# Patient Record
Sex: MALE | Race: WHITE | Employment: UNEMPLOYED | URBAN - METROPOLITAN AREA
[De-identification: names, ages, dates, MRNs, and addresses within clinical notes are randomized per-mention and may not be internally consistent; named-entity substitution may affect disease eponyms.]

---

## 2017-01-20 ENCOUNTER — HOSPITAL ENCOUNTER (OUTPATIENT)
Dept: GENERAL RADIOLOGY | Age: 57
Discharge: HOME | End: 2017-01-20
Payer: COMMERCIAL

## 2017-01-20 ENCOUNTER — OFFICE VISIT (OUTPATIENT)
Dept: INTERNAL MEDICINE | Age: 57
End: 2017-01-20

## 2017-01-20 VITALS
BODY MASS INDEX: 31.64 KG/M2 | SYSTOLIC BLOOD PRESSURE: 140 MMHG | DIASTOLIC BLOOD PRESSURE: 90 MMHG | TEMPERATURE: 97.8 F | OXYGEN SATURATION: 96 % | WEIGHT: 196 LBS | HEART RATE: 96 BPM

## 2017-01-20 DIAGNOSIS — E78.5 HYPERLIPIDEMIA, UNSPECIFIED HYPERLIPIDEMIA TYPE: ICD-10-CM

## 2017-01-20 DIAGNOSIS — I10 ESSENTIAL HYPERTENSION: ICD-10-CM

## 2017-01-20 DIAGNOSIS — E78.5 HYPERLIPIDEMIA, UNSPECIFIED HYPERLIPIDEMIA TYPE: Primary | ICD-10-CM

## 2017-01-20 DIAGNOSIS — R09.89 CHEST CONGESTION: ICD-10-CM

## 2017-01-20 LAB
ALBUMIN SERPL-MCNC: 4 G/DL (ref 3.9–4.9)
ALP BLD-CCNC: 177 U/L (ref 35–104)
ALT SERPL-CCNC: 34 U/L (ref 0–41)
ANION GAP SERPL CALCULATED.3IONS-SCNC: 13 MEQ/L (ref 7–13)
ANTISTREPTOLYSIN-O: <20 IU/ML (ref 0–200)
AST SERPL-CCNC: 30 U/L (ref 0–40)
BILIRUB SERPL-MCNC: 0.3 MG/DL (ref 0–1.2)
BUN BLDV-MCNC: 23 MG/DL (ref 6–20)
C-REACTIVE PROTEIN: 1 MG/L (ref 0–5)
CALCIUM SERPL-MCNC: 8.8 MG/DL (ref 8.6–10.2)
CHLORIDE BLD-SCNC: 102 MEQ/L (ref 98–107)
CO2: 24 MEQ/L (ref 22–29)
CREAT SERPL-MCNC: 1.23 MG/DL (ref 0.7–1.2)
GFR AFRICAN AMERICAN: >60
GFR NON-AFRICAN AMERICAN: >60
GLOBULIN: 2.5 G/DL (ref 2.3–3.5)
GLUCOSE BLD-MCNC: 211 MG/DL (ref 74–109)
HBA1C MFR BLD: 6.3 % (ref 4.8–5.9)
HCT VFR BLD CALC: 37.9 % (ref 42–52)
HEMOGLOBIN: 12.3 G/DL (ref 14–18)
MCH RBC QN AUTO: 28.9 PG (ref 27–31.3)
MCHC RBC AUTO-ENTMCNC: 32.6 % (ref 33–37)
MCV RBC AUTO: 88.7 FL (ref 80–100)
MONO TEST: NEGATIVE
PDW BLD-RTO: 13.6 % (ref 11.5–14.5)
PLATELET # BLD: 234 K/UL (ref 130–400)
POTASSIUM SERPL-SCNC: 5 MEQ/L (ref 3.5–5.1)
RBC # BLD: 4.27 M/UL (ref 4.7–6.1)
SODIUM BLD-SCNC: 139 MEQ/L (ref 132–144)
TOTAL PROTEIN: 6.5 G/DL (ref 6.4–8.1)
WBC # BLD: 8.5 K/UL (ref 4.8–10.8)

## 2017-01-20 PROCEDURE — 99213 OFFICE O/P EST LOW 20 MIN: CPT | Performed by: INTERNAL MEDICINE

## 2017-01-20 PROCEDURE — 71020 XR CHEST STANDARD TWO VW: CPT

## 2017-01-20 RX ORDER — NICOTINE POLACRILEX 4 MG
15 LOZENGE BUCCAL
COMMUNITY
Start: 2015-11-11 | End: 2017-05-01 | Stop reason: ALTCHOICE

## 2017-01-20 RX ORDER — SYRINGE AND NEEDLE,INSULIN,1ML 29 G X1/2"
SYRINGE, EMPTY DISPOSABLE MISCELLANEOUS
COMMUNITY
Start: 2016-12-21 | End: 2021-10-25

## 2017-01-20 RX ORDER — AZITHROMYCIN 250 MG/1
TABLET, FILM COATED ORAL
Qty: 1 PACKET | Refills: 0 | Status: SHIPPED | OUTPATIENT
Start: 2017-01-20 | End: 2017-01-30

## 2017-01-20 ASSESSMENT — ENCOUNTER SYMPTOMS
SINUS PRESSURE: 1
ABDOMINAL PAIN: 0
VOMITING: 0
ANAL BLEEDING: 0
BACK PAIN: 0
TROUBLE SWALLOWING: 0
SHORTNESS OF BREATH: 1
SORE THROAT: 0
NAUSEA: 1
COUGH: 1
CONSTIPATION: 0
DIARRHEA: 0

## 2017-03-07 ENCOUNTER — OFFICE VISIT (OUTPATIENT)
Dept: INTERNAL MEDICINE | Age: 57
End: 2017-03-07

## 2017-03-07 VITALS
HEART RATE: 84 BPM | OXYGEN SATURATION: 98 % | WEIGHT: 195.4 LBS | TEMPERATURE: 94.9 F | SYSTOLIC BLOOD PRESSURE: 140 MMHG | BODY MASS INDEX: 31.54 KG/M2 | DIASTOLIC BLOOD PRESSURE: 100 MMHG

## 2017-03-07 DIAGNOSIS — I10 ESSENTIAL HYPERTENSION: Primary | ICD-10-CM

## 2017-03-07 DIAGNOSIS — I63.9 CEREBROVASCULAR ACCIDENT (CVA), UNSPECIFIED MECHANISM (HCC): ICD-10-CM

## 2017-03-07 DIAGNOSIS — R09.81 SINUS CONGESTION: ICD-10-CM

## 2017-03-07 DIAGNOSIS — R53.1 WEAKNESS: ICD-10-CM

## 2017-03-07 DIAGNOSIS — E78.5 HYPERLIPIDEMIA, UNSPECIFIED HYPERLIPIDEMIA TYPE: ICD-10-CM

## 2017-03-07 PROCEDURE — 99214 OFFICE O/P EST MOD 30 MIN: CPT | Performed by: INTERNAL MEDICINE

## 2017-03-07 PROCEDURE — 96372 THER/PROPH/DIAG INJ SC/IM: CPT | Performed by: INTERNAL MEDICINE

## 2017-03-07 RX ORDER — FEXOFENADINE HCL 180 MG/1
180 TABLET ORAL DAILY
Qty: 30 TABLET | Refills: 3 | Status: SHIPPED | OUTPATIENT
Start: 2017-03-07 | End: 2017-12-12 | Stop reason: ALTCHOICE

## 2017-03-07 RX ORDER — CYANOCOBALAMIN 1000 UG/ML
1000 INJECTION INTRAMUSCULAR; SUBCUTANEOUS ONCE
Status: COMPLETED | OUTPATIENT
Start: 2017-03-07 | End: 2017-03-07

## 2017-03-07 RX ADMIN — CYANOCOBALAMIN 1000 MCG: 1000 INJECTION INTRAMUSCULAR; SUBCUTANEOUS at 11:20

## 2017-03-07 ASSESSMENT — ENCOUNTER SYMPTOMS
COLOR CHANGE: 0
WHEEZING: 0
TROUBLE SWALLOWING: 0
ABDOMINAL PAIN: 0
NAUSEA: 0
ANAL BLEEDING: 0
BACK PAIN: 0
SORE THROAT: 0
SHORTNESS OF BREATH: 1
CONSTIPATION: 0
COUGH: 0
DIARRHEA: 0
VOMITING: 0

## 2017-03-07 ASSESSMENT — PATIENT HEALTH QUESTIONNAIRE - PHQ9
1. LITTLE INTEREST OR PLEASURE IN DOING THINGS: 0
SUM OF ALL RESPONSES TO PHQ QUESTIONS 1-9: 0
2. FEELING DOWN, DEPRESSED OR HOPELESS: 0
SUM OF ALL RESPONSES TO PHQ9 QUESTIONS 1 & 2: 0

## 2017-04-03 ENCOUNTER — OFFICE VISIT (OUTPATIENT)
Dept: INTERNAL MEDICINE | Age: 57
End: 2017-04-03

## 2017-04-03 VITALS
HEART RATE: 87 BPM | SYSTOLIC BLOOD PRESSURE: 172 MMHG | DIASTOLIC BLOOD PRESSURE: 100 MMHG | HEIGHT: 66 IN | OXYGEN SATURATION: 97 % | TEMPERATURE: 97.6 F | BODY MASS INDEX: 31.66 KG/M2 | WEIGHT: 197 LBS

## 2017-04-03 DIAGNOSIS — I69.354 HEMIPARESIS AFFECTING LEFT SIDE AS LATE EFFECT OF CEREBROVASCULAR ACCIDENT (CVA) (HCC): Chronic | ICD-10-CM

## 2017-04-03 DIAGNOSIS — I10 HYPERTENSION, UNCONTROLLED: Primary | ICD-10-CM

## 2017-04-03 PROCEDURE — 99213 OFFICE O/P EST LOW 20 MIN: CPT | Performed by: INTERNAL MEDICINE

## 2017-04-03 RX ORDER — VALSARTAN AND HYDROCHLOROTHIAZIDE 80; 12.5 MG/1; MG/1
1 TABLET, FILM COATED ORAL DAILY
Qty: 30 TABLET | Refills: 3 | Status: SHIPPED | OUTPATIENT
Start: 2017-04-03 | End: 2017-05-01

## 2017-04-03 ASSESSMENT — ENCOUNTER SYMPTOMS
BLOOD IN STOOL: 0
COUGH: 0
TROUBLE SWALLOWING: 0
ORTHOPNEA: 0
SHORTNESS OF BREATH: 0
CHOKING: 0
BACK PAIN: 0
RESPIRATORY NEGATIVE: 1
ABDOMINAL PAIN: 0

## 2017-05-01 ENCOUNTER — OFFICE VISIT (OUTPATIENT)
Dept: INTERNAL MEDICINE | Age: 57
End: 2017-05-01

## 2017-05-01 VITALS
OXYGEN SATURATION: 99 % | SYSTOLIC BLOOD PRESSURE: 140 MMHG | RESPIRATION RATE: 16 BRPM | HEIGHT: 66 IN | TEMPERATURE: 98.7 F | BODY MASS INDEX: 31.18 KG/M2 | DIASTOLIC BLOOD PRESSURE: 100 MMHG | WEIGHT: 194 LBS | HEART RATE: 84 BPM

## 2017-05-01 DIAGNOSIS — E78.2 MIXED HYPERLIPIDEMIA: Chronic | ICD-10-CM

## 2017-05-01 DIAGNOSIS — I10 HYPERTENSION, UNCONTROLLED: Primary | Chronic | ICD-10-CM

## 2017-05-01 PROCEDURE — 99213 OFFICE O/P EST LOW 20 MIN: CPT | Performed by: INTERNAL MEDICINE

## 2017-05-01 RX ORDER — BENZOCAINE/MENTHOL 6 MG-10 MG
LOZENGE MUCOUS MEMBRANE
Refills: 0 | COMMUNITY
Start: 2017-04-03 | End: 2017-09-12 | Stop reason: ALTCHOICE

## 2017-05-01 RX ORDER — ECHINACEA PURPUREA EXTRACT 125 MG
1 TABLET ORAL 4 TIMES DAILY PRN
Qty: 1 BOTTLE | Refills: 3 | Status: SHIPPED | OUTPATIENT
Start: 2017-05-01 | End: 2019-06-24 | Stop reason: ALTCHOICE

## 2017-05-01 RX ORDER — ATORVASTATIN CALCIUM 80 MG/1
TABLET, FILM COATED ORAL
Qty: 90 TABLET | Refills: 1 | Status: SHIPPED | OUTPATIENT
Start: 2017-05-01 | End: 2017-12-16 | Stop reason: SDUPTHER

## 2017-05-01 RX ORDER — LOSARTAN POTASSIUM 100 MG/1
100 TABLET ORAL DAILY
Qty: 90 TABLET | Refills: 1 | Status: SHIPPED | OUTPATIENT
Start: 2017-05-01 | End: 2017-07-07

## 2017-05-01 RX ORDER — ATORVASTATIN CALCIUM 80 MG/1
TABLET, FILM COATED ORAL
Qty: 90 TABLET | Refills: 1 | Status: CANCELLED | OUTPATIENT
Start: 2017-05-01

## 2017-05-01 RX ORDER — LOSARTAN POTASSIUM 50 MG/1
50 TABLET ORAL DAILY
Refills: 6 | COMMUNITY
Start: 2017-04-19 | End: 2017-05-01 | Stop reason: SDUPTHER

## 2017-05-01 ASSESSMENT — ENCOUNTER SYMPTOMS
BLOOD IN STOOL: 0
SHORTNESS OF BREATH: 0
BACK PAIN: 0
ORTHOPNEA: 0
TROUBLE SWALLOWING: 0
COUGH: 0
CHOKING: 0
ABDOMINAL PAIN: 0
RESPIRATORY NEGATIVE: 1

## 2017-06-30 RX ORDER — VALSARTAN AND HYDROCHLOROTHIAZIDE 80; 12.5 MG/1; MG/1
TABLET, FILM COATED ORAL
COMMUNITY
Start: 2017-06-29 | End: 2017-07-07

## 2017-08-01 ENCOUNTER — OFFICE VISIT (OUTPATIENT)
Dept: INTERNAL MEDICINE | Age: 57
End: 2017-08-01

## 2017-08-01 VITALS
HEART RATE: 85 BPM | WEIGHT: 194 LBS | OXYGEN SATURATION: 95 % | TEMPERATURE: 98.4 F | HEIGHT: 66 IN | BODY MASS INDEX: 31.18 KG/M2 | SYSTOLIC BLOOD PRESSURE: 138 MMHG | DIASTOLIC BLOOD PRESSURE: 88 MMHG

## 2017-08-01 DIAGNOSIS — E78.2 MIXED HYPERLIPIDEMIA: Chronic | ICD-10-CM

## 2017-08-01 DIAGNOSIS — I10 ESSENTIAL HYPERTENSION: Primary | Chronic | ICD-10-CM

## 2017-08-01 PROCEDURE — 99213 OFFICE O/P EST LOW 20 MIN: CPT | Performed by: INTERNAL MEDICINE

## 2017-08-01 ASSESSMENT — ENCOUNTER SYMPTOMS
TROUBLE SWALLOWING: 0
BLOOD IN STOOL: 0
ABDOMINAL PAIN: 0
RESPIRATORY NEGATIVE: 1
ORTHOPNEA: 0
SHORTNESS OF BREATH: 0
COUGH: 0
BACK PAIN: 0
CHOKING: 0

## 2017-09-12 ENCOUNTER — OFFICE VISIT (OUTPATIENT)
Dept: INTERNAL MEDICINE | Age: 57
End: 2017-09-12

## 2017-09-12 VITALS
DIASTOLIC BLOOD PRESSURE: 80 MMHG | HEIGHT: 66 IN | SYSTOLIC BLOOD PRESSURE: 130 MMHG | WEIGHT: 185 LBS | HEART RATE: 90 BPM | BODY MASS INDEX: 29.73 KG/M2 | TEMPERATURE: 97.4 F | OXYGEN SATURATION: 99 %

## 2017-09-12 DIAGNOSIS — M21.42 PES PLANUS OF BOTH FEET: ICD-10-CM

## 2017-09-12 DIAGNOSIS — Z79.4 CONTROLLED TYPE 2 DIABETES MELLITUS WITHOUT COMPLICATION, WITH LONG-TERM CURRENT USE OF INSULIN (HCC): Primary | Chronic | ICD-10-CM

## 2017-09-12 DIAGNOSIS — M21.41 PES PLANUS OF BOTH FEET: ICD-10-CM

## 2017-09-12 DIAGNOSIS — I10 ESSENTIAL HYPERTENSION: Chronic | ICD-10-CM

## 2017-09-12 DIAGNOSIS — E11.9 CONTROLLED TYPE 2 DIABETES MELLITUS WITHOUT COMPLICATION, WITH LONG-TERM CURRENT USE OF INSULIN (HCC): Primary | Chronic | ICD-10-CM

## 2017-09-12 PROCEDURE — 99213 OFFICE O/P EST LOW 20 MIN: CPT | Performed by: INTERNAL MEDICINE

## 2017-09-12 RX ORDER — ASPIRIN 81 MG/1
TABLET ORAL
COMMUNITY
Start: 2017-08-16 | End: 2017-09-12 | Stop reason: SDUPTHER

## 2017-09-12 ASSESSMENT — ENCOUNTER SYMPTOMS
RESPIRATORY NEGATIVE: 1
BLOOD IN STOOL: 0
BLURRED VISION: 0
ABDOMINAL PAIN: 0
SHORTNESS OF BREATH: 0
CHOKING: 0
BACK PAIN: 0
COUGH: 0
TROUBLE SWALLOWING: 0

## 2017-09-13 DIAGNOSIS — Z79.4 CONTROLLED TYPE 2 DIABETES MELLITUS WITHOUT COMPLICATION, WITH LONG-TERM CURRENT USE OF INSULIN (HCC): Primary | ICD-10-CM

## 2017-09-13 DIAGNOSIS — E11.9 CONTROLLED TYPE 2 DIABETES MELLITUS WITHOUT COMPLICATION, WITH LONG-TERM CURRENT USE OF INSULIN (HCC): Primary | ICD-10-CM

## 2017-09-13 LAB — HBA1C MFR BLD: 5.4 %

## 2017-09-13 PROCEDURE — 83036 HEMOGLOBIN GLYCOSYLATED A1C: CPT | Performed by: INTERNAL MEDICINE

## 2017-11-01 DIAGNOSIS — E11.9 CONTROLLED TYPE 2 DIABETES MELLITUS WITHOUT COMPLICATION, WITH LONG-TERM CURRENT USE OF INSULIN (HCC): Chronic | ICD-10-CM

## 2017-11-01 DIAGNOSIS — Z79.4 CONTROLLED TYPE 2 DIABETES MELLITUS WITHOUT COMPLICATION, WITH LONG-TERM CURRENT USE OF INSULIN (HCC): Chronic | ICD-10-CM

## 2017-11-01 LAB
CHOLESTEROL, TOTAL: 117 MG/DL (ref 0–199)
HDLC SERPL-MCNC: 58 MG/DL (ref 40–59)
LDL CHOLESTEROL CALCULATED: 41 MG/DL (ref 0–129)
TRIGL SERPL-MCNC: 90 MG/DL (ref 0–200)

## 2017-12-12 ENCOUNTER — OFFICE VISIT (OUTPATIENT)
Dept: INTERNAL MEDICINE | Age: 57
End: 2017-12-12

## 2017-12-12 VITALS
HEIGHT: 66 IN | DIASTOLIC BLOOD PRESSURE: 100 MMHG | WEIGHT: 184 LBS | HEART RATE: 90 BPM | SYSTOLIC BLOOD PRESSURE: 162 MMHG | TEMPERATURE: 96.7 F | BODY MASS INDEX: 29.57 KG/M2

## 2017-12-12 DIAGNOSIS — I10 UNCONTROLLED HYPERTENSION: Primary | ICD-10-CM

## 2017-12-12 DIAGNOSIS — J30.89 CHRONIC NON-SEASONAL ALLERGIC RHINITIS, UNSPECIFIED TRIGGER: Chronic | ICD-10-CM

## 2017-12-12 PROCEDURE — 99213 OFFICE O/P EST LOW 20 MIN: CPT | Performed by: INTERNAL MEDICINE

## 2017-12-12 PROCEDURE — 3017F COLORECTAL CA SCREEN DOC REV: CPT | Performed by: INTERNAL MEDICINE

## 2017-12-12 PROCEDURE — G8484 FLU IMMUNIZE NO ADMIN: HCPCS | Performed by: INTERNAL MEDICINE

## 2017-12-12 PROCEDURE — G8427 DOCREV CUR MEDS BY ELIG CLIN: HCPCS | Performed by: INTERNAL MEDICINE

## 2017-12-12 PROCEDURE — G8598 ASA/ANTIPLAT THER USED: HCPCS | Performed by: INTERNAL MEDICINE

## 2017-12-12 PROCEDURE — 1036F TOBACCO NON-USER: CPT | Performed by: INTERNAL MEDICINE

## 2017-12-12 PROCEDURE — G8417 CALC BMI ABV UP PARAM F/U: HCPCS | Performed by: INTERNAL MEDICINE

## 2017-12-12 RX ORDER — MONTELUKAST SODIUM 10 MG/1
10 TABLET ORAL NIGHTLY
Qty: 30 TABLET | Refills: 3 | Status: SHIPPED | OUTPATIENT
Start: 2017-12-12 | End: 2018-04-02 | Stop reason: SDUPTHER

## 2017-12-12 ASSESSMENT — ENCOUNTER SYMPTOMS
RESPIRATORY NEGATIVE: 1
CHOKING: 0
BLOOD IN STOOL: 0
SINUS PRESSURE: 0
ORTHOPNEA: 0
RHINORRHEA: 1
COUGH: 0
SINUS PAIN: 0
ABDOMINAL PAIN: 0
SHORTNESS OF BREATH: 0
BACK PAIN: 0
TROUBLE SWALLOWING: 0

## 2017-12-12 NOTE — PROGRESS NOTES
Travis Estrada is a 56 y.o. male with history of recurrent strokes, diabetes mellitus, who presents with     Chief Complaint   Patient presents with    Hypertension    Nasal Congestion     Since the past office visit, the patient has continued to live independently. He comes today to the office visit accompanied with his mother. He had no accidents and no emergency room visits or hospital admissions. He states he takes his medications daily, does not check blood pressures at home. The following laboratory reports since the past visit were reviewed (the ones pertinent to today's visit were discussed with the patient):    Orders Only on 11/01/2017   Component Date Value Ref Range Status    Cholesterol, Total 11/01/2017 117  0 - 199 mg/dL Final    Triglycerides 11/01/2017 90  0 - 200 mg/dL Final    HDL 11/01/2017 58  40 - 59 mg/dL Final    Comment: ATP III HDL Cholesterol Classification is Desirable. Expected Values:    Males:    >55 = No Risk            35-55 = Moderate Risk            <35 = High Risk    Females:  >65 = No Risk            45-65 = Moderate Risk            <45 = High Risk    NCEP Guidelines: Third Report May 2001  >59 = negative risk factor for CHD  <40 = major risk factor for CHD      LDL Calculated 11/01/2017 41  0 - 129 mg/dL Final   Orders Only on 09/13/2017   Component Date Value Ref Range Status    Hemoglobin A1C 09/13/2017 5.4  % Final       HPI:    Hypertension   This is a chronic problem. The current episode started more than 1 month ago. The problem is resistant. Associated symptoms include anxiety. Pertinent negatives include no chest pain, headaches, malaise/fatigue, neck pain, orthopnea, palpitations, peripheral edema, PND or shortness of breath. There are no associated agents to hypertension. Risk factors for coronary artery disease include male gender, sedentary lifestyle, stress, diabetes mellitus and obesity. Past treatments include angiotensin blockers and diuretics.  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 No pertinent family history. Family and social history were reviewed and pertinent changes documented. ALLERGIES    Review of patient's allergies indicates no known allergies. Current Outpatient Prescriptions on File Prior to Visit   Medication Sig Dispense Refill    aspirin 81 MG tablet Take 1 tablet by mouth 2 times daily 60 tablet 5    insulin 70-30 (HUMULIN;NOVOLIN) (70-30) 100 UNIT per ML injection vial Inject 20 Units subcutaneously before breakfast and 20 Units before dinner. 1 vial 5    valsartan-hydrochlorothiazide (DIOVAN-HCT) 80-12.5 MG per tablet TAKE 1 TABLET BY MOUTH DAILY 30 tablet 5    atorvastatin (LIPITOR) 80 MG tablet TAKE 1 TABLET BY MOUTH DAILY 90 tablet 1    budesonide (RHINOCORT AQUA) 32 MCG/ACT nasal spray 1 spray by Nasal route daily 1 Bottle 2    sodium chloride (ALTAMIST SPRAY) 0.65 % nasal spray 1 spray by Nasal route 4 times daily as needed for Congestion 1 Bottle 3    RELION INSULIN SYR 0.3CC/30G 30G X 5/16\" 0.3 ML MISC        No current facility-administered medications on file prior to visit. Review of Systems   Constitutional: Negative for activity change, appetite change and malaise/fatigue. HENT: Positive for congestion, hearing loss, postnasal drip and rhinorrhea. Negative for drooling, nosebleeds, sinus pain, sinus pressure and trouble swallowing. Eyes: Negative for visual disturbance. Respiratory: Negative. Negative for cough, choking and shortness of breath. Cardiovascular: Negative. Negative for chest pain, palpitations, orthopnea and PND. Gastrointestinal: Negative for abdominal pain and blood in stool.    Endocrine: Negative for cold (around 3/12/2018). I have reviewed the patient's medical and surgical, family and social history, health maintenance schedule, and updated the computerized patient record. Please note this report has been partially produced by using speech recognition hardware. It may contain errors related to the system, including grammar, punctuation and spelling as well as words and phrases that may seem inaccurate. For any questions or concerns, please feel free to contact me for clarification.         Electronically signed by Corey Wise MD

## 2017-12-12 NOTE — PATIENT INSTRUCTIONS
sodium\" may still have too much sodium. Be sure to read the label to see how much sodium you are getting. · Buy fresh vegetables, or frozen vegetables without added sauces. Buy low-sodium versions of canned vegetables, soups, and other canned goods. Prepare low-sodium meals  · Cut back on the amount of salt you use in cooking. This will help you adjust to the taste. Do not add salt after cooking. One teaspoon of salt has about 2,300 mg of sodium. · Take the salt shaker off the table. · Flavor your food with garlic, lemon juice, onion, vinegar, herbs, and spices. Do not use soy sauce, lite soy sauce, steak sauce, onion salt, garlic salt, celery salt, mustard, or ketchup on your food. · Use low-sodium salad dressings, sauces, and ketchup. Or make your own salad dressings and sauces without adding salt. · Use less salt (or none) when recipes call for it. You can often use half the salt a recipe calls for without losing flavor. Other foods such as rice, pasta, and grains do not need added salt. · Rinse canned vegetables, and cook them in fresh water. This removes some-but not all-of the salt. · Avoid water that is naturally high in sodium or that has been treated with water softeners, which add sodium. Call your local water company to find out the sodium content of your water supply. If you buy bottled water, read the label and choose a sodium-free brand. Avoid high-sodium foods  · Avoid eating:  ¨ Smoked, cured, salted, and canned meat, fish, and poultry. ¨ Ham, rosenbaum, hot dogs, and luncheon meats. ¨ Regular, hard, and processed cheese and regular peanut butter. ¨ Crackers with salted tops, and other salted snack foods such as pretzels, chips, and salted popcorn. ¨ Frozen prepared meals, unless labeled low-sodium. ¨ Canned and dried soups, broths, and bouillon, unless labeled sodium-free or low-sodium. ¨ Canned vegetables, unless labeled sodium-free or low-sodium.   ¨ Western Liliana fries, pizza, tacos, and other fast foods. ¨ Pickles, olives, ketchup, and other condiments, especially soy sauce, unless labeled sodium-free or low-sodium. Where can you learn more? Go to https://Ko[employer] [address].[employer]. org and sign in to your [employer] account. Enter N321 in the [facility] box to learn more about \"Low Sodium Diet (2,000 Milligram): Care Instructions. \"     If you do not have an account, please click on the \"Sign Up Now\" link. Current as of: May 12, 2017  Content Version: 11.4  © 1700-9163 [employer]. Care instructions adapted under license by [facility] ([facility]). If you have questions about a medical condition or this instruction, always ask your healthcare professional. [address] any warranty or liability for your use of this information. Patient Education        DASH Diet: Care Instructions  Your Care Instructions    The DASH diet is an eating plan that can help lower your blood pressure. DASH stands for Dietary Approaches to Stop Hypertension. Hypertension is high blood pressure. The DASH diet focuses on eating foods that are high in calcium, potassium, and magnesium. These nutrients can lower blood pressure. The foods that are highest in these nutrients are fruits, vegetables, low-fat dairy products, nuts, seeds, and legumes. But taking calcium, potassium, and magnesium supplements instead of eating foods that are high in those nutrients does not have the same effect. The DASH diet also includes whole grains, fish, and poultry. The DASH diet is one of several lifestyle changes your doctor may recommend to lower your high blood pressure. Your doctor may also want you to decrease the amount of sodium in your diet. Lowering sodium while following the DASH diet can lower blood pressure even further than just the DASH diet alone. Follow-up care is a key part of your treatment and safety.  Be sure to make and go to all appointments, and call your doctor if you are having problems. It's also a good idea to know your test results and keep a list of the medicines you take. How can you care for yourself at home? Following the DASH diet  · Eat 4 to 5 servings of fruit each day. A serving is 1 medium-sized piece of fruit, ½ cup chopped or canned fruit, 1/4 cup dried fruit, or 4 ounces (½ cup) of fruit juice. Choose fruit more often than fruit juice. · Eat 4 to 5 servings of vegetables each day. A serving is 1 cup of lettuce or raw leafy vegetables, ½ cup of chopped or cooked vegetables, or 4 ounces (½ cup) of vegetable juice. Choose vegetables more often than vegetable juice. · Get 2 to 3 servings of low-fat and fat-free dairy each day. A serving is 8 ounces of milk, 1 cup of yogurt, or 1 ½ ounces of cheese. · Eat 6 to 8 servings of grains each day. A serving is 1 slice of bread, 1 ounce of dry cereal, or ½ cup of cooked rice, pasta, or cooked cereal. Try to choose whole-grain products as much as possible. · Limit lean meat, poultry, and fish to 2 servings each day. A serving is 3 ounces, about the size of a deck of cards. · Eat 4 to 5 servings of nuts, seeds, and legumes (cooked dried beans, lentils, and split peas) each week. A serving is 1/3 cup of nuts, 2 tablespoons of seeds, or ½ cup of cooked beans or peas. · Limit fats and oils to 2 to 3 servings each day. A serving is 1 teaspoon of vegetable oil or 2 tablespoons of salad dressing. · Limit sweets and added sugars to 5 servings or less a week. A serving is 1 tablespoon jelly or jam, ½ cup sorbet, or 1 cup of lemonade. · Eat less than 2,300 milligrams (mg) of sodium a day. If you limit your sodium to 1,500 mg a day, you can lower your blood pressure even more. Tips for success  · Start small. Do not try to make dramatic changes to your diet all at once. You might feel that you are missing out on your favorite foods and then be more likely to not follow the plan. Make small changes, and stick with them.  Once those changes become habit, add a few more changes. · Try some of the following:  ¨ Make it a goal to eat a fruit or vegetable at every meal and at snacks. This will make it easy to get the recommended amount of fruits and vegetables each day. ¨ Try yogurt topped with fruit and nuts for a snack or healthy dessert. ¨ Add lettuce, tomato, cucumber, and onion to sandwiches. ¨ Combine a ready-made pizza crust with low-fat mozzarella cheese and lots of vegetable toppings. Try using tomatoes, squash, spinach, broccoli, carrots, cauliflower, and onions. ¨ Have a variety of cut-up vegetables with a low-fat dip as an appetizer instead of chips and dip. ¨ Sprinkle sunflower seeds or chopped almonds over salads. Or try adding chopped walnuts or almonds to cooked vegetables. ¨ Try some vegetarian meals using beans and peas. Add garbanzo or kidney beans to salads. Make burritos and tacos with mashed awad beans or black beans. Where can you learn more? Go to https://ViewdlepepicewTRAKLOK.[employer]. org and sign in to your [employer] account. Enter E926 in the [employer] box to learn more about \"DASH Diet: Care Instructions. \"     If you do not have an account, please click on the \"Sign Up Now\" link. Current as of: September 21, 2016  Content Version: 11.4  © 9928-8081 [employer]. Care instructions adapted under license by [facility] ([facility]). If you have questions about a medical condition or this instruction, always ask your healthcare professional. [place] [address] any warranty or liability for your use of this information. Patient Education        Learning About High Blood Pressure  What is high blood pressure? Blood pressure is a measure of how hard the blood pushes against the walls of your arteries. It's normal for blood pressure to go up and down throughout the day, but if it stays up, you have high blood pressure. Another name for high blood pressure is hypertension.   Two numbers tell

## 2017-12-18 RX ORDER — ATORVASTATIN CALCIUM 80 MG/1
TABLET, FILM COATED ORAL
Qty: 90 TABLET | Refills: 1 | Status: SHIPPED | OUTPATIENT
Start: 2017-12-18 | End: 2018-04-03 | Stop reason: SDUPTHER

## 2017-12-19 RX ORDER — VALSARTAN AND HYDROCHLOROTHIAZIDE 80; 12.5 MG/1; MG/1
TABLET, FILM COATED ORAL
Qty: 30 TABLET | Refills: 5 | Status: SHIPPED | OUTPATIENT
Start: 2017-12-19 | End: 2018-01-16 | Stop reason: SDUPTHER

## 2018-01-16 RX ORDER — VALSARTAN AND HYDROCHLOROTHIAZIDE 80; 12.5 MG/1; MG/1
TABLET, FILM COATED ORAL
Qty: 30 TABLET | Refills: 5 | Status: SHIPPED | OUTPATIENT
Start: 2018-01-16 | End: 2018-07-03 | Stop reason: SDUPTHER

## 2018-01-16 RX ORDER — ASPIRIN 81 MG/1
TABLET ORAL
Refills: 5 | COMMUNITY
Start: 2018-01-10 | End: 2018-07-03 | Stop reason: SDUPTHER

## 2018-04-02 RX ORDER — MONTELUKAST SODIUM 10 MG/1
10 TABLET ORAL NIGHTLY
Qty: 30 TABLET | Refills: 3 | Status: SHIPPED | OUTPATIENT
Start: 2018-04-02 | End: 2019-06-24 | Stop reason: ALTCHOICE

## 2018-04-03 ENCOUNTER — OFFICE VISIT (OUTPATIENT)
Dept: INTERNAL MEDICINE CLINIC | Age: 58
End: 2018-04-03
Payer: MEDICARE

## 2018-04-03 VITALS
BODY MASS INDEX: 29.57 KG/M2 | DIASTOLIC BLOOD PRESSURE: 94 MMHG | OXYGEN SATURATION: 98 % | TEMPERATURE: 97.7 F | HEIGHT: 66 IN | HEART RATE: 80 BPM | WEIGHT: 184 LBS | SYSTOLIC BLOOD PRESSURE: 158 MMHG

## 2018-04-03 DIAGNOSIS — Z79.4 CONTROLLED TYPE 2 DIABETES MELLITUS WITHOUT COMPLICATION, WITH LONG-TERM CURRENT USE OF INSULIN (HCC): Primary | ICD-10-CM

## 2018-04-03 DIAGNOSIS — E11.9 CONTROLLED TYPE 2 DIABETES MELLITUS WITHOUT COMPLICATION, WITH LONG-TERM CURRENT USE OF INSULIN (HCC): Primary | ICD-10-CM

## 2018-04-03 LAB — HBA1C MFR BLD: 6.3 %

## 2018-04-03 PROCEDURE — 83036 HEMOGLOBIN GLYCOSYLATED A1C: CPT | Performed by: INTERNAL MEDICINE

## 2018-04-03 PROCEDURE — 99213 OFFICE O/P EST LOW 20 MIN: CPT | Performed by: INTERNAL MEDICINE

## 2018-04-03 RX ORDER — ATORVASTATIN CALCIUM 80 MG/1
TABLET, FILM COATED ORAL
Qty: 90 TABLET | Refills: 1 | Status: SHIPPED | OUTPATIENT
Start: 2018-04-03 | End: 2018-06-30 | Stop reason: SDUPTHER

## 2018-04-03 ASSESSMENT — ENCOUNTER SYMPTOMS
SHORTNESS OF BREATH: 0
SINUS PRESSURE: 0
COUGH: 0
ABDOMINAL PAIN: 0
SINUS PAIN: 0
EYE ITCHING: 0
BLURRED VISION: 0
EYE PAIN: 0
EYE REDNESS: 0
RESPIRATORY NEGATIVE: 1
EYE DISCHARGE: 0
BLOOD IN STOOL: 0
TROUBLE SWALLOWING: 0
CHOKING: 0
RHINORRHEA: 1
ABDOMINAL DISTENTION: 0
BACK PAIN: 0

## 2018-04-03 ASSESSMENT — PATIENT HEALTH QUESTIONNAIRE - PHQ9
2. FEELING DOWN, DEPRESSED OR HOPELESS: 0
1. LITTLE INTEREST OR PLEASURE IN DOING THINGS: 0
SUM OF ALL RESPONSES TO PHQ9 QUESTIONS 1 & 2: 0
SUM OF ALL RESPONSES TO PHQ QUESTIONS 1-9: 0

## 2018-04-09 ENCOUNTER — HOSPITAL ENCOUNTER (OUTPATIENT)
Dept: ULTRASOUND IMAGING | Age: 58
Discharge: HOME | End: 2018-04-11
Payer: MEDICARE

## 2018-04-09 DIAGNOSIS — I65.23 BILATERAL CAROTID ARTERY STENOSIS: ICD-10-CM

## 2018-04-09 PROCEDURE — 93880 EXTRACRANIAL BILAT STUDY: CPT

## 2018-04-16 DIAGNOSIS — Z79.4 CONTROLLED TYPE 2 DIABETES MELLITUS WITHOUT COMPLICATION, WITH LONG-TERM CURRENT USE OF INSULIN (HCC): ICD-10-CM

## 2018-04-16 DIAGNOSIS — E11.9 CONTROLLED TYPE 2 DIABETES MELLITUS WITHOUT COMPLICATION, WITH LONG-TERM CURRENT USE OF INSULIN (HCC): ICD-10-CM

## 2018-04-16 PROBLEM — R80.9 MICROALBUMINURIA: Status: ACTIVE | Noted: 2018-01-01

## 2018-04-16 LAB
CREATININE URINE: 74.1 MG/DL
MICROALBUMIN UR-MCNC: 2.1 MG/DL
MICROALBUMIN/CREAT UR-RTO: 28.3 MG/G (ref 0–30)

## 2018-07-02 RX ORDER — ATORVASTATIN CALCIUM 80 MG/1
TABLET, FILM COATED ORAL
Qty: 90 TABLET | Refills: 1 | Status: SHIPPED | OUTPATIENT
Start: 2018-07-02 | End: 2018-12-11 | Stop reason: SDUPTHER

## 2018-07-03 ENCOUNTER — OFFICE VISIT (OUTPATIENT)
Dept: INTERNAL MEDICINE CLINIC | Age: 58
End: 2018-07-03
Payer: MEDICARE

## 2018-07-03 VITALS
DIASTOLIC BLOOD PRESSURE: 90 MMHG | SYSTOLIC BLOOD PRESSURE: 130 MMHG | HEIGHT: 66 IN | HEART RATE: 76 BPM | TEMPERATURE: 98.5 F | BODY MASS INDEX: 29.09 KG/M2 | WEIGHT: 181 LBS | OXYGEN SATURATION: 98 %

## 2018-07-03 DIAGNOSIS — I10 ESSENTIAL HYPERTENSION: Chronic | ICD-10-CM

## 2018-07-03 DIAGNOSIS — E11.29 CONTROLLED TYPE 2 DIABETES MELLITUS WITH MICROALBUMINURIA, WITH LONG-TERM CURRENT USE OF INSULIN (HCC): Primary | Chronic | ICD-10-CM

## 2018-07-03 DIAGNOSIS — R80.9 CONTROLLED TYPE 2 DIABETES MELLITUS WITH MICROALBUMINURIA, WITH LONG-TERM CURRENT USE OF INSULIN (HCC): Primary | Chronic | ICD-10-CM

## 2018-07-03 DIAGNOSIS — Z79.4 CONTROLLED TYPE 2 DIABETES MELLITUS WITH MICROALBUMINURIA, WITH LONG-TERM CURRENT USE OF INSULIN (HCC): Primary | Chronic | ICD-10-CM

## 2018-07-03 PROCEDURE — 99214 OFFICE O/P EST MOD 30 MIN: CPT | Performed by: INTERNAL MEDICINE

## 2018-07-03 PROCEDURE — 3044F HG A1C LEVEL LT 7.0%: CPT | Performed by: INTERNAL MEDICINE

## 2018-07-03 PROCEDURE — G8417 CALC BMI ABV UP PARAM F/U: HCPCS | Performed by: INTERNAL MEDICINE

## 2018-07-03 PROCEDURE — 3017F COLORECTAL CA SCREEN DOC REV: CPT | Performed by: INTERNAL MEDICINE

## 2018-07-03 PROCEDURE — G8598 ASA/ANTIPLAT THER USED: HCPCS | Performed by: INTERNAL MEDICINE

## 2018-07-03 PROCEDURE — 1036F TOBACCO NON-USER: CPT | Performed by: INTERNAL MEDICINE

## 2018-07-03 PROCEDURE — 2022F DILAT RTA XM EVC RTNOPTHY: CPT | Performed by: INTERNAL MEDICINE

## 2018-07-03 PROCEDURE — G8427 DOCREV CUR MEDS BY ELIG CLIN: HCPCS | Performed by: INTERNAL MEDICINE

## 2018-07-03 RX ORDER — VALSARTAN AND HYDROCHLOROTHIAZIDE 80; 12.5 MG/1; MG/1
TABLET, FILM COATED ORAL
Qty: 90 TABLET | Refills: 1 | Status: SHIPPED | OUTPATIENT
Start: 2018-07-03 | End: 2018-09-04

## 2018-07-03 RX ORDER — ASPIRIN 81 MG/1
162 TABLET ORAL DAILY
Qty: 30 TABLET | Refills: 5
Start: 2018-07-03 | End: 2019-06-24 | Stop reason: SDUPTHER

## 2018-07-03 ASSESSMENT — ENCOUNTER SYMPTOMS
EYE REDNESS: 0
ABDOMINAL PAIN: 0
SINUS PRESSURE: 0
CHOKING: 0
EYE DISCHARGE: 0
EYE PAIN: 0
SHORTNESS OF BREATH: 0
EYE ITCHING: 0
BLOOD IN STOOL: 0
BLURRED VISION: 0
TROUBLE SWALLOWING: 0
BACK PAIN: 0
VOICE CHANGE: 0
ABDOMINAL DISTENTION: 0
COUGH: 0

## 2018-07-03 NOTE — PROGRESS NOTES
Darshan Restrepo is a 56 y.o. male nonsmoker, history of recurrent CVA, small vessel cerebrovascular disease, hearing loss, who presents with     Chief Complaint   Patient presents with    Diabetes     lost weight, passed out  x 1 a month ago, June 14, sugar was below 100 in AM, gave himself insulin then went to the kitchen to have breakfast, fell on the floor, no injury    Hypertension    Referral - General     [facility]    Medication Refill       Interim history: Since past office visit, the patient remained independent at home, no emergency room or hospital admission. Comes to the office visit today accompanied by mother. Overall, feels he is doing well and he lost significant amount of weight. The following laboratory reports since the past visit were reviewed (the ones pertinent to today's visit were discussed with the patient):    Orders Only on 04/16/2018   Component Date Value Ref Range Status    Microalbumin, Random Urine 04/16/2018 2.10* Not Established mg/dL Final    Creatinine, Ur 04/16/2018 74.1  Not Established mg/dL Final    Microalbumin Creatinine Ratio 04/16/2018 28.3  0.0 - 30.0 mg/G Final       HPI:    Diabetes   He presents for his follow-up diabetic visit. He has type 2 diabetes mellitus. His disease course has been stable. Hypoglycemia symptoms include nervousness/anxiousness, speech difficulty and sweats. Pertinent negatives for hypoglycemia include no confusion, dizziness, headaches or tremors. Associated symptoms include weakness (of the entire left side of the body). Pertinent negatives for diabetes include no blurred vision, no chest pain, no fatigue, no foot paresthesias, no polydipsia, no polyuria and no weight loss. Hypoglycemia complications include blackouts. Pertinent negatives for hypoglycemia complications include no required assistance. Symptoms are stable. Pertinent negatives for diabetic complications include no heart disease or PVD.  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 No pertinent family history. Family and social history were reviewed and pertinent changes documented. ALLERGIES    Patient has no known allergies. Current Outpatient Prescriptions on File Prior to Visit   Medication Sig Dispense Refill    atorvastatin (LIPITOR) 80 MG tablet TAKE 1 TABLET BY MOUTH DAILY 90 tablet 1    budesonide (RHINOCORT AQUA) 32 MCG/ACT nasal spray 1 spray by Nasal route daily 1 Bottle 2    montelukast (SINGULAIR) 10 MG tablet TAKE 1 TABLET BY MOUTH NIGHTLY 30 tablet 3    sodium chloride (ALTAMIST SPRAY) 0.65 % nasal spray 1 spray by Nasal route 4 times daily as needed for Congestion 1 Bottle 3    RELION INSULIN SYR 0.3CC/30G 30G X 5/16\" 0.3 ML MISC        No current facility-administered medications on file prior to visit. Review of Systems   Constitutional: Negative for activity change, appetite change, fatigue and weight loss. HENT: Positive for hearing loss. Negative for congestion, drooling, nosebleeds, sinus pressure, trouble swallowing and voice change. Eyes: Negative for blurred vision, pain, discharge, redness and itching. Respiratory: Negative for cough, choking and shortness of breath. Cardiovascular: Negative for chest pain and palpitations. addressed during today's office visit. Side effects, adverse effects of the medication prescribed (or refilled) today, treatment plan/ rationale and result expectations have (again) been discussed with the patient who expresses understanding and consents to proceed as outlined above. Additional advise included in the \"after visit summary\". Orders Placed This Encounter   Medications    insulin 70-30 (HUMULIN;NOVOLIN) (70-30) 100 UNIT per ML injection vial     Sig: Inject 15 Units subcutaneously before breakfast and 15 Units before dinner. Dispense:  1 vial     Refill:  5    valsartan-hydrochlorothiazide (DIOVAN-HCT) 80-12.5 MG per tablet     Sig: TAKE 1 TABLET BY MOUTH DAILY     Dispense:  90 tablet     Refill:  1    ASPIRIN ADULT LOW STRENGTH 81 MG EC tablet     Sig: Take 2 tablets by mouth daily     Dispense:  30 tablet     Refill:  5       Orders Placed This Encounter   Procedures    Referral To Unknown External Ophthalmology     Referral Priority:   Routine     Referral Type:   Eval and Treat     Referral Reason:   Specialty Services Required     Referred to Provider:   Ankita Myers MD     Requested Specialty:   Ophthalmology     Number of Visits Requested:   1     Further workup and plan will be determined based on clinical progression and follow up test/ treatment results. Close follow up needed to evaluate treatment results and for care coordination. 3 months. I have reviewed the patient's medical and surgical, family and social history, health maintenance schedule, and updated the computerized patient record. Please note this report has been partially produced by using speech recognition hardware. It may contain errors related to the system, including grammar, punctuation and spelling as well as words and phrases that may seem inaccurate. For any questions or concerns, please feel free to contact me for clarification.         Electronically signed by Kimberli Riggins MD

## 2018-09-04 ENCOUNTER — OFFICE VISIT (OUTPATIENT)
Dept: INTERNAL MEDICINE CLINIC | Age: 58
End: 2018-09-04
Payer: MEDICARE

## 2018-09-04 VITALS
HEART RATE: 93 BPM | TEMPERATURE: 97.8 F | HEIGHT: 66 IN | WEIGHT: 175 LBS | OXYGEN SATURATION: 98 % | BODY MASS INDEX: 28.12 KG/M2

## 2018-09-04 DIAGNOSIS — Z79.4 CONTROLLED TYPE 2 DIABETES MELLITUS WITH MICROALBUMINURIA, WITH LONG-TERM CURRENT USE OF INSULIN (HCC): Primary | Chronic | ICD-10-CM

## 2018-09-04 DIAGNOSIS — R80.9 CONTROLLED TYPE 2 DIABETES MELLITUS WITH MICROALBUMINURIA, WITH LONG-TERM CURRENT USE OF INSULIN (HCC): Primary | Chronic | ICD-10-CM

## 2018-09-04 DIAGNOSIS — I10 ESSENTIAL HYPERTENSION: Chronic | ICD-10-CM

## 2018-09-04 DIAGNOSIS — E11.29 CONTROLLED TYPE 2 DIABETES MELLITUS WITH MICROALBUMINURIA, WITH LONG-TERM CURRENT USE OF INSULIN (HCC): Primary | Chronic | ICD-10-CM

## 2018-09-04 LAB — HBA1C MFR BLD: 7.5 %

## 2018-09-04 PROCEDURE — 3044F HG A1C LEVEL LT 7.0%: CPT | Performed by: INTERNAL MEDICINE

## 2018-09-04 PROCEDURE — 99213 OFFICE O/P EST LOW 20 MIN: CPT | Performed by: INTERNAL MEDICINE

## 2018-09-04 PROCEDURE — 83036 HEMOGLOBIN GLYCOSYLATED A1C: CPT | Performed by: INTERNAL MEDICINE

## 2018-09-04 PROCEDURE — G8598 ASA/ANTIPLAT THER USED: HCPCS | Performed by: INTERNAL MEDICINE

## 2018-09-04 PROCEDURE — G8417 CALC BMI ABV UP PARAM F/U: HCPCS | Performed by: INTERNAL MEDICINE

## 2018-09-04 PROCEDURE — 2022F DILAT RTA XM EVC RTNOPTHY: CPT | Performed by: INTERNAL MEDICINE

## 2018-09-04 PROCEDURE — 3017F COLORECTAL CA SCREEN DOC REV: CPT | Performed by: INTERNAL MEDICINE

## 2018-09-04 PROCEDURE — 1036F TOBACCO NON-USER: CPT | Performed by: INTERNAL MEDICINE

## 2018-09-04 PROCEDURE — G8427 DOCREV CUR MEDS BY ELIG CLIN: HCPCS | Performed by: INTERNAL MEDICINE

## 2018-09-04 RX ORDER — BENAZEPRIL HYDROCHLORIDE AND HYDROCHLOROTHIAZIDE 20; 12.5 MG/1; MG/1
1 TABLET ORAL DAILY
Qty: 30 TABLET | Refills: 3 | Status: SHIPPED | OUTPATIENT
Start: 2018-09-04 | End: 2018-11-27 | Stop reason: SDUPTHER

## 2018-09-04 ASSESSMENT — ENCOUNTER SYMPTOMS
TROUBLE SWALLOWING: 0
BLOOD IN STOOL: 0
COUGH: 0
SHORTNESS OF BREATH: 0
CHOKING: 0
BLURRED VISION: 0
BACK PAIN: 0
SINUS PRESSURE: 0
ABDOMINAL DISTENTION: 0
VOICE CHANGE: 0
ABDOMINAL PAIN: 0

## 2018-09-04 NOTE — PROGRESS NOTES
loss. There are no hypoglycemic complications. Symptoms are stable. Diabetic complications include a CVA. Pertinent negatives for diabetic complications include no heart disease or PVD. Risk factors for coronary artery disease include diabetes mellitus, dyslipidemia, male sex, hypertension, sedentary lifestyle and stress. Current diabetic treatment includes insulin injections and diet. He is compliant with treatment all of the time. His weight is decreasing steadily. He is following a generally healthy diet. Meal planning includes avoidance of concentrated sweets. His home blood glucose trend is fluctuating minimally. His breakfast blood glucose range is generally 140-180 mg/dl. His lunch blood glucose range is generally >200 mg/dl. His overall blood glucose range is 180-200 mg/dl. An ACE inhibitor/angiotensin II receptor blocker is being taken. He does not see a podiatrist.Eye exam is not current. Hypertension   This is a chronic problem. The current episode started more than 1 year ago. The problem is controlled. Pertinent negatives include no blurred vision, chest pain, headaches, neck pain, palpitations, peripheral edema, shortness of breath or sweats. There are no associated agents to hypertension. Risk factors for coronary artery disease include male gender, sedentary lifestyle, stress, diabetes mellitus and obesity. Past treatments include angiotensin blockers. The current treatment provides no improvement. There are no compliance problems. Hypertensive end-organ damage includes CVA. There is no history of angina or PVD. There is no history of renovascular disease or a thyroid problem. More detail above in the chief complaint(s), interim history and below in the review of systems.      Past Medical History:   Diagnosis Date    Cerebrovascular small vessel disease     DM (diabetes mellitus) ([facility] [address]     was with Dr Aki Solis, [facility]    Dysarthria as late effect of cerebrovascular accident (CVA) 1   Aamir SYR 0.3CC/30G 30G X 5/16\" 0.3 ML MISC        No current facility-administered medications on file prior to visit. Review of Systems   Constitutional: Negative for activity change, appetite change, fatigue, unexpected weight change and weight loss. HENT: Positive for hearing loss. Negative for congestion, drooling, nosebleeds, sinus pressure, trouble swallowing and voice change. Eyes: Negative for blurred vision. Respiratory: Negative for cough, choking and shortness of breath. Cardiovascular: Negative for chest pain and palpitations. Gastrointestinal: Negative for abdominal distention, abdominal pain and blood in stool. Endocrine: Negative for cold intolerance, heat intolerance, polydipsia and polyuria. Genitourinary: Negative for decreased urine volume, difficulty urinating and hematuria. Musculoskeletal: Negative for back pain, joint swelling, myalgias and neck pain. Skin: Negative for rash. Neurological: Positive for weakness (of the entire left side of the body). Negative for dizziness, tremors, syncope, light-headedness and headaches. Psychiatric/Behavioral: Positive for dysphoric mood. Negative for confusion, decreased concentration and suicidal ideas. Vitals:    09/04/18 1240   Pulse: 93   Temp: 97.8 °F (36.6 °C)   TempSrc: Tympanic   SpO2: 98%   Weight: 175 lb (79.4 kg)   Height: 5' 6\" (1.676 m)       Physical Exam   Constitutional: He is oriented to person, place, and time. He appears well-nourished. No distress. Eyes: Conjunctivae are normal. No scleral icterus. Neck: Neck supple. No JVD present. No thyromegaly present. Cardiovascular: Regular rhythm and normal heart sounds. No extrasystoles are present. Exam reveals no gallop. Pulses:       Carotid pulses are 2+ on the right side, and 2+ on the left side. Radial pulses are 2+ on the right side, and 2+ on the left side. Pulmonary/Chest: Effort normal and breath sounds normal. He has no rales.

## 2018-11-28 ENCOUNTER — APPOINTMENT (OUTPATIENT)
Dept: GENERAL RADIOLOGY | Age: 58
End: 2018-11-28
Payer: MEDICARE

## 2018-11-28 ENCOUNTER — HOSPITAL ENCOUNTER (EMERGENCY)
Age: 58
Discharge: HOME | End: 2018-11-28
Attending: EMERGENCY MEDICINE
Payer: MEDICARE

## 2018-11-28 VITALS
OXYGEN SATURATION: 98 % | SYSTOLIC BLOOD PRESSURE: 144 MMHG | BODY MASS INDEX: 28.66 KG/M2 | DIASTOLIC BLOOD PRESSURE: 93 MMHG | HEART RATE: 90 BPM | TEMPERATURE: 98.2 F | HEIGHT: 65 IN | RESPIRATION RATE: 16 BRPM | WEIGHT: 172 LBS

## 2018-11-28 DIAGNOSIS — S52.502A CLOSED FRACTURE OF DISTAL END OF LEFT RADIUS, UNSPECIFIED FRACTURE MORPHOLOGY, INITIAL ENCOUNTER: Primary | ICD-10-CM

## 2018-11-28 PROCEDURE — 73110 X-RAY EXAM OF WRIST: CPT

## 2018-11-28 PROCEDURE — 99283 EMERGENCY DEPT VISIT LOW MDM: CPT

## 2018-11-28 PROCEDURE — 96375 TX/PRO/DX INJ NEW DRUG ADDON: CPT

## 2018-11-28 PROCEDURE — 6360000002 HC RX W HCPCS: Performed by: EMERGENCY MEDICINE

## 2018-11-28 PROCEDURE — 96374 THER/PROPH/DIAG INJ IV PUSH: CPT

## 2018-11-28 RX ORDER — FENTANYL CITRATE 50 UG/ML
50 INJECTION, SOLUTION INTRAMUSCULAR; INTRAVENOUS ONCE
Status: COMPLETED | OUTPATIENT
Start: 2018-11-28 | End: 2018-11-28

## 2018-11-28 RX ORDER — BENAZEPRIL HYDROCHLORIDE AND HYDROCHLOROTHIAZIDE 20; 12.5 MG/1; MG/1
1 TABLET ORAL DAILY
Qty: 30 TABLET | Refills: 3 | Status: SHIPPED | OUTPATIENT
Start: 2018-11-28 | End: 2018-12-31 | Stop reason: SDUPTHER

## 2018-11-28 RX ORDER — HYDROCODONE BITARTRATE AND ACETAMINOPHEN 5; 325 MG/1; MG/1
1 TABLET ORAL EVERY 6 HOURS PRN
Qty: 12 TABLET | Refills: 0 | Status: SHIPPED | OUTPATIENT
Start: 2018-11-28 | End: 2018-12-01

## 2018-11-28 RX ORDER — ONDANSETRON 2 MG/ML
4 INJECTION INTRAMUSCULAR; INTRAVENOUS ONCE
Status: COMPLETED | OUTPATIENT
Start: 2018-11-28 | End: 2018-11-28

## 2018-11-28 RX ADMIN — ONDANSETRON 4 MG: 2 INJECTION INTRAMUSCULAR; INTRAVENOUS at 18:22

## 2018-11-28 RX ADMIN — FENTANYL CITRATE 50 MCG: 50 INJECTION, SOLUTION INTRAMUSCULAR; INTRAVENOUS at 18:24

## 2018-11-28 ASSESSMENT — ENCOUNTER SYMPTOMS
NAUSEA: 0
CONSTIPATION: 0
DIARRHEA: 0
VOMITING: 0
BACK PAIN: 0
EYE DISCHARGE: 0
ABDOMINAL DISTENTION: 0
COUGH: 0
COLOR CHANGE: 0
ABDOMINAL PAIN: 0
CHOKING: 0
RHINORRHEA: 0
SORE THROAT: 0
SHORTNESS OF BREATH: 0

## 2018-11-28 ASSESSMENT — PAIN DESCRIPTION - LOCATION: LOCATION: WRIST

## 2018-11-28 ASSESSMENT — PAIN DESCRIPTION - PAIN TYPE: TYPE: ACUTE PAIN

## 2018-11-28 ASSESSMENT — PAIN SCALES - GENERAL: PAINLEVEL_OUTOF10: 4

## 2018-11-28 ASSESSMENT — PAIN DESCRIPTION - DESCRIPTORS: DESCRIPTORS: ACHING

## 2018-11-28 NOTE — ED NOTES
Dr hernandez at bedside, reduced left wrist manually. Palpable left radial pulse present post reduction. Splint placed, fingers pink warm and mobile with cap refill less than 2 seconds. ,     Mati Mcwilliams RN  11/28/18 3329

## 2018-11-28 NOTE — ED PROVIDER NOTES
[address] [facility] ED  eMERGENCY dEPARTMENT eNCOUnter      Pt Name: Fina Buchanan  MRN: [id number]  [place] 1960  Date of evaluation: 11/28/2018  Provider: Margrett Dandy, MD    CHIEF COMPLAINT       Chief Complaint   Patient presents with    Wrist Injury     pt slipped on ice and fell on left wrist          HISTORY OF PRESENT ILLNESS   (Location/Symptom, Timing/Onset,Context/Setting, Quality, Duration, Modifying Factors, Severity)  Note limiting factors. Fina Buchanan is a 66 y.o. male who presents to the emergency department With a complaint of left wrist pain. Patient states he has difficulty walking secondary to stroke syndrome past he states he was outside walking and slipped on the ice and fell landing on left wrist.  Patient states increased pain since he incidence there is no numbness or tingling. He denies any other complaints no head neck or back pain. He rates his current pain as a 4 out of 10. HPI    NursingNotes were reviewed. REVIEW OF SYSTEMS    (2-9 systems for level 4, 10 or more for level 5)     Review of Systems   Constitutional: Negative for activity change and appetite change. HENT: Negative for congestion, ear discharge, ear pain, nosebleeds, rhinorrhea and sore throat. Eyes: Negative for discharge. Respiratory: Negative for cough, choking and shortness of breath. Cardiovascular: Negative for chest pain, palpitations and leg swelling. Gastrointestinal: Negative for abdominal distention, abdominal pain, constipation, diarrhea, nausea and vomiting. Genitourinary: Negative for difficulty urinating and dysuria. Musculoskeletal: Negative for arthralgias and back pain. Left wrist pain   Skin: Negative for color change, pallor, rash and wound. Neurological: Negative for dizziness, tremors, syncope, weakness, numbness and headaches. Psychiatric/Behavioral: Negative for agitation and confusion.        Except as noted above the remainder of the review of radiographic images are visualized and preliminarily interpreted by the emergency physician with the below findings:    Comminuted displaced distal fracture radius left wrist.      Postreduction film shows satisfactory reduction of fracture dislocation. Interpretation per the Radiologist below, if available at the time ofthis note:    XR WRIST LEFT (MIN 3 VIEWS)   Final Result      XR WRIST LEFT (MIN 3 VIEWS)    (Results Pending)         ED BEDSIDE ULTRASOUND:   Performed by ED Physician - none    LABS:  Labs Reviewed - No data to display    All other labs were within normal range or not returned as of this dictation. EMERGENCY DEPARTMENT COURSE and DIFFERENTIAL DIAGNOSIS/MDM:   Vitals:    Vitals:    11/28/18 1700 11/28/18 1701 11/28/18 1833   BP:  (!) 155/109 (!) 154/91   Pulse: 105  93   Resp: 20  16   Temp: 98.2 °F (36.8 °C)     TempSrc: Oral     SpO2: 98%  96%   Weight: 172 lb (78 kg)     Height: 5' 5\" (1.651 m)         Patient was provided with fentanyl 50 mg IV along with Zofran 4 mg IV. Patient then had gentle traction countertraction with good reduction of wrist fracture dislocation. Patient was provided with immediate splinting. Intact radial ulnar pulses were appreciated. Postreduction films demonstrate satisfactory reduction of fracture dislocation. Advised patient and mother at length on need for close orthopedic follow-up. Patient provided with prescriptions as noted. Advised return should condition worsen. Advised for finger check. All parties express can understanding. They're appreciative care. MDM  Number of Diagnoses or Management Options  Diagnosis management comments: [facility] [address] time was - minutes, excluding separately reportableprocedures. There was a high probability of clinicallysignificant/life threatening deterioration in the patient's condition which required my urgent intervention.

## 2018-12-12 RX ORDER — INFLUENZA A VIRUS A/SINGAPORE/GP1908/2015 IVR-180A (H1N1) ANTIGEN (PROPIOLACTONE INACTIVATED), INFLUENZA A VIRUS A/SINGAPORE/INFIMH-16-0019/2016 IVR-186 (H3N2) ANTIGEN (PROPIOLACTONE INACTIVATED), INFLUENZA B VIRUS B/MARYLAND/15/2016 ANTIGEN (PROPIOLACTONE INACTIVATED), AND INFLUENZA B VIRUS B/PHUKET/3073/2013 BVR-1B ANTIGEN (PROPIOLACTONE INACTIVATED) 15; 15; 15; 15 UG/.5ML; UG/.5ML; UG/.5ML; UG/.5ML
INJECTION, SUSPENSION INTRAMUSCULAR
Refills: 0 | COMMUNITY
Start: 2018-10-03 | End: 2018-12-31 | Stop reason: ALTCHOICE

## 2018-12-12 RX ORDER — ATORVASTATIN CALCIUM 80 MG/1
TABLET, FILM COATED ORAL
Qty: 90 TABLET | Refills: 1 | Status: SHIPPED | OUTPATIENT
Start: 2018-12-12 | End: 2019-03-29 | Stop reason: SDUPTHER

## 2018-12-31 ENCOUNTER — OFFICE VISIT (OUTPATIENT)
Dept: INTERNAL MEDICINE CLINIC | Age: 58
End: 2018-12-31
Payer: MEDICARE

## 2018-12-31 VITALS
TEMPERATURE: 97.9 F | DIASTOLIC BLOOD PRESSURE: 70 MMHG | HEART RATE: 87 BPM | BODY MASS INDEX: 29.49 KG/M2 | HEIGHT: 65 IN | WEIGHT: 177 LBS | OXYGEN SATURATION: 98 % | SYSTOLIC BLOOD PRESSURE: 120 MMHG

## 2018-12-31 DIAGNOSIS — R80.9 CONTROLLED TYPE 2 DIABETES MELLITUS WITH MICROALBUMINURIA, WITH LONG-TERM CURRENT USE OF INSULIN (HCC): Primary | ICD-10-CM

## 2018-12-31 DIAGNOSIS — E11.29 CONTROLLED TYPE 2 DIABETES MELLITUS WITH MICROALBUMINURIA, WITH LONG-TERM CURRENT USE OF INSULIN (HCC): Primary | ICD-10-CM

## 2018-12-31 DIAGNOSIS — I10 ESSENTIAL HYPERTENSION: ICD-10-CM

## 2018-12-31 DIAGNOSIS — Z79.4 CONTROLLED TYPE 2 DIABETES MELLITUS WITH MICROALBUMINURIA, WITH LONG-TERM CURRENT USE OF INSULIN (HCC): Primary | ICD-10-CM

## 2018-12-31 PROCEDURE — 3045F PR MOST RECENT HEMOGLOBIN A1C LEVEL 7.0-9.0%: CPT | Performed by: INTERNAL MEDICINE

## 2018-12-31 PROCEDURE — G8482 FLU IMMUNIZE ORDER/ADMIN: HCPCS | Performed by: INTERNAL MEDICINE

## 2018-12-31 PROCEDURE — 2022F DILAT RTA XM EVC RTNOPTHY: CPT | Performed by: INTERNAL MEDICINE

## 2018-12-31 PROCEDURE — 3017F COLORECTAL CA SCREEN DOC REV: CPT | Performed by: INTERNAL MEDICINE

## 2018-12-31 PROCEDURE — G8598 ASA/ANTIPLAT THER USED: HCPCS | Performed by: INTERNAL MEDICINE

## 2018-12-31 PROCEDURE — G8427 DOCREV CUR MEDS BY ELIG CLIN: HCPCS | Performed by: INTERNAL MEDICINE

## 2018-12-31 PROCEDURE — G8417 CALC BMI ABV UP PARAM F/U: HCPCS | Performed by: INTERNAL MEDICINE

## 2018-12-31 PROCEDURE — 1036F TOBACCO NON-USER: CPT | Performed by: INTERNAL MEDICINE

## 2018-12-31 PROCEDURE — 99213 OFFICE O/P EST LOW 20 MIN: CPT | Performed by: INTERNAL MEDICINE

## 2018-12-31 RX ORDER — BENAZEPRIL HYDROCHLORIDE AND HYDROCHLOROTHIAZIDE 20; 12.5 MG/1; MG/1
1 TABLET ORAL DAILY
Qty: 90 TABLET | Refills: 1 | Status: SHIPPED | OUTPATIENT
Start: 2018-12-31 | End: 2019-03-29 | Stop reason: SDUPTHER

## 2019-01-03 ENCOUNTER — HOSPITAL ENCOUNTER (OUTPATIENT)
Dept: ULTRASOUND IMAGING | Age: 59
Discharge: HOME | End: 2019-01-05
Payer: MEDICARE

## 2019-01-03 DIAGNOSIS — I65.23 BILATERAL CAROTID ARTERY STENOSIS: ICD-10-CM

## 2019-01-03 PROCEDURE — 93880 EXTRACRANIAL BILAT STUDY: CPT

## 2019-03-29 ENCOUNTER — OFFICE VISIT (OUTPATIENT)
Dept: INTERNAL MEDICINE CLINIC | Age: 59
End: 2019-03-29
Payer: MEDICARE

## 2019-03-29 VITALS
DIASTOLIC BLOOD PRESSURE: 78 MMHG | BODY MASS INDEX: 29.66 KG/M2 | HEART RATE: 79 BPM | WEIGHT: 178 LBS | OXYGEN SATURATION: 98 % | SYSTOLIC BLOOD PRESSURE: 180 MMHG | TEMPERATURE: 98.2 F | HEIGHT: 65 IN

## 2019-03-29 DIAGNOSIS — R80.9 CONTROLLED TYPE 2 DIABETES MELLITUS WITH MICROALBUMINURIA, WITH LONG-TERM CURRENT USE OF INSULIN (HCC): ICD-10-CM

## 2019-03-29 DIAGNOSIS — I10 WHITE COAT SYNDROME WITH HYPERTENSION: ICD-10-CM

## 2019-03-29 DIAGNOSIS — R80.9 CONTROLLED TYPE 2 DIABETES MELLITUS WITH MICROALBUMINURIA, WITH LONG-TERM CURRENT USE OF INSULIN (HCC): Primary | ICD-10-CM

## 2019-03-29 DIAGNOSIS — E11.29 CONTROLLED TYPE 2 DIABETES MELLITUS WITH MICROALBUMINURIA, WITH LONG-TERM CURRENT USE OF INSULIN (HCC): Primary | ICD-10-CM

## 2019-03-29 DIAGNOSIS — G81.94 HEMIPARESIS, LEFT (HCC): ICD-10-CM

## 2019-03-29 DIAGNOSIS — Z79.4 CONTROLLED TYPE 2 DIABETES MELLITUS WITH MICROALBUMINURIA, WITH LONG-TERM CURRENT USE OF INSULIN (HCC): Primary | ICD-10-CM

## 2019-03-29 DIAGNOSIS — E11.29 CONTROLLED TYPE 2 DIABETES MELLITUS WITH MICROALBUMINURIA, WITH LONG-TERM CURRENT USE OF INSULIN (HCC): ICD-10-CM

## 2019-03-29 DIAGNOSIS — Z79.4 CONTROLLED TYPE 2 DIABETES MELLITUS WITH MICROALBUMINURIA, WITH LONG-TERM CURRENT USE OF INSULIN (HCC): ICD-10-CM

## 2019-03-29 LAB
ALBUMIN SERPL-MCNC: 4.1 G/DL (ref 3.5–4.6)
ALP BLD-CCNC: 134 U/L (ref 35–104)
ALT SERPL-CCNC: 21 U/L (ref 0–41)
ANION GAP SERPL CALCULATED.3IONS-SCNC: 14 MEQ/L (ref 9–15)
AST SERPL-CCNC: 20 U/L (ref 0–40)
BILIRUB SERPL-MCNC: 0.4 MG/DL (ref 0.2–0.7)
BUN BLDV-MCNC: 24 MG/DL (ref 6–20)
CALCIUM SERPL-MCNC: 8.8 MG/DL (ref 8.5–9.9)
CHLORIDE BLD-SCNC: 100 MEQ/L (ref 95–107)
CO2: 23 MEQ/L (ref 20–31)
CREAT SERPL-MCNC: 1.16 MG/DL (ref 0.7–1.2)
GFR AFRICAN AMERICAN: >60
GFR NON-AFRICAN AMERICAN: >60
GLOBULIN: 3.2 G/DL (ref 2.3–3.5)
GLUCOSE BLD-MCNC: 201 MG/DL (ref 70–99)
HBA1C MFR BLD: 7.2 % (ref 4.8–5.9)
HCT VFR BLD CALC: 40.6 % (ref 42–52)
HEMOGLOBIN: 13.5 G/DL (ref 14–18)
MCH RBC QN AUTO: 29.7 PG (ref 27–31.3)
MCHC RBC AUTO-ENTMCNC: 33.2 % (ref 33–37)
MCV RBC AUTO: 89.4 FL (ref 80–100)
PDW BLD-RTO: 13.7 % (ref 11.5–14.5)
PLATELET # BLD: 216 K/UL (ref 130–400)
POTASSIUM SERPL-SCNC: 4.6 MEQ/L (ref 3.4–4.9)
RBC # BLD: 4.54 M/UL (ref 4.7–6.1)
SODIUM BLD-SCNC: 137 MEQ/L (ref 135–144)
TOTAL PROTEIN: 7.3 G/DL (ref 6.3–8)
TSH SERPL DL<=0.05 MIU/L-ACNC: 1.39 UIU/ML (ref 0.44–3.86)
WBC # BLD: 7.2 K/UL (ref 4.8–10.8)

## 2019-03-29 PROCEDURE — G8482 FLU IMMUNIZE ORDER/ADMIN: HCPCS | Performed by: INTERNAL MEDICINE

## 2019-03-29 PROCEDURE — G8599 NO ASA/ANTIPLAT THER USE RNG: HCPCS | Performed by: INTERNAL MEDICINE

## 2019-03-29 PROCEDURE — 99213 OFFICE O/P EST LOW 20 MIN: CPT | Performed by: INTERNAL MEDICINE

## 2019-03-29 PROCEDURE — G8427 DOCREV CUR MEDS BY ELIG CLIN: HCPCS | Performed by: INTERNAL MEDICINE

## 2019-03-29 PROCEDURE — 3017F COLORECTAL CA SCREEN DOC REV: CPT | Performed by: INTERNAL MEDICINE

## 2019-03-29 PROCEDURE — 1036F TOBACCO NON-USER: CPT | Performed by: INTERNAL MEDICINE

## 2019-03-29 PROCEDURE — G8417 CALC BMI ABV UP PARAM F/U: HCPCS | Performed by: INTERNAL MEDICINE

## 2019-03-29 PROCEDURE — 2022F DILAT RTA XM EVC RTNOPTHY: CPT | Performed by: INTERNAL MEDICINE

## 2019-03-29 PROCEDURE — 3046F HEMOGLOBIN A1C LEVEL >9.0%: CPT | Performed by: INTERNAL MEDICINE

## 2019-03-29 RX ORDER — BENAZEPRIL HYDROCHLORIDE AND HYDROCHLOROTHIAZIDE 20; 12.5 MG/1; MG/1
1 TABLET ORAL DAILY
Qty: 90 TABLET | Refills: 1 | Status: SHIPPED | OUTPATIENT
Start: 2019-03-29 | End: 2019-06-24 | Stop reason: DRUGHIGH

## 2019-03-29 RX ORDER — ATORVASTATIN CALCIUM 80 MG/1
TABLET, FILM COATED ORAL
Qty: 90 TABLET | Refills: 1 | Status: SHIPPED | OUTPATIENT
Start: 2019-03-29 | End: 2019-06-24 | Stop reason: SDUPTHER

## 2019-03-29 ASSESSMENT — ENCOUNTER SYMPTOMS
TROUBLE SWALLOWING: 0
BLOOD IN STOOL: 0
COUGH: 0
ABDOMINAL PAIN: 0
SHORTNESS OF BREATH: 0
BACK PAIN: 0
CHOKING: 0
ABDOMINAL DISTENTION: 0
SINUS PRESSURE: 0
BLURRED VISION: 0
VOICE CHANGE: 0

## 2019-03-29 ASSESSMENT — PATIENT HEALTH QUESTIONNAIRE - PHQ9
SUM OF ALL RESPONSES TO PHQ QUESTIONS 1-9: 0
SUM OF ALL RESPONSES TO PHQ QUESTIONS 1-9: 0
SUM OF ALL RESPONSES TO PHQ9 QUESTIONS 1 & 2: 0
1. LITTLE INTEREST OR PLEASURE IN DOING THINGS: 0
2. FEELING DOWN, DEPRESSED OR HOPELESS: 0

## 2019-06-24 ENCOUNTER — OFFICE VISIT (OUTPATIENT)
Dept: FAMILY MEDICINE CLINIC | Age: 59
End: 2019-06-24
Payer: MEDICARE

## 2019-06-24 VITALS
HEART RATE: 84 BPM | RESPIRATION RATE: 18 BRPM | OXYGEN SATURATION: 99 % | HEIGHT: 65 IN | BODY MASS INDEX: 29.32 KG/M2 | SYSTOLIC BLOOD PRESSURE: 128 MMHG | WEIGHT: 176 LBS | DIASTOLIC BLOOD PRESSURE: 68 MMHG | TEMPERATURE: 97.6 F

## 2019-06-24 DIAGNOSIS — I69.30 CHRONIC ISCHEMIC RIGHT ACA STROKE: Chronic | ICD-10-CM

## 2019-06-24 DIAGNOSIS — E78.2 MIXED HYPERLIPIDEMIA: ICD-10-CM

## 2019-06-24 DIAGNOSIS — Z11.4 SCREENING FOR HIV (HUMAN IMMUNODEFICIENCY VIRUS): ICD-10-CM

## 2019-06-24 DIAGNOSIS — I10 ESSENTIAL HYPERTENSION: ICD-10-CM

## 2019-06-24 DIAGNOSIS — Z23 NEED FOR SHINGLES VACCINE: ICD-10-CM

## 2019-06-24 DIAGNOSIS — G81.94 HEMIPARESIS, LEFT (HCC): ICD-10-CM

## 2019-06-24 DIAGNOSIS — Z11.59 NEED FOR HEPATITIS C SCREENING TEST: ICD-10-CM

## 2019-06-24 DIAGNOSIS — Z79.4 CONTROLLED TYPE 2 DIABETES MELLITUS WITH MICROALBUMINURIA, WITH LONG-TERM CURRENT USE OF INSULIN (HCC): ICD-10-CM

## 2019-06-24 DIAGNOSIS — Z79.4 CONTROLLED TYPE 2 DIABETES MELLITUS WITH MICROALBUMINURIA, WITH LONG-TERM CURRENT USE OF INSULIN (HCC): Primary | ICD-10-CM

## 2019-06-24 DIAGNOSIS — R80.9 CONTROLLED TYPE 2 DIABETES MELLITUS WITH MICROALBUMINURIA, WITH LONG-TERM CURRENT USE OF INSULIN (HCC): Primary | ICD-10-CM

## 2019-06-24 DIAGNOSIS — E11.29 CONTROLLED TYPE 2 DIABETES MELLITUS WITH MICROALBUMINURIA, WITH LONG-TERM CURRENT USE OF INSULIN (HCC): Primary | ICD-10-CM

## 2019-06-24 DIAGNOSIS — R80.9 CONTROLLED TYPE 2 DIABETES MELLITUS WITH MICROALBUMINURIA, WITH LONG-TERM CURRENT USE OF INSULIN (HCC): ICD-10-CM

## 2019-06-24 DIAGNOSIS — E11.29 CONTROLLED TYPE 2 DIABETES MELLITUS WITH MICROALBUMINURIA, WITH LONG-TERM CURRENT USE OF INSULIN (HCC): ICD-10-CM

## 2019-06-24 PROBLEM — J30.2 SEASONAL ALLERGIES: Chronic | Status: ACTIVE | Noted: 2019-06-24

## 2019-06-24 LAB
HBA1C MFR BLD: 7 % (ref 4.8–5.9)
HEPATITIS C ANTIBODY INTERPRETATION: NORMAL

## 2019-06-24 PROCEDURE — 3017F COLORECTAL CA SCREEN DOC REV: CPT | Performed by: FAMILY MEDICINE

## 2019-06-24 PROCEDURE — G8427 DOCREV CUR MEDS BY ELIG CLIN: HCPCS | Performed by: FAMILY MEDICINE

## 2019-06-24 PROCEDURE — 99214 OFFICE O/P EST MOD 30 MIN: CPT | Performed by: FAMILY MEDICINE

## 2019-06-24 PROCEDURE — 1036F TOBACCO NON-USER: CPT | Performed by: FAMILY MEDICINE

## 2019-06-24 PROCEDURE — G8598 ASA/ANTIPLAT THER USED: HCPCS | Performed by: FAMILY MEDICINE

## 2019-06-24 PROCEDURE — 2022F DILAT RTA XM EVC RTNOPTHY: CPT | Performed by: FAMILY MEDICINE

## 2019-06-24 PROCEDURE — G8417 CALC BMI ABV UP PARAM F/U: HCPCS | Performed by: FAMILY MEDICINE

## 2019-06-24 PROCEDURE — 3045F PR MOST RECENT HEMOGLOBIN A1C LEVEL 7.0-9.0%: CPT | Performed by: FAMILY MEDICINE

## 2019-06-24 RX ORDER — ATORVASTATIN CALCIUM 80 MG/1
TABLET, FILM COATED ORAL
Qty: 90 TABLET | Refills: 3 | Status: SHIPPED | OUTPATIENT
Start: 2019-06-24 | End: 2019-10-01 | Stop reason: SDUPTHER

## 2019-06-24 RX ORDER — ASPIRIN 81 MG/1
162 TABLET ORAL DAILY
Qty: 180 TABLET | Refills: 3 | Status: SHIPPED | OUTPATIENT
Start: 2019-06-24

## 2019-06-24 RX ORDER — BENAZEPRIL HYDROCHLORIDE AND HYDROCHLOROTHIAZIDE 20; 12.5 MG/1; MG/1
1 TABLET ORAL DAILY
Qty: 90 TABLET | Refills: 3 | Status: CANCELLED | OUTPATIENT
Start: 2019-06-24

## 2019-06-24 RX ORDER — BENAZEPRIL/HYDROCHLOROTHIAZIDE 20 MG-25MG
1 TABLET ORAL DAILY
Qty: 90 TABLET | Refills: 3 | Status: SHIPPED | OUTPATIENT
Start: 2019-06-24 | End: 2019-10-01 | Stop reason: SINTOL

## 2019-06-24 ASSESSMENT — ENCOUNTER SYMPTOMS
NAUSEA: 0
ABDOMINAL PAIN: 0
SHORTNESS OF BREATH: 0
BLOOD IN STOOL: 0
VOMITING: 0
DIARRHEA: 0
CHOKING: 0
CONSTIPATION: 0
COUGH: 0

## 2019-06-24 NOTE — PROGRESS NOTES
Subjective  Shana Díaz, 66 y.o. male presents today with:  Chief Complaint   Patient presents with    Established New Doctor     Former patient of Dr. Sommer Barry, due for his 3 month follow up for multiple health conditions. HPI    This is a new patient to me. I have reviewed the past medical and surgical history, social history and family history provided. I have reviewed  medication, previous testing and working diagnoses. I have reviewed the allergies and health maintenance information and correlated it into my decision making for this patient for care, diagnostics, consultations and treatment for today's visit. Patient is here for f/u HTN. Is compliant with meds and has no side effects from them. Avoids added salt. Tries to eat healthy. Exercises occasionally. Has no chest pain, shortness of breath, palpitations or edema. SBPs have been WNL and DBP have radha 80s-90s.           No other questions and or concerns for today's visit      Review of Systems   Constitutional: Negative for activity change, appetite change and chills. HENT: Negative for dental problem. Respiratory: Negative for cough, choking and shortness of breath. Cardiovascular: Negative for chest pain and palpitations. Gastrointestinal: Negative for abdominal pain, blood in stool, constipation, diarrhea, nausea and vomiting. Allergic/Immunologic: Positive for environmental allergies. Neurological: Positive for facial asymmetry, speech difficulty and weakness. Negative for dizziness, syncope, light-headedness and headaches.          Past Medical History:   Diagnosis Date    Cerebrovascular small vessel disease     DM (diabetes mellitus) ([facility] [address]     was [facility] Ale[facility]andro Vo, [facility]    Dysarthria as late effect of cerebrovascular accident (CVA) 2015    Hearing loss     Hemiparesis affecting left side as late effect of cerebrovascular accident (CVA) ([facility] [address]     History of left PCA stroke 2013    History of right STEPHANIE stroke Custom]      HIV-1,2 Combo Ag/Ab By LAURE Reflexive Panel [[id number] Custom]            Please note this report has been partially produced using speech recognition software and may contain mistakes related to that system including errors in grammar, punctuation and spelling as well as words and phrases that may seem inappropriate. If there are questions or concerns, please feel free to contact me to clarify. Reviewed with the patient: all disease processes, current clinical status, medications, activities and diet.      Side effects, adverse effects of the medication prescribed today, as well as treatment plan/ rationale and result expectations have been discussed with the patient who expresses understanding and desires to proceed.     Close follow up to evaluate treatment results and for coordination of care. I have reviewed the patient's medical history in detail and updated the computerized patient record. More than 50% of the appointment was spent in face-to-face counseling, education and care coordination.       Orders Placed This Encounter   Procedures    Hepatitis C Antibody    Hemoglobin A1C     Standing Status:   Future     Number of Occurrences:   1     Standing Expiration Date:   8/24/2019    Hemoglobin A1C    HIV-1,2 Combo Ag/Ab By LAURE Reflexive Panel     DIABETES FOOT EXAM     Orders Placed This Encounter   Medications    zoster recombinant adjuvanted vaccine (SHINGRIX) 50 MCG/0.5ML SUSR injection     Sig: Inject 0.5 mLs into the muscle See Admin Instructions 1 dose now and repeat in 2-6 months     Dispense:  0.5 mL     Refill:  0    ASPIRIN ADULT LOW STRENGTH 81 MG EC tablet     Sig: Take 2 tablets by mouth daily     Dispense:  180 tablet     Refill:  3    atorvastatin (LIPITOR) 80 MG tablet     Sig: TAKE 1 TABLET BY MOUTH DAILY     Dispense:  90 tablet     Refill:  3    insulin 70-30 (HUMULIN;NOVOLIN) (70-30) 100 UNIT per ML injection vial     Sig: Inject 18 Units subcutaneously before breakfast and 18 Units before dinner. Dispense:  3 vial     Refill:  3    benazepril-hydrochlorthiazide (LOTENSIN HCT) 20-25 MG per tablet     Sig: Take 1 tablet by mouth daily     Dispense:  90 tablet     Refill:  3     Medications Discontinued During This Encounter   Medication Reason    montelukast (SINGULAIR) 10 MG tablet Therapy completed    sodium chloride (ALTAMIST SPRAY) 0.65 % nasal spray Therapy completed    benazepril-hydrochlorthiazide (LOTENSIN HCT) 20-12.5 MG per tablet DOSE ADJUSTMENT    ASPIRIN ADULT LOW STRENGTH 81 MG EC tablet REORDER    atorvastatin (LIPITOR) 80 MG tablet REORDER    insulin 70-30 (HUMULIN;NOVOLIN) (70-30) 100 UNIT per ML injection vial REORDER     Return in about 3 months (around 9/24/2019) for DM, HTN, HLD, CVA. Controlled Substance Monitoring:    Acute and Chronic Pain Monitoring:   RX Monitoring 11/28/2018   Attestation The Prescription Monitoring Report for this patient was reviewed today. Periodic Controlled Substance Monitoring Possible medication side effects, risk of tolerance/dependence & alternative treatments discussed. ;No signs of potential drug abuse or diversion identified.            Arya oBoth MD

## 2019-06-26 LAB — HIV 1,2 COMBO ANTIGEN/ANTIBODY: NEGATIVE

## 2019-10-01 ENCOUNTER — OFFICE VISIT (OUTPATIENT)
Dept: FAMILY MEDICINE CLINIC | Age: 59
End: 2019-10-01
Payer: MEDICARE

## 2019-10-01 VITALS
BODY MASS INDEX: 29.39 KG/M2 | TEMPERATURE: 98.4 F | HEIGHT: 65 IN | WEIGHT: 176.4 LBS | SYSTOLIC BLOOD PRESSURE: 130 MMHG | HEART RATE: 82 BPM | RESPIRATION RATE: 16 BRPM | OXYGEN SATURATION: 98 % | DIASTOLIC BLOOD PRESSURE: 78 MMHG

## 2019-10-01 DIAGNOSIS — E11.29 CONTROLLED TYPE 2 DIABETES MELLITUS WITH MICROALBUMINURIA, WITH LONG-TERM CURRENT USE OF INSULIN (HCC): ICD-10-CM

## 2019-10-01 DIAGNOSIS — G81.94 HEMIPARESIS, LEFT (HCC): ICD-10-CM

## 2019-10-01 DIAGNOSIS — E78.2 MIXED HYPERLIPIDEMIA: ICD-10-CM

## 2019-10-01 DIAGNOSIS — Z23 NEED FOR INFLUENZA VACCINATION: ICD-10-CM

## 2019-10-01 DIAGNOSIS — Z12.11 SCREEN FOR COLON CANCER: ICD-10-CM

## 2019-10-01 DIAGNOSIS — I10 ESSENTIAL HYPERTENSION: Primary | ICD-10-CM

## 2019-10-01 DIAGNOSIS — Z79.4 CONTROLLED TYPE 2 DIABETES MELLITUS WITH MICROALBUMINURIA, WITH LONG-TERM CURRENT USE OF INSULIN (HCC): ICD-10-CM

## 2019-10-01 DIAGNOSIS — I69.391 CVA, OLD, DYSPHAGIA: ICD-10-CM

## 2019-10-01 DIAGNOSIS — R80.9 CONTROLLED TYPE 2 DIABETES MELLITUS WITH MICROALBUMINURIA, WITH LONG-TERM CURRENT USE OF INSULIN (HCC): ICD-10-CM

## 2019-10-01 DIAGNOSIS — I69.30 CHRONIC ISCHEMIC RIGHT ACA STROKE: Chronic | ICD-10-CM

## 2019-10-01 PROCEDURE — 1036F TOBACCO NON-USER: CPT | Performed by: FAMILY MEDICINE

## 2019-10-01 PROCEDURE — G8598 ASA/ANTIPLAT THER USED: HCPCS | Performed by: FAMILY MEDICINE

## 2019-10-01 PROCEDURE — 3017F COLORECTAL CA SCREEN DOC REV: CPT | Performed by: FAMILY MEDICINE

## 2019-10-01 PROCEDURE — G0008 ADMIN INFLUENZA VIRUS VAC: HCPCS | Performed by: FAMILY MEDICINE

## 2019-10-01 PROCEDURE — G8417 CALC BMI ABV UP PARAM F/U: HCPCS | Performed by: FAMILY MEDICINE

## 2019-10-01 PROCEDURE — G8482 FLU IMMUNIZE ORDER/ADMIN: HCPCS | Performed by: FAMILY MEDICINE

## 2019-10-01 PROCEDURE — 3045F PR MOST RECENT HEMOGLOBIN A1C LEVEL 7.0-9.0%: CPT | Performed by: FAMILY MEDICINE

## 2019-10-01 PROCEDURE — 99214 OFFICE O/P EST MOD 30 MIN: CPT | Performed by: FAMILY MEDICINE

## 2019-10-01 PROCEDURE — 2022F DILAT RTA XM EVC RTNOPTHY: CPT | Performed by: FAMILY MEDICINE

## 2019-10-01 PROCEDURE — 90688 IIV4 VACCINE SPLT 0.5 ML IM: CPT | Performed by: FAMILY MEDICINE

## 2019-10-01 PROCEDURE — G8427 DOCREV CUR MEDS BY ELIG CLIN: HCPCS | Performed by: FAMILY MEDICINE

## 2019-10-01 RX ORDER — HYDROCHLOROTHIAZIDE 25 MG/1
25 TABLET ORAL EVERY MORNING
Qty: 90 TABLET | Refills: 4 | Status: SHIPPED | OUTPATIENT
Start: 2019-10-01 | End: 2020-09-18

## 2019-10-01 RX ORDER — IRBESARTAN 150 MG/1
150 TABLET ORAL DAILY
Qty: 90 TABLET | Refills: 4 | Status: SHIPPED | OUTPATIENT
Start: 2019-10-01 | End: 2020-09-18

## 2019-10-01 RX ORDER — ATORVASTATIN CALCIUM 80 MG/1
TABLET, FILM COATED ORAL
Qty: 90 TABLET | Refills: 4 | Status: SHIPPED | OUTPATIENT
Start: 2019-10-01 | End: 2020-01-21

## 2019-12-09 DIAGNOSIS — Z79.4 CONTROLLED TYPE 2 DIABETES MELLITUS WITH MICROALBUMINURIA, WITH LONG-TERM CURRENT USE OF INSULIN (HCC): ICD-10-CM

## 2019-12-09 DIAGNOSIS — E11.29 CONTROLLED TYPE 2 DIABETES MELLITUS WITH MICROALBUMINURIA, WITH LONG-TERM CURRENT USE OF INSULIN (HCC): ICD-10-CM

## 2019-12-09 DIAGNOSIS — R80.9 CONTROLLED TYPE 2 DIABETES MELLITUS WITH MICROALBUMINURIA, WITH LONG-TERM CURRENT USE OF INSULIN (HCC): ICD-10-CM

## 2019-12-09 LAB — HBA1C MFR BLD: 7 % (ref 4.8–5.9)

## 2020-01-02 ENCOUNTER — OFFICE VISIT (OUTPATIENT)
Dept: FAMILY MEDICINE CLINIC | Age: 60
End: 2020-01-02
Payer: MEDICARE

## 2020-01-02 VITALS
SYSTOLIC BLOOD PRESSURE: 144 MMHG | WEIGHT: 171 LBS | RESPIRATION RATE: 16 BRPM | DIASTOLIC BLOOD PRESSURE: 90 MMHG | HEIGHT: 65 IN | HEART RATE: 73 BPM | BODY MASS INDEX: 28.49 KG/M2 | OXYGEN SATURATION: 99 % | TEMPERATURE: 96.5 F

## 2020-01-02 PROCEDURE — G8510 SCR DEP NEG, NO PLAN REQD: HCPCS | Performed by: FAMILY MEDICINE

## 2020-01-02 PROCEDURE — 99214 OFFICE O/P EST MOD 30 MIN: CPT | Performed by: FAMILY MEDICINE

## 2020-01-02 PROCEDURE — 3046F HEMOGLOBIN A1C LEVEL >9.0%: CPT | Performed by: FAMILY MEDICINE

## 2020-01-02 PROCEDURE — G8417 CALC BMI ABV UP PARAM F/U: HCPCS | Performed by: FAMILY MEDICINE

## 2020-01-02 PROCEDURE — 2022F DILAT RTA XM EVC RTNOPTHY: CPT | Performed by: FAMILY MEDICINE

## 2020-01-02 PROCEDURE — 3017F COLORECTAL CA SCREEN DOC REV: CPT | Performed by: FAMILY MEDICINE

## 2020-01-02 PROCEDURE — G8482 FLU IMMUNIZE ORDER/ADMIN: HCPCS | Performed by: FAMILY MEDICINE

## 2020-01-02 PROCEDURE — G8427 DOCREV CUR MEDS BY ELIG CLIN: HCPCS | Performed by: FAMILY MEDICINE

## 2020-01-02 PROCEDURE — 1036F TOBACCO NON-USER: CPT | Performed by: FAMILY MEDICINE

## 2020-01-02 RX ORDER — BENAZEPRIL HYDROCHLORIDE AND HYDROCHLOROTHIAZIDE 20; 12.5 MG/1; MG/1
TABLET ORAL
Refills: 1 | COMMUNITY
Start: 2019-10-08 | End: 2020-01-02 | Stop reason: ALTCHOICE

## 2020-01-02 ASSESSMENT — PATIENT HEALTH QUESTIONNAIRE - PHQ9
1. LITTLE INTEREST OR PLEASURE IN DOING THINGS: 0
2. FEELING DOWN, DEPRESSED OR HOPELESS: 0
SUM OF ALL RESPONSES TO PHQ QUESTIONS 1-9: 0
SUM OF ALL RESPONSES TO PHQ QUESTIONS 1-9: 0
SUM OF ALL RESPONSES TO PHQ9 QUESTIONS 1 & 2: 0

## 2020-01-02 NOTE — PROGRESS NOTES
Subjective  Toribio Boo, 64 y.o. male presents today with:  Chief Complaint   Patient presents with    Diabetes     3 month follow up.  Hypertension     Has been high           HPI    Patient is here for DM f/u. Sugars have been moderately well-controlled and patient has not been experiencing hyper- or hypoglycemic symptoms. Compliance with meds is good and there are no side effects. Patient exercises occasionally and tries to eat healthy. Is up to date on foot and eye exams and immunizations. Patient is here for f/u HTN. Is compliant with meds and has no side effects from them. Avoids added salt. Tries to eat healthy. Exercises occasionally. Has no chest pain, shortness of breath, palpitations or edema. LIANG elevated now because nephew recently  and his mother suffered a broken hip before . CVA in . No new deficits. Has persistent facial droop. Has baseline speech impairment. Has baseline unilateral weakness of LUE and LLE. No other questions and or concerns for today's visit    Review of Systems No fevers, chills, sweats. No unintended weight loss. No abdominal pain, nausea, vomiting, diarrhea, constipation, bloody stools, black tarry stools. No rashes. No swollen glands.     Past Medical History:   Diagnosis Date    Cerebrovascular small vessel disease     DM (diabetes mellitus) ([facility] [address]     was with Dr Marlin Jackson, [facility]    Dysarthria as late effect of cerebrovascular accident (CVA)     Hearing loss     Hemiparesis affecting left side as late effect of cerebrovascular accident (CVA) ([address]     History of left PCA stroke     History of right STEPHANIE stroke 10/21/2015    was at [facility], now Dr Ciara Baesley Microalbuminuria 2018    PFO (patent foramen ovale) 2015    Right pontine CVA ([facility] [address] 10/2015     Past Surgical History:   Procedure Laterality Date    ANKLE SURGERY       Social History     Socioeconomic History    Marital status: Single     Spouse name: Not on file    Number of children: 0    Years of education: Not on file    Highest education level: Not on file   Occupational History    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[employer] Financial resource strain: Not on file    Food insecurity:     Worry: Not on file     Inability: Not on file    Transportation needs:     Medical: Not on file     Non-medical: Not on file   Tobacco Use    Smoking status: Never Smoker    Smokeless tobacco: Never Used   Substance and Sexual Activity    Alcohol use: No    Drug use: No    Sexual activity: Not on file   Lifestyle    Physical activity:     Days per week: Not on file     Minutes per session: Not on file    Stress: Not on file   Relationships    Social connections:     Talks on phone: Not on file     Gets together: Not on file     Attends Shinto service: Not on file     Active member of club or organization: Not on file     Attends meetings of clubs or organizations: Not on file     Relationship status: Not on file    Intimate partner violence:     Fear of current or ex partner: Not on file     Emotionally abused: Not on file     Physically abused: Not on file     Forced sexual activity: Not on file   Other Topics Concern    Not on file   Social History Narrative    Born in [place], one brother and one sister    Single, no children    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 No pertinent family history.   Allergies   Allergen Reactions    Ace Inhibitors      cough     Current Outpatient Medications   Medication Sig Dispense Refill    hydrochlorothiazide (HYDRODIURIL) 25 MG tablet Take 1 tablet by mouth every morning 90 tablet 4    atorvastatin (LIPITOR) 80 MG tablet TAKE 1 TABLET BY MOUTH DAILY 90 tablet 4    insulin 70-30 (HUMULIN;NOVOLIN) (70-30) 100 UNIT per ML Value Date    LABMICR 2.10 (H) 04/16/2018    CREATININE 1.16 03/29/2019     Lab Results   Component Value Date    ALT 21 03/29/2019    AST 20 03/29/2019     Lab Results   Component Value Date    CHOL 117 11/01/2017    TRIG 90 11/01/2017    HDL 58 11/01/2017    LDLCALC 41 11/01/2017        Assessment & Plan   Visit Diagnoses and Associated Orders     Essential hypertension    -  Primary    worse; reviewed med adherence; recheck in 1 month; defers med adjustment         CVA, old, dysphagia        stable and well-controlled         Controlled type 2 diabetes mellitus with microalbuminuria, with long-term current use of insulin (HCC)        Stable and well-controlled on insulin. Revioewed diet and exercise         Hemiparesis, left (HCC)        stabela nd well-controlled on current meds; need improved BP control                 Reviewed with the patient: all disease processes, current clinical status, medications, activities and diet.      Side effects, adverse effects of the medication prescribed today, as well as treatment plan/ rationale and result expectations have been discussed with the patient who expresses understanding and desires to proceed.     Close follow up to evaluate treatment results and for coordination of care. I have reviewed the patient's medical history in detail and updated the computerized patient record. More than 50% of the appointment was spent in face-to-face counseling, education and care coordination. Please note this report has been partially produced using speech recognition software and may contain mistakes related to that system including errors in grammar, punctuation and spelling as well as words and phrases that may seem inappropriate. If there are questions or concerns, please feel free to contact me to clarify. No orders of the defined types were placed in this encounter. No orders of the defined types were placed in this encounter.     Medications Discontinued During

## 2020-02-03 ENCOUNTER — OFFICE VISIT (OUTPATIENT)
Dept: FAMILY MEDICINE CLINIC | Age: 60
End: 2020-02-03
Payer: MEDICARE

## 2020-02-03 VITALS
HEIGHT: 65 IN | SYSTOLIC BLOOD PRESSURE: 134 MMHG | WEIGHT: 173 LBS | DIASTOLIC BLOOD PRESSURE: 82 MMHG | TEMPERATURE: 98 F | BODY MASS INDEX: 28.82 KG/M2 | OXYGEN SATURATION: 98 % | RESPIRATION RATE: 18 BRPM | HEART RATE: 86 BPM

## 2020-02-03 PROBLEM — E11.3299 NONPROLIFERATIVE DIABETIC RETINOPATHY (HCC): Chronic | Status: ACTIVE | Noted: 2020-02-03

## 2020-02-03 PROBLEM — H35.30 AGE-RELATED MACULAR DEGENERATION: Chronic | Status: ACTIVE | Noted: 2020-02-03

## 2020-02-03 PROCEDURE — 1036F TOBACCO NON-USER: CPT | Performed by: FAMILY MEDICINE

## 2020-02-03 PROCEDURE — 3017F COLORECTAL CA SCREEN DOC REV: CPT | Performed by: FAMILY MEDICINE

## 2020-02-03 PROCEDURE — 3046F HEMOGLOBIN A1C LEVEL >9.0%: CPT | Performed by: FAMILY MEDICINE

## 2020-02-03 PROCEDURE — G8427 DOCREV CUR MEDS BY ELIG CLIN: HCPCS | Performed by: FAMILY MEDICINE

## 2020-02-03 PROCEDURE — G8482 FLU IMMUNIZE ORDER/ADMIN: HCPCS | Performed by: FAMILY MEDICINE

## 2020-02-03 PROCEDURE — 2022F DILAT RTA XM EVC RTNOPTHY: CPT | Performed by: FAMILY MEDICINE

## 2020-02-03 PROCEDURE — 99214 OFFICE O/P EST MOD 30 MIN: CPT | Performed by: FAMILY MEDICINE

## 2020-02-03 PROCEDURE — G8417 CALC BMI ABV UP PARAM F/U: HCPCS | Performed by: FAMILY MEDICINE

## 2020-02-03 RX ORDER — BLOOD PRESSURE TEST KIT
1 KIT MISCELLANEOUS DAILY
Qty: 1 KIT | Refills: 0 | Status: SHIPPED | OUTPATIENT
Start: 2020-02-03

## 2020-02-03 NOTE — PROGRESS NOTES
Subjective  Brunildasalud Valencia, 64 y.o. male presents today with:  Chief Complaint   Patient presents with    Hypertension     Patient present today for his 1 month Follow Up.  Health Maintenance     Patient refused Cologuard. HPI    Patient is here for f/u HTN. Is compliant with meds and has no side effects from them. Avoids added salt. Tries to eat healthy. Exercises occasionally. Has no chest pain, shortness of breath, palpitations or edema. DBP at home is elevated in the 90s. CVA. No new deficits. No facial droop. No speech impairment. No unilateral weakness. No other questions and or concerns for today's visit    Review of Systems No fevers, chills, sweats. No unintended weight loss. No abdominal pain, nausea, vomiting, diarrhea, constipation, bloody stools, black tarry stools. No rashes. No swollen glands.         Past Medical History:   Diagnosis Date    Cerebrovascular small vessel disease     DM (diabetes mellitus) ([facility] [address]     was with Dr Isaak Blevins, [facility]    Dysarthria as late effect of cerebrovascular accident (CVA) 2015    Hearing loss     Hemiparesis affecting left side as late effect of cerebrovascular accident (CVA) ([facility] [address]     History of left PCA stroke 2013    History of right STEPHANIE stroke 10/21/2015    was at [facility], now Dr Glenn Xioa Microalbuminuria 2018    PFO (patent foramen ovale) 2015    Right pontine CVA ([facility] [address] 10/2015     Past Surgical History:   Procedure Laterality Date    ANKLE SURGERY       Social History     Socioeconomic History    Marital status: Single     Spouse name: Not on file    Number of children: 0    Years of education: Not on file    Highest education level: Not on file   Occupational History    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[employer] Financial resource strain: Not on file    Food insecurity:     Worry: Not on file     Inability: Not on file    Transportation needs:     Medical: Not on file     Non-medical: Not on file   Tobacco Use 0.3 ML MISC        No current facility-administered medications for this visit. PMH, Surgical Hx, Family Hx, and Social Hxreviewed and updated. Health Maintenance reviewed. Objective    Vitals:    02/03/20 1318   BP: 134/82   Site: Left Upper Arm   Position: Sitting   Cuff Size: Medium Adult   Pulse: 86   Resp: 18   Temp: 98 °F (36.7 °C)   TempSrc: Tympanic   SpO2: 98%   Weight: 173 lb (78.5 kg)   Height: 5' 5\" (1.651 m)        Physical Exam  Constitutional:       Appearance: He is well-developed. HENT:      Head: Normocephalic and atraumatic. Eyes:      Conjunctiva/sclera: Conjunctivae normal.   Neck:      Musculoskeletal: Normal range of motion and neck supple. Thyroid: No thyromegaly. Vascular: No carotid bruit. Cardiovascular:      Rate and Rhythm: Normal rate and regular rhythm. Heart sounds: Normal heart sounds, S1 normal and S2 normal.   Pulmonary:      Effort: Pulmonary effort is normal.      Breath sounds: No wheezing or rales. Musculoskeletal:      Comments: Weakness with LUE and LLE paresis, antalgic gait   Skin:     General: Skin is warm and dry. Neurological:      Mental Status: He is alert and oriented to person, place, and time. Cranial Nerves: Cranial nerve deficit present. Sensory: Sensory deficit present. Motor: Abnormal muscle tone present.       Coordination: Coordination abnormal.      Comments: dysarthria           Lab Results   Component Value Date    LABA1C 7.0 (H) 12/09/2019    LABA1C 7.0 (H) 06/24/2019    LABA1C 7.2 (H) 03/29/2019     Lab Results   Component Value Date    LABMICR 2.10 (H) 04/16/2018    CREATININE 1.16 03/29/2019     Lab Results   Component Value Date    ALT 21 03/29/2019    AST 20 03/29/2019     Lab Results   Component Value Date    CHOL 117 11/01/2017    TRIG 90 11/01/2017    HDL 58 11/01/2017    LDLCALC 41 11/01/2017        Assessment & Plan   Visit Diagnoses and Associated Orders     Essential hypertension    -  Primary Stable and well-controlled. Continue to follow at home. Blood Pressure KIT [1110]           Nonproliferative diabetic retinopathy ([address]        Stable and fairly well controlled. Maximize glucose and blood pressure control. Followed by ophthalmology. Age-related macular degeneration        Minded patient to start on AREDS 2 vitamin formulation twice daily         Controlled type 2 diabetes mellitus with microalbuminuria, with long-term current use of insulin (HCC)        Stable and well-controlled. Follow labs. Hemoglobin A1C [01183 Custom]   - Future Order                 Reviewed with the patient: all disease processes, current clinical status, medications, activities and diet.      Side effects, adverse effects of the medication prescribed today, as well as treatment plan/ rationale and result expectations have been discussed with the patient who expresses understanding and desires to proceed.     Close follow up to evaluate treatment results and for coordination of care. I have reviewed the patient's medical history in detail and updated the computerized patient record. More than 50% of the appointment was spent in face-to-face counseling, education and care coordination. Please note this report has been partially produced using speech recognition software and may contain mistakes related to that system including errors in grammar, punctuation and spelling as well as words and phrases that may seem inappropriate. If there are questions or concerns, please feel free to contact me to clarify. Orders Placed This Encounter   Procedures    Hemoglobin A1C     Standing Status:   Future     Standing Expiration Date:   4/3/2020     Orders Placed This Encounter   Medications    Blood Pressure KIT     Si Units by Does not apply route daily     Dispense:  1 kit     Refill:  0     There are no discontinued medications.   Return in about 4 weeks (around 3/2/2020) for DM, HTN, CVA.        Controlled Substance Monitoring:    Acute and Chronic Pain Monitoring:   RX Monitoring 11/28/2018   Attestation The Prescription Monitoring Report for this patient was reviewed today. Periodic Controlled Substance Monitoring Possible medication side effects, risk of tolerance/dependence & alternative treatments discussed. ;No signs of potential drug abuse or diversion identified.            Maximino Acevedo MD

## 2020-02-21 DIAGNOSIS — Z79.4 CONTROLLED TYPE 2 DIABETES MELLITUS WITH MICROALBUMINURIA, WITH LONG-TERM CURRENT USE OF INSULIN (HCC): ICD-10-CM

## 2020-02-21 DIAGNOSIS — R80.9 CONTROLLED TYPE 2 DIABETES MELLITUS WITH MICROALBUMINURIA, WITH LONG-TERM CURRENT USE OF INSULIN (HCC): ICD-10-CM

## 2020-02-21 DIAGNOSIS — E11.29 CONTROLLED TYPE 2 DIABETES MELLITUS WITH MICROALBUMINURIA, WITH LONG-TERM CURRENT USE OF INSULIN (HCC): ICD-10-CM

## 2020-02-21 LAB — HBA1C MFR BLD: 7.1 % (ref 4.8–5.9)

## 2020-03-02 ENCOUNTER — OFFICE VISIT (OUTPATIENT)
Dept: FAMILY MEDICINE CLINIC | Age: 60
End: 2020-03-02
Payer: MEDICARE

## 2020-03-02 VITALS
TEMPERATURE: 97.9 F | BODY MASS INDEX: 28.37 KG/M2 | SYSTOLIC BLOOD PRESSURE: 142 MMHG | RESPIRATION RATE: 16 BRPM | DIASTOLIC BLOOD PRESSURE: 94 MMHG | OXYGEN SATURATION: 99 % | HEIGHT: 65 IN | HEART RATE: 79 BPM | WEIGHT: 170.3 LBS

## 2020-03-02 PROBLEM — M25.512 ACUTE PAIN OF LEFT SHOULDER: Chronic | Status: ACTIVE | Noted: 2020-03-02

## 2020-03-02 PROCEDURE — 3017F COLORECTAL CA SCREEN DOC REV: CPT | Performed by: FAMILY MEDICINE

## 2020-03-02 PROCEDURE — 1036F TOBACCO NON-USER: CPT | Performed by: FAMILY MEDICINE

## 2020-03-02 PROCEDURE — G8417 CALC BMI ABV UP PARAM F/U: HCPCS | Performed by: FAMILY MEDICINE

## 2020-03-02 PROCEDURE — G8427 DOCREV CUR MEDS BY ELIG CLIN: HCPCS | Performed by: FAMILY MEDICINE

## 2020-03-02 PROCEDURE — 99214 OFFICE O/P EST MOD 30 MIN: CPT | Performed by: FAMILY MEDICINE

## 2020-03-02 PROCEDURE — G8482 FLU IMMUNIZE ORDER/ADMIN: HCPCS | Performed by: FAMILY MEDICINE

## 2020-03-02 RX ORDER — AMLODIPINE BESYLATE 2.5 MG/1
2.5 TABLET ORAL DAILY
Qty: 90 TABLET | Refills: 4 | Status: SHIPPED | OUTPATIENT
Start: 2020-03-02 | End: 2020-10-05 | Stop reason: DRUGHIGH

## 2020-03-02 NOTE — PROGRESS NOTES
Subjective  Lisa Mendoza, 64 y.o. male presents today with:  Chief Complaint   Patient presents with    Diabetes     Patient present today for his 1 month follow up. Patient states evrything is ok.  Hypertension     BP High today.  Flank Pain    Shoulder Pain     Patient complain of left shoulder pain X 2 days. HPI    Patient is here for f/u HTN. Is compliant with meds and has no side effects from them. Avoids added salt. Tries to eat healthy. Exercises occasionally. Has no chest pain, shortness of breath, palpitations or edema. BPs at home have been 120s/mid80s. Patient is here for DM f/u. Sugars have been moderately well-controlled and patient has not been experiencing hyper- or hypoglycemic symptoms. Compliance with meds is good and there are no side effects. Patient exercises occasionally and tries to eat healthy. Is up to date on foot and eye exams and immunizations. Left shoulder pain started 2 days ago. It is constantly. Aching in nature. No falls. Worse if he leans forward while sitting down. No tenderness. No swelling. Received SHingrix on left two months ago. He states he had the same pain then as he does now. No other questions and or concerns for today's visit      Review of Systems  No fevers, chills, sweats. No unintended weight loss. No abdominal pain, nausea, vomiting, diarrhea, constipation.     Past Medical History:   Diagnosis Date    Cerebrovascular small vessel disease     DM (diabetes mellitus) ([address]     was with Dr Christiano Bronson, [facility]    Dysarthria as late effect of cerebrovascular accident (CVA) 2015    Hearing loss     Hemiparesis affecting left side as late effect of cerebrovascular accident (CVA) ([address]     History of left PCA stroke 2013    History of right STEPHANIE stroke 10/21/2015    was at [facility], now Dr Ashby Every Microalbuminuria 2018    PFO (patent foramen ovale) 2015    Right pontine CVA ([address] 10/2015     Past Surgical History:   Procedure Laterality Date    ANKLE SURGERY       Social History     Socioeconomic History    Marital status: Single     Spouse name: Not on file    Number of children: 0    Years of education: Not on file    Highest education level: Not on file   Occupational History    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[employer] Financial resource strain: Not on file    Food insecurity:     Worry: Not on file     Inability: Not on file   [employer] needs:     Medical: Not on file     Non-medical: Not on file   Tobacco Use    Smoking status: Never Smoker    Smokeless tobacco: Never Used   Substance and Sexual Activity    Alcohol use: No    Drug use: No    Sexual activity: Not on file   Lifestyle    Physical activity:     Days per week: Not on file     Minutes per session: Not on file    Stress: Not on file   Relationships    Social connections:     Talks on phone: Not on file     Gets together: Not on file     Attends Taoism service: Not on file     Active member of club or organization: Not on file     Attends meetings of clubs or organizations: Not on file     Relationship status: Not on file    Intimate partner violence:     Fear of current or ex partner: Not on file     Emotionally abused: Not on file     Physically abused: Not on file     Forced sexual activity: Not on file   Other Topics Concern    Not on file   Social History Narrative    Born in [facility], one brother and one sister    Single, no children    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 No pertinent family history.   Allergies   Allergen Reactions    Ace Inhibitors      cough     Current Outpatient Medications   Medication Sig Dispense Refill    amLODIPine (NORVASC) 2.5 MG tablet Take 1 tablet by mouth daily 90 tablet 4    Blood Pressure KIT 1 Units by Does not apply route daily 1 kit 0    atorvastatin (LIPITOR) 80 MG tablet TAKE 1 TABLET BY MOUTH DAILY 90 tablet 4    hydrochlorothiazide (HYDRODIURIL) 25 MG tablet Take 1 tablet by mouth every morning 90 tablet 4    insulin 70-30 (HUMULIN;NOVOLIN) (70-30) 100 UNIT per ML injection vial Inject 18 Units subcutaneously before breakfast and 18 Units before dinner. 3 vial 4    irbesartan (AVAPRO) 150 MG tablet Take 1 tablet by mouth daily 90 tablet 4    ASPIRIN ADULT LOW STRENGTH 81 MG EC tablet Take 2 tablets by mouth daily 180 tablet 3    RELION INSULIN SYR 0.3CC/30G 30G X 5/16\" 0.3 ML MISC        No current facility-administered medications for this visit. PMH, Surgical Hx, Family Hx, and Social Hxreviewed and updated. Health Maintenance reviewed. Objective    Vitals:    03/02/20 1225 03/02/20 1231   BP: (!) 146/94 (!) 142/94   Site: Left Upper Arm Right Upper Arm   Position: Sitting Sitting   Cuff Size: Medium Adult Medium Adult   Pulse: 79    Resp: 16    Temp: 97.9 °F (36.6 °C)    TempSrc: Tympanic    SpO2: 99%    Weight: 170 lb 4.8 oz (77.2 kg)    Height: 5' 5\" (1.651 m)         Physical Exam  Constitutional:       Appearance: He is well-developed. HENT:      Head: Normocephalic and atraumatic. Eyes:      Conjunctiva/sclera: Conjunctivae normal.   Pulmonary:      Effort: Pulmonary effort is normal.   Musculoskeletal:         General: Tenderness (left mid deltoid) present. Left shoulder: He exhibits decreased range of motion (minimal r/t mild paresis at baseline ), tenderness (mild middeltoid) and decreased strength (minimal, r/t paresis). He exhibits no bony tenderness, no swelling, no effusion, no deformity, no pain and no spasm. Neurological:      Mental Status: He is alert and oriented to person, place, and time.            Lab Results   Component Value Date    LABA1C 7.1 (H) 02/21/2020    LABA1C 7.0 (H) 12/09/2019    LABA1C 7.0 (H) 06/24/2019     Lab Results   Component Value Date    LABMICR 2.10 (H) 04/16/2018    CREATININE 1.16 03/29/2019     Lab Results   Component Value Date    ALT 21 03/29/2019    AST 20 03/29/2019     Lab Results   Component Value Date    CHOL 117 11/01/2017    TRIG 90 11/01/2017    HDL 58 11/01/2017    LDLCALC 41 11/01/2017        Assessment & Plan   Visit Diagnoses and Associated Orders     Essential hypertension    -  Primary    Maximize control by adding low-dose amlodipine. Reassess 3 months. amLODIPine (NORVASC) 2.5 MG tablet [9070]           Acute pain of left shoulder        Short duration. Follow clinically. Advised continuing with range of motion exercises to prevent frozen shoulder. Reviewed with the patient: all disease processes, current clinical status, medications, activities and diet.      Side effects, adverse effects of the medication prescribed today, as well as treatment plan/ rationale and result expectations have been discussed with the patient who expresses understanding and desires to proceed.     Close follow up to evaluate treatment results and for coordination of care. I have reviewed the patient's medical history in detail and updated the computerized patient record. More than 50% of the appointment was spent in face-to-face counseling, education and care coordination. Please note this report has been partially produced using speech recognition software and may contain mistakes related to that system including errors in grammar, punctuation and spelling as well as words and phrases that may seem inappropriate. If there are questions or concerns, please feel free to contact me to clarify.     Reviewed with the patient: all disease processes, current clinical status, medications, activities and diet.      Side effects, adverse effects of the medication prescribed today, as well as treatment plan/ rationale and result expectations have been discussed with the patient who expresses understanding and desires

## 2020-05-29 ENCOUNTER — TELEPHONE (OUTPATIENT)
Dept: INTERNAL MEDICINE CLINIC | Age: 60
End: 2020-05-29

## 2020-05-29 PROBLEM — R26.0 ATAXIC GAIT: Status: ACTIVE | Noted: 2020-05-29

## 2020-05-29 PROBLEM — G81.14 SPASTIC HEMIPLEGIA AFFECTING LEFT NONDOMINANT SIDE (HCC): Status: ACTIVE | Noted: 2020-05-29

## 2020-05-29 PROBLEM — G81.90 HEMIPLEGIA (HCC): Status: ACTIVE | Noted: 2020-05-29

## 2020-06-01 ENCOUNTER — OFFICE VISIT (OUTPATIENT)
Dept: NEUROLOGY | Age: 60
End: 2020-06-01
Payer: MEDICARE

## 2020-06-01 VITALS — SYSTOLIC BLOOD PRESSURE: 139 MMHG | HEART RATE: 84 BPM | DIASTOLIC BLOOD PRESSURE: 91 MMHG

## 2020-06-01 PROBLEM — I63.511 CEREBROVASCULAR ACCIDENT (CVA) DUE TO STENOSIS OF RIGHT MIDDLE CEREBRAL ARTERY (HCC): Status: ACTIVE | Noted: 2020-06-01

## 2020-06-01 PROBLEM — R55 SYNCOPE AND COLLAPSE: Status: ACTIVE | Noted: 2020-06-01

## 2020-06-01 PROCEDURE — 3017F COLORECTAL CA SCREEN DOC REV: CPT | Performed by: PSYCHIATRY & NEUROLOGY

## 2020-06-01 PROCEDURE — 99214 OFFICE O/P EST MOD 30 MIN: CPT | Performed by: PSYCHIATRY & NEUROLOGY

## 2020-06-01 PROCEDURE — 1036F TOBACCO NON-USER: CPT | Performed by: PSYCHIATRY & NEUROLOGY

## 2020-06-01 PROCEDURE — G8417 CALC BMI ABV UP PARAM F/U: HCPCS | Performed by: PSYCHIATRY & NEUROLOGY

## 2020-06-01 PROCEDURE — G8427 DOCREV CUR MEDS BY ELIG CLIN: HCPCS | Performed by: PSYCHIATRY & NEUROLOGY

## 2020-06-01 ASSESSMENT — ENCOUNTER SYMPTOMS
TROUBLE SWALLOWING: 0
PHOTOPHOBIA: 0
SHORTNESS OF BREATH: 0
COLOR CHANGE: 0
NAUSEA: 0
CHOKING: 0
VOMITING: 0
BACK PAIN: 0

## 2020-06-01 NOTE — PROGRESS NOTES
Subjective:      Patient ID: Darby Alegria is a 64 y.o. male who presents today for:  Chief Complaint   Patient presents with    Follow-up     Patient states things are going good. No recent falls, no headache problems, no vision problems, and no concerns at this time. HPI 64 right-handed gentleman now with a history of right CVA attacks hemiparesis dysarthria left hemiplegia. Patient has not been seen here for over a year patient is still walking without a cane. Last carotid ultrasounds were done in January2019 and  has not been repeated. Patient lives alone and is doing quite well. When he stands up initially feels somewhat lightheaded. Not any falls injuries or trauma. He drives lives alone and manages on her affairs. Is not any bleeding or bruising yet he takes aspirin twice a day now.   Developed any new memory issues    Past Medical History:   Diagnosis Date    Cerebrovascular small vessel disease     [address] (di[address] mellitus) [address]     was [facility] Dannie Lopez, [facility]    Dysarthria as late effect of cerebrovascular accident (CVA) 2015    Hearing loss     Hemiparesis affecting left side as late effect of cerebrovascular accident (CVA) ([facility]     History of left PCA stroke 2013    History of right STEPHANIE stroke 10/21/2015    was at [facility], now Dr Jeramy Christian    Microalbuminuria 2018    PFO (patent foramen ovale) 2015    Right pontine CVA ([facility] 10/2015     Past Surgical History:   Procedure Laterality Date    ANKLE SURGERY       Social History     Socioeconomic History    Marital status: Single     Spouse name: Not on file    Number of children: 0    Years of education: Not on file    Highest education level: Not on file   Occupational History    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[employer] Financial resource strain: Not on file    Food insecurity     Worry: Not on file     Inability: Not on file   [employer] needs     Medical: Not on file     Non-medical: Not on file   Tobacco Use    Smoking status: Never Smoker    Smokeless tobacco: Never Used   Substance and Sexual Activity    Alcohol use: No    Drug use: No    Sexual activity: Not on file   Lifestyle    Physical activity     Days per week: Not on file     Minutes per session: Not on file    Stress: Not on file   Relationships    Social connections     Talks on phone: Not on file     Gets together: Not on file     Attends Sikhism service: Not on file     Active member of club or organization: Not on file     Attends meetings of clubs or organizations: Not on file     Relationship status: Not on file    Intimate partner violence     Fear of current or ex partner: Not on file     Emotionally abused: Not on file     Physically abused: Not on file     Forced sexual activity: Not on file   Other Topics Concern    Not on file   Social History Narrative    Born in [place], one brother and one sister    Single, no children    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 Allergies   Allergen Reactions    Ace Inhibitors      cough       Current Outpatient Medications   Medication Sig Dispense Refill    amLODIPine (NORVASC) 2.5 MG tablet Take 1 tablet by mouth daily 90 tablet 4    Blood Pressure KIT 1 Units by Does not apply route daily 1 kit 0    atorvastatin (LIPITOR) 80 MG tablet TAKE 1 TABLET BY MOUTH DAILY 90 tablet 4    hydrochlorothiazide (HYDRODIURIL) 25 MG tablet Take 1 tablet by mouth every morning 90 tablet 4    insulin 70-30 (HUMULIN;NOVOLIN) (70-30) 100 UNIT per ML injection vial Inject 18 Units subcutaneously before breakfast and 18 Units before dinner.  3 vial 4    irbesartan (AVAPRO) 150 MG tablet Take 1 tablet by mouth daily 90 tablet 4    ASPIRIN ADULT LOW STRENGTH 81 MG EC tablet Take 2 tablets by mouth daily 180 MCHC 33.2 03/29/2019    RDW 13.7 03/29/2019     03/29/2019     Lab Results   Component Value Date     03/29/2019    K 4.6 03/29/2019     03/29/2019    CO2 23 03/29/2019    BUN 24 03/29/2019    CREATININE 1.16 03/29/2019    GFRAA >60.0 03/29/2019    LABGLOM >60.0 03/29/2019    GLUCOSE 201 03/29/2019    PROT 7.3 03/29/2019    LABALBU 4.1 03/29/2019    CALCIUM 8.8 03/29/2019    BILITOT 0.4 03/29/2019    ALKPHOS 134 03/29/2019    AST 20 03/29/2019    ALT 21 03/29/2019     Lab Results   Component Value Date    PROTIME 10.0 12/04/2015    INR 0.9 12/04/2015     Lab Results   Component Value Date    TSH 1.390 03/29/2019     Lab Results   Component Value Date    TRIG 90 11/01/2017    HDL 58 11/01/2017    LDLCALC 41 11/01/2017     No results found for: Gabbi Remberto, LABBENZ, CANNAB, COCAINESCRN, LABMETH, OPIATESCREENURINE, PHENCYCLIDINESCREENURINE, PPXUR, ETOH  No results found for: LITHIUM, DILFRTOT, VALPROATE    Assessment:       Diagnosis Orders   1. Cerebrovascular accident (CVA) due to stenosis of right middle cerebral artery ([address]  US Carotid Artery Bilateral   2. Chronic ischemic right STEPHANIE stroke     3. Hemiparesis, left (HCC)     4. Spastic hemiplegia of left nondominant side as late effect of cerebral infarction (HCC)     5. Ataxia     6. Dysarthria     Right cerebral stroke with gait ataxia with hemiparesis dysarthria left hemiplegia. Patient has no other concerns except he is lightheaded when he stands up. Pt not dizzy. Patient does not report any recent falls injuries trauma is mild ataxia going on for some time. Patient has no bleeding or bruising any takes aspirin twice a day please risk factors for vascular is were reviewed. Recommend carotid ultrasound has not had one for 1 year. He has dysarthria which is quite prominent. Continue to follow him in a year unless there are new issues.       Plan:      Orders Placed This Encounter   Procedures    US Carotid Artery Bilateral Standing Status:   Future     Standing Expiration Date:   6/1/2021     Order Specific Question:   Reason for exam:     Answer:   stroke     No orders of the defined types were placed in this encounter. Return in about 1 year (around 6/1/2021).       Popeye Kern MD

## 2020-06-15 ENCOUNTER — VIRTUAL VISIT (OUTPATIENT)
Dept: FAMILY MEDICINE CLINIC | Age: 60
End: 2020-06-15
Payer: MEDICARE

## 2020-06-15 PROBLEM — R26.89 BALANCE PROBLEM: Chronic | Status: ACTIVE | Noted: 2020-06-15

## 2020-06-15 PROCEDURE — 99213 OFFICE O/P EST LOW 20 MIN: CPT | Performed by: FAMILY MEDICINE

## 2020-06-15 ASSESSMENT — PATIENT HEALTH QUESTIONNAIRE - PHQ9
SUM OF ALL RESPONSES TO PHQ9 QUESTIONS 1 & 2: 0
SUM OF ALL RESPONSES TO PHQ QUESTIONS 1-9: 0
SUM OF ALL RESPONSES TO PHQ QUESTIONS 1-9: 0
2. FEELING DOWN, DEPRESSED OR HOPELESS: 0
1. LITTLE INTEREST OR PLEASURE IN DOING THINGS: 0

## 2020-06-15 NOTE — PROGRESS NOTES
Tom Russell is a 64 y.o. male evaluated via telephone on 6/15/2020. Consent:  He and/or health care decision maker is aware that that he may receive a bill for this telephone service, depending on his insurance coverage, and has provided verbal consent to proceed: Yes      Documentation:  I communicated with the patient and/or health care decision maker about see above. Details of this discussion including any medical advice provided:see above    I affirm this is a Patient Initiated Episode with an Established Patient who has not had a related appointment within my department in the past 7 days or scheduled within the next 24 hours. Total Time: minutes: 11-20 minutes    Note: not billable if this call serves to triage the patient into an appointment for the relevant concern      María Elena ALCANTAR     6/15/2020    TELEHEALTH EVALUATION -- Audio (During [facility]- public health emergency)    HPI:    Tom Russell (:  1960) has requested an audio evaluation for the following concern(s):    Patient is here for f/u HTN. Is compliant with meds and has no side effects from them. Avoids added salt. Tries to eat healthy. Exercises occasionally. Has no chest pain, shortness of breath, palpitations or edema. CVA. No new deficits. Left sided weakness is stable. Balance is abnormal. Loses balance when he is walking. Patient is here for DM f/u. Sugars have been very  well-controlled - 104 this AM, 120s in general - and patient has not been experiencing hyper- or hypoglycemic symptoms. Compliance with meds is good and there are no side effects. Patient exercises occasionally and tries to eat healthy. Cooks for himself. Shoulder pain - hurt for two months. Stopped taking AREDS and now is better. Review of Systems No fevers, chills, sweats. No unintended weight loss. No abdominal pain, nausea, vomiting, diarrhea, constipation, bloody stools, black tarry stools. No rashes.   No swollen glands. Prior to Visit Medications    Medication Sig Taking? Authorizing Provider   amLODIPine (NORVASC) 2.5 MG tablet Take 1 tablet by mouth daily  Flor Wilson MD   Blood Pressure KIT 1 Units by Does not apply route daily  Flor Wilson MD   atorvastatin (LIPITOR) 80 MG tablet TAKE 1 TABLET BY MOUTH DAILY  Flor Wilson MD   hydrochlorothiazide (HYDRODIURIL) 25 MG tablet Take 1 tablet by mouth every morning  Flor Wilson MD   insulin 70-30 (HUMULIN;NOVOLIN) (70-30) 100 UNIT per ML injection vial Inject 18 Units subcutaneously before breakfast and 18 Units before dinner.   Flor Wilson MD   irbesartan (AVAPRO) 150 MG tablet Take 1 tablet by mouth daily  Flor Wilson MD   ASPIRIN ADULT LOW STRENGTH 81 MG EC tablet Take 2 tablets by mouth daily  Flor Wilson MD   RELION INSULIN SYR 0.3CC/30G 30G X 5/16\" 0.3 ML [facility]   Historical Provider, MD       Social History     Tobacco Use    Smoking status: Never Smoker    Smokeless tobacco: Never Used   Substance Use Topics    Alcohol use: No    Drug use: No        Allergies   Allergen Reactions    Ace Inhibitors      cough   ,   Past Medical History:   Diagnosis Date    Cerebrovascular small vessel disease     DM (diabetes mellitus) ([facility]     was with Dr Danielle Burns, [facility]    Dysarthria as late effect of cerebrovascular accident (CVA) 2015    Hearing loss     Hemiparesis affecting left side as late effect of cerebrovascular accident (CVA) ([facility]     History of left PCA stroke 2013    History of right STEPHANIE stroke 10/21/2015    was at [facility], now Dr Madhavi Hull    Microalbuminuria 2018    PFO (patent foramen ovale) 2015    Right pontine CVA ([facility] 10/2015   ,   Past Surgical History:   Procedure Laterality Date    ANKLE SURGERY         PHYSICAL EXAMINATION:  [ INSTRUCTIONS:  \"[x]\" Indicates a positive item  \"[]\" Indicates a negative item  -- DELETE ALL ITEMS NOT EXAMINED]  Vital Signs: unavailable 136/93 after physical exertion; 114/82    Patient appears to be alert and oriented to person, place, time, situation and is in no acute distress. Respiratory effort appears normal. Mood appears stable and speech and thought are grossly normal. Dysarthria present and at baseline. ASSESSMENT/PLAN:  Luis Fernando Jenkins was seen today for hypertension, hyperlipidemia and diabetes. Diagnoses and all orders for this visit:    Cerebrovascular accident (CVA) due to stenosis of right middle cerebral artery ([address]  Comments:  Stable and well-controlled on atorvastatin and aspirin with normal blood pressure control. Dr. Krystle Kuo who has ordered carotid ultrasounds. Controlled type 2 diabetes mellitus with microalbuminuria, with long-term current use of insulin ([facility])  Comments:  Stable and well-controlled on Humulin 70/30 twice daily. Orders:  -     Hemoglobin A1C; Future  -     Hemoglobin A1C; Future    Mixed hyperlipidemia  Comments:  Stable and well-controlled on atorvastatin    Balance problem  Comments:  New onset. Possibly due to deconditioning related to increased sedentary time during Claretha Boron pandemic. Defers PT. Has carotid ultrasounds ordered    Essential hypertension  Comments:  Stable and well-controlled on irbesartan, amlodipine, hydrochlorothiazide. Return in about 3 months (around 9/15/2020) for DM, HTN, CVD - phone visit. Jennifer Damico is a 64 y.o. male being evaluated by a Virtual Visit (telephonic visit) encounter to address concerns as mentioned above. A caregiver was present when appropriate. Due to this being a TeleHealth encounter (During [facility]CRX[facility]-05 public health emergency), evaluation of the following organ systems was limited: Vitals/Constitutional/EENT/Resp/CV/GI//MS/Neuro/Skin/Heme-Lymph-Imm.   Pursuant to the emergency declaration under the [facility] waiver authority and the [place] [employer] and Response Supplemental Appropriations Act, this Virtual Visit was conducted with patient's (and/or legal guardian's) consent, to reduce the patient's risk of exposure to COVID-19 and provide necessary medical care. The patient (and/or legal guardian) has also been advised to contact this office for worsening conditions or problems, and seek emergency medical treatment and/or call 911 if deemed necessary. Services were provided through a telephonic synchronous discussion virtually to substitute for in-person clinic visit. Patient and provider were located at their individual homes. --Jf Reid MD on 6/15/2020 at 10:04 AM    An electronic signature was used to authenticate this note.

## 2020-07-02 DIAGNOSIS — Z79.4 CONTROLLED TYPE 2 DIABETES MELLITUS WITH MICROALBUMINURIA, WITH LONG-TERM CURRENT USE OF INSULIN (HCC): ICD-10-CM

## 2020-07-02 DIAGNOSIS — R80.9 CONTROLLED TYPE 2 DIABETES MELLITUS WITH MICROALBUMINURIA, WITH LONG-TERM CURRENT USE OF INSULIN (HCC): ICD-10-CM

## 2020-07-02 DIAGNOSIS — E11.29 CONTROLLED TYPE 2 DIABETES MELLITUS WITH MICROALBUMINURIA, WITH LONG-TERM CURRENT USE OF INSULIN (HCC): ICD-10-CM

## 2020-07-02 LAB — HBA1C MFR BLD: 6.6 % (ref 4.8–5.9)

## 2020-07-08 ENCOUNTER — HOSPITAL ENCOUNTER (OUTPATIENT)
Dept: ULTRASOUND IMAGING | Age: 60
Discharge: HOME | End: 2020-07-10
Payer: MEDICARE

## 2020-07-08 PROCEDURE — 93880 EXTRACRANIAL BILAT STUDY: CPT

## 2020-08-20 ENCOUNTER — OFFICE VISIT (OUTPATIENT)
Dept: FAMILY MEDICINE CLINIC | Age: 60
End: 2020-08-20
Payer: MEDICARE

## 2020-08-20 VITALS
DIASTOLIC BLOOD PRESSURE: 80 MMHG | RESPIRATION RATE: 16 BRPM | OXYGEN SATURATION: 98 % | TEMPERATURE: 97.3 F | HEIGHT: 65 IN | HEART RATE: 88 BPM | WEIGHT: 173 LBS | SYSTOLIC BLOOD PRESSURE: 136 MMHG | BODY MASS INDEX: 28.82 KG/M2

## 2020-08-20 PROCEDURE — 99214 OFFICE O/P EST MOD 30 MIN: CPT | Performed by: FAMILY MEDICINE

## 2020-08-20 PROCEDURE — 2022F DILAT RTA XM EVC RTNOPTHY: CPT | Performed by: FAMILY MEDICINE

## 2020-08-20 PROCEDURE — 3017F COLORECTAL CA SCREEN DOC REV: CPT | Performed by: FAMILY MEDICINE

## 2020-08-20 PROCEDURE — 3044F HG A1C LEVEL LT 7.0%: CPT | Performed by: FAMILY MEDICINE

## 2020-08-20 PROCEDURE — G8427 DOCREV CUR MEDS BY ELIG CLIN: HCPCS | Performed by: FAMILY MEDICINE

## 2020-08-20 ASSESSMENT — LIFESTYLE VARIABLES: HOW OFTEN DO YOU HAVE A DRINK CONTAINING ALCOHOL: 0

## 2020-08-20 ASSESSMENT — PATIENT HEALTH QUESTIONNAIRE - PHQ9
SUM OF ALL RESPONSES TO PHQ QUESTIONS 1-9: 0
SUM OF ALL RESPONSES TO PHQ QUESTIONS 1-9: 0

## 2020-08-20 NOTE — PATIENT INSTRUCTIONS
Personalized Preventive Plan for Jude Wilson - 8/20/2020  Medicare offers a range of preventive health benefits. Some of the tests and screenings are paid in full while other may be subject to a deductible, co-insurance, and/or copay. Some of these benefits include a comprehensive review of your medical history including lifestyle, illnesses that may run in your family, and various assessments and screenings as appropriate. After reviewing your medical record and screening and assessments performed today your provider may have ordered immunizations, labs, imaging, and/or referrals for you. A list of these orders (if applicable) as well as your Preventive Care list are included within your After Visit Summary for your review. Other Preventive Recommendations:    · A preventive eye exam performed by an eye specialist is recommended every 1-2 years to screen for glaucoma; cataracts, macular degeneration, and other eye disorders. · A preventive dental visit is recommended every 6 months. · Try to get at least 150 minutes of exercise per week or 10,000 steps per day on a pedometer . · Order or download the FREE \"Exercise & Physical Activity: Your Everyday Guide\" from [employer]. Call 0-721.779.3392 or search [employer] online. · You need 4322-5344 mg of calcium and 9192-1419 IU of vitamin D per day. It is possible to meet your calcium requirement with diet alone, but a vitamin D supplement is usually necessary to meet this goal.  · When exposed to the sun, use a sunscreen that protects against both UVA and UVB radiation with an SPF of 30 or greater. Reapply every 2 to 3 hours or after sweating, drying off with a towel, or swimming. · Always wear a seat belt when traveling in a car. Always wear a helmet when riding a bicycle or motorcycle. Heart-Healthy Diet   Sodium, Fat, and Cholesterol Controlled Diet       What Is a Heart Healthy Diet?    A heart-healthy diet is one that limits sodium , certain types of fat , and cholesterol . This type of diet is recommended for:   People with any form of cardiovascular disease (eg, coronary heart disease , peripheral vascular disease , previous heart attack , previous stroke )   People with risk factors for cardiovascular disease, such as high blood pressure , high cholesterol , or diabetes   Anyone who wants to lower their risk of developing cardiovascular disease   Sodium    Sodium is a mineral found in many foods. In general, most people consume much more sodium than they need. Diets high in sodium can increase blood pressure and lead to edema (water retention). On a heart-healthy diet, you should consume no more than 2,300 mg (milligrams) of sodium per dayabout the amount in one teaspoon of table salt. The foods highest in sodium include table salt (about 50% sodium), processed foods, convenience foods, and preserved foods. Cholesterol    Cholesterol is a fat-like, waxy substance in your blood. Our bodies make some cholesterol. It is also found in animal products, with the highest amounts in fatty meat, egg yolks, whole milk, cheese, shellfish, and organ meats. On a heart-healthy diet, you should limit your cholesterol intake to less than 200 mg per day. It is normal and important to have some cholesterol in your bloodstream. But too much cholesterol can cause plaque to build up within your arteries, which can eventually lead to a heart attack or stroke. The two types of cholesterol that are most commonly referred to are:   Low-density lipoprotein (LDL) cholesterol  Also known as bad cholesterol, this is the cholesterol that tends to build up along your arteries. Bad cholesterol levels are increased by eating fats that are saturated or hydrogenated. Optimal level of this cholesterol is less than 100. Over 130 starts to get risky for heart disease.    High-density lipoprotein (HDL) cholesterol  Also known as good cholesterol, this type of cholesterol actually carries cholesterol away from your arteries and may, therefore, help lower your risk of having a heart attack. You want this level to be high (ideally greater than 60). It is a risk to have a level less than 40. You can raise this good cholesterol by eating olive oil, canola oil, avocados, or nuts. Exercise raises this level, too. Fat    Fat is calorie dense and packs a lot of calories into a small amount of food. Even though fats should be limited due to their high calorie content, not all fats are bad. In fact, some fats are quite healthful. Fat can be broken down into four main types. The good-for-you fats are:   Monounsaturated fat  found in oils such as olive and canola, avocados, and nuts and natural nut butters; can decrease cholesterol levels, while keeping levels of HDL cholesterol high   Polyunsaturated fat  found in oils such as safflower, sunflower, soybean, corn, and sesame; can decrease total cholesterol and LDL cholesterol   Omega-3 fatty acids  particularly those found in fatty fish (such as salmon, trout, tuna, mackerel, herring, and sardines); can decrease risk of arrhythmias, decrease triglyceride levels, and slightly lower blood pressure   The fats that you want to limit are:   Saturated fat  found in animal products, many fast foods, and a few vegetables; increases total blood cholesterol, including LDL levels   Animal fats that are saturated include: butter, lard, whole-milk dairy products, meat fat, and poultry skin   Vegetable fats that are saturated include: hydrogenated shortening, palm oil, coconut oil, cocoa butter   Hydrogenated or trans fat  found in margarine and vegetable shortening, most shelf stable snack foods, and fried foods; increases LDL and decreases HDL     It is generally recommended that you limit your total fat for the day to less than 30% of your total calories.  If you follow an 1800-calorie heart healthy diet, for example, this would mean 60 grams of fat or less per day. Saturated fat and trans fat in your diet raises your blood cholesterol the most, much more than dietary cholesterol does. For this reason, on a heart-healthy diet, less than 7% of your calories should come from saturated fat and ideally 0% from trans fat. On an 1800-calorie diet, this translates into less than 14 grams of saturated fat per day, leaving 46 grams of fat to come from mono- and polyunsaturated fats.    Food Choices on a Heart Healthy Diet   Food Category   Foods Recommended   Foods to Avoid   Grains   Breads and rolls without salted tops Most dry and cooked cereals Unsalted crackers and breadsticks Low-sodium or homemade breadcrumbs or stuffing All rice and pastas   Breads, rolls, and crackers with salted tops High-fat baked goods (eg, muffins, donuts, pastries) Quick breads, self-rising flour, and biscuit mixes Regular bread crumbs Instant hot cereals Commercially prepared rice, pasta, or stuffing mixes   Vegetables   Most fresh, frozen, and low-sodium canned vegetables Low-sodium and salt-free vegetable juices Canned vegetables if unsalted or rinsed   Regular canned vegetables and juices, including sauerkraut and pickled vegetables Frozen vegetables with sauces Commercially prepared potato and vegetable mixes   Fruits   Most fresh, frozen, and canned fruits All fruit juices   Fruits processed with salt or sodium   Milk   Nonfat or low-fat (1%) milk Nonfat or low-fat yogurt Cottage cheese, low-fat ricotta, cheeses labeled as low-fat and low-sodium   Whole milk Reduced-fat (2%) milk Malted and chocolate milk Full fat yogurt Most cheeses (unless low-fat and low salt) Buttermilk (no more than 1 cup per week)   Meats and Beans   Lean cuts of fresh or frozen beef, veal, lamb, or pork (look for the word loin) Fresh or frozen poultry without the skin Fresh or frozen fish and some shellfish Egg whites and egg substitutes (Limit whole eggs to three per week) Tofu Nuts or seeds (unsalted, dry-roasted), low-sodium peanut butter Dried peas, beans, and lentils   Any smoked, cured, salted, or canned meat, fish, or poultry (including rosenbaum, chipped beef, cold cuts, hot dogs, sausages, sardines, and anchovies) Poultry skins Breaded and/or fried fish or meats Canned peas, beans, and lentils Salted nuts   Fats and Oils   Olive oil and canola oil Low-sodium, low-fat salad dressings and mayonnaise   Butter, margarine, coconut and palm oils, rosenbaum fat   Snacks, Sweets, and Condiments   Low-sodium or unsalted versions of broths, soups, soy sauce, and condiments Pepper, herbs, and spices; vinegar, lemon, or lime juice Low-fat frozen desserts (yogurt, sherbet, fruit bars) Sugar, cocoa powder, honey, syrup, jam, and preserves Low-fat, trans-fat free cookies, cakes, and pies Chase and animal crackers, fig bars, ozzie snaps   High-fat desserts Broth, soups, gravies, and sauces, made from instant mixes or other high-sodium ingredients Salted snack foods Canned olives Meat tenderizers, seasoning salt, and most flavored vinegars   Beverages   Low-sodium carbonated beverages Tea and coffee in moderation Soy milk   Commercially softened water   Suggestions   Make whole grains, fruits, and vegetables the base of your diet. Choose heart-healthy fats such as canola, olive, and flaxseed oil, and foods high in heart-healthy fats, such as nuts, seeds, soybeans, tofu, and fish. Eat fish at least twice per week; the fish highest in omega-3 fatty acids and lowest in mercury include salmon, herring, mackerel, sardines, and canned chunk light tuna. If you eat fish less than twice per week or have high triglycerides, talk to your doctor about taking fish oil supplements. Read food labels.    For products low in fat and cholesterol, look for fat free, low-fat, cholesterol free, saturated fat free, and trans fat freeAlso scan the Nutrition Facts Label, which lists saturated fat, trans fat, and cholesterol amounts. For products low in sodium, look for sodium free, very low sodium, low sodium, no added salt, and unsalted   Skip the salt when cooking or at the table; if food needs more flavor, get creative and try out different herbs and spices. Garlic and onion also add substantial flavor to foods. Trim any visible fat off meat and poultry before cooking, and drain the fat off after stafford. Use cooking methods that require little or no added fat, such as grilling, boiling, baking, poaching, broiling, roasting, steaming, stir-frying, and sauting. Avoid fast food and convenience food. They tend to be high in saturated and trans fat and have a lot of added salt. Talk to a registered dietitian for individualized diet advice. Last Reviewed: March 2011 Fernanda Mendieta MS, MPH, RD   Updated: 3/29/2011   ·     High-Fiber Diet     What Is Fiber? Dietary fiber is a form of carbohydrate found in plants that cannot be digested by humans. All plants contain fiber, including fruits, vegetables, grains, and legumes. Fiber is often classified into two categories: soluble and insoluble. Soluble fiber draws water into the bowel and can help slow digestion. Examples of foods that are high in soluble fiber include oatmeal, oat bran, barley, legumes (eg, beans and peas), apples, and strawberries. Insoluble fiber speeds digestion and can add bulk to the stool. Examples of foods that are high in insoluble fiber include whole-wheat products, wheat bran, cauliflower, green beans, and potatoes. Why Follow a High-Fiber Diet? A high-fiber diet is often recommended to prevent and treat constipation , hemorrhoids , diverticulitis , and irritable bowel syndrome . Eating a high-fiber diet can also help improve your cholesterol levels, lower your risk of coronary heart disease , reduce your risk of type 2 diabetes , and lower your weight.  For people with type 1 or 2 diabetes, a high-fiber diet can also help stabilize blood sugar levels. How Much Fiber Should I Eat? A high-fiber diet should contain  20-35 grams  of fiber a day. This is actually the amount recommended for the general adult population; however, most Americans eat only 15 grams of fiber per day. Digestion of Fiber   Eating a higher fiber diet than usual can take some getting used to by your body's digestive system. To avoid the side effects of sudden increases in dietary fiber (eg, gas, cramping, bloating, and diarrhea), increase fiber gradually and be sure to drink plenty of fluids every day. Tips for Increasing Fiber Intake   Whenever possible, choose whole grains over refined grains (eg, brown rice instead of white rice, whole-wheat bread instead of white bread). Include a variety of grains in your diet, such as wheat, rye, barley, oats, quinoa, and bulgur. Eat more vegetarian-based meals. Here are some ideas: black bean burgers, eggplant lasagna, and veggie tofu stir-slater. Choose high-fiber snacks, such as fruits, popcorn, whole-grain crackers, and nuts. Make whole-grain cereal or whole-grain toast part of your daily breakfast regime. When eating out, whether ordering a sandwich or dinner, ask for extra vegetables. When baking, replace part of the white flour with whole-wheat flour. Whole-wheat flour is particularly easy to incorporate into a recipe. High-Fiber Diet Eating Guide   Food Category   Foods Recommended   Notes   Grains   Whole-grain breads, muffins, bagels, or alyssa bread Rye bread Whole-wheat crackers or crisp breads Whole-grain or bran cereals Oatmeal, oat bran, or grits Wheat germ Whole-wheat pasta and brown rice   Read the ingredients list on food labels. Look for products that list \"whole\" as the first ingredient (eg, whole-wheat, whole oats). Choose cereals with at least 2 grams of fiber per serving.    Vegetables   All vegetables, especially asparagus, bean sprouts, broccoli, Fay sprouts, cabbage, carrots, cauliflower, celery, corn, greens, green beans, green pepper, onions, peas, potatoes (with skin), snow peas, spinach, squash, sweet potatoes, tomatoes, zucchini   For maximum fiber intake, eat the peels of fruits and vegetablesjust be sure to wash them well first.   Fruits   All fruits, especially apples, berries, grapefruits, mangoes, nectarines, oranges, peaches, pears, dried fruits (figs, dates, prunes, raisins)   Choose raw fruits and vegetables over juice, cooked, or cannedraw fruit has more fiber. Dried fruit is also a good source of fiber. Milk   With the exception of yogurt containing inulin (a type of fiber), dairy foods provide little fiber. Add more fiber by topping your yogurt or cottage cheese with fresh fruit, whole grain or bran cereals, nuts, or seeds. Meats and Beans   All beans and peas, especially Garbanzo beans, kidney beans, lentils, lima beans, split peas, and awad beans All nuts and seeds, especially almonds, peanuts, [place] nuts, cashews, peanut butter, walnuts, sesame and sunflower seeds All meat, poultry, fish, and eggs   Increase fiber in meat dishes by adding awad beans, kidney beans, black-eyed peas, bran, or oatmeal. If you are following a low-fat diet, use nuts and seeds only in moderation. Fats and Oils   All in moderation   Fats and oils do not provide fiber   Snacks, Sweets, and Condiments   Fruit Nuts Popcorn, whole-wheat pretzels, or trail mix made with dried fruits, nuts, and seeds Cakes, breads, and cookies made with oatmeal or whole-wheat flour   Most snack foods do not provide much fiber. Choose snacks with at least 2 grams of fiber per serving. Last Reviewed: March 2011 Kathi Edwards MS, MPH, RD   Updated: 3/29/2011   ·     Keep Your Memory Cherylskyler Mackey       Many factors can affect your ability to remembera hectic lifestyle, aging, stress, chronic disease, and certain medicines.  But, there are steps you can take to sharpen your mind and help preserve your memory. Challenge Your Brain   Regularly challenging your mind may help keeps it in top shape. Good mental exercises include:   Crossword puzzlesUse a dictionary if you need it; you will learn more that way. Brainteasers Try some! Crafts, such as wood working and sewing   Hobbies, such as gardening and building model airplanes   SocializingVisit old friends or join groups to meet new ones. Reading   Learning a new language   Taking a class, whether it be art history or santana chi   TravelingExperience the food, history, and culture of your destination   Learning to use a computer   Going to museums, the theater, or thought-provoking movies   Changing things in your daily life, such as reversing your pattern in the grocery store or brushing your teeth using your nondominant hand   Use Memory Aids   There is no need to remember every detail on your own. These memory aids can help:   Calendars and day planners   Electronic organizers to store all sorts of helpful informationThese devices can \"beep\" to remind you of appointments. A book of days to record birthdays, anniversaries, and other occasions that occur on the same date every year   Detailed \"to-do\" lists and strategically placed sticky notes   Quick \"study\" sessionsBefore a gathering, review who will be there so their names will be fresh in your mind. Establish routinesFor example, keep your keys, wallet, and umbrella in the same place all the time or take medicine with your 8:00 AM glass of juice   Live a Healthy Life   Many actions that will keep your body strong will do the same for your mind. For example:   Talk to Your Doctor About Herbs and Supplements    Malnutrition and vitamin deficiencies can impair your mental function. For example, vitamin B12 deficiency can cause a range of symptoms, including confusion. But, what if your nutritional needs are being met? Can herbs and supplements still offer a benefit?  Researchers have investigated a range of natural remedies, such as ginkgo , ginseng , and the supplement phosphatidylserine (PS). So far, though, the evidence is inconsistent as to whether these products can improve memory or thinking. If you are interested in taking herbs and supplements, talk to your doctor first because they may interact with other medicines that you are taking. Exercise Regularly    Among the many benefits of regular exercise are increased blood flow to the brain and decreased risk of certain diseases that can interfere with memory function. One study found that even moderate exercise has a beneficial effect. Examples of \"moderate\" exercise include:   Playing 18 holes of golf once a week, without a cart   Playing tennis twice a week   Walking one mile per day   Manage Stress    It can be tough to remember what is important when your mind is cluttered. Make time for relaxation. Choose activities that calm you down, and make it routine. Manage Chronic Conditions    Side effects of high blood pressure , diabetes, and heart disease can interfere with mental function. Many of the lifestyle steps discussed here can help manage these conditions. Strive to eat a healthy diet, exercise regularly, get stress under control, and follow your doctor's advice for your condition. Minimize Medications    Talk to your doctor about the medicines that you take. Some may be unnecessary. Also, healthy lifestyle habits may lower the need for certain drugs. Last Reviewed: April 2010 Kirsten Benjamin MD   Updated: 4/13/2010   ·     [address]       As we get older, changes in balance, gait, strength, vision, hearing, and cognition make even the most youthful senior more prone to accidents. Falls are one of the leading health risks for older people.  This increased risk of falling is related to:   Aging process (eg, decreased muscle strength, slowed reflexes)   Higher incidence of chronic health problems (eg, arthritis, diabetes) that may limit mobility, agility or sensory awareness   Side effects of medicine (eg, dizziness, blurred vision)especially medicines like prescription pain medicines and drugs used to treat mental health conditions   Depending on the brittleness of your bones, the consequences of a fall can be serious and long lasting. Home Life   Research by the Association of [employer] [facility]) shows that some home accidents among older adults can be prevented by making simple lifestyle changes and basic modifications and repairs to the home environment. Here are some lifestyle changes that experts recommend:   Have your hearing and vision checked regularly. Be sure to wear prescription glasses that are right for you. Speak to your doctor or pharmacist about the possible side effects of your medicines. A number of medicines can cause dizziness. If you have problems with sleep, talk to your doctor. Limit your intake of alcohol. If necessary, use a cane or walker to help maintain your balance. Wear supportive, rubber-soled shoes, even at home. If you live in a region that gets wintry weather, you may want to put special cleats on your shoes to prevent you from slipping on the snow and ice. Exercise regularly to help maintain muscle tone, agility, and balance. Always hold the banister when going up or down stairs. Also, use  bars when getting in or out of the bath or shower, or using the toilet. To avoid dizziness, get up slowly from a lying down position. Sit up first, dangling your legs for a minute or two before rising to a standing position. Overall Home Safety Check   According to the Consumer Product Safety Commision's \"Older Consumer Home Safety Checklist,\" it is important to check for potential hazards in each room. And remember, proper lighting is an essential factor in home safety. If you cannot see clearly, you are more likely to fall.    Important questions to ask yourself include:   Are lamp, electric, extension, and telephone cords placed out of the flow of traffic and maintained in good condition? Have frayed cords been replaced? Are all small rugs and runners slip resistant? If not, you can secure them to the floor with a special double-sided carpet tape. Are smoke detectors properly locatedone on every floor of your home and one outside of every sleeping area? Are they in good working order? Are batteries replaced at least once a year? Do you have a well-maintained carbon monoxide detector outside every sleeping are in your home? Does your furniture layout leave plenty of space to maneuver between and around chairs, tables, beds, and sofas? Are hallways, stairs and passages between rooms well lit? Can you reach a lamp without getting out of bed? Are floor surfaces well maintained? Shag rugs, high-pile carpeting, tile floors, and polished wood floors can be particularly slippery. Stairs should always have handrails and be carpeted or fitted with a non-skid tread. Is your telephone easily reachable. Is the cord safely tucked away? Room by Room   According to the Association [employer]ging, bathrooms and deidre are the two most potentially hazardous rooms in your home. In the Kitchen    Be sure your stove is in proper working order and always make sure burners and the oven are off before you go out or go to sleep. Keep pots on the back burners, turn handles away from the front of the stove, and keep stove clean and free of grease build-up. Kitchen ventilation systems and range exhausts should be working properly. Keep flammable objects such as towels and pot holders away from the cooking area except when in use. Make sure kitchen curtains are tied back. Move cords and appliances away from the sink and hot surfaces. If extension cords are needed, install wiring guides so they do not hang over the sink, range, or working areas.     Look for coffee pots, kettles and toaster ovens with automatic shut-offs. Keep a mop handy in the kitchen so you can wipe up spills instantly. You should also have a small fire extinguisher. Arrange your kitchen with frequently used items on lower shelves to avoid the need to stand on a stepstool to reach them. Make sure countertops are well-lit to avoid injuries while cutting and preparing food. In the Bathroom    Use a non-slip mat or decals in the tub and shower, since wet, soapy tile or porcelain surfaces are extremely slippery. Make sure bathroom rugs are non-skid or tape them firmly to the floor. Bathtubs should have at least one, preferably two, grab bars, firmly attached to structural supports in the wall. (Do not use built-in soap holders or glass shower doors as grab bars.)    Tub seats fitted with non-slip material on the legs allow you to wash sitting down. For people with limited mobility, bathtub transfer benches allow you to slide safely into the tub. Raised toilet seats and toilet safety rails are helpful for those with knee or hip problems. In the [facility]    Make sure you use a nightlight and that the area around your bed is clear of potential obstacles. Be careful with electric blankets and never go to sleep with a heating pad, which can cause serious burns even if on a low setting. Use fire-resistant mattress covers and pillows, and NEVER smoke in bed. Keep a phone next to the bed that is programmed to dial 911 at the push of a button. If you have a chronic condition, you may want to sign on with an automatic call-in service. Typically the system includes a small pendant that connects directly to an emergency medical voice-response system. You should also make arrangements to stay in contact with someonefriend, neighbor, family memberon a regular schedule.    Fire Prevention   According to the [employer]. (Smoke Alarms for Every) [address] [place] [address], senior citizens are one of the two highest risk groups for death your mind and make your final days less stressful and more meaningful. Follow-up care is a key part of your treatment and safety. Be sure to make and go to all appointments, and call your doctor if you are having problems. It's also a good idea to know your test results and keep a list of the medicines you take. What can you do to plan for the end of life? You can bring these issues up with your doctor. You do not need to wait until your doctor starts the conversation. You might start with \"I would not be willing to live with . Daya Baker \" When you complete this sentence it helps your doctor understand your wishes. Talk openly and honestly with your doctor. This is the best way to understand the decisions you will need to make as your health changes. Know that you can always change your mind. Ask your doctor about commonly used life-support measures. These include tube feedings, breathing machines, and fluids given through a vein (IV). Understanding these treatments will help you decide whether you want them. You may choose to have these life-supporting treatments for a limited time. This allows a trial period to see whether they will help you. You may also decide that you want your doctor to take only certain measures to keep you alive. It is important to spell out these conditions so that your doctor and family understand them. Talk to your doctor about how long you are likely to live. He or she may be able to give you an idea of what usually happens with your specific illness. Think about preparing papers that state your wishes. This way there will not be any confusion about what you want. You can change your instructions at any time. Which papers should you prepare? Advance directives are legal papers that tell doctors how you want to be cared for at the end of your life. You do not need a  to write these papers.  Ask your doctor or your state [facility] department for information on how to write your advance directives. They may have the forms for each of these types of papers. Make sure your doctor has a copy of these on file, and give a copy to a family member or close friend. Consider a do-not-resuscitate order (DNR). This order asks that no extra treatments be done if your heart stops or you stop breathing. Extra treatments may include cardiopulmonary resuscitation (CPR), electrical shock to restart your heart, or a machine to breathe for you. If you decide to have a DNR order, ask your doctor to explain and write it. Place the order in your home where everyone can easily see it. Consider a living will. A living will explains your wishes about life support and other treatments at the end of your life if you become unable to speak for yourself. Living mcdaniel tell doctors to use or not use treatments that would keep you alive. You must have one or two witnesses or a notary present when you sign this form. A living will may be called something else in your state. Consider a medical power of . This form allows you to name a person to make decisions about your care if you are not able to. Most people ask a close friend or family member. Talk to this person about the kinds of treatments you want and those that you do not want. Make sure this person understands your wishes. A medical power of  may be called something else in your state. These legal papers are simple to change. Tell your doctor what you want to change, and ask him or her to make a note in your medical file. Give your family updated copies of the papers. Where can you learn more? Go to https://Vaunteherson.CellVir. org and sign in to your PinkelStar account. Enter P184 in the [facility] box to learn more about \"Advance Care Planning: Care Instructions. \"     If you do not have an account, please click on the \"Sign Up Now\" link.   Current as of: December 9, 2019               Content Version: 12.5  © 9702-7356 Healthwise, Incorporated. Care instructions adapted under license by [employer]. If you have questions about a medical condition or this instruction, always ask your healthcare professional. [address] any warranty or liability for your use of this information. ·        Learning About Living [place]  What is a living will? A living will, also called a declaration, is a legal form. It tells your family and your doctor your wishes when you can't speak for yourself. It's used by the health professionals who will treat you as you near the end of your life or if you get seriously hurt or ill. If you put your wishes in writing, your loved ones and others will know what kind of care you want. They won't need to guess. This can ease your mind and be helpful to others. And you can change or cancel your living will at any time. A living will is not the same as an estate or property will. An estate will explains what you want to happen with your money and property after you die. How do you use it? A living will is used to describe the kinds of treatment or life support you want as you near the end of your life or if you get seriously hurt or ill. Keep these facts in mind about living mcdaniel. Your living will is used only if you can't speak or make decisions for yourself. Most often, one or more doctors must certify that you can't speak or decide for yourself before your living will takes effect. If you get better and can speak for yourself again, you can accept or refuse any treatment. It doesn't matter what you said in your living will. Some states may limit your right to refuse treatment in certain cases. For example, you may need to clearly state in your living will that you don't want artificial hydration and nutrition, such as being fed through a tube. Is a living will a legal document? A living will is a legal document. Each state has its own laws about living mcdaniel.  And a living will may be called something else in your state. Here are some things to know about living mcdaniel. You don't need an  to complete a living will. But legal advice can be helpful if your state's laws are unclear. It can also help if your health history is complicated or your family can't agree on what should be in your living will. You can change your living will at any time. Some people find that their wishes about end-of-life care change as their health changes. If you make big changes to your living will, complete a new form. If you move to another state, make sure that your living will is legal in the state where you now live. In most cases, doctors will respect your wishes even if you have a form from a different state. You might use a universal form that has been approved by many states. This kind of form can sometimes be filled out and stored online. Your digital copy will then be available wherever you have a connection to the internet. The doctors and nurses who need to treat you can find it right away. Your state may offer an online registry. This is another place where you can store your living will online. It's a good idea to get your living will notarized. This means using a person called a [facility] to watch two people sign, or witness, your living will. What should you know when you create a living will? Here are some questions to ask yourself as you make your living will:  Do you know enough about life support methods that might be used? If not, talk to your doctor so you know what might be done if you can't breathe on your own, your heart stops, or you can't swallow. What things would you still want to be able to do after you receive life-support methods? Would you want to be able to walk? To speak? To eat on your own? To live without the help of machines? Do you want certain Protestant practices performed if you become very ill? If you have a choice, where do you want to be cared for?  In your home? At a hospital or nursing home? If you have a choice at the end of your life, where would you prefer to die? At home? In a hospital or nursing home? Somewhere else? Would you prefer to be buried or cremated? Do you want your organs to be donated after you die? What should you do with your living will? Make sure that your family members and your health care agent have copies of your living will (also called a declaration). Give your doctor a copy of your living will. Ask him or her to keep it as part of your medical record. If you have more than one doctor, make sure that each one has a copy. Put a copy of your living will where it can be easily found. For example, some people may put a copy on their refrigerator door. If you are using a digital copy, be sure your doctor, family members, and health care agent know how to find and access it. Where can you learn more? Go to https://SplitSecndpeQuality Solicitorseweb.AugmentWare. org and sign in to your Sinimanes account. Enter M436 in the [employer] box to learn more about \"Learning About Living [place]. \"     If you do not have an account, please click on the \"Sign Up Now\" link. Current as of: December 9, 2019               Content Version: 12.5  © 9123-9633 Healthwise, Incorporated. Care instructions adapted under license by [facility] ([facility]). If you have questions about a medical condition or this instruction, always ask your healthcare professional. [place] [address] any warranty or liability for your use of this information. ·        Learning About Medical Power of   What is a medical power of ? A medical power of , also called a durable power of  for health care, is one type of the legal forms called advance directives. It lets you name the person you want to make treatment decisions for you if you can't speak or decide for yourself. The person you choose is called your health care agent.  This person is also called a health care proxy or health care surrogate. A medical power of  may be called something else in your state. How do you choose a health care agent? Choose your health care agent carefully. This person may or may not be a family member. Talk to the person before you make your final decision. Make sure he or she is comfortable with this responsibility. It's a good idea to choose someone who:  Is at least 25years old. Knows you well and understands what makes life meaningful for you. Understands your Gnosticism and moral values. Will do what you want, not what he or she wants. Will be able to make difficult choices at a stressful time. Will be able to refuse or stop treatment, if that is what you would want, even if you could die. Will be firm and confident with health professionals if needed. Will ask questions to get needed information. Lives near you or agrees to travel to you if needed. Your family may help you make medical decisions while you can still be part of that process. But it's important to choose one person to be your health care agent in case you aren't able to make decisions for yourself. If you don't fill out the legal form and name a health care agent, the decisions your family can make may be limited. A health care agent may be called something else in your state. Who will make decisions for you if you don't have a health care agent? If you don't have a health care agent or a living will, you may not get the care you want. Decisions may be made by family members who disagree about your medical care. Or decisions may be made by a medical professional who doesn't know you well. In some cases, a  makes the decisions. When you name a health care agent, it is very clear who has the power to make health decisions for you. How do you name a health care agent? You name your health care agent on a legal form.  This form is usually called a medical power of . Ask your hospital, state bar association, or office on aging where to find these forms. You must sign the form to make it legal. Some states require you to get the form notarized. This means that a person called a  watches you sign the form and then he or she signs the form. Some states also require that two or more witnesses sign the form. Be sure to tell your family members and doctors who your health care agent is. Where can you learn more? Go to https://MetrekarepeMeaningfy.Echometrix. org and sign in to your Neuravi account. Enter [id number] in the [employer] box to learn more about \"Learning About [address]. \"     If you do not have an account, please click on the \"Sign Up Now\" link. Current as of: December 9, 2019               Content Version: 12.5  © 8501-3245 Healthwise, Incorporated. Care instructions adapted under license by [facility] ([facility]). If you have questions about a medical condition or this instruction, always ask your healthcare professional. [place] [address] any warranty or liability for your use of this information.     ·

## 2020-08-20 NOTE — PROGRESS NOTES
Medicare Annual Wellness Visit  Name: Dayna Cha Date: 2020   MRN: [id number] Sex: Male   Age: 64 y.o. Ethnicity: Non-/Non    : 1960 Race: Marla Petersen is here for Medicare AWV and Health Maintenance (refusing CRC)    Screenings for behavioral, psychosocial and functional/safety risks, and cognitive dysfunction are all negative except as indicated below. These results, as well as other patient data from the [address] form, are documented in Flowsheets linked to this Encounter. Allergies   Allergen Reactions    Ace Inhibitors      cough       Prior to Visit Medications    Medication Sig Taking? Authorizing Provider   amLODIPine (NORVASC) 2.5 MG tablet Take 1 tablet by mouth daily Yes Katina Banegas MD   Blood Pressure KIT 1 Units by Does not apply route daily Yes Katina Banegas MD   atorvastatin (LIPITOR) 80 MG tablet TAKE 1 TABLET BY MOUTH DAILY Yes Katina Banegas MD   insulin 70-30 (HUMULIN;NOVOLIN) (70-30) 100 UNIT per ML injection vial Inject 18 Units subcutaneously before breakfast and 18 Units before dinner.  Yes Katina Banegas MD   irbesartan (AVAPRO) 150 MG tablet Take 1 tablet by mouth daily Yes Katina Banegas MD   ASPIRIN ADULT LOW STRENGTH 81 MG EC tablet Take 2 tablets by mouth daily Yes Katina Banegas MD   RELION INSULIN SYR 0.3CC/30G 30G X 5/16\" 0.3 ML MISC  Yes Historical Provider, MD   hydrochlorothiazide (HYDRODIURIL) 25 MG tablet Take 1 tablet by mouth every morning  Patient not taking: Reported on 2020  Katina Banegas MD       Past Medical History:   Diagnosis Date    Cerebrovascular small vessel disease     DM [facility]di[facility] mellitus) [facility] [address]     was with Dr Donis Cline, [facility]    Dysarthria as late effect of cerebrovascular accident (CVA)     Hearing loss     Hemiparesis affecting left side as late effect of cerebrovascular accident (CVA) [facility] [address]     History of left PCA stroke 2013    History of right STEPHANIE stroke 10/21/2015    [facility], now Dr Yoana Bo    Microalbuminuria 2018    PFO (patent foramen ovale) 2015    Right pontine CVA ([facility] [address] 10/2015       Past Surgical History:   Procedure Laterality Date    ANKLE SURGERY         No family history on file. CareTeam (Including outside providers/suppliers regularly involved in providing care):   Patient Care Team:  Krissy Ward MD as PCP - General (Family Medicine)  Krissy Ward MD as PCP - [facility] Provider    Wt Readings from Last 3 Encounters:   08/20/20 173 lb (78.5 kg)   03/02/20 170 lb 4.8 oz (77.2 kg)   02/03/20 173 lb (78.5 kg)     Vitals:    08/20/20 0919   BP: 136/80   Site: Left Upper Arm   Position: Sitting   Cuff Size: Medium Adult   Pulse: 88   Resp: 16   Temp: 97.3 °F (36.3 °C)   TempSrc: Tympanic   SpO2: 98%   Weight: 173 lb (78.5 kg)   Height: 5' 5\" (1.651 m)     Body mass index is 28.79 kg/m². Based upon direct observation of the patient, evaluation of cognition reveals recent and remote memory intact. Patient's complete Health Risk Assessment and screening values have been reviewed and are found in Flowsheets. The following problems were reviewed today and where indicated follow up appointments were made and/or referrals ordered. Positive Risk Factor Screenings with Interventions:     General Health:  General  In general, how would you say your health is?: Fair  In the past 7 days, have you experienced any of the following?  New or Increased Pain, New or Increased Fatigue, Loneliness, Social Isolation, Stress or Anger?: None of These  Do you get the social and emotional support that you need?: (!) No  Do you have a Living Will?: (!) No  General Health Risk Interventions:  · Social isolation: patient declines any further intervention for this issue  · No Living Will: patient declined    Hearing/Vision:  No exam data present  Hearing/Vision  Do you or your family notice any trouble with your hearing?: (!) Yes(hearing aids)  Do you have difficulty driving, watching TV, or doing any of your daily activities because of your eyesight?: (!) Yes(glasses)  Have you had an eye exam within the past year?: Yes  Hearing/Vision Interventions:  · Hearing concerns:  patient declines any further evaluation/treatment for hearing issues  · Vision concerns:  patient declines any further evaluation/treatment for this issue    Safety:  Safety  Do you have working smoke detectors?: Yes  Have all throw rugs been removed or fastened?: Yes  Do you have non-slip mats or surfaces in all bathtubs/showers?: (!) No  Do all of your stairways have a railing or banister?: Yes  Are your doorways, halls and stairs free of clutter?: Yes  Do you always fasten your seatbelt when you are in a car?: Yes  Safety Interventions:  · Home safety tips provided  · Patient declines any further evaluation/treatment for this issue    Personalized Preventive Plan   Current Health Maintenance Status  Immunization History   Administered Date(s) Administered    Influenza Vaccine, unspecified formulation 10/07/2016, 10/02/2017    Influenza Virus Vaccine 10/03/2018    Influenza, Lexx Rubio, IM, (6 mo and older Fluzone, Flulaval, Fluarix and 3 yrs and older Afluria) 10/03/2018    Influenza, Quadv, IM, PF (6 mo and older Fluzone, Flulaval, Fluarix, and 3 yrs and older Afluria) 10/01/2019    Pneumococcal Polysaccharide ([id number]) 02/06/2014    Tdap (Boostrix, Adacel) 02/22/2013    Zoster Recombinant (Shingrix) 07/08/2019, 11/19/2019        Health Maintenance   Topic Date Due    Colon cancer screen colonoscopy  07/16/2012    Lipid screen  11/01/2018    Annual Wellness Visit (AWV)  05/29/2019    Potassium monitoring  03/29/2020    Creatinine monitoring  03/29/2020    Diabetic foot exam  06/24/2020    Hepatitis B vaccine (1 of 3 - Risk 3-dose series) 08/20/2021 (Originally

## 2020-09-14 DIAGNOSIS — E11.29 CONTROLLED TYPE 2 DIABETES MELLITUS WITH MICROALBUMINURIA, WITH LONG-TERM CURRENT USE OF INSULIN (HCC): ICD-10-CM

## 2020-09-14 DIAGNOSIS — R80.9 CONTROLLED TYPE 2 DIABETES MELLITUS WITH MICROALBUMINURIA, WITH LONG-TERM CURRENT USE OF INSULIN (HCC): ICD-10-CM

## 2020-09-14 DIAGNOSIS — E78.2 MIXED HYPERLIPIDEMIA: ICD-10-CM

## 2020-09-14 DIAGNOSIS — Z79.4 CONTROLLED TYPE 2 DIABETES MELLITUS WITH MICROALBUMINURIA, WITH LONG-TERM CURRENT USE OF INSULIN (HCC): ICD-10-CM

## 2020-09-14 LAB
ALBUMIN SERPL-MCNC: 4 G/DL (ref 3.5–4.6)
ALP BLD-CCNC: 144 U/L (ref 35–104)
ALT SERPL-CCNC: 26 U/L (ref 0–41)
ANION GAP SERPL CALCULATED.3IONS-SCNC: 9 MEQ/L (ref 9–15)
AST SERPL-CCNC: 27 U/L (ref 0–40)
BILIRUB SERPL-MCNC: 0.3 MG/DL (ref 0.2–0.7)
BUN BLDV-MCNC: 41 MG/DL (ref 6–20)
CALCIUM SERPL-MCNC: 8.5 MG/DL (ref 8.5–9.9)
CHLORIDE BLD-SCNC: 104 MEQ/L (ref 95–107)
CHOLESTEROL, TOTAL: 103 MG/DL (ref 0–199)
CO2: 24 MEQ/L (ref 20–31)
CREAT SERPL-MCNC: 1.68 MG/DL (ref 0.7–1.2)
GFR AFRICAN AMERICAN: 51
GFR NON-AFRICAN AMERICAN: 42.2
GLOBULIN: 2.7 G/DL (ref 2.3–3.5)
GLUCOSE BLD-MCNC: 144 MG/DL (ref 70–99)
HDLC SERPL-MCNC: 59 MG/DL (ref 40–59)
LDL CHOLESTEROL CALCULATED: 32 MG/DL (ref 0–129)
POTASSIUM SERPL-SCNC: 5.3 MEQ/L (ref 3.4–4.9)
SODIUM BLD-SCNC: 137 MEQ/L (ref 135–144)
TOTAL PROTEIN: 6.7 G/DL (ref 6.3–8)
TRIGL SERPL-MCNC: 61 MG/DL (ref 0–150)

## 2020-09-18 RX ORDER — HYDROCHLOROTHIAZIDE 25 MG/1
TABLET ORAL
Qty: 90 TABLET | Refills: 3 | Status: SHIPPED | OUTPATIENT
Start: 2020-09-18 | End: 2020-10-05 | Stop reason: ALTCHOICE

## 2020-09-18 RX ORDER — IRBESARTAN 150 MG/1
150 TABLET ORAL DAILY
Qty: 90 TABLET | Refills: 3 | Status: SHIPPED | OUTPATIENT
Start: 2020-09-18

## 2020-09-18 NOTE — TELEPHONE ENCOUNTER
Rx request   Requested Prescriptions     Pending Prescriptions Disp Refills    hydroCHLOROthiazide (HYDRODIURIL) 25 MG tablet [Pharmacy Med Name: hydrochlorothiazide 25 mg tablet] 90 tablet 3     Sig: TAKE 1 TABLET BY MOUTH DAILY IN THE MORNING    irbesartan (AVAPRO) 150 MG tablet [Pharmacy Med Name: irbesartan 150 mg tablet] 90 tablet 3     Sig: TAKE 1 TABLET BY MOUTH DAILY     LOV 8/20/2020  Next Visit Date:  Future Appointments   Date Time Provider Sarmad Jaime   12/16/2020  8:30 AM MD Barbara Garcia [facility]   6/7/2021  1:00 PM MD Arias Welch   8/26/2021 10:00 AM Jimmy Guzman MD [address] [place] [address]

## 2020-09-24 ENCOUNTER — TELEPHONE (OUTPATIENT)
Dept: FAMILY MEDICINE CLINIC | Age: 60
End: 2020-09-24

## 2020-10-05 RX ORDER — AMLODIPINE BESYLATE 5 MG/1
5 TABLET ORAL DAILY
Qty: 90 TABLET | Refills: 4 | Status: SHIPPED | OUTPATIENT
Start: 2020-10-05 | End: 2020-10-08 | Stop reason: SDUPTHER

## 2020-10-08 ENCOUNTER — TELEPHONE (OUTPATIENT)
Dept: FAMILY MEDICINE CLINIC | Age: 60
End: 2020-10-08

## 2020-10-08 RX ORDER — AMLODIPINE BESYLATE 5 MG/1
5 TABLET ORAL DAILY
Qty: 90 TABLET | Refills: 4 | Status: SHIPPED | OUTPATIENT
Start: 2020-10-08 | End: 2021-10-21 | Stop reason: SDUPTHER

## 2020-10-08 NOTE — TELEPHONE ENCOUNTER
Patient called to say that he need his Amlodipine 5mg sent to [facility] in [place].  He no longer uses [facility]  Rx sent

## 2020-12-16 ENCOUNTER — VIRTUAL VISIT (OUTPATIENT)
Dept: FAMILY MEDICINE CLINIC | Age: 60
End: 2020-12-16
Payer: MEDICARE

## 2020-12-16 PROBLEM — N18.32 CHRONIC KIDNEY DISEASE, STAGE 3B (HCC): Chronic | Status: ACTIVE | Noted: 2020-12-16

## 2020-12-16 PROCEDURE — 2022F DILAT RTA XM EVC RTNOPTHY: CPT | Performed by: FAMILY MEDICINE

## 2020-12-16 PROCEDURE — 99214 OFFICE O/P EST MOD 30 MIN: CPT | Performed by: FAMILY MEDICINE

## 2020-12-16 PROCEDURE — 3017F COLORECTAL CA SCREEN DOC REV: CPT | Performed by: FAMILY MEDICINE

## 2020-12-16 PROCEDURE — G8427 DOCREV CUR MEDS BY ELIG CLIN: HCPCS | Performed by: FAMILY MEDICINE

## 2020-12-16 PROCEDURE — 3044F HG A1C LEVEL LT 7.0%: CPT | Performed by: FAMILY MEDICINE

## 2020-12-16 ASSESSMENT — PATIENT HEALTH QUESTIONNAIRE - PHQ9
1. LITTLE INTEREST OR PLEASURE IN DOING THINGS: 0
SUM OF ALL RESPONSES TO PHQ9 QUESTIONS 1 & 2: 0
SUM OF ALL RESPONSES TO PHQ QUESTIONS 1-9: 0
2. FEELING DOWN, DEPRESSED OR HOPELESS: 0

## 2020-12-16 NOTE — PROGRESS NOTES
Meenu Griffiths is a 64 y.o. male evaluated via telephone on 2020. Consent:  He and/or health care decision maker is aware that that he may receive a bill for this telephone service, depending on his insurance coverage, and has provided verbal consent to proceed: Yes      Documentation:  I communicated with the patient and/or health care decision maker about see below. Details of this discussion including any medical advice provided: see below      I affirm this is a Patient Initiated Episode with an Established Patient who has not had a related appointment within my department in the past 7 days or scheduled within the next 24 hours. Total Time: minutes: 21-30 minutes    Note: not billable if this call serves to triage the patient into an appointment for the relevant concern      Kala ALCANTAR     2020    TELEHEALTH EVALUATION -- Audio (During [facility]-34 public health emergency)    HPI:    Meenu Griffiths (:  1960) has requested an audio evaluation for the following concern(s):    3 Month Follow-Up (Voices no new concerns) and Health Maintenance (Pt declines cologaurd because of stroke)    Patient is here for f/u HTN. Is compliant with meds and has no side effects from them. Avoids added salt. Tries to eat healthy. Exercises occasionally. Has no chest pain, shortness of breath, palpitations or edema. Patient is here for DM f/u. Sugars have been well-controlled and patient has not been experiencing hyper- or hypoglycemic symptoms. Compliance with meds is good and there are no side effects. Patient exercises occasionally and tries to eat healthy. Is up to date on foot and eye exams and immunizations. Patient is here for hyperlipidemia. Is compliant with medications and has no apparent side effects from them. CVA. No new deficits. No facial droop. No speech impairment. No unilateral weakness.     Review of Systems     Prior to Visit Medications Medication Sig Taking? Authorizing Provider   amLODIPine (NORVASC) 5 MG tablet Take 1 tablet by mouth daily Yes Tim Franco MD   irbesartan (AVAPRO) 150 MG tablet TAKE 1 TABLET BY MOUTH DAILY Yes Tim Franco MD   Blood Pressure KIT 1 Units by Does not apply route daily Yes Tim Franco MD   atorvastatin (LIPITOR) 80 MG tablet TAKE 1 TABLET BY MOUTH DAILY Yes Tim Franco MD   insulin 70-30 (HUMULIN;NOVOLIN) (70-30) 100 UNIT per ML injection vial Inject 18 Units subcutaneously before breakfast and 18 Units before dinner. Yes Tim Franco MD   ASPIRIN ADULT LOW STRENGTH 81 MG EC tablet Take 2 tablets by mouth daily Yes Tim Franco MD   RELION INSULIN SYR 0.3CC/30G 30G X 5/16\" 0.3 ML MISC  Yes Historical Provider, MD       Social History     Tobacco Use    Smoking status: Never Smoker    Smokeless tobacco: Never Used   Substance Use Topics    Alcohol use: No    Drug use: No        Allergies   Allergen Reactions    Ace Inhibitors      cough   ,   Past Medical History:   Diagnosis Date    Cerebrovascular small vessel disease     Chronic kidney disease, stage 3b 12/16/2020    DM (diabetes mellitus) ([facility]     was with Dr Rica Warren, [facility]    Dysarthria as late effect of cerebrovascular accident (CVA) 2015    Hearing loss     Hemiparesis affecting left side as late effect of cerebrovascular accident (CVA) ([facility]     History of left PCA stroke 2013    History of right STEPHANIE stroke 10/21/2015    was at [facility], now Dr Mario Jackson    Microalbuminuria 2018    PFO (patent foramen ovale) 2015    Right pontine CVA ([facility] 10/2015   ,   Past Surgical History:   Procedure Laterality Date    ANKLE SURGERY     ,   Social History     Tobacco Use    Smoking status: Never Smoker    Smokeless tobacco: Never Used   Substance Use Topics    Alcohol use: No    Drug use: No   , History reviewed. No pertinent family history. PHYSICAL EXAMINATION:  [ INSTRUCTIONS:  \"[x]\" Indicates a positive item  \"[]\" Indicates a negative item  -- DELETE ALL ITEMS NOT EXAMINED]  Vital Signs: unavailable    Patient appears to be alert and oriented to person, place, time, situation and is in no acute distress. Respiratory effort appears normal. Mood appears stable and speech and thought are grossly normal.    Lab Results   Component Value Date    WBC 7.2 03/29/2019    HGB 13.5 (L) 03/29/2019    HCT 40.6 (L) 03/29/2019     03/29/2019    CHOL 103 09/14/2020    TRIG 61 09/14/2020    HDL 59 09/14/2020    ALT 26 09/14/2020    AST 27 09/14/2020     09/14/2020    K 5.3 (H) 09/14/2020     09/14/2020    CREATININE 1.68 (H) 09/14/2020    BUN 41 (H) 09/14/2020    CO2 24 09/14/2020    TSH 1.390 03/29/2019    PSA 0.26 06/06/2016    INR 0.9 12/04/2015    LABA1C 6.6 (H) 07/02/2020    LABMICR 2.10 (H) 04/16/2018       ASSESSMENT/PLAN:  Umm Perez was seen today for 3 month follow-up and health maintenance.     Diagnoses and all orders for this visit:    Cerebrovascular accident (CVA) due to stenosis of right middle cerebral artery (HCC)  Comments:  stable and well-controlled on ASA, atorvastatin      Essential hypertension  Comments:  stable and well-controlled irbesartan and amlodipine    Controlled type 2 diabetes mellitus with microalbuminuria, with long-term current use of insulin ([facility])  Comments:  stable ad well-controlled on 70-30    Nonproliferative diabetic retinopathy ([address]  Comments:  stable and well-controlled    Hemiparesis, left ([address]  Comments:  stable and well-controlled in that there is no progression    Hemiplegia as late effect of cerebral infarction, unspecified hemiplegia type, unspecified laterality ([address]  Comments:  stable and well-controlled in that there is no progression    Spastic hemiplegia of left nondominant side as late effect of cerebral infarction [facility]  Comments:  stable and well-controlled in that there is no progression Mixed hyperlipidemia  Comments:  stable and well-controlled on atorvastatin    Chronic kidney disease, stage 3b  Comments:  New onset. Unclear etiology stopped HCTZ. Follow BP and labs. Return in about 4 months (around 4/16/2021) for DM, HTN, HLD, CVA - OV. Rolan Boateng is a 64 y.o. male being evaluated by a Virtual Visit (telephonic visit) encounter to address concerns as mentioned above. A caregiver was present when appropriate. Due to this being a TeleHealth encounter (During [facility]), evaluation of the following organ systems was limited: Vitals/Constitutional/EENT/Resp/CV/GI//MS/Neuro/Skin/Heme-Lymph-Imm. Pursuant to the emergency declaration under the [address] [place] [address] authority and the [employer] and [employer] Act, this Virtual Visit was conducted with patient's (and/or legal guardian's) consent, to reduce the patient's risk of exposure to COVID-19 and provide necessary medical care. The patient (and/or legal guardian) has also been advised to contact this office for worsening conditions or problems, and seek emergency medical treatment and/or call 911 if deemed necessary. Services were provided through a telephonic synchronous discussion virtually to substitute for in-person clinic visit. Patient and provider were located at their individual homes. --Adolfo Haines MD on 12/16/2020 at 9:02 AM    An electronic signature was used to authenticate this note.

## 2021-01-04 DIAGNOSIS — I10 ESSENTIAL HYPERTENSION: ICD-10-CM

## 2021-01-04 DIAGNOSIS — Z79.4 CONTROLLED TYPE 2 DIABETES MELLITUS WITH MICROALBUMINURIA, WITH LONG-TERM CURRENT USE OF INSULIN (HCC): ICD-10-CM

## 2021-01-04 DIAGNOSIS — N18.32 CHRONIC KIDNEY DISEASE, STAGE 3B (HCC): Chronic | ICD-10-CM

## 2021-01-04 DIAGNOSIS — R80.9 CONTROLLED TYPE 2 DIABETES MELLITUS WITH MICROALBUMINURIA, WITH LONG-TERM CURRENT USE OF INSULIN (HCC): ICD-10-CM

## 2021-01-04 DIAGNOSIS — E11.29 CONTROLLED TYPE 2 DIABETES MELLITUS WITH MICROALBUMINURIA, WITH LONG-TERM CURRENT USE OF INSULIN (HCC): ICD-10-CM

## 2021-01-04 LAB
ANION GAP SERPL CALCULATED.3IONS-SCNC: 14 MEQ/L (ref 9–15)
BUN BLDV-MCNC: 26 MG/DL (ref 6–20)
CALCIUM SERPL-MCNC: 8.7 MG/DL (ref 8.5–9.9)
CHLORIDE BLD-SCNC: 110 MEQ/L (ref 95–107)
CO2: 19 MEQ/L (ref 20–31)
CREAT SERPL-MCNC: 1.58 MG/DL (ref 0.7–1.2)
GFR AFRICAN AMERICAN: 54.7
GFR NON-AFRICAN AMERICAN: 45.2
GLUCOSE BLD-MCNC: 219 MG/DL (ref 70–99)
HBA1C MFR BLD: 6.7 % (ref 4.8–5.9)
POTASSIUM SERPL-SCNC: 5 MEQ/L (ref 3.4–4.9)
SODIUM BLD-SCNC: 143 MEQ/L (ref 135–144)

## 2021-04-22 ENCOUNTER — OFFICE VISIT (OUTPATIENT)
Dept: FAMILY MEDICINE CLINIC | Age: 61
End: 2021-04-22
Payer: MEDICARE

## 2021-04-22 VITALS
BODY MASS INDEX: 29.49 KG/M2 | RESPIRATION RATE: 18 BRPM | SYSTOLIC BLOOD PRESSURE: 112 MMHG | WEIGHT: 177.2 LBS | TEMPERATURE: 97.2 F | DIASTOLIC BLOOD PRESSURE: 78 MMHG | OXYGEN SATURATION: 97 % | HEART RATE: 64 BPM

## 2021-04-22 DIAGNOSIS — E11.42 DIABETIC POLYNEUROPATHY ASSOCIATED WITH TYPE 2 DIABETES MELLITUS (HCC): Chronic | ICD-10-CM

## 2021-04-22 DIAGNOSIS — I69.30 CHRONIC ISCHEMIC RIGHT ACA STROKE: Chronic | ICD-10-CM

## 2021-04-22 DIAGNOSIS — I63.511 CEREBROVASCULAR ACCIDENT (CVA) DUE TO STENOSIS OF RIGHT MIDDLE CEREBRAL ARTERY (HCC): Primary | ICD-10-CM

## 2021-04-22 DIAGNOSIS — I10 ESSENTIAL HYPERTENSION: ICD-10-CM

## 2021-04-22 DIAGNOSIS — E11.3299 NONPROLIFERATIVE DIABETIC RETINOPATHY (HCC): ICD-10-CM

## 2021-04-22 DIAGNOSIS — I69.354 SPASTIC HEMIPLEGIA OF LEFT NONDOMINANT SIDE AS LATE EFFECT OF CEREBRAL INFARCTION (HCC): ICD-10-CM

## 2021-04-22 DIAGNOSIS — E11.29 CONTROLLED TYPE 2 DIABETES MELLITUS WITH MICROALBUMINURIA, WITH LONG-TERM CURRENT USE OF INSULIN (HCC): ICD-10-CM

## 2021-04-22 DIAGNOSIS — Z79.4 CONTROLLED TYPE 2 DIABETES MELLITUS WITH MICROALBUMINURIA, WITH LONG-TERM CURRENT USE OF INSULIN (HCC): ICD-10-CM

## 2021-04-22 DIAGNOSIS — E78.2 MIXED HYPERLIPIDEMIA: ICD-10-CM

## 2021-04-22 DIAGNOSIS — G81.94 HEMIPARESIS, LEFT (HCC): ICD-10-CM

## 2021-04-22 DIAGNOSIS — R80.9 CONTROLLED TYPE 2 DIABETES MELLITUS WITH MICROALBUMINURIA, WITH LONG-TERM CURRENT USE OF INSULIN (HCC): ICD-10-CM

## 2021-04-22 DIAGNOSIS — N18.32 CHRONIC KIDNEY DISEASE, STAGE 3B (HCC): Chronic | ICD-10-CM

## 2021-04-22 PROCEDURE — 99215 OFFICE O/P EST HI 40 MIN: CPT | Performed by: FAMILY MEDICINE

## 2021-04-22 PROCEDURE — 3044F HG A1C LEVEL LT 7.0%: CPT | Performed by: FAMILY MEDICINE

## 2021-04-22 PROCEDURE — G8417 CALC BMI ABV UP PARAM F/U: HCPCS | Performed by: FAMILY MEDICINE

## 2021-04-22 PROCEDURE — 1036F TOBACCO NON-USER: CPT | Performed by: FAMILY MEDICINE

## 2021-04-22 PROCEDURE — 3017F COLORECTAL CA SCREEN DOC REV: CPT | Performed by: FAMILY MEDICINE

## 2021-04-22 PROCEDURE — 2022F DILAT RTA XM EVC RTNOPTHY: CPT | Performed by: FAMILY MEDICINE

## 2021-04-22 PROCEDURE — G8427 DOCREV CUR MEDS BY ELIG CLIN: HCPCS | Performed by: FAMILY MEDICINE

## 2021-04-22 RX ORDER — HYDROCHLOROTHIAZIDE 25 MG/1
25 TABLET ORAL EVERY MORNING
COMMUNITY
Start: 2021-03-26 | End: 2021-10-21 | Stop reason: ALTCHOICE

## 2021-04-22 RX ORDER — ATORVASTATIN CALCIUM 80 MG/1
TABLET, FILM COATED ORAL
Qty: 90 TABLET | Refills: 4 | Status: SHIPPED | OUTPATIENT
Start: 2021-04-22 | End: 2022-06-06 | Stop reason: SDUPTHER

## 2021-04-22 ASSESSMENT — PATIENT HEALTH QUESTIONNAIRE - PHQ9
2. FEELING DOWN, DEPRESSED OR HOPELESS: 0
SUM OF ALL RESPONSES TO PHQ9 QUESTIONS 1 & 2: 0
SUM OF ALL RESPONSES TO PHQ QUESTIONS 1-9: 0

## 2021-04-22 NOTE — PROGRESS NOTES
Subjective  Madhavi Hidalgo, 64 y.o. male presents today with:  Chief Complaint   Patient presents with    Hypertension     HTN, DM, HLD, CVA 4 month follow-up           HPI    Patient is here for f/u HTN. Is compliant with meds and has no side effects from them. Avoids added salt. Tries to eat healthy. Exercises occasionally. Has no chest pain, shortness of breath, palpitations or edema. However, did have ankle swelling two months ago. It resolved quickly. CVA. No new deficits. No facial droop. No speech impairment. No unilateral weakness. He requests that I complete his [employer] Disability papers which Dr. Yamilka Vickers typically completes. Patient is here for DM f/u. Sugars have been moderately well-controlled and patient has not been experiencing hyper- or hypoglycemic symptoms. Compliance with meds is good and there are no side effects. Patient exercises regularly and tries to eat healthy. Is up to date on foot and eye exams and immunizations. Goes every 6 months for eye exams. Gets shooting pain in feet, ankles and shins. Intermittent. Leg cramps in calves at night. Pains are mild and he defers meds. Patient is here for hyperlipidemia. Is compliant with medications and has no apparent side effects from them.     No other questions and or concerns for today's visit          Past Medical History:   Diagnosis Date    Cerebrovascular small vessel disease     Chronic kidney disease, stage 3b 12/16/2020    Diabetic polyneuropathy associated with type 2 diabetes mellitus ([facility] 4/22/2021    DM (diabetes mellitus) ([facility]     was with Dr Malachi Hemphill, [facility]    Dysarthria as late effect of cerebrovascular accident (CVA) 2015    Hearing loss     Hemiparesis affecting left side as late effect of cerebrovascular accident (CVA) ([facility]     History of left PCA stroke 2013    History of right STEPHANIE stroke 10/21/2015    was at [facility], now Dr Yamilka Vickers    Microalbuminuria 2018    PFO (patent foramen ovale) 2015    Right pontine JELLY ([facility] 10/2015     Past Surgical History:   Procedure Laterality Date    ANKLE SURGERY       Social History     Socioeconomic History    Marital status: Single     Spouse name: Not on file    Number of children: 0    Years of education: Not on file    Highest education level: Not on file   Occupational History    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[place] [employer] strain: Not on file    Food insecurity     Worry: Not on file     Inability: Not on file   [place] [employer] needs     Medical: Not on file     Non-medical: Not on file   Tobacco Use    Smoking status: Never Smoker    Smokeless tobacco: Never Used   Substance and Sexual Activity    Alcohol use: No    Drug use: No    Sexual activity: Not on file   Lifestyle    Physical activity     Days per week: Not on file     Minutes per session: Not on file    Stress: Not on file   Relationships    Social connections     Talks on phone: Not on file     Gets together: Not on file     Attends [employer] service: Not on file     Active member of club or organization: Not on file     Attends meetings of clubs or organizations: Not on file     Relationship status: Not on file    Intimate partner violence     Fear of current or ex partner: Not on file     Emotionally abused: Not on file     Physically abused: Not on file     Forced sexual activity: Not on file   Other Topics Concern    Not on file   Social History Narrative    Born in [facility], one brother and one sister    Single, no children    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 No pertinent family history.   Allergies   Allergen Reactions    Ace Inhibitors      cough     Current Outpatient Medications   Medication Sig Dispense Refill    hydroCHLOROthiazide (HYDRODIURIL) 25 MG tablet Take 25 mg by mouth every morning      atorvastatin (LIPITOR) 80 MG tablet TAKE 1 TABLET BY MOUTH DAILY 90 tablet 4    amLODIPine (NORVASC) 5 MG tablet Take 1 tablet by mouth daily 90 tablet 4    Blood Pressure KIT 1 Units by Does not apply route daily 1 kit 0    insulin 70-30 (HUMULIN;NOVOLIN) (70-30) 100 UNIT per ML injection vial Inject 18 Units subcutaneously before breakfast and 18 Units before dinner. 3 vial 4    ASPIRIN ADULT LOW STRENGTH 81 MG EC tablet Take 2 tablets by mouth daily 180 tablet 3    RELION INSULIN SYR 0.3CC/30G 30G X 5/16\" 0.3 ML MISC       irbesartan (AVAPRO) 150 MG tablet TAKE 1 TABLET BY MOUTH DAILY (Patient not taking: Reported on 4/22/2021) 90 tablet 3     No current facility-administered medications for this visit. PMH, Surgical Hx, Family Hx, and Social Hxreviewed and updated. Health Maintenance reviewed. Objective    Vitals:    04/22/21 1038   BP: 112/78   Site: Right Upper Arm   Position: Sitting   Cuff Size: Medium Adult   Pulse: 64   Resp: 18   Temp: 97.2 °F (36.2 °C)   TempSrc: Tympanic   SpO2: 97%   Weight: 177 lb 3.2 oz (80.4 kg)        Physical Exam  Constitutional:       General: He is not in acute distress. Appearance: He is well-developed. HENT:      Head: Normocephalic and atraumatic. Eyes:      Conjunctiva/sclera: Conjunctivae normal.   Neck:      Musculoskeletal: Normal range of motion and neck supple. Cardiovascular:      Rate and Rhythm: Normal rate and regular rhythm. Heart sounds: Normal heart sounds. Pulmonary:      Effort: No respiratory distress. Breath sounds: No wheezing or rales. Musculoskeletal:      Right lower leg: No edema. Left lower leg: No edema. Lymphadenopathy:      Cervical: No cervical adenopathy. Skin:     General: Skin is warm and dry. Neurological:      Mental Status: He is alert and oriented to person, place, and time. Comments: Spastic hemiplegia of left lower extremity.   Hemiparesis of labs.    Basic Metabolic Panel [64966 Custom]   - Future Order         Mixed hyperlipidemia        Stable. Follow labs. Continue atorvastatin. atorvastatin (LIPITOR) 80 MG tablet [[id number]]           Diabetic polyneuropathy associated with type 2 diabetes mellitus (HCC)        Stable, fair control. Patient defers medical management at this time. Mixed hyperlipidemia        Stable and well-controlled on atorvastatin    atorvastatin (LIPITOR) 80 MG tablet [[id number]]           Chronic ischemic right STEPHANIE stroke        No new strokelike symptoms. atorvastatin (LIPITOR) 80 MG tablet [[id number]]           ORDERS WITHOUT AN ASSOCIATED DIAGNOSIS    hydroCHLOROthiazide (HYDRODIURIL) 25 MG tablet [[id number]]              Reviewed with the patient: all disease processes, current clinical status, medications, activities and diet.      Side effects, adverse effects of the medication prescribed today, as well as treatment plan/ rationale and result expectations have been discussed with the patient who expresses understanding and desires to proceed.     Close follow up to evaluate treatment results and for coordination of care. I have reviewed the patient's medical history in detail and updated the computerized patient record. Time spent: 50 minutes. Please note this report has been partially produced using speech recognition software and may contain mistakes related to that system including errors in grammar, punctuation and spelling as well as words and phrases that may seem inappropriate. If there are questions or concerns, please feel free to contact me to clarify.     Orders Placed This Encounter   Procedures    Hemoglobin A1C     Standing Status:   Future     Standing Expiration Date:   10/22/2021    Basic Metabolic Panel     Standing Status:   Future     Standing Expiration Date:   10/22/2021     Orders Placed This Encounter   Medications    atorvastatin (LIPITOR) 80 MG tablet     Sig: TAKE 1 TABLET BY MOUTH DAILY Dispense:  90 tablet     Refill:  4     Medications Discontinued During This Encounter   Medication Reason    atorvastatin (LIPITOR) 80 MG tablet REORDER     Return in about 6 months (around 10/22/2021) for DM, HTN, CVA - OV. Controlled Substance Monitoring:    Acute and Chronic Pain Monitoring:   RX Monitoring 11/28/2018   Attestation The Prescription Monitoring Report for this patient was reviewed today. Periodic Controlled Substance Monitoring Possible medication side effects, risk of tolerance/dependence & alternative treatments discussed. ;No signs of potential drug abuse or diversion identified.            Prerna Faith MD

## 2021-06-01 PROBLEM — S52.592A OTH FRACTURES OF LOWER END OF LEFT RADIUS, INIT FOR CLOS FX: Status: ACTIVE | Noted: 2018-11-29

## 2021-06-01 PROBLEM — S52.592A OTH FRACTURES OF LOWER END OF LEFT RADIUS, INIT FOR CLOS FX: Status: ACTIVE | Noted: 2019-02-22

## 2021-06-01 PROBLEM — R47.1 DYSARTHRIA: Status: ACTIVE | Noted: 2021-06-01

## 2021-06-07 ENCOUNTER — OFFICE VISIT (OUTPATIENT)
Dept: NEUROLOGY | Age: 61
End: 2021-06-07
Payer: MEDICARE

## 2021-06-07 VITALS
SYSTOLIC BLOOD PRESSURE: 140 MMHG | WEIGHT: 178.9 LBS | BODY MASS INDEX: 29.77 KG/M2 | HEART RATE: 92 BPM | OXYGEN SATURATION: 96 % | DIASTOLIC BLOOD PRESSURE: 90 MMHG

## 2021-06-07 DIAGNOSIS — R29.898 LEFT LEG WEAKNESS: ICD-10-CM

## 2021-06-07 DIAGNOSIS — N18.32 CHRONIC KIDNEY DISEASE, STAGE 3B (HCC): Chronic | ICD-10-CM

## 2021-06-07 DIAGNOSIS — I69.359 HEMIPLEGIA AS LATE EFFECT OF CEREBRAL INFARCTION, UNSPECIFIED HEMIPLEGIA TYPE, UNSPECIFIED LATERALITY (HCC): ICD-10-CM

## 2021-06-07 DIAGNOSIS — Z79.4 CONTROLLED TYPE 2 DIABETES MELLITUS WITH MICROALBUMINURIA, WITH LONG-TERM CURRENT USE OF INSULIN (HCC): ICD-10-CM

## 2021-06-07 DIAGNOSIS — I69.30 CHRONIC ISCHEMIC RIGHT ACA STROKE: Primary | Chronic | ICD-10-CM

## 2021-06-07 DIAGNOSIS — R42 DIZZINESS: ICD-10-CM

## 2021-06-07 DIAGNOSIS — E11.29 CONTROLLED TYPE 2 DIABETES MELLITUS WITH MICROALBUMINURIA, WITH LONG-TERM CURRENT USE OF INSULIN (HCC): ICD-10-CM

## 2021-06-07 DIAGNOSIS — G81.94 HEMIPARESIS, LEFT (HCC): ICD-10-CM

## 2021-06-07 DIAGNOSIS — I10 ESSENTIAL HYPERTENSION: ICD-10-CM

## 2021-06-07 DIAGNOSIS — R80.9 CONTROLLED TYPE 2 DIABETES MELLITUS WITH MICROALBUMINURIA, WITH LONG-TERM CURRENT USE OF INSULIN (HCC): ICD-10-CM

## 2021-06-07 LAB
ANION GAP SERPL CALCULATED.3IONS-SCNC: 12 MEQ/L (ref 9–15)
BUN BLDV-MCNC: 33 MG/DL (ref 6–20)
CALCIUM SERPL-MCNC: 9.3 MG/DL (ref 8.5–9.9)
CHLORIDE BLD-SCNC: 103 MEQ/L (ref 95–107)
CO2: 24 MEQ/L (ref 20–31)
CREAT SERPL-MCNC: 1.7 MG/DL (ref 0.7–1.2)
GFR AFRICAN AMERICAN: 50.2
GFR NON-AFRICAN AMERICAN: 41.5
GLUCOSE BLD-MCNC: 120 MG/DL (ref 70–99)
HBA1C MFR BLD: 7.3 % (ref 4.8–5.9)
POTASSIUM SERPL-SCNC: 4.3 MEQ/L (ref 3.4–4.9)
SODIUM BLD-SCNC: 139 MEQ/L (ref 135–144)

## 2021-06-07 PROCEDURE — G8427 DOCREV CUR MEDS BY ELIG CLIN: HCPCS | Performed by: PSYCHIATRY & NEUROLOGY

## 2021-06-07 PROCEDURE — 3017F COLORECTAL CA SCREEN DOC REV: CPT | Performed by: PSYCHIATRY & NEUROLOGY

## 2021-06-07 PROCEDURE — 99213 OFFICE O/P EST LOW 20 MIN: CPT | Performed by: PSYCHIATRY & NEUROLOGY

## 2021-06-07 PROCEDURE — 1036F TOBACCO NON-USER: CPT | Performed by: PSYCHIATRY & NEUROLOGY

## 2021-06-07 PROCEDURE — G8417 CALC BMI ABV UP PARAM F/U: HCPCS | Performed by: PSYCHIATRY & NEUROLOGY

## 2021-06-07 ASSESSMENT — ENCOUNTER SYMPTOMS
SHORTNESS OF BREATH: 0
COLOR CHANGE: 0
NAUSEA: 0
VOMITING: 0
PHOTOPHOBIA: 0
CHOKING: 0
BACK PAIN: 0
TROUBLE SWALLOWING: 0

## 2021-06-07 NOTE — PROGRESS NOTES
Subjective:      Patient ID: Concepcion Mcclain is a 64 y.o. male who presents today for:  Chief Complaint   Patient presents with    Follow-up     Pt states things have been going good for him. Pt has no questions or concerns today. HPI 65-year right-handed gentleman history of right CVA with a hemiparesis with dysarthria with left hemiplegia. Patient is seen every year. Last carotid ultrasound did not raise extinction. Still somewhat woozy and lightheaded when he stands up he has not passed out. Is not any hospitalizations. She still reports that when he looks up he has some dizziness but has not passed out. Not any falls injuries trauma no bleeding bruising choking drooling. He otherwise is independent in his activities of daily living.   Episodes reports occurs only about once a month    Past Medical History:   Diagnosis Date    Cerebrovascular small vessel disease     Chronic kidney disease, stage 3b ([address] 12/16/2020    Diabetic polyneuropathy associated with type 2 diabetes mellitus ([facility] 4/22/2021    DM (diabetes mellitus) ([facility]     was with Dr Demetris Figueroa, [facility]    Dysarthria as late effect of cerebrovascular accident (CVA) 2015    Hearing loss     Hemiparesis affecting left side as late effect of cerebrovascular accident (CVA) ([address]     History of left PCA stroke 2013    History of right STEPHANIE stroke 10/21/2015    was at [facility], now Dr Lissette Cruz Microalbuminuria 2018    PFO (patent foramen ovale) 2015    Right pontine CVA ([address] 10/2015     Past Surgical History:   Procedure Laterality Date    ANKLE SURGERY       Social History     Socioeconomic History    Marital status: Single     Spouse name: Not on file    Number of children: 0    Years of education: Not on file    Highest education level: Not on file   Occupational History    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[employer]   Tobacco Use    Smoking status: Never Smoker    Smokeless tobacco: Never Used   Substance and Sexual Activity    Alcohol use: No    Drug use: No    Sexual activity: Not on file   Other Topics Concern    Not on file   Social History Narrative    Born in [facility], one brother and one sister    Single, no children    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, the time of a pontine and cerebellar stroke    Was at [facility] and in nursing home for rehabilitation    Lives independetly in an apartment in [facility] Strain:     Difficulty of Paying Living Expenses:    Food Insecurity:     Worried About [address] in the Last Year:     [address] [place] in the Last Year:    Transportation Needs:     Lack of Transportation (Medical):  Lack of Transportation (Non-Medical):    Physical Activity:     Days of Exercise per Week:     Minutes of Exercise per Session:    Stress:     Feeling of Stress :    Social Connections:     Frequency of Communication with Friends and Family:     Frequency of Social Gatherings with Friends and Family:     Attends Mandaeism Services:     Active Member of Clubs or Organizations:     Attends Club or Organization Meetings:     Marital Status:    Intimate Partner Violence:     Fear of Current or Ex-Partner:     Emotionally Abused:     Physically Abused:     Sexually Abused:      No family history on file.   Allergies   Allergen Reactions    Ace Inhibitors      cough       Current Outpatient Medications   Medication Sig Dispense Refill    hydroCHLOROthiazide (HYDRODIURIL) 25 MG tablet Take 25 mg by mouth every morning      atorvastatin (LIPITOR) 80 MG tablet TAKE 1 TABLET BY MOUTH DAILY 90 tablet 4    amLODIPine (NORVASC) 5 MG tablet Take 1 tablet by mouth daily 90 tablet 4    irbesartan (AVAPRO) 150 MG tablet TAKE 1 TABLET BY MOUTH DAILY 90 tablet 3    Blood Pressure KIT 1 Units by Does not apply route daily 1 kit 0    insulin 70-30 (HUMULIN;NOVOLIN) (70-30) 100 UNIT per ML injection vial Inject 18 Units subcutaneously before breakfast and 18 Units before dinner. 3 vial 4    ASPIRIN ADULT LOW STRENGTH 81 MG EC tablet Take 2 tablets by mouth daily 180 tablet 3    RELION INSULIN SYR 0.3CC/30G 30G X 5/16\" 0.3 ML MISC        No current facility-administered medications for this visit. Review of Systems   Constitutional: Negative for fever. HENT: Negative for ear pain, tinnitus and trouble swallowing. Eyes: Negative for photophobia and visual disturbance. Respiratory: Negative for choking and shortness of breath. Cardiovascular: Negative for chest pain and palpitations. Gastrointestinal: Negative for nausea and vomiting. Musculoskeletal: Negative for back pain, gait problem, joint swelling, myalgias, neck pain and neck stiffness. Skin: Negative for color change. Allergic/Immunologic: Negative for food allergies. Neurological: Positive for dizziness. Negative for tremors, seizures, syncope, facial asymmetry, speech difficulty, weakness, light-headedness, numbness and headaches. Psychiatric/Behavioral: Negative for behavioral problems, confusion, hallucinations and sleep disturbance. Objective:   BP (!) 140/90 (Site: Left Upper Arm, Position: Sitting, Cuff Size: Medium Adult)   Pulse 92   Wt 178 lb 14.4 oz (81.1 kg)   SpO2 96%   BMI 29.77 kg/m²     Physical Exam  Vitals reviewed. Eyes:      Pupils: Pupils are equal, round, and reactive to light. Cardiovascular:      Rate and Rhythm: Normal rate and regular rhythm. Heart sounds: No murmur heard. Pulmonary:      Effort: Pulmonary effort is normal.      Breath sounds: Normal breath sounds. Abdominal:      General: Bowel sounds are normal.   Musculoskeletal:         General: Normal range of motion. Cervical back: Normal range of motion. Skin:     General: Skin is warm. Neurological:      Mental Status: He is alert and oriented to person, place, and time. Cranial Nerves: No cranial nerve deficit. Sensory: No sensory deficit. Motor: No abnormal muscle tone. Coordination: Coordination normal.      Deep Tendon Reflexes: Reflexes are normal and symmetric. Babinski sign absent on the right side. Babinski sign absent on the left side. Psychiatric:         Mood and Affect: Mood normal.         US CAROTID ARTERY BILATERAL    Result Date: 7/8/2020  EXAMINATION: US CAROTID ARTERY BILATERAL DATE AND TIME:7/8/2020 12:12 PM CLINICAL HISTORY: Carotid artery stenosis  I63.511 Cerebrovascular accident (CVA) due to stenosis of right middle cerebral artery ([address] ICD10 COMPARISON: January 3, 2019 TECHNIQUE:Carotid duplex sonograms which include gray scale and color flow evaluation are complimented with spectral waveform analysis. Please refer to chart below for specific carotid velocity measurements. The degree of stenosis recorded on this exam uses the same method of stratification used in the NASCET trials. This complies with ACR practice guidelines and the Society of radiology in ultrasound consensus statement and provides adequate information for clinical decision making. Society of Radiologists in Ultrasound (SRU) consensus statement was used to estimate internal carotid artery stenosis. FINDINGS: There is some heterogeneous plaque again noted in the right carotid bulb not significantly changed in appearance from the previous ultrasound. No significant increase in systolic flow or turbulence to indicate any hemodynamically significant narrowing in the right or left internal carotid arteries. There is antegrade flow identified in both vertebral arteries.  ARTERIAL BLOOD FLOW VELOCITY RIGHT PS                                               Prox CCA    108 cm/s             Mid CCA     115 cm/s              Dist CCA    119 cm/s              Prox ICA    93 cm/s               Mid ICA     116 cm/s            Dist ICA    114 cm/s             Prox ECA    160 cm/s             Prox VERT   37 cm/s              ICA/CCA This may suggest a carotid sinus hypersensitivity. Patient is not any falls any trauma no bleeding bruising choking drooling lives alone is independent his activities of daily living. His risk factors cerebrovascular disease were reviewed and appears to be all well corrected. See me in a year   Plan:      No orders of the defined types were placed in this encounter. No orders of the defined types were placed in this encounter. No follow-ups on file.       Anuel Sherman MD

## 2021-08-30 ENCOUNTER — OFFICE VISIT (OUTPATIENT)
Dept: FAMILY MEDICINE CLINIC | Age: 61
End: 2021-08-30
Payer: MEDICARE

## 2021-08-30 VITALS
HEIGHT: 65 IN | DIASTOLIC BLOOD PRESSURE: 82 MMHG | RESPIRATION RATE: 18 BRPM | OXYGEN SATURATION: 98 % | SYSTOLIC BLOOD PRESSURE: 120 MMHG | BODY MASS INDEX: 28.32 KG/M2 | TEMPERATURE: 98.2 F | HEART RATE: 88 BPM | WEIGHT: 170 LBS

## 2021-08-30 DIAGNOSIS — Z53.20 COLON CANCER SCREENING DECLINED: Chronic | ICD-10-CM

## 2021-08-30 DIAGNOSIS — N18.32 CHRONIC KIDNEY DISEASE, STAGE 3B (HCC): ICD-10-CM

## 2021-08-30 DIAGNOSIS — Z00.00 ROUTINE GENERAL MEDICAL EXAMINATION AT A HEALTH CARE FACILITY: Primary | ICD-10-CM

## 2021-08-30 PROCEDURE — 3051F HG A1C>EQUAL 7.0%<8.0%: CPT | Performed by: FAMILY MEDICINE

## 2021-08-30 PROCEDURE — G0438 PPPS, INITIAL VISIT: HCPCS | Performed by: FAMILY MEDICINE

## 2021-08-30 PROCEDURE — 3017F COLORECTAL CA SCREEN DOC REV: CPT | Performed by: FAMILY MEDICINE

## 2021-08-30 SDOH — ECONOMIC STABILITY: FOOD INSECURITY: WITHIN THE PAST 12 MONTHS, YOU WORRIED THAT YOUR FOOD WOULD RUN OUT BEFORE YOU GOT MONEY TO BUY MORE.: NEVER TRUE

## 2021-08-30 SDOH — ECONOMIC STABILITY: FOOD INSECURITY: WITHIN THE PAST 12 MONTHS, THE FOOD YOU BOUGHT JUST DIDN'T LAST AND YOU DIDN'T HAVE MONEY TO GET MORE.: NEVER TRUE

## 2021-08-30 ASSESSMENT — SOCIAL DETERMINANTS OF HEALTH (SDOH): HOW HARD IS IT FOR YOU TO PAY FOR THE VERY BASICS LIKE FOOD, HOUSING, MEDICAL CARE, AND HEATING?: NOT HARD AT ALL

## 2021-08-30 ASSESSMENT — PATIENT HEALTH QUESTIONNAIRE - PHQ9
SUM OF ALL RESPONSES TO PHQ QUESTIONS 1-9: 0
SUM OF ALL RESPONSES TO PHQ QUESTIONS 1-9: 0
2. FEELING DOWN, DEPRESSED OR HOPELESS: 0
SUM OF ALL RESPONSES TO PHQ QUESTIONS 1-9: 0
1. LITTLE INTEREST OR PLEASURE IN DOING THINGS: 0
SUM OF ALL RESPONSES TO PHQ9 QUESTIONS 1 & 2: 0

## 2021-08-30 ASSESSMENT — LIFESTYLE VARIABLES: HOW OFTEN DO YOU HAVE A DRINK CONTAINING ALCOHOL: 0

## 2021-08-30 NOTE — PATIENT INSTRUCTIONS
Personalized Preventive Plan for Mia Stephenson - 8/30/2021  Medicare offers a range of preventive health benefits. Some of the tests and screenings are paid in full while other may be subject to a deductible, co-insurance, and/or copay. Some of these benefits include a comprehensive review of your medical history including lifestyle, illnesses that may run in your family, and various assessments and screenings as appropriate. After reviewing your medical record and screening and assessments performed today your provider may have ordered immunizations, labs, imaging, and/or referrals for you. A list of these orders (if applicable) as well as your Preventive Care list are included within your After Visit Summary for your review. Other Preventive Recommendations:    · A preventive eye exam performed by an eye specialist is recommended every 1-2 years to screen for glaucoma; cataracts, macular degeneration, and other eye disorders. · A preventive dental visit is recommended every 6 months. · Try to get at least 150 minutes of exercise per week or 10,000 steps per day on a pedometer . · Order or download the FREE \"Exercise & Physical Activity: Your Everyday Guide\" from [employer]. Call 2-705.638.2113 or search [employer] online. · You need 2459-4241 mg of calcium and 0320-9352 IU of vitamin D per day. It is possible to meet your calcium requirement with diet alone, but a vitamin D supplement is usually necessary to meet this goal.  · When exposed to the sun, use a sunscreen that protects against both UVA and UVB radiation with an SPF of 30 or greater. Reapply every 2 to 3 hours or after sweating, drying off with a towel, or swimming. · Always wear a seat belt when traveling in a car. Always wear a helmet when riding a bicycle or motorcycle. High-Fiber Diet     What Is Fiber?    Dietary fiber is a form of carbohydrate found in plants that cannot be digested by humans. All plants contain fiber, including fruits, vegetables, grains, and legumes. Fiber is often classified into two categories: soluble and insoluble. Soluble fiber draws water into the bowel and can help slow digestion. Examples of foods that are high in soluble fiber include oatmeal, oat bran, barley, legumes (eg, beans and peas), apples, and strawberries. Insoluble fiber speeds digestion and can add bulk to the stool. Examples of foods that are high in insoluble fiber include whole-wheat products, wheat bran, cauliflower, green beans, and potatoes. Why Follow a High-Fiber Diet? A high-fiber diet is often recommended to prevent and treat constipation , hemorrhoids , diverticulitis , and irritable bowel syndrome . Eating a high-fiber diet can also help improve your cholesterol levels, lower your risk of coronary heart disease , reduce your risk of type 2 diabetes , and lower your weight. For people with type 1 or 2 diabetes, a high-fiber diet can also help stabilize blood sugar levels. How Much Fiber Should I Eat? A high-fiber diet should contain  20-35 grams  of fiber a day. This is actually the amount recommended for the general adult population; however, most Americans eat only 15 grams of fiber per day. Digestion of Fiber   Eating a higher fiber diet than usual can take some getting used to by your body's digestive system. To avoid the side effects of sudden increases in dietary fiber (eg, gas, cramping, bloating, and diarrhea), increase fiber gradually and be sure to drink plenty of fluids every day. Tips for Increasing Fiber Intake   Whenever possible, choose whole grains over refined grains (eg, brown rice instead of white rice, whole-wheat bread instead of white bread). Include a variety of grains in your diet, such as wheat, rye, barley, oats, quinoa, and bulgur. Eat more vegetarian-based meals. Here are some ideas: black bean burgers, eggplant lasagna, and veggie tofu stir-slater. medicines. A number of medicines can cause dizziness. If you have problems with sleep, talk to your doctor. Limit your intake of alcohol. If necessary, use a cane or walker to help maintain your balance. Wear supportive, rubber-soled shoes, even at home. If you live in a region that gets wintry weather, you may want to put special cleats on your shoes to prevent you from slipping on the snow and ice. Exercise regularly to help maintain muscle tone, agility, and balance. Always hold the banister when going up or down stairs. Also, use  bars when getting in or out of the bath or shower, or using the toilet. To avoid dizziness, get up slowly from a lying down position. Sit up first, dangling your legs for a minute or two before rising to a standing position. Overall Home Safety Check   According to the Consumer Product Safety Commision's \"Older Consumer Home Safety Checklist,\" it is important to check for potential hazards in each room. And remember, proper lighting is an essential factor in home safety. If you cannot see clearly, you are more likely to fall. Important questions to ask yourself include:   Are lamp, electric, extension, and telephone cords placed out of the flow of traffic and maintained in good condition? Have frayed cords been replaced? Are all small rugs and runners slip resistant? If not, you can secure them to the floor with a special double-sided carpet tape. Are smoke detectors properly locatedone on every floor of your home and one outside of every sleeping area? Are they in good working order? Are batteries replaced at least once a year? Do you have a well-maintained carbon monoxide detector outside every sleeping are in your home? Does your furniture layout leave plenty of space to maneuver between and around chairs, tables, beds, and sofas? Are hallways, stairs and passages between rooms well lit? Can you reach a lamp without getting out of bed?    Are floor surfaces well maintained? Shag rugs, high-pile carpeting, tile floors, and polished wood floors can be particularly slippery. Stairs should always have handrails and be carpeted or fitted with a non-skid tread. Is your telephone easily reachable. Is the cord safely tucked away? Room by Room   According to the Association of Aging, bathrooms and deidre are the two most potentially hazardous rooms in your home. In the Kitchen    Be sure your stove is in proper working order and always make sure burners and the oven are off before you go out or go to sleep. Keep pots on the back burners, turn handles away from the front of the stove, and keep stove clean and free of grease build-up. Kitchen ventilation systems and range exhausts should be working properly. Keep flammable objects such as towels and pot holders away from the cooking area except when in use. Make sure kitchen curtains are tied back. Move cords and appliances away from the sink and hot surfaces. If extension cords are needed, install wiring guides so they do not hang over the sink, range, or working areas. Look for coffee pots, kettles and toaster ovens with automatic shut-offs. Keep a mop handy in the kitchen so you can wipe up spills instantly. You should also have a small fire extinguisher. Arrange your kitchen with frequently used items on lower shelves to avoid the need to stand on a stepstool to reach them. Make sure countertops are well-lit to avoid injuries while cutting and preparing food. In the Bathroom    Use a non-slip mat or decals in the tub and shower, since wet, soapy tile or porcelain surfaces are extremely slippery. Make sure bathroom rugs are non-skid or tape them firmly to the floor. Bathtubs should have at least one, preferably two, grab bars, firmly attached to structural supports in the wall.  (Do not use built-in soap holders or glass shower doors as grab bars.)    Tub seats fitted with non-slip material on the legs allow you to wash sitting down. For people with limited mobility, bathtub transfer benches allow you to slide safely into the tub. Raised toilet seats and toilet safety rails are helpful for those with knee or hip problems. In the [facility]    Make sure you use a nightlight and that the area around your bed is clear of potential obstacles. Be careful with electric blankets and never go to sleep with a heating pad, which can cause serious burns even if on a low setting. Use fire-resistant mattress covers and pillows, and NEVER smoke in bed. Keep a phone next to the bed that is programmed to dial 911 at the push of a button. If you have a chronic condition, you may want to sign on with an automatic call-in service. Typically the system includes a small pendant that connects directly to an emergency medical voice-response system. You should also make arrangements to stay in contact with someonefriend, neighbor, family memberon a regular schedule. Fire Prevention   According to the [employer] (Smoke Alarms for Every) [address] [place] [address], senior citizens are one of the two highest risk groups for death and serious injuries due to residential fires. When cooking, wear short-sleeved items, never a bulky long-sleeved robe. The [facility]'s Safety Checklist for Older Consumers emphasizes the importance of checking basements, garages, workshops and storage areas for fire hazards, such as volatile liquids, piles of old rags or clothing and overloaded circuits. Never smoke in bed or when lying down on a couch or recliner chair. Small portable electric or kerosene heaters are responsible for many home fires and should be used cautiously if at all. If you do use one, be sure to keep them away from flammable materials. In case of fire, make sure you have a pre-established emergency exit plan. Have a professional check your fireplace and other fuel-burning appliances yearly. [employer]   Baby boomers entering the coreas years will continue to see the development of new products to help older adults live safely and independently in spite of age-related changes. Making Life More Livable  , by Keisha Dixon, lists over 1,000 products for \"living well in the mature years,\" such as bathing and mobility aids, household security devices, ergonomically designed knives and peelers, and faucet valves and knobs for temperature control. Medical supply stores and organizations are good sources of information about products that improve your quality of life and insure your safety.      Last Reviewed: November 2009 Mitch Bender MD   Updated: 3/7/2011     ·

## 2021-08-30 NOTE — PROGRESS NOTES
Medicare Annual Wellness Visit  Name: Leyda La Date: 2021   MRN: [id number] Sex: Male   Age: 63 y.o. Ethnicity: Non- / Non    : 1960 Race: White (non-)      Virgen Santiago is here for Medicare AWV and Health Maintenance (declines colonoscopy and cologuard)    Screenings for behavioral, psychosocial and functional/safety risks, and cognitive dysfunction are all negative except as indicated below. These results, as well as other patient data from the [address] form, are documented in Flowsheets linked to this Encounter. Allergies   Allergen Reactions    Ace Inhibitors      cough         Prior to Visit Medications    Medication Sig Taking? Authorizing Provider   hydroCHLOROthiazide (HYDRODIURIL) 25 MG tablet Take 25 mg by mouth every morning Yes Historical Provider, MD   atorvastatin (LIPITOR) 80 MG tablet TAKE 1 TABLET BY MOUTH DAILY Yes Janiya Matos MD   amLODIPine (NORVASC) 5 MG tablet Take 1 tablet by mouth daily Yes Janiya Matos MD   irbesartan (AVAPRO) 150 MG tablet TAKE 1 TABLET BY MOUTH DAILY Yes Janiya Matos MD   Blood Pressure KIT 1 Units by Does not apply route daily Yes Janiya Matos MD   insulin 70-30 (HUMULIN;NOVOLIN) (70-30) 100 UNIT per ML injection vial Inject 18 Units subcutaneously before breakfast and 18 Units before dinner.  Yes Janiya Matos MD   ASPIRIN ADULT LOW STRENGTH 81 MG EC tablet Take 2 tablets by mouth daily Yes Janiya Matos MD   RELION INSULIN SYR 0.3CC/30G 30G X 5/16\" 0.3 ML MISC  Yes Historical Provider, MD         Past Medical History:   Diagnosis Date    Cerebrovascular small vessel disease     Chronic kidney disease, stage 3b ([facility] 2020    Colon cancer screening declined 2021    Diabetic polyneuropathy associated with type 2 diabetes mellitus ([facility] 2021    DM (diabetes mellitus) ([facility]     [facility] with  [facility], [facility]    Dysarthria as late effect of cerebrovascular accident (CVA) 2015    Hearing loss     Hemiparesis affecting left side as late effect of cerebrovascular accident (CVA) ([address]     History of left PCA stroke 2013    History of right STEPHANIE stroke 10/21/2015    [facility], now Dr Bettina Olszewski Microalbuminuria 2018    PFO (patent foramen ovale) 2015    Right pontine CVA ([facility] 10/2015       Past Surgical History:   Procedure Laterality Date    ANKLE SURGERY         History reviewed. No pertinent family history. CareTeam (Including outside providers/suppliers regularly involved in providing care):   Patient Care Team:  Brien Parham MD as PCP - General (Family Medicine)  Brien Parham MD as PCP - [facility] Provider    Wt Readings from Last 3 Encounters:   08/30/21 170 lb (77.1 kg)   06/07/21 178 lb 14.4 oz (81.1 kg)   04/22/21 177 lb 3.2 oz (80.4 kg)     Vitals:    08/30/21 1252   BP: 120/82   Site: Left Upper Arm   Position: Sitting   Cuff Size: Medium Adult   Pulse: 88   Resp: 18   Temp: 98.2 °F (36.8 °C)   TempSrc: Temporal   SpO2: 98%   Weight: 170 lb (77.1 kg)   Height: 5' 5\" (1.651 m)     Body mass index is 28.29 kg/m². Based upon direct observation of the patient, evaluation of cognition reveals recent and remote memory intact. Patient's complete Health Risk Assessment and screening values have been reviewed and are found in Flowsheets. The following problems were reviewed today and where indicated follow up appointments were made and/or referrals ordered. Positive Risk Factor Screenings with Interventions:      Cognitive: Words recalled: 2 Words Recalled  Clock Drawing Test (CDT) Score: (!) Abnormal  Total Score Interpretation: Positive Mini-Cog  Did the patient refuse to take the cognition test?: No  Cognitive Impairment Interventions:  · No cognitive impairment.  SUspect left sided neglect r/t CVA         General Health and ACP:  General  In 10/01/2019, 09/10/2020    Pneumococcal Polysaccharide ([id number]) 02/06/2014    Tdap (Boostrix, Adacel) 02/22/2013    Zoster Recombinant (Shingrix) 07/08/2019, 11/19/2019        Health Maintenance   Topic Date Due    Hepatitis B vaccine (1 of 3 - Risk 3-dose series) Never done    Colon cancer screen colonoscopy  Never done    Annual Wellness Visit (AWV)  Never done    Diabetic retinal exam  11/08/2020    Diabetic foot exam  08/20/2021    Flu vaccine (1) 09/01/2021    Lipid screen  09/14/2021    A1C test (Diabetic or Prediabetic)  06/07/2022    Potassium monitoring  06/07/2022    Creatinine monitoring  06/07/2022    DTaP/Tdap/Td vaccine (2 - Td or Tdap) 02/22/2023    Pneumococcal 0-64 years Vaccine (2 of 2 - PPSV23) 07/16/2027    Shingles Vaccine  Completed    COVID-19 Vaccine  Completed    Hepatitis C screen  Completed    HIV screen  Completed    Hepatitis A vaccine  Aged Out    Hib vaccine  Aged Out    Meningococcal (ACWY) vaccine  Aged Out     Recommendations for [employer] Due: see orders and patient instructions/AVS.  . Recommended screening schedule for the next 5-10 years is provided to the patient in written form: see Patient Fatimah Jean was seen today for medicare awv and health maintenance. Diagnoses and all orders for this visit:    Routine general medical examination at a health care facility    Type 2 diabetes mellitus with neurological manifestations, uncontrolled ([address]  -     Hemoglobin A1C; Future  -     Microalbumin / Creatinine Urine Ratio; Future    Chronic kidney disease, stage 3b ([address]  -     Basic Metabolic Panel;  Future    Colon cancer screening declined  Comments:  Understands delay in dx may increase morbidity and mortality

## 2021-09-01 ENCOUNTER — TELEPHONE (OUTPATIENT)
Dept: FAMILY MEDICINE CLINIC | Age: 61
End: 2021-09-01

## 2021-09-03 DIAGNOSIS — N18.32 CHRONIC KIDNEY DISEASE, STAGE 3B (HCC): Primary | ICD-10-CM

## 2021-10-04 ENCOUNTER — TELEPHONE (OUTPATIENT)
Dept: FAMILY MEDICINE CLINIC | Age: 61
End: 2021-10-04

## 2021-10-04 DIAGNOSIS — H91.91 HEARING LOSS OF RIGHT EAR, UNSPECIFIED HEARING LOSS TYPE: Primary | ICD-10-CM

## 2021-10-04 NOTE — TELEPHONE ENCOUNTER
Patient is calling to ask for a referral to Dr. Hugo Aaron. Patient has an apt scheduled for tomorrow, but states he needs a referral for insurance purposes. This referral is for his right ear. He has lost hearing. Please advise and thank you.

## 2021-10-05 ENCOUNTER — INITIAL CONSULT (OUTPATIENT)
Dept: ENT CLINIC | Age: 61
End: 2021-10-05
Payer: MEDICARE

## 2021-10-05 VITALS
OXYGEN SATURATION: 97 % | BODY MASS INDEX: 28.32 KG/M2 | TEMPERATURE: 96.9 F | WEIGHT: 170 LBS | SYSTOLIC BLOOD PRESSURE: 138 MMHG | HEIGHT: 65 IN | DIASTOLIC BLOOD PRESSURE: 76 MMHG | HEART RATE: 84 BPM

## 2021-10-05 DIAGNOSIS — H90.A11 CONDUCTIVE HEARING LOSS OF RIGHT EAR WITH RESTRICTED HEARING OF LEFT EAR: ICD-10-CM

## 2021-10-05 DIAGNOSIS — H90.3 SENSORINEURAL HEARING LOSS (SNHL) OF BOTH EARS: Primary | ICD-10-CM

## 2021-10-05 PROCEDURE — G8484 FLU IMMUNIZE NO ADMIN: HCPCS | Performed by: OTOLARYNGOLOGY

## 2021-10-05 PROCEDURE — 3017F COLORECTAL CA SCREEN DOC REV: CPT | Performed by: OTOLARYNGOLOGY

## 2021-10-05 PROCEDURE — 1036F TOBACCO NON-USER: CPT | Performed by: OTOLARYNGOLOGY

## 2021-10-05 PROCEDURE — G8417 CALC BMI ABV UP PARAM F/U: HCPCS | Performed by: OTOLARYNGOLOGY

## 2021-10-05 PROCEDURE — G8427 DOCREV CUR MEDS BY ELIG CLIN: HCPCS | Performed by: OTOLARYNGOLOGY

## 2021-10-05 PROCEDURE — 99203 OFFICE O/P NEW LOW 30 MIN: CPT | Performed by: OTOLARYNGOLOGY

## 2021-10-05 ASSESSMENT — ENCOUNTER SYMPTOMS
RHINORRHEA: 0
FACIAL SWELLING: 0
TROUBLE SWALLOWING: 0
SORE THROAT: 0
SINUS PAIN: 0
VOICE CHANGE: 0
SINUS PRESSURE: 0

## 2021-10-05 NOTE — PROGRESS NOTES
Subjective:      Patient ID: Coy Monreal is a 63 y.o. male who presents today for:  Chief Complaint   Patient presents with    Other       HPI: Patient has a binaural hearing aids who got the left side 5 years ago on the left side only a year ago few weeks ago he could not hear in the right side went to his audiologist evaluated and was told it is not his hearing aid this is a problem he was subsequently referred to me for definitive diagnosis and treatment    Past Medical History:   Diagnosis Date    Cerebrovascular small vessel disease     Chronic kidney disease, stage 3b ([address] 12/16/2020    Colon cancer screening declined 8/30/2021    Diabetic polyneuropathy associated with type 2 diabetes mellitus ([address] 4/22/2021    DM (diabetes mellitus) ([address]     was with Dr Valente De Paz, [facility]    Dysarthria as late effect of cerebrovascular accident (CVA) 2015    Hearing loss     Hemiparesis affecting left side as late effect of cerebrovascular accident (CVA) ([address]     History of left PCA stroke 2013    History of right STEPHANIE stroke 10/21/2015    was at [facility], now Dr Gloria Diaz Microalbuminuria 2018    PFO (patent foramen ovale) 2015    Right pontine CVA ([address] 10/2015     Past Surgical History:   Procedure Laterality Date    ANKLE SURGERY       Social History     Socioeconomic History    Marital status: Single     Spouse name: Not on file    Number of children: 0    Years of education: Not on file    Highest education level: Not on file   Occupational History    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[employer]   Tobacco Use    Smoking status: Never Smoker    Smokeless tobacco: Never Used   Substance and Sexual Activity    Alcohol use: No    Drug use: No    Sexual activity: Not on file   Other Topics Concern    Not on file   Social History Narrative    Born in [place], one brother and one sister    Single, no children    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, the time of a pontine and cerebellar stroke    [facility] at [facility] and in nursing home for rehabilitation    Lives independetly in an apartment in [facility] Strain: Low Risk     Difficulty of Paying Living Expenses: Not hard at all   Food Insecurity: No Food Insecurity    Worried About [address] in the Last Year: Never true    [address] [facility] in the Last Year: Never true   Transportation Needs:     Lack of Transportation (Medical):  Lack of Transportation (Non-Medical):    Physical Activity:     Days of Exercise per Week:     Minutes of Exercise per Session:    Stress:     Feeling of Stress :    Social Connections:     Frequency of Communication with Friends and Family:     Frequency of Social Gatherings with Friends and Family:     Attends Christianity Services:     Active Member of Clubs or Organizations:     Attends Club or Organization Meetings:     Marital Status:    Intimate Partner Violence:     Fear of Current or Ex-Partner:     Emotionally Abused:     Physically Abused:     Sexually Abused:      History reviewed. No pertinent family history. Allergies   Allergen Reactions    Ace Inhibitors      cough         Review of Systems   HENT: Positive for hearing loss (Bilateral sensorineural hearing loss with a mixed component in the right ear due to an existing otitis media with effusion and drainage) and tinnitus. Negative for congestion, dental problem, drooling, ear discharge, ear pain, facial swelling, mouth sores, nosebleeds, postnasal drip, rhinorrhea, sinus pressure, sinus pain, sneezing, sore throat, trouble swallowing and voice change. All other systems reviewed and are negative.       Objective:   /76 (Site: Right Upper Arm, Position: Sitting, Cuff Size: Large Adult)   Pulse 84   Temp 96.9 °F (36.1 °C) (Infrared)   Ht 5' 5\" (1.651 m)   Wt 170 lb (77.1 kg)   SpO2 97%   BMI 28.29 kg/m²     Physical Exam     Head and neck: Normocephalic no nuchal rigidity no palpable mass no venous engorgement no lymphadenopathy    Ears: Patient wearing hearing aid in both ears right ear canal with minimal drainage tympanic membrane intact with small perforation left tympanic membrane intact no perforation    Nose: No significant finding    Mouth and throat: No abnormality    Assessment:      Bilateral sensorineural hearing loss with right conductive overlay due to otitis media   Plan:   Right ear suctioned out and removal of minimal wax performed  Augmentin 875 mg twice a day for 10 days  The office in 2 weeks for reevaluation    Nguyễn Chambers MD

## 2021-10-21 ENCOUNTER — OFFICE VISIT (OUTPATIENT)
Dept: FAMILY MEDICINE CLINIC | Age: 61
End: 2021-10-21
Payer: MEDICARE

## 2021-10-21 VITALS
BODY MASS INDEX: 28.49 KG/M2 | HEIGHT: 65 IN | OXYGEN SATURATION: 97 % | DIASTOLIC BLOOD PRESSURE: 80 MMHG | TEMPERATURE: 96.1 F | WEIGHT: 171 LBS | SYSTOLIC BLOOD PRESSURE: 130 MMHG | RESPIRATION RATE: 15 BRPM | HEART RATE: 82 BPM

## 2021-10-21 DIAGNOSIS — N18.31 STAGE 3A CHRONIC KIDNEY DISEASE (HCC): ICD-10-CM

## 2021-10-21 DIAGNOSIS — I10 ESSENTIAL HYPERTENSION: ICD-10-CM

## 2021-10-21 DIAGNOSIS — E16.2 HYPOGLYCEMIA: ICD-10-CM

## 2021-10-21 DIAGNOSIS — Z12.11 SCREEN FOR COLON CANCER: ICD-10-CM

## 2021-10-21 PROCEDURE — 1036F TOBACCO NON-USER: CPT | Performed by: FAMILY MEDICINE

## 2021-10-21 PROCEDURE — 3017F COLORECTAL CA SCREEN DOC REV: CPT | Performed by: FAMILY MEDICINE

## 2021-10-21 PROCEDURE — 3051F HG A1C>EQUAL 7.0%<8.0%: CPT | Performed by: FAMILY MEDICINE

## 2021-10-21 PROCEDURE — 2022F DILAT RTA XM EVC RTNOPTHY: CPT | Performed by: FAMILY MEDICINE

## 2021-10-21 PROCEDURE — G8484 FLU IMMUNIZE NO ADMIN: HCPCS | Performed by: FAMILY MEDICINE

## 2021-10-21 PROCEDURE — G8417 CALC BMI ABV UP PARAM F/U: HCPCS | Performed by: FAMILY MEDICINE

## 2021-10-21 PROCEDURE — 99215 OFFICE O/P EST HI 40 MIN: CPT | Performed by: FAMILY MEDICINE

## 2021-10-21 PROCEDURE — G8427 DOCREV CUR MEDS BY ELIG CLIN: HCPCS | Performed by: FAMILY MEDICINE

## 2021-10-21 RX ORDER — AMLODIPINE BESYLATE 5 MG/1
5 TABLET ORAL DAILY
Qty: 90 TABLET | Refills: 4 | Status: SHIPPED | OUTPATIENT
Start: 2021-10-21 | End: 2021-12-30 | Stop reason: SDUPTHER

## 2021-10-21 RX ORDER — CANAGLIFLOZIN 100 MG/1
100 TABLET, FILM COATED ORAL
Qty: 90 TABLET | Refills: 4 | Status: SHIPPED | OUTPATIENT
Start: 2021-10-21 | End: 2021-11-11 | Stop reason: DRUGHIGH

## 2021-10-21 NOTE — PROGRESS NOTES
Subjective  Stevie Nathaniel, 63 y.o. male presents today with:  Chief Complaint   Patient presents with    6 Month Follow-Up           HPI  Cares for elderly mother who lives alone and is DM as well. Patient is here for f/u HTN. Is compliant with meds and has no side effects from them. Avoids added salt. Tries to eat healthy. Exercises occasionally. Has no chest pain, shortness of breath, palpitations or edema. CVA. No new deficits. No facial droop. No speech impairment. No unilateral weakness. Patient is here for DM f/u. Has changed diet to plant-based. Sugars have been low. Had glucose=60 and gave himself insulin with a meal then woke up on the floor hours later. Metformin causes diarrhea.  Avandia, Amaryl and     No other questions and or concerns for today's visit      Review of Systems      Past Medical History:   Diagnosis Date    Cerebrovascular small vessel disease     Chronic kidney disease, stage 3b ([address] 12/16/2020    Colon cancer screening declined 8/30/2021    Diabetic polyneuropathy associated with type 2 diabetes mellitus ([address] 4/22/2021    DM (diabetes mellitus) ([address]     was with Dr Dorina Rao, [facility]    Dysarthria as late effect of cerebrovascular accident (CVA) 2015    Hearing loss     Hemiparesis affecting left side as late effect of cerebrovascular accident (CVA) ([address]     History of left PCA stroke 2013    History of right STEPHANIE stroke 10/21/2015    was at [facility], now Dr Sharifa Clark Microalbuminuria 2018    PFO (patent foramen ovale) 2015    Right pontine CVA ([address] 10/2015     Past Surgical History:   Procedure Laterality Date    ANKLE SURGERY       Social History     Socioeconomic History    Marital status: Single     Spouse name: Not on file    Number of children: 0    Years of education: Not on file    Highest education level: Not on file   Occupational History    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[employer]   Tobacco Use    Smoking status: Never Smoker    Smokeless tobacco: Never Used   Substance and Sexual Activity    Alcohol use: No    Drug use: No    Sexual activity: Not on file   Other Topics Concern    Not on file   Social History Narrative    Born in [place], one brother and one sister    Single, no children    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 and in nursing home for rehabilitation    Lives independetly in an apartment in [place] [facility] Strain: Low Risk     Difficulty of Paying Living Expenses: Not hard at all   Food Insecurity: No Food Insecurity    Worried About [address] in the Last Year: Never true    [address] in the Last Year: Never true   Transportation Needs:     Lack of Transportation (Medical):  Lack of Transportation (Non-Medical):    Physical Activity:     Days of Exercise per Week:     Minutes of Exercise per Session:    Stress:     Feeling of Stress :    Social Connections:     Frequency of Communication with Friends and Family:     Frequency of Social Gatherings with Friends and Family:     Attends Voodoo Services:     Active Member of Clubs or Organizations:     Attends Club or Organization Meetings:     Marital Status:    Intimate Partner Violence:     Fear of Current or Ex-Partner:     Emotionally Abused:     Physically Abused:     Sexually Abused:      History reviewed. No pertinent family history.   Allergies   Allergen Reactions    Ace Inhibitors      cough     Current Outpatient Medications   Medication Sig Dispense Refill    amLODIPine (NORVASC) 5 MG tablet Take 1 tablet by mouth daily 90 tablet 4    canagliflozin (INVOKANA) 100 MG TABS tablet Take 1 tablet by mouth every morning (before breakfast) 90 tablet 4    atorvastatin (LIPITOR) 80 MG tablet TAKE 1 TABLET BY MOUTH DAILY 90 tablet 4    Blood Pressure KIT 1 Units by Does not apply route daily 1 kit 0    insulin 70-30 (HUMULIN;NOVOLIN) (70-30) 100 UNIT per ML injection vial Inject 18 Units subcutaneously before breakfast and 18 Units before dinner. 3 vial 4    ASPIRIN ADULT LOW STRENGTH 81 MG EC tablet Take 2 tablets by mouth daily 180 tablet 3    irbesartan (AVAPRO) 150 MG tablet TAKE 1 TABLET BY MOUTH DAILY 90 tablet 3    RELION INSULIN SYR 0.3CC/30G 30G X 5/16\" 0.3 ML MISC  (Patient not taking: Reported on 10/21/2021)       No current facility-administered medications for this visit. PMH, Surgical Hx, Family Hx, and Social Hxreviewed and updated. Health Maintenance reviewed. Objective    Vitals:    10/21/21 1215   BP: 130/80   Pulse: 82   Resp: 15   Temp: 96.1 °F (35.6 °C)   TempSrc: Temporal   SpO2: 97%   Weight: 171 lb (77.6 kg)   Height: 5' 5\" (1.651 m)        Physical Exam  Constitutional:       General: He is not in acute distress. Appearance: He is well-developed. HENT:      Head: Normocephalic and atraumatic. Ears:      Comments: B/l hearing deficit, severe; wearing aides  Eyes:      Conjunctiva/sclera: Conjunctivae normal.   Cardiovascular:      Rate and Rhythm: Normal rate and regular rhythm. Heart sounds: Normal heart sounds. Pulmonary:      Effort: No respiratory distress. Breath sounds: No wheezing or rales. Musculoskeletal:      Cervical back: Normal range of motion and neck supple. Right lower leg: No edema. Left lower leg: No edema. Lymphadenopathy:      Cervical: No cervical adenopathy. Skin:     General: Skin is warm and dry. Neurological:      Mental Status: He is alert and oriented to person, place, and time. Comments: Spastic hemiplegia of left lower extremity. Hemiparesis of right upper extremity. Moderately severe dysarthria. Ataxic gait. Dysdiadochokinesia of left upper extremity   Psychiatric:         Mood and Affect: Mood normal.         Behavior: Behavior normal.         Thought Content:  Thought content normal.         Judgment: Judgment normal.           Lab Results   Component Value Date    LABA1C 7.2 (H) 09/02/2021    LABA1C 7.3 (H) 06/07/2021    LABA1C 6.7 (H) 01/04/2021     Lab Results   Component Value Date    LABMICR 1.30 10/05/2021    CREATININE 1.52 (H) 10/05/2021     Lab Results   Component Value Date    ALT 26 09/14/2020    AST 27 09/14/2020     Lab Results   Component Value Date    CHOL 103 09/14/2020    TRIG 61 09/14/2020    HDL 59 09/14/2020    LDLCALC 32 09/14/2020        Assessment & Plan   Visit Diagnoses and Associated Orders     Type 2 diabetes mellitus with neurological manifestations, uncontrolled ([facility]    -  Primary    Worse, fair control in patient has had an episode of hypoglycemia with LOC. He is only giving himself insulin if his sugar is over 200    Lipid, Fasting [[id number] Custom]   - Future Order    Hemoglobin A1C [[id number] Custom]   - Future Order    Basic Metabolic Panel [[id number] Custom]   - Future Order         Hypoglycemia        Had one episode of LOC r/t hypoglycemia; stop insulin now that on low carb diet. Stage 3a chronic kidney disease (HCC)        stable, fair control. Follow closely. HCTZ has been stopped. Basic Metabolic Panel [[id number] Custom]   - Future Order         Essential hypertension        Maximize control by adding low-dose amlodipine. Reassess 3 months.     amLODIPine (NORVASC) 5 MG tablet [5471]      Basic Metabolic Panel [[id number] Custom]   - Future Order         Screen for colon cancer        Cologuard (For External Results Only) [[id number] Custom]   - Future Order         ORDERS WITHOUT AN ASSOCIATED DIAGNOSIS    canagliflozin (INVOKANA) 100 MG TABS tablet [[id number]]        Diet reviewed and diet info provided    Time spent: 40 minutes    Reviewed with the patient: all disease processes, current clinical status, medications, activities and diet.      Side effects, adverse effects of the medication prescribed today, as well as treatment plan/ rationale and result expectations have been [place] Screen. Controlled Substance Monitoring:    Acute and Chronic Pain Monitoring:   RX Monitoring 11/28/2018   Attestation The Prescription Monitoring Report for this patient was reviewed today. Periodic Controlled Substance Monitoring Possible medication side effects, risk of tolerance/dependence & alternative treatments discussed. ;No signs of potential drug abuse or diversion identified.            Karen Mirza MD

## 2021-11-11 ENCOUNTER — OFFICE VISIT (OUTPATIENT)
Dept: FAMILY MEDICINE CLINIC | Age: 61
End: 2021-11-11
Payer: MEDICARE

## 2021-11-11 VITALS
TEMPERATURE: 96 F | SYSTOLIC BLOOD PRESSURE: 114 MMHG | RESPIRATION RATE: 16 BRPM | DIASTOLIC BLOOD PRESSURE: 70 MMHG | HEIGHT: 65 IN | BODY MASS INDEX: 28.16 KG/M2 | OXYGEN SATURATION: 99 % | HEART RATE: 92 BPM | WEIGHT: 169 LBS

## 2021-11-11 DIAGNOSIS — R80.9 CONTROLLED TYPE 2 DIABETES MELLITUS WITH MICROALBUMINURIA, WITH LONG-TERM CURRENT USE OF INSULIN (HCC): Primary | ICD-10-CM

## 2021-11-11 DIAGNOSIS — E11.29 CONTROLLED TYPE 2 DIABETES MELLITUS WITH MICROALBUMINURIA, WITH LONG-TERM CURRENT USE OF INSULIN (HCC): Primary | ICD-10-CM

## 2021-11-11 DIAGNOSIS — Z79.4 CONTROLLED TYPE 2 DIABETES MELLITUS WITH MICROALBUMINURIA, WITH LONG-TERM CURRENT USE OF INSULIN (HCC): Primary | ICD-10-CM

## 2021-11-11 PROCEDURE — 99214 OFFICE O/P EST MOD 30 MIN: CPT | Performed by: FAMILY MEDICINE

## 2021-11-11 PROCEDURE — 2022F DILAT RTA XM EVC RTNOPTHY: CPT | Performed by: FAMILY MEDICINE

## 2021-11-11 PROCEDURE — G8427 DOCREV CUR MEDS BY ELIG CLIN: HCPCS | Performed by: FAMILY MEDICINE

## 2021-11-11 PROCEDURE — G8484 FLU IMMUNIZE NO ADMIN: HCPCS | Performed by: FAMILY MEDICINE

## 2021-11-11 PROCEDURE — 1036F TOBACCO NON-USER: CPT | Performed by: FAMILY MEDICINE

## 2021-11-11 PROCEDURE — G8417 CALC BMI ABV UP PARAM F/U: HCPCS | Performed by: FAMILY MEDICINE

## 2021-11-11 PROCEDURE — 3017F COLORECTAL CA SCREEN DOC REV: CPT | Performed by: FAMILY MEDICINE

## 2021-11-11 PROCEDURE — 3051F HG A1C>EQUAL 7.0%<8.0%: CPT | Performed by: FAMILY MEDICINE

## 2021-11-11 RX ORDER — CANAGLIFLOZIN 300 MG/1
300 TABLET, FILM COATED ORAL
Qty: 90 TABLET | Refills: 4 | Status: SHIPPED | OUTPATIENT
Start: 2021-11-11 | End: 2021-12-07

## 2021-11-11 RX ORDER — ACETAMINOPHEN 160 MG
TABLET,DISINTEGRATING ORAL
COMMUNITY

## 2021-11-11 RX ORDER — MV-MIN/FA/VIT K/LUTEIN/ZEAXANT 200MCG-5MG
CAPSULE ORAL
COMMUNITY

## 2021-11-11 NOTE — PROGRESS NOTES
Subjective  David Baxter, 63 y.o. male presents today with:  Chief Complaint   Patient presents with    Diabetes     f/u;  glucose has been in the 200s average            HPI    Because [place] has started to exercise and has become very strict with his diet which is now essentially vegetarian, his sugars have been running very low. Therefore, we decided he should stop his insulin and take Invokana 150 mg. Since then, his sugars have been in the high 100s to low 200s. He has had no hyper or hypoglycemic symptoms.     No other questions and or concerns for today's visit      Review of Systems    See above  Past Medical History:   Diagnosis Date    Cerebrovascular small vessel disease     Chronic kidney disease, stage 3b ([address] 12/16/2020    Colon cancer screening declined 8/30/2021    Diabetic polyneuropathy associated with type 2 diabetes mellitus ([address] 4/22/2021    DM (diabetes mellitus) ([address]     was with Dr Haleigh Bowers, CC    Dysarthria as late effect of cerebrovascular accident (CVA) 2015    Hearing loss     Hemiparesis affecting left side as late effect of cerebrovascular accident (CVA) ([address]     History of left PCA stroke 2013    History of right STEPHANIE stroke 10/21/2015    was at [facility], now Dr Terri Hall Microalbuminuria 2018    PFO (patent foramen ovale) 2015    Right pontine CVA ([address] 10/2015     Past Surgical History:   Procedure Laterality Date    ANKLE SURGERY       Social History     Socioeconomic History    Marital status: Single     Spouse name: Not on file    Number of children: 0    Years of education: Not on file    Highest education level: Not on file   Occupational History    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[employer]   Tobacco Use    Smoking status: Never Smoker    Smokeless tobacco: Never Used   Substance and Sexual Activity    Alcohol use: No    Drug use: No    Sexual activity: Not on file   Other Topics Concern    Not on file   Social History Narrative    Born in [place], 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 and in [facility]    Lives independetly in an apartment in [place] [address] Strain: Low Risk     Difficulty of Paying Living Expenses: Not hard at all   Food Insecurity: No Food Insecurity    Worried About [address] in the Last Year: Never true    [address] in the Last Year: Never true   Transportation Needs:     Lack of Transportation (Medical): Not on file    Lack of Transportation (Non-Medical): Not on file   Physical Activity:     Days of Exercise per Week: Not on file    Minutes of Exercise per Session: Not on file   Stress:     Feeling of Stress : Not on file   Social Connections:     Frequency of Communication with Friends and Family: Not on file    Frequency of Social Gatherings with Friends and Family: Not on file    Attends Religion Services: Not on file    Active Member of [employer] or Organizations: Not on file    Attends Club or Organization Meetings: Not on file    Marital Status: Not on file   Intimate Partner Violence:     Fear of Current or Ex-Partner: Not on file    Emotionally Abused: Not on file    Physically Abused: Not on file    Sexually Abused: Not on file   Housing Stability:     Unable to Pay for Housing in the Last Year: Not on file    Number of [place] in the Last Year: Not on file    Unstable Housing in the Last Year: Not on file     History reviewed. No pertinent family history.   Allergies   Allergen Reactions    Ace Inhibitors      cough     Current Outpatient Medications   Medication Sig Dispense Refill    Multiple Vitamins-Minerals (PRESERVISION AREDS 2+MULTI VIT) CAPS Take by mouth      Cholecalciferol (VITAMIN D3) 50 MCG (2000 UT) CAPS Take by mouth      canagliflozin (INVOKANA) 300 MG TABS tablet Take 1 tablet by mouth every morning (before breakfast) 90 tablet 4    amLODIPine (NORVASC) 5 MG tablet Take 1 tablet by mouth daily 90 tablet 4    atorvastatin (LIPITOR) 80 MG tablet TAKE 1 TABLET BY MOUTH DAILY 90 tablet 4    irbesartan (AVAPRO) 150 MG tablet TAKE 1 TABLET BY MOUTH DAILY 90 tablet 3    Blood Pressure KIT 1 Units by Does not apply route daily 1 kit 0    ASPIRIN ADULT LOW STRENGTH 81 MG EC tablet Take 2 tablets by mouth daily 180 tablet 3     No current facility-administered medications for this visit. PMH, Surgical Hx, Family Hx, and Social Hxreviewed and updated. Health Maintenance reviewed. Objective    Vitals:    11/11/21 0939   BP: 114/70   Pulse: 92   Resp: 16   Temp: 96 °F (35.6 °C)   TempSrc: Temporal   SpO2: 99%   Weight: 169 lb (76.7 kg)   Height: 5' 5\" (1.651 m)        Physical Exam  Constitutional:       General: He is not in acute distress. Appearance: He is well-developed. HENT:      Head: Normocephalic and atraumatic. Ears:      Comments: B/l hearing deficit, severe; wearing aides  Eyes:      Conjunctiva/sclera: Conjunctivae normal.   Skin:     General: Skin is warm and dry. Neurological:      Mental Status: He is alert and oriented to person, place, and time. Comments: Spastic hemiplegia of left lower extremity. Hemiparesis of right upper extremity. Moderately severe dysarthria. Ataxic gait. Dysdiadochokinesia of left upper extremity   Psychiatric:         Mood and Affect: Mood normal.         Behavior: Behavior normal.         Thought Content:  Thought content normal.         Judgment: Judgment normal.           Lab Results   Component Value Date    LABA1C 7.2 (H) 09/02/2021    LABA1C 7.3 (H) 06/07/2021    LABA1C 6.7 (H) 01/04/2021     Lab Results   Component Value Date    LABMICR 1.30 10/05/2021    CREATININE 1.52 (H) 10/05/2021     Lab Results   Component Value Date    ALT 26 09/14/2020    AST 27 09/14/2020     Lab Results   Component Value Date    CHOL 103 09/14/2020    TRIG 61 09/14/2020    HDL 59 09/14/2020    LDLCALC 32 09/14/2020        Assessment & Plan   Visit Diagnoses and Associated Orders     Controlled type 2 diabetes mellitus with microalbuminuria, with long-term current use of insulin ([facility]    -  Primary    Worse, fair control. Increase Invokana to 300 mg and reassess in 3 months. Patient will contact me should hypoglycemia persist with change in dose    canagliflozin (INVOKANA) 300 MG TABS tablet [[id number]]           ORDERS WITHOUT AN ASSOCIATED DIAGNOSIS    Multiple Vitamins-Minerals (PRESERVISION AREDS 2+MULTI VIT) CAPS [[id number]]      Cholecalciferol (VITAMIN D3) 50 MCG (2000 UT) CAPS [[id number]]              Reviewed with the patient: all disease processes, current clinical status, medications, activities and diet.      Side effects, adverse effects of the medication prescribed today, as well as treatment plan/ rationale and result expectations have been discussed with the patient who expresses understanding and desires to proceed.     Close follow up to evaluate treatment results and for coordination of care. I have reviewed the patient's medical history in detail and updated the computerized patient record. More than 50% of the appointment was spent in face-to-face counseling, education and care coordination. Please note this report has been partially produced using speech recognition software and may contain mistakes related to that system including errors in grammar, punctuation and spelling as well as words and phrases that may seem inappropriate. If there are questions or concerns, please feel free to contact me to clarify. No orders of the defined types were placed in this encounter.     Orders Placed This Encounter   Medications    canagliflozin (INVOKANA) 300 MG TABS tablet     Sig: Take 1 tablet by mouth every morning (before breakfast)     Dispense:  90 tablet     Refill:  4     Medications Discontinued During This Encounter   Medication Reason    insulin 70-30 (HUMULIN;NOVOLIN) (70-30) 100 UNIT per ML injection vial Therapy completed    canagliflozin (INVOKANA) 100 MG TABS tablet DOSE ADJUSTMENT     Return in about 4 weeks (around 12/9/2021) for for appt on 12/21/2021 previously scheduled. Controlled Substance Monitoring:    Acute and Chronic Pain Monitoring:   RX Monitoring 11/28/2018   Attestation The Prescription Monitoring Report for this patient was reviewed today. Periodic Controlled Substance Monitoring Possible medication side effects, risk of tolerance/dependence & alternative treatments discussed. ;No signs of potential drug abuse or diversion identified.            Michel Juárez MD

## 2021-12-02 DIAGNOSIS — I10 ESSENTIAL HYPERTENSION: ICD-10-CM

## 2021-12-02 DIAGNOSIS — N18.31 STAGE 3A CHRONIC KIDNEY DISEASE (HCC): ICD-10-CM

## 2021-12-02 LAB
ANION GAP SERPL CALCULATED.3IONS-SCNC: 15 MEQ/L (ref 9–15)
BUN BLDV-MCNC: 34 MG/DL (ref 6–20)
CALCIUM SERPL-MCNC: 9.2 MG/DL (ref 8.5–9.9)
CHLORIDE BLD-SCNC: 105 MEQ/L (ref 95–107)
CHOLESTEROL, FASTING: 127 MG/DL (ref 0–199)
CO2: 22 MEQ/L (ref 20–31)
CREAT SERPL-MCNC: 1.64 MG/DL (ref 0.7–1.2)
GFR AFRICAN AMERICAN: 52.2
GFR NON-AFRICAN AMERICAN: 43.2
GLUCOSE BLD-MCNC: 151 MG/DL (ref 70–99)
HBA1C MFR BLD: 8.8 % (ref 4.8–5.9)
HDLC SERPL-MCNC: 47 MG/DL (ref 40–59)
LDL CHOLESTEROL CALCULATED: 47 MG/DL (ref 0–129)
POTASSIUM SERPL-SCNC: 5.2 MEQ/L (ref 3.4–4.9)
SODIUM BLD-SCNC: 142 MEQ/L (ref 135–144)
TRIGLYCERIDE, FASTING: 163 MG/DL (ref 0–150)

## 2021-12-07 DIAGNOSIS — R80.9 CONTROLLED TYPE 2 DIABETES MELLITUS WITH MICROALBUMINURIA, WITH LONG-TERM CURRENT USE OF INSULIN (HCC): Primary | ICD-10-CM

## 2021-12-07 DIAGNOSIS — Z79.4 CONTROLLED TYPE 2 DIABETES MELLITUS WITH MICROALBUMINURIA, WITH LONG-TERM CURRENT USE OF INSULIN (HCC): Primary | ICD-10-CM

## 2021-12-07 DIAGNOSIS — E11.29 CONTROLLED TYPE 2 DIABETES MELLITUS WITH MICROALBUMINURIA, WITH LONG-TERM CURRENT USE OF INSULIN (HCC): Primary | ICD-10-CM

## 2021-12-07 RX ORDER — PIOGLITAZONEHYDROCHLORIDE 15 MG/1
15 TABLET ORAL DAILY
Qty: 90 TABLET | Refills: 4 | Status: SHIPPED | OUTPATIENT
Start: 2021-12-07

## 2021-12-08 DIAGNOSIS — R80.9 CONTROLLED TYPE 2 DIABETES MELLITUS WITH MICROALBUMINURIA, WITH LONG-TERM CURRENT USE OF INSULIN (HCC): ICD-10-CM

## 2021-12-08 DIAGNOSIS — E11.29 CONTROLLED TYPE 2 DIABETES MELLITUS WITH MICROALBUMINURIA, WITH LONG-TERM CURRENT USE OF INSULIN (HCC): ICD-10-CM

## 2021-12-08 DIAGNOSIS — Z79.4 CONTROLLED TYPE 2 DIABETES MELLITUS WITH MICROALBUMINURIA, WITH LONG-TERM CURRENT USE OF INSULIN (HCC): ICD-10-CM

## 2021-12-08 NOTE — TELEPHONE ENCOUNTER
Patient is under the impression that he should be taking the INVOKANA 300 mg along with the ACTOS 15mg please advise    Rx request   Requested Prescriptions     Pending Prescriptions Disp Refills    canagliflozin (INVOKANA) 300 MG TABS tablet 90 tablet 4     Sig: Take 1 tablet by mouth every morning (before breakfast)     LOV 11/11/2021  Next Visit Date:  Future Appointments   Date Time Provider Sarmad Jaime   12/21/2021  9:30 AM Thor Mccann MD [facility]   6/6/2022  1:00 PM Kay Jackson MD [facility]   9/1/2022 11:00 AM Thor Mccann MD [address] [place] [address]

## 2021-12-09 RX ORDER — CANAGLIFLOZIN 300 MG/1
300 TABLET, FILM COATED ORAL
Qty: 90 TABLET | Refills: 4 | Status: SHIPPED | OUTPATIENT
Start: 2021-12-09 | End: 2021-12-21 | Stop reason: DRUGHIGH

## 2021-12-09 NOTE — TELEPHONE ENCOUNTER
I received a message saying that he was not comfortable with Invokana and that he wanted Actos. Both in combination would be best.  Therefore I have refilled the Invokana.

## 2021-12-21 ENCOUNTER — OFFICE VISIT (OUTPATIENT)
Dept: FAMILY MEDICINE CLINIC | Age: 61
End: 2021-12-21
Payer: MEDICARE

## 2021-12-21 ENCOUNTER — OFFICE VISIT (OUTPATIENT)
Dept: ENDOCRINOLOGY | Age: 61
End: 2021-12-21
Payer: MEDICARE

## 2021-12-21 VITALS
HEART RATE: 88 BPM | BODY MASS INDEX: 24.99 KG/M2 | WEIGHT: 150 LBS | SYSTOLIC BLOOD PRESSURE: 124 MMHG | HEIGHT: 65 IN | DIASTOLIC BLOOD PRESSURE: 78 MMHG | OXYGEN SATURATION: 98 %

## 2021-12-21 VITALS
RESPIRATION RATE: 17 BRPM | BODY MASS INDEX: 25.16 KG/M2 | TEMPERATURE: 97 F | WEIGHT: 151 LBS | SYSTOLIC BLOOD PRESSURE: 122 MMHG | DIASTOLIC BLOOD PRESSURE: 78 MMHG | HEIGHT: 65 IN | HEART RATE: 85 BPM | OXYGEN SATURATION: 98 %

## 2021-12-21 DIAGNOSIS — R80.9 CONTROLLED TYPE 2 DIABETES MELLITUS WITH MICROALBUMINURIA, WITH LONG-TERM CURRENT USE OF INSULIN (HCC): Primary | ICD-10-CM

## 2021-12-21 DIAGNOSIS — Z79.4 CONTROLLED TYPE 2 DIABETES MELLITUS WITH MICROALBUMINURIA, WITH LONG-TERM CURRENT USE OF INSULIN (HCC): Primary | ICD-10-CM

## 2021-12-21 DIAGNOSIS — E11.29 CONTROLLED TYPE 2 DIABETES MELLITUS WITH MICROALBUMINURIA, WITH LONG-TERM CURRENT USE OF INSULIN (HCC): Primary | ICD-10-CM

## 2021-12-21 DIAGNOSIS — I10 ESSENTIAL HYPERTENSION: ICD-10-CM

## 2021-12-21 PROCEDURE — 2022F DILAT RTA XM EVC RTNOPTHY: CPT | Performed by: FAMILY MEDICINE

## 2021-12-21 PROCEDURE — 1036F TOBACCO NON-USER: CPT | Performed by: FAMILY MEDICINE

## 2021-12-21 PROCEDURE — 3052F HG A1C>EQUAL 8.0%<EQUAL 9.0%: CPT | Performed by: PHYSICIAN ASSISTANT

## 2021-12-21 PROCEDURE — G8420 CALC BMI NORM PARAMETERS: HCPCS | Performed by: FAMILY MEDICINE

## 2021-12-21 PROCEDURE — 99214 OFFICE O/P EST MOD 30 MIN: CPT | Performed by: FAMILY MEDICINE

## 2021-12-21 PROCEDURE — G8420 CALC BMI NORM PARAMETERS: HCPCS | Performed by: PHYSICIAN ASSISTANT

## 2021-12-21 PROCEDURE — G8484 FLU IMMUNIZE NO ADMIN: HCPCS | Performed by: PHYSICIAN ASSISTANT

## 2021-12-21 PROCEDURE — G8427 DOCREV CUR MEDS BY ELIG CLIN: HCPCS | Performed by: PHYSICIAN ASSISTANT

## 2021-12-21 PROCEDURE — 2022F DILAT RTA XM EVC RTNOPTHY: CPT | Performed by: PHYSICIAN ASSISTANT

## 2021-12-21 PROCEDURE — G8484 FLU IMMUNIZE NO ADMIN: HCPCS | Performed by: FAMILY MEDICINE

## 2021-12-21 PROCEDURE — 3052F HG A1C>EQUAL 8.0%<EQUAL 9.0%: CPT | Performed by: FAMILY MEDICINE

## 2021-12-21 PROCEDURE — 3017F COLORECTAL CA SCREEN DOC REV: CPT | Performed by: PHYSICIAN ASSISTANT

## 2021-12-21 PROCEDURE — G8427 DOCREV CUR MEDS BY ELIG CLIN: HCPCS | Performed by: FAMILY MEDICINE

## 2021-12-21 PROCEDURE — 3017F COLORECTAL CA SCREEN DOC REV: CPT | Performed by: FAMILY MEDICINE

## 2021-12-21 PROCEDURE — 99213 OFFICE O/P EST LOW 20 MIN: CPT | Performed by: PHYSICIAN ASSISTANT

## 2021-12-21 PROCEDURE — 1036F TOBACCO NON-USER: CPT | Performed by: PHYSICIAN ASSISTANT

## 2021-12-21 RX ORDER — CANAGLIFLOZIN 100 MG/1
100 TABLET, FILM COATED ORAL
Qty: 90 TABLET | Refills: 4 | Status: CANCELLED | OUTPATIENT
Start: 2021-12-21

## 2021-12-21 RX ORDER — DULAGLUTIDE 0.75 MG/.5ML
0.75 INJECTION, SOLUTION SUBCUTANEOUS WEEKLY
Qty: 4 PEN | Refills: 1 | Status: SHIPPED | OUTPATIENT
Start: 2021-12-21 | End: 2022-03-21

## 2021-12-21 RX ORDER — CANAGLIFLOZIN 300 MG/1
300 TABLET, FILM COATED ORAL
Qty: 30 TABLET | Refills: 3 | Status: SHIPPED | OUTPATIENT
Start: 2021-12-21 | End: 2022-01-25 | Stop reason: SDUPTHER

## 2021-12-21 ASSESSMENT — ENCOUNTER SYMPTOMS
EYE PAIN: 0
SORE THROAT: 0
SINUS PRESSURE: 0
ABDOMINAL PAIN: 0
SHORTNESS OF BREATH: 0
RHINORRHEA: 0
NAUSEA: 0
VOMITING: 0
WHEEZING: 0
EYE REDNESS: 0
DIARRHEA: 0
COUGH: 0

## 2021-12-21 NOTE — PROGRESS NOTES
stroke 2013    History of right STEPHANIE stroke 10/21/2015    was at [facility], now Dr Alex Guo Microalbuminuria 2018    PFO (patent foramen ovale) 2015    Right pontine CVA ([address] 10/2015     Past Surgical History:   Procedure Laterality Date    ANKLE SURGERY       Social History     Socioeconomic History    Marital status: Single     Spouse name: Not on file    Number of children: 0    Years of education: Not on file    Highest education level: Not on file   Occupational History    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[employer]   Tobacco Use    Smoking status: Never Smoker    Smokeless tobacco: Never Used   Substance and Sexual Activity    Alcohol use: No    Drug use: No    Sexual activity: Not on file   Other Topics Concern    Not on file   Social History Narrative    Born in [facility], one brother and one sister    Single, no children    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, the time of a pontine and cerebellar stroke    Was at [facility] and in nursing home for rehabilitation    Lives independetly in an apartment in [facility] Strain: Low Risk     Difficulty of Paying Living Expenses: Not hard at all   Food Insecurity: No Food Insecurity    Worried About [address] in the Last Year: Never true    [address] in the Last Year: Never true   Transportation Needs:     Lack of Transportation (Medical): Not on file    Lack of Transportation (Non-Medical):  Not on file   Physical Activity:     Days of Exercise per Week: Not on file    Minutes of Exercise per Session: Not on file   Stress:     Feeling of Stress : Not on file   Social Connections:     Frequency of Communication with Friends and Family: Not on file    Frequency of Social Gatherings with Friends and Family: Not on file    Attends Worship Services: Not on file    Active Member of Clubs or Organizations: Not on file    Attends Club or Organization Meetings: Not on file    Marital Status: Not on file   Intimate Partner Violence:     Fear of Current or Ex-Partner: Not on file    Emotionally Abused: Not on file    Physically Abused: Not on file    Sexually Abused: Not on file   Housing Stability:     Unable to Pay for Housing in the Last Year: Not on file    Number of [place] in the Last Year: Not on file    Unstable Housing in the Last Year: Not on file     History reviewed. No pertinent family history. Allergies   Allergen Reactions    Ace Inhibitors      cough     Current Outpatient Medications   Medication Sig Dispense Refill    pioglitazone (ACTOS) 15 MG tablet Take 1 tablet by mouth daily 90 tablet 4    Multiple Vitamins-Minerals (PRESERVISION AREDS 2+MULTI VIT) CAPS Take by mouth      Cholecalciferol (VITAMIN D3) 50 MCG (2000 UT) CAPS Take by mouth      amLODIPine (NORVASC) 5 MG tablet Take 1 tablet by mouth daily 90 tablet 4    atorvastatin (LIPITOR) 80 MG tablet TAKE 1 TABLET BY MOUTH DAILY 90 tablet 4    irbesartan (AVAPRO) 150 MG tablet TAKE 1 TABLET BY MOUTH DAILY 90 tablet 3    Blood Pressure KIT 1 Units by Does not apply route daily 1 kit 0    ASPIRIN ADULT LOW STRENGTH 81 MG EC tablet Take 2 tablets by mouth daily 180 tablet 3    canagliflozin (INVOKANA) 300 MG TABS tablet Take 1 tablet by mouth every morning (before breakfast) 30 tablet 3    Dulaglutide (TRULICITY) 0.35 KA/2.0AI SOPN Inject 0.75 mg into the skin once a week 4 pen 1     No current facility-administered medications for this visit. PMH, Surgical Hx, Family Hx, and Social Hxreviewed and updated. Health Maintenance reviewed. Objective    Vitals:    12/21/21 0932   BP: 122/78   Pulse: 85   Resp: 17   Temp: 97 °F (36.1 °C)   TempSrc: Temporal   SpO2: 98%   Weight: 151 lb (68.5 kg)   Height: 5' 5\" (1.651 m)        Physical Exam  Constitutional:       Appearance: He is well-developed. Comments: While in NAD, sig wt loss evident, appears somewhat fatigued. HENT:      Head: Normocephalic and atraumatic. Eyes:      Conjunctiva/sclera: Conjunctivae normal.   Pulmonary:      Effort: Pulmonary effort is normal.   Neurological:      Mental Status: He is alert and oriented to person, place, and time. Lab Results   Component Value Date    LABA1C 8.8 (H) 12/02/2021    LABA1C 7.2 (H) 09/02/2021    LABA1C 7.3 (H) 06/07/2021     Lab Results   Component Value Date    LABMICR 1.30 10/05/2021    CREATININE 1.64 (H) 12/02/2021     Lab Results   Component Value Date    ALT 26 09/14/2020    AST 27 09/14/2020     Lab Results   Component Value Date    CHOL 103 09/14/2020    TRIG 61 09/14/2020    HDL 47 12/02/2021    LDLCALC 47 12/02/2021        Assessment & Plan   Visit Diagnoses and Associated Orders     Type 2 diabetes mellitus with neurological manifestations, uncontrolled ([address]    -  Primary    worse, poor control; arranged for Caesar PALMER to see patient immediately for further E&M. Essential hypertension        Stable and well-controlled on current medications                 Reviewed with the patient: all disease processes, current clinical status, medications, activities and diet.      Side effects, adverse effects of the medication prescribed today, as well as treatment plan/ rationale and result expectations have been discussed with the patient who expresses understanding and desires to proceed.     Close follow up to evaluate treatment results and for coordination of care. I have reviewed the patient's medical history in detail and updated the computerized patient record. More than 50% of the appointment was spent in face-to-face counseling, education and care coordination. Please note this report has been partially produced using speech recognition software and may contain mistakes related to that system including errors in grammar, punctuation and spelling as well as words and phrases that may seem inappropriate.  If there are questions or concerns, please feel free to contact me to clarify. No orders of the defined types were placed in this encounter. No orders of the defined types were placed in this encounter. Medications Discontinued During This Encounter   Medication Reason    canagliflozin (INVOKANA) 300 MG TABS tablet DOSE ADJUSTMENT     No follow-ups on file. Controlled Substance Monitoring:    Acute and Chronic Pain Monitoring:   RX Monitoring 11/28/2018   Attestation The Prescription Monitoring Report for this patient was reviewed today. Periodic Controlled Substance Monitoring Possible medication side effects, risk of tolerance/dependence & alternative treatments discussed. ;No signs of potential drug abuse or diversion identified.            Tiki Castillo MD

## 2021-12-21 NOTE — PATIENT INSTRUCTIONS
Endocrinology-diabetes    1. Check your blood sugars 4 times a day, before meals and at night  2. Document these numbers and a blood glucose log and bring them with you to your follow-up appointment. 3. Call our office if you have blood sugars less than 80 or greater then 300 on two or more occasions  4. Call our office if you have any questions regarding your blood sugars or insulin dosing regiment  5. Signs of low blood sugar include sweating , heart racing, dizziness and weakness. Check your blood sugar if you have any of these symptoms. Medications-    6. Pioglitazone 15 mg by mouth daily  7. Invokana 300 mg by mouth daily  8. Start Trulicity 9.18 mg injected weekly   9. Monitor glucose 4 times daily   10.  Follow up in 4 weeks

## 2021-12-21 NOTE — PROGRESS NOTES
12/21/2021    Assessment:       Diagnosis Orders   1. Controlled type 2 diabetes mellitus with microalbuminuria, with long-term current use of insulin (HCC)  C-Peptide    GLUTAMIC ACID DECARBOXYLASE ANTIBODY (GAD65)    Insulin Free & Total    Comprehensive Metabolic Panel       PLAN:     1. Pioglitazone 15 mg by mouth daily  2. Invokana 300 mg by mouth daily  3. Start Trulicity 3.39 mg injected weekly   4. Monitor glucose 4 times daily   5. Follow up in 4 weeks       Orders Placed This Encounter   Procedures    C-Peptide     Standing Status:   Future     Standing Expiration Date:   12/21/2022    GLUTAMIC ACID DECARBOXYLASE ANTIBODY (GAD65)     Standing Status:   Future     Standing Expiration Date:   12/21/2022    Insulin Free & Total     Standing Status:   Future     Standing Expiration Date:   12/21/2022    Comprehensive Metabolic Panel     Standing Status:   Future     Standing Expiration Date:   12/21/2022     Orders Placed This Encounter   Medications    canagliflozin (INVOKANA) 300 MG TABS tablet     Sig: Take 1 tablet by mouth every morning (before breakfast)     Dispense:  30 tablet     Refill:  3    Dulaglutide (TRULICITY) 4.07 WZ/5.3ZG SOPN     Sig: Inject 0.75 mg into the skin once a week     Dispense:  4 pen     Refill:  1     Return in about 3 months (around 3/21/2022) for Diabetes. Subjective:     Chief Complaint   Patient presents with    New Patient    Diabetes     Vitals:    12/21/21 1034   BP: 124/78   Pulse: 88   SpO2: 98%   Weight: 150 lb (68 kg)   Height: 5' 5\" (1.651 m)     Wt Readings from Last 3 Encounters:   12/21/21 150 lb (68 kg)   12/21/21 151 lb (68.5 kg)   11/11/21 169 lb (76.7 kg)     BP Readings from Last 3 Encounters:   12/21/21 124/78   12/21/21 122/78   11/11/21 114/70     Kelly Alejo is a 65-year-old type II diabetic male with worsening glycemic control. He has been on insulin in the past, wanted to come off of it because he kept getting hypoglycemic events.   He has made significant lifestyle changes, is mostly vegetarian diet, low calorie, 20 pound weight loss in 3 months. He states that he is not eating because when he does it makes his blood glucose levels go up he appears to be low energy and slightly emaciated. He was sent from his PCP over those same concerns as well as his deteriorating renal function. I spent least 20 minutes educating the patient on the pathophysiology and progression of diabetes. The effects of insulin and why insulin is required to lower glucose levels in his blood. Also told him that we might have to put him on insulin if his pancreatic function was decreased. He had some understanding of those concepts, still seems slightly confused, we will continue the education ongoing at his follow-up visits. He did not want to attend diabetic education classes at this time because he is busy taking care of his mother. Diabetes  He presents for his initial diabetic visit. He has type 2 diabetes mellitus. The initial diagnosis of diabetes was made 20 years ago. His disease course has been worsening. Hypoglycemia symptoms include confusion, sweats and tremors. Pertinent negatives for hypoglycemia include no dizziness or headaches. Pertinent negatives for diabetes include no chest pain, no fatigue, no polydipsia and no polyuria. Hypoglycemia complications include blackouts and required assistance. Diabetic complications include a CVA. Risk factors for coronary artery disease include diabetes mellitus and male sex. Current diabetic treatment includes oral agent (triple therapy). He is compliant with treatment all of the time. His weight is decreasing steadily. He is following a generally healthy diet. Meal planning includes avoidance of concentrated sweets. He has had a previous visit with a dietitian. He participates in exercise daily. His home blood glucose trend is increasing steadily. His overall blood glucose range is 180-200 mg/dl.  An ACE inhibitor/angiotensin II receptor blocker is being taken. He does not see a podiatrist.Eye exam is current.      Past Medical History:   Diagnosis Date    Cerebrovascular small vessel disease     Chronic kidney disease, stage 3b ([address] 12/16/2020    Colon cancer screening declined 8/30/2021    Diabetic polyneuropathy associated with type 2 diabetes mellitus ([address] 4/22/2021    DM (diabetes mellitus) ([facility]     was with Dr Goldie Mejia, [facility]    Dysarthria as late effect of cerebrovascular accident (CVA) 2015    Hearing loss     Hemiparesis affecting left side as late effect of cerebrovascular accident (CVA) ([address]     History of left PCA stroke 2013    History of right STEPHANIE stroke 10/21/2015    was at [facility], now Dr Ezequiel Vázquez Microalbuminuria 2018    PFO (patent foramen ovale) 2015    Right pontine CVA ([address] 10/2015     Past Surgical History:   Procedure Laterality Date    ANKLE SURGERY       Social History     Socioeconomic History    Marital status: Single     Spouse name: Not on file    Number of children: 0    Years of education: Not on file    Highest education level: Not on file   Occupational History    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[employer]   Tobacco Use    Smoking status: Never Smoker    Smokeless tobacco: Never Used   Substance and Sexual Activity    Alcohol use: No    Drug use: No    Sexual activity: Not on file   Other Topics Concern    Not on file   Social History Narrative    Born in [facility], one brother and one sister    Single, no children    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, the time of a pontine and cerebellar stroke    Was at [facility] and in [facility] home [facility]    Lives independetly in an apartment in [facility] Determinants of Health     Financial Resource Strain: Low Risk     Difficulty of Paying Living Expenses: Not hard at all   Food Insecurity: No Food Insecurity    Worried About [address] in the Last Year: Never true  Ran Out of Food in the Last Year: Never true   Transportation Needs:     Lack of Transportation (Medical): Not on file    Lack of Transportation (Non-Medical): Not on file   Physical Activity:     Days of Exercise per Week: Not on file    Minutes of Exercise per Session: Not on file   Stress:     Feeling of Stress : Not on file   Social Connections:     Frequency of Communication with Friends and Family: Not on file    Frequency of Social Gatherings with Friends and Family: Not on file    Attends Ep[employer]opalian Services: Not on file    Active Member of [address] [place] [address] or Organizations: Not on file    Attends Club or Organization Meetings: Not on file    Marital Status: Not on file   Intimate Partner Violence:     Fear of Current or Ex-Partner: Not on file    Emotionally Abused: Not on file    Physically Abused: Not on file    Sexually Abused: Not on file   Housing Stability:     Unable to Pay for Housing in the Last Year: Not on file    Number of [place] in the Last Year: Not on file    Unstable Housing in the Last Year: Not on file     No family history on file.   Allergies   Allergen Reactions    Ace Inhibitors      cough       Current Outpatient Medications:     canagliflozin (INVOKANA) 300 MG TABS tablet, Take 1 tablet by mouth every morning (before breakfast), Disp: 30 tablet, Rfl: 3    Dulaglutide (TRULICITY) 1.18 SM/4.8UT SOPN, Inject 0.75 mg into the skin once a week, Disp: 4 pen, Rfl: 1    pioglitazone (ACTOS) 15 MG tablet, Take 1 tablet by mouth daily, Disp: 90 tablet, Rfl: 4    Multiple Vitamins-Minerals (PRESERVISION AREDS 2+MULTI VIT) CAPS, Take by mouth, Disp: , Rfl:     Cholecalciferol (VITAMIN D3) 50 MCG (2000 UT) CAPS, Take by mouth, Disp: , Rfl:     amLODIPine (NORVASC) 5 MG tablet, Take 1 tablet by mouth daily, Disp: 90 tablet, Rfl: 4    atorvastatin (LIPITOR) 80 MG tablet, TAKE 1 TABLET BY MOUTH DAILY, Disp: 90 tablet, Rfl: 4    irbesartan (AVAPRO) 150 MG tablet, TAKE 1 TABLET BY MOUTH DAILY, Disp: 90 tablet, Rfl: 3    Blood Pressure KIT, 1 Units by Does not apply route daily, Disp: 1 kit, Rfl: 0    ASPIRIN ADULT LOW STRENGTH 81 MG EC tablet, Take 2 tablets by mouth daily, Disp: 180 tablet, Rfl: 3  Lab Results   Component Value Date     12/02/2021    K 5.2 (H) 12/02/2021     12/02/2021    CO2 22 12/02/2021    BUN 34 (H) 12/02/2021    CREATININE 1.64 (H) 12/02/2021    GLUCOSE 151 (H) 12/02/2021    CALCIUM 9.2 12/02/2021    PROT 6.7 09/14/2020    LABALBU 4.0 09/14/2020    BILITOT 0.3 09/14/2020    ALKPHOS 144 (H) 09/14/2020    AST 27 09/14/2020    ALT 26 09/14/2020    LABGLOM 43.2 (L) 12/02/2021    GFRAA 52.2 (L) 12/02/2021    GLOB 2.7 09/14/2020     Lab Results   Component Value Date    WBC 7.2 03/29/2019    HGB 13.5 (L) 03/29/2019    HCT 40.6 (L) 03/29/2019    MCV 89.4 03/29/2019     03/29/2019     Lab Results   Component Value Date    LABA1C 8.8 (H) 12/02/2021    LABA1C 7.2 (H) 09/02/2021    LABA1C 7.3 (H) 06/07/2021     Lab Results   Component Value Date    CHOLFAST 127 12/02/2021    TRIGLYCFAST 163 (H) 12/02/2021    HDL 47 12/02/2021    HDL 59 09/14/2020    HDL 58 11/01/2017    LDLCALC 47 12/02/2021    LDLCALC 32 09/14/2020    LDLCALC 41 11/01/2017    CHOL 103 09/14/2020    CHOL 117 11/01/2017    CHOL 122 06/06/2016    TRIG 61 09/14/2020    TRIG 90 11/01/2017    TRIG 78 06/06/2016     No results found for: TESTM  Lab Results   Component Value Date    TSH 1.390 03/29/2019     No results found for: TPOABS    Review of Systems   Constitutional: Negative for chills, fatigue and fever. HENT: Negative for congestion, ear pain, postnasal drip, rhinorrhea, sinus pressure and sore throat. Eyes: Negative for pain and redness. Respiratory: Negative for cough, shortness of breath and wheezing. Cardiovascular: Negative for chest pain, palpitations and leg swelling. Gastrointestinal: Negative for abdominal pain, diarrhea, nausea and vomiting.    Endocrine: Negative

## 2021-12-30 DIAGNOSIS — I10 ESSENTIAL HYPERTENSION: ICD-10-CM

## 2021-12-30 RX ORDER — AMLODIPINE BESYLATE 5 MG/1
5 TABLET ORAL DAILY
Qty: 90 TABLET | Refills: 4 | Status: SHIPPED | OUTPATIENT
Start: 2021-12-30

## 2021-12-30 NOTE — TELEPHONE ENCOUNTER
Rx request   Requested Prescriptions     Pending Prescriptions Disp Refills    amLODIPine (NORVASC) 5 MG tablet 90 tablet 4     Sig: Take 1 tablet by mouth daily     LOV 12/21/2021  Next Visit Date:  Future Appointments   Date Time Provider Sarmad Yeni   1/25/2022 10:40 AM Newton Matthews, [address]   6/6/2022  1:00 PM Carolina Masters MD [facility]   9/1/2022 11:00 AM Moira Pace MD [address] [place] [address]

## 2022-01-12 DIAGNOSIS — E11.29 CONTROLLED TYPE 2 DIABETES MELLITUS WITH MICROALBUMINURIA, WITH LONG-TERM CURRENT USE OF INSULIN (HCC): ICD-10-CM

## 2022-01-12 DIAGNOSIS — Z79.4 CONTROLLED TYPE 2 DIABETES MELLITUS WITH MICROALBUMINURIA, WITH LONG-TERM CURRENT USE OF INSULIN (HCC): ICD-10-CM

## 2022-01-12 DIAGNOSIS — R80.9 CONTROLLED TYPE 2 DIABETES MELLITUS WITH MICROALBUMINURIA, WITH LONG-TERM CURRENT USE OF INSULIN (HCC): ICD-10-CM

## 2022-01-12 LAB
ALBUMIN SERPL-MCNC: 4.2 G/DL (ref 3.5–4.6)
ALP BLD-CCNC: 127 U/L (ref 35–104)
ALT SERPL-CCNC: 26 U/L (ref 0–41)
ANION GAP SERPL CALCULATED.3IONS-SCNC: 14 MEQ/L (ref 9–15)
AST SERPL-CCNC: 27 U/L (ref 0–40)
BILIRUB SERPL-MCNC: 0.4 MG/DL (ref 0.2–0.7)
BUN BLDV-MCNC: 32 MG/DL (ref 6–20)
CALCIUM SERPL-MCNC: 9.4 MG/DL (ref 8.5–9.9)
CHLORIDE BLD-SCNC: 98 MEQ/L (ref 95–107)
CO2: 21 MEQ/L (ref 20–31)
CREAT SERPL-MCNC: 1.4 MG/DL (ref 0.7–1.2)
GFR AFRICAN AMERICAN: >60
GFR NON-AFRICAN AMERICAN: 51.8
GLOBULIN: 3.1 G/DL (ref 2.3–3.5)
GLUCOSE BLD-MCNC: 175 MG/DL (ref 70–99)
POTASSIUM SERPL-SCNC: 4.2 MEQ/L (ref 3.4–4.9)
SODIUM BLD-SCNC: 133 MEQ/L (ref 135–144)
TOTAL PROTEIN: 7.3 G/DL (ref 6.3–8)

## 2022-01-13 LAB — C-PEPTIDE: 2.1 NG/ML (ref 1.1–4.4)

## 2022-01-17 LAB — GLUTAMIC ACID DECARB AB: <5 IU/ML (ref 0–5)

## 2022-01-25 ENCOUNTER — OFFICE VISIT (OUTPATIENT)
Dept: ENDOCRINOLOGY | Age: 62
End: 2022-01-25
Payer: MEDICARE

## 2022-01-25 VITALS
HEART RATE: 88 BPM | HEIGHT: 65 IN | WEIGHT: 147 LBS | SYSTOLIC BLOOD PRESSURE: 134 MMHG | BODY MASS INDEX: 24.49 KG/M2 | OXYGEN SATURATION: 96 % | DIASTOLIC BLOOD PRESSURE: 81 MMHG

## 2022-01-25 DIAGNOSIS — Z79.4 CONTROLLED TYPE 2 DIABETES MELLITUS WITH MICROALBUMINURIA, WITH LONG-TERM CURRENT USE OF INSULIN (HCC): Primary | ICD-10-CM

## 2022-01-25 DIAGNOSIS — R80.9 CONTROLLED TYPE 2 DIABETES MELLITUS WITH MICROALBUMINURIA, WITH LONG-TERM CURRENT USE OF INSULIN (HCC): Primary | ICD-10-CM

## 2022-01-25 DIAGNOSIS — E11.29 CONTROLLED TYPE 2 DIABETES MELLITUS WITH MICROALBUMINURIA, WITH LONG-TERM CURRENT USE OF INSULIN (HCC): Primary | ICD-10-CM

## 2022-01-25 LAB
CHP ED QC CHECK: NORMAL
GLUCOSE BLD-MCNC: 191 MG/DL

## 2022-01-25 PROCEDURE — 2022F DILAT RTA XM EVC RTNOPTHY: CPT | Performed by: PHYSICIAN ASSISTANT

## 2022-01-25 PROCEDURE — 99213 OFFICE O/P EST LOW 20 MIN: CPT | Performed by: PHYSICIAN ASSISTANT

## 2022-01-25 PROCEDURE — 1036F TOBACCO NON-USER: CPT | Performed by: PHYSICIAN ASSISTANT

## 2022-01-25 PROCEDURE — G8420 CALC BMI NORM PARAMETERS: HCPCS | Performed by: PHYSICIAN ASSISTANT

## 2022-01-25 PROCEDURE — 82962 GLUCOSE BLOOD TEST: CPT | Performed by: PHYSICIAN ASSISTANT

## 2022-01-25 PROCEDURE — 3046F HEMOGLOBIN A1C LEVEL >9.0%: CPT | Performed by: PHYSICIAN ASSISTANT

## 2022-01-25 PROCEDURE — 3017F COLORECTAL CA SCREEN DOC REV: CPT | Performed by: PHYSICIAN ASSISTANT

## 2022-01-25 PROCEDURE — G8484 FLU IMMUNIZE NO ADMIN: HCPCS | Performed by: PHYSICIAN ASSISTANT

## 2022-01-25 PROCEDURE — G8428 CUR MEDS NOT DOCUMENT: HCPCS | Performed by: PHYSICIAN ASSISTANT

## 2022-01-25 RX ORDER — DULAGLUTIDE 1.5 MG/.5ML
1.5 INJECTION, SOLUTION SUBCUTANEOUS WEEKLY
Qty: 4 PEN | Refills: 3 | Status: SHIPPED | OUTPATIENT
Start: 2022-01-25 | End: 2022-03-21

## 2022-01-25 RX ORDER — CANAGLIFLOZIN 300 MG/1
300 TABLET, FILM COATED ORAL
Qty: 90 TABLET | Refills: 3 | Status: SHIPPED | OUTPATIENT
Start: 2022-01-25 | End: 2022-05-24 | Stop reason: ALTCHOICE

## 2022-01-25 ASSESSMENT — ENCOUNTER SYMPTOMS
SHORTNESS OF BREATH: 0
VOMITING: 0
WHEEZING: 0
DIARRHEA: 0
COUGH: 0
EYE REDNESS: 0
NAUSEA: 0
EYE PAIN: 0
SORE THROAT: 0
SINUS PRESSURE: 0
RHINORRHEA: 0
ABDOMINAL PAIN: 0

## 2022-01-25 NOTE — PATIENT INSTRUCTIONS
Endocrinology-diabetes    1. Check your blood sugars 4 times a day, before meals and at night  2. Document these numbers and a blood glucose log and bring them with you to your follow-up appointment. 3. Do not take your mealtime insulin if your blood sugars less than 120  4. Call our office if you have blood sugars less than 80 or greater then 300 on two or more occasions  5. Call our office if you have any questions regarding your blood sugars or insulin dosing regiment  6. Signs of low blood sugar include sweating , heart racing, dizziness and weakness. Check your blood sugar if you have any of these symptoms.      Medications-

## 2022-01-25 NOTE — PROGRESS NOTES
1/25/2022    Assessment:       Diagnosis Orders   1. Controlled type 2 diabetes mellitus with microalbuminuria, with long-term current use of insulin ([facility])  POCT Glucose    Comprehensive Metabolic Panel    Hemoglobin A1C     DIABETES FOOT EXAM       PLAN:     1. Continue Pioglitazone 15 mg by mouth daily  2. Continue Invokana 300 mg by mouth daily  3. Increase Trulicity 1.5 mg injected weekly   4. Monitor glucose 3-4 times daily and document  5. Repeat labs in 3 months   6. Follow up in 3 months       Orders Placed This Encounter   Procedures    Comprehensive Metabolic Panel     Standing Status:   Future     Standing Expiration Date:   1/25/2023    Hemoglobin A1C     Standing Status:   Future     Standing Expiration Date:   1/25/2023    POCT Glucose     DIABETES FOOT EXAM     Orders Placed This Encounter   Medications    Dulaglutide (TRULICITY) 1.5 XA/3.6ZO SOPN     Sig: Inject 1.5 mg into the skin once a week     Dispense:  4 pen     Refill:  3    canagliflozin (INVOKANA) 300 MG TABS tablet     Sig: Take 1 tablet by mouth every morning (before breakfast)     Dispense:  90 tablet     Refill:  3     Return in about 3 months (around 4/25/2022) for Diabetes. Subjective:     Chief Complaint   Patient presents with    1 Month Follow-Up    Diabetes     Vitals:    01/25/22 0936   BP: 134/81   Pulse: 88   SpO2: 96%   Weight: 147 lb (66.7 kg)   Height: 5' 5\" (1.651 m)     Wt Readings from Last 3 Encounters:   01/25/22 147 lb (66.7 kg)   12/21/21 150 lb (68 kg)   12/21/21 151 lb (68.5 kg)     BP Readings from Last 3 Encounters:   01/25/22 134/81   12/21/21 124/78   12/21/21 122/78     Marco is a 61-year-old type II diabetic male with worsening glycemic control. He has been on insulin in the past, wanted to come off of it because he kept getting hypoglycemic events. He has made significant lifestyle changes, is mostly vegetarian diet, low calorie, 20 pound weight loss in 3 months.   He states that he is not eating because when he does it makes his blood glucose levels go up he appears to be low energy and slightly emaciated. He was sent from his PCP over those same concerns as well as his deteriorating renal function. I spent least 20 minutes educating the patient on the pathophysiology and progression of diabetes. The effects of insulin and why insulin is required to lower glucose levels in his blood. Also told him that we might have to put him on insulin if his pancreatic function was decreased. He had some understanding of those concepts, still seems slightly confused, we will continue the education ongoing at his follow-up visits. He did not want to attend diabetic education classes at this time because he is busy taking care of his mother. Diabetes  He presents for his initial diabetic visit. He has type 2 diabetes mellitus. The initial diagnosis of diabetes was made 20 years ago. His disease course has been worsening. Hypoglycemia symptoms include confusion, sweats and tremors. Pertinent negatives for hypoglycemia include no dizziness or headaches. Pertinent negatives for diabetes include no chest pain, no fatigue, no polydipsia and no polyuria. Hypoglycemia complications include blackouts and required assistance. Diabetic complications include a CVA. Risk factors for coronary artery disease include diabetes mellitus and male sex. Current diabetic treatment includes oral agent (triple therapy). He is compliant with treatment all of the time. His weight is decreasing steadily. He is following a generally healthy diet. Meal planning includes avoidance of concentrated sweets. He has had a previous visit with a dietitian. He participates in exercise daily. His home blood glucose trend is increasing steadily. His overall blood glucose range is 180-200 mg/dl. An ACE inhibitor/angiotensin II receptor blocker is being taken. He does not see a podiatrist.Eye exam is current.      Past Medical History:   Diagnosis Date  Cerebrovascular small vessel disease     Chronic kidney disease, stage 3b ([facility] 12/16/2020    [place] cancer screening declined 8/30/2021    Diabetic polyneuropathy associated with type 2 diabetes mellitus ([facility] 4/22/2021    DM (diabetes mellitus) ([facility]     was with Dr Yoana Hartmann, [facility]    Dysarthria as late effect of cerebrovascular accident (CVA) 2015    Hearing loss     Hemiparesis affecting left side as late effect of cerebrovascular accident (CVA) ([facility]     History of left PCA stroke 2013    History of right STEPHANIE stroke 10/21/2015    was at [facility] [place], now Dr Di Bolaños Microalbuminuria 2018    PFO (patent foramen ovale) 2015    Right pontine CVA ([facility] 10/2015     Past Surgical History:   Procedure Laterality Date    ANKLE SURGERY       Social History     Socioeconomic History    Marital status: Single     Spouse name: Not on file    Number of children: 0    Years of education: Not on file    Highest education level: Not on file   Occupational History    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[employer]   Tobacco Use    Smoking status: Never Smoker    Smokeless tobacco: Never Used   Substance and Sexual Activity    Alcohol use: No    Drug use: No    Sexual activity: Not on file   Other Topics Concern    Not on file   Social History Narrative    Born in [facility], one brother and one sister    Single, no children    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, the time of a pontine and cerebellar stroke    Was at [facility] and in nursing home for rehabilitation    Lives independetly in an apartment in [facility] Strain: Low Risk     Difficulty of Paying Living Expenses: Not hard at all   Food Insecurity: No Food Insecurity    Worried About [address] in the Last Year: Never true    [address] in the Last Year: Never true   Transportation Needs:     Lack of Transportation (Medical):  Not on file    Lack of Transportation (Non-Medical): Not on file   Physical Activity:     Days of Exercise per Week: Not on file    Minutes of Exercise per Session: Not on file   Stress:     Feeling of Stress : Not on file   Social Connections:     Frequency of Communication with Friends and Family: Not on file    Frequency of Social Gatherings with Friends and Family: Not on file    Attends Orthodoxy Services: Not on file    Active Member of [address] [place] [address] or Organizations: Not on file    Attends Club or Organization Meetings: Not on file    Marital Status: Not on file   Intimate Partner Violence:     Fear of Current or Ex-Partner: Not on file    Emotionally Abused: Not on file    Physically Abused: Not on file    Sexually Abused: Not on file   Housing Stability:     Unable to Pay for Housing in the Last Year: Not on file    Number of [place] in the Last Year: Not on file    Unstable Housing in the Last Year: Not on file     No family history on file.   Allergies   Allergen Reactions    Ace Inhibitors      cough       Current Outpatient Medications:     Dulaglutide (TRULICITY) 1.5 EK/7.6EA SOPN, Inject 1.5 mg into the skin once a week, Disp: 4 pen, Rfl: 3    canagliflozin (INVOKANA) 300 MG TABS tablet, Take 1 tablet by mouth every morning (before breakfast), Disp: 90 tablet, Rfl: 3    amLODIPine (NORVASC) 5 MG tablet, Take 1 tablet by mouth daily, Disp: 90 tablet, Rfl: 4    Dulaglutide (TRULICITY) 9.78 HA/2.4WD SOPN, Inject 0.75 mg into the skin once a week, Disp: 4 pen, Rfl: 1    pioglitazone (ACTOS) 15 MG tablet, Take 1 tablet by mouth daily, Disp: 90 tablet, Rfl: 4    Multiple Vitamins-Minerals (PRESERVISION AREDS 2+MULTI VIT) CAPS, Take by mouth, Disp: , Rfl:     Cholecalciferol (VITAMIN D3) 50 MCG (2000 UT) CAPS, Take by mouth, Disp: , Rfl:     atorvastatin (LIPITOR) 80 MG tablet, TAKE 1 TABLET BY MOUTH DAILY, Disp: 90 tablet, Rfl: 4    irbesartan (AVAPRO) 150 MG tablet, TAKE 1 TABLET BY MOUTH DAILY, Disp: 90 tablet, Rfl: 3   Blood Pressure KIT, 1 Units by Does not apply route daily, Disp: 1 kit, Rfl: 0    ASPIRIN ADULT LOW STRENGTH 81 MG EC tablet, Take 2 tablets by mouth daily, Disp: 180 tablet, Rfl: 3  Lab Results   Component Value Date     (L) 01/12/2022    K 4.2 01/12/2022    CL 98 01/12/2022    CO2 21 01/12/2022    BUN 32 (H) 01/12/2022    CREATININE 1.40 (H) 01/12/2022    GLUCOSE 191 01/25/2022    CALCIUM 9.4 01/12/2022    PROT 7.3 01/12/2022    LABALBU 4.2 01/12/2022    BILITOT 0.4 01/12/2022    ALKPHOS 127 (H) 01/12/2022    AST 27 01/12/2022    ALT 26 01/12/2022    LABGLOM 51.8 (L) 01/12/2022    GFRAA >60.0 01/12/2022    GLOB 3.1 01/12/2022     Lab Results   Component Value Date    WBC 7.2 03/29/2019    HGB 13.5 (L) 03/29/2019    HCT 40.6 (L) 03/29/2019    MCV 89.4 03/29/2019     03/29/2019     Lab Results   Component Value Date    LABA1C 8.8 (H) 12/02/2021    LABA1C 7.2 (H) 09/02/2021    LABA1C 7.3 (H) 06/07/2021     Results for Pollo Tapia" (MRN [id number]) as of 1/25/2022 09:58   Ref. Range 1/12/2022 08:33   C-Peptide Latest Ref Range: 1.1 - 4.4 ng/mL 2.1      1/12/22 0833    Glutamic Acid Decarb Ab 0.0 - 5.0 IU/mL <5.0          Lab Results   Component Value Date    CHOLFAST 127 12/02/2021    TRIGLYCFAST 163 (H) 12/02/2021    HDL 47 12/02/2021    HDL 59 09/14/2020    HDL 58 11/01/2017    LDLCALC 47 12/02/2021    LDLCALC 32 09/14/2020    LDLCALC 41 11/01/2017    CHOL 103 09/14/2020    CHOL 117 11/01/2017    CHOL 122 06/06/2016    TRIG 61 09/14/2020    TRIG 90 11/01/2017    TRIG 78 06/06/2016     No results found for: TESTM  Lab Results   Component Value Date    TSH 1.390 03/29/2019     No results found for: TPOABS    Review of Systems   Constitutional: Negative for chills, fatigue and fever. HENT: Negative for congestion, ear pain, postnasal drip, rhinorrhea, sinus pressure and sore throat. Eyes: Negative for pain and redness. Respiratory: Negative for cough, shortness of breath and wheezing. Cardiovascular: Negative for chest pain, palpitations and leg swelling. Gastrointestinal: Negative for abdominal pain, diarrhea, nausea and vomiting. Endocrine: Negative for polydipsia and polyuria. Genitourinary: Negative for difficulty urinating. Musculoskeletal: Negative for arthralgias. Skin: Negative for rash. Neurological: Positive for tremors. Negative for dizziness and headaches. Psychiatric/Behavioral: Positive for confusion. Objective:   Physical Exam  Constitutional:       Appearance: He is well-developed. HENT:      Head: Normocephalic and atraumatic. Mouth/Throat:      Mouth: Mucous membranes are moist.   Eyes:      Conjunctiva/sclera: Conjunctivae normal.   Neck:      Comments: No thyromegaly or palpable nodules  Cardiovascular:      Rate and Rhythm: Normal rate and regular rhythm. Heart sounds: Normal heart sounds. No murmur heard. Pulmonary:      Effort: Pulmonary effort is normal.      Breath sounds: Normal breath sounds. Abdominal:      General: Bowel sounds are normal.      Palpations: Abdomen is soft. Musculoskeletal:         General: No swelling. Normal range of motion. Cervical back: Normal range of motion and neck supple. Comments: 2+ DP/PT pulses bilaterally with good capillary refill. No wounds, lesions, or ulcerations. Thickened toenails. Moderate Lt ankle 1+ pitting edema. Gross and protective sensation intact bilaterally, intact vibratory sensation bilaterally. Skin:     General: Skin is warm and dry. Neurological:      Mental Status: He is alert and oriented to person, place, and time.    Psychiatric:         Mood and Affect: Mood normal.      Comments: Dysphoric mood, improved

## 2022-01-26 LAB
INSULIN FREE: 4 UIU/ML (ref 3–25)
INSULIN: 5 UIU/ML (ref 3–25)

## 2022-03-10 DIAGNOSIS — E11.29 CONTROLLED TYPE 2 DIABETES MELLITUS WITH MICROALBUMINURIA, WITH LONG-TERM CURRENT USE OF INSULIN (HCC): ICD-10-CM

## 2022-03-10 DIAGNOSIS — R80.9 CONTROLLED TYPE 2 DIABETES MELLITUS WITH MICROALBUMINURIA, WITH LONG-TERM CURRENT USE OF INSULIN (HCC): ICD-10-CM

## 2022-03-10 DIAGNOSIS — Z79.4 CONTROLLED TYPE 2 DIABETES MELLITUS WITH MICROALBUMINURIA, WITH LONG-TERM CURRENT USE OF INSULIN (HCC): ICD-10-CM

## 2022-03-10 LAB
ALBUMIN SERPL-MCNC: 4.1 G/DL (ref 3.5–4.6)
ALP BLD-CCNC: 119 U/L (ref 35–104)
ALT SERPL-CCNC: 18 U/L (ref 0–41)
ANION GAP SERPL CALCULATED.3IONS-SCNC: 11 MEQ/L (ref 9–15)
AST SERPL-CCNC: 21 U/L (ref 0–40)
BILIRUB SERPL-MCNC: 0.4 MG/DL (ref 0.2–0.7)
BUN BLDV-MCNC: 33 MG/DL (ref 6–20)
CALCIUM SERPL-MCNC: 9 MG/DL (ref 8.5–9.9)
CHLORIDE BLD-SCNC: 105 MEQ/L (ref 95–107)
CO2: 22 MEQ/L (ref 20–31)
CREAT SERPL-MCNC: 1.55 MG/DL (ref 0.7–1.2)
GFR AFRICAN AMERICAN: 55.7
GFR NON-AFRICAN AMERICAN: 46
GLOBULIN: 2.7 G/DL (ref 2.3–3.5)
GLUCOSE BLD-MCNC: 188 MG/DL (ref 70–99)
HBA1C MFR BLD: 7.9 % (ref 4.8–5.9)
POTASSIUM SERPL-SCNC: 4.6 MEQ/L (ref 3.4–4.9)
SODIUM BLD-SCNC: 138 MEQ/L (ref 135–144)
TOTAL PROTEIN: 6.8 G/DL (ref 6.3–8)

## 2022-03-21 ENCOUNTER — OFFICE VISIT (OUTPATIENT)
Dept: ENDOCRINOLOGY | Age: 62
End: 2022-03-21
Payer: MEDICARE

## 2022-03-21 VITALS
DIASTOLIC BLOOD PRESSURE: 83 MMHG | HEART RATE: 81 BPM | SYSTOLIC BLOOD PRESSURE: 124 MMHG | HEIGHT: 65 IN | WEIGHT: 140 LBS | OXYGEN SATURATION: 98 % | BODY MASS INDEX: 23.32 KG/M2

## 2022-03-21 DIAGNOSIS — Z79.4 CONTROLLED TYPE 2 DIABETES MELLITUS WITH MICROALBUMINURIA, WITH LONG-TERM CURRENT USE OF INSULIN (HCC): Primary | ICD-10-CM

## 2022-03-21 DIAGNOSIS — R80.9 CONTROLLED TYPE 2 DIABETES MELLITUS WITH MICROALBUMINURIA, WITH LONG-TERM CURRENT USE OF INSULIN (HCC): Primary | ICD-10-CM

## 2022-03-21 DIAGNOSIS — E11.29 CONTROLLED TYPE 2 DIABETES MELLITUS WITH MICROALBUMINURIA, WITH LONG-TERM CURRENT USE OF INSULIN (HCC): Primary | ICD-10-CM

## 2022-03-21 LAB
CHP ED QC CHECK: NORMAL
GLUCOSE BLD-MCNC: 215 MG/DL

## 2022-03-21 PROCEDURE — G8427 DOCREV CUR MEDS BY ELIG CLIN: HCPCS | Performed by: PHYSICIAN ASSISTANT

## 2022-03-21 PROCEDURE — 99213 OFFICE O/P EST LOW 20 MIN: CPT | Performed by: PHYSICIAN ASSISTANT

## 2022-03-21 PROCEDURE — G8420 CALC BMI NORM PARAMETERS: HCPCS | Performed by: PHYSICIAN ASSISTANT

## 2022-03-21 PROCEDURE — 3017F COLORECTAL CA SCREEN DOC REV: CPT | Performed by: PHYSICIAN ASSISTANT

## 2022-03-21 PROCEDURE — 2022F DILAT RTA XM EVC RTNOPTHY: CPT | Performed by: PHYSICIAN ASSISTANT

## 2022-03-21 PROCEDURE — G8484 FLU IMMUNIZE NO ADMIN: HCPCS | Performed by: PHYSICIAN ASSISTANT

## 2022-03-21 PROCEDURE — 1036F TOBACCO NON-USER: CPT | Performed by: PHYSICIAN ASSISTANT

## 2022-03-21 PROCEDURE — 3051F HG A1C>EQUAL 7.0%<8.0%: CPT | Performed by: PHYSICIAN ASSISTANT

## 2022-03-21 PROCEDURE — 82962 GLUCOSE BLOOD TEST: CPT | Performed by: PHYSICIAN ASSISTANT

## 2022-03-21 ASSESSMENT — ENCOUNTER SYMPTOMS
SHORTNESS OF BREATH: 0
COUGH: 0
NAUSEA: 0
RHINORRHEA: 0
VOMITING: 0
SINUS PRESSURE: 0
EYE PAIN: 0
ABDOMINAL PAIN: 0
WHEEZING: 0
SORE THROAT: 0
DIARRHEA: 0
EYE REDNESS: 0

## 2022-03-21 NOTE — PROGRESS NOTES
3/21/2022    Assessment:       Diagnosis Orders   1. Controlled type 2 diabetes mellitus with microalbuminuria, with long-term current use of insulin ([facility])  POCT Glucose     Stop Trulicity due to cost   PLAN:     1. Start Novolog 70/30 inject 15 units before breakfast and dinner if glucose is greater than 130  2. Continue Pioglitazone 15 mg by mouth daily  3. Continue Invokana 300 mg by mouth daily  4. Monitor glucose 3-4 times daily and document  5. Repeat labs in 3 months   6. Follow up in 3 months     Orders Placed This Encounter   Procedures    POCT Glucose     No orders of the defined types were placed in this encounter. No follow-ups on file. Subjective:     Chief Complaint   Patient presents with    Follow-up    Diabetes     Vitals:    03/21/22 1111   BP: 124/83   Pulse: 81   SpO2: 98%   Weight: 140 lb (63.5 kg)   Height: 5' 5\" (1.651 m)     Wt Readings from Last 3 Encounters:   03/21/22 140 lb (63.5 kg)   01/25/22 147 lb (66.7 kg)   12/21/21 150 lb (68 kg)     BP Readings from Last 3 Encounters:   03/21/22 124/83   01/25/22 134/81   12/21/21 124/78     Tesha Swenson is a 82-year-old type II diabetic male with worsening glycemic control. He has been on insulin in the past, wanted to come off of it because he kept getting hypoglycemic events. He has made significant lifestyle changes, is mostly vegetarian diet, low calorie, 20 pound weight loss in 3 months. He states that he is not eating because when he does it makes his blood glucose levels go up he appears to be low energy and slightly emaciated. He was sent from his PCP over those same concerns as well as his deteriorating renal function. I spent least 20 minutes educating the patient on the pathophysiology and progression of diabetes. The effects of insulin and why insulin is required to lower glucose levels in his blood. Also told him that we might have to put him on insulin if his pancreatic function was decreased.   He had some understanding of those concepts, still seems slightly confused, we will continue the education ongoing at his follow-up visits. He did not want to attend diabetic education classes at this time because he is busy taking care of his mother. Diabetes  He presents for his initial diabetic visit. He has type 2 diabetes mellitus. The initial diagnosis of diabetes was made 20 years ago. His disease course has been worsening. Hypoglycemia symptoms include confusion, sweats and tremors. Pertinent negatives for hypoglycemia include no dizziness or headaches. Pertinent negatives for diabetes include no chest pain, no fatigue, no polydipsia and no polyuria. Hypoglycemia complications include blackouts and required assistance. Diabetic complications include a CVA. Risk factors for coronary artery disease include diabetes mellitus and male sex. Current diabetic treatment includes oral agent (triple therapy). He is compliant with treatment all of the time. His weight is decreasing steadily. He is following a generally healthy diet. Meal planning includes avoidance of concentrated sweets. He has had a previous visit with a dietitian. He participates in exercise daily. His home blood glucose trend is increasing steadily. His overall blood glucose range is 180-200 mg/dl. An ACE inhibitor/angiotensin II receptor blocker is being taken. He does not see a podiatrist.Eye exam is current.      Past Medical History:   Diagnosis Date    Cerebrovascular small vessel disease     Chronic kidney disease, stage 3b ([facility] 12/16/2020    Colon cancer screening declined 8/30/2021    Diabetic polyneuropathy associated with type 2 diabetes mellitus ([facility] 4/22/2021    DM (diabetes mellitus) ([facility]     was with Dr Kwesi Johnson, [facility]    Dysarthria as late effect of cerebrovascular accident (CVA) 2015    Hearing loss     Hemiparesis affecting left side as late effect of cerebrovascular accident (CVA) ([facility] [facility]     History of left PCA stroke 2013    History of right STEPHANIE stroke 10/21/2015    was at [facility], now Dr Dilma Onofre Microalbuminuria 2018    PFO (patent foramen ovale) 2015    Right pontine CVA ([address] 10/2015     Past Surgical History:   Procedure Laterality Date    ANKLE SURGERY       Social History     Socioeconomic History    Marital status: Single     Spouse name: Not on file    Number of children: 0    Years of education: Not on file    Highest education level: Not on file   Occupational History    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[employer]   Tobacco Use    Smoking status: Never Smoker    Smokeless tobacco: Never Used   Substance and Sexual Activity    Alcohol use: No    Drug use: No    Sexual activity: Not on file   Other Topics Concern    Not on file   Social History Narrative    Born in [facility], one brother and one sister    Single, no children    Was living and working as a manager in [place]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, the time of a pontine and cerebellar stroke    [facility] and in nursing home for rehabilitation    Lives independetly in an apartment in [address] [place] [address] Strain: Low Risk     Difficulty of Paying Living Expenses: Not hard at all   Food Insecurity: No Food Insecurity    Worried About [facility] in the Last Year: Never true    [facility] in the Last Year: Never true   Transportation Needs:     Lack of Transportation (Medical): Not on file    Lack of Transportation (Non-Medical):  Not on file   Physical Activity:     Days of Exercise per Week: Not on file    Minutes of Exercise per Session: Not on file   Stress:     Feeling of Stress : Not on file   Social Connections:     Frequency of Communication with Friends and Family: Not on file    Frequency of Social Gatherings with Friends and Family: Not on file    Attends Nondenominational Services: Not on file    Active Member of Clubs or Organizations: Not on file    Attends Club or Organization Meetings: Not on file    Marital Status: Not on file   Intimate Partner Violence:     Fear of Current or Ex-Partner: Not on file    Emotionally Abused: Not on file    Physically Abused: Not on file    Sexually Abused: Not on file   Housing Stability:     Unable to Pay for Housing in the Last Year: Not on file    Number of [place] in the Last Year: Not on file    Unstable Housing in the Last Year: Not on file     No family history on file.   Allergies   Allergen Reactions    Ace Inhibitors      cough       Current Outpatient Medications:     canagliflozin (INVOKANA) 300 MG TABS tablet, Take 1 tablet by mouth every morning (before breakfast), Disp: 90 tablet, Rfl: 3    amLODIPine (NORVASC) 5 MG tablet, Take 1 tablet by mouth daily, Disp: 90 tablet, Rfl: 4    pioglitazone (ACTOS) 15 MG tablet, Take 1 tablet by mouth daily, Disp: 90 tablet, Rfl: 4    Multiple Vitamins-Minerals (PRESERVISION AREDS 2+MULTI VIT) CAPS, Take by mouth, Disp: , Rfl:     Cholecalciferol (VITAMIN D3) 50 MCG (2000 UT) CAPS, Take by mouth, Disp: , Rfl:     atorvastatin (LIPITOR) 80 MG tablet, TAKE 1 TABLET BY MOUTH DAILY, Disp: 90 tablet, Rfl: 4    irbesartan (AVAPRO) 150 MG tablet, TAKE 1 TABLET BY MOUTH DAILY, Disp: 90 tablet, Rfl: 3    Blood Pressure KIT, 1 Units by Does not apply route daily, Disp: 1 kit, Rfl: 0    ASPIRIN ADULT LOW STRENGTH 81 MG EC tablet, Take 2 tablets by mouth daily, Disp: 180 tablet, Rfl: 3    Dulaglutide (TRULICITY) 1.5 CP/5.3FW SOPN, Inject 1.5 mg into the skin once a week (Patient not taking: Reported on 3/21/2022), Disp: 4 pen, Rfl: 3    Dulaglutide (TRULICITY) 0.22 HD/1.3IF SOPN, Inject 0.75 mg into the skin once a week (Patient not taking: Reported on 3/21/2022), Disp: 4 pen, Rfl: 1  Lab Results   Component Value Date     03/10/2022    K 4.6 03/10/2022     03/10/2022    CO2 22 03/10/2022    BUN 33 (H) 03/10/2022    CREATININE 1.55 (H) 03/10/2022    GLUCOSE 215 03/21/2022    CALCIUM 9.0 03/10/2022    PROT 6.8 03/10/2022 LABALBU 4.1 03/10/2022    BILITOT 0.4 03/10/2022    ALKPHOS 119 (H) 03/10/2022    AST 21 03/10/2022    ALT 18 03/10/2022    LABGLOM 46.0 (L) 03/10/2022    GFRAA 55.7 (L) 03/10/2022    GLOB 2.7 03/10/2022     Lab Results   Component Value Date    WBC 7.2 03/29/2019    HGB 13.5 (L) 03/29/2019    HCT 40.6 (L) 03/29/2019    MCV 89.4 03/29/2019     03/29/2019     Lab Results   Component Value Date    LABA1C 7.9 (H) 03/10/2022    LABA1C 8.8 (H) 12/02/2021    LABA1C 7.2 (H) 09/02/2021     Results for Stefani Pardo" (MRN [id number]) as of 1/25/2022 09:58   Ref. Range 1/12/2022 08:33   C-Peptide Latest Ref Range: 1.1 - 4.4 ng/mL 2.1      1/12/22 0833    Glutamic Acid Decarb Ab 0.0 - 5.0 IU/mL <5.0          Lab Results   Component Value Date    CHOLFAST 127 12/02/2021    TRIGLYCFAST 163 (H) 12/02/2021    HDL 47 12/02/2021    HDL 59 09/14/2020    HDL 58 11/01/2017    LDLCALC 47 12/02/2021    LDLCALC 32 09/14/2020    LDLCALC 41 11/01/2017    CHOL 103 09/14/2020    CHOL 117 11/01/2017    CHOL 122 06/06/2016    TRIG 61 09/14/2020    TRIG 90 11/01/2017    TRIG 78 06/06/2016     No results found for: TESTM  Lab Results   Component Value Date    TSH 1.390 03/29/2019     No results found for: TPOABS    Review of Systems   Constitutional: Negative for chills, fatigue and fever. HENT: Negative for congestion, ear pain, postnasal drip, rhinorrhea, sinus pressure and sore throat. Eyes: Negative for pain and redness. Respiratory: Negative for cough, shortness of breath and wheezing. Cardiovascular: Negative for chest pain, palpitations and leg swelling. Gastrointestinal: Negative for abdominal pain, diarrhea, nausea and vomiting. Endocrine: Negative for polydipsia and polyuria. Genitourinary: Negative for difficulty urinating. Musculoskeletal: Negative for arthralgias. Skin: Negative for rash. Neurological: Positive for tremors. Negative for dizziness and headaches.    Psychiatric/Behavioral: Positive for confusion. Objective:   Physical Exam  Constitutional:       Appearance: He is well-developed. HENT:      Head: Normocephalic and atraumatic. Mouth/Throat:      Mouth: Mucous membranes are moist.   Eyes:      Conjunctiva/sclera: Conjunctivae normal.   Neck:      Comments: No thyromegaly or palpable nodules  Cardiovascular:      Rate and Rhythm: Normal rate and regular rhythm. Heart sounds: Normal heart sounds. No murmur heard. Pulmonary:      Effort: Pulmonary effort is normal.      Breath sounds: Normal breath sounds. Abdominal:      General: Bowel sounds are normal.      Palpations: Abdomen is soft. Musculoskeletal:         General: No swelling. Normal range of motion. Cervical back: Normal range of motion and neck supple. Skin:     General: Skin is warm and dry. Neurological:      Mental Status: He is alert and oriented to person, place, and time.    Psychiatric:         Mood and Affect: Mood normal.      Comments: Dysphoric mood, improved

## 2022-04-11 ENCOUNTER — OFFICE VISIT (OUTPATIENT)
Dept: FAMILY MEDICINE CLINIC | Age: 62
End: 2022-04-11
Payer: MEDICARE

## 2022-04-11 VITALS
OXYGEN SATURATION: 99 % | TEMPERATURE: 95.8 F | HEART RATE: 75 BPM | DIASTOLIC BLOOD PRESSURE: 70 MMHG | BODY MASS INDEX: 23.66 KG/M2 | RESPIRATION RATE: 18 BRPM | SYSTOLIC BLOOD PRESSURE: 116 MMHG | WEIGHT: 142 LBS | HEIGHT: 65 IN

## 2022-04-11 DIAGNOSIS — I69.354 SPASTIC HEMIPLEGIA OF LEFT NONDOMINANT SIDE AS LATE EFFECT OF CEREBRAL INFARCTION (HCC): ICD-10-CM

## 2022-04-11 DIAGNOSIS — E78.2 MIXED HYPERLIPIDEMIA: ICD-10-CM

## 2022-04-11 DIAGNOSIS — G81.94 HEMIPARESIS, LEFT (HCC): ICD-10-CM

## 2022-04-11 DIAGNOSIS — I63.511 CEREBROVASCULAR ACCIDENT (CVA) DUE TO STENOSIS OF RIGHT MIDDLE CEREBRAL ARTERY (HCC): ICD-10-CM

## 2022-04-11 DIAGNOSIS — I10 ESSENTIAL HYPERTENSION: ICD-10-CM

## 2022-04-11 DIAGNOSIS — E11.42 DIABETIC POLYNEUROPATHY ASSOCIATED WITH TYPE 2 DIABETES MELLITUS (HCC): Chronic | ICD-10-CM

## 2022-04-11 DIAGNOSIS — R80.9 MICROALBUMINURIA: ICD-10-CM

## 2022-04-11 PROBLEM — R55 SYNCOPE AND COLLAPSE: Status: RESOLVED | Noted: 2020-06-01 | Resolved: 2022-04-11

## 2022-04-11 PROBLEM — N18.31 STAGE 3A CHRONIC KIDNEY DISEASE (HCC): Chronic | Status: ACTIVE | Noted: 2020-12-16

## 2022-04-11 PROCEDURE — G8420 CALC BMI NORM PARAMETERS: HCPCS | Performed by: FAMILY MEDICINE

## 2022-04-11 PROCEDURE — 1036F TOBACCO NON-USER: CPT | Performed by: FAMILY MEDICINE

## 2022-04-11 PROCEDURE — 99215 OFFICE O/P EST HI 40 MIN: CPT | Performed by: FAMILY MEDICINE

## 2022-04-11 PROCEDURE — 3017F COLORECTAL CA SCREEN DOC REV: CPT | Performed by: FAMILY MEDICINE

## 2022-04-11 PROCEDURE — 2022F DILAT RTA XM EVC RTNOPTHY: CPT | Performed by: FAMILY MEDICINE

## 2022-04-11 PROCEDURE — 3051F HG A1C>EQUAL 7.0%<8.0%: CPT | Performed by: FAMILY MEDICINE

## 2022-04-11 PROCEDURE — G8427 DOCREV CUR MEDS BY ELIG CLIN: HCPCS | Performed by: FAMILY MEDICINE

## 2022-04-11 NOTE — PATIENT INSTRUCTIONS
Follow Mediterranean Diet or [address] [place] [address] Avenue. Be very intentional about your food. Choose Insulin or choose Actos or choose Invokana and stick with it without changing back and forth. Then see George Wang with your numbers. That way you can add on meds/insulin in a logical and deliberate manner and can avoid large fluctuations in your sugars. Patient Education        Giving a Mixed-Dose Insulin Shot: Care Instructions  Overview     Insulin is normally made by the pancreas, a gland behind the stomach. In people with diabetes, the pancreas no longer makes enough insulin or it stops making it. Without insulin, your blood sugar level rises to dangerous levels. When this happens, you need insulin shots to keep your blood sugar in your targetrange. You may be nervous giving a shot at first. But soon, giving yourself a shot will become routine. It is quite easy to learn how to draw up insulin into a syringe and give the shot. The needles you use to give the insulin injections are very thin, and most people who have diabetes say that they do not even feel the needle enter the skin. Even if you do feel the injection, the sting of theshot is not bad and does not last long. Follow-up care is a key part of your treatment and safety. Be sure to make and go to all appointments, and call your doctor if you are having problems. It's also a good idea to know your test results and keep alist of the medicines you take. How can you care for yourself at home? Getting started  [employer] your supplies. You will need an insulin syringe, your bottles of insulin, and an alcohol wipe or a cotton ball dipped in alcohol. Make sure the types of insulin you're using can be mixed together. Keep your supplies in a bag or kit so you can carry the supplies wherever you go.  Check the labels on the bottles and contents.  Read and follow all instructions on the label, including how to store the insulin and how long the insulin will last.  [employer] your hands with soap and running water. Dry them well. Preparing the shot   For a mixed-dose insulin shot:  1. Roll the insulin bottles gently between your hands. This will warm the insulin if you have kept the bottle in the refrigerator. Roll the cloudy insulin bottle until the white powder has dissolved and the insulin is mixed. 2. Wipe the rubber lid of both insulin bottles with an alcohol wipe or a cotton ball dipped in alcohol. (If you are using a bottle for the first time, remove the protective cover over the rubber lid.) Let the top dry before you remove any insulin. 3. Remove the plastic cap from the needle on your insulin syringe. Take care not to touch the needle. 4. Pull the plunger back on your insulin syringe, and draw air into the syringe equal to the number of units of cloudy insulin to be given. 5. Push the needle of the syringe into the rubber lid of the cloudy insulin bottle. Push the plunger of the syringe to force the air into the bottle. This equalizes the pressure in the bottle when you later remove the dose of insulin. Remove the needle from the bottle, but do not draw up any insulin. 6. Pull the plunger of the syringe back and draw air into the syringe equal to the number of units of clear insulin to be given. 7. Push the needle of the syringe into the rubber lid of the clear insulin bottle. Push the plunger to force the air into the bottle. Leave the needle in the bottle. 8. Turn the bottle and syringe upside down, and hold them in one hand. Position the tip of the needle so that it is below the surface of insulin in the bottle. Pull back the plunger to fill the syringe with slightly more than the correct number of units of clear insulin to be given. 9. Tap the outside (barrel) of the syringe so that trapped air bubbles move into the needle area. Push the air bubbles back into the bottle. Make sure that you have the correct number of units of insulin in your syringe.  Remove the needle from the clear insulin bottle. 10. Insert the needle into the rubber lid of the cloudy insulin bottle. Do not push the plunger, because this would force clear insulin into your cloudy insulin bottle. If clear insulin is mixed in the bottle of cloudy, it will change the action of your other doses from that bottle. 11. Turn the bottle and syringe upside down and hold them in one hand. Position the tip of the needle so that it is below the surface of insulin in the bottle. Slowly pull back the plunger of the syringe to fill the syringe with the correct number of units of cloudy insulin to be given. This will keep air bubbles from entering the syringe. Remove the needle from the bottle. 12. You should now have the total number of units for the clear and cloudy insulin in your syringe. For example, if 10 units of clear and 15 units of cloudy are needed, you should have 25 units in your syringe. Now you are ready to give the shot. Giving the shot  Before giving your shot:  1. Use alcohol to clean the skin before you give the shot. Let it dry. 2. Slightly pinch a fold of skin between your fingers and thumb of one hand. 3. Hold the syringe like a pencil close to the site, keeping your fingers off the plunger. It is usually recommended to place the syringe at a 90-degree angle to the shot site, standing straight up from the skin. 4. Bend your wrist, and quickly push the needle all the way into the pinched-up area. 5. Push the plunger of the syringe all the way in so the insulin goes into the fatty tissue. 6. Take the needle out at the same angle that you inserted it. If you bleed a little, apply pressure over the shot area with your finger, a cotton ball, or a piece of gauze. Do not rub the area. 7. Replace the cover over the needle and dispose of the needle safely. Do not use the same needle more than one time.   Where to give the shot  You can inject insulin into:   The belly, but at least 2 inches from set the dose of insulin with a dial on the outside of the pen. You use the pen to give the insulin shot (injection). Both disposable and reusable insulinpens are available. With a disposable pen, a set amount of insulin comes in the pen ready to use. When the insulin is used up, you throw the pen away. You use a new pen the nexttime you need insulin. With a reusable pen, you don't throw the pen away. Instead, you reload the pen with a pre-measured cartridge of insulin. When the insulin is used up, youinsert a new cartridge into the pen. Disposable and reusable pens both need a new needle with each shot. The needles come in different lengths and widths. Carman needles will prevent injecting into the muscle, especially in children or people who are lean. Thinner-width needles reduce the pricking sensation. Width is measured by gauge. The higherthe number, the thinner the needle. Why do some people prefer pens?  Most people find that insulin pens are easier to use than a bottle and syringe.  Many people feel less pain (or no pain) with the smaller insulin pen needle, compared to a syringe needle.  Insulin pens may help you give yourself more accurate doses. When you draw insulin into a syringe, you must carefully measure so that you don't get too much or too little. But with a pen, you set a dial for the amount of insulin you want, and then you push the button.  Insulin pens may work better than syringes for people who don't see well or who have problems like arthritis that make it harder to use a syringe.  Using an insulin pen draws less attention from others. You can give yourself insulin with fewer people noticing.  You don't need to carry insulin bottles and syringes everywhere you go. An insulin pen fits into a pocket or purse. What should you know about insulin pens? Each pen delivers a different brand and type (or types) of insulin. Some deliver rapid-acting insulin.  Others deliver long-acting insulin. And some pensdeliver a mixture of both in one shot. Pens have different colored labels, cartridge holders, or dosing knobs. Many pens have special features. For example, some pens have springs so that it takes less force to deliver a dose of insulin. Other pens have signals you can hear that let you know the insulin has been delivered. Some have memory to showthe amount and time of the last dose. How do you use an insulin pen? 1. For a reusable pen, put the insulin cartridge into the pen. Disposable pens already have an insulin cartridge. Follow the directions for how to screw a new needle onto your pen. Before using cloudy insulin, such as NPH and premixed insulin, gently roll the pen between your palms 10 times, then tip the pen up and down 10 times. Do not shake the pen. The insulin should look milky white. 2. Remove the outer cap from the needle. Keep this cap to use later. 3. Remove the inner cover from the needle. Be careful not to prick yourself. 4. Before each shot, prime the needle. Priming removes air from the needle and helps make sure you get the right dose. Turn the dose knob to 2 units or to the amount that your pen's  recommends. Hold your pen with the needle pointing up. Tap the cartridge ocampo gently to move any air bubbles to the top. Push the injection button all the way in. Watch for a stream or drop of insulin to come out of the needle. If it does not, repeat this step again. 5. Clean the area of skin where you will give the shot. If you use alcohol to clean the skin, let it dry. Use a different spot each time you inject insulin. That's because using the same spot every time can cause bumps or pits to form in the skin. For example, inject your insulin above your belly button, then the next time use your upper thigh, and then the next time inject below your belly button. 6. Turn the dose knob to the number of units of insulin you need to inject.  Push the needle into your skin. Most people can inject using a 90-degree angle and without pinching the skin. Adults and children who are very lean and people who use longer needles may need to pinch the skin to avoid injecting into muscle. 7. Put your thumb on the injection button and push it in until it stops. Keep the pen in your skin. Hold the dose knob in for 10 seconds (or to the number that the  recommends). Then pull the needle out of your skin. Do not rub the area. 8. Put only the outer cap back over the needle. The thin inner cover is harder to put back on, and you could stick yourself. 9. After covering the needle with the outer cap, unscrew the needle and throw it away in a sharps container or other solid plastic container. You can get a sharps container at your drugstore. 10. Always read the insulin package information that tells the best way to store your insulin pen and insulin cartridges. In general, unopened insulin for pens will last longer if it is kept in the refrigerator. After insulin is opened, most manufacturers say to store it at room temperature. Don't share insulin pens with anyone else who uses insulin. Even when the needle is changed, an insulin pen can carry bacteria or blood that can makeanother person sick. Where can you learn more? Go to [email or web address] org and sign in to your [employer] account. Enter M910 in the [facility] box to learn more about \"Learning About Insulin Pens. \"     If you do not have an account, please click on the \"Sign Up Now\" link. Current as of: July 28, 2021               Content Version: 13.2  © 4103-0580 Health[employer]. Care instructions adapted under license by [facility] ([facility]). If you have questions about a medical condition or this instruction, always ask your healthcare professional. [address]  any warranty or liability for your use of this information.          Patient Education        insulin aspart and insulin aspart protamine  Pronunciation: IN yeh oren AS part, IN yeh oren AS part PRO carlos kitchen  Brand: NovoLOG Mix 70/30, NovoLOG Mix 70/30 FlexPen  What is the most important information I should know about this medicine? Never share an injection pen or syringe with another person, even if the needle has been changed. What is insulin aspart and insulin aspart protamine? Insulin is a hormone that works by lowering levels of glucose (sugar) in the blood. Insulin aspart is a fast-acting insulin. Insulin aspart protamine is an intermediate-acting insulin. This combination insulin starts to work within 10 to 20 minutes afterinjection, peaks in 2 hours, and keeps working for up to 24 hours. Insulin aspart and insulin aspart protamine is a combination medicine used toimprove blood sugar control in adults with diabetes mellitus (type 1 or type 2). Insulin aspart and insulin aspart protamine may also be used for purposes notlisted in this medication guide. What should I discuss with my healthcare provider before using this medicine? You should not use this medicine if you are allergic to insulin aspart, or ifyou are having an episode of hypoglycemia (low blood sugar). This medicine is not approved for use by anyone younger than 25years old. Tell your doctor if you have ever had:   liver or kidney disease; or   low levels of potassium in your blood (hypokalemia). Tell your doctor if you also take pioglitazone or rosiglitazone (sometimes contained in combinations with glimepiride or metformin). Taking certain oral diabetes medicines while you are using insulin may increase your risk of serious heart problems. Tell your doctor if you are pregnant or breastfeeding. Follow your doctor's instructions about using insulin if you are pregnant or you become pregnant. Controlling diabetes is very important during pregnancy, and having high bloodsugar may cause complications in both the mother and the baby.   How should I use this medicine? Follow all directions on your prescription label and read all medication guidesor instruction sheets. Use the medicine exactly as directed. This medicine is injected under the skin. A healthcare provider may teach you how to properly use the medication by yourself. Do not inject this medicine into a vein or a muscle. This type of insulin must not be given with an insulin pump, or mixed with other insulins. Read and carefully follow any Instructions for Use provided with your medicine. Ask your doctor or pharmacist if you don't understand all instructions. Prepare an injection only when you are ready to give it. This medicine should look white and cloudy. Do not use the medicine if it looks clear or has particles in it. Call your pharmacist for new medicine. It is important to time your insulin use with meals.  If you have type 1 diabetes: Use this medicine within 15 minutes before the start of a meal.   If you have type 2 diabetes: Use this medicine within 15 minutes before or after the start of a meal.  If you use an injection pen, use only the injection pen that comes with this medicine. Attach a new needlebefore each use. Never share an injection pen or syringe with another person, even if the needle has been changed. Sharing these devices can allow infections or disease to pass from one personto another. Do not inject this medicine into skin that is damaged, tender, bruised, pitted,thickened, scaly, or has a scar or hard lump. You may have low blood sugar (hypoglycemia) and feel very hungry, dizzy, irritable, confused, anxious, or shaky. To quickly treat hypoglycemia, eat or drink a fast-acting source of sugar (fruitjuice, hard candy, crackers, raisins, or non-diet soda). Your doctor may prescribe a glucagon injection kit in case you have severe hypoglycemia. Be sure your family or close friends know how to give you thisinjection in an emergency.   Also watch for signs of high blood sugar (hyperglycemia) such as increased thirst or urination. Blood sugar levels can be affected by stress, illness, surgery, exercise, alcohol use, or skipping meals. Ask your doctor before changing your dose or medication schedule. Keep this medicine in its original container protected from heat and light. Do not freeze insulin or store it near the cooling element in a refrigerator. Throw away any insulin that has been frozen. Storing this medicine unopened (not in use):  Refrigerate and use until expiration date; or   Store at room temperature, and use the vial within 28 days or use the injection pen within 14 days. Storing this medicine opened (in use):  Store vial in a refrigerator or at room temperature and use within 28 days.  Store the injection pen at room temperature (do not refrigerate) and use within 14 days. Do not store the injection pen with a needle attached. Use a needle and syringe only once and then place them in a puncture-proof \"sharps\" container. Follow state or local laws about how to dispose of thiscontainer. Keep it out of the reach of children and pets. What happens if I miss a dose? Since this medicine is used with meals, you may not be on a timed dosing schedule. Whenever you use this insulin, be sure to eat a meal within 15 minutes. Do not use extra medicine to make up a missed dose. What happens if I overdose? Seek emergency medical attention or call the Poison Help line at 1-421.429.2589. Insulin overdose can cause life-threatening hypoglycemia. Symptoms include drowsiness, confusion, blurred vision, trouble speaking, muscle weakness, clumsy or jerky movements, seizure (convulsions), or loss ofconsciousness. What should I avoid while using this medicine? Avoid driving or operating machinery until you know how this medicine willaffect you. Avoid medication errors by always checking the medicine label before injectingyour insulin. Avoid drinking alcohol. It can cause low blood sugar and may interfere withyour diabetes treatment. What are the possible side effects of insulin aspart and insulin aspart protamine? Get emergency medical help if you have signs of insulin allergy: redness or swelling where an injection was given, itchy skin rash over the entire body, trouble breathing, wheezing, rapid pulse, sweating, feeling likeyou might pass out, or swelling in your tongue or throat. Call your doctor at once if you have:   fluid retention --weight gain, swelling in your hands or feet, feeling short of breath; or   low potassium --leg cramps, constipation, irregular heartbeats, fluttering in your chest, increased thirst or urination, numbness or tingling, muscle weakness or limp feeling. Common side effects may include:   low potassium;   itching, mild skin rash; or   thickening or hollowing of the skin where you injected the medicine. This is not a complete list of side effects and others may occur. Call your doctor for medical advice about side effects. You may report side effects toFDA at [phone]. What other drugs will affect insulin aspart and insulin aspart protamine? Many other medicines can affect your blood sugar, and some medicines can increase or decrease the effects of insulin. Some drugs can also cause you to have fewer symptoms of hypoglycemia, making it harder to tell when your blood sugar is low. Tell each of your health care providers about all medicines you use now and any medicine you start or stop using. This includes prescription and over-the-counter medicines, vitamins, andherbal products. Where can I get more information? Your pharmacist can provide more information about insulin aspart and insulinaspart protamine. Remember, keep this and all other medicines out of the reach of children, never share your medicines with others, and use this medication only for the indication prescribed.    Every effort has been made to ensure that the information provided by [address] is accurate, up-to-date, and complete, but no guarantee is made to that effect. Drug information contained herein may be time sensitive. [facility] information has been compiled for use by healthcare practitioners and consumers in the [place] and therefore [facility] does not warrant that uses outside of the [place] are appropriate, unless specifically indicated otherwise. [facility]'s drug information does not endorse drugs, diagnose patients or recommend therapy. [facility]'s drug information is an informational resource designed to assist licensed healthcare practitioners in caring for their patients and/or to serve consumers viewing this service as a supplement to, and not a substitute for, the expertise, skill, knowledge and judgment of healthcare practitioners. The absence of a warning for a given drug or drug combination in no way should be construed to indicate that the drug or drug combination is safe, effective or appropriate for any given patient. [facility] does not assume any responsibility for any aspect of healthcare administered with the aid of information [facility] provides. The information contained herein is not intended to cover all possible uses, directions, precautions, warnings, drug interactions, allergic reactions, or adverse effects. If you have questions about the drugs you are taking, check with yourdoctor, nurse or pharmacist.  Copyright 7374-0564 [address] [place] [address] Dr BAIG. Version: 11.03. Revision date: 4/6/2020. Care instructions adapted under license by [facility] [place][note 2] [address] If you have questions about a medical condition or this instruction, always ask your healthcare professional. [place] [address] any warranty or liability for your use of this information.

## 2022-04-11 NOTE — PROGRESS NOTES
Subjective  Juan Garcia, 63 y.o. male presents today with:  Chief Complaint   Patient presents with    Diabetes     sugar is going down to 50 with invokana and insulin; taking 12 units of humalog and it stays at a go BG            HPI    11/11/2021:    Because Magali Russ has started to exercise and has become very strict with his diet which is now essentially vegetarian, his sugars have been running very low.  Therefore, we decided he should stop his insulin and take Invokana 150 mg.  Since then, his sugars have been in the high 100s to low Nabor Cos has had no hyper or hypoglycemic symptoms.     12/21/2021: Labs sugars have been running high and he opted not to take Actos but did increase Invokana to 300. His sugars continue to be high. His hemoglobin A1c on 12/2/2021 was 8.8.. His LDL was 47, triglycerides 163, HDL 47. Creatinine 1.64 and GFR 43.2. In October creatinine was 1.52 and GFR was 47.1. In September creatinine was 1.44 and GFR was 50.2. He is on amlodipine, atorvastatin, irbesartan, pioglitazone and canagliflozin. He has lost 18 pounds in 1 month.     Weight loss - lunch - broccoli and cauliflower, breakfast is a piece of toast. \"I've been trying to control my sugar with eating but it ain't helping much. \" States he is eating more meat. INsistent upon not resuming insulin. Multiple concerns about oral mediations. 04/11/2022: Mother is now in nursing home and he goes to see her every day from 11am to 2 or 4pm.    DM - Per Renee Arriaga on 03/21/2022:     Diagnosis Orders   1. Controlled type 2 diabetes mellitus with microalbuminuria, with long-term current use of insulin ([facility])  POCT Glucose      Stop Trulicity due to cost   PLAN:      1. Start Novolog 70/30 inject 15 units before breakfast and dinner if glucose is greater than 130  2. Continue Pioglitazone 15 mg by mouth daily  3. Continue Invokana 300 mg by mouth daily  4. Monitor glucose 3-4 times daily and document  5.  Repeat labs in 3 months   6. Follow up in 3 months      A1C now 7.9. Weight 142, Was 178 June 2021. Was losing weight still and was getting sugars in the 50s so he has reduced insulin to 6-8 units AC BID. He takes no Invokana when he does insulin and no Actos when he does insulin. But if sugar is low enough - 120-150 - he might take Invokana at breakfast and Actos at dinner. Regarding weight loss, he has gained 6 pounds in the last 2 weeks because he ceased his highly restrictive diet and began to eat in an unrestricted manner like he used to eat. E.g. He ate two meatball subs last night and had pizza the night before (because he missed it and was craving it). The pizza he made was made with jessica sauce because tomatoes and potatoes are bad for kidneys. All last week was visiting mother several hours a day all last week so he did not use insulin or take oral meds for DM because he feared having hypoglycemia while visiting and while driving. Refuses nutrition education due to cost and asks for my assist with DM mgt because I \"cost zero and Darell Awad costs him $40 to walk in the door. \"    Patient is here for f/u HTN. Is compliant with meds and has no side effects from them. Avoids added salt. Tries to eat healthy. Exercises occasionally. Has no chest pain, shortness of breath, palpitations or edema. CVA. No new deficits. Persistent hemiparesis, hearing loss, dysarthria, ataxia.      No other questions and or concerns for today's visit      Past Medical History:   Diagnosis Date    Cerebrovascular small vessel disease     Chronic kidney disease, stage 3b ([address] 12/16/2020    Colon cancer screening declined 8/30/2021    Diabetic polyneuropathy associated with type 2 diabetes mellitus ([address] 4/22/2021    DM (diabetes mellitus) [facility] [address]     was with Dr Shabana Rodriguez, [facility]    Dysarthria as late effect of cerebrovascular accident (CVA) 2015    Hearing loss     Hemiparesis affecting left side as late effect of cerebrovascular accident (CVA) [facility] [address]     History of left PCA stroke 2013    History of right STEPHANIE stroke 10/21/2015    was at [facility], now Dr Calderon Hawkins Microalbuminuria 2018    PFO (patent foramen ovale) 2015    Right pontine CVA ([facility] [address] 10/2015    Stage 3a chronic kidney disease ([facility] [address] 12/16/2020    Syncope and collapse 6/1/2020     Past Surgical History:   Procedure Laterality Date    ANKLE SURGERY       Social History     Socioeconomic History    Marital status: Single     Spouse name: Not on file    Number of children: 0    Years of education: Not on file    Highest education level: Not on file   Occupational History    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[employer]   Tobacco Use    Smoking status: Never Smoker    Smokeless tobacco: Never Used   Substance and Sexual Activity    Alcohol use: No    Drug use: No    Sexual activity: Not on file   Other Topics Concern    Not on file   Social History Narrative    Born in [facility], one brother and one sister    Single, no children    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, the time of a pontine and cerebellar stroke    [facility] at [facility] and in nursing home for rehabilitation    Lives independetly in an apartment in [facility] Strain: Low Risk     Difficulty of Paying Living Expenses: Not hard at all   Food Insecurity: No Food Insecurity    Worried About Running Out of Food in the Last Year: Never true    Alexis of Food in the Last Year: Never true   Transportation Needs:     Lack of Transportation (Medical): Not on file    Lack of Transportation (Non-Medical):  Not on file   Physical Activity:     Days of Exercise per Week: Not on file    Minutes of Exercise per Session: Not on file   Stress:     Feeling of Stress : Not on file   Social Connections:     Frequency of Communication with Friends and Family: Not on file    Frequency of Social Gatherings with Friends and Family: Not on file    Attends Confucianism Services: Not on file    Active Member of Clubs or Organizations: Not on file    Attends Club or Organization Meetings: Not on file    Marital Status: Not on file   Intimate Partner Violence:     Fear of Current or Ex-Partner: Not on file    Emotionally Abused: Not on file    Physically Abused: Not on file    Sexually Abused: Not on file   Housing Stability:     Unable to Pay for Housing in the Last Year: Not on file    Number of [place] in the Last Year: Not on file    Unstable Housing in the Last Year: Not on file     History reviewed. No pertinent family history. Allergies   Allergen Reactions    Ace Inhibitors      cough     Current Outpatient Medications   Medication Sig Dispense Refill    insulin aspart protamine-insulin aspart (NOVOLOG 70/30) (70-30) 100 UNIT/ML injection Inject 15 Units into the skin 2 times daily (with meals) Before breakfast and dinner 5 pen 3    Insulin Pen Needle 32G X 6 MM MISC 1 Device by Does not apply route 2 times daily (before meals) 90 each 3    canagliflozin (INVOKANA) 300 MG TABS tablet Take 1 tablet by mouth every morning (before breakfast) 90 tablet 3    amLODIPine (NORVASC) 5 MG tablet Take 1 tablet by mouth daily 90 tablet 4    pioglitazone (ACTOS) 15 MG tablet Take 1 tablet by mouth daily 90 tablet 4    Multiple Vitamins-Minerals (PRESERVISION AREDS 2+MULTI VIT) CAPS Take by mouth      Cholecalciferol (VITAMIN D3) 50 MCG (2000 UT) CAPS Take by mouth      atorvastatin (LIPITOR) 80 MG tablet TAKE 1 TABLET BY MOUTH DAILY 90 tablet 4    irbesartan (AVAPRO) 150 MG tablet TAKE 1 TABLET BY MOUTH DAILY 90 tablet 3    Blood Pressure KIT 1 Units by Does not apply route daily 1 kit 0    ASPIRIN ADULT LOW STRENGTH 81 MG EC tablet Take 2 tablets by mouth daily 180 tablet 3     No current facility-administered medications for this visit. PMH, Surgical Hx, Family Hx, and Social Hxreviewed and updated.   Health Maintenance reviewed. Objective    Vitals:    04/11/22 1249   BP: 116/70   Pulse: 75   Resp: 18   Temp: 95.8 °F (35.4 °C)   TempSrc: Temporal   SpO2: 99%   Weight: 142 lb (64.4 kg)   Height: 5' 5\" (1.651 m)        Physical Exam  Constitutional:       General: He is not in acute distress. Appearance: He is well-developed. Comments: While in NAD, sig wt loss evident, appears somewhat fatigued. HENT:      Head: Normocephalic and atraumatic. Eyes:      Conjunctiva/sclera: Conjunctivae normal.   Cardiovascular:      Rate and Rhythm: Normal rate and regular rhythm. Heart sounds: Normal heart sounds. Pulmonary:      Effort: Pulmonary effort is normal. No respiratory distress. Breath sounds: No wheezing or rales. Musculoskeletal:      Cervical back: Normal range of motion and neck supple. Lymphadenopathy:      Cervical: No cervical adenopathy. Skin:     General: Skin is warm and dry. Neurological:      Mental Status: He is alert and oriented to person, place, and time.          Lab Results   Component Value Date    WBC 7.2 03/29/2019    HGB 13.5 (L) 03/29/2019    HCT 40.6 (L) 03/29/2019     03/29/2019    CHOL 103 09/14/2020    TRIG 61 09/14/2020    HDL 47 12/02/2021    ALT 18 03/10/2022    AST 21 03/10/2022     03/10/2022    K 4.6 03/10/2022     03/10/2022    CREATININE 1.55 (H) 03/10/2022    BUN 33 (H) 03/10/2022    CO2 22 03/10/2022    TSH 1.390 03/29/2019    PSA 0.26 06/06/2016    INR 0.9 12/04/2015    LABA1C 7.9 (H) 03/10/2022    LABMICR 1.30 10/05/2021       Lab Results   Component Value Date    LABA1C 7.9 (H) 03/10/2022    LABA1C 8.8 (H) 12/02/2021    LABA1C 7.2 (H) 09/02/2021     Lab Results   Component Value Date    LABMICR 1.30 10/05/2021    CREATININE 1.55 (H) 03/10/2022     Lab Results   Component Value Date    ALT 18 03/10/2022    AST 21 03/10/2022     Lab Results   Component Value Date    CHOL 103 09/14/2020    TRIG 61 09/14/2020    HDL 47 12/02/2021    LDLCALC 47 12/02/2021        Assessment & Plan   Visit Diagnoses and Associated Orders     Type 2 diabetes mellitus with neurological manifestations, uncontrolled ([facility] [address]    -  Primary    Worse, poor control due to med plan nonadherence and extreme calorie restriction v. dietary indiscretion         Hemiparesis, left (HCC)        Stable and well-controlled on current meds in that there has been no recurrence or progression         Cerebrovascular accident (CVA) due to stenosis of right middle cerebral artery (HCC)        Stable and well-controlled on current meds in that there has been no recurrence or progression         Essential hypertension        Stable and well-controlled on current meds          Diabetic polyneuropathy associated with type 2 diabetes mellitus (HCC)        Stable and well-controlled on current meds in that there has been no recurrence or progression         Spastic hemiplegia of left nondominant side as late effect of cerebral infarction ([facility] [address]        Stable and well-controlled on current meds in that there has been no recurrence or progression         Mixed hyperlipidemia        Stable and well-controlled on current meds    Lipid, Fasting [21516 Custom]   - Future Order         Microalbuminuria        CHeck labs    Microalbumin / Creatinine Urine Ratio [[id number] Custom]   - Future Order               Time spent on appointment included reviewing past laboratory and radiographic results, previous notes, consultations, past procedures, medications, obtaining history and performing physical, formulating mutually agreed upon plan of care with patient - 72 minutes      Reviewed with the patient: all disease processes, current clinical status, medications, activities and diet.      Side effects, adverse effects of the medication prescribed today, as well as treatment plan/ rationale and result expectations have been discussed with the patient who expresses understanding and desires to proceed.     Close follow up to evaluate treatment results and for coordination of care. I have reviewed the patient's medical history in detail and updated the computerized patient record. More than 50% of the appointment was spent in face-to-face counseling, education and care coordination. Please note this report has been partially produced using speech recognition software and may contain mistakes related to that system including errors in grammar, punctuation and spelling as well as words and phrases that may seem inappropriate. If there are questions or concerns, please feel free to contact me to clarify.'. Orders Placed This Encounter   Procedures    Lipid, Fasting     Standing Status:   Future     Standing Expiration Date:   4/11/2023    Microalbumin / Creatinine Urine Ratio     Standing Status:   Future     Standing Expiration Date:   6/11/2022     No orders of the defined types were placed in this encounter. There are no discontinued medications. Return in about 3 months (around 7/11/2022) for DM, HTn. Controlled Substance Monitoring:    Acute and Chronic Pain Monitoring:   RX Monitoring 11/28/2018   Attestation The Prescription Monitoring Report for this patient was reviewed today. Periodic Controlled Substance Monitoring Possible medication side effects, risk of tolerance/dependence & alternative treatments discussed. ;No signs of potential drug abuse or diversion identified.            Apryl Etienne MD

## 2022-05-11 DIAGNOSIS — R80.9 CONTROLLED TYPE 2 DIABETES MELLITUS WITH MICROALBUMINURIA, WITH LONG-TERM CURRENT USE OF INSULIN (HCC): ICD-10-CM

## 2022-05-11 DIAGNOSIS — Z79.4 CONTROLLED TYPE 2 DIABETES MELLITUS WITH MICROALBUMINURIA, WITH LONG-TERM CURRENT USE OF INSULIN (HCC): ICD-10-CM

## 2022-05-11 DIAGNOSIS — R80.9 MICROALBUMINURIA: ICD-10-CM

## 2022-05-11 DIAGNOSIS — E11.29 CONTROLLED TYPE 2 DIABETES MELLITUS WITH MICROALBUMINURIA, WITH LONG-TERM CURRENT USE OF INSULIN (HCC): ICD-10-CM

## 2022-05-11 LAB
ALBUMIN SERPL-MCNC: 3.8 G/DL (ref 3.5–4.6)
ALP BLD-CCNC: 113 U/L (ref 35–104)
ALT SERPL-CCNC: 18 U/L (ref 0–41)
ANION GAP SERPL CALCULATED.3IONS-SCNC: 13 MEQ/L (ref 9–15)
AST SERPL-CCNC: 20 U/L (ref 0–40)
BILIRUB SERPL-MCNC: 0.4 MG/DL (ref 0.2–0.7)
BUN BLDV-MCNC: 33 MG/DL (ref 6–20)
CALCIUM SERPL-MCNC: 8.5 MG/DL (ref 8.5–9.9)
CHLORIDE BLD-SCNC: 107 MEQ/L (ref 95–107)
CO2: 21 MEQ/L (ref 20–31)
CREAT SERPL-MCNC: 1.55 MG/DL (ref 0.7–1.2)
CREATININE URINE: 62.4 MG/DL
GFR AFRICAN AMERICAN: 55.7
GFR NON-AFRICAN AMERICAN: 46
GLOBULIN: 2.6 G/DL (ref 2.3–3.5)
GLUCOSE BLD-MCNC: 143 MG/DL (ref 70–99)
HBA1C MFR BLD: 6.9 % (ref 4.8–5.9)
MICROALBUMIN UR-MCNC: <1.2 MG/DL
MICROALBUMIN/CREAT UR-RTO: NORMAL MG/G (ref 0–30)
POTASSIUM SERPL-SCNC: 4.7 MEQ/L (ref 3.4–4.9)
SODIUM BLD-SCNC: 141 MEQ/L (ref 135–144)
TOTAL PROTEIN: 6.4 G/DL (ref 6.3–8)

## 2022-05-24 ENCOUNTER — OFFICE VISIT (OUTPATIENT)
Dept: ENDOCRINOLOGY | Age: 62
End: 2022-05-24
Payer: MEDICARE

## 2022-05-24 VITALS
OXYGEN SATURATION: 96 % | DIASTOLIC BLOOD PRESSURE: 81 MMHG | BODY MASS INDEX: 24.16 KG/M2 | HEIGHT: 65 IN | WEIGHT: 145 LBS | SYSTOLIC BLOOD PRESSURE: 120 MMHG | HEART RATE: 78 BPM

## 2022-05-24 DIAGNOSIS — R80.9 CONTROLLED TYPE 2 DIABETES MELLITUS WITH MICROALBUMINURIA, WITH LONG-TERM CURRENT USE OF INSULIN (HCC): Primary | ICD-10-CM

## 2022-05-24 DIAGNOSIS — Z79.4 CONTROLLED TYPE 2 DIABETES MELLITUS WITH MICROALBUMINURIA, WITH LONG-TERM CURRENT USE OF INSULIN (HCC): Primary | ICD-10-CM

## 2022-05-24 DIAGNOSIS — E11.29 CONTROLLED TYPE 2 DIABETES MELLITUS WITH MICROALBUMINURIA, WITH LONG-TERM CURRENT USE OF INSULIN (HCC): Primary | ICD-10-CM

## 2022-05-24 LAB
CHP ED QC CHECK: ABNORMAL
GLUCOSE BLD-MCNC: 174 MG/DL

## 2022-05-24 PROCEDURE — 99213 OFFICE O/P EST LOW 20 MIN: CPT | Performed by: PHYSICIAN ASSISTANT

## 2022-05-24 PROCEDURE — 3044F HG A1C LEVEL LT 7.0%: CPT | Performed by: PHYSICIAN ASSISTANT

## 2022-05-24 PROCEDURE — 82962 GLUCOSE BLOOD TEST: CPT | Performed by: PHYSICIAN ASSISTANT

## 2022-05-24 PROCEDURE — 1036F TOBACCO NON-USER: CPT | Performed by: PHYSICIAN ASSISTANT

## 2022-05-24 PROCEDURE — G8427 DOCREV CUR MEDS BY ELIG CLIN: HCPCS | Performed by: PHYSICIAN ASSISTANT

## 2022-05-24 PROCEDURE — 3017F COLORECTAL CA SCREEN DOC REV: CPT | Performed by: PHYSICIAN ASSISTANT

## 2022-05-24 PROCEDURE — G8420 CALC BMI NORM PARAMETERS: HCPCS | Performed by: PHYSICIAN ASSISTANT

## 2022-05-24 PROCEDURE — 2022F DILAT RTA XM EVC RTNOPTHY: CPT | Performed by: PHYSICIAN ASSISTANT

## 2022-05-24 RX ORDER — FLASH GLUCOSE SENSOR
1 KIT MISCELLANEOUS
Qty: 2 EACH | Refills: 3 | Status: SHIPPED | OUTPATIENT
Start: 2022-05-24

## 2022-05-24 RX ORDER — CANAGLIFLOZIN 300 MG/1
300 TABLET, FILM COATED ORAL
Qty: 30 TABLET | Refills: 5 | Status: SHIPPED | OUTPATIENT
Start: 2022-05-24 | End: 2022-08-23 | Stop reason: SDUPTHER

## 2022-05-24 RX ORDER — PEN NEEDLE, DIABETIC 31 G X1/4"
NEEDLE, DISPOSABLE MISCELLANEOUS
COMMUNITY
Start: 2022-03-21

## 2022-05-24 RX ORDER — FLASH GLUCOSE SCANNING READER
1 EACH MISCELLANEOUS
Qty: 1 EACH | Refills: 0 | Status: SHIPPED | OUTPATIENT
Start: 2022-05-24

## 2022-05-24 ASSESSMENT — ENCOUNTER SYMPTOMS
SINUS PRESSURE: 0
COUGH: 0
SHORTNESS OF BREATH: 0
SORE THROAT: 0
DIARRHEA: 0
WHEEZING: 0
VOMITING: 0
EYE PAIN: 0
EYE REDNESS: 0
ABDOMINAL PAIN: 0
NAUSEA: 0
RHINORRHEA: 0

## 2022-05-24 NOTE — PROGRESS NOTES
2022    Assessment:       Diagnosis Orders   1. Controlled type 2 diabetes mellitus with microalbuminuria, with long-term current use of insulin (formerly [facility])  POCT Glucose    insulin aspart protamine-insulin aspart (NOVOLOG 70/30) (70-30) 100 UNIT/ML injection    Comprehensive Metabolic Panel    Hemoglobin Q6H     Stopped Trulicity due to cost   PLAN:     1. Decrease Novolog 70/30 inject 10 units before breakfast and dinner if glucose is greater than 130  2. Continue Pioglitazone 15 mg by mouth daily  3. Continue Invokana (canagliflozin)  300 mg by mouth daily  4. Monitor glucose 3-4 times daily and document  5. Freestyle Nghia [employer]   6. Repeat labs in 3 months   7. Follow up in 3 months     Orders Placed This Encounter   Procedures    Comprehensive Metabolic Panel     Standing Status:   Future     Standing Expiration Date:   2023    Hemoglobin A1C     Standing Status:   Future     Standing Expiration Date:   2023    POCT Glucose     Orders Placed This Encounter   Medications    insulin aspart protamine-insulin aspart (NOVOLOG 70/30) (70-30) 100 UNIT/ML injection     Sig: Inject 15 Units into the skin 2 times daily (with meals) Before breakfast and dinner     Dispense:  5 pen     Refill:  3    canagliflozin (INVOKANA) 300 MG TABS tablet     Sig: Take 1 tablet by mouth every morning (before breakfast)     Dispense:  30 tablet     Refill:  5    Continuous Blood Gluc Sensor (FREESTYLE NGHIA 2 SENSOR) MISC     Si Device by Does not apply route every 14 days     Dispense:  2 each     Refill:  3    Continuous Blood Gluc  (FREESTYLE NGHIA 2 READER) JALEEL     Si Device by Does not apply route 4 times daily (before meals and nightly)     Dispense:  1 each     Refill:  0     Return in about 3 months (around 2022) for Diabetes.   Subjective:     Chief Complaint   Patient presents with    Follow-up    Diabetes     Vitals:    22 0959   BP: 120/81   Pulse: 78 SpO2: 96%   Weight: 145 lb (65.8 kg)   Height: 5' 5\" (1.651 m)     Wt Readings from Last 3 Encounters:   05/24/22 145 lb (65.8 kg)   04/11/22 142 lb (64.4 kg)   03/21/22 140 lb (63.5 kg)     BP Readings from Last 3 Encounters:   05/24/22 120/81   04/11/22 116/70   03/21/22 124/83     Marco is a 53-year-old type II diabetic male with worsening glycemic control. He has been on insulin in the past, wanted to come off of it because he kept getting hypoglycemic events. He has made significant lifestyle changes, is mostly vegetarian diet, low calorie, 20 pound weight loss in 3 months. He states that he is not eating because when he does it makes his blood glucose levels go up he appears to be low energy and slightly emaciated. He was sent from his PCP over those same concerns as well as his deteriorating renal function. I spent least 20 minutes educating the patient on the pathophysiology and progression of diabetes. The effects of insulin and why insulin is required to lower glucose levels in his blood. Also told him that we might have to put him on insulin if his pancreatic function was decreased. He had some understanding of those concepts, still seems slightly confused, we will continue the education ongoing at his follow-up visits. He did not want to attend diabetic education classes at this time because he is busy taking care of his mother. Diabetes  He presents for his initial diabetic visit. He has type 2 diabetes mellitus. The initial diagnosis of diabetes was made 20 years ago. His disease course has been worsening. Hypoglycemia symptoms include confusion, sweats and tremors. Pertinent negatives for hypoglycemia include no dizziness or headaches. Pertinent negatives for diabetes include no chest pain, no fatigue, no polydipsia and no polyuria. Hypoglycemia complications include blackouts and required assistance. Diabetic complications include a CVA.  Risk factors for coronary artery disease include diabetes mellitus and male sex. Current diabetic treatment includes oral agent (triple therapy). He is compliant with treatment all of the time. His weight is decreasing steadily. He is following a generally healthy diet. Meal planning includes avoidance of concentrated sweets. He has had a previous visit with a dietitian. He participates in exercise daily. His home blood glucose trend is increasing steadily. His overall blood glucose range is 180-200 mg/dl. An ACE inhibitor/angiotensin II receptor blocker is being taken. He does not see a podiatrist.Eye exam is current.      Past Medical History:   Diagnosis Date    Cerebrovascular small vessel disease     Chronic kidney disease, stage 3b ([address] 12/16/2020    Colon cancer screening declined 8/30/2021    Diabetic polyneuropathy associated with type 2 diabetes mellitus ([address] 4/22/2021    DM [facility]diabetes mellitus) [facility]     was [facility] Deborah [facility], [facility]    Dysarthria as late effect of cerebrovascular accident (CVA) 2015    Hearing loss     Hemiparesis affecting left side as late effect of cerebrovascular accident (CVA) ([facility]     History of left PCA stroke 2013    History of right STEPHANIE stroke 10/21/2015    was at [facility], now Dr Eve Shepherd    Microalbuminuria 2018    PFO (patent foramen ovale) 2015    Right pontine CVA ([address] 10/2015    Stage 3a chronic kidney disease ([facility] 12/16/2020    Syncope and collapse 6/1/2020     Past Surgical History:   Procedure Laterality Date    ANKLE SURGERY       Social History     Socioeconomic History    Marital status: Single     Spouse name: Not on file    Number of children: 0    Years of education: Not on file    Highest education level: Not on file   Occupational History    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[employer]   Tobacco Use    Smoking status: Never Smoker    Smokeless tobacco: Never Used   Substance and Sexual Activity    Alcohol use: No    Drug use: No    Sexual activity: Not on file   Other Topics Concern    Not on file Social History Narrative    Born in [facility], one brother and one sister    Single, no children    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, the time of a pontine and cerebellar stroke    [facility] at [facility] and in [facility] home [facility]    Lives independetly in an apartment in [address] [place] [address] Strain: Low Risk     Difficulty of Paying Living Expenses: Not hard at all   Food Insecurity: No Food Insecurity    Worried About [facility] in the Last Year: Never true    [address]  [employer] in the Last Year: Never true   Transportation Needs:     Lack of Transportation (Medical): Not on file    Lack of Transportation (Non-Medical): Not on file   Physical Activity:     Days of Exercise per Week: Not on file    Minutes of Exercise per Session: Not on file   Stress:     Feeling of Stress : Not on file   Social Connections:     Frequency of Communication with Friends and Family: Not on file    Frequency of Social Gatherings with Friends and Family: Not on file    Attends Baptism Services: Not on file    Active Member of [address] [place] [address] or Organizations: Not on file    Attends Club or Organization Meetings: Not on file    Marital Status: Not on file   Intimate Partner Violence:     Fear of Current or Ex-Partner: Not on file    Emotionally Abused: Not on file    Physically Abused: Not on file    Sexually Abused: Not on file   Housing Stability:     Unable to Pay for Housing in the Last Year: Not on file    Number of [place] in the Last Year: Not on file    Unstable Housing in the Last Year: Not on file     No family history on file.   Allergies   Allergen Reactions    Ace Inhibitors      cough       Current Outpatient Medications:     [facility] UNIFINE PENTIPS 31G X 6 MM MISC, USE AS DIRECTED TWICE DAILY BEFORE MEALS, Disp: , Rfl:     insulin aspart protamine-insulin aspart (NOVOLOG 70/30) (70-30) 100 UNIT/ML injection, Inject 15 Units into the skin 2 times daily (with meals) Before breakfast and dinner, Disp: 5 pen, Rfl: 3    canagliflozin (INVOKANA) 300 MG TABS tablet, Take 1 tablet by mouth every morning (before breakfast), Disp: 30 tablet, Rfl: 5    Continuous Blood Gluc Sensor (FREESTYLE CHINO 2 SENSOR) MISC, 1 Device by Does not apply route every 14 days, Disp: 2 each, Rfl: 3    Continuous Blood Gluc  (FREESTYLE CHINO 2 READER) JALEEL, 1 Device by Does not apply route 4 times daily (before meals and nightly), Disp: 1 each, Rfl: 0    amLODIPine (NORVASC) 5 MG tablet, Take 1 tablet by mouth daily, Disp: 90 tablet, Rfl: 4    pioglitazone (ACTOS) 15 MG tablet, Take 1 tablet by mouth daily, Disp: 90 tablet, Rfl: 4    Multiple Vitamins-Minerals (PRESERVISION AREDS 2+MULTI VIT) CAPS, Take by mouth, Disp: , Rfl:     Cholecalciferol (VITAMIN D3) 50 MCG (2000 UT) CAPS, Take by mouth, Disp: , Rfl:     atorvastatin (LIPITOR) 80 MG tablet, TAKE 1 TABLET BY MOUTH DAILY, Disp: 90 tablet, Rfl: 4    irbesartan (AVAPRO) 150 MG tablet, TAKE 1 TABLET BY MOUTH DAILY, Disp: 90 tablet, Rfl: 3    Blood Pressure KIT, 1 Units by Does not apply route daily, Disp: 1 kit, Rfl: 0    ASPIRIN ADULT LOW STRENGTH 81 MG EC tablet, Take 2 tablets by mouth daily, Disp: 180 tablet, Rfl: 3  Lab Results   Component Value Date     05/11/2022    K 4.7 05/11/2022     05/11/2022    CO2 21 05/11/2022    BUN 33 (H) 05/11/2022    CREATININE 1.55 (H) 05/11/2022    GLUCOSE 174 (H) 05/24/2022    CALCIUM 8.5 05/11/2022    PROT 6.4 05/11/2022    LABALBU 3.8 05/11/2022    BILITOT 0.4 05/11/2022    ALKPHOS 113 (H) 05/11/2022    AST 20 05/11/2022    ALT 18 05/11/2022    LABGLOM 46.0 (L) 05/11/2022    GFRAA 55.7 (L) 05/11/2022    GLOB 2.6 05/11/2022     Lab Results   Component Value Date    WBC 7.2 03/29/2019    HGB 13.5 (L) 03/29/2019    HCT 40.6 (L) 03/29/2019    MCV 89.4 03/29/2019     03/29/2019     Lab Results   Component Value Date LABA1C 6.9 (H) 05/11/2022    LABA1C 7.9 (H) 03/10/2022    LABA1C 8.8 (H) 12/02/2021     Results for Brandi Crum" (MRN [id number]) as of 1/25/2022 09:58   Ref. Range 1/12/2022 08:33   C-Peptide Latest Ref Range: 1.1 - 4.4 ng/mL 2.1      1/12/22 0833    Glutamic Acid Decarb Ab 0.0 - 5.0 IU/mL <5.0          Lab Results   Component Value Date    CHOLFAST 127 12/02/2021    TRIGLYCFAST 163 (H) 12/02/2021    HDL 47 12/02/2021    HDL 59 09/14/2020    HDL 58 11/01/2017    LDLCALC 47 12/02/2021    LDLCALC 32 09/14/2020    LDLCALC 41 11/01/2017    CHOL 103 09/14/2020    CHOL 117 11/01/2017    CHOL 122 06/06/2016    TRIG 61 09/14/2020    TRIG 90 11/01/2017    TRIG 78 06/06/2016     No results found for: TESTM  Lab Results   Component Value Date    TSH 1.390 03/29/2019     No results found for: TPOABS    Review of Systems   Constitutional: Negative for chills, fatigue and fever. HENT: Negative for congestion, ear pain, postnasal drip, rhinorrhea, sinus pressure and sore throat. Eyes: Negative for pain and redness. Respiratory: Negative for cough, shortness of breath and wheezing. Cardiovascular: Negative for chest pain, palpitations and leg swelling. Gastrointestinal: Negative for abdominal pain, diarrhea, nausea and vomiting. Endocrine: Negative for polydipsia and polyuria. Genitourinary: Negative for difficulty urinating. Musculoskeletal: Negative for arthralgias. Skin: Negative for rash. Neurological: Positive for tremors. Negative for dizziness and headaches. Psychiatric/Behavioral: Positive for confusion. Objective:   Physical Exam  Constitutional:       Appearance: He is well-developed. HENT:      Head: Normocephalic and atraumatic. Mouth/Throat:      Mouth: Mucous membranes are moist.   Eyes:      Conjunctiva/sclera: Conjunctivae normal.   Neck:      Comments: No thyromegaly or palpable nodules  Cardiovascular:      Rate and Rhythm: Normal rate and regular rhythm.       Heart

## 2022-05-24 NOTE — PATIENT INSTRUCTIONS
Endocrinology-diabetes    1. Check your blood sugars 4 times a day, before meals and at night  2. Document these numbers and a blood glucose log and bring them with you to your follow-up appointment. 3. If you are prescribed insulin, Do not take your mealtime insulin if your blood sugars less than 130   4. Call our office if you have blood sugars less than 80 or greater then 300 on two or more occasions  5. Call our office if you have any questions regarding your blood sugars or insulin dosing regiment  6. Signs of low blood sugar include sweating , heart racing, dizziness and weakness. Check your blood sugar if you have any of these symptoms. Medications-  7. Decrease Novolog 70/30 inject 10 units before breakfast and dinner if glucose is greater than 130  8. Continue Pioglitazone 15 mg by mouth daily  9. Continue Invokana (canagliflozin)  300 mg by mouth daily  10. Monitor glucose 3-4 times daily and document  11. [employer] [facility] [employer]   12. Repeat labs in 3 months   13.  Follow up in 3 months

## 2022-05-31 PROBLEM — R42 DIZZINESS: Status: ACTIVE | Noted: 2022-05-31

## 2022-05-31 PROBLEM — G81.94 HEMIPARESIS, LEFT (HCC): Status: ACTIVE | Noted: 2022-05-31

## 2022-05-31 PROBLEM — I69.359: Status: ACTIVE | Noted: 2022-05-31

## 2022-05-31 PROBLEM — R29.898 LEFT LEG WEAKNESS: Status: ACTIVE | Noted: 2022-05-31

## 2022-06-06 DIAGNOSIS — I69.30 CHRONIC ISCHEMIC RIGHT ACA STROKE: Chronic | ICD-10-CM

## 2022-06-06 DIAGNOSIS — E78.2 MIXED HYPERLIPIDEMIA: ICD-10-CM

## 2022-06-06 RX ORDER — ATORVASTATIN CALCIUM 80 MG/1
TABLET, FILM COATED ORAL
Qty: 90 TABLET | Refills: 4 | Status: SHIPPED | OUTPATIENT
Start: 2022-06-06

## 2022-06-06 NOTE — TELEPHONE ENCOUNTER
pharmacy requesting medication refill.  Please approve or deny this request.    Rx requested:  Requested Prescriptions     Pending Prescriptions Disp Refills    atorvastatin (LIPITOR) 80 MG tablet 90 tablet 4     Sig: TAKE 1 TABLET BY MOUTH DAILY         Last Office Visit:   4/11/2022      Next Visit Date:  Future Appointments   Date Time Provider Sarmad Jaime   6/6/2022  1:00 PM Ender Flanagan MD [facility]   7/11/2022  8:30 AM Tim Franco MD [facility]   8/23/2022 10:00 AM Kendrick Vivar [facility]   9/1/2022 11:00 AM Tim Franco MD [address] [place] [address]

## 2022-06-20 DIAGNOSIS — R80.9 CONTROLLED TYPE 2 DIABETES MELLITUS WITH MICROALBUMINURIA, WITH LONG-TERM CURRENT USE OF INSULIN (HCC): ICD-10-CM

## 2022-06-20 DIAGNOSIS — E11.29 CONTROLLED TYPE 2 DIABETES MELLITUS WITH MICROALBUMINURIA, WITH LONG-TERM CURRENT USE OF INSULIN (HCC): ICD-10-CM

## 2022-06-20 DIAGNOSIS — Z79.4 CONTROLLED TYPE 2 DIABETES MELLITUS WITH MICROALBUMINURIA, WITH LONG-TERM CURRENT USE OF INSULIN (HCC): ICD-10-CM

## 2022-06-20 LAB
ALBUMIN SERPL-MCNC: 4.2 G/DL (ref 3.5–4.6)
ALP BLD-CCNC: 84 U/L (ref 35–104)
ALT SERPL-CCNC: 19 U/L (ref 0–41)
ANION GAP SERPL CALCULATED.3IONS-SCNC: 12 MEQ/L (ref 9–15)
AST SERPL-CCNC: 18 U/L (ref 0–40)
BILIRUB SERPL-MCNC: 0.4 MG/DL (ref 0.2–0.7)
BUN BLDV-MCNC: 31 MG/DL (ref 6–20)
CALCIUM SERPL-MCNC: 8.8 MG/DL (ref 8.5–9.9)
CHLORIDE BLD-SCNC: 104 MEQ/L (ref 95–107)
CO2: 22 MEQ/L (ref 20–31)
CREAT SERPL-MCNC: 1.47 MG/DL (ref 0.7–1.2)
GFR AFRICAN AMERICAN: 59.1
GFR NON-AFRICAN AMERICAN: 48.9
GLOBULIN: 2.4 G/DL (ref 2.3–3.5)
GLUCOSE BLD-MCNC: 148 MG/DL (ref 70–99)
HBA1C MFR BLD: 6.9 % (ref 4.8–5.9)
POTASSIUM SERPL-SCNC: 4.7 MEQ/L (ref 3.4–4.9)
SODIUM BLD-SCNC: 138 MEQ/L (ref 135–144)
TOTAL PROTEIN: 6.6 G/DL (ref 6.3–8)

## 2022-07-11 ENCOUNTER — OFFICE VISIT (OUTPATIENT)
Dept: FAMILY MEDICINE CLINIC | Age: 62
End: 2022-07-11
Payer: MEDICARE

## 2022-07-11 VITALS
BODY MASS INDEX: 24.79 KG/M2 | TEMPERATURE: 96.8 F | HEIGHT: 65 IN | HEART RATE: 68 BPM | SYSTOLIC BLOOD PRESSURE: 128 MMHG | DIASTOLIC BLOOD PRESSURE: 70 MMHG | OXYGEN SATURATION: 100 % | WEIGHT: 148.8 LBS

## 2022-07-11 DIAGNOSIS — R53.81 DEBILITY: Chronic | ICD-10-CM

## 2022-07-11 DIAGNOSIS — R26.89 BALANCE PROBLEM: Chronic | ICD-10-CM

## 2022-07-11 DIAGNOSIS — N18.31 STAGE 3A CHRONIC KIDNEY DISEASE (HCC): Chronic | ICD-10-CM

## 2022-07-11 DIAGNOSIS — G81.94 HEMIPARESIS, LEFT (HCC): ICD-10-CM

## 2022-07-11 DIAGNOSIS — E11.42 DIABETIC POLYNEUROPATHY ASSOCIATED WITH TYPE 2 DIABETES MELLITUS (HCC): Primary | Chronic | ICD-10-CM

## 2022-07-11 DIAGNOSIS — Z53.20 COLON CANCER SCREENING DECLINED: Chronic | ICD-10-CM

## 2022-07-11 DIAGNOSIS — E11.3299 NONPROLIFERATIVE DIABETIC RETINOPATHY (HCC): Chronic | ICD-10-CM

## 2022-07-11 DIAGNOSIS — R27.0 ATAXIA: ICD-10-CM

## 2022-07-11 PROBLEM — G81.14 SPASTIC HEMIPLEGIA AFFECTING LEFT NONDOMINANT SIDE (HCC): Status: RESOLVED | Noted: 2020-05-29 | Resolved: 2022-07-11

## 2022-07-11 PROBLEM — I69.359: Status: RESOLVED | Noted: 2022-05-31 | Resolved: 2022-07-11

## 2022-07-11 PROBLEM — S52.592A OTH FRACTURES OF LOWER END OF LEFT RADIUS, INIT FOR CLOS FX: Status: RESOLVED | Noted: 2018-11-29 | Resolved: 2022-07-11

## 2022-07-11 PROCEDURE — G8427 DOCREV CUR MEDS BY ELIG CLIN: HCPCS | Performed by: FAMILY MEDICINE

## 2022-07-11 PROCEDURE — 3044F HG A1C LEVEL LT 7.0%: CPT | Performed by: FAMILY MEDICINE

## 2022-07-11 PROCEDURE — 99215 OFFICE O/P EST HI 40 MIN: CPT | Performed by: FAMILY MEDICINE

## 2022-07-11 PROCEDURE — 1036F TOBACCO NON-USER: CPT | Performed by: FAMILY MEDICINE

## 2022-07-11 PROCEDURE — 2022F DILAT RTA XM EVC RTNOPTHY: CPT | Performed by: FAMILY MEDICINE

## 2022-07-11 PROCEDURE — 3017F COLORECTAL CA SCREEN DOC REV: CPT | Performed by: FAMILY MEDICINE

## 2022-07-11 PROCEDURE — G8420 CALC BMI NORM PARAMETERS: HCPCS | Performed by: FAMILY MEDICINE

## 2022-07-11 ASSESSMENT — PATIENT HEALTH QUESTIONNAIRE - PHQ9
8. MOVING OR SPEAKING SO SLOWLY THAT OTHER PEOPLE COULD HAVE NOTICED. OR THE OPPOSITE, BEING SO FIGETY OR RESTLESS THAT YOU HAVE BEEN MOVING AROUND A LOT MORE THAN USUAL: 0
4. FEELING TIRED OR HAVING LITTLE ENERGY: 0
2. FEELING DOWN, DEPRESSED OR HOPELESS: 0
3. TROUBLE FALLING OR STAYING ASLEEP: 0
SUM OF ALL RESPONSES TO PHQ QUESTIONS 1-9: 0
5. POOR APPETITE OR OVEREATING: 0
SUM OF ALL RESPONSES TO PHQ9 QUESTIONS 1 & 2: 0
SUM OF ALL RESPONSES TO PHQ QUESTIONS 1-9: 0
SUM OF ALL RESPONSES TO PHQ QUESTIONS 1-9: 0
9. THOUGHTS THAT YOU WOULD BE BETTER OFF DEAD, OR OF HURTING YOURSELF: 0
7. TROUBLE CONCENTRATING ON THINGS, SUCH AS READING THE NEWSPAPER OR WATCHING TELEVISION: 0
1. LITTLE INTEREST OR PLEASURE IN DOING THINGS: 0
10. IF YOU CHECKED OFF ANY PROBLEMS, HOW DIFFICULT HAVE THESE PROBLEMS MADE IT FOR YOU TO DO YOUR WORK, TAKE CARE OF THINGS AT HOME, OR GET ALONG WITH OTHER PEOPLE: 0
SUM OF ALL RESPONSES TO PHQ QUESTIONS 1-9: 0
6. FEELING BAD ABOUT YOURSELF - OR THAT YOU ARE A FAILURE OR HAVE LET YOURSELF OR YOUR FAMILY DOWN: 0

## 2022-07-11 NOTE — PROGRESS NOTES
June 2021. Was losing weight still and was getting sugars in the 50s so he has reduced insulin to 6-8 units AC BID. He takes no Invokana when he does insulin and no Actos when he does insulin. But if sugar is low enough - 120-150 - he might take Invokana at breakfast and Actos at dinner.      Regarding weight loss, he has gained 6 pounds in the last 2 weeks because he ceased his highly restrictive diet and began to eat in an unrestricted manner like he used to eat. E.g. He ate two meatball subs last night and had pizza the night before (because he missed it and was craving it). The pizza he made was made with jessica sauce because tomatoes and potatoes are bad for kidneys.     All last week was visiting mother several hours a day all last week so he did not use insulin or take oral meds for DM because he feared having hypoglycemia while visiting and while driving.      Refuses nutrition education due to cost and asks for my assist with DM mgt because I \"cost zero and Romero Gopijoao costs him $40 to walk in the door. \"     Patient is here for f/u HTN. Is compliant with meds and has no side effects from them. Avoids added salt. Tries to eat healthy. Exercises occasionally. Has no chest pain, shortness of breath, palpitations or edema.     CVA. No new deficits. Persistent hemiparesis, hearing loss, dysarthria, ataxia. 05/24/2022: Plan from visit with Eddie PALMER as follows:    7. Decrease Novolog 70/30 inject 10 units before breakfast and dinner if glucose is greater than 130  8. Continue Pioglitazone 15 mg by mouth daily  9. Continue Invokana (canagliflozin)  300 mg by mouth daily  10. Monitor glucose 3-4 times daily and document  11. Hello [facility] [employer]   12. Repeat labs in 3 months   13. Follow up in 3 months     07/11/2022:   Sad because mother is \"dyinig in the nursing home from the same kidney problems I got. \" Goes to [place] daily to see her. This is farther for him to drive. DM: Meds as above. A1C is 6.9. Has been eating less restrictive diet at meals but is not eating snacks. Has CBGM which is helping a lot. States he passed out on Easter and was out for 8 hours. Has not passed out since then \"but my sugar drops. .No results found for this visit on 07/11/22. morning my sugar was 111\" and he did not take insulin. Sugar is <130 and he needs no insulin about three times a week. Has had 4 lows in the last 90 days, all have been in the last 30 days. Night time sugars are generally higher - 170 last night. Reviewed levels: appears to be approaching his symptomatic lows at HS. At night, no hearing aids and can't hear alarm when he is hypoglycemic.    CVA. No new deficits. No new or worsening facial droop. No  worsening of baseline speech impairment. No progression of unilateral weakness. States that it is getting hard for him to go up the stairs at his apartment  - mix of balance and strength deficits. No recent falls. Acknowledges sedentary lifestyle because he fears falling and now has decreased endurance. Able to attend to IADLs and ADLs without assist.    No chest pain, pressure or tightness, dizziness, headache, vision changes, palpitations, swelling in feet/legs.           No other questions and or concerns for today's visit      Review of Systems      Past Medical History:   Diagnosis Date    Cerebrovascular small vessel disease     Chronic kidney disease, stage 3b ([address] 12/16/2020    Colon cancer screening declined 8/30/2021    Debility 7/11/2022    Diabetic polyneuropathy associated with type 2 diabetes mellitus ([address] 4/22/2021    DM (diabetes mellitus) ([address]     was with Dr Nadia Quiñones, [facility]    Dysarthria as late effect of cerebrovascular accident (CVA) 2015    Hearing loss     Hemiparesis affecting left side as late effect of cerebrovascular accident (CVA) ([address]     History of left PCA stroke 2013    History of right STEPHANIE stroke 10/21/2015    was at [facility], now  [facility]    Microalbuminuria 2018    PFO (patent foramen ovale) 2015    Right pontine CVA [facility] 10/2015    Stage 3a chronic kidney disease ([facility] [facility] 12/16/2020    Syncope and collapse 6/1/2020     Past Surgical History:   Procedure Laterality Date    ANKLE SURGERY       Social History     Socioeconomic History    Marital status: Single     Spouse name: Not on file    Number of children: 0    Years of education: Not on file    Highest education level: Not on file   Occupational History    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[employer]   Tobacco Use    Smoking status: Never Smoker    Smokeless tobacco: Never Used   Substance and Sexual Activity    Alcohol use: No    Drug use: No    Sexual activity: Not on file   Other Topics Concern    Not on file   Social History Narrative    Born in [facility], one brother and one sister    Single, no children    Was living and working as a manager in [employer]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, the time of a pontine and cerebellar stroke    Was at [facility] and in nursing home for rehabilitation    Lives independetly in an apartment in [facility] Strain: Low Risk     Difficulty of Paying Living Expenses: Not hard at all   Food Insecurity: No Food Insecurity    Worried About Running Out of Food in the Last Year: Never true    Alexis of Food in the Last Year: Never true   Transportation Needs:     Lack of Transportation (Medical): Not on file    Lack of Transportation (Non-Medical):  Not on file   Physical Activity:     Days of Exercise per Week: Not on file    Minutes of Exercise per Session: Not on file   Stress:     Feeling of Stress : Not on file   Social Connections:     Frequency of Communication with Friends and Family: Not on file    Frequency of Social Gatherings with Friends and Family: Not on file    Attends Latter day Services: Not on file    Active Member of Clubs or Organizations: Not on file    Attends Club or Organization this visit. PMH, Surgical Hx, Family Hx, and Social Hxreviewed and updated. Health Maintenance reviewed. Objective    Vitals:    07/11/22 0842   BP: 128/70   Pulse: 68   Temp: 96.8 °F (36 °C)   TempSrc: Temporal   SpO2: 100%   Weight: 148 lb 12.8 oz (67.5 kg)   Height: 5' 5\" (1.651 m)        Physical Exam  Constitutional:       General: He is not in acute distress. Appearance: He is well-developed. HENT:      Head: Normocephalic and atraumatic. Eyes:      Conjunctiva/sclera: Conjunctivae normal.   Cardiovascular:      Rate and Rhythm: Normal rate and regular rhythm. Heart sounds: Normal heart sounds. Pulmonary:      Effort: No respiratory distress. Breath sounds: No wheezing or rales. Musculoskeletal:      Cervical back: Normal range of motion and neck supple. Lymphadenopathy:      Cervical: No cervical adenopathy. Skin:     General: Skin is warm and dry. Neurological:      Mental Status: He is alert and oriented to person, place, and time. Comments: Dysarthria with spastic paresis of LUE and paresis of LLE; significant hearing impairment; ataxia   Psychiatric:      Comments: Affect appears flat at baseline; tends to be inflexible in his thoughts and beliefs about his diet, his daily schedule, his capacity to participate in activities such as PT, the potential benefits of PT as would apply to his balance (fears falling, therefore will do no activities) and endurance.             Lab Results   Component Value Date    LABA1C 6.9 (H) 06/20/2022    LABA1C 6.9 (H) 05/11/2022    LABA1C 7.9 (H) 03/10/2022     Lab Results   Component Value Date    LABMICR <1.20 05/11/2022    CREATININE 1.47 (H) 06/20/2022     Lab Results   Component Value Date    ALT 19 06/20/2022    AST 18 06/20/2022     Lab Results   Component Value Date    CHOL 103 09/14/2020    TRIG 61 09/14/2020    HDL 47 12/02/2021    LDLCALC 47 12/02/2021        Assessment & Plan   Visit Diagnoses and Associated Orders Diabetic polyneuropathy associated with type 2 diabetes mellitus (HCC)    -  Primary         Nonproliferative diabetic retinopathy (HCC)             Hemiparesis, left (HCC)             Stage 3a chronic kidney disease (HCC)             Ataxia             Balance problem             Colon cancer screening declined             Debility                 Time spent on appointment included reviewing past laboratory and radiographic results, previous notes, consultations, past procedures, medications, obtaining history and performing physical, formulating mutually agreed upon plan of care with patient - 45 minutes. .       Reviewed with the patient: all disease processes, current clinical status, medications, activities and diet.      Side effects, adverse effects of the medication prescribed today, as well as treatment plan/ rationale and result expectations have been discussed with the patient who expresses understanding and desires to proceed.     Close follow up to evaluate treatment results and for coordination of care. I have reviewed the patient's medical history in detail and updated the computerized patient record. More than 50% of the appointment was spent in face-to-face counseling, education and care coordination. Please note this report has been partially produced using speech recognition software and may contain mistakes related to that system including errors in grammar, punctuation and spelling as well as words and phrases that may seem inappropriate. If there are questions or concerns, please feel free to contact me to clarify. No orders of the defined types were placed in this encounter. No orders of the defined types were placed in this encounter. There are no discontinued medications. Return in about 6 months (around 1/11/2023) for DM, CKD, HTN - OV.         Controlled Substance Monitoring:    Acute and Chronic Pain Monitoring:   RX Monitoring 11/28/2018   Attestation The Prescription Monitoring Report for this patient was reviewed today. Periodic Controlled Substance Monitoring Possible medication side effects, risk of tolerance/dependence & alternative treatments discussed. ;No signs of potential drug abuse or diversion identified.            Penelope Kumar MD

## 2022-08-15 ENCOUNTER — TELEPHONE (OUTPATIENT)
Dept: GASTROENTEROLOGY | Age: 62
End: 2022-08-15

## 2022-08-23 ENCOUNTER — OFFICE VISIT (OUTPATIENT)
Dept: ENDOCRINOLOGY | Age: 62
End: 2022-08-23
Payer: MEDICARE

## 2022-08-23 VITALS
HEIGHT: 65 IN | BODY MASS INDEX: 24.66 KG/M2 | OXYGEN SATURATION: 97 % | WEIGHT: 148 LBS | HEART RATE: 81 BPM | SYSTOLIC BLOOD PRESSURE: 138 MMHG | DIASTOLIC BLOOD PRESSURE: 86 MMHG

## 2022-08-23 DIAGNOSIS — R80.9 MICROALBUMINURIA: ICD-10-CM

## 2022-08-23 DIAGNOSIS — E11.29 CONTROLLED TYPE 2 DIABETES MELLITUS WITH MICROALBUMINURIA, WITH LONG-TERM CURRENT USE OF INSULIN (HCC): Primary | ICD-10-CM

## 2022-08-23 DIAGNOSIS — Z79.4 CONTROLLED TYPE 2 DIABETES MELLITUS WITH MICROALBUMINURIA, WITH LONG-TERM CURRENT USE OF INSULIN (HCC): Primary | ICD-10-CM

## 2022-08-23 DIAGNOSIS — R80.9 CONTROLLED TYPE 2 DIABETES MELLITUS WITH MICROALBUMINURIA, WITH LONG-TERM CURRENT USE OF INSULIN (HCC): Primary | ICD-10-CM

## 2022-08-23 LAB
CHP ED QC CHECK: ABNORMAL
GLUCOSE BLD-MCNC: 151 MG/DL

## 2022-08-23 PROCEDURE — 2022F DILAT RTA XM EVC RTNOPTHY: CPT | Performed by: PHYSICIAN ASSISTANT

## 2022-08-23 PROCEDURE — G8420 CALC BMI NORM PARAMETERS: HCPCS | Performed by: PHYSICIAN ASSISTANT

## 2022-08-23 PROCEDURE — 3017F COLORECTAL CA SCREEN DOC REV: CPT | Performed by: PHYSICIAN ASSISTANT

## 2022-08-23 PROCEDURE — 3044F HG A1C LEVEL LT 7.0%: CPT | Performed by: PHYSICIAN ASSISTANT

## 2022-08-23 PROCEDURE — G8427 DOCREV CUR MEDS BY ELIG CLIN: HCPCS | Performed by: PHYSICIAN ASSISTANT

## 2022-08-23 PROCEDURE — 99214 OFFICE O/P EST MOD 30 MIN: CPT | Performed by: PHYSICIAN ASSISTANT

## 2022-08-23 PROCEDURE — 82962 GLUCOSE BLOOD TEST: CPT | Performed by: PHYSICIAN ASSISTANT

## 2022-08-23 PROCEDURE — 1036F TOBACCO NON-USER: CPT | Performed by: PHYSICIAN ASSISTANT

## 2022-08-23 RX ORDER — CANAGLIFLOZIN 300 MG/1
300 TABLET, FILM COATED ORAL
Qty: 30 TABLET | Refills: 5 | Status: SHIPPED | OUTPATIENT
Start: 2022-08-23

## 2022-08-23 ASSESSMENT — ENCOUNTER SYMPTOMS
EYE REDNESS: 0
VOMITING: 0
DIARRHEA: 0
SINUS PRESSURE: 0
SHORTNESS OF BREATH: 0
NAUSEA: 0
RHINORRHEA: 0
COUGH: 0
ABDOMINAL PAIN: 0
WHEEZING: 0
SORE THROAT: 0
EYE PAIN: 0

## 2022-08-23 NOTE — PATIENT INSTRUCTIONS
Endocrinology-    Check your blood sugars 4 times a day, before meals and at night  Document these numbers in a blood glucose log and bring them with you to your follow-up appointment. If you are prescribed insulin, Do not take your mealtime insulin if your blood sugars less than 120   Call our office if you have blood sugars less than 80 or greater then 300 on two or more occasions  Call our office if you have any questions regarding your blood sugars or insulin dosing regiment  Signs of low blood sugar may include tremors, feeling shaky, sweating, dizziness, confusion and weakness. Check your blood sugar immediatly if you have any of these symptoms.      The plan as discussed at your appointment-    Continue Novolog 70/30 inject 10 units before breakfast and dinner if glucose is greater than 130  Stop Pioglitazone 15 mg by mouth daily  Continue Invokana (canagliflozin)  300 mg by mouth daily  Monitor glucose 3-4 times daily and document  Freestyle [place] 2 cfvygyc- [facility]   Repeat labs in 3 months   Follow up in 3 months

## 2022-08-23 NOTE — PROGRESS NOTES
8/23/2022    Assessment:       Diagnosis Orders   1. Controlled type 2 diabetes mellitus with microalbuminuria, with long-term current use of insulin ([facility])  POCT Glucose    canagliflozin (INVOKANA) 300 MG TABS tablet    Comprehensive Metabolic Panel    Hemoglobin A1C      2. Microalbuminuria          Stopped Trulicity due to cost   PLAN:     Continue Novolog 70/30 inject 10 units before breakfast and dinner if glucose is greater than 130  Stop Pioglitazone 15 mg by mouth daily (LE edema)   Continue Invokana (canagliflozin)  300 mg by mouth daily  Monitor glucose 3-4 times daily and document  Freestyle N[place] 2 ordered- [facility]   Repeat labs in 4 months   Follow up in 4 months     Orders Placed This Encounter   Procedures    Comprehensive Metabolic Panel     Standing Status:   Future     Standing Expiration Date:   8/23/2023    Hemoglobin A1C     Standing Status:   Future     Standing Expiration Date:   8/23/2023    POCT Glucose     Orders Placed This Encounter   Medications    canagliflozin (INVOKANA) 300 MG TABS tablet     Sig: Take 1 tablet by mouth every morning (before breakfast)     Dispense:  30 tablet     Refill:  5     Return in about 4 months (around 12/23/2022) for Diabetes. Subjective:     Chief Complaint   Patient presents with    Follow-up    Diabetes     Vitals:    08/23/22 1009   BP: 138/86   Site: Left Upper Arm   Pulse: 81   SpO2: 97%   Weight: 148 lb (67.1 kg)   Height: 5' 5\" (1.651 m)     Wt Readings from Last 3 Encounters:   08/23/22 148 lb (67.1 kg)   07/11/22 148 lb 12.8 oz (67.5 kg)   05/24/22 145 lb (65.8 kg)     BP Readings from Last 3 Encounters:   08/23/22 138/86   07/11/22 128/70   05/24/22 120/81     Presents today for follow-up of his diabetes. Hemoglobin A1c 6.9%. He has bilateral lower extremity edema. He has a small 2 mm round eschar on the tip of his left toe that is healing well without erythema or drainage.   I have instructed him to stop his Actos and see if that is contributing to his swelling in the lower legs. Told him to call me if he should not have improvement or if he has any chest pressure, chest pain, shortness of breath, or  palpitations. Diabetes  He presents for his initial diabetic visit. He has type 2 diabetes mellitus. The initial diagnosis of diabetes was made 20 years ago. His disease course has been worsening. Hypoglycemia symptoms include confusion, sweats and tremors. Pertinent negatives for hypoglycemia include no dizziness or headaches. Pertinent negatives for diabetes include no chest pain, no fatigue, no polydipsia and no polyuria. Hypoglycemia complications include blackouts and required assistance. Diabetic complications include a CVA. Risk factors for coronary artery disease include diabetes mellitus and male sex. Current diabetic treatment includes oral agent (triple therapy). He is compliant with treatment all of the time. His weight is decreasing steadily. He is following a generally healthy diet. Meal planning includes avoidance of concentrated sweets. He has had a previous visit with a dietitian. He participates in exercise daily. His home blood glucose trend is increasing steadily. His overall blood glucose range is 180-200 mg/dl. An ACE inhibitor/angiotensin II receptor blocker is being taken. He does not see a podiatrist.Eye exam is current.    Past Medical History:   Diagnosis Date    Cerebrovascular small vessel disease     Chronic kidney disease, stage 3b ([facility] 12/16/2020    Colon cancer screening declined 8/30/2021    Debility 7/11/2022    Diabetic polyneuropathy associated with type 2 diabetes mellitus ([facility] 4/22/2021    DM (diabetes mellitus) ([facility] [facility]     was with Dr Richy Prieto, [facility]    Dysarthria as late effect of cerebrovascular accident (CVA) 2015    Hearing loss     Hemiparesis affecting left side as late effect of cerebrovascular accident (CVA) ([facility]     History of left PCA stroke 2013    History of right STEPHANIE stroke 6 MM MISC, USE AS DIRECTED TWICE DAILY BEFORE MEALS, Disp: , Rfl:     insulin aspart protamine-insulin aspart (NOVOLOG 70/30) (70-30) 100 UNIT/ML injection, Inject 15 Units into the skin 2 times daily (with meals) Before breakfast and dinner, Disp: 5 pen, Rfl: 3    Continuous Blood Gluc Sensor (FREESTYLE CHINO 2 SENSOR) MISC, 1 Device by Does not apply route every 14 days, Disp: 2 each, Rfl: 3    Continuous Blood Gluc  (FREESTYLE CHINO 2 READER) JALEEL, 1 Device by Does not apply route 4 times daily (before meals and nightly), Disp: 1 each, Rfl: 0    amLODIPine (NORVASC) 5 MG tablet, Take 1 tablet by mouth daily, Disp: 90 tablet, Rfl: 4    pioglitazone (ACTOS) 15 MG tablet, Take 1 tablet by mouth daily, Disp: 90 tablet, Rfl: 4    Multiple Vitamins-Minerals (PRESERVISION AREDS 2+MULTI VIT) CAPS, Take by mouth, Disp: , Rfl:     Cholecalciferol (VITAMIN D3) 50 MCG (2000 UT) CAPS, Take by mouth, Disp: , Rfl:     irbesartan (AVAPRO) 150 MG tablet, TAKE 1 TABLET BY MOUTH DAILY, Disp: 90 tablet, Rfl: 3    Blood Pressure KIT, 1 Units by Does not apply route daily, Disp: 1 kit, Rfl: 0    ASPIRIN ADULT LOW STRENGTH 81 MG EC tablet, Take 2 tablets by mouth daily, Disp: 180 tablet, Rfl: 3  Lab Results   Component Value Date     06/20/2022    K 4.7 06/20/2022     06/20/2022    CO2 22 06/20/2022    BUN 31 (H) 06/20/2022    CREATININE 1.47 (H) 06/20/2022    GLUCOSE 151 (H) 08/23/2022    CALCIUM 8.8 06/20/2022    PROT 6.6 06/20/2022    LABALBU 4.2 06/20/2022    BILITOT 0.4 06/20/2022    ALKPHOS 84 06/20/2022    AST 18 06/20/2022    ALT 19 06/20/2022    LABGLOM 48.9 (L) 06/20/2022    GFRAA 59.1 (L) 06/20/2022    GLOB 2.4 06/20/2022     Lab Results   Component Value Date    WBC 7.2 03/29/2019    HGB 13.5 (L) 03/29/2019    HCT 40.6 (L) 03/29/2019    MCV 89.4 03/29/2019     03/29/2019     Lab Results   Component Value Date    LABA1C 6.9 (H) 06/20/2022    LABA1C 6.9 (H) 05/11/2022    LABA1C 7.9 (H) 03/10/2022 Results for Art Catherine" (MRN [id number]) as of 1/25/2022 09:58   Ref. Range 1/12/2022 08:33   C-Peptide Latest Ref Range: 1.1 - 4.4 ng/mL 2.1      1/12/22 0833    Glutamic Acid Decarb Ab 0.0 - 5.0 IU/mL <5.0          Lab Results   Component Value Date    CHOLFAST 127 12/02/2021    TRIGLYCFAST 163 (H) 12/02/2021    HDL 47 12/02/2021    HDL 59 09/14/2020    HDL 58 11/01/2017    LDLCALC 47 12/02/2021    LDLCALC 32 09/14/2020    LDLCALC 41 11/01/2017    CHOL 103 09/14/2020    CHOL 117 11/01/2017    CHOL 122 06/06/2016    TRIG 61 09/14/2020    TRIG 90 11/01/2017    TRIG 78 06/06/2016     No results found for: TESTM  Lab Results   Component Value Date    TSH 1.390 03/29/2019     No results found for: TPOABS    Review of Systems   Constitutional:  Negative for chills, fatigue and fever. HENT:  Negative for congestion, ear pain, postnasal drip, rhinorrhea, sinus pressure and sore throat. Eyes:  Negative for pain and redness. Respiratory:  Negative for cough, shortness of breath and wheezing. Cardiovascular:  Positive for leg swelling. Negative for chest pain and palpitations. Gastrointestinal:  Negative for abdominal pain, diarrhea, nausea and vomiting. Endocrine: Negative for polydipsia and polyuria. Genitourinary:  Negative for difficulty urinating. Musculoskeletal:  Negative for arthralgias. Skin:  Negative for rash. Neurological:  Positive for tremors. Negative for dizziness and headaches. Psychiatric/Behavioral:  Positive for confusion. Objective:   Physical Exam  Constitutional:       Appearance: He is well-developed. HENT:      Head: Normocephalic and atraumatic. Mouth/Throat:      Mouth: Mucous membranes are moist.   Eyes:      Conjunctiva/sclera: Conjunctivae normal.   Neck:      Comments: No thyromegaly or palpable nodules  Cardiovascular:      Rate and Rhythm: Normal rate and regular rhythm. Heart sounds: Normal heart sounds. No murmur heard.   Pulmonary: Effort: Pulmonary effort is normal.      Breath sounds: Normal breath sounds. Abdominal:      General: Bowel sounds are normal.      Palpations: Abdomen is soft. Musculoskeletal:         General: Swelling present. Normal range of motion. Cervical back: Normal range of motion and neck supple. Skin:     General: Skin is warm and dry. Neurological:      Mental Status: He is alert and oriented to person, place, and time.    Psychiatric:         Mood and Affect: Mood normal.      Comments: Dysphoric mood, improved

## 2022-08-25 SDOH — HEALTH STABILITY: PHYSICAL HEALTH: ON AVERAGE, HOW MANY MINUTES DO YOU ENGAGE IN EXERCISE AT THIS LEVEL?: 30 MIN

## 2022-08-25 SDOH — HEALTH STABILITY: PHYSICAL HEALTH: ON AVERAGE, HOW MANY DAYS PER WEEK DO YOU ENGAGE IN MODERATE TO STRENUOUS EXERCISE (LIKE A BRISK WALK)?: 7 DAYS

## 2022-08-25 ASSESSMENT — LIFESTYLE VARIABLES
HOW MANY STANDARD DRINKS CONTAINING ALCOHOL DO YOU HAVE ON A TYPICAL DAY: 0
HOW OFTEN DO YOU HAVE A DRINK CONTAINING ALCOHOL: NEVER
HOW MANY STANDARD DRINKS CONTAINING ALCOHOL DO YOU HAVE ON A TYPICAL DAY: PATIENT DOES NOT DRINK

## 2022-09-01 ENCOUNTER — OFFICE VISIT (OUTPATIENT)
Dept: FAMILY MEDICINE CLINIC | Age: 62
End: 2022-09-01
Payer: MEDICARE

## 2022-09-01 VITALS
HEART RATE: 80 BPM | BODY MASS INDEX: 24.66 KG/M2 | OXYGEN SATURATION: 99 % | SYSTOLIC BLOOD PRESSURE: 122 MMHG | HEIGHT: 65 IN | WEIGHT: 148 LBS | DIASTOLIC BLOOD PRESSURE: 78 MMHG

## 2022-09-01 DIAGNOSIS — Z71.89 COUNSELING REGARDING ADVANCE CARE PLANNING AND GOALS OF CARE: ICD-10-CM

## 2022-09-01 DIAGNOSIS — Z23 NEED FOR PROPHYLACTIC VACCINATION AGAINST STREPTOCOCCUS PNEUMONIAE (PNEUMOCOCCUS): ICD-10-CM

## 2022-09-01 DIAGNOSIS — Z00.00 MEDICARE ANNUAL WELLNESS VISIT, SUBSEQUENT: Primary | ICD-10-CM

## 2022-09-01 DIAGNOSIS — Z53.20 COLON CANCER SCREENING DECLINED: ICD-10-CM

## 2022-09-01 PROCEDURE — 3017F COLORECTAL CA SCREEN DOC REV: CPT | Performed by: FAMILY MEDICINE

## 2022-09-01 PROCEDURE — 90471 IMMUNIZATION ADMIN: CPT | Performed by: FAMILY MEDICINE

## 2022-09-01 PROCEDURE — G0439 PPPS, SUBSEQ VISIT: HCPCS | Performed by: FAMILY MEDICINE

## 2022-09-01 PROCEDURE — 90677 PCV20 VACCINE IM: CPT | Performed by: FAMILY MEDICINE

## 2022-09-01 ASSESSMENT — PATIENT HEALTH QUESTIONNAIRE - PHQ9
SUM OF ALL RESPONSES TO PHQ QUESTIONS 1-9: 0
1. LITTLE INTEREST OR PLEASURE IN DOING THINGS: 0
SUM OF ALL RESPONSES TO PHQ QUESTIONS 1-9: 0
SUM OF ALL RESPONSES TO PHQ9 QUESTIONS 1 & 2: 0
SUM OF ALL RESPONSES TO PHQ QUESTIONS 1-9: 0
2. FEELING DOWN, DEPRESSED OR HOPELESS: 0
SUM OF ALL RESPONSES TO PHQ QUESTIONS 1-9: 0

## 2022-09-01 NOTE — PROGRESS NOTES
Medicare Annual Wellness Visit    Feliciano Garvey is here for Medicare AWV    Assessment & Plan    Recommendations for Preventive Services Due: see orders and patient instructions/AVS.  Recommended screening schedule for the next 5-10 years is provided to the patient in written form: see Patient Instructions/AVS.     Return for Medicare Annual Wellness Visit in 1 year. Subjective       Patient's complete Health Risk Assessment and screening values have been reviewed and are found in Flowsheets. The following problems were reviewed today and where indicated follow up appointments were made and/or referrals ordered. Positive Risk Factor Screenings with Interventions:    Fall Risk:  Do you feel unsteady or are you worried about falling? : (!) yes  2 or more falls in past year?: (!) yes  Fall with injury in past year?: no   Fall Risk Interventions:    Home safety tips provided  Home exercises provided to promote strength and balance  Has \"passed out from my diabetes. \" Last occurred on Easter. Has CBGM now. It alarms and CBG at 70. Has occurred 3-4 times since June.              General Health and ACP:  General  In general, how would you say your health is?: Fair  In the past 7 days, have you experienced any of the following: New or Increased Pain, New or Increased Fatigue, Loneliness, Social Isolation, Stress or Anger?: No  Do you get the social and emotional support that you need?: Yes  Do you have a Living Will?: (!) No    Advance Directives       Power of [address] [place] [address] Will ACP-Advance Directive ACP-Power of     Not on File Not on File Not on File Not on File        General Health Risk Interventions:  No Living Will: Patient referred to [place] Habits/Nutrition:  Physical Activity: Sufficiently Active    Days of Exercise per Week: 7 days    Minutes of Exercise per Session: 30 min     Have you lost any weight without trying in the past 3 months?: (!) Yes  Body mass index: 24.63  Have you seen the dentist within the past year?: Yes  Health Habits/Nutrition Interventions:  Inadequate physical activity:  educational materials provided to promote increased physical activity  Nutritional issues:  educational materials for healthy, well-balanced diet provided             Objective   Vitals:    09/01/22 1053   BP: 122/78   Pulse: 80   SpO2: 99%   Weight: 148 lb (67.1 kg)   Height: 5' 5\" (1.651 m)      Body mass index is 24.63 kg/m². Allergies   Allergen Reactions    Ace Inhibitors      cough     Prior to Visit Medications    Medication Sig Taking?  Authorizing Provider   canagliflozin (INVOKANA) 300 MG TABS tablet Take 1 tablet by mouth every morning (before breakfast) Yes ESTELA Molina   atorvastatin (LIPITOR) 80 MG tablet TAKE 1 TABLET BY MOUTH DAILY Yes Shirley Helton MD   [facility] UNIFINE PENTIPS 31G X 6 MM MISC USE AS DIRECTED TWICE DAILY BEFORE MEALS Yes Historical Provider, MD   insulin aspart protamine-insulin aspart (NOVOLOG 70/30) (70-30) 100 UNIT/ML injection Inject 15 Units into the skin 2 times daily (with meals) Before breakfast and dinner Yes ESTELA Molina   Continuous Blood Gluc Sensor (FREESTYLE CHINO 2 SENSOR) MISC 1 Device by Does not apply route every 14 days Yes ESTELA Molina   Continuous Blood Gluc  (FREESTYLE CHINO 2 READER) JALEEL 1 Device by Does not apply route 4 times daily (before meals and nightly) Yes ESTELA Molina   amLODIPine (NORVASC) 5 MG tablet Take 1 tablet by mouth daily Yes Shirley Helton MD   pioglitazone (ACTOS) 15 MG tablet Take 1 tablet by mouth daily Yes Shirley Helton MD   Multiple Vitamins-Minerals (PRESERVISION AREDS 2+MULTI VIT) CAPS Take by mouth Yes Historical Provider, MD   Cholecalciferol (VITAMIN D3) 50 MCG (2000 UT) CAPS Take by mouth Yes Historical Provider, MD   irbesartan (AVAPRO) 150 MG tablet TAKE 1 TABLET BY MOUTH DAILY Yes Shirley Helton

## 2022-09-01 NOTE — PATIENT INSTRUCTIONS
Advance Directives: Care Instructions  Overview  An advance directive is a legal way to state your wishes at the end of your life. It tells your family and your doctor what to do if you can't say what youwant. There are two main types of advance directives. You can change them any timeyour wishes change. Living will. This form tells your family and your doctor your wishes about life support and other treatment. The form is also called a declaration. Medical power of . This form lets you name a person to make treatment decisions for you when you can't speak for yourself. This person is called a health care agent (health care proxy, health care surrogate). The form is also called a durable power of  for health care. If you do not have an advance directive, decisions about your medical care maybe made by a family member, or by a doctor or a  who doesn't know you. It may help to think of an advance directive as a gift to the people who carefor you. If you have one, they won't have to make tough decisions by themselves. Follow-up care is a key part of your treatment and safety. Be sure to make and go to all appointments, and call your doctor if you are having problems. It's also a good idea to know your test results and keep alist of the medicines you take. What should you include in an advance directive? Many states have a unique advance directive form. (It may ask you to address specific issues.) Or you might use a universal form that's approved by manystates. If your form doesn't tell you what to address, it may be hard to know what to include in your advance directive. Use the questions below to help you getstarted. Who do you want to make decisions about your medical care if you are not able to? What life-support measures do you want if you have a serious illness that gets worse over time or can't be cured? What are you most afraid of that might happen?  (Maybe you're afraid of having pain, losing your independence, or being kept alive by machines.)  Where would you prefer to die? (Your home? A hospital? A nursing home?)  Do you want to donate your organs when you die? Do you want certain Faith practices performed before you die? When should you call for help? Be sure to contact your doctor if you have any questions. Where can you learn more? Go to https://[email or web address]. org and sign in to your Giftology account. Enter R264 in the GIGAS box to learn more about \"Advance Directives: Care Instructions. \"     If you do not have an account, please click on the \"Sign Up Now\" link. Current as of: October 18, 2021               Content Version: 13.3  © 2006-2022 Healthwise, [employer]. Care instructions adapted under license by [email or web address] ([facility]). If you have questions about a medical condition or this instruction, always ask your healthcare professional. [place] [address] any warranty or liability for your use of this information. Learning About Medical Power of   What is a medical power of ? A medical power of , also called a durable power of  for health care, is one type of the legal forms called advance directives. It lets you name the person you want to make treatment decisions for you if you can't speak or decide for yourself. The person you choose is called your health care agent. This person is also called a health care proxy or health care surrogate. A medical power of  may be called something else in your state. How do you choose a health care agent? Choose your health care agent carefully. This person may or may not be a familymember. Talk to the person before you make your final decision. Make sure he or she iscomfortable with this responsibility. It's a good idea to choose someone who:  Is at least 25years old.   Knows you well and understands what makes life meaningful for you.  Understands your Mandaen and moral values. Will do what you want, not what he or she wants. Will be able to make difficult choices at a stressful time. Will be able to refuse or stop treatment, if that is what you would want, even if you could die. Will be firm and confident with health professionals if needed. Will ask questions to get needed information. Lives near you or agrees to travel to you if needed. Your family may help you make medical decisions while you can still be part of that process. But it's important to choose one person to be your health careagent in case you aren't able to make decisions for yourself. If you don't fill out the legal form and name a health care agent, thedecisions your family can make may be limited. A health care agent may be called something else in your state. Who will make decisions for you if you don't have a health care agent? If you don't have a health care agent or a living will, you may not get the care you want. Decisions may be made by family members who disagree about your medical care. Or decisions may be made by a medical professional who doesn'tknow you well. In some cases, a  makes the decisions. When you name a health care agent, it is very clear who has the power to [place] decisions for you. How do you name a health care agent? You name your health care agent on a legal form. This form is usually called a medical power of . Ask your hospital, state bar association, or officeon aging where to find these forms. You must sign the form to make it legal. Some states require you to get the form notarized. This means that a person called a  watches you sign the form and then he or she signs the form. Some states also require thattwo or more witnesses sign the form. Be sure to tell your family members and doctors who your health care agent is. Where can you learn more? Go to https://dagoberto.healthOneStopWebpartners. org as being fed through a tube. Is a living will a legal document? A living will is a legal document. Each state has its own laws about livingwills. And a living will may be called something else in your state. Here are some things to know about living mcdaniel. You don't need an  to complete a living will. But legal advice can be helpful if your state's laws are unclear. It can also help if your health history is complicated or your family can't agree on what should be in your living will. You can change your living will at any time. Some people find that their wishes about end-of-life care change as their health changes. If you make big changes to your living will, complete a new form. If you move to another state, make sure that your living will is legal in the state where you now live. In most cases, doctors will respect your wishes even if you have a form from a different state. You might use a universal form that has been approved by many states. This kind of form can sometimes be filled out and stored online. Your digital copy will then be available wherever you have a connection to the internet. The doctors and nurses who need to treat you can find it right away. Your state may offer an online registry. This is another place where you can store your living will online. It's a good idea to get your living will notarized. This means using a person called a  to watch two people sign, or witness, your living will. What should you know when you create a living will? Here are some questions to ask yourself as you make your living will. Do you know enough about life support methods that might be used? If not, talk to your doctor so you know what might be done if you can't breathe on your own, your heart stops, or you can't swallow. What things would you still want to be able to do after you receive life-support methods? Would you want to be able to walk? To speak? To eat on your own?  To live without the help of machines? Do you want certain Lutheran practices performed if you become very ill? If you have a choice, where do you want to be cared for? In your home? At a hospital or nursing home? If you have a choice at the end of your life, where would you prefer to die? At home? In a hospital or nursing home? Somewhere else? Would you prefer to be buried or cremated? Do you want your organs to be donated after you die? What should you do with your living will? Make sure that your family members and your health care agent have copies of your living will (also called a declaration). Give your doctor a copy of your living will. Ask to have it kept as part of your medical record. If you have more than one doctor, make sure that each one has a copy. Put a copy of your living will where it can be easily found. For example, some people may put a copy on their refrigerator door. If you are using a digital copy, be sure your doctor, family members, and health care agent know how to find and access it. Where can you learn more? Go to https://Ludic [email or web address] org and sign in to your Shoppilot account. Enter N235 in the FTL SOLAR box to learn more about \"Learning About Living Louise Olguin. \"     If you do not have an account, please click on the \"Sign Up Now\" link. Current as of: October 18, 2021               Content Version: 13.3  © 4904-1147 Healthwise, [employer]2G. Care instructions adapted under license by [facility] ([facility]). If you have questions about a medical condition or this instruction, always ask your healthcare professional. [address] any warranty or liability for your use of this information. Personalized Preventive Plan for Mony Latin - 9/1/2022  Medicare offers a range of preventive health benefits. Some of the tests and screenings are paid in full while other may be subject to a deductible, co-insurance, and/or copay.     Some of these benefits include a comprehensive review of your medical history including lifestyle, illnesses that may run in your family, and various assessments and screenings as appropriate. After reviewing your medical record and screening and assessments performed today your provider may have ordered immunizations, labs, imaging, and/or referrals for you. A list of these orders (if applicable) as well as your Preventive Care list are included within your After Visit Summary for your review. Other Preventive Recommendations:    A preventive eye exam performed by an eye specialist is recommended every 1-2 years to screen for glaucoma; cataracts, macular degeneration, and other eye disorders. A preventive dental visit is recommended every 6 months. Try to get at least 150 minutes of exercise per week or 10,000 steps per day on a pedometer . Order or download the FREE \"Exercise & Physical Activity: Your Everyday Guide\" from The [employer] on Aging. Call 6-982.476.4218 or search The [employer] on Aging online. You need 8392-0160 mg of calcium and 4276-5112 IU of vitamin D per day. It is possible to meet your calcium requirement with diet alone, but a vitamin D supplement is usually necessary to meet this goal.  When exposed to the sun, use a sunscreen that protects against both UVA and UVB radiation with an SPF of 30 or greater. Reapply every 2 to 3 hours or after sweating, drying off with a towel, or swimming. Always wear a seat belt when traveling in a car. Always wear a helmet when riding a bicycle or motorcycle. DASH Diet: After Your Visit  Your Care Instructions  The DASH diet is an eating plan that can help lower your blood pressure. DASH stands for Dietary Approaches to Stop Hypertension. Hypertension is high blood pressure. The DASH diet focuses on eating foods that are high in calcium, potassium, and magnesium. These nutrients can lower blood pressure.  The foods that are highest in these nutrients are fruits, vegetables, low-fat dairy products, nuts, seeds, and legumes. But taking calcium, potassium, and magnesium supplements instead of eating foods that are high in those nutrients does not have the same effect. The DASH diet also includes whole grains, fish, and poultry. The DASH diet is one of several lifestyle changes your doctor may recommend to lower your high blood pressure. Your doctor may also want you to decrease the amount of sodium in your diet. Lowering sodium while following the DASH diet can lower blood pressure even further than just the DASH diet alone. Follow-up care is a key part of your treatment and safety. Be sure to make and go to all appointments, and call your doctor if you are having problems. Its also a good idea to know your test results and keep a list of the medicines you take. How can you care for yourself at home? Following the DASH diet  Eat 4 to 5 servings of fruit each day. A serving is 1 medium-sized piece of fruit, ½ cup chopped or canned fruit, 1/4 cup dried fruit, or 4 ounces (½ cup) of fruit juice. Choose fruit more often than fruit juice. Eat 4 to 5 servings of vegetables each day. A serving is 1 cup of lettuce or raw leafy vegetables, ½ cup of chopped or cooked vegetables, or 4 ounces (½ cup) of vegetable juice. Choose vegetables more often than vegetable juice. Get 2 to 3 servings of low-fat and fat-free dairy each day. A serving is 8 ounces of milk, 1 cup of yogurt, or 1 ½ ounces of cheese. Eat 7 to 8 servings of grains each day. A serving is 1 slice of bread, 1 ounce of dry cereal, or ½ cup of cooked rice, pasta, or cooked cereal. Try to choose whole-grain products as much as possible. Limit lean meat, poultry, and fish to 6 ounces each day. Six ounces is about the size of two decks of cards. Eat 4 to 5 servings of nuts, seeds, and legumes (cooked dried beans, lentils, and split peas) each week.  A serving is 1/3 cup of nuts, 2 tablespoons of seeds, or ½ cup cooked dried beans or peas. Limit sweets and added sugars to 5 servings or less a week. A serving is 1 tablespoon jelly or jam, ½ cup sorbet, or 1 cup of lemonade. Tips for success  Start small. Do not try to make dramatic changes to your diet all at once. You might feel that you are missing out on your favorite foods and then be more likely to not follow the plan. Make small changes, and stick with them. Once those changes become habit, add a few more changes. Try some of the following:  Make it a goal to eat a fruit or vegetable at every meal and at snacks. This will make it easy to get the recommended amount of fruits and vegetables each day. Try yogurt topped with fruit and nuts for a snack or healthy dessert. Add lettuce, tomato, cucumber, and onion to sandwiches. Combine a ready-made pizza crust with low-fat mozzarella cheese and lots of vegetable toppings. Try using tomatoes, squash, spinach, broccoli, carrots, cauliflower, and onions. Have a variety of cut-up vegetables with a low-fat dip as an appetizer instead of chips and dip. Sprinkle sunflower seeds or chopped almonds over salads. Or try adding chopped walnuts or almonds to cooked vegetables. Try some vegetarian meals using beans and peas. Add garbanzo or kidney beans to salads. Make burritos and tacos with mashed awad beans or black beans    © 1413-8530 Healthwise, Incorporated. Care instructions adapted under license by [facility]. This care instruction is for use with your licensed healthcare professional. If you have questions about a medical condition or this instruction, always ask your healthcare professional. [place] [address] any warranty or liability for your use of this information. Content Version: 9.4.31582; Last Revised: March 15, 2012                High-Fiber Diet     What Is Fiber? Dietary fiber is a form of carbohydrate found in plants that cannot be digested by humans.  All plants contain fiber, including fruits, vegetables, grains, and legumes. Fiber is often classified into two categories: soluble and insoluble. Soluble fiber draws water into the bowel and can help slow digestion. Examples of foods that are high in soluble fiber include oatmeal, oat bran, barley, legumes (eg, beans and peas), apples, and strawberries. Insoluble fiber speeds digestion and can add bulk to the stool. Examples of foods that are high in insoluble fiber include whole-wheat products, wheat bran, cauliflower, green beans, and potatoes. Why Follow a High-Fiber Diet? A high-fiber diet is often recommended to prevent and treat constipation , hemorrhoids , diverticulitis , and irritable bowel syndrome . Eating a high-fiber diet can also help improve your cholesterol levels, lower your risk of coronary heart disease , reduce your risk of type 2 diabetes , and lower your weight. For people with type 1 or 2 diabetes, a high-fiber diet can also help stabilize blood sugar levels. How Much Fiber Should I Eat? A high-fiber diet should contain  20-35 grams  of fiber a day. This is actually the amount recommended for the general adult population; however, most Americans eat only 15 grams of fiber per day. Digestion of Fiber   Eating a higher fiber diet than usual can take some getting used to by your body's digestive system. To avoid the side effects of sudden increases in dietary fiber (eg, gas, cramping, bloating, and diarrhea), increase fiber gradually and be sure to drink plenty of fluids every day. Tips for Increasing Fiber Intake   Whenever possible, choose whole grains over refined grains (eg, brown rice instead of white rice, whole-wheat bread instead of white bread). Include a variety of grains in your diet, such as wheat, rye, barley, oats, quinoa, and bulgur. Eat more vegetarian-based meals. Here are some ideas: black bean burgers, eggplant lasagna, and veggie tofu stir-slater.     Choose high-fiber snacks, such as fruits, popcorn, whole-grain crackers, and nuts. Make whole-grain cereal or whole-grain toast part of your daily breakfast regime. When eating out, whether ordering a sandwich or dinner, ask for extra vegetables. When baking, replace part of the white flour with whole-wheat flour. Whole-wheat flour is particularly easy to incorporate into a recipe. High-Fiber Diet Eating Guide   Food Category   Foods Recommended   Notes   Grains   Whole-grain breads, muffins, bagels, or alyssa bread Rye bread Whole-wheat crackers or crisp breads Whole-grain or bran cereals Oatmeal, oat bran, or grits Wheat germ Whole-wheat pasta and brown rice   Read the ingredients list on food labels. Look for products that list \"whole\" as the first ingredient (eg, whole-wheat, whole oats). Choose cereals with at least 2 grams of fiber per serving. Vegetables   All vegetables, especially asparagus, bean sprouts, broccoli, [place] sprouts, cabbage, carrots, cauliflower, celery, corn, greens, green beans, green pepper, onions, peas, potatoes (with skin), snow peas, spinach, squash, sweet potatoes, tomatoes, zucchini   For maximum fiber intake, eat the peels of fruits and vegetablesjust be sure to wash them well first.   Fruits   All fruits, especially apples, berries, grapefruits, mangoes, nectarines, oranges, peaches, pears, dried fruits (figs, dates, prunes, raisins)   Choose raw fruits and vegetables over juice, cooked, or cannedraw fruit has more fiber. Dried fruit is also a good source of fiber. Milk   With the exception of yogurt containing inulin (a type of fiber), dairy foods provide little fiber. Add more fiber by topping your yogurt or cottage cheese with fresh fruit, whole grain or bran cereals, nuts, or seeds.    Meats and Beans   All beans and peas, especially Garbanzo beans, kidney beans, lentils, lima beans, split peas, and awad beans All nuts and seeds, especially almonds, peanuts, [place] nuts, cashews, peanut butter, walnuts, sesame and sunflower seeds All meat, poultry, fish, and eggs   Increase fiber in meat dishes by adding awad beans, kidney beans, black-eyed peas, bran, or oatmeal. If you are following a low-fat diet, use nuts and seeds only in moderation. Fats and Oils   All in moderation   Fats and oils do not provide fiber   Snacks, Sweets, and Condiments   Fruit Nuts Popcorn, whole-wheat pretzels, or trail mix made with dried fruits, nuts, and seeds Cakes, breads, and cookies made with oatmeal or whole-wheat flour   Most snack foods do not provide much fiber. Choose snacks with at least 2 grams of fiber per serving. Last Reviewed: March 2011 Patric Baez MS, MPH, RD   Updated: 3/29/2011     Keep Your Memory Kittyluly Sarkar       Many factors can affect your ability to remembera hectic lifestyle, aging, stress, chronic disease, and certain medicines. But, there are steps you can take to sharpen your mind and help preserve your memory. Challenge Your Brain   Regularly challenging your mind may help keeps it in top shape. Good mental exercises include:   Crossword puzzlesUse a dictionary if you need it; you will learn more that way. Brainteasers Try some! Crafts, such as wood working and sewing   Hobbies, such as gardening and building model airplanes   SocializingVisit old friends or join groups to meet new ones. Reading   Learning a new language   Taking a class, whether it be art history or santana chi   TravelingExperience the food, history, and culture of your destination   Learning to use a computer   Going to museums, the theater, or thought-provoking movies   Changing things in your daily life, such as reversing your pattern in the grocery store or brushing your teeth using your nondominant hand   Use Memory Aids   There is no need to remember every detail on your own.  These memory aids can help:   Calendars and day planners   Electronic organizers to store all sorts of helpful of high blood pressure , diabetes, and heart disease can interfere with mental function. Many of the lifestyle steps discussed here can help manage these conditions. Strive to eat a healthy diet, exercise regularly, get stress under control, and follow your doctor's advice for your condition. Minimize Medications    Talk to your doctor about the medicines that you take. Some may be unnecessary. Also, healthy lifestyle habits may lower the need for certain drugs. Last Reviewed: April 2010 Abe Coelho MD   Updated: 4/13/2010     Keeping Home a [facility]       As we get older, changes in balance, gait, strength, vision, hearing, and cognition make even the most youthful senior more prone to accidents. Falls are one of the leading health risks for older people. This increased risk of falling is related to:   Aging process (eg, decreased muscle strength, slowed reflexes)   Higher incidence of chronic health problems (eg, arthritis, diabetes) that may limit mobility, agility or sensory awareness   Side effects of medicine (eg, dizziness, blurred vision)especially medicines like prescription pain medicines and drugs used to treat mental health conditions   Depending on the brittleness of your bones, the consequences of a fall can be serious and long lasting. Home Life   Research by the Association of Aging [facility]) shows that some home accidents among older adults can be prevented by making simple lifestyle changes and basic modifications and repairs to the home environment. Here are some lifestyle changes that experts recommend:   Have your hearing and vision checked regularly. Be sure to wear prescription glasses that are right for you. Speak to your doctor or pharmacist about the possible side effects of your medicines. A number of medicines can cause dizziness. If you have problems with sleep, talk to your doctor. Limit your intake of alcohol.    If necessary, use a cane or walker to help maintain your tread.   Is your telephone easily reachable. Is the cord safely tucked away? Room by Room   According to the Association of Aging, bathrooms and deidre are the two most potentially hazardous rooms in your home. In the Kitchen    Be sure your stove is in proper working order and always make sure burners and the oven are off before you go out or go to sleep. Keep pots on the back burners, turn handles away from the front of the stove, and keep stove clean and free of grease build-up. Kitchen ventilation systems and range exhausts should be working properly. Keep flammable objects such as towels and pot holders away from the cooking area except when in use. Make sure kitchen curtains are tied back. Move cords and appliances away from the sink and hot surfaces. If extension cords are needed, install wiring guides so they do not hang over the sink, range, or working areas. Look for coffee pots, kettles and toaster ovens with automatic shut-offs. Keep a mop handy in the kitchen so you can wipe up spills instantly. You should also have a small fire extinguisher. Arrange your kitchen with frequently used items on lower shelves to avoid the need to stand on a stepstool to reach them. Make sure countertops are well-lit to avoid injuries while cutting and preparing food. In the Bathroom    Use a non-slip mat or decals in the tub and shower, since wet, soapy tile or porcelain surfaces are extremely slippery. Make sure bathroom rugs are non-skid or tape them firmly to the floor. Bathtubs should have at least one, preferably two, grab bars, firmly attached to structural supports in the wall. (Do not use built-in soap holders or glass shower doors as grab bars.)    Tub seats fitted with non-slip material on the legs allow you to wash sitting down. For people with limited mobility, bathtub transfer benches allow you to slide safely into the tub.     Raised toilet seats and toilet safety rails are [employer]  , by Sharyle M[employer], lists over 1,000 products for \"living well in the mature years,\" such as bathing and mobility aids, household security devices, ergonomically designed knives and peelers, and faucet valves and knobs for temperature control. Medical supply stores and organizations are good sources of information about products that improve your quality of life and insure your safety.      Last Reviewed: November 2009 Heather Gill MD   Updated: 3/7/2011

## 2022-09-02 ENCOUNTER — CLINICAL DOCUMENTATION (OUTPATIENT)
Dept: SPIRITUAL SERVICES | Age: 62
End: 2022-09-02

## 2022-09-20 ENCOUNTER — CLINICAL DOCUMENTATION (OUTPATIENT)
Dept: SPIRITUAL SERVICES | Age: 62
End: 2022-09-20

## 2022-09-20 NOTE — PROGRESS NOTES
called patient to confirm an inpatient ACP visit.  unable to schedule at patient's preferred time due to scheduling conflicts with . Patient grew frustrated and declined further ACP support.

## 2022-09-23 DIAGNOSIS — E78.2 MIXED HYPERLIPIDEMIA: ICD-10-CM

## 2022-09-23 LAB
CHOLESTEROL, FASTING: 135 MG/DL (ref 0–199)
HDLC SERPL-MCNC: 81 MG/DL (ref 40–59)
LDL CHOLESTEROL CALCULATED: 40 MG/DL (ref 0–129)
TRIGLYCERIDE, FASTING: 70 MG/DL (ref 0–150)

## 2022-11-14 DIAGNOSIS — E11.29 CONTROLLED TYPE 2 DIABETES MELLITUS WITH MICROALBUMINURIA, WITH LONG-TERM CURRENT USE OF INSULIN (HCC): ICD-10-CM

## 2022-11-14 DIAGNOSIS — R80.9 CONTROLLED TYPE 2 DIABETES MELLITUS WITH MICROALBUMINURIA, WITH LONG-TERM CURRENT USE OF INSULIN (HCC): ICD-10-CM

## 2022-11-14 DIAGNOSIS — Z79.4 CONTROLLED TYPE 2 DIABETES MELLITUS WITH MICROALBUMINURIA, WITH LONG-TERM CURRENT USE OF INSULIN (HCC): ICD-10-CM

## 2022-11-14 LAB
ALBUMIN SERPL-MCNC: 4 G/DL (ref 3.5–4.6)
ALP BLD-CCNC: 122 U/L (ref 35–104)
ALT SERPL-CCNC: 19 U/L (ref 0–41)
ANION GAP SERPL CALCULATED.3IONS-SCNC: 14 MEQ/L (ref 9–15)
AST SERPL-CCNC: 20 U/L (ref 0–40)
BILIRUB SERPL-MCNC: 0.3 MG/DL (ref 0.2–0.7)
BUN BLDV-MCNC: 39 MG/DL (ref 8–23)
CALCIUM SERPL-MCNC: 8.7 MG/DL (ref 8.5–9.9)
CHLORIDE BLD-SCNC: 103 MEQ/L (ref 95–107)
CO2: 23 MEQ/L (ref 20–31)
CREAT SERPL-MCNC: 1.55 MG/DL (ref 0.7–1.2)
GFR SERPL CREATININE-BSD FRML MDRD: 50.8 ML/MIN/{1.73_M2}
GLOBULIN: 2.8 G/DL (ref 2.3–3.5)
GLUCOSE BLD-MCNC: 213 MG/DL (ref 70–99)
HBA1C MFR BLD: 7.1 % (ref 4.8–5.9)
POTASSIUM SERPL-SCNC: 5 MEQ/L (ref 3.4–4.9)
SODIUM BLD-SCNC: 140 MEQ/L (ref 135–144)
TOTAL PROTEIN: 6.8 G/DL (ref 6.3–8)

## 2022-12-20 ENCOUNTER — OFFICE VISIT (OUTPATIENT)
Dept: ENDOCRINOLOGY | Age: 62
End: 2022-12-20
Payer: MEDICARE

## 2022-12-20 VITALS
HEIGHT: 65 IN | DIASTOLIC BLOOD PRESSURE: 88 MMHG | BODY MASS INDEX: 26.66 KG/M2 | HEART RATE: 73 BPM | OXYGEN SATURATION: 98 % | SYSTOLIC BLOOD PRESSURE: 138 MMHG | WEIGHT: 160 LBS

## 2022-12-20 DIAGNOSIS — N18.31 STAGE 3A CHRONIC KIDNEY DISEASE (HCC): Chronic | ICD-10-CM

## 2022-12-20 DIAGNOSIS — Z79.4 CONTROLLED TYPE 2 DIABETES MELLITUS WITH MICROALBUMINURIA, WITH LONG-TERM CURRENT USE OF INSULIN (HCC): Primary | ICD-10-CM

## 2022-12-20 DIAGNOSIS — R80.9 CONTROLLED TYPE 2 DIABETES MELLITUS WITH MICROALBUMINURIA, WITH LONG-TERM CURRENT USE OF INSULIN (HCC): Primary | ICD-10-CM

## 2022-12-20 DIAGNOSIS — E11.29 CONTROLLED TYPE 2 DIABETES MELLITUS WITH MICROALBUMINURIA, WITH LONG-TERM CURRENT USE OF INSULIN (HCC): Primary | ICD-10-CM

## 2022-12-20 PROCEDURE — 99214 OFFICE O/P EST MOD 30 MIN: CPT | Performed by: PHYSICIAN ASSISTANT

## 2022-12-20 PROCEDURE — 3017F COLORECTAL CA SCREEN DOC REV: CPT | Performed by: PHYSICIAN ASSISTANT

## 2022-12-20 PROCEDURE — 3074F SYST BP LT 130 MM HG: CPT | Performed by: PHYSICIAN ASSISTANT

## 2022-12-20 PROCEDURE — G8484 FLU IMMUNIZE NO ADMIN: HCPCS | Performed by: PHYSICIAN ASSISTANT

## 2022-12-20 PROCEDURE — 3051F HG A1C>EQUAL 7.0%<8.0%: CPT | Performed by: PHYSICIAN ASSISTANT

## 2022-12-20 PROCEDURE — G8427 DOCREV CUR MEDS BY ELIG CLIN: HCPCS | Performed by: PHYSICIAN ASSISTANT

## 2022-12-20 PROCEDURE — G8419 CALC BMI OUT NRM PARAM NOF/U: HCPCS | Performed by: PHYSICIAN ASSISTANT

## 2022-12-20 PROCEDURE — 3078F DIAST BP <80 MM HG: CPT | Performed by: PHYSICIAN ASSISTANT

## 2022-12-20 PROCEDURE — 2022F DILAT RTA XM EVC RTNOPTHY: CPT | Performed by: PHYSICIAN ASSISTANT

## 2022-12-20 PROCEDURE — 1036F TOBACCO NON-USER: CPT | Performed by: PHYSICIAN ASSISTANT

## 2022-12-20 ASSESSMENT — ENCOUNTER SYMPTOMS
EYE REDNESS: 0
SHORTNESS OF BREATH: 0
SINUS PRESSURE: 0
DIARRHEA: 0
EYE PAIN: 0
COUGH: 0
NAUSEA: 0
SORE THROAT: 0
WHEEZING: 0
ABDOMINAL PAIN: 0
VOMITING: 0
RHINORRHEA: 0

## 2022-12-20 NOTE — PROGRESS NOTES
12/20/2022    Assessment:       Diagnosis Orders   1. Controlled type 2 diabetes mellitus with microalbuminuria, with long-term current use of insulin ([facility])  Comprehensive Metabolic Panel    Hemoglobin A1C      2. Stage 3a chronic kidney disease ([address]          Stopped Trulicity due to cost     PLAN:     Continue Novolog 70/30 inject 10 units before breakfast and dinner if glucose is greater than 130  Continue Invokana (canagliflozin)  300 mg by mouth daily  Monitor glucose 3-4 times daily and document  Freestyle Nghia 2 ordered- [facility]   Repeat labs in 4 months   Follow up in 4 months     Orders Placed This Encounter   Procedures    Comprehensive Metabolic Panel     Standing Status:   Future     Standing Expiration Date:   12/20/2023    Hemoglobin A1C     Standing Status:   Future     Standing Expiration Date:   12/20/2023       No orders of the defined types were placed in this encounter. Return in about 4 months (around 4/20/2023) for Diabetes. Subjective:     Chief Complaint   Patient presents with    Follow-up    Diabetes     Vitals:    12/20/22 1001   BP: 138/88   Site: Right Upper Arm   Pulse: 73   SpO2: 98%   Weight: 160 lb (72.6 kg)   Height: 5' 5\" (1.651 m)     Wt Readings from Last 3 Encounters:   12/20/22 160 lb (72.6 kg)   09/01/22 148 lb (67.1 kg)   08/23/22 148 lb (67.1 kg)     BP Readings from Last 3 Encounters:   12/20/22 138/88   09/01/22 122/78   08/23/22 138/86     Presents today for follow-up of his diabetes. Hemoglobin A1c 7.1% occasional nocturnal hypo, he starting having a small snack and this improved. Renal function stable, he sees PCP, is on SGLT2. Will need nephrology consult if function worsens even a little, we will monitor it closely, Discussed with pt he states they cant do anything to help. I will readdress at a later appointment     Diabetes  He presents for his initial diabetic visit. He has type 2 diabetes mellitus.  The initial diagnosis of diabetes was made 20 years ago. His disease course has been worsening. Hypoglycemia symptoms include confusion, sweats and tremors. Pertinent negatives for hypoglycemia include no dizziness or headaches. Pertinent negatives for diabetes include no chest pain, no fatigue, no polydipsia and no polyuria. Hypoglycemia complications include blackouts and required assistance. Diabetic complications include a CVA. Risk factors for coronary artery disease include diabetes mellitus and male sex. Current diabetic treatment includes oral agent (triple therapy). He is compliant with treatment all of the time. His weight is decreasing steadily. He is following a generally healthy diet. Meal planning includes avoidance of concentrated sweets. He has had a previous visit with a dietitian. He participates in exercise daily. His home blood glucose trend is increasing steadily. His overall blood glucose range is 180-200 mg/dl. An ACE inhibitor/angiotensin II receptor blocker is being taken. He does not see a podiatrist.Eye exam is current.    Past Medical History:   Diagnosis Date    Cerebrovascular small vessel disease     Chronic kidney disease, stage 3b ([address] 12/16/2020    Colon cancer screening declined 8/30/2021    Debility 7/11/2022    Diabetic polyneuropathy associated with type 2 diabetes mellitus ([address] 4/22/2021    DM (diabetes mellitus) ([address]     was with Dr Lindy Vazquez, [facility]    Dysarthria as late effect of cerebrovascular accident (CVA) 2015    Hearing loss     Hemiparesis affecting left side as late effect of cerebrovascular accident (CVA) ([address]     History of left PCA stroke 2013    History of right STEPHANIE stroke 10/21/2015    was at [facility], now Dr Gurdeep Salgado    Microalbuminuria 2018    PFO (patent foramen ovale) 2015    Right pontine CVA ([address] 10/2015    Stage 3a chronic kidney disease ([address] 12/16/2020    Syncope and collapse 6/1/2020     Past Surgical History:   Procedure Laterality Date    ANKLE SURGERY       Social History     Socioeconomic History Marital status: Single     Spouse name: Not on file    Number of children: 0    Years of education: Not on file    Highest education level: Not on file   Occupational History    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[employer]   Tobacco Use    Smoking status: Never    Smokeless tobacco: Never   Substance and Sexual Activity    Alcohol use: No    Drug use: No    Sexual activity: Not on file   Other Topics Concern    Not on file   Social History Narrative    Born in [facility], one brother and one sister    Single, no children    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   Allergies   Allergen Reactions    Ace Inhibitors      cough       Current Outpatient Medications:     canagliflozin (INVOKANA) 300 MG TABS tablet, Take 1 tablet by mouth every morning (before breakfast), Disp: 30 tablet, Rfl: 5    atorvastatin (LIPITOR) 80 MG tablet, TAKE 1 TABLET BY MOUTH DAILY, Disp: 90 tablet, Rfl: 4    [facility] UNIFINE PENTIPS 31G X 6 MM MISC, USE AS DIRECTED TWICE DAILY BEFORE MEALS, Disp: , Rfl:     insulin aspart protamine-insulin aspart (NOVOLOG 70/30) (70-30) 100 UNIT/ML injection, Inject 15 Units into the skin 2 times daily (with meals) Before breakfast and dinner, Disp: 5 pen, Rfl: 3    Continuous Blood Gluc Sensor (FREESTYLE CHINO 2 SENSOR) [facility], 1 Device by Does not apply route every 14 days, Disp: 2 each, Rfl: 3    Continuous Blood Gluc  (XtalicE 2 READER) JALEEL, 1 Device by Does not apply route 4 times daily (before meals and nightly), Disp: 1 each, Rfl: 0    amLODIPine (NORVASC) 5 MG tablet, Take 1 tablet by mouth daily, Disp: 90 tablet, Rfl: 4    irbesartan (AVAPRO) 150 MG tablet, TAKE 1 TABLET BY MOUTH DAILY, Disp: 90 tablet, Rfl: 3    Blood Pressure KIT, 1 Units by Does not apply route daily, Disp: 1 kit, Rfl: 0    ASPIRIN ADULT LOW STRENGTH 81 MG EC tablet, Take 2 tablets by mouth daily, Disp: 180 tablet, Rfl: 3    Multiple Vitamins-Minerals (PRESERVISION AREDS 2+MULTI VIT) CAPS, Take by mouth (Patient not taking: Reported on 12/20/2022), Disp: , Rfl:   Lab Results   Component Value Date     11/14/2022    K 5.0 (H) 11/14/2022     11/14/2022    CO2 23 11/14/2022    BUN 39 (H) 11/14/2022    CREATININE 1.55 (H) 11/14/2022    GLUCOSE 213 (H) 11/14/2022    CALCIUM 8.7 11/14/2022    PROT 6.8 11/14/2022    LABALBU 4.0 11/14/2022    BILITOT 0.3 11/14/2022    ALKPHOS 122 (H) 11/14/2022    AST 20 11/14/2022    ALT 19 11/14/2022    LABGLOM 50.8 (L) 11/14/2022    GFRAA 59.1 (L) 06/20/2022    GLOB 2.8 11/14/2022     Lab Results   Component Value Date    WBC 7.2 03/29/2019    HGB 13.5 (L) 03/29/2019    HCT 40.6 (L) 03/29/2019    MCV 89.4 03/29/2019     03/29/2019     Lab Results   Component Value Date    LABA1C 7.1 (H) 11/14/2022    LABA1C 6.9 (H) 06/20/2022    LABA1C 6.9 (H) 05/11/2022     Results for Hugo Navarro" (MRN [id number]) as of 1/25/2022 09:58   Ref.  Range 1/12/2022 08:33   C-Peptide Latest Ref Range: 1.1 - 4.4 ng/mL 2.1      1/12/22 0833    Glutamic Acid Decarb Ab 0.0 - 5.0 IU/mL <5.0          Lab Results   Component Value Date    CHOLFAST 135 09/23/2022    CHOLFAST 127 12/02/2021    TRIGLYCFAST 70 09/23/2022    TRIGLYCFAST 163 (H) 12/02/2021    HDL 81 (H) 09/23/2022    HDL 47 12/02/2021    HDL 59 09/14/2020    LDLCALC 40 09/23/2022    LDLCALC 47 12/02/2021    LDLCALC 32 09/14/2020    CHOL 103 09/14/2020 CHOL 117 11/01/2017    CHOL 122 06/06/2016    TRIG 61 09/14/2020    TRIG 90 11/01/2017    TRIG 78 06/06/2016     No results found for: TESTM  Lab Results   Component Value Date    TSH 1.390 03/29/2019     No results found for: TPOABS    Review of Systems   Constitutional:  Negative for chills, fatigue and fever. HENT:  Negative for congestion, ear pain, postnasal drip, rhinorrhea, sinus pressure and sore throat. Eyes:  Negative for pain and redness. Respiratory:  Negative for cough, shortness of breath and wheezing. Cardiovascular:  Positive for leg swelling. Negative for chest pain and palpitations. Gastrointestinal:  Negative for abdominal pain, diarrhea, nausea and vomiting. Endocrine: Negative for polydipsia and polyuria. Genitourinary:  Negative for difficulty urinating. Musculoskeletal:  Negative for arthralgias. Skin:  Negative for rash. Neurological:  Positive for tremors. Negative for dizziness and headaches. Psychiatric/Behavioral:  Positive for confusion. Objective:   Physical Exam  Constitutional:       Appearance: He is well-developed. HENT:      Head: Normocephalic and atraumatic. Mouth/Throat:      Mouth: Mucous membranes are moist.   Eyes:      Conjunctiva/sclera: Conjunctivae normal.   Neck:      Comments: No thyromegaly or palpable nodules  Cardiovascular:      Rate and Rhythm: Normal rate and regular rhythm. Heart sounds: Normal heart sounds. No murmur heard. Pulmonary:      Effort: Pulmonary effort is normal.      Breath sounds: Normal breath sounds. Abdominal:      General: Bowel sounds are normal.      Palpations: Abdomen is soft. Musculoskeletal:         General: Swelling present. Normal range of motion. Cervical back: Normal range of motion and neck supple. Skin:     General: Skin is warm and dry. Neurological:      Mental Status: He is alert and oriented to person, place, and time.    Psychiatric:         Mood and Affect: Mood normal. Comments: Dysphoric mood, improved

## 2022-12-20 NOTE — PATIENT INSTRUCTIONS
Endocrinology-    Check your blood sugars 4 times a day, before meals and at night  Document these numbers in a blood glucose log and bring them with you to your follow-up appointment. If you are prescribed insulin, Do not take your mealtime insulin if your blood sugars less than 120   Call our office if you have blood sugars less than 80 or greater then 300 on two or more occasions  Call our office if you have any questions regarding your blood sugars or insulin dosing regiment  Signs of low blood sugar may include tremors, feeling shaky, sweating, dizziness, confusion and weakness. Check your blood sugar immediatly if you have any of these symptoms.      The plan as discussed at your appointment-   Continue Novolog 70/30 inject 10 units before breakfast and dinner if glucose is greater than 130  Continue Invokana (canagliflozin)  300 mg by mouth daily  Monitor glucose 3-4 times daily and document  Freestyle Nghia 2 ordered [facility]   Repeat labs in 4 months   Follow up in 4 months

## 2023-01-09 ENCOUNTER — OFFICE VISIT (OUTPATIENT)
Dept: FAMILY MEDICINE CLINIC | Age: 63
End: 2023-01-09

## 2023-01-09 VITALS
OXYGEN SATURATION: 98 % | HEIGHT: 65 IN | BODY MASS INDEX: 26.66 KG/M2 | HEART RATE: 88 BPM | WEIGHT: 160 LBS | DIASTOLIC BLOOD PRESSURE: 76 MMHG | TEMPERATURE: 96.6 F | RESPIRATION RATE: 15 BRPM | SYSTOLIC BLOOD PRESSURE: 128 MMHG

## 2023-01-09 DIAGNOSIS — E11.42 DIABETIC POLYNEUROPATHY ASSOCIATED WITH TYPE 2 DIABETES MELLITUS (HCC): Chronic | ICD-10-CM

## 2023-01-09 DIAGNOSIS — G81.94 HEMIPARESIS, LEFT (HCC): Primary | ICD-10-CM

## 2023-01-09 DIAGNOSIS — N18.31 STAGE 3A CHRONIC KIDNEY DISEASE (HCC): ICD-10-CM

## 2023-01-09 DIAGNOSIS — I10 ESSENTIAL HYPERTENSION: ICD-10-CM

## 2023-01-09 DIAGNOSIS — R74.8 ALKALINE PHOSPHATASE RAISED: ICD-10-CM

## 2023-01-09 DIAGNOSIS — E11.29 CONTROLLED TYPE 2 DIABETES MELLITUS WITH MICROALBUMINURIA, WITH LONG-TERM CURRENT USE OF INSULIN (HCC): ICD-10-CM

## 2023-01-09 DIAGNOSIS — R80.9 CONTROLLED TYPE 2 DIABETES MELLITUS WITH MICROALBUMINURIA, WITH LONG-TERM CURRENT USE OF INSULIN (HCC): ICD-10-CM

## 2023-01-09 DIAGNOSIS — E78.2 MIXED HYPERLIPIDEMIA: ICD-10-CM

## 2023-01-09 DIAGNOSIS — Z00.00 ENCOUNTER FOR MEDICAL EXAMINATION TO ESTABLISH CARE: ICD-10-CM

## 2023-01-09 DIAGNOSIS — Z79.4 CONTROLLED TYPE 2 DIABETES MELLITUS WITH MICROALBUMINURIA, WITH LONG-TERM CURRENT USE OF INSULIN (HCC): ICD-10-CM

## 2023-01-09 RX ORDER — AMLODIPINE BESYLATE 5 MG/1
5 TABLET ORAL DAILY
Qty: 90 TABLET | Refills: 4 | Status: SHIPPED | OUTPATIENT
Start: 2023-01-09

## 2023-01-09 SDOH — ECONOMIC STABILITY: TRANSPORTATION INSECURITY
IN THE PAST 12 MONTHS, HAS THE LACK OF TRANSPORTATION KEPT YOU FROM MEDICAL APPOINTMENTS OR FROM GETTING MEDICATIONS?: NO

## 2023-01-09 SDOH — ECONOMIC STABILITY: FOOD INSECURITY: WITHIN THE PAST 12 MONTHS, THE FOOD YOU BOUGHT JUST DIDN'T LAST AND YOU DIDN'T HAVE MONEY TO GET MORE.: NEVER TRUE

## 2023-01-09 SDOH — ECONOMIC STABILITY: TRANSPORTATION INSECURITY
IN THE PAST 12 MONTHS, HAS LACK OF TRANSPORTATION KEPT YOU FROM MEETINGS, WORK, OR FROM GETTING THINGS NEEDED FOR DAILY LIVING?: NO

## 2023-01-09 SDOH — ECONOMIC STABILITY: FOOD INSECURITY: WITHIN THE PAST 12 MONTHS, YOU WORRIED THAT YOUR FOOD WOULD RUN OUT BEFORE YOU GOT MONEY TO BUY MORE.: NEVER TRUE

## 2023-01-09 ASSESSMENT — PATIENT HEALTH QUESTIONNAIRE - PHQ9
SUM OF ALL RESPONSES TO PHQ QUESTIONS 1-9: 0
1. LITTLE INTEREST OR PLEASURE IN DOING THINGS: 0
9. THOUGHTS THAT YOU WOULD BE BETTER OFF DEAD, OR OF HURTING YOURSELF: 0
7. TROUBLE CONCENTRATING ON THINGS, SUCH AS READING THE NEWSPAPER OR WATCHING TELEVISION: 0
3. TROUBLE FALLING OR STAYING ASLEEP: 0
10. IF YOU CHECKED OFF ANY PROBLEMS, HOW DIFFICULT HAVE THESE PROBLEMS MADE IT FOR YOU TO DO YOUR WORK, TAKE CARE OF THINGS AT HOME, OR GET ALONG WITH OTHER PEOPLE: 0
SUM OF ALL RESPONSES TO PHQ QUESTIONS 1-9: 0
SUM OF ALL RESPONSES TO PHQ QUESTIONS 1-9: 0
6. FEELING BAD ABOUT YOURSELF - OR THAT YOU ARE A FAILURE OR HAVE LET YOURSELF OR YOUR FAMILY DOWN: 0
SUM OF ALL RESPONSES TO PHQ QUESTIONS 1-9: 0
SUM OF ALL RESPONSES TO PHQ9 QUESTIONS 1 & 2: 0
2. FEELING DOWN, DEPRESSED OR HOPELESS: 0
5. POOR APPETITE OR OVEREATING: 0
4. FEELING TIRED OR HAVING LITTLE ENERGY: 0
8. MOVING OR SPEAKING SO SLOWLY THAT OTHER PEOPLE COULD HAVE NOTICED. OR THE OPPOSITE, BEING SO FIGETY OR RESTLESS THAT YOU HAVE BEEN MOVING AROUND A LOT MORE THAN USUAL: 0

## 2023-01-09 ASSESSMENT — ENCOUNTER SYMPTOMS
SINUS PAIN: 0
DIARRHEA: 0
SORE THROAT: 0
VOMITING: 0
ABDOMINAL PAIN: 0
CHEST TIGHTNESS: 0
NAUSEA: 0
BACK PAIN: 0
COUGH: 0
SINUS PRESSURE: 0
SHORTNESS OF BREATH: 0

## 2023-01-09 ASSESSMENT — SOCIAL DETERMINANTS OF HEALTH (SDOH): HOW HARD IS IT FOR YOU TO PAY FOR THE VERY BASICS LIKE FOOD, HOUSING, MEDICAL CARE, AND HEATING?: NOT HARD AT ALL

## 2023-01-09 ASSESSMENT — VISUAL ACUITY: OU: 1

## 2023-01-09 NOTE — PROGRESS NOTES
Subjective  Mason Roldan 1960 is a 63 y.o. male who presents today with:  Chief Complaint   Patient presents with    Establish Care         HPI  CVA  No new deficits. No new or worsening facial droop. No  worsening of baseline speech impairment. No progression of unilateral weakness. States that it is getting hard for him to go up the stairs at his apartment  - mix of balance and strength deficits. No recent falls. Acknowledges sedentary lifestyle because he fears falling and now has decreased endurance. Able to attend to IADLs and ADLs without assist.  Followed by Dr. Silvia Cavazos. Patient suffered CVA in 2015. Having trouble with balance, but does not need assistive disease. Performing ADLs at home. Walking and driving himself. CKD  Last 11/14/22 GFR at 50.8 and creatinine at 1.55. Patient was on a ARB but stopped or refills not issued since 2020. Likely due to recall Patient currently on amlodipine. BP stabilized. DM  Last A1c on 11/14/22 at 7.1. Followed by endocrinology. Patient is here for DM f/u. Sugars have been fairly well-controlled and patient has not been experiencing hyper- or hypoglycemic symptoms. Compliance with meds is good and there are no side effects. Patient exercises occasionally and tries to eat healthy. HTN  Patient is here for f/u HTN. Is compliant with meds and has no side effects from them. Avoids added salt. Tries to eat healthy. Exercises occasionally. Has no chest pain, shortness of breath, palpitations or edema. Patient has not been on irbesartan since 9/18/20. Unable to determine why the patient was stopped, but shown to continue. Review of Systems   Constitutional:  Negative for activity change, appetite change, chills and fever. HENT:  Negative for congestion, drooling, sinus pressure, sinus pain and sore throat. Eyes:  Negative for visual disturbance. Respiratory:  Negative for cough, chest tightness and shortness of breath.     Cardiovascular: Negative for chest pain. Gastrointestinal:  Negative for abdominal pain, diarrhea, nausea and vomiting. Endocrine: Negative for cold intolerance. Genitourinary:  Negative for dysuria, flank pain, frequency and hematuria. Musculoskeletal:  Negative for arthralgias and back pain. Skin:  Negative for rash. Allergic/Immunologic: Negative for food allergies. Neurological:  Negative for weakness, light-headedness, numbness and headaches. Hematological:  Does not bruise/bleed easily. Psychiatric/Behavioral:  Negative for agitation, decreased concentration and dysphoric mood.       Past Medical History:   Diagnosis Date    Cerebrovascular small vessel disease     Chronic kidney disease, stage 3b ([address] 12/16/2020    Colon cancer screening declined 8/30/2021    Debility 7/11/2022    Diabetic polyneuropathy associated with type 2 diabetes mellitus ([address] 4/22/2021    DM (diabetes mellitus) ([facility]     was with Dr Haleigh Bowers, [facility]    Dysarthria as late effect of cerebrovascular accident (CVA) 2015    Hearing loss     Hemiparesis affecting left side as late effect of cerebrovascular accident (CVA) ([address]     History of left PCA stroke 2013    History of right STEPHANIE stroke 10/21/2015    was at [facility], now Dr Migue Tarango    Microalbuminuria 2018    PFO (patent foramen ovale) 2015    Right pontine CVA ([address] 10/2015    Stage 3a chronic kidney disease ([facility] 12/16/2020    Syncope and collapse 6/1/2020     Past Surgical History:   Procedure Laterality Date    ANKLE SURGERY       Social History     Socioeconomic History    Marital status: Single     Spouse name: Not on file    Number of children: 0    Years of education: Not on file    Highest education level: Not on file   Occupational History    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[employer]   Tobacco Use    Smoking status: Never    Smokeless tobacco: Never   Substance and Sexual Activity    Alcohol use: No    Drug use: No    Sexual activity: Not on file   Other Topics Concern    Not on file Social History Narrative    Born in [facility], one brother and one sister    Single, no children    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, the time of a pontine and cerebellar stroke    Was at [facility] and in nursing home for rehabilitation    Lives independetly in an apartment in [facility] Strain: Low Risk     Difficulty of Paying Living Expenses: Not hard at all   Food Insecurity: No Food Insecurity    Worried About [address] in the Last Year: Never true    [address] in the Last Year: Never true   Transportation Needs: No Transportation Needs    Lack of Transportation (Medical): No    Lack of Transportation (Non-Medical): No   Physical Activity: Sufficiently Active    Days of Exercise per Week: 7 days    Minutes of Exercise per Session: 30 min   Stress: Not on file   Social Connections: Not on file   Intimate Partner Violence: Not on file   Housing Stability: Not on file     History reviewed. No pertinent family history.   Allergies   Allergen Reactions    Ace Inhibitors      cough     Current Outpatient Medications   Medication Sig Dispense Refill    amLODIPine (NORVASC) 5 MG tablet Take 1 tablet by mouth daily 90 tablet 4    canagliflozin (INVOKANA) 300 MG TABS tablet Take 1 tablet by mouth every morning (before breakfast) 30 tablet 5    atorvastatin (LIPITOR) 80 MG tablet TAKE 1 TABLET BY MOUTH DAILY 90 tablet 4    [facility] UNIFINE PENTIPS 31G X 6 MM MISC USE AS DIRECTED TWICE DAILY BEFORE MEALS      insulin aspart protamine-insulin aspart (NOVOLOG 70/30) (70-30) 100 UNIT/ML injection Inject 15 Units into the skin 2 times daily (with meals) Before breakfast and dinner 5 pen 3    Continuous Blood Gluc Sensor (FREESTYLE CHINO 2 SENSOR) MISC 1 Device by Does not apply route every 14 days 2 each 3    Continuous Blood Gluc  (FREESTYLE CHINO 2 READER) JALEEL 1 Device by Does not apply route 4 times daily (before meals and nightly) 1 each 0    Multiple Vitamins-Minerals (PRESERVISION AREDS 2+MULTI VIT) CAPS Take by mouth      ASPIRIN ADULT LOW STRENGTH 81 MG EC tablet Take 2 tablets by mouth daily 180 tablet 3    Blood Pressure KIT 1 Units by Does not apply route daily (Patient not taking: Reported on 1/9/2023) 1 kit 0     No current facility-administered medications for this visit. PMH, Surgical Hx, Family Hx, and Social Hx reviewed and updated. Health Maintenance reviewed. Objective  Vitals:    01/09/23 0808   BP: 128/76   Site: Right Upper Arm   Position: Sitting   Cuff Size: Medium Adult   Pulse: 88   Resp: 15   Temp: (!) 96.6 °F (35.9 °C)   TempSrc: Temporal   SpO2: 98%   Weight: 160 lb (72.6 kg)   Height: 5' 5\" (1.651 m)     BP Readings from Last 3 Encounters:   01/09/23 128/76   12/20/22 138/88   09/01/22 122/78     Wt Readings from Last 3 Encounters:   01/09/23 160 lb (72.6 kg)   12/20/22 160 lb (72.6 kg)   09/01/22 148 lb (67.1 kg)       Lab Results   Component Value Date    LABA1C 7.1 (H) 11/14/2022    LABA1C 6.9 (H) 06/20/2022    LABA1C 6.9 (H) 05/11/2022     Lab Results   Component Value Date    LABMICR <1.20 05/11/2022    CREATININE 1.55 (H) 11/14/2022     Lab Results   Component Value Date    ALT 19 11/14/2022    AST 20 11/14/2022     Lab Results   Component Value Date    CHOL 103 09/14/2020    TRIG 61 09/14/2020    HDL 81 (H) 09/23/2022    LDLCALC 40 09/23/2022          Physical Exam  Vitals and nursing note reviewed. Constitutional:       General: He is awake. He is not in acute distress. Appearance: Normal appearance. He is well-developed. He is not ill-appearing, toxic-appearing or diaphoretic. Comments: Bilateral hearing aid. HENT:      Head: Normocephalic and atraumatic. Right Ear: Hearing and external ear normal.      Left Ear: Hearing and external ear normal.      Nose: Nose normal.   Eyes:      General: Lids are normal. Vision grossly intact.  Gaze aligned appropriately. Conjunctiva/sclera: Conjunctivae normal.      Pupils: Pupils are equal, round, and reactive to light. Cardiovascular:      Rate and Rhythm: Normal rate and regular rhythm. Pulses: Normal pulses. Heart sounds: Normal heart sounds, S1 normal and S2 normal.   Pulmonary:      Effort: Pulmonary effort is normal.      Breath sounds: Normal breath sounds and air entry. Musculoskeletal:      Cervical back: Normal range of motion. Skin:     General: Skin is warm. Capillary Refill: Capillary refill takes less than 2 seconds. Neurological:      Mental Status: He is alert and oriented to person, place, and time. Gait: Gait is intact. Psychiatric:         Attention and Perception: Attention normal.         Mood and Affect: Mood normal.         Speech: Speech normal.         Behavior: Behavior normal. Behavior is cooperative. Assessment & Plan   1. Hemiparesis, left ([facility]  2. Controlled type 2 diabetes mellitus with microalbuminuria, with long-term current use of insulin ([facility]  Assessment & Plan:   Monitored by specialist- no acute findings meriting change in the plan  3. Stage 3a chronic kidney disease ([facility]  Assessment & Plan:   At goal, continue current medications. May benefit in kidney protective medication\  - On coozar in the past. Will likely set up at next appointment we reassessing kidney function. 4. Essential hypertension  Comments:  Maximize control by adding low-dose amlodipine. Reassess 3 months. Assessment & Plan:   Well-controlled, continue current medications. Would benefit in a Arb for kidney protection since patient does not tolerate ACEI  Orders:  -     amLODIPine (NORVASC) 5 MG tablet; Take 1 tablet by mouth daily, Disp-90 tablet, R-4Normal  5. Encounter for medical examination to establish care  6. Alkaline phosphatase raised  7.  Diabetic polyneuropathy associated with type 2 diabetes mellitus ([facility]  Assessment & Plan:   Monitored by specialist- no acute findings meriting change in the plan       8. Mixed hyperlipidemia  Assessment & Plan:   Well-controlled, continue current medications   No orders of the defined types were placed in this encounter. Orders Placed This Encounter   Medications    amLODIPine (NORVASC) 5 MG tablet     Sig: Take 1 tablet by mouth daily     Dispense:  90 tablet     Refill:  4       Medications Discontinued During This Encounter   Medication Reason    irbesartan (AVAPRO) 150 MG tablet LIST CLEANUP    amLODIPine (NORVASC) 5 MG tablet REORDER     Return in about 3 months (around 4/18/2023). Reviewed with the patient: current clinical status, medications, activities and diet. Side effects, adverse effects of the medication prescribed today, as well as treatment plan/ rationale and result expectations have been discussed with the patient who expresses understanding and desires to proceed. Close follow up to evaluate treatment results and for coordination of care. I have reviewed the patient's medical history in detail and updated the computerized patient record.     Rogelio Gaines

## 2023-01-09 NOTE — ASSESSMENT & PLAN NOTE
Well-controlled, continue current medications.  Would benefit in a Arb for kidney protection since patient does not tolerate ACEI

## 2023-02-16 ENCOUNTER — OFFICE VISIT (OUTPATIENT)
Dept: FAMILY MEDICINE CLINIC | Age: 63
End: 2023-02-16

## 2023-02-16 VITALS
HEIGHT: 65 IN | OXYGEN SATURATION: 99 % | HEART RATE: 72 BPM | TEMPERATURE: 96.3 F | BODY MASS INDEX: 26.82 KG/M2 | SYSTOLIC BLOOD PRESSURE: 132 MMHG | WEIGHT: 161 LBS | RESPIRATION RATE: 16 BRPM | DIASTOLIC BLOOD PRESSURE: 80 MMHG

## 2023-02-16 DIAGNOSIS — L20.9 ATOPIC DERMATITIS, UNSPECIFIED TYPE: Primary | ICD-10-CM

## 2023-02-16 RX ORDER — LORATADINE 10 MG/1
10 TABLET ORAL DAILY
Qty: 30 TABLET | Refills: 0 | Status: SHIPPED | OUTPATIENT
Start: 2023-02-16

## 2023-02-16 RX ORDER — PREDNISONE 20 MG/1
20 TABLET ORAL 2 TIMES DAILY
Qty: 10 TABLET | Refills: 0 | Status: SHIPPED | OUTPATIENT
Start: 2023-02-16 | End: 2023-02-16 | Stop reason: CLARIF

## 2023-02-16 SDOH — ECONOMIC STABILITY: FOOD INSECURITY: WITHIN THE PAST 12 MONTHS, THE FOOD YOU BOUGHT JUST DIDN'T LAST AND YOU DIDN'T HAVE MONEY TO GET MORE.: NEVER TRUE

## 2023-02-16 SDOH — ECONOMIC STABILITY: FOOD INSECURITY: WITHIN THE PAST 12 MONTHS, YOU WORRIED THAT YOUR FOOD WOULD RUN OUT BEFORE YOU GOT MONEY TO BUY MORE.: NEVER TRUE

## 2023-02-16 SDOH — ECONOMIC STABILITY: HOUSING INSECURITY
IN THE LAST 12 MONTHS, WAS THERE A TIME WHEN YOU DID NOT HAVE A STEADY PLACE TO SLEEP OR SLEPT IN A SHELTER (INCLUDING NOW)?: NO

## 2023-02-16 SDOH — ECONOMIC STABILITY: INCOME INSECURITY: HOW HARD IS IT FOR YOU TO PAY FOR THE VERY BASICS LIKE FOOD, HOUSING, MEDICAL CARE, AND HEATING?: NOT HARD AT ALL

## 2023-02-16 ASSESSMENT — ENCOUNTER SYMPTOMS
DIARRHEA: 0
SORE THROAT: 0
SHORTNESS OF BREATH: 0
NAIL CHANGES: 0
COUGH: 0
EYE PAIN: 0
RHINORRHEA: 0
VOMITING: 0

## 2023-02-16 ASSESSMENT — PATIENT HEALTH QUESTIONNAIRE - PHQ9
SUM OF ALL RESPONSES TO PHQ9 QUESTIONS 1 & 2: 0
SUM OF ALL RESPONSES TO PHQ QUESTIONS 1-9: 0
4. FEELING TIRED OR HAVING LITTLE ENERGY: 0
9. THOUGHTS THAT YOU WOULD BE BETTER OFF DEAD, OR OF HURTING YOURSELF: 0
5. POOR APPETITE OR OVEREATING: 0
7. TROUBLE CONCENTRATING ON THINGS, SUCH AS READING THE NEWSPAPER OR WATCHING TELEVISION: 0
10. IF YOU CHECKED OFF ANY PROBLEMS, HOW DIFFICULT HAVE THESE PROBLEMS MADE IT FOR YOU TO DO YOUR WORK, TAKE CARE OF THINGS AT HOME, OR GET ALONG WITH OTHER PEOPLE: 0
6. FEELING BAD ABOUT YOURSELF - OR THAT YOU ARE A FAILURE OR HAVE LET YOURSELF OR YOUR FAMILY DOWN: 0
2. FEELING DOWN, DEPRESSED OR HOPELESS: 0
SUM OF ALL RESPONSES TO PHQ QUESTIONS 1-9: 0
8. MOVING OR SPEAKING SO SLOWLY THAT OTHER PEOPLE COULD HAVE NOTICED. OR THE OPPOSITE, BEING SO FIGETY OR RESTLESS THAT YOU HAVE BEEN MOVING AROUND A LOT MORE THAN USUAL: 0
1. LITTLE INTEREST OR PLEASURE IN DOING THINGS: 0
SUM OF ALL RESPONSES TO PHQ QUESTIONS 1-9: 0
3. TROUBLE FALLING OR STAYING ASLEEP: 0
SUM OF ALL RESPONSES TO PHQ QUESTIONS 1-9: 0

## 2023-02-16 ASSESSMENT — VISUAL ACUITY: OU: 1

## 2023-02-16 NOTE — TELEPHONE ENCOUNTER
Comments:     Last Office Visit (last PCP visit):   12/20/2022    Next Visit Date:  Future Appointments   Date Time Provider Sarmad Eyni   2/16/2023  9:30 AM ESTELA Roblero [facility]   3/6/2023  1:30 PM Lacy Dobbins MD [facility] -   4/18/2023 10:30 AM ESTELA Caldwell [facility]   7/10/2023  8:30 AM ESTELA Roblero [facility]       **If hasn't been seen in over a year OR hasn't followed up according to last diabetes/ADHD visit, make appointment for patient before sending refill to provider.     Rx requested:  Requested Prescriptions     Pending Prescriptions Disp Refills    [facility] UNIFINE PENTIPS 31G X 6 MM MISC 100 each      Sig: USE AS DIRECTED TWICE DAILY BEFORE MEALS

## 2023-02-16 NOTE — PROGRESS NOTES
Reena Archibald 1960 is a 63 y.o. male who presents today with:  Chief Complaint   Patient presents with    Rash     X 1 month; is itchy all over and has little bumps on arms and back and thighs, around the ankles        Rash  This is a new problem. The current episode started more than 1 month ago. The problem is unchanged. The rash is diffuse. The rash is characterized by itchiness and burning. He was exposed to nothing. Pertinent negatives include no anorexia, congestion, cough, diarrhea, eye pain, facial edema, fatigue, fever, joint pain, nail changes, rhinorrhea, shortness of breath, sore throat or vomiting. Treatments tried: goldbond and hydrocortisone cream. There is no history of allergies, asthma, eczema or varicella. Occurs after warm showers and when he is feeling hot. Review of Systems   Constitutional:  Negative for fatigue and fever. HENT:  Negative for congestion, rhinorrhea and sore throat. Eyes:  Negative for pain. Respiratory:  Negative for cough and shortness of breath. Gastrointestinal:  Negative for anorexia, diarrhea and vomiting. Musculoskeletal:  Negative for joint pain. Skin:  Positive for rash. Negative for nail changes.      Past Medical History:   Diagnosis Date    Cerebrovascular small vessel disease     Chronic kidney disease, stage 3b ([address] 12/16/2020    Colon cancer screening declined 8/30/2021    Debility 7/11/2022    Diabetic polyneuropathy associated with type 2 diabetes mellitus ([address] 4/22/2021    DM (diabetes mellitus) ([address]     was with Dr Ludin Sanchez, [facility]    Dysarthria as late effect of cerebrovascular accident (CVA) 2015    Hearing loss     Hemiparesis affecting left side as late effect of cerebrovascular accident (CVA) ([address]     History of left PCA stroke 2013    History of right STEPHANIE stroke 10/21/2015    was at [facility], now Dr Aurelia Helton    Microalbuminuria 2018    PFO (patent foramen ovale) 2015    Right pontine CVA ([address] 10/2015    Stage 3a chronic kidney disease ([facility] 12/16/2020    Syncope and collapse 6/1/2020     Past Surgical History:   Procedure Laterality Date    ANKLE SURGERY       Social History     Socioeconomic History    Marital status: Single     Spouse name: Not on file    Number of children: 0    Years of education: Not on file    Highest education level: Not on file   Occupational History    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[employer]   Tobacco Use    Smoking status: Never    Smokeless tobacco: Never   Substance and Sexual Activity    Alcohol use: No    Drug use: No    Sexual activity: Not on file   Other Topics Concern    Not on file   Social History Narrative    Born in [facility], one brother and one sister    Single, no children    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, the time of a pontine and cerebellar stroke    Was at [facility] and in nursing home for rehabilitation    Lives independetly in an apartment in [facility] Strain: Low Risk     Difficulty of Paying Living Expenses: Not hard at all   Food Insecurity: No Food Insecurity    Worried About [address] in the Last Year: Never true    [address] in the Last Year: Never true   Transportation Needs: No Transportation Needs    Lack of Transportation (Medical): No    Lack of Transportation (Non-Medical): No   Physical Activity: Sufficiently Active    Days of Exercise per Week: 7 days    Minutes of Exercise per Session: 30 min   Stress: Not on file   Social Connections: Not on file   Intimate Partner Violence: Not on file   Housing Stability: Unknown    Unable to Pay for Housing in the Last Year: Not on file    Number of Places Lived in the Last Year: Not on file    Unstable Housing in the Last Year: No     No family history on file.   Allergies   Allergen Reactions    Ace Inhibitors      cough     Current Outpatient Medications   Medication Sig Dispense Refill    triamcinolone (KENALOG) 0.1 % ointment Apply thin layer topically 2 times daily for max of 2 weeks. 80 g 0    loratadine (CLARITIN) 10 MG tablet Take 1 tablet by mouth daily 30 tablet 0    amLODIPine (NORVASC) 5 MG tablet Take 1 tablet by mouth daily 90 tablet 4    canagliflozin (INVOKANA) 300 MG TABS tablet Take 1 tablet by mouth every morning (before breakfast) 30 tablet 5    atorvastatin (LIPITOR) 80 MG tablet TAKE 1 TABLET BY MOUTH DAILY 90 tablet 4    [facility] UNIFINE PENTIPS 31G X 6 MM MISC USE AS DIRECTED TWICE DAILY BEFORE MEALS      insulin aspart protamine-insulin aspart (NOVOLOG 70/30) (70-30) 100 UNIT/ML injection Inject 15 Units into the skin 2 times daily (with meals) Before breakfast and dinner 5 pen 3    Continuous Blood Gluc Sensor (FREESTYLE CHINO 2 SENSOR) MISC 1 Device by Does not apply route every 14 days 2 each 3    Continuous Blood Gluc  (FREESTYLE CHINO 2 READER) JALEEL 1 Device by Does not apply route 4 times daily (before meals and nightly) 1 each 0    Multiple Vitamins-Minerals (PRESERVISION AREDS 2+MULTI VIT) CAPS Take by mouth      ASPIRIN ADULT LOW STRENGTH 81 MG EC tablet Take 2 tablets by mouth daily 180 tablet 3    Blood Pressure KIT 1 Units by Does not apply route daily (Patient not taking: No sig reported) 1 kit 0     No current facility-administered medications for this visit. PMH, Surgical Hx, Family Hx, and Social Hx reviewed and updated. Health Maintenance reviewed.     Objective  Vitals:    02/16/23 0924   BP: 132/80   Site: Right Upper Arm   Position: Sitting   Cuff Size: Medium Adult   Pulse: 72   Resp: 16   Temp: (!) 96.3 °F (35.7 °C)   TempSrc: Temporal   SpO2: 99%   Weight: 161 lb (73 kg)   Height: 5' 5\" (1.651 m)     BP Readings from Last 3 Encounters:   02/16/23 132/80   01/09/23 128/76   12/20/22 138/88     Wt Readings from Last 3 Encounters:   02/16/23 161 lb (73 kg)   01/09/23 160 lb (72.6 kg)   12/20/22 160 lb (72.6 kg)       Lab Results   Component Value Date    LABA1C 7.1 (H) 11/14/2022    LABA1C 6.9 (H) 06/20/2022    LABA1C 6.9 (H) 05/11/2022     Lab Results   Component Value Date    LABMICR <1.20 05/11/2022    CREATININE 1.55 (H) 11/14/2022     Lab Results   Component Value Date    ALT 19 11/14/2022    AST 20 11/14/2022     Lab Results   Component Value Date    CHOL 103 09/14/2020    TRIG 61 09/14/2020    HDL 81 (H) 09/23/2022    LDLCALC 40 09/23/2022          Physical Exam  Vitals and nursing note reviewed. Constitutional:       General: He is awake. He is not in acute distress. Appearance: Normal appearance. He is well-developed. He is not ill-appearing, toxic-appearing or diaphoretic. HENT:      Head: Normocephalic and atraumatic. Right Ear: Hearing and external ear normal.      Left Ear: Hearing and external ear normal.      Nose: Nose normal.   Eyes:      General: Lids are normal. Vision grossly intact. Gaze aligned appropriately. Conjunctiva/sclera: Conjunctivae normal.      Pupils: Pupils are equal, round, and reactive to light. Cardiovascular:      Rate and Rhythm: Normal rate and regular rhythm. Pulses: Normal pulses. Heart sounds: Normal heart sounds, S1 normal and S2 normal.   Pulmonary:      Effort: Pulmonary effort is normal.      Breath sounds: Normal breath sounds and air entry. Musculoskeletal:      Cervical back: Normal range of motion. Skin:     General: Skin is warm. Capillary Refill: Capillary refill takes less than 2 seconds. Comments: Diffuse rash. No pustules. Excoriation marks seen. Neurological:      Mental Status: He is alert and oriented to person, place, and time. Gait: Gait is intact. Psychiatric:         Attention and Perception: Attention normal.         Mood and Affect: Mood normal.         Speech: Speech normal.         Behavior: Behavior normal. Behavior is cooperative. Assessment & Plan   1. Atopic dermatitis, unspecified type  -     triamcinolone (KENALOG) 0.1 % ointment;  Apply thin layer topically 2 times daily for max of 2 weeks. , Disp-80 g, R-0, Normal  -     loratadine (CLARITIN) 10 MG tablet; Take 1 tablet by mouth daily, Disp-30 tablet, R-0Normal   - no new foods, soap, detergents, lotions, drugs, or pets. Likely heat rash and dry skin. Skin is dry today. Recommend vasaline and kenalog cream. Went over possible s/e of the medications. Patient would like to proceed with therapy. Discussed signs and symptoms which require immediate follow-up in ED/call to 911. Patient verbalized understanding. No orders of the defined types were placed in this encounter. Orders Placed This Encounter   Medications    DISCONTD: predniSONE (DELTASONE) 20 MG tablet     Sig: Take 1 tablet by mouth 2 times daily for 5 days     Dispense:  10 tablet     Refill:  0    triamcinolone (KENALOG) 0.1 % ointment     Sig: Apply thin layer topically 2 times daily for max of 2 weeks. Dispense:  80 g     Refill:  0    loratadine (CLARITIN) 10 MG tablet     Sig: Take 1 tablet by mouth daily     Dispense:  30 tablet     Refill:  0     Medications Discontinued During This Encounter   Medication Reason    predniSONE (DELTASONE) 20 MG tablet ERROR     Return if symptoms worsen or fail to improve. Reviewed with the patient: current clinical status, medications, activities and diet. Side effects, adverse effects of the medication prescribed today, as well as treatment plan/ rationale and result expectations have been discussed with the patient who expresses understanding and desires to proceed. Close follow up to evaluate treatment results and for coordination of care. I have reviewed the patient's medical history in detail and updated the computerized patient record.     Azeb Mcdermott [place]

## 2023-02-24 RX ORDER — PEN NEEDLE, DIABETIC 31 G X1/4"
NEEDLE, DISPOSABLE MISCELLANEOUS
Qty: 100 EACH | Refills: 5 | Status: SHIPPED | OUTPATIENT
Start: 2023-02-24

## 2023-02-27 RX ORDER — PEN NEEDLE, DIABETIC 31 G X1/4"
NEEDLE, DISPOSABLE MISCELLANEOUS
Qty: 100 EACH | Refills: 5 | Status: SHIPPED | OUTPATIENT
Start: 2023-02-27

## 2023-03-30 DIAGNOSIS — Z79.4 CONTROLLED TYPE 2 DIABETES MELLITUS WITH MICROALBUMINURIA, WITH LONG-TERM CURRENT USE OF INSULIN (HCC): ICD-10-CM

## 2023-03-30 DIAGNOSIS — R80.9 CONTROLLED TYPE 2 DIABETES MELLITUS WITH MICROALBUMINURIA, WITH LONG-TERM CURRENT USE OF INSULIN (HCC): ICD-10-CM

## 2023-03-30 DIAGNOSIS — E11.29 CONTROLLED TYPE 2 DIABETES MELLITUS WITH MICROALBUMINURIA, WITH LONG-TERM CURRENT USE OF INSULIN (HCC): ICD-10-CM

## 2023-03-30 LAB
ALBUMIN SERPL-MCNC: 4.6 G/DL (ref 3.5–4.6)
ALP SERPL-CCNC: 173 U/L (ref 35–104)
ALT SERPL-CCNC: 24 U/L (ref 0–41)
ANION GAP SERPL CALCULATED.3IONS-SCNC: 15 MEQ/L (ref 9–15)
AST SERPL-CCNC: 23 U/L (ref 0–40)
BILIRUB SERPL-MCNC: 0.4 MG/DL (ref 0.2–0.7)
BUN SERPL-MCNC: 37 MG/DL (ref 8–23)
CALCIUM SERPL-MCNC: 9.3 MG/DL (ref 8.5–9.9)
CHLORIDE SERPL-SCNC: 100 MEQ/L (ref 95–107)
CO2 SERPL-SCNC: 22 MEQ/L (ref 20–31)
CREAT SERPL-MCNC: 1.88 MG/DL (ref 0.7–1.2)
GLOBULIN SER CALC-MCNC: 3 G/DL (ref 2.3–3.5)
GLUCOSE SERPL-MCNC: 224 MG/DL (ref 70–99)
HBA1C MFR BLD: 7.5 % (ref 4.8–5.9)
POTASSIUM SERPL-SCNC: 4.4 MEQ/L (ref 3.4–4.9)
PROT SERPL-MCNC: 7.6 G/DL (ref 6.3–8)
SODIUM SERPL-SCNC: 137 MEQ/L (ref 135–144)

## 2023-04-18 ENCOUNTER — OFFICE VISIT (OUTPATIENT)
Dept: ENDOCRINOLOGY | Age: 63
End: 2023-04-18

## 2023-04-18 VITALS
HEIGHT: 65 IN | DIASTOLIC BLOOD PRESSURE: 99 MMHG | BODY MASS INDEX: 26.82 KG/M2 | WEIGHT: 161 LBS | OXYGEN SATURATION: 96 % | SYSTOLIC BLOOD PRESSURE: 160 MMHG | HEART RATE: 86 BPM

## 2023-04-18 DIAGNOSIS — Z79.4 CONTROLLED TYPE 2 DIABETES MELLITUS WITH MICROALBUMINURIA, WITH LONG-TERM CURRENT USE OF INSULIN (HCC): Primary | ICD-10-CM

## 2023-04-18 DIAGNOSIS — E11.29 CONTROLLED TYPE 2 DIABETES MELLITUS WITH MICROALBUMINURIA, WITH LONG-TERM CURRENT USE OF INSULIN (HCC): Primary | ICD-10-CM

## 2023-04-18 DIAGNOSIS — R80.9 CONTROLLED TYPE 2 DIABETES MELLITUS WITH MICROALBUMINURIA, WITH LONG-TERM CURRENT USE OF INSULIN (HCC): Primary | ICD-10-CM

## 2023-04-18 RX ORDER — CANAGLIFLOZIN 300 MG/1
300 TABLET, FILM COATED ORAL
Qty: 30 TABLET | Refills: 5 | Status: SHIPPED | OUTPATIENT
Start: 2023-04-18

## 2023-04-18 ASSESSMENT — ENCOUNTER SYMPTOMS
EYE PAIN: 0
SINUS PRESSURE: 0
VOMITING: 0
NAUSEA: 0
EYE REDNESS: 0
ABDOMINAL PAIN: 0
SHORTNESS OF BREATH: 0
COUGH: 0
SORE THROAT: 0
WHEEZING: 0
RHINORRHEA: 0
DIARRHEA: 0

## 2023-04-18 NOTE — PROGRESS NOTES
4/18/2023    Assessment:       Diagnosis Orders   1. Controlled type 2 diabetes mellitus with microalbuminuria, with long-term current use of insulin ([facility])  POCT Glucose    Comprehensive Metabolic Panel    Hemoglobin A1C    HM DIABETES FOOT EXAM    canagliflozin (INVOKANA) 300 MG TABS tablet    CBC    insulin aspart protamine-insulin aspart (NOVOLOG 70/30) (70-30) 100 UNIT/ML injection        Stopped Trulicity due to cost     PLAN:     Increase Novolog 70/30 inject 14 units before breakfast and 12 units before dinner dinner if glucose is greater than 130  Continue Invokana (canagliflozin)  300 mg by mouth daily  Monitor glucose 3-4 times daily and document  Freestyle [place] 2 ordered- [facility]   Repeat labs in 4 months   Follow up in 4 months     Orders Placed This Encounter   Procedures    Comprehensive Metabolic Panel     Standing Status:   Future     Standing Expiration Date:   4/18/2024    Hemoglobin A1C     Standing Status:   Future     Standing Expiration Date:   4/18/2024    CBC     Standing Status:   Future     Standing Expiration Date:   4/18/2024    POCT Glucose     DIABETES FOOT EXAM       Orders Placed This Encounter   Medications    canagliflozin (INVOKANA) 300 MG TABS tablet     Sig: Take 1 tablet by mouth every morning (before breakfast)     Dispense:  30 tablet     Refill:  5    insulin aspart protamine-insulin aspart (NOVOLOG 70/30) (70-30) 100 UNIT/ML injection     Sig: Inject 14 units Before breakfast and 12 units before dinner if glucose is greater than 130     Dispense:  5 Adjustable Dose Pre-filled Pen Syringe     Refill:  4     Return in about 4 months (around 8/18/2023) for Diabetes.   Subjective:     Chief Complaint   Patient presents with    Follow-up    Diabetes     Vitals:    04/18/23 1026 04/18/23 1036   BP: (!) 148/88 (!) 160/99   Site: Left Upper Arm Left Upper Arm   Position: Sitting    Cuff Size: Small Adult    Pulse: 86    SpO2: 96%    Weight: 161 lb (73 kg)

## 2023-04-18 NOTE — PATIENT INSTRUCTIONS
Endocrinology-    Check your blood sugars 4 times a day, before meals and at night  Document these numbers in a blood glucose log and bring them with you to your follow-up appointment. If you are prescribed insulin, Do not take your mealtime insulin if your blood sugars less than 130   Call our office if you have blood sugars less than 80 or greater then 300 on two or more occasions  Call our office if you have any questions regarding your blood sugars or insulin dosing regiment  Signs of low blood sugar may include tremors, feeling shaky, sweating, dizziness, confusion and weakness. Check your blood sugar immediatly if you have any of these symptoms.      The plan as discussed at your appointment-

## 2023-05-16 ENCOUNTER — OFFICE VISIT (OUTPATIENT)
Dept: FAMILY MEDICINE CLINIC | Age: 63
End: 2023-05-16

## 2023-05-16 VITALS
HEIGHT: 65 IN | TEMPERATURE: 96.7 F | WEIGHT: 160.4 LBS | HEART RATE: 90 BPM | SYSTOLIC BLOOD PRESSURE: 130 MMHG | OXYGEN SATURATION: 98 % | BODY MASS INDEX: 26.73 KG/M2 | DIASTOLIC BLOOD PRESSURE: 78 MMHG

## 2023-05-16 DIAGNOSIS — R26.89 BALANCE PROBLEM: ICD-10-CM

## 2023-05-16 DIAGNOSIS — G81.94 HEMIPARESIS, LEFT (HCC): Primary | ICD-10-CM

## 2023-05-16 DIAGNOSIS — I10 ESSENTIAL HYPERTENSION: ICD-10-CM

## 2023-05-16 DIAGNOSIS — N18.31 STAGE 3A CHRONIC KIDNEY DISEASE (HCC): ICD-10-CM

## 2023-05-16 DIAGNOSIS — R27.0 ATAXIA: ICD-10-CM

## 2023-05-16 DIAGNOSIS — I63.511 CEREBROVASCULAR ACCIDENT (CVA) DUE TO STENOSIS OF RIGHT MIDDLE CEREBRAL ARTERY (HCC): ICD-10-CM

## 2023-05-16 DIAGNOSIS — I69.354 SPASTIC HEMIPLEGIA OF LEFT NONDOMINANT SIDE AS LATE EFFECT OF CEREBRAL INFARCTION (HCC): ICD-10-CM

## 2023-05-16 NOTE — PROGRESS NOTES
Subjective  Christiane Goldman 1960 is a 63 y.o. male who presents today with:  Chief Complaint   Patient presents with    Other     Pt states he here for his eyes he has macular degeneration. Kidney Problem     Pt states he has diabetes and he worried about his kidneys with his diabetes        HPI  Here for Disability. Eye- Retina specility on tanya. CVA  No new deficits. No new or worsening facial droop. No  worsening of baseline speech impairment. No progression of unilateral weakness. States that it is getting hard for him to go up the stairs at his apartment  - mix of balance and strength deficits. No recent falls. Acknowledges sedentary lifestyle because he fears falling and now has decreased endurance. Able to attend to IADLs and ADLs without assist.  Followed by Dr. Serafin Ware. Patient suffered CVA in 2015. Having trouble with balance, but does not need assistive disease. Performing ADLs at home. Walking and driving himself. CKD  Last 11/14/22 GFR at 50.8 and creatinine at 1.55. Patient was on a ARB but stopped or refills not issued since 2020. Likely due to recall Patient currently on amlodipine. BP stabilized. DM  Last A1c on 11/14/22 at 7.1. Followed by endocrinology. Patient is here for DM f/u. Sugars have been fairly well-controlled and patient has not been experiencing hyper- or hypoglycemic symptoms. Compliance with meds is good and there are no side effects. Patient exercises occasionally and tries to eat healthy. HTN  Patient is here for f/u HTN. Is compliant with meds and has no side effects from them. Avoids added salt. Tries to eat healthy. Exercises occasionally. Has no chest pain, shortness of breath, palpitations or edema. Patient has not been on irbesartan since 9/18/20. Unable to determine why the patient was stopped, but shown to continue.      Review of Systems    Past Medical History:   Diagnosis Date    Cerebrovascular small vessel disease     Chronic kidney

## 2023-05-17 ENCOUNTER — TELEPHONE (OUTPATIENT)
Dept: FAMILY MEDICINE CLINIC | Age: 63
End: 2023-05-17

## 2023-05-17 ASSESSMENT — VISUAL ACUITY: OU: 1

## 2023-05-17 NOTE — TELEPHONE ENCOUNTER
Patient is aware that disability paperwork is completed. He has already completed and mailed the patient portion. At his request, I will fax what is here and file for him to .

## 2023-06-15 PROBLEM — R29.898 LEFT LEG WEAKNESS: Status: ACTIVE | Noted: 2023-06-15

## 2023-06-19 ENCOUNTER — OFFICE VISIT (OUTPATIENT)
Dept: NEUROLOGY | Age: 63
End: 2023-06-19
Payer: MEDICARE

## 2023-06-19 VITALS
HEART RATE: 78 BPM | DIASTOLIC BLOOD PRESSURE: 70 MMHG | WEIGHT: 164.1 LBS | SYSTOLIC BLOOD PRESSURE: 130 MMHG | BODY MASS INDEX: 27.31 KG/M2

## 2023-06-19 DIAGNOSIS — R26.89 BALANCE PROBLEM: Chronic | ICD-10-CM

## 2023-06-19 DIAGNOSIS — E11.42 DIABETIC POLYNEUROPATHY ASSOCIATED WITH TYPE 2 DIABETES MELLITUS (HCC): Chronic | ICD-10-CM

## 2023-06-19 DIAGNOSIS — R27.0 ATAXIA: ICD-10-CM

## 2023-06-19 DIAGNOSIS — I69.30 CHRONIC ISCHEMIC RIGHT ACA STROKE: Primary | Chronic | ICD-10-CM

## 2023-06-19 DIAGNOSIS — I63.321 CEREBRAL INFARCTION DUE TO THROMBOSIS OF RIGHT ANTERIOR CEREBRAL ARTERY (HCC): ICD-10-CM

## 2023-06-19 DIAGNOSIS — G81.94 HEMIPARESIS, LEFT (HCC): ICD-10-CM

## 2023-06-19 PROCEDURE — 3078F DIAST BP <80 MM HG: CPT | Performed by: PSYCHIATRY & NEUROLOGY

## 2023-06-19 PROCEDURE — G8419 CALC BMI OUT NRM PARAM NOF/U: HCPCS | Performed by: PSYCHIATRY & NEUROLOGY

## 2023-06-19 PROCEDURE — G8427 DOCREV CUR MEDS BY ELIG CLIN: HCPCS | Performed by: PSYCHIATRY & NEUROLOGY

## 2023-06-19 PROCEDURE — 3051F HG A1C>EQUAL 7.0%<8.0%: CPT | Performed by: PSYCHIATRY & NEUROLOGY

## 2023-06-19 PROCEDURE — 3074F SYST BP LT 130 MM HG: CPT | Performed by: PSYCHIATRY & NEUROLOGY

## 2023-06-19 PROCEDURE — 1036F TOBACCO NON-USER: CPT | Performed by: PSYCHIATRY & NEUROLOGY

## 2023-06-19 PROCEDURE — 3017F COLORECTAL CA SCREEN DOC REV: CPT | Performed by: PSYCHIATRY & NEUROLOGY

## 2023-06-19 PROCEDURE — 99214 OFFICE O/P EST MOD 30 MIN: CPT | Performed by: PSYCHIATRY & NEUROLOGY

## 2023-06-19 PROCEDURE — 2022F DILAT RTA XM EVC RTNOPTHY: CPT | Performed by: PSYCHIATRY & NEUROLOGY

## 2023-06-19 ASSESSMENT — ENCOUNTER SYMPTOMS
NAUSEA: 0
TROUBLE SWALLOWING: 0
PHOTOPHOBIA: 0
CHOKING: 0
SHORTNESS OF BREATH: 0
VOMITING: 0
BACK PAIN: 0
COLOR CHANGE: 0

## 2023-06-28 ENCOUNTER — HOSPITAL ENCOUNTER (OUTPATIENT)
Dept: ULTRASOUND IMAGING | Age: 63
Discharge: HOME | End: 2023-06-30
Payer: MEDICARE

## 2023-06-28 DIAGNOSIS — I63.321 CEREBRAL INFARCTION DUE TO THROMBOSIS OF RIGHT ANTERIOR CEREBRAL ARTERY (HCC): ICD-10-CM

## 2023-06-28 DIAGNOSIS — I69.30 CHRONIC ISCHEMIC RIGHT ACA STROKE: Chronic | ICD-10-CM

## 2023-06-28 PROCEDURE — 93880 EXTRACRANIAL BILAT STUDY: CPT

## 2023-07-05 DIAGNOSIS — Z79.4 CONTROLLED TYPE 2 DIABETES MELLITUS WITH MICROALBUMINURIA, WITH LONG-TERM CURRENT USE OF INSULIN (HCC): ICD-10-CM

## 2023-07-05 DIAGNOSIS — R80.9 CONTROLLED TYPE 2 DIABETES MELLITUS WITH MICROALBUMINURIA, WITH LONG-TERM CURRENT USE OF INSULIN (HCC): ICD-10-CM

## 2023-07-05 DIAGNOSIS — E11.29 CONTROLLED TYPE 2 DIABETES MELLITUS WITH MICROALBUMINURIA, WITH LONG-TERM CURRENT USE OF INSULIN (HCC): ICD-10-CM

## 2023-07-05 LAB
ALBUMIN SERPL-MCNC: 4.3 G/DL (ref 3.5–4.6)
ALP SERPL-CCNC: 116 U/L (ref 35–104)
ALT SERPL-CCNC: 24 U/L (ref 0–41)
ANION GAP SERPL CALCULATED.3IONS-SCNC: 11 MEQ/L (ref 9–15)
AST SERPL-CCNC: 25 U/L (ref 0–40)
BILIRUB SERPL-MCNC: 0.3 MG/DL (ref 0.2–0.7)
BUN SERPL-MCNC: 46 MG/DL (ref 8–23)
CALCIUM SERPL-MCNC: 9.3 MG/DL (ref 8.5–9.9)
CHLORIDE SERPL-SCNC: 105 MEQ/L (ref 95–107)
CO2 SERPL-SCNC: 23 MEQ/L (ref 20–31)
CREAT SERPL-MCNC: 1.69 MG/DL (ref 0.7–1.2)
ERYTHROCYTE [DISTWIDTH] IN BLOOD BY AUTOMATED COUNT: 14.5 % (ref 11.5–14.5)
GLOBULIN SER CALC-MCNC: 2.7 G/DL (ref 2.3–3.5)
GLUCOSE SERPL-MCNC: 191 MG/DL (ref 70–99)
HCT VFR BLD AUTO: 37.6 % (ref 42–52)
HGB BLD-MCNC: 12.3 G/DL (ref 14–18)
MCH RBC QN AUTO: 29.5 PG (ref 27–31.3)
MCHC RBC AUTO-ENTMCNC: 32.7 % (ref 33–37)
MCV RBC AUTO: 90.3 FL (ref 79–92.2)
PLATELET # BLD AUTO: 231 K/UL (ref 130–400)
POTASSIUM SERPL-SCNC: 4.9 MEQ/L (ref 3.4–4.9)
PROT SERPL-MCNC: 7 G/DL (ref 6.3–8)
RBC # BLD AUTO: 4.16 M/UL (ref 4.7–6.1)
SODIUM SERPL-SCNC: 139 MEQ/L (ref 135–144)
WBC # BLD AUTO: 8.5 K/UL (ref 4.8–10.8)

## 2023-07-06 LAB — HBA1C MFR BLD: 7.4 % (ref 4.8–5.9)

## 2023-07-10 ENCOUNTER — OFFICE VISIT (OUTPATIENT)
Dept: FAMILY MEDICINE CLINIC | Age: 63
End: 2023-07-10
Payer: MEDICARE

## 2023-07-10 ENCOUNTER — PATIENT MESSAGE (OUTPATIENT)
Dept: FAMILY MEDICINE CLINIC | Age: 63
End: 2023-07-10

## 2023-07-10 VITALS
TEMPERATURE: 96.1 F | HEART RATE: 80 BPM | WEIGHT: 161 LBS | RESPIRATION RATE: 16 BRPM | DIASTOLIC BLOOD PRESSURE: 80 MMHG | SYSTOLIC BLOOD PRESSURE: 128 MMHG | HEIGHT: 65 IN | BODY MASS INDEX: 26.82 KG/M2 | OXYGEN SATURATION: 98 %

## 2023-07-10 DIAGNOSIS — E11.22 TYPE 2 DIABETES MELLITUS WITH STAGE 3A CHRONIC KIDNEY DISEASE, WITHOUT LONG-TERM CURRENT USE OF INSULIN (HCC): Primary | ICD-10-CM

## 2023-07-10 DIAGNOSIS — N18.31 TYPE 2 DIABETES MELLITUS WITH STAGE 3A CHRONIC KIDNEY DISEASE, WITHOUT LONG-TERM CURRENT USE OF INSULIN (HCC): Primary | ICD-10-CM

## 2023-07-10 DIAGNOSIS — I63.511 CEREBROVASCULAR ACCIDENT (CVA) DUE TO STENOSIS OF RIGHT MIDDLE CEREBRAL ARTERY (HCC): ICD-10-CM

## 2023-07-10 DIAGNOSIS — N18.31 STAGE 3A CHRONIC KIDNEY DISEASE (HCC): Chronic | ICD-10-CM

## 2023-07-10 DIAGNOSIS — Z12.11 SCREENING FOR COLORECTAL CANCER: ICD-10-CM

## 2023-07-10 DIAGNOSIS — Z12.12 SCREENING FOR COLORECTAL CANCER: ICD-10-CM

## 2023-07-10 PROCEDURE — 3079F DIAST BP 80-89 MM HG: CPT | Performed by: PHYSICIAN ASSISTANT

## 2023-07-10 PROCEDURE — G8419 CALC BMI OUT NRM PARAM NOF/U: HCPCS | Performed by: PHYSICIAN ASSISTANT

## 2023-07-10 PROCEDURE — 3051F HG A1C>EQUAL 7.0%<8.0%: CPT | Performed by: PHYSICIAN ASSISTANT

## 2023-07-10 PROCEDURE — 1036F TOBACCO NON-USER: CPT | Performed by: PHYSICIAN ASSISTANT

## 2023-07-10 PROCEDURE — 2022F DILAT RTA XM EVC RTNOPTHY: CPT | Performed by: PHYSICIAN ASSISTANT

## 2023-07-10 PROCEDURE — 3017F COLORECTAL CA SCREEN DOC REV: CPT | Performed by: PHYSICIAN ASSISTANT

## 2023-07-10 PROCEDURE — 99213 OFFICE O/P EST LOW 20 MIN: CPT | Performed by: PHYSICIAN ASSISTANT

## 2023-07-10 PROCEDURE — 3074F SYST BP LT 130 MM HG: CPT | Performed by: PHYSICIAN ASSISTANT

## 2023-07-10 PROCEDURE — G8427 DOCREV CUR MEDS BY ELIG CLIN: HCPCS | Performed by: PHYSICIAN ASSISTANT

## 2023-07-10 RX ORDER — LISINOPRIL 2.5 MG/1
2.5 TABLET ORAL DAILY
Qty: 30 TABLET | Refills: 3 | Status: SHIPPED | OUTPATIENT
Start: 2023-07-10

## 2023-07-10 ASSESSMENT — VISUAL ACUITY: OU: 1

## 2023-07-10 ASSESSMENT — PATIENT HEALTH QUESTIONNAIRE - PHQ9
4. FEELING TIRED OR HAVING LITTLE ENERGY: 0
6. FEELING BAD ABOUT YOURSELF - OR THAT YOU ARE A FAILURE OR HAVE LET YOURSELF OR YOUR FAMILY DOWN: 0
5. POOR APPETITE OR OVEREATING: 0
7. TROUBLE CONCENTRATING ON THINGS, SUCH AS READING THE NEWSPAPER OR WATCHING TELEVISION: 0
10. IF YOU CHECKED OFF ANY PROBLEMS, HOW DIFFICULT HAVE THESE PROBLEMS MADE IT FOR YOU TO DO YOUR WORK, TAKE CARE OF THINGS AT HOME, OR GET ALONG WITH OTHER PEOPLE: 0
SUM OF ALL RESPONSES TO PHQ QUESTIONS 1-9: 0
SUM OF ALL RESPONSES TO PHQ9 QUESTIONS 1 & 2: 0
2. FEELING DOWN, DEPRESSED OR HOPELESS: 0
SUM OF ALL RESPONSES TO PHQ QUESTIONS 1-9: 0
9. THOUGHTS THAT YOU WOULD BE BETTER OFF DEAD, OR OF HURTING YOURSELF: 0
8. MOVING OR SPEAKING SO SLOWLY THAT OTHER PEOPLE COULD HAVE NOTICED. OR THE OPPOSITE, BEING SO FIGETY OR RESTLESS THAT YOU HAVE BEEN MOVING AROUND A LOT MORE THAN USUAL: 0
SUM OF ALL RESPONSES TO PHQ QUESTIONS 1-9: 0
SUM OF ALL RESPONSES TO PHQ QUESTIONS 1-9: 0
3. TROUBLE FALLING OR STAYING ASLEEP: 0
1. LITTLE INTEREST OR PLEASURE IN DOING THINGS: 0

## 2023-07-10 ASSESSMENT — ENCOUNTER SYMPTOMS
SINUS PAIN: 0
BACK PAIN: 0
SINUS PRESSURE: 0
DIARRHEA: 0
ABDOMINAL PAIN: 0
CHEST TIGHTNESS: 0
COUGH: 0
NAUSEA: 0
VOMITING: 0
SORE THROAT: 0
SHORTNESS OF BREATH: 0

## 2023-07-10 NOTE — TELEPHONE ENCOUNTER
From: Ronny Yoo  To: Gabrielle Perez  Sent: 7/10/2023 11:30 AM EDT  Subject: new medicine     do I take lisinopril with my other blood pressure medication,The aftervisit says no long term insulin use ?

## 2023-07-11 ENCOUNTER — OFFICE VISIT (OUTPATIENT)
Dept: ENDOCRINOLOGY | Age: 63
End: 2023-07-11
Payer: MEDICARE

## 2023-07-11 VITALS
SYSTOLIC BLOOD PRESSURE: 138 MMHG | OXYGEN SATURATION: 96 % | BODY MASS INDEX: 26.82 KG/M2 | HEIGHT: 65 IN | WEIGHT: 161 LBS | HEART RATE: 78 BPM | DIASTOLIC BLOOD PRESSURE: 87 MMHG

## 2023-07-11 DIAGNOSIS — E11.29 CONTROLLED TYPE 2 DIABETES MELLITUS WITH MICROALBUMINURIA, WITH LONG-TERM CURRENT USE OF INSULIN (HCC): Primary | ICD-10-CM

## 2023-07-11 DIAGNOSIS — Z79.4 CONTROLLED TYPE 2 DIABETES MELLITUS WITH MICROALBUMINURIA, WITH LONG-TERM CURRENT USE OF INSULIN (HCC): Primary | ICD-10-CM

## 2023-07-11 DIAGNOSIS — R80.9 CONTROLLED TYPE 2 DIABETES MELLITUS WITH MICROALBUMINURIA, WITH LONG-TERM CURRENT USE OF INSULIN (HCC): Primary | ICD-10-CM

## 2023-07-11 PROCEDURE — G8427 DOCREV CUR MEDS BY ELIG CLIN: HCPCS | Performed by: PHYSICIAN ASSISTANT

## 2023-07-11 PROCEDURE — 3079F DIAST BP 80-89 MM HG: CPT | Performed by: PHYSICIAN ASSISTANT

## 2023-07-11 PROCEDURE — 99214 OFFICE O/P EST MOD 30 MIN: CPT | Performed by: PHYSICIAN ASSISTANT

## 2023-07-11 PROCEDURE — 3051F HG A1C>EQUAL 7.0%<8.0%: CPT | Performed by: PHYSICIAN ASSISTANT

## 2023-07-11 PROCEDURE — G8419 CALC BMI OUT NRM PARAM NOF/U: HCPCS | Performed by: PHYSICIAN ASSISTANT

## 2023-07-11 PROCEDURE — 1036F TOBACCO NON-USER: CPT | Performed by: PHYSICIAN ASSISTANT

## 2023-07-11 PROCEDURE — 3017F COLORECTAL CA SCREEN DOC REV: CPT | Performed by: PHYSICIAN ASSISTANT

## 2023-07-11 PROCEDURE — 2022F DILAT RTA XM EVC RTNOPTHY: CPT | Performed by: PHYSICIAN ASSISTANT

## 2023-07-11 PROCEDURE — 3075F SYST BP GE 130 - 139MM HG: CPT | Performed by: PHYSICIAN ASSISTANT

## 2023-07-11 ASSESSMENT — ENCOUNTER SYMPTOMS
RHINORRHEA: 0
COUGH: 0
EYE REDNESS: 0
ABDOMINAL PAIN: 0
VOMITING: 0
WHEEZING: 0
NAUSEA: 0
DIARRHEA: 0
SHORTNESS OF BREATH: 0
EYE PAIN: 0
SORE THROAT: 0
SINUS PRESSURE: 0

## 2023-07-11 NOTE — PROGRESS NOTES
difficulty urinating. Musculoskeletal:  Negative for arthralgias. Skin:  Negative for rash. Neurological:  Positive for tremors. Negative for dizziness and headaches. Psychiatric/Behavioral:  Positive for confusion. Objective:   Physical Exam  Constitutional:       Appearance: He is well-developed. HENT:      Head: Normocephalic and atraumatic. Mouth/Throat:      Mouth: Mucous membranes are moist.   Eyes:      Conjunctiva/sclera: Conjunctivae normal.   Neck:      Comments: No thyromegaly or palpable nodules  Cardiovascular:      Rate and Rhythm: Normal rate and regular rhythm. Heart sounds: Normal heart sounds. No murmur heard. Pulmonary:      Effort: Pulmonary effort is normal.      Breath sounds: Normal breath sounds. Abdominal:      General: Bowel sounds are normal.      Palpations: Abdomen is soft. Musculoskeletal:         General: Swelling present. Normal range of motion. Cervical back: Normal range of motion and neck supple. Skin:     General: Skin is warm and dry. Neurological:      Mental Status: He is alert and oriented to person, place, and time.    Psychiatric:         Mood and Affect: Mood normal.      Comments: Dysphoric mood, improved

## 2023-07-11 NOTE — PATIENT INSTRUCTIONS
Endocrinology-    Check your blood sugars 4 times a day, before meals and at night  Document these numbers in a blood glucose log and bring them with you to your follow-up appointment. If you are prescribed insulin, Do not take your mealtime insulin if your blood sugars less than 120   Call our office if you have blood sugars less than 80 or greater then 300 on two or more occasions  Call our office if you have any questions regarding your blood sugars or insulin dosing regiment  Signs of low blood sugar may include tremors, feeling shaky, sweating, dizziness, confusion and weakness. Check your blood sugar immediatly if you have any of these symptoms. The plan as discussed at your appointment-   Continue Novolog 70/30 inject 14 units before breakfast and increase 16 units before dinner if glucose is greater than 130  Continue Invokana (canagliflozin)  300 mg by mouth daily  Monitor glucose 3-4 times daily and document  Freestyle Nghia 2 being used[employer]   Repeat labs in 4 months   Follow up in 4 months     You have been prescribed the [employer] 2 continuous glucose monitor (CGM). This device reads your glucose levels in the interstitial fluid under your skin. This is not a blood glucose monitor. The concentration of glucose in your blood is sometimes slightly higher than the concentration of glucose in the fluid under your skin. This is a normal variation and is usually only a 5-15 difference. You may notice a greater difference in the numbers between the blood and the device immediately after eating or activity. This again is a normal variance and the levels will usually equal out over time. You can use the number on the Freestyle Nghia 2 to monitor your glucose and take your medications or insulin. If, for example, you get a low or high reading on the [place] and are asymptomatic, do a fingerstick and check your blood glucose to verify.   If there is a large discrepancy, use the

## 2023-08-21 DIAGNOSIS — R80.9 CONTROLLED TYPE 2 DIABETES MELLITUS WITH MICROALBUMINURIA, WITH LONG-TERM CURRENT USE OF INSULIN (HCC): ICD-10-CM

## 2023-08-21 DIAGNOSIS — Z79.4 CONTROLLED TYPE 2 DIABETES MELLITUS WITH MICROALBUMINURIA, WITH LONG-TERM CURRENT USE OF INSULIN (HCC): ICD-10-CM

## 2023-08-21 DIAGNOSIS — E11.29 CONTROLLED TYPE 2 DIABETES MELLITUS WITH MICROALBUMINURIA, WITH LONG-TERM CURRENT USE OF INSULIN (HCC): ICD-10-CM

## 2023-08-21 LAB
CREAT UR-MCNC: 67.5 MG/DL
MICROALBUMIN UR-MCNC: <1.2 MG/DL
MICROALBUMIN/CREAT UR-RTO: NORMAL MG/G (ref 0–30)

## 2023-10-03 ENCOUNTER — TELEPHONE (OUTPATIENT)
Dept: FAMILY MEDICINE CLINIC | Age: 63
End: 2023-10-03

## 2023-11-07 ENCOUNTER — OFFICE VISIT (OUTPATIENT)
Dept: ENDOCRINOLOGY | Age: 63
End: 2023-11-07
Payer: MEDICARE

## 2023-11-07 VITALS
OXYGEN SATURATION: 97 % | HEIGHT: 65 IN | WEIGHT: 174 LBS | DIASTOLIC BLOOD PRESSURE: 85 MMHG | BODY MASS INDEX: 28.99 KG/M2 | HEART RATE: 76 BPM | SYSTOLIC BLOOD PRESSURE: 136 MMHG

## 2023-11-07 DIAGNOSIS — Z79.4 CONTROLLED TYPE 2 DIABETES MELLITUS WITH STAGE 3 CHRONIC KIDNEY DISEASE, WITH LONG-TERM CURRENT USE OF INSULIN (HCC): Primary | ICD-10-CM

## 2023-11-07 DIAGNOSIS — E11.22 CONTROLLED TYPE 2 DIABETES MELLITUS WITH STAGE 3 CHRONIC KIDNEY DISEASE, WITH LONG-TERM CURRENT USE OF INSULIN (HCC): Primary | ICD-10-CM

## 2023-11-07 DIAGNOSIS — N18.30 CONTROLLED TYPE 2 DIABETES MELLITUS WITH STAGE 3 CHRONIC KIDNEY DISEASE, WITH LONG-TERM CURRENT USE OF INSULIN (HCC): Primary | ICD-10-CM

## 2023-11-07 PROCEDURE — 2022F DILAT RTA XM EVC RTNOPTHY: CPT | Performed by: PHYSICIAN ASSISTANT

## 2023-11-07 PROCEDURE — G8419 CALC BMI OUT NRM PARAM NOF/U: HCPCS | Performed by: PHYSICIAN ASSISTANT

## 2023-11-07 PROCEDURE — 1036F TOBACCO NON-USER: CPT | Performed by: PHYSICIAN ASSISTANT

## 2023-11-07 PROCEDURE — G8484 FLU IMMUNIZE NO ADMIN: HCPCS | Performed by: PHYSICIAN ASSISTANT

## 2023-11-07 PROCEDURE — 3079F DIAST BP 80-89 MM HG: CPT | Performed by: PHYSICIAN ASSISTANT

## 2023-11-07 PROCEDURE — 3051F HG A1C>EQUAL 7.0%<8.0%: CPT | Performed by: PHYSICIAN ASSISTANT

## 2023-11-07 PROCEDURE — 99214 OFFICE O/P EST MOD 30 MIN: CPT | Performed by: PHYSICIAN ASSISTANT

## 2023-11-07 PROCEDURE — 3075F SYST BP GE 130 - 139MM HG: CPT | Performed by: PHYSICIAN ASSISTANT

## 2023-11-07 PROCEDURE — G8427 DOCREV CUR MEDS BY ELIG CLIN: HCPCS | Performed by: PHYSICIAN ASSISTANT

## 2023-11-07 PROCEDURE — 3017F COLORECTAL CA SCREEN DOC REV: CPT | Performed by: PHYSICIAN ASSISTANT

## 2023-11-07 ASSESSMENT — ENCOUNTER SYMPTOMS
EYE REDNESS: 0
EYE PAIN: 0
VOMITING: 0
RHINORRHEA: 0
COUGH: 0
SHORTNESS OF BREATH: 0
NAUSEA: 0
WHEEZING: 0
SINUS PRESSURE: 0
SORE THROAT: 0
ABDOMINAL PAIN: 0
DIARRHEA: 0

## 2023-11-07 NOTE — PROGRESS NOTES
2023    Assessment:       Diagnosis Orders   1. Controlled type 2 diabetes mellitus with stage 3 chronic kidney disease, with long-term current use of insulin (HCC)            Stopped Trulicity due to cost     PLAN:     Continue Novolog 70/30 inject 12 units before breakfast and increase 12 units before dinner if glucose is greater than 130  Continue Invokana (canagliflozin)  300 mg by mouth daily  Monitor glucose 3-4 times daily and document  Freestyle Nghia 2 being used[employer]   Repeat labs in 4 months   Follow up in 4 months     No orders of the defined types were placed in this encounter. Orders Placed This Encounter   Medications    Continuous Blood Gluc  (FREESTYLE NGHIA 2 READER) JALEEL     Si Device by Does not apply route 4 times daily (before meals and nightly)     Dispense:  1 each     Refill:  0    Continuous Blood Gluc Sensor (FREESTYLE NGHIA 2 SENSOR) MISC     Si Device by Does not apply route every 14 days     Dispense:  6 each     Refill:  3     No follow-ups on file. Subjective:     Chief Complaint   Patient presents with    Diabetes     Vitals:    23 0929   BP: 136/85   Site: Left Upper Arm   Pulse: 76   SpO2: 97%   Weight: 78.9 kg (174 lb)   Height: 1.651 m (5' 5\")     Wt Readings from Last 3 Encounters:   23 78.9 kg (174 lb)   23 73 kg (161 lb)   07/10/23 73 kg (161 lb)     BP Readings from Last 3 Encounters:   23 136/85   23 138/87   07/10/23 128/80     Presents today for follow-up of his diabetes. Hemoglobin A1c 7.1% occasional nocturnal hypo, he starting having a small snack and this improved. Renal function stable, he sees PCP, is on SGLT2. Will need nephrology consult if function worsens even a little, we will monitor it closely, Discussed with pt he states they cant do anything to help. Renal function stable. Glycemic control is good. Clarence and reviewed with pt.      Diabetes  He presents for his initial

## 2023-11-07 NOTE — PATIENT INSTRUCTIONS
Endocrinology-    Check your blood sugars 4 times a day, before meals and at night  Document these numbers in a blood glucose log and bring them with you to your follow-up appointment. If you are prescribed insulin, Do not take your mealtime insulin if your blood sugars less than 120   Call our office if you have blood sugars less than 80 or greater then 300 on two or more occasions  Call our office if you have any questions regarding your blood sugars or insulin dosing regiment  Signs of low blood sugar may include tremors, feeling shaky, sweating, dizziness, confusion and weakness. Check your blood sugar immediatly if you have any of these symptoms.      The plan as discussed at your appointment-   Continue Novolog 70/30 inject 12 units before breakfast and increase 12 units before dinner if glucose is greater than 130  Continue Invokana (canagliflozin)  300 mg by mouth daily  Monitor glucose 3-4 times daily and document  Freestyle [place] 2 being used- [address] [phone] - F [phone]   [address] [place] [address], [address] [place] [id number]   Phone:  [phone]  Fax:  [phone]  Repeat labs in 4 months   Follow up in 4 months

## 2023-11-09 DIAGNOSIS — N18.31 TYPE 2 DIABETES MELLITUS WITH STAGE 3A CHRONIC KIDNEY DISEASE, WITHOUT LONG-TERM CURRENT USE OF INSULIN (HCC): ICD-10-CM

## 2023-11-09 DIAGNOSIS — R80.9 CONTROLLED TYPE 2 DIABETES MELLITUS WITH MICROALBUMINURIA, WITH LONG-TERM CURRENT USE OF INSULIN (HCC): ICD-10-CM

## 2023-11-09 DIAGNOSIS — E11.29 CONTROLLED TYPE 2 DIABETES MELLITUS WITH MICROALBUMINURIA, WITH LONG-TERM CURRENT USE OF INSULIN (HCC): ICD-10-CM

## 2023-11-09 DIAGNOSIS — E11.22 TYPE 2 DIABETES MELLITUS WITH STAGE 3A CHRONIC KIDNEY DISEASE, WITHOUT LONG-TERM CURRENT USE OF INSULIN (HCC): ICD-10-CM

## 2023-11-09 DIAGNOSIS — Z79.4 CONTROLLED TYPE 2 DIABETES MELLITUS WITH MICROALBUMINURIA, WITH LONG-TERM CURRENT USE OF INSULIN (HCC): ICD-10-CM

## 2023-11-09 RX ORDER — LISINOPRIL 2.5 MG/1
2.5 TABLET ORAL DAILY
Qty: 30 TABLET | Refills: 3 | Status: SHIPPED | OUTPATIENT
Start: 2023-11-09

## 2023-11-09 RX ORDER — CANAGLIFLOZIN 300 MG/1
300 TABLET, FILM COATED ORAL
Qty: 30 TABLET | Refills: 5 | Status: SHIPPED | OUTPATIENT
Start: 2023-11-09

## 2023-11-09 NOTE — TELEPHONE ENCOUNTER
Comments:     Last Office Visit (last PCP visit):   7/10/2023    Next Visit Date:  Future Appointments   Date Time Provider [address]   1/9/2024 10:30 AM Christine Monroe   1/10/2024 10:00 [facility]   2/14/2024  9:30 AM Christine Monroe   6/17/2024  1:15 PM Fahad Gillespie MD [facility] Neurology -       **If hasn't been seen in over a year OR hasn't followed up according to last diabetes/ADHD visit, make appointment for patient before sending refill to provider.     Rx requested:  Requested Prescriptions     Pending Prescriptions Disp Refills    lisinopril (ZESTRIL) 2.5 MG tablet 30 tablet 3     Sig: Take 1 tablet by mouth daily

## 2023-12-08 ENCOUNTER — TELEPHONE (OUTPATIENT)
Dept: FAMILY MEDICINE CLINIC | Age: 63
End: 2023-12-08

## 2024-01-09 PROBLEM — N18.32 CHRONIC KIDNEY DISEASE, STAGE 3B (HCC): Status: ACTIVE | Noted: 2024-01-09

## 2024-01-09 NOTE — PROGRESS NOTES
what it has been in the past. Trying to stay well hydrated.   Avoids added salt. Tries to eat healthy. Exercises occasionally. Has no chest pain, shortness of breath, palpitations or edema.    Fall/abrasion/swelling  - fell a couple of weeks ago onto back part of the couch. Patient reports that he was having dizziness and lost balance. Landing onto the back of the couch. Did not loss consciousness. Did sustain an abrasion on the left shine. Still having some redness to the area with healing ulcer. Wound is not bleeding. No drainage from the area. No fevers, chills, or pain in left lower extremity.   -Patient does have swelling of bilateral extremities. In the past was advised to continue compression stocking. Swelling around the ankles. Patient is also on amlodipine, which could be attributing to his ankle swelling. Denies any chest pain or sob.   - h/o ataxia. Not using assistive device at home.   Review of Systems   Constitutional:  Negative for activity change, appetite change, chills and fever.   HENT:  Negative for congestion, drooling, sinus pressure, sinus pain and sore throat.    Eyes:  Negative for visual disturbance.   Respiratory:  Negative for cough, chest tightness and shortness of breath.    Cardiovascular:  Positive for leg swelling. Negative for chest pain.   Gastrointestinal:  Negative for abdominal pain, diarrhea, nausea and vomiting.   Endocrine: Negative for cold intolerance.   Genitourinary:  Negative for dysuria, flank pain, frequency and hematuria.   Musculoskeletal:  Positive for myalgias. Negative for arthralgias and back pain.   Skin:  Positive for wound (left lower extremity). Negative for rash.   Allergic/Immunologic: Negative for food allergies.   Neurological:  Positive for speech difficulty (from CVA; no change from baseline) and weakness (from CVA). Negative for dizziness, light-headedness, numbness and headaches.   Hematological:  Does not bruise/bleed easily.       Past Medical

## 2024-01-10 ENCOUNTER — OFFICE VISIT (OUTPATIENT)
Dept: FAMILY MEDICINE CLINIC | Age: 64
End: 2024-01-10

## 2024-01-10 VITALS
TEMPERATURE: 97.9 F | OXYGEN SATURATION: 99 % | BODY MASS INDEX: 28.49 KG/M2 | DIASTOLIC BLOOD PRESSURE: 74 MMHG | HEART RATE: 85 BPM | WEIGHT: 171 LBS | HEIGHT: 65 IN | SYSTOLIC BLOOD PRESSURE: 106 MMHG

## 2024-01-10 DIAGNOSIS — E11.22 CONTROLLED TYPE 2 DIABETES MELLITUS WITH STAGE 3 CHRONIC KIDNEY DISEASE, WITH LONG-TERM CURRENT USE OF INSULIN (HCC): ICD-10-CM

## 2024-01-10 DIAGNOSIS — E11.3299 NONPROLIFERATIVE DIABETIC RETINOPATHY (HCC): Chronic | ICD-10-CM

## 2024-01-10 DIAGNOSIS — E11.42 DIABETIC POLYNEUROPATHY ASSOCIATED WITH TYPE 2 DIABETES MELLITUS (HCC): Chronic | ICD-10-CM

## 2024-01-10 DIAGNOSIS — Z79.4 CONTROLLED TYPE 2 DIABETES MELLITUS WITH STAGE 3 CHRONIC KIDNEY DISEASE, WITH LONG-TERM CURRENT USE OF INSULIN (HCC): ICD-10-CM

## 2024-01-10 DIAGNOSIS — S80.812A ABRASION OF LEFT LOWER EXTREMITY, INITIAL ENCOUNTER: ICD-10-CM

## 2024-01-10 DIAGNOSIS — G81.94 HEMIPARESIS, LEFT (HCC): ICD-10-CM

## 2024-01-10 DIAGNOSIS — N18.30 CONTROLLED TYPE 2 DIABETES MELLITUS WITH STAGE 3 CHRONIC KIDNEY DISEASE, WITH LONG-TERM CURRENT USE OF INSULIN (HCC): ICD-10-CM

## 2024-01-10 DIAGNOSIS — N18.32 CHRONIC KIDNEY DISEASE, STAGE 3B (HCC): Primary | ICD-10-CM

## 2024-01-10 DIAGNOSIS — I10 ESSENTIAL HYPERTENSION: ICD-10-CM

## 2024-01-10 DIAGNOSIS — I63.511 CEREBROVASCULAR ACCIDENT (CVA) DUE TO STENOSIS OF RIGHT MIDDLE CEREBRAL ARTERY (HCC): ICD-10-CM

## 2024-01-10 DIAGNOSIS — R22.43 LOCALIZED SWELLING OF BOTH LOWER LEGS: ICD-10-CM

## 2024-01-10 DIAGNOSIS — R42 DIZZINESS: ICD-10-CM

## 2024-01-10 LAB
ALBUMIN SERPL-MCNC: 4.1 G/DL (ref 3.5–4.6)
ALP SERPL-CCNC: 144 U/L (ref 35–104)
ALT SERPL-CCNC: 21 U/L (ref 0–41)
ANION GAP SERPL CALCULATED.3IONS-SCNC: 12 MEQ/L (ref 9–15)
AST SERPL-CCNC: 21 U/L (ref 0–40)
BILIRUB SERPL-MCNC: 0.4 MG/DL (ref 0.2–0.7)
BUN SERPL-MCNC: 35 MG/DL (ref 8–23)
CALCIUM SERPL-MCNC: 8.6 MG/DL (ref 8.5–9.9)
CHLORIDE SERPL-SCNC: 107 MEQ/L (ref 95–107)
CO2 SERPL-SCNC: 23 MEQ/L (ref 20–31)
CREAT SERPL-MCNC: 1.87 MG/DL (ref 0.7–1.2)
GLOBULIN SER CALC-MCNC: 3 G/DL (ref 2.3–3.5)
GLUCOSE SERPL-MCNC: 137 MG/DL (ref 70–99)
HBA1C MFR BLD: 7.4 % (ref 4.8–5.9)
POTASSIUM SERPL-SCNC: 4.4 MEQ/L (ref 3.4–4.9)
PROT SERPL-MCNC: 7.1 G/DL (ref 6.3–8)
SODIUM SERPL-SCNC: 142 MEQ/L (ref 135–144)

## 2024-01-10 ASSESSMENT — PATIENT HEALTH QUESTIONNAIRE - PHQ9
4. FEELING TIRED OR HAVING LITTLE ENERGY: 0
SUM OF ALL RESPONSES TO PHQ QUESTIONS 1-9: 0
2. FEELING DOWN, DEPRESSED OR HOPELESS: 0
8. MOVING OR SPEAKING SO SLOWLY THAT OTHER PEOPLE COULD HAVE NOTICED. OR THE OPPOSITE, BEING SO FIGETY OR RESTLESS THAT YOU HAVE BEEN MOVING AROUND A LOT MORE THAN USUAL: 0
10. IF YOU CHECKED OFF ANY PROBLEMS, HOW DIFFICULT HAVE THESE PROBLEMS MADE IT FOR YOU TO DO YOUR WORK, TAKE CARE OF THINGS AT HOME, OR GET ALONG WITH OTHER PEOPLE: 0
9. THOUGHTS THAT YOU WOULD BE BETTER OFF DEAD, OR OF HURTING YOURSELF: 0
7. TROUBLE CONCENTRATING ON THINGS, SUCH AS READING THE NEWSPAPER OR WATCHING TELEVISION: 0
SUM OF ALL RESPONSES TO PHQ QUESTIONS 1-9: 0
3. TROUBLE FALLING OR STAYING ASLEEP: 0
SUM OF ALL RESPONSES TO PHQ9 QUESTIONS 1 & 2: 0
SUM OF ALL RESPONSES TO PHQ QUESTIONS 1-9: 0
5. POOR APPETITE OR OVEREATING: 0
6. FEELING BAD ABOUT YOURSELF - OR THAT YOU ARE A FAILURE OR HAVE LET YOURSELF OR YOUR FAMILY DOWN: 0
SUM OF ALL RESPONSES TO PHQ QUESTIONS 1-9: 0
1. LITTLE INTEREST OR PLEASURE IN DOING THINGS: 0

## 2024-01-10 ASSESSMENT — ENCOUNTER SYMPTOMS
COUGH: 0
VOMITING: 0
DIARRHEA: 0
SHORTNESS OF BREATH: 0
NAUSEA: 0
SINUS PRESSURE: 0
CHEST TIGHTNESS: 0
BACK PAIN: 0
SORE THROAT: 0
SINUS PAIN: 0
ABDOMINAL PAIN: 0

## 2024-01-10 ASSESSMENT — VISUAL ACUITY: OU: 1

## 2024-01-10 NOTE — PATIENT INSTRUCTIONS
Blood Pressure : log for 1 week x2 a day. Send in [employer] when completed.   Stop the Amlodpine 10 mg. Continue Lisinopril 2.5 mg.

## 2024-01-18 DIAGNOSIS — I10 PRIMARY HYPERTENSION: Primary | ICD-10-CM

## 2024-01-18 DIAGNOSIS — I10 ESSENTIAL HYPERTENSION: ICD-10-CM

## 2024-01-18 RX ORDER — LISINOPRIL 10 MG/1
10 TABLET ORAL DAILY
Qty: 90 TABLET | Refills: 1 | Status: SHIPPED | OUTPATIENT
Start: 2024-01-18

## 2024-01-25 ENCOUNTER — PATIENT MESSAGE (OUTPATIENT)
Dept: FAMILY MEDICINE CLINIC | Age: 64
End: 2024-01-25

## 2024-01-25 NOTE — TELEPHONE ENCOUNTER
From: Jesse Leigh  To: Tierra Mallory  Sent: 1/25/2024 8:54 AM EST  Subject: bp readings week 2    10 mg lispril on day2  133 /80 119 /73    119 /72 111 73  113 /76 125 /81  122 77 131/ 81  121/80 122 /79  114 /77 143 /81  122 /76 124/81

## 2024-02-14 ENCOUNTER — OFFICE VISIT (OUTPATIENT)
Dept: ENDOCRINOLOGY | Age: 64
End: 2024-02-14
Payer: MEDICARE

## 2024-02-14 VITALS
BODY MASS INDEX: 28.82 KG/M2 | HEIGHT: 65 IN | HEART RATE: 77 BPM | DIASTOLIC BLOOD PRESSURE: 89 MMHG | SYSTOLIC BLOOD PRESSURE: 143 MMHG | WEIGHT: 173 LBS

## 2024-02-14 DIAGNOSIS — R80.9 CONTROLLED TYPE 2 DIABETES MELLITUS WITH MICROALBUMINURIA, WITH LONG-TERM CURRENT USE OF INSULIN (HCC): Primary | ICD-10-CM

## 2024-02-14 DIAGNOSIS — E11.29 CONTROLLED TYPE 2 DIABETES MELLITUS WITH MICROALBUMINURIA, WITH LONG-TERM CURRENT USE OF INSULIN (HCC): Primary | ICD-10-CM

## 2024-02-14 DIAGNOSIS — Z79.4 CONTROLLED TYPE 2 DIABETES MELLITUS WITH MICROALBUMINURIA, WITH LONG-TERM CURRENT USE OF INSULIN (HCC): Primary | ICD-10-CM

## 2024-02-14 DIAGNOSIS — E11.22 CONTROLLED TYPE 2 DIABETES MELLITUS WITH STAGE 3 CHRONIC KIDNEY DISEASE, WITH LONG-TERM CURRENT USE OF INSULIN (HCC): ICD-10-CM

## 2024-02-14 DIAGNOSIS — N18.30 CONTROLLED TYPE 2 DIABETES MELLITUS WITH STAGE 3 CHRONIC KIDNEY DISEASE, WITH LONG-TERM CURRENT USE OF INSULIN (HCC): ICD-10-CM

## 2024-02-14 DIAGNOSIS — Z79.4 CONTROLLED TYPE 2 DIABETES MELLITUS WITH STAGE 3 CHRONIC KIDNEY DISEASE, WITH LONG-TERM CURRENT USE OF INSULIN (HCC): ICD-10-CM

## 2024-02-14 PROCEDURE — 3079F DIAST BP 80-89 MM HG: CPT | Performed by: PHYSICIAN ASSISTANT

## 2024-02-14 PROCEDURE — 3017F COLORECTAL CA SCREEN DOC REV: CPT | Performed by: PHYSICIAN ASSISTANT

## 2024-02-14 PROCEDURE — G8484 FLU IMMUNIZE NO ADMIN: HCPCS | Performed by: PHYSICIAN ASSISTANT

## 2024-02-14 PROCEDURE — G8427 DOCREV CUR MEDS BY ELIG CLIN: HCPCS | Performed by: PHYSICIAN ASSISTANT

## 2024-02-14 PROCEDURE — 95251 CONT GLUC MNTR ANALYSIS I&R: CPT | Performed by: PHYSICIAN ASSISTANT

## 2024-02-14 PROCEDURE — 1036F TOBACCO NON-USER: CPT | Performed by: PHYSICIAN ASSISTANT

## 2024-02-14 PROCEDURE — 3051F HG A1C>EQUAL 7.0%<8.0%: CPT | Performed by: PHYSICIAN ASSISTANT

## 2024-02-14 PROCEDURE — G8419 CALC BMI OUT NRM PARAM NOF/U: HCPCS | Performed by: PHYSICIAN ASSISTANT

## 2024-02-14 PROCEDURE — 2022F DILAT RTA XM EVC RTNOPTHY: CPT | Performed by: PHYSICIAN ASSISTANT

## 2024-02-14 PROCEDURE — 99214 OFFICE O/P EST MOD 30 MIN: CPT | Performed by: PHYSICIAN ASSISTANT

## 2024-02-14 PROCEDURE — 3077F SYST BP >= 140 MM HG: CPT | Performed by: PHYSICIAN ASSISTANT

## 2024-02-14 NOTE — PROGRESS NOTES
2/14/2024    Assessment:       Diagnosis Orders   1. Controlled type 2 diabetes mellitus with microalbuminuria, with long-term current use of insulin ([facility])        2. Controlled type 2 diabetes mellitus with stage 3 chronic kidney disease, with long-term current use of insulin ([facility])            Stopped Trulicity due to cost     PLAN:     Continue Novolog 70/30 inject 12 units before breakfast and increase 12 units before dinner if glucose is greater than 130  Continue Invokana (canagliflozin)  300 mg by mouth daily  Monitor glucose 3-4 times daily and document  Freestyle Nghia 2 being used- [employer]   Repeat labs in 6 months   Follow up in 6 months     No orders of the defined types were placed in this encounter.      No orders of the defined types were placed in this encounter.    No follow-ups on file.  Subjective:     Chief Complaint   Patient presents with    Diabetes      per nghia     Vitals:    02/14/24 0945   BP: (!) 143/89   Site: Right Upper Arm   Position: Sitting   Cuff Size: Large Adult   Pulse: 77   Weight: 78.5 kg (173 lb)   Height: 1.651 m (5' 5\")     Wt Readings from Last 3 Encounters:   02/14/24 78.5 kg (173 lb)   01/10/24 77.6 kg (171 lb)   11/07/23 78.9 kg (174 lb)     BP Readings from Last 3 Encounters:   02/14/24 (!) 143/89   01/10/24 106/74   11/07/23 136/85     Presents today for follow-up of his diabetes.  Hemoglobin A1c 7.4%  Renal function stable, he sees PCP, is on SGLT2. Will need nephrology consult if function worsens even a little, we will monitor it closely, Discussed with pt he states they cant do anything to help. Renal function stable.  Glycemic control is good. Nghia downloaded and reviewed with pt. Marco states that he milagros low every time he starts a new pen, it sounds as thought he is using the pen correctly and mixing the insulin before injection. I advised him to only take 8 units when starting a new pen.     Diabetes  He presents for his initial diabetic

## 2024-02-14 NOTE — PATIENT INSTRUCTIONS
Endocrinology-    Check your blood sugars 4 times a day, before meals and at night  Document these numbers in a blood glucose log and bring them with you to your follow-up appointment.  If you are prescribed insulin, Do not take your mealtime insulin if your blood sugars less than 120   Call our office if you have blood sugars less than 80 or greater then 300 on two or more occasions  Call our office if you have any questions regarding your blood sugars or insulin dosing regiment  Signs of low blood sugar may include tremors, feeling shaky, sweating, dizziness, confusion and weakness. Check your blood sugar immediatly if you have any of these symptoms.     The plan as discussed at your appointment-     Continue Novolog 70/30 inject 12 units before breakfast and increase 12 units before dinner if glucose is greater than 130  Continue Invokana (canagliflozin)  300 mg by mouth daily  Monitor glucose 3-4 times daily and document  Freestyle Nghia 2 being used- [employer]   Repeat labs in 6 months   Follow up in 6 months

## 2024-05-10 DIAGNOSIS — E11.29 CONTROLLED TYPE 2 DIABETES MELLITUS WITH MICROALBUMINURIA, WITH LONG-TERM CURRENT USE OF INSULIN (HCC): Primary | ICD-10-CM

## 2024-05-10 DIAGNOSIS — R80.9 CONTROLLED TYPE 2 DIABETES MELLITUS WITH MICROALBUMINURIA, WITH LONG-TERM CURRENT USE OF INSULIN (HCC): Primary | ICD-10-CM

## 2024-05-10 DIAGNOSIS — Z79.4 CONTROLLED TYPE 2 DIABETES MELLITUS WITH MICROALBUMINURIA, WITH LONG-TERM CURRENT USE OF INSULIN (HCC): Primary | ICD-10-CM

## 2024-05-13 DIAGNOSIS — Z79.4 CONTROLLED TYPE 2 DIABETES MELLITUS WITH MICROALBUMINURIA, WITH LONG-TERM CURRENT USE OF INSULIN (HCC): ICD-10-CM

## 2024-05-13 DIAGNOSIS — R80.9 CONTROLLED TYPE 2 DIABETES MELLITUS WITH MICROALBUMINURIA, WITH LONG-TERM CURRENT USE OF INSULIN (HCC): ICD-10-CM

## 2024-05-13 DIAGNOSIS — E11.29 CONTROLLED TYPE 2 DIABETES MELLITUS WITH MICROALBUMINURIA, WITH LONG-TERM CURRENT USE OF INSULIN (HCC): ICD-10-CM

## 2024-05-13 LAB
ALBUMIN SERPL-MCNC: 4.2 G/DL (ref 3.5–4.6)
ALP SERPL-CCNC: 141 U/L (ref 35–104)
ALT SERPL-CCNC: 17 U/L (ref 0–41)
ANION GAP SERPL CALCULATED.3IONS-SCNC: 12 MEQ/L (ref 9–15)
AST SERPL-CCNC: 19 U/L (ref 0–40)
BILIRUB SERPL-MCNC: 0.4 MG/DL (ref 0.2–0.7)
BUN SERPL-MCNC: 32 MG/DL (ref 8–23)
CALCIUM SERPL-MCNC: 8.8 MG/DL (ref 8.5–9.9)
CHLORIDE SERPL-SCNC: 104 MEQ/L (ref 95–107)
CO2 SERPL-SCNC: 22 MEQ/L (ref 20–31)
CREAT SERPL-MCNC: 1.63 MG/DL (ref 0.7–1.2)
ESTIMATED AVERAGE GLUCOSE: 166 MG/DL
GLOBULIN SER CALC-MCNC: 3 G/DL (ref 2.3–3.5)
GLUCOSE SERPL-MCNC: 162 MG/DL (ref 70–99)
HBA1C MFR BLD: 7.4 % (ref 4–6)
POTASSIUM SERPL-SCNC: 4.7 MEQ/L (ref 3.4–4.9)
PROT SERPL-MCNC: 7.2 G/DL (ref 6.3–8)
SODIUM SERPL-SCNC: 138 MEQ/L (ref 135–144)

## 2024-05-14 DIAGNOSIS — R80.9 CONTROLLED TYPE 2 DIABETES MELLITUS WITH MICROALBUMINURIA, WITH LONG-TERM CURRENT USE OF INSULIN (HCC): ICD-10-CM

## 2024-05-14 DIAGNOSIS — Z79.4 CONTROLLED TYPE 2 DIABETES MELLITUS WITH MICROALBUMINURIA, WITH LONG-TERM CURRENT USE OF INSULIN (HCC): ICD-10-CM

## 2024-05-14 DIAGNOSIS — E11.29 CONTROLLED TYPE 2 DIABETES MELLITUS WITH MICROALBUMINURIA, WITH LONG-TERM CURRENT USE OF INSULIN (HCC): ICD-10-CM

## 2024-05-14 RX ORDER — CANAGLIFLOZIN 300 MG/1
300 TABLET, FILM COATED ORAL
Qty: 30 TABLET | Refills: 5 | Status: SHIPPED | OUTPATIENT
Start: 2024-05-14

## 2024-05-29 DIAGNOSIS — Z79.4 CONTROLLED TYPE 2 DIABETES MELLITUS WITH MICROALBUMINURIA, WITH LONG-TERM CURRENT USE OF INSULIN (HCC): ICD-10-CM

## 2024-05-29 DIAGNOSIS — R80.9 CONTROLLED TYPE 2 DIABETES MELLITUS WITH MICROALBUMINURIA, WITH LONG-TERM CURRENT USE OF INSULIN (HCC): ICD-10-CM

## 2024-05-29 DIAGNOSIS — E11.29 CONTROLLED TYPE 2 DIABETES MELLITUS WITH MICROALBUMINURIA, WITH LONG-TERM CURRENT USE OF INSULIN (HCC): ICD-10-CM

## 2024-06-17 ENCOUNTER — OFFICE VISIT (OUTPATIENT)
Dept: NEUROLOGY | Age: 64
End: 2024-06-17
Payer: MEDICARE

## 2024-06-17 VITALS
BODY MASS INDEX: 27.96 KG/M2 | HEART RATE: 92 BPM | WEIGHT: 168 LBS | SYSTOLIC BLOOD PRESSURE: 120 MMHG | DIASTOLIC BLOOD PRESSURE: 70 MMHG

## 2024-06-17 DIAGNOSIS — Z86.73 HISTORY OF STROKE: ICD-10-CM

## 2024-06-17 DIAGNOSIS — R29.898 LEFT LEG WEAKNESS: ICD-10-CM

## 2024-06-17 DIAGNOSIS — Z86.73 CHRONIC ISCHEMIC RIGHT ACA STROKE: Primary | Chronic | ICD-10-CM

## 2024-06-17 DIAGNOSIS — E11.42 DIABETIC POLYNEUROPATHY ASSOCIATED WITH TYPE 2 DIABETES MELLITUS (HCC): Chronic | ICD-10-CM

## 2024-06-17 DIAGNOSIS — R27.0 ATAXIA: ICD-10-CM

## 2024-06-17 DIAGNOSIS — I63.511 CEREBROVASCULAR ACCIDENT (CVA) DUE TO STENOSIS OF RIGHT MIDDLE CEREBRAL ARTERY (HCC): ICD-10-CM

## 2024-06-17 PROCEDURE — 3074F SYST BP LT 130 MM HG: CPT | Performed by: PSYCHIATRY & NEUROLOGY

## 2024-06-17 PROCEDURE — 2022F DILAT RTA XM EVC RTNOPTHY: CPT | Performed by: PSYCHIATRY & NEUROLOGY

## 2024-06-17 PROCEDURE — 99214 OFFICE O/P EST MOD 30 MIN: CPT | Performed by: PSYCHIATRY & NEUROLOGY

## 2024-06-17 PROCEDURE — 3017F COLORECTAL CA SCREEN DOC REV: CPT | Performed by: PSYCHIATRY & NEUROLOGY

## 2024-06-17 PROCEDURE — 1036F TOBACCO NON-USER: CPT | Performed by: PSYCHIATRY & NEUROLOGY

## 2024-06-17 PROCEDURE — 3078F DIAST BP <80 MM HG: CPT | Performed by: PSYCHIATRY & NEUROLOGY

## 2024-06-17 PROCEDURE — G8419 CALC BMI OUT NRM PARAM NOF/U: HCPCS | Performed by: PSYCHIATRY & NEUROLOGY

## 2024-06-17 PROCEDURE — G8427 DOCREV CUR MEDS BY ELIG CLIN: HCPCS | Performed by: PSYCHIATRY & NEUROLOGY

## 2024-06-17 PROCEDURE — 3051F HG A1C>EQUAL 7.0%<8.0%: CPT | Performed by: PSYCHIATRY & NEUROLOGY

## 2024-06-17 NOTE — PROGRESS NOTES
Subjective:      Patient ID: Jesse Leigh is a 63 y.o. male who presents today for:  Chief Complaint   Patient presents with    Follow-up     Pt states things are ok. Things are getting harder for him on his legs and he hit his leg on the tub in feb and its still kind of bruised        HPI 62-year-old right-handed gentleman with a history of chronic ischemic gliosis from previous right STEPHANIE stroke.  Patient multiple risk factors.  Patient also has neuropathy.  When last seen he was having some issues with his creatinine and kidney failure.  He still lives alone and is independent in activities of daily living    Past Medical History:   Diagnosis Date    Cerebrovascular small vessel disease     Chronic kidney disease, stage 3b (HCC) 12/16/2020    Colon cancer screening declined 8/30/2021    Debility 7/11/2022    Diabetic polyneuropathy associated with type 2 diabetes mellitus ([facility]) 4/22/2021    DM (diabetes mellitus) ([facility])     was with Dr Knox, [facility]    Dysarthria as late effect of cerebrovascular accident (CVA) 2015    Hearing loss     Hemiparesis affecting left side as late effect of cerebrovascular accident (CVA) ([facility])     History of left PCA stroke 2013    History of right STEPHANIE stroke 10/21/2015    was at [facility], now Dr Mallory    Microalbuminuria 2018    PFO (patent foramen ovale) 2015    Right pontine CVA ([facility]) 10/2015    Stage 3a chronic kidney disease ([facility]) 12/16/2020    Syncope and collapse 6/1/2020     Past Surgical History:   Procedure Laterality Date    ANKLE SURGERY       Social History     Socioeconomic History    Marital status: Single     Spouse name: Not on file    Number of children: 0    Years of education: Not on file    Highest education level: Not on file   Occupational History    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[employer]   Tobacco Use    Smoking status: Never    Smokeless tobacco: Never   Substance and Sexual Activity    Alcohol use: No    Drug use: No    Sexual activity: Not on file   Other Topics

## 2024-06-24 ENCOUNTER — HOSPITAL ENCOUNTER (OUTPATIENT)
Dept: ULTRASOUND IMAGING | Age: 64
Discharge: HOME | End: 2024-06-26
Payer: MEDICARE

## 2024-06-24 DIAGNOSIS — I63.511 CEREBROVASCULAR ACCIDENT (CVA) DUE TO STENOSIS OF RIGHT MIDDLE CEREBRAL ARTERY (HCC): ICD-10-CM

## 2024-06-24 PROCEDURE — 93880 EXTRACRANIAL BILAT STUDY: CPT

## 2024-07-02 ENCOUNTER — OFFICE VISIT (OUTPATIENT)
Dept: ENDOCRINOLOGY | Age: 64
End: 2024-07-02
Payer: MEDICARE

## 2024-07-02 VITALS
BODY MASS INDEX: 27.99 KG/M2 | HEART RATE: 90 BPM | DIASTOLIC BLOOD PRESSURE: 81 MMHG | WEIGHT: 168 LBS | SYSTOLIC BLOOD PRESSURE: 137 MMHG | HEIGHT: 65 IN | OXYGEN SATURATION: 95 %

## 2024-07-02 DIAGNOSIS — Z79.4 CONTROLLED TYPE 2 DIABETES MELLITUS WITH MICROALBUMINURIA, WITH LONG-TERM CURRENT USE OF INSULIN (HCC): Primary | ICD-10-CM

## 2024-07-02 DIAGNOSIS — E11.29 CONTROLLED TYPE 2 DIABETES MELLITUS WITH MICROALBUMINURIA, WITH LONG-TERM CURRENT USE OF INSULIN (HCC): Primary | ICD-10-CM

## 2024-07-02 DIAGNOSIS — R80.9 CONTROLLED TYPE 2 DIABETES MELLITUS WITH MICROALBUMINURIA, WITH LONG-TERM CURRENT USE OF INSULIN (HCC): Primary | ICD-10-CM

## 2024-07-02 LAB
CHP ED QC CHECK: NORMAL
GLUCOSE BLD-MCNC: 168 MG/DL

## 2024-07-02 PROCEDURE — G8419 CALC BMI OUT NRM PARAM NOF/U: HCPCS | Performed by: PHYSICIAN ASSISTANT

## 2024-07-02 PROCEDURE — 3017F COLORECTAL CA SCREEN DOC REV: CPT | Performed by: PHYSICIAN ASSISTANT

## 2024-07-02 PROCEDURE — 3051F HG A1C>EQUAL 7.0%<8.0%: CPT | Performed by: PHYSICIAN ASSISTANT

## 2024-07-02 PROCEDURE — 99214 OFFICE O/P EST MOD 30 MIN: CPT | Performed by: PHYSICIAN ASSISTANT

## 2024-07-02 PROCEDURE — 2022F DILAT RTA XM EVC RTNOPTHY: CPT | Performed by: PHYSICIAN ASSISTANT

## 2024-07-02 PROCEDURE — 1036F TOBACCO NON-USER: CPT | Performed by: PHYSICIAN ASSISTANT

## 2024-07-02 PROCEDURE — 3079F DIAST BP 80-89 MM HG: CPT | Performed by: PHYSICIAN ASSISTANT

## 2024-07-02 PROCEDURE — 82962 GLUCOSE BLOOD TEST: CPT | Performed by: PHYSICIAN ASSISTANT

## 2024-07-02 PROCEDURE — G8427 DOCREV CUR MEDS BY ELIG CLIN: HCPCS | Performed by: PHYSICIAN ASSISTANT

## 2024-07-02 PROCEDURE — 3075F SYST BP GE 130 - 139MM HG: CPT | Performed by: PHYSICIAN ASSISTANT

## 2024-07-02 ASSESSMENT — ENCOUNTER SYMPTOMS
ABDOMINAL PAIN: 0
EYE PAIN: 0
WHEEZING: 0
SORE THROAT: 0
SINUS PRESSURE: 0
RHINORRHEA: 0
COUGH: 0
EYE REDNESS: 0
VOMITING: 0
DIARRHEA: 0
NAUSEA: 0
SHORTNESS OF BREATH: 0

## 2024-07-02 NOTE — PROGRESS NOTES
7/2/2024    Assessment:       Diagnosis Orders   1. Controlled type 2 diabetes mellitus with microalbuminuria, with long-term current use of insulin ([facility])  POCT Glucose    Comprehensive Metabolic Panel    HM DIABETES FOOT EXAM    Hemoglobin A1C          Stopped Trulicity due to cost     PLAN:     Continue Novolog 70/30 inject 12 units before breakfast and increase 12 units before dinner if glucose is greater than 130  Continue Invokana (canagliflozin)  300 mg by mouth daily  Monitor glucose 3-4 times daily and document  Freestyle Nghia 2 being used- [employer]   Repeat labs in 6 months   Follow up in 6 months     Orders Placed This Encounter   Procedures    Comprehensive Metabolic Panel     Standing Status:   Future     Standing Expiration Date:   7/2/2025    Hemoglobin A1C     Standing Status:   Future     Standing Expiration Date:   7/2/2025    POCT Glucose    HM DIABETES FOOT EXAM         No orders of the defined types were placed in this encounter.    Return in about 3 months (around 10/2/2024) for Diabetes.  Subjective:     Chief Complaint   Patient presents with    Diabetes       Vitals:    07/02/24 1005   BP: 137/81   Pulse: 90   SpO2: 95%   Weight: 76.2 kg (168 lb)   Height: 1.651 m (5' 5\")       Wt Readings from Last 3 Encounters:   07/02/24 76.2 kg (168 lb)   06/17/24 76.2 kg (168 lb)   02/14/24 78.5 kg (173 lb)     BP Readings from Last 3 Encounters:   07/02/24 137/81   06/17/24 120/70   02/14/24 (!) 143/89     Presents today for follow-up of his diabetes.  Hemoglobin A1c 7.4%  Renal function stable, he sees PCP, is on SGLT2. Will need nephrology consult if function worsens even a little, we will monitor it closely, Discussed with pt he states they cant do anything to help. Renal function stable.  Glycemic control is good. Nghia downloaded and reviewed with pt. continues to claim that he drops low every time he starts a new insulin pen, states that he mixes the insulin properly, he is a

## 2024-07-02 NOTE — PATIENT INSTRUCTIONS
glucose number and treat the number accordingly.  You can calibrate the device by going into the settings, and inputting your blood glucose levels.  Sometimes technical issues can arrive, the sensor may be bent or come out from under the skin, the transmitter may not function.  If this should happen usual your regular glucometer and test your blood until you can place a new sensor and transmitter on your skin.

## 2024-07-03 PROBLEM — N18.32 CHRONIC KIDNEY DISEASE, STAGE 3B (HCC): Status: RESOLVED | Noted: 2024-01-09 | Resolved: 2024-07-03

## 2024-07-03 NOTE — PROGRESS NOTES
Subjective  Jesse Leigh 1960 is a 63 y.o. male who presents today with:  Chief Complaint   Patient presents with    Follow-up     Follow up   Pt reports no concerns        HPI  HTN  - Lisinopril 10 mg  - Patient is here for f/u HTN. Is compliant with meds. Having dizziness intermittently from sitting to standing. Trying to stay well hydrated. Had swelling around his ankles. We d/c amlodipine because of this and changed lisinopril 2.5 mg to 10 mg. BP today 146/80 today. But readings from home showing that he is around -129 and DBP 73-82.  Avoids added salt. Tries to eat healthy. Exercises occasionally. Has no chest pain, shortness of breath, palpitations or edema.    DM, Type 2/ DM retinopathy  - followed by Mike Rey PA-C  - Invokana 300 mg, insulin  -Patient is here for DM f/u. Sugars have been fairly well-controlled and patient has not been experiencing hyper- or hypoglycemic symptoms. Compliance with meds is good and there are no side effects. Patient exercises occasionally and tries to eat healthy.   - On ACEI and statin med.  - DM Eye: Macular degeneration and retinopathy. Getting injections with ophthalmology. Patient reports he feels like it did not help, feels like it is worse. Dr. Whitaker at [facility]. Patient is fearful of operation d/t anaesthesia. Feels like he will not wake up from it. Also being treated for retinopathy. Vision[facility]- pill that is for the eyes. Seems to be helping per patient.   - DM Foot: Will call podiatrist to schedule.     CKD, 3a  - Bun 32, creatinine 1.63, eGFR 47.4 on 05/13/24  - on a baby asa for his stroke.     CVA  No new deficits.  No new or worsening facial droop.  No  worsening of baseline speech impairment.  No progression of unilateral weakness. States that it is getting hard for him to go up the stairs at his apartment  - mix of balance and strength deficits.  Acknowledges sedentary lifestyle because he fears falling and now

## 2024-07-07 SDOH — ECONOMIC STABILITY: FOOD INSECURITY: WITHIN THE PAST 12 MONTHS, YOU WORRIED THAT YOUR FOOD WOULD RUN OUT BEFORE YOU GOT MONEY TO BUY MORE.: NEVER TRUE

## 2024-07-07 SDOH — ECONOMIC STABILITY: FOOD INSECURITY: WITHIN THE PAST 12 MONTHS, THE FOOD YOU BOUGHT JUST DIDN'T LAST AND YOU DIDN'T HAVE MONEY TO GET MORE.: NEVER TRUE

## 2024-07-07 SDOH — ECONOMIC STABILITY: INCOME INSECURITY: HOW HARD IS IT FOR YOU TO PAY FOR THE VERY BASICS LIKE FOOD, HOUSING, MEDICAL CARE, AND HEATING?: NOT HARD AT ALL

## 2024-07-10 ENCOUNTER — OFFICE VISIT (OUTPATIENT)
Dept: FAMILY MEDICINE CLINIC | Age: 64
End: 2024-07-10
Payer: MEDICARE

## 2024-07-10 VITALS
SYSTOLIC BLOOD PRESSURE: 146 MMHG | RESPIRATION RATE: 16 BRPM | DIASTOLIC BLOOD PRESSURE: 80 MMHG | TEMPERATURE: 97.5 F | HEIGHT: 65 IN | BODY MASS INDEX: 27.96 KG/M2 | OXYGEN SATURATION: 98 % | HEART RATE: 87 BPM

## 2024-07-10 DIAGNOSIS — E11.22 CONTROLLED TYPE 2 DIABETES MELLITUS WITH STAGE 3 CHRONIC KIDNEY DISEASE, WITH LONG-TERM CURRENT USE OF INSULIN (HCC): ICD-10-CM

## 2024-07-10 DIAGNOSIS — N18.30 CONTROLLED TYPE 2 DIABETES MELLITUS WITH STAGE 3 CHRONIC KIDNEY DISEASE, WITH LONG-TERM CURRENT USE OF INSULIN (HCC): ICD-10-CM

## 2024-07-10 DIAGNOSIS — I63.511 CEREBROVASCULAR ACCIDENT (CVA) DUE TO STENOSIS OF RIGHT MIDDLE CEREBRAL ARTERY (HCC): Primary | ICD-10-CM

## 2024-07-10 DIAGNOSIS — I10 ESSENTIAL HYPERTENSION: ICD-10-CM

## 2024-07-10 DIAGNOSIS — Z79.4 CONTROLLED TYPE 2 DIABETES MELLITUS WITH STAGE 3 CHRONIC KIDNEY DISEASE, WITH LONG-TERM CURRENT USE OF INSULIN (HCC): ICD-10-CM

## 2024-07-10 DIAGNOSIS — N18.31 STAGE 3A CHRONIC KIDNEY DISEASE (HCC): Chronic | ICD-10-CM

## 2024-07-10 PROCEDURE — 2022F DILAT RTA XM EVC RTNOPTHY: CPT | Performed by: PHYSICIAN ASSISTANT

## 2024-07-10 PROCEDURE — G8427 DOCREV CUR MEDS BY ELIG CLIN: HCPCS | Performed by: PHYSICIAN ASSISTANT

## 2024-07-10 PROCEDURE — 3077F SYST BP >= 140 MM HG: CPT | Performed by: PHYSICIAN ASSISTANT

## 2024-07-10 PROCEDURE — G8419 CALC BMI OUT NRM PARAM NOF/U: HCPCS | Performed by: PHYSICIAN ASSISTANT

## 2024-07-10 PROCEDURE — 3079F DIAST BP 80-89 MM HG: CPT | Performed by: PHYSICIAN ASSISTANT

## 2024-07-10 PROCEDURE — 3051F HG A1C>EQUAL 7.0%<8.0%: CPT | Performed by: PHYSICIAN ASSISTANT

## 2024-07-10 PROCEDURE — 3017F COLORECTAL CA SCREEN DOC REV: CPT | Performed by: PHYSICIAN ASSISTANT

## 2024-07-10 PROCEDURE — 99214 OFFICE O/P EST MOD 30 MIN: CPT | Performed by: PHYSICIAN ASSISTANT

## 2024-07-10 PROCEDURE — 1036F TOBACCO NON-USER: CPT | Performed by: PHYSICIAN ASSISTANT

## 2024-07-10 RX ORDER — LISINOPRIL 10 MG/1
10 TABLET ORAL DAILY
Qty: 90 TABLET | Refills: 1 | Status: SHIPPED | OUTPATIENT
Start: 2024-07-10

## 2024-07-10 SDOH — ECONOMIC STABILITY: FOOD INSECURITY: WITHIN THE PAST 12 MONTHS, YOU WORRIED THAT YOUR FOOD WOULD RUN OUT BEFORE YOU GOT MONEY TO BUY MORE.: NEVER TRUE

## 2024-07-10 SDOH — ECONOMIC STABILITY: FOOD INSECURITY: WITHIN THE PAST 12 MONTHS, THE FOOD YOU BOUGHT JUST DIDN'T LAST AND YOU DIDN'T HAVE MONEY TO GET MORE.: NEVER TRUE

## 2024-07-10 SDOH — ECONOMIC STABILITY: INCOME INSECURITY: HOW HARD IS IT FOR YOU TO PAY FOR THE VERY BASICS LIKE FOOD, HOUSING, MEDICAL CARE, AND HEATING?: NOT HARD AT ALL

## 2024-07-10 ASSESSMENT — ENCOUNTER SYMPTOMS
BACK PAIN: 0
COUGH: 0
ABDOMINAL PAIN: 0
NAUSEA: 0
DIARRHEA: 0
SINUS PRESSURE: 0
VOMITING: 0
SINUS PAIN: 0
SHORTNESS OF BREATH: 0
CHEST TIGHTNESS: 0
SORE THROAT: 0

## 2024-07-10 ASSESSMENT — VISUAL ACUITY: OU: 1

## 2024-08-07 DIAGNOSIS — Z86.73 CHRONIC ISCHEMIC RIGHT ACA STROKE: Chronic | ICD-10-CM

## 2024-08-07 DIAGNOSIS — E78.2 MIXED HYPERLIPIDEMIA: ICD-10-CM

## 2024-08-07 RX ORDER — ATORVASTATIN CALCIUM 80 MG/1
TABLET, FILM COATED ORAL
Qty: 90 TABLET | Refills: 4 | Status: SHIPPED | OUTPATIENT
Start: 2024-08-07

## 2024-08-07 NOTE — TELEPHONE ENCOUNTER
Comments:     Last Office Visit (last PCP visit):   7/10/2024    Next Visit Date:  Future Appointments   Date Time Provider Department Center   10/2/2024 10:00 AM Rey, Mike S, PA [facility]   12/11/2024 10:00 AM Tierra Mallory PA [facility]   6/16/2025  1:00 PM Yusuf Mallory MD [facility] Neurology -       **If hasn't been seen in over a year OR hasn't followed up according to last diabetes/ADHD visit, make appointment for patient before sending refill to provider.    Rx requested:  Requested Prescriptions     Pending Prescriptions Disp Refills    atorvastatin (LIPITOR) 80 MG tablet 90 tablet 4     Sig: TAKE 1 TABLET BY MOUTH DAILY

## 2024-09-12 DIAGNOSIS — E11.29 CONTROLLED TYPE 2 DIABETES MELLITUS WITH MICROALBUMINURIA, WITH LONG-TERM CURRENT USE OF INSULIN (HCC): ICD-10-CM

## 2024-09-12 DIAGNOSIS — R80.9 CONTROLLED TYPE 2 DIABETES MELLITUS WITH MICROALBUMINURIA, WITH LONG-TERM CURRENT USE OF INSULIN (HCC): ICD-10-CM

## 2024-09-12 DIAGNOSIS — Z79.4 CONTROLLED TYPE 2 DIABETES MELLITUS WITH MICROALBUMINURIA, WITH LONG-TERM CURRENT USE OF INSULIN (HCC): ICD-10-CM

## 2024-09-12 LAB
ALBUMIN SERPL-MCNC: 3.9 G/DL (ref 3.5–4.6)
ALP SERPL-CCNC: 115 U/L (ref 35–104)
ALT SERPL-CCNC: 26 U/L (ref 0–41)
ANION GAP SERPL CALCULATED.3IONS-SCNC: 11 MEQ/L (ref 9–15)
AST SERPL-CCNC: 24 U/L (ref 0–40)
BILIRUB SERPL-MCNC: 0.4 MG/DL (ref 0.2–0.7)
BUN SERPL-MCNC: 27 MG/DL (ref 8–23)
CALCIUM SERPL-MCNC: 9 MG/DL (ref 8.5–9.9)
CHLORIDE SERPL-SCNC: 105 MEQ/L (ref 95–107)
CO2 SERPL-SCNC: 23 MEQ/L (ref 20–31)
CREAT SERPL-MCNC: 1.63 MG/DL (ref 0.7–1.2)
ESTIMATED AVERAGE GLUCOSE: 171 MG/DL
GLOBULIN SER CALC-MCNC: 2.9 G/DL (ref 2.3–3.5)
GLUCOSE SERPL-MCNC: 74 MG/DL (ref 70–99)
HBA1C MFR BLD: 7.6 % (ref 4–6)
POTASSIUM SERPL-SCNC: 4.4 MEQ/L (ref 3.4–4.9)
PROT SERPL-MCNC: 6.8 G/DL (ref 6.3–8)
SODIUM SERPL-SCNC: 139 MEQ/L (ref 135–144)

## 2024-09-24 ENCOUNTER — TELEPHONE (OUTPATIENT)
Dept: ENDOCRINOLOGY | Age: 64
End: 2024-09-24

## 2024-10-02 ENCOUNTER — OFFICE VISIT (OUTPATIENT)
Dept: ENDOCRINOLOGY | Age: 64
End: 2024-10-02

## 2024-10-02 VITALS
OXYGEN SATURATION: 95 % | HEIGHT: 65 IN | DIASTOLIC BLOOD PRESSURE: 86 MMHG | HEART RATE: 80 BPM | WEIGHT: 170 LBS | SYSTOLIC BLOOD PRESSURE: 144 MMHG | BODY MASS INDEX: 28.32 KG/M2

## 2024-10-02 DIAGNOSIS — E11.29 CONTROLLED TYPE 2 DIABETES MELLITUS WITH MICROALBUMINURIA, WITH LONG-TERM CURRENT USE OF INSULIN (HCC): Primary | ICD-10-CM

## 2024-10-02 DIAGNOSIS — Z79.4 CONTROLLED TYPE 2 DIABETES MELLITUS WITH MICROALBUMINURIA, WITH LONG-TERM CURRENT USE OF INSULIN (HCC): Primary | ICD-10-CM

## 2024-10-02 DIAGNOSIS — R80.9 CONTROLLED TYPE 2 DIABETES MELLITUS WITH MICROALBUMINURIA, WITH LONG-TERM CURRENT USE OF INSULIN (HCC): Primary | ICD-10-CM

## 2024-10-02 LAB
CHP ED QC CHECK: NORMAL
GLUCOSE BLD-MCNC: 151 MG/DL

## 2024-10-02 RX ORDER — DAPAGLIFLOZIN 10 MG/1
10 TABLET, FILM COATED ORAL EVERY MORNING
Qty: 90 TABLET | Refills: 4 | Status: SHIPPED | OUTPATIENT
Start: 2024-10-02

## 2024-10-02 ASSESSMENT — ENCOUNTER SYMPTOMS
NAUSEA: 0
EYE PAIN: 0
SHORTNESS OF BREATH: 0
VOMITING: 0
SORE THROAT: 0
ABDOMINAL PAIN: 0
DIARRHEA: 0
EYE REDNESS: 0
COUGH: 0
RHINORRHEA: 0
WHEEZING: 0
SINUS PRESSURE: 0

## 2024-10-02 NOTE — PROGRESS NOTES
Negative for abdominal pain, diarrhea, nausea and vomiting.   Endocrine: Negative for polydipsia and polyuria.   Genitourinary:  Negative for difficulty urinating.   Musculoskeletal:  Negative for arthralgias.   Skin:  Negative for rash.   Neurological:  Positive for tremors. Negative for dizziness and headaches.   Psychiatric/Behavioral:  Positive for confusion.        Objective:   Physical Exam  Constitutional:       Appearance: He is well-developed.   HENT:      Head: Normocephalic and atraumatic.      Mouth/Throat:      Mouth: Mucous membranes are moist.   Eyes:      Conjunctiva/sclera: Conjunctivae normal.   Neck:      Comments: No thyromegaly or palpable nodules  Cardiovascular:      Rate and Rhythm: Normal rate and regular rhythm.      Heart sounds: Normal heart sounds. No murmur heard.  Pulmonary:      Effort: Pulmonary effort is normal.      Breath sounds: Normal breath sounds.   Abdominal:      General: Bowel sounds are normal.      Palpations: Abdomen is soft.   Musculoskeletal:         General: Swelling present. Normal range of motion.      Cervical back: Normal range of motion and neck supple.      Comments: Bilateral foot exam completed, examination of the forefoot, hindfoot, and webspaces were done.  Range of motion is intact bilaterally.  1+ DP/PT pulses bilaterally with good capillary refill.  No wounds, lesions, or ulcerations, skin is intact.  Thickened toenails.  1+ pitting edema. Gross sensation intact bilaterally.    Skin:     General: Skin is warm and dry.   Neurological:      Mental Status: He is alert and oriented to person, place, and time.   Psychiatric:         Mood and Affect: Mood normal.      Comments: Dysphoric mood, improved

## 2024-10-02 NOTE — PATIENT INSTRUCTIONS
Endocrinology-    Check your blood sugars 4 times a day, before meals and at night  Document these numbers in a blood glucose log and bring them with you to your follow-up appointment.  If you are prescribed insulin, Do not take your mealtime insulin if your blood sugars less than 120   Call our office if you have blood sugars less than 80 or greater then 300 on two or more occasions  Call our office if you have any questions regarding your blood sugars or insulin dosing regiment  Signs of low blood sugar may include tremors, feeling shaky, sweating, dizziness, confusion and weakness. Check your blood sugar immediatly if you have any of these symptoms.     The plan as discussed at your appointment-   Continue Novolog 70/30 inject 6 units before breakfast and increase 12 units before dinner if glucose is greater than 130  Stop Invokana (canagliflozin)  300 mg by mouth daily (too costly)   Start Farxiga 10 mg daily   Hold lisinopril 10 mg , observe for improvement of cough  Monitor glucose 3-4 times daily and document  Freestyle Nghia 2 being used- [employer]   Repeat labs in 6 months   Follow up in 6 months

## 2024-10-07 ENCOUNTER — OFFICE VISIT (OUTPATIENT)
Age: 64
End: 2024-10-07
Payer: MEDICARE

## 2024-10-07 VITALS
BODY MASS INDEX: 28.32 KG/M2 | WEIGHT: 170 LBS | HEIGHT: 65 IN | HEART RATE: 104 BPM | TEMPERATURE: 97 F | OXYGEN SATURATION: 96 % | RESPIRATION RATE: 15 BRPM | DIASTOLIC BLOOD PRESSURE: 80 MMHG | SYSTOLIC BLOOD PRESSURE: 120 MMHG

## 2024-10-07 DIAGNOSIS — H60.61 CHRONIC OTITIS EXTERNA OF RIGHT EAR, UNSPECIFIED TYPE: Primary | ICD-10-CM

## 2024-10-07 PROCEDURE — G8419 CALC BMI OUT NRM PARAM NOF/U: HCPCS | Performed by: STUDENT IN AN ORGANIZED HEALTH CARE EDUCATION/TRAINING PROGRAM

## 2024-10-07 PROCEDURE — 99203 OFFICE O/P NEW LOW 30 MIN: CPT | Performed by: STUDENT IN AN ORGANIZED HEALTH CARE EDUCATION/TRAINING PROGRAM

## 2024-10-07 PROCEDURE — 3074F SYST BP LT 130 MM HG: CPT | Performed by: STUDENT IN AN ORGANIZED HEALTH CARE EDUCATION/TRAINING PROGRAM

## 2024-10-07 PROCEDURE — 3017F COLORECTAL CA SCREEN DOC REV: CPT | Performed by: STUDENT IN AN ORGANIZED HEALTH CARE EDUCATION/TRAINING PROGRAM

## 2024-10-07 PROCEDURE — G8427 DOCREV CUR MEDS BY ELIG CLIN: HCPCS | Performed by: STUDENT IN AN ORGANIZED HEALTH CARE EDUCATION/TRAINING PROGRAM

## 2024-10-07 PROCEDURE — G8484 FLU IMMUNIZE NO ADMIN: HCPCS | Performed by: STUDENT IN AN ORGANIZED HEALTH CARE EDUCATION/TRAINING PROGRAM

## 2024-10-07 PROCEDURE — 3079F DIAST BP 80-89 MM HG: CPT | Performed by: STUDENT IN AN ORGANIZED HEALTH CARE EDUCATION/TRAINING PROGRAM

## 2024-10-07 PROCEDURE — 1036F TOBACCO NON-USER: CPT | Performed by: STUDENT IN AN ORGANIZED HEALTH CARE EDUCATION/TRAINING PROGRAM

## 2024-10-07 RX ORDER — OFLOXACIN 3 MG/ML
5 SOLUTION AURICULAR (OTIC) 2 TIMES DAILY
Qty: 5 ML | Refills: 0 | Status: SHIPPED | OUTPATIENT
Start: 2024-10-07 | End: 2024-10-11 | Stop reason: SDUPTHER

## 2024-10-07 NOTE — PROGRESS NOTES
[facility] [place] [facility], [facility] OTOLARYNGOLOGY  [address] [place] [address], [address]  [facility] [address]  Dept: [phone]  Dept Fax: [phone]  Loc: [phone]     10/7/2024    Visit type: New patient    Reason for Visit: New Patient (Ear Infection (self referral))       ASSESSMENT/PLAN   1. Chronic otitis externa of right ear, unspecified type    Right otitis externa, otowick placed. Start floxin drops and follow up 1 week    No follow-ups on file.  Orders Placed This Encounter   Medications    DISCONTD: ofloxacin (FLOXIN) 0.3 % otic solution     Sig: Place 5 drops into the right ear 2 times daily for 10 days     Dispense:  5 mL     Refill:  0      Subjective    Patient: Jesse Leigh is a 64 y.o. male   HPI:  Presents today for right ear concerns    Right ear pain for the past few days  Feels water behind eardrum   Hasn't been on any medication   Does not have frequent ear infections    No Known Allergies    Current Outpatient Medications:     dapagliflozin (FARXIGA) 10 MG tablet, Take 1 tablet by mouth every morning, Disp: 90 tablet, Rfl: 4    atorvastatin (LIPITOR) 80 MG tablet, TAKE 1 TABLET BY MOUTH DAILY, Disp: 90 tablet, Rfl: 4    lisinopril (PRINIVIL;ZESTRIL) 10 MG tablet, Take 1 tablet by mouth daily, Disp: 90 tablet, Rfl: 1    insulin aspart prot & aspart (NOVOLOG 70/30) injection pen, Inject 14 units Before breakfast and 12 units before dinner if glucose is greater than 130, Disp: 5 Adjustable Dose Pre-filled Pen Syringe, Rfl: 4    Continuous Glucose Sensor (FREESTYLE CHINO 2 SENSOR) MISC, 1 Device by Does not apply route every 14 days, Disp: 6 each, Rfl: 3    Continuous Blood Gluc  (FREESTYLE CHINO 2 READER) JALEEL, 1 Device by Does not apply route 4 times daily (before meals and nightly), Disp: 1 each, Rfl: 0    Blood Pressure KIT, 1 Units by Does not apply route daily, Disp: 1 kit, Rfl: 0    ofloxacin (FLOXIN) 0.3 % otic solution, Place 5 drops into the

## 2024-10-11 ENCOUNTER — OFFICE VISIT (OUTPATIENT)
Age: 64
End: 2024-10-11
Payer: MEDICARE

## 2024-10-11 VITALS
HEART RATE: 76 BPM | DIASTOLIC BLOOD PRESSURE: 82 MMHG | SYSTOLIC BLOOD PRESSURE: 130 MMHG | BODY MASS INDEX: 27.96 KG/M2 | OXYGEN SATURATION: 96 % | WEIGHT: 168 LBS

## 2024-10-11 DIAGNOSIS — H60.61 CHRONIC OTITIS EXTERNA OF RIGHT EAR, UNSPECIFIED TYPE: Primary | ICD-10-CM

## 2024-10-11 PROCEDURE — G8427 DOCREV CUR MEDS BY ELIG CLIN: HCPCS | Performed by: STUDENT IN AN ORGANIZED HEALTH CARE EDUCATION/TRAINING PROGRAM

## 2024-10-11 PROCEDURE — 99212 OFFICE O/P EST SF 10 MIN: CPT | Performed by: STUDENT IN AN ORGANIZED HEALTH CARE EDUCATION/TRAINING PROGRAM

## 2024-10-11 PROCEDURE — 3079F DIAST BP 80-89 MM HG: CPT | Performed by: STUDENT IN AN ORGANIZED HEALTH CARE EDUCATION/TRAINING PROGRAM

## 2024-10-11 PROCEDURE — G8419 CALC BMI OUT NRM PARAM NOF/U: HCPCS | Performed by: STUDENT IN AN ORGANIZED HEALTH CARE EDUCATION/TRAINING PROGRAM

## 2024-10-11 PROCEDURE — G8484 FLU IMMUNIZE NO ADMIN: HCPCS | Performed by: STUDENT IN AN ORGANIZED HEALTH CARE EDUCATION/TRAINING PROGRAM

## 2024-10-11 PROCEDURE — 3017F COLORECTAL CA SCREEN DOC REV: CPT | Performed by: STUDENT IN AN ORGANIZED HEALTH CARE EDUCATION/TRAINING PROGRAM

## 2024-10-11 PROCEDURE — 3075F SYST BP GE 130 - 139MM HG: CPT | Performed by: STUDENT IN AN ORGANIZED HEALTH CARE EDUCATION/TRAINING PROGRAM

## 2024-10-11 PROCEDURE — 1036F TOBACCO NON-USER: CPT | Performed by: STUDENT IN AN ORGANIZED HEALTH CARE EDUCATION/TRAINING PROGRAM

## 2024-10-11 RX ORDER — OFLOXACIN 3 MG/ML
5 SOLUTION AURICULAR (OTIC) 2 TIMES DAILY
Qty: 5 ML | Refills: 0 | Status: SHIPPED | OUTPATIENT
Start: 2024-10-11 | End: 2024-10-18

## 2024-10-11 NOTE — PROGRESS NOTES
[facility] [place] [facility], [facility] OTOLARYNGOLOGY  [address] [place] [address], [address]  [place] [id number]  Dept: [phone]  Dept Fax: [phone]  Loc: [phone]     10/11/2024    Visit type: Follow up    Reason for Visit: Ear Problem (Right ear pain )       ASSESSMENT/PLAN   1. Chronic otitis externa of right ear, unspecified type    Otowick removed with significant improvement in swelling and drainage.   Small amount of granulation tissue in EAC, would like to see this resolve   Continue drops and follow up 1 week    No follow-ups on file.  Orders Placed This Encounter   Medications    ofloxacin (FLOXIN) 0.3 % otic solution     Sig: Place 5 drops into the right ear 2 times daily for 7 days     Dispense:  5 mL     Refill:  0      Subjective    Patient: Jesse Leigh is a 64 y.o. male   HPI:    Recall,   10/7/24   Right ear pain and drainage    Otowick placed and started drops    Today,   Improvement in pain   Continues to have decreased hearing 2/2 otowick in place    Of note, has history of stroke causing left sided facial weakness    Objective     Vitals:    10/11/24 0855   BP: 130/82   Site: Right Upper Arm   Position: Sitting   Cuff Size: Small Adult   Pulse: 76   SpO2: 96%   Weight: 76.2 kg (168 lb)        Physical Exam  Constitutional:       Appearance: Normal appearance.   HENT:      Head: Normocephalic and atraumatic.      Right Ear: External ear normal.      Left Ear: Tympanic membrane, ear canal and external ear normal.      Ears:      Comments: Significant improvement in EAC swelling and drainage. Some granulation tissue within EAC, nonobstructive. TM normal to otoscopy     Nose: Nose normal.      Mouth/Throat:      Mouth: Mucous membranes are moist.   Pulmonary:      Effort: Pulmonary effort is normal.      Breath sounds: No stridor.   Neurological:      General: No focal deficit present.      Mental Status: He is alert and oriented to person, place, and time.

## 2024-10-17 ENCOUNTER — OFFICE VISIT (OUTPATIENT)
Age: 64
End: 2024-10-17
Payer: MEDICARE

## 2024-10-17 VITALS
HEIGHT: 65 IN | WEIGHT: 168 LBS | OXYGEN SATURATION: 98 % | RESPIRATION RATE: 14 BRPM | TEMPERATURE: 96.9 F | BODY MASS INDEX: 27.99 KG/M2 | HEART RATE: 88 BPM | DIASTOLIC BLOOD PRESSURE: 80 MMHG | SYSTOLIC BLOOD PRESSURE: 130 MMHG

## 2024-10-17 DIAGNOSIS — H60.61 CHRONIC OTITIS EXTERNA OF RIGHT EAR, UNSPECIFIED TYPE: Primary | ICD-10-CM

## 2024-10-17 PROCEDURE — 1036F TOBACCO NON-USER: CPT | Performed by: STUDENT IN AN ORGANIZED HEALTH CARE EDUCATION/TRAINING PROGRAM

## 2024-10-17 PROCEDURE — G8419 CALC BMI OUT NRM PARAM NOF/U: HCPCS | Performed by: STUDENT IN AN ORGANIZED HEALTH CARE EDUCATION/TRAINING PROGRAM

## 2024-10-17 PROCEDURE — G8427 DOCREV CUR MEDS BY ELIG CLIN: HCPCS | Performed by: STUDENT IN AN ORGANIZED HEALTH CARE EDUCATION/TRAINING PROGRAM

## 2024-10-17 PROCEDURE — 3017F COLORECTAL CA SCREEN DOC REV: CPT | Performed by: STUDENT IN AN ORGANIZED HEALTH CARE EDUCATION/TRAINING PROGRAM

## 2024-10-17 PROCEDURE — 3075F SYST BP GE 130 - 139MM HG: CPT | Performed by: STUDENT IN AN ORGANIZED HEALTH CARE EDUCATION/TRAINING PROGRAM

## 2024-10-17 PROCEDURE — 99212 OFFICE O/P EST SF 10 MIN: CPT | Performed by: STUDENT IN AN ORGANIZED HEALTH CARE EDUCATION/TRAINING PROGRAM

## 2024-10-17 PROCEDURE — 3079F DIAST BP 80-89 MM HG: CPT | Performed by: STUDENT IN AN ORGANIZED HEALTH CARE EDUCATION/TRAINING PROGRAM

## 2024-10-17 PROCEDURE — G8484 FLU IMMUNIZE NO ADMIN: HCPCS | Performed by: STUDENT IN AN ORGANIZED HEALTH CARE EDUCATION/TRAINING PROGRAM

## 2024-10-18 NOTE — PROGRESS NOTES
[facility] [place] [facility], [facility] OTOLARYNGOLOGY  [address] [place] [address], [address]  [facility] [address]  Dept: [phone]  Dept Fax: [phone]  Loc: [phone]     10/17/2024    Visit type: Follow up    Reason for Visit: Follow-up       ASSESSMENT/PLAN   1. Chronic otitis externa of right ear, unspecified type      Improvement in drainage- small amt of residusl debris suctioned but overall looking better. Advised to stop drops and keep ears dry    No follow-ups on file.  No orders of the defined types were placed in this encounter.     Subjective    Patient: Jesse Leigh is a 64 y.o. male   HPI:    Recall,   10/7/24   Right ear pain and drainage    Otowick placed and started drops    10/11,   Improvement in pain   Continues to have decreased hearing 2/2 otowick in place    Of note, has history of stroke causing left sided facial weakness    Today   No pain or drainage  Hearing back to normal       Objective     Vitals:    10/17/24 0919   BP: 130/80   Pulse: 88   Resp: 14   Temp: 96.9 °F (36.1 °C)   TempSrc: Temporal   SpO2: 98%   Weight: 76.2 kg (168 lb)   Height: 1.651 m (5' 5\")        Physical Exam  Constitutional:       Appearance: Normal appearance.   HENT:      Head: Normocephalic and atraumatic.      Right Ear: External ear normal.      Left Ear: Tympanic membrane, ear canal and external ear normal.      Ears:      Comments: Significant improvement in EAC swelling and drainage. Mild residual debris possibly from drops. TM normal to otoscopy     Nose: Nose normal.      Mouth/Throat:      Mouth: Mucous membranes are moist.   Pulmonary:      Effort: Pulmonary effort is normal.      Breath sounds: No stridor.   Neurological:      General: No focal deficit present.      Mental Status: He is alert and oriented to person, place, and time.   Psychiatric:         Mood and Affect: Mood normal.         Behavior: Behavior normal.           Side effects, adverse effects of the

## 2024-11-17 ENCOUNTER — APPOINTMENT (OUTPATIENT)
Dept: RADIOLOGY | Facility: HOSPITAL | Age: 64
DRG: 522 | End: 2024-11-17
Payer: MEDICARE

## 2024-11-17 ENCOUNTER — HOSPITAL ENCOUNTER (INPATIENT)
Facility: HOSPITAL | Age: 64
DRG: 522 | End: 2024-11-17
Attending: INTERNAL MEDICINE | Admitting: INTERNAL MEDICINE
Payer: MEDICARE

## 2024-11-17 VITALS
BODY MASS INDEX: 27.32 KG/M2 | HEART RATE: 105 BPM | RESPIRATION RATE: 18 BRPM | DIASTOLIC BLOOD PRESSURE: 80 MMHG | SYSTOLIC BLOOD PRESSURE: 132 MMHG | HEIGHT: 66 IN | TEMPERATURE: 96.8 F | WEIGHT: 170 LBS | OXYGEN SATURATION: 94 %

## 2024-11-17 DIAGNOSIS — I10 PRIMARY HYPERTENSION: ICD-10-CM

## 2024-11-17 DIAGNOSIS — R69 MULTIPLE COMORBID CONDITIONS: ICD-10-CM

## 2024-11-17 DIAGNOSIS — S72.002A CLOSED FRACTURE OF LEFT HIP, INITIAL ENCOUNTER: ICD-10-CM

## 2024-11-17 DIAGNOSIS — M25.552 LEFT HIP PAIN: ICD-10-CM
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
## 2024-11-17 LAB
ALBUMIN SERPL BCP-MCNC: 3.7 G/DL (ref 3.4–5)
ALP SERPL-CCNC: 100 U/L (ref 33–136)
ALT SERPL W P-5'-P-CCNC: 19 U/L (ref 10–52)
ANION GAP SERPL CALC-SCNC: 14 MMOL/L (ref 10–20)
AST SERPL W P-5'-P-CCNC: 19 U/L (ref 9–39)
BASOPHILS # BLD AUTO: 0.08 X10*3/UL (ref 0–0.1)
BASOPHILS NFR BLD AUTO: 0.6 %
BILIRUB SERPL-MCNC: 0.5 MG/DL (ref 0–1.2)
BUN SERPL-MCNC: 37 MG/DL (ref 6–23)
CALCIUM SERPL-MCNC: 8.6 MG/DL (ref 8.6–10.3)
CHLORIDE SERPL-SCNC: 105 MMOL/L (ref 98–107)
CO2 SERPL-SCNC: 23 MMOL/L (ref 21–32)
CREAT SERPL-MCNC: 1.76 MG/DL (ref 0.5–1.3)
EGFRCR SERPLBLD CKD-EPI 2021: 43 ML/MIN/1.73M*2
EOSINOPHIL # BLD AUTO: 0.14 X10*3/UL (ref 0–0.7)
EOSINOPHIL NFR BLD AUTO: 1 %
ERYTHROCYTE [DISTWIDTH] IN BLOOD BY AUTOMATED COUNT: 13 % (ref 11.5–14.5)
EST. AVERAGE GLUCOSE BLD GHB EST-MCNC: 189 MG/DL
GLUCOSE SERPL-MCNC: 166 MG/DL (ref 74–99)
HBA1C MFR BLD: 8.2 %
HCT VFR BLD AUTO: 35.9 % (ref 41–52)
HGB BLD-MCNC: 11.8 G/DL (ref 13.5–17.5)
IMM GRANULOCYTES # BLD AUTO: 0.08 X10*3/UL (ref 0–0.7)
IMM GRANULOCYTES NFR BLD AUTO: 0.6 % (ref 0–0.9)
LYMPHOCYTES # BLD AUTO: 1.66 X10*3/UL (ref 1.2–4.8)
LYMPHOCYTES NFR BLD AUTO: 11.9 %
MCH RBC QN AUTO: 29.7 PG (ref 26–34)
MCHC RBC AUTO-ENTMCNC: 32.9 G/DL (ref 32–36)
MCV RBC AUTO: 90 FL (ref 80–100)
MONOCYTES # BLD AUTO: 0.64 X10*3/UL (ref 0.1–1)
MONOCYTES NFR BLD AUTO: 4.6 %
NEUTROPHILS # BLD AUTO: 11.39 X10*3/UL (ref 1.2–7.7)
NEUTROPHILS NFR BLD AUTO: 81.3 %
NRBC BLD-RTO: 0 /100 WBCS (ref 0–0)
PLATELET # BLD AUTO: 186 X10*3/UL (ref 150–450)
POTASSIUM SERPL-SCNC: 4.8 MMOL/L (ref 3.5–5.3)
PROT SERPL-MCNC: 6.6 G/DL (ref 6.4–8.2)
RBC # BLD AUTO: 3.97 X10*6/UL (ref 4.5–5.9)
SODIUM SERPL-SCNC: 137 MMOL/L (ref 136–145)
WBC # BLD AUTO: 14 X10*3/UL (ref 4.4–11.3)

## 2024-11-17 PROCEDURE — 36415 COLL VENOUS BLD VENIPUNCTURE: CPT | Performed by: NURSE PRACTITIONER

## 2024-11-17 PROCEDURE — 70450 CT HEAD/BRAIN W/O DYE: CPT

## 2024-11-17 PROCEDURE — 73700 CT LOWER EXTREMITY W/O DYE: CPT | Mod: LEFT SIDE | Performed by: RADIOLOGY

## 2024-11-17 PROCEDURE — 80053 COMPREHEN METABOLIC PANEL: CPT | Performed by: NURSE PRACTITIONER

## 2024-11-17 PROCEDURE — 72125 CT NECK SPINE W/O DYE: CPT

## 2024-11-17 PROCEDURE — 83036 HEMOGLOBIN GLYCOSYLATED A1C: CPT | Mod: ELYLAB | Performed by: INTERNAL MEDICINE

## 2024-11-17 PROCEDURE — 72125 CT NECK SPINE W/O DYE: CPT | Performed by: RADIOLOGY

## 2024-11-17 PROCEDURE — 2500000004 HC RX 250 GENERAL PHARMACY W/ HCPCS (ALT 636 FOR OP/ED): Performed by: NURSE PRACTITIONER

## 2024-11-17 PROCEDURE — 70450 CT HEAD/BRAIN W/O DYE: CPT | Performed by: RADIOLOGY

## 2024-11-17 PROCEDURE — 1200000002 HC GENERAL ROOM WITH TELEMETRY DAILY

## 2024-11-17 PROCEDURE — 72170 X-RAY EXAM OF PELVIS: CPT

## 2024-11-17 PROCEDURE — 99223 1ST HOSP IP/OBS HIGH 75: CPT | Performed by: INTERNAL MEDICINE

## 2024-11-17 PROCEDURE — 2500000004 HC RX 250 GENERAL PHARMACY W/ HCPCS (ALT 636 FOR OP/ED): Performed by: INTERNAL MEDICINE

## 2024-11-17 PROCEDURE — 73552 X-RAY EXAM OF FEMUR 2/>: CPT | Mod: LEFT SIDE | Performed by: RADIOLOGY

## 2024-11-17 PROCEDURE — 73700 CT LOWER EXTREMITY W/O DYE: CPT | Mod: LT

## 2024-11-17 PROCEDURE — 99285 EMERGENCY DEPT VISIT HI MDM: CPT | Mod: 25

## 2024-11-17 PROCEDURE — 85025 COMPLETE CBC W/AUTO DIFF WBC: CPT | Performed by: NURSE PRACTITIONER

## 2024-11-17 PROCEDURE — 72170 X-RAY EXAM OF PELVIS: CPT | Mod: FOREIGN READ | Performed by: RADIOLOGY

## 2024-11-17 PROCEDURE — 73552 X-RAY EXAM OF FEMUR 2/>: CPT | Mod: LT

## 2024-11-17 RX ORDER — POLYETHYLENE GLYCOL 3350 17 G/17G
17 POWDER, FOR SOLUTION ORAL DAILY
Status: DISCONTINUED | OUTPATIENT
Start: 2024-11-17 | End: 2024-11-20 | Stop reason: HOSPADM

## 2024-11-17 RX ORDER — ATORVASTATIN CALCIUM 80 MG/1
80 TABLET, FILM COATED ORAL DAILY
COMMUNITY

## 2024-11-17 RX ORDER — DAPAGLIFLOZIN 10 MG/1
10 TABLET, FILM COATED ORAL EVERY 24 HOURS
COMMUNITY

## 2024-11-17 RX ORDER — OXYCODONE HYDROCHLORIDE 5 MG/1
5 TABLET ORAL EVERY 6 HOURS PRN
Status: DISCONTINUED | OUTPATIENT
Start: 2024-11-17 | End: 2024-11-20 | Stop reason: HOSPADM

## 2024-11-17 RX ORDER — MORPHINE SULFATE 4 MG/ML
4 INJECTION, SOLUTION INTRAMUSCULAR; INTRAVENOUS ONCE
Status: COMPLETED | OUTPATIENT
Start: 2024-11-17 | End: 2024-11-17

## 2024-11-17 RX ORDER — ACETAMINOPHEN 160 MG/5ML
650 SOLUTION ORAL EVERY 4 HOURS PRN
Status: DISCONTINUED | OUTPATIENT
Start: 2024-11-17 | End: 2024-11-20 | Stop reason: HOSPADM

## 2024-11-17 RX ORDER — LISINOPRIL 10 MG/1
10 TABLET ORAL DAILY
Status: ON HOLD | COMMUNITY
End: 2024-11-20

## 2024-11-17 RX ORDER — ONDANSETRON HYDROCHLORIDE 2 MG/ML
4 INJECTION, SOLUTION INTRAVENOUS ONCE
Status: COMPLETED | OUTPATIENT
Start: 2024-11-17 | End: 2024-11-17

## 2024-11-17 RX ORDER — DEXTROSE 50 % IN WATER (D50W) INTRAVENOUS SYRINGE
25
Status: DISCONTINUED | OUTPATIENT
Start: 2024-11-17 | End: 2024-11-20 | Stop reason: HOSPADM

## 2024-11-17 RX ORDER — ACETAMINOPHEN 650 MG/1
650 SUPPOSITORY RECTAL EVERY 4 HOURS PRN
Status: DISCONTINUED | OUTPATIENT
Start: 2024-11-17 | End: 2024-11-20 | Stop reason: HOSPADM

## 2024-11-17 RX ORDER — SODIUM CHLORIDE 9 MG/ML
100 INJECTION, SOLUTION INTRAVENOUS CONTINUOUS
Status: ACTIVE | OUTPATIENT
Start: 2024-11-17 | End: 2024-11-18

## 2024-11-17 RX ORDER — BISACODYL 5 MG
10 TABLET, DELAYED RELEASE (ENTERIC COATED) ORAL DAILY PRN
Status: DISCONTINUED | OUTPATIENT
Start: 2024-11-17 | End: 2024-11-20 | Stop reason: HOSPADM

## 2024-11-17 RX ORDER — LISINOPRIL 40 MG/1
40 TABLET ORAL DAILY
Status: ON HOLD | COMMUNITY
End: 2024-11-17 | Stop reason: WASHOUT

## 2024-11-17 RX ORDER — ONDANSETRON HYDROCHLORIDE 2 MG/ML
4 INJECTION, SOLUTION INTRAVENOUS EVERY 8 HOURS PRN
Status: DISCONTINUED | OUTPATIENT
Start: 2024-11-17 | End: 2024-11-20 | Stop reason: HOSPADM

## 2024-11-17 RX ORDER — INSULIN LISPRO 100 [IU]/ML
0-10 INJECTION, SOLUTION INTRAVENOUS; SUBCUTANEOUS
Status: DISCONTINUED | OUTPATIENT
Start: 2024-11-18 | End: 2024-11-20 | Stop reason: HOSPADM

## 2024-11-17 RX ORDER — ONDANSETRON 4 MG/1
4 TABLET, FILM COATED ORAL EVERY 8 HOURS PRN
Status: DISCONTINUED | OUTPATIENT
Start: 2024-11-17 | End: 2024-11-20 | Stop reason: HOSPADM

## 2024-11-17 RX ORDER — DEXTROSE 50 % IN WATER (D50W) INTRAVENOUS SYRINGE
12.5
Status: DISCONTINUED | OUTPATIENT
Start: 2024-11-17 | End: 2024-11-20 | Stop reason: HOSPADM

## 2024-11-17 RX ORDER — HEPARIN SODIUM 5000 [USP'U]/ML
5000 INJECTION, SOLUTION INTRAVENOUS; SUBCUTANEOUS EVERY 8 HOURS SCHEDULED
Status: DISCONTINUED | OUTPATIENT
Start: 2024-11-17 | End: 2024-11-18

## 2024-11-17 RX ORDER — INSULIN ASPART 100 [IU]/ML
8 INJECTION, SUSPENSION SUBCUTANEOUS 2 TIMES DAILY
COMMUNITY

## 2024-11-17 RX ORDER — HYDROMORPHONE HYDROCHLORIDE 0.2 MG/ML
0.2 INJECTION INTRAMUSCULAR; INTRAVENOUS; SUBCUTANEOUS
Status: DISCONTINUED | OUTPATIENT
Start: 2024-11-17 | End: 2024-11-20 | Stop reason: HOSPADM

## 2024-11-17 RX ORDER — ACETAMINOPHEN 325 MG/1
650 TABLET ORAL EVERY 4 HOURS PRN
Status: DISCONTINUED | OUTPATIENT
Start: 2024-11-17 | End: 2024-11-20 | Stop reason: HOSPADM

## 2024-11-17 RX ADMIN — HYDROMORPHONE HYDROCHLORIDE 0.2 MG: 0.2 INJECTION, SOLUTION INTRAMUSCULAR; INTRAVENOUS; SUBCUTANEOUS at 20:46

## 2024-11-17 RX ADMIN — MORPHINE SULFATE 4 MG: 4 INJECTION, SOLUTION INTRAMUSCULAR; INTRAVENOUS at 14:43

## 2024-11-17 RX ADMIN — HEPARIN SODIUM 5000 UNITS: 5000 INJECTION INTRAVENOUS; SUBCUTANEOUS at 20:47

## 2024-11-17 RX ADMIN — ONDANSETRON 4 MG: 2 INJECTION INTRAMUSCULAR; INTRAVENOUS at 14:43

## 2024-11-17 RX ADMIN — SODIUM CHLORIDE 75 ML/HR: 9 INJECTION, SOLUTION INTRAVENOUS at 20:47

## 2024-11-17 SDOH — ECONOMIC STABILITY: FOOD INSECURITY: WITHIN THE PAST 12 MONTHS, YOU WORRIED THAT YOUR FOOD WOULD RUN OUT BEFORE YOU GOT THE MONEY TO BUY MORE.: NEVER TRUE

## 2024-11-17 SDOH — SOCIAL STABILITY: SOCIAL INSECURITY: ARE THERE ANY APPARENT SIGNS OF INJURIES/BEHAVIORS THAT COULD BE RELATED TO ABUSE/NEGLECT?: NO

## 2024-11-17 SDOH — SOCIAL STABILITY: SOCIAL INSECURITY: DO YOU FEEL UNSAFE GOING BACK TO THE PLACE WHERE YOU ARE LIVING?: NO

## 2024-11-17 SDOH — ECONOMIC STABILITY: INCOME INSECURITY: IN THE PAST 12 MONTHS HAS THE ELECTRIC, GAS, OIL, OR WATER COMPANY THREATENED TO SHUT OFF SERVICES IN YOUR HOME?: NO

## 2024-11-17 SDOH — SOCIAL STABILITY: SOCIAL INSECURITY: ARE YOU OR HAVE YOU BEEN THREATENED OR ABUSED PHYSICALLY, EMOTIONALLY, OR SEXUALLY BY ANYONE?: NO

## 2024-11-17 SDOH — SOCIAL STABILITY: SOCIAL INSECURITY
WITHIN THE LAST YEAR, HAVE YOU BEEN KICKED, HIT, SLAPPED, OR OTHERWISE PHYSICALLY HURT BY YOUR PARTNER OR EX-PARTNER?: NO

## 2024-11-17 SDOH — SOCIAL STABILITY: SOCIAL INSECURITY: HAS ANYONE EVER THREATENED TO HURT YOUR FAMILY OR YOUR PETS?: NO

## 2024-11-17 SDOH — SOCIAL STABILITY: SOCIAL INSECURITY: HAVE YOU HAD ANY THOUGHTS OF HARMING ANYONE ELSE?: NO

## 2024-11-17 SDOH — SOCIAL STABILITY: SOCIAL INSECURITY: WITHIN THE LAST YEAR, HAVE YOU BEEN HUMILIATED OR EMOTIONALLY ABUSED IN OTHER WAYS BY YOUR PARTNER OR EX-PARTNER?: NO

## 2024-11-17 SDOH — SOCIAL STABILITY: SOCIAL INSECURITY: DO YOU FEEL ANYONE HAS EXPLOITED OR TAKEN ADVANTAGE OF YOU FINANCIALLY OR OF YOUR PERSONAL PROPERTY?: NO

## 2024-11-17 SDOH — SOCIAL STABILITY: SOCIAL INSECURITY: HAVE YOU HAD THOUGHTS OF HARMING ANYONE ELSE?: NO

## 2024-11-17 SDOH — SOCIAL STABILITY: SOCIAL INSECURITY: ABUSE: ADULT

## 2024-11-17 SDOH — SOCIAL STABILITY: SOCIAL INSECURITY
WITHIN THE LAST YEAR, HAVE YOU BEEN RAPED OR FORCED TO HAVE ANY KIND OF SEXUAL ACTIVITY BY YOUR PARTNER OR EX-PARTNER?: NO

## 2024-11-17 SDOH — SOCIAL STABILITY: SOCIAL INSECURITY: WITHIN THE LAST YEAR, HAVE YOU BEEN AFRAID OF YOUR PARTNER OR EX-PARTNER?: NO

## 2024-11-17 SDOH — ECONOMIC STABILITY: FOOD INSECURITY: WITHIN THE PAST 12 MONTHS, THE FOOD YOU BOUGHT JUST DIDN'T LAST AND YOU DIDN'T HAVE MONEY TO GET MORE.: NEVER TRUE

## 2024-11-17 SDOH — SOCIAL STABILITY: SOCIAL INSECURITY: WERE YOU ABLE TO COMPLETE ALL THE BEHAVIORAL HEALTH SCREENINGS?: YES

## 2024-11-17 SDOH — SOCIAL STABILITY: SOCIAL INSECURITY: DOES ANYONE TRY TO KEEP YOU FROM HAVING/CONTACTING OTHER FRIENDS OR DOING THINGS OUTSIDE YOUR HOME?: NO

## 2024-11-17 ASSESSMENT — ENCOUNTER SYMPTOMS
RESPIRATORY NEGATIVE: 1
ENDOCRINE NEGATIVE: 1
ALLERGIC/IMMUNOLOGIC NEGATIVE: 1
CONSTITUTIONAL NEGATIVE: 1
CARDIOVASCULAR NEGATIVE: 1
WEAKNESS: 1
PSYCHIATRIC NEGATIVE: 1
GASTROINTESTINAL NEGATIVE: 1
EYES NEGATIVE: 1

## 2024-11-17 ASSESSMENT — COGNITIVE AND FUNCTIONAL STATUS - GENERAL
TOILETING: A LITTLE
STANDING UP FROM CHAIR USING ARMS: TOTAL
DRESSING REGULAR LOWER BODY CLOTHING: A LITTLE
MOBILITY SCORE: 7
MOVING FROM LYING ON BACK TO SITTING ON SIDE OF FLAT BED WITH BEDRAILS: A LOT
DAILY ACTIVITIY SCORE: 20
HELP NEEDED FOR BATHING: A LITTLE
PATIENT BASELINE BEDBOUND: NO
TURNING FROM BACK TO SIDE WHILE IN FLAT BAD: TOTAL
WALKING IN HOSPITAL ROOM: TOTAL
CLIMB 3 TO 5 STEPS WITH RAILING: TOTAL
DRESSING REGULAR UPPER BODY CLOTHING: A LITTLE
MOVING TO AND FROM BED TO CHAIR: TOTAL

## 2024-11-17 ASSESSMENT — LIFESTYLE VARIABLES
HOW MANY STANDARD DRINKS CONTAINING ALCOHOL DO YOU HAVE ON A TYPICAL DAY: PATIENT DOES NOT DRINK
TOTAL SCORE: 0
SKIP TO QUESTIONS 9-10: 1
HOW OFTEN DO YOU HAVE A DRINK CONTAINING ALCOHOL: NEVER
AUDIT-C TOTAL SCORE: 0
EVER FELT BAD OR GUILTY ABOUT YOUR DRINKING: NO
AUDIT-C TOTAL SCORE: 0
HAVE YOU EVER FELT YOU SHOULD CUT DOWN ON YOUR DRINKING: NO
HOW OFTEN DO YOU HAVE 6 OR MORE DRINKS ON ONE OCCASION: NEVER
EVER HAD A DRINK FIRST THING IN THE MORNING TO STEADY YOUR NERVES TO GET RID OF A HANGOVER: NO
HAVE PEOPLE ANNOYED YOU BY CRITICIZING YOUR DRINKING: NO

## 2024-11-17 ASSESSMENT — ACTIVITIES OF DAILY LIVING (ADL)
GROOMING: INDEPENDENT
WALKS IN HOME: INDEPENDENT
TOILETING: INDEPENDENT
LACK_OF_TRANSPORTATION: NO
BATHING: INDEPENDENT
HEARING - LEFT EAR: HEARING AID
DRESSING YOURSELF: INDEPENDENT
ASSISTIVE_DEVICE: HEARING AID - LEFT;HEARING AID - RIGHT;EYEGLASSES
JUDGMENT_ADEQUATE_SAFELY_COMPLETE_DAILY_ACTIVITIES: YES
FEEDING YOURSELF: INDEPENDENT
HEARING - RIGHT EAR: HEARING AID
ADEQUATE_TO_COMPLETE_ADL: YES
PATIENT'S MEMORY ADEQUATE TO SAFELY COMPLETE DAILY ACTIVITIES?: YES

## 2024-11-17 ASSESSMENT — PATIENT HEALTH QUESTIONNAIRE - PHQ9
2. FEELING DOWN, DEPRESSED OR HOPELESS: NOT AT ALL
1. LITTLE INTEREST OR PLEASURE IN DOING THINGS: NOT AT ALL
SUM OF ALL RESPONSES TO PHQ9 QUESTIONS 1 & 2: 0

## 2024-11-17 ASSESSMENT — COLUMBIA-SUICIDE SEVERITY RATING SCALE - C-SSRS
1. IN THE PAST MONTH, HAVE YOU WISHED YOU WERE DEAD OR WISHED YOU COULD GO TO SLEEP AND NOT WAKE UP?: NO
6. HAVE YOU EVER DONE ANYTHING, STARTED TO DO ANYTHING, OR PREPARED TO DO ANYTHING TO END YOUR LIFE?: NO
2. HAVE YOU ACTUALLY HAD ANY THOUGHTS OF KILLING YOURSELF?: NO

## 2024-11-17 ASSESSMENT — PAIN DESCRIPTION - LOCATION
LOCATION: HIP
LOCATION: LEG

## 2024-11-17 ASSESSMENT — PAIN - FUNCTIONAL ASSESSMENT: PAIN_FUNCTIONAL_ASSESSMENT: 0-10

## 2024-11-17 ASSESSMENT — PAIN DESCRIPTION - PAIN TYPE: TYPE: ACUTE PAIN

## 2024-11-17 ASSESSMENT — PAIN SCALES - GENERAL
PAINLEVEL_OUTOF10: 10 - WORST POSSIBLE PAIN
PAINLEVEL_OUTOF10: 4
PAINLEVEL_OUTOF10: 0 - NO PAIN

## 2024-11-17 ASSESSMENT — PAIN DESCRIPTION - ORIENTATION: ORIENTATION: LEFT

## 2024-11-17 NOTE — CARE PLAN
Problem: Pain - Adult  Goal: Verbalizes/displays adequate comfort level or baseline comfort level  Outcome: Progressing     Problem: Safety - Adult  Goal: Free from fall injury  Outcome: Progressing     Problem: Discharge Planning  Goal: Discharge to home or other facility with appropriate resources  Outcome: Progressing     Problem: Chronic Conditions and Co-morbidities  Goal: Patient's chronic conditions and co-morbidity symptoms are monitored and maintained or improved  Outcome: Progressing     Problem: Skin  Goal: Decreased wound size/increased tissue granulation at next dressing change  Outcome: Progressing  Goal: Participates in plan/prevention/treatment measures  Outcome: Progressing  Goal: Prevent/manage excess moisture  Outcome: Progressing  Goal: Prevent/minimize sheer/friction injuries  Outcome: Progressing  Flowsheets (Taken 11/17/2024 5793)  Prevent/minimize sheer/friction injuries: Use pull sheet  Goal: Promote/optimize nutrition  Outcome: Progressing  Goal: Promote skin healing  Outcome: Progressing

## 2024-11-17 NOTE — ED PROVIDER NOTES
"    [facility]  ED  Encounter Note  Admit Date/RoomTime: 2024 10:30 AM  ED Room: 608/608-A  NAME: Lito Osei  : 1960  MRN: [id number]     Chief Complaint:  Fall    HISTORY OF PRESENT ILLNESS        Lito Osei is a 64 y.o. male who presents to the ED for evaluation of fall.    Patient was walking in his kitchen and had some sort of muscle spasm in his left leg which is not new and fall and landed on left hip/femur. He was unable to bear weight and 911 was called. He feels painful to lift left leg but denied it caused it any back issues. No chest pain sob abdominal pain dizziness.     Does not think hit head or neck. Nothing else feels painful. He denied it was a syncopal episode. Hx of previous cva 4 to 5 yrs ago with left sided weakness. No blood thinners. Glucose was okay this am and did take his medication. Glucose around 188 per his recollection. He thinks he ate food. No recent illness.  Lives alone and does not use assist device.    Adamant about no pain medication \"I have bad kidneys\" despite reassurance would help with the spasms.     ROS   Pertinent positives and negatives are stated within HPI, all other systems reviewed and are negative.    Past Medical History:  has a past medical history of CKD (chronic kidney disease), Diabetes mellitus (Multi), HLD (hyperlipidemia), Hypertension, and Stroke (Multi).    He has no past medical history of CHF (congestive heart failure). DM left sided weakness due to cva htn hld gait instability neuropathy CKD    Surgical History:  has a past surgical history that includes Ankle surgery (Right).    Social History:  reports that he has never smoked. He does not have any smokeless tobacco history on file. He reports that he does not drink alcohol and does not use drugs. denied nicotine alcohol or street drug use    Family History: family history is not on file.     Allergies: Patient has no known allergies.    PHYSICAL EXAM "   Oxygen Saturation Interpretation: Normal.          Physical Exam  Constitutional/General: Alert and oriented x3, well appearing, non toxic  HEENT:  NC/NT. PERRLA.  Airway patent.  Neck: Supple, full ROM. No midline vertebral tenderness or crepitus.   Respiratory: Lung sounds clear to auscultation bilaterally. No wheezes, rhonchi or stridor. Not in respiratory distress.  CV:  Regular rate. Regular rhythm. No murmurs or rubs. 2+ distal pulses.  GI:  Abdomen soft, non-tender, non-distended. +BS. No rebound, guarding, or rigidity. No pulsatile masses.  Musculoskeletal: Moves all extremities x 4. Warm and well perfused. Capillary refill <3 seconds  Integument: Skin warm and dry. No rashes.   Neurologic: Alert and oriented with no focal deficits, symmetric strength 5/5 in the upper and lower extremities bilaterally.  Psychiatric: Normal affect.  ++pain to left hip femur pulses +2 intact   Difficulty raising     Lab / Imaging Results   (All laboratory and radiology results have been personally reviewed by myself)  Labs:  Results for orders placed or performed during the hospital encounter of 11/17/24   CBC and Auto Differential    Collection Time: 11/17/24 11:24 AM   Result Value Ref Range    WBC 14.0 (H) 4.4 - 11.3 x10*3/uL    nRBC 0.0 0.0 - 0.0 /100 WBCs    RBC 3.97 (L) 4.50 - 5.90 x10*6/uL    Hemoglobin 11.8 (L) 13.5 - 17.5 g/dL    Hematocrit 35.9 (L) 41.0 - 52.0 %    MCV 90 80 - 100 fL    MCH 29.7 26.0 - 34.0 pg    MCHC 32.9 32.0 - 36.0 g/dL    RDW 13.0 11.5 - 14.5 %    Platelets 186 150 - 450 x10*3/uL    Neutrophils % 81.3 40.0 - 80.0 %    Immature Granulocytes %, Automated 0.6 0.0 - 0.9 %    Lymphocytes % 11.9 13.0 - 44.0 %    Monocytes % 4.6 2.0 - 10.0 %    Eosinophils % 1.0 0.0 - 6.0 %    Basophils % 0.6 0.0 - 2.0 %    Neutrophils Absolute 11.39 (H) 1.20 - 7.70 x10*3/uL    Immature Granulocytes Absolute, Automated 0.08 0.00 - 0.70 x10*3/uL    Lymphocytes Absolute 1.66 1.20 - 4.80 x10*3/uL    Monocytes Absolute  0.64 0.10 - 1.00 x10*3/uL    Eosinophils Absolute 0.14 0.00 - 0.70 x10*3/uL    Basophils Absolute 0.08 0.00 - 0.10 x10*3/uL   Comprehensive metabolic panel    Collection Time: 11/17/24 11:24 AM   Result Value Ref Range    Glucose 166 (H) 74 - 99 mg/dL    Sodium 137 136 - 145 mmol/L    Potassium 4.8 3.5 - 5.3 mmol/L    Chloride 105 98 - 107 mmol/L    Bicarbonate 23 21 - 32 mmol/L    Anion Gap 14 10 - 20 mmol/L    Urea Nitrogen 37 (H) 6 - 23 mg/dL    Creatinine 1.76 (H) 0.50 - 1.30 mg/dL    eGFR 43 (L) >60 mL/min/1.73m*2    Calcium 8.6 8.6 - 10.3 mg/dL    Albumin 3.7 3.4 - 5.0 g/dL    Alkaline Phosphatase 100 33 - 136 U/L    Total Protein 6.6 6.4 - 8.2 g/dL    AST 19 9 - 39 U/L    Bilirubin, Total 0.5 0.0 - 1.2 mg/dL    ALT 19 10 - 52 U/L   Hemoglobin A1C    Collection Time: 11/17/24 11:24 AM   Result Value Ref Range    Hemoglobin A1C 8.2 (H) See comment %    Estimated Average Glucose 189 Not Established mg/dL   CBC    Collection Time: 11/18/24  5:34 AM   Result Value Ref Range    WBC 13.0 (H) 4.4 - 11.3 x10*3/uL    nRBC 0.0 0.0 - 0.0 /100 WBCs    RBC 4.20 (L) 4.50 - 5.90 x10*6/uL    Hemoglobin 12.4 (L) 13.5 - 17.5 g/dL    Hematocrit 38.9 (L) 41.0 - 52.0 %    MCV 93 80 - 100 fL    MCH 29.5 26.0 - 34.0 pg    MCHC 31.9 (L) 32.0 - 36.0 g/dL    RDW 13.1 11.5 - 14.5 %    Platelets 230 150 - 450 x10*3/uL   Basic metabolic panel    Collection Time: 11/18/24  5:34 AM   Result Value Ref Range    Glucose 209 (H) 74 - 99 mg/dL    Sodium 139 136 - 145 mmol/L    Potassium 5.1 3.5 - 5.3 mmol/L    Chloride 106 98 - 107 mmol/L    Bicarbonate 19 (L) 21 - 32 mmol/L    Anion Gap 19 10 - 20 mmol/L    Urea Nitrogen 39 (H) 6 - 23 mg/dL    Creatinine 1.93 (H) 0.50 - 1.30 mg/dL    eGFR 39 (L) >60 mL/min/1.73m*2    Calcium 8.5 (L) 8.6 - 10.3 mg/dL   POCT GLUCOSE    Collection Time: 11/18/24  7:45 AM   Result Value Ref Range    POCT Glucose 171 (H) 74 - 99 mg/dL   POCT GLUCOSE    Collection Time: 11/18/24 11:23 AM   Result Value Ref Range     POCT Glucose 193 (H) 74 - 99 mg/dL   Urinalysis with Reflex Culture and Microscopic    Collection Time: 11/18/24 12:01 PM   Result Value Ref Range    Color, Urine Light-Yellow Light-Yellow, Yellow, Dark-Yellow    Appearance, Urine Clear Clear    Specific Gravity, Urine 1.016 1.005 - 1.035    pH, Urine 5.0 5.0, 5.5, 6.0, 6.5, 7.0, 7.5, 8.0    Protein, Urine NEGATIVE NEGATIVE, 10 (TRACE), 20 (TRACE) mg/dL    Glucose, Urine OVER (4+) (A) Normal mg/dL    Blood, Urine NEGATIVE NEGATIVE    Ketones, Urine 40 (2+) (A) NEGATIVE mg/dL    Bilirubin, Urine NEGATIVE NEGATIVE    Urobilinogen, Urine Normal Normal mg/dL    Nitrite, Urine NEGATIVE NEGATIVE    Leukocyte Esterase, Urine NEGATIVE NEGATIVE   Extra Urine Gray Tube    Collection Time: 11/18/24 12:01 PM   Result Value Ref Range    Extra Tube Hold for add-ons.    POCT GLUCOSE    Collection Time: 11/18/24  5:50 PM   Result Value Ref Range    POCT Glucose 253 (H) 74 - 99 mg/dL   POCT GLUCOSE    Collection Time: 11/18/24  9:02 PM   Result Value Ref Range    POCT Glucose 278 (H) 74 - 99 mg/dL   POCT GLUCOSE    Collection Time: 11/19/24  6:22 AM   Result Value Ref Range    POCT Glucose 282 (H) 74 - 99 mg/dL   POCT GLUCOSE    Collection Time: 11/19/24  7:20 AM   Result Value Ref Range    POCT Glucose 273 (H) 74 - 99 mg/dL   SST TOP    Collection Time: 11/19/24  8:45 AM   Result Value Ref Range    Extra Tube Hold for add-ons.    Basic Metabolic Panel    Collection Time: 11/19/24  8:48 AM   Result Value Ref Range    Glucose 247 (H) 74 - 99 mg/dL    Sodium 139 136 - 145 mmol/L    Potassium 5.1 3.5 - 5.3 mmol/L    Chloride 108 (H) 98 - 107 mmol/L    Bicarbonate 21 21 - 32 mmol/L    Anion Gap 15 10 - 20 mmol/L    Urea Nitrogen 45 (H) 6 - 23 mg/dL    Creatinine 2.64 (H) 0.50 - 1.30 mg/dL    eGFR 27 (L) >60 mL/min/1.73m*2    Calcium 8.4 (L) 8.6 - 10.3 mg/dL   CBC and Auto Differential    Collection Time: 11/19/24  8:48 AM   Result Value Ref Range    WBC 17.6 (H) 4.4 - 11.3 x10*3/uL     nRBC 0.0 0.0 - 0.0 /100 WBCs    RBC 3.91 (L) 4.50 - 5.90 x10*6/uL    Hemoglobin 11.6 (L) 13.5 - 17.5 g/dL    Hematocrit 36.4 (L) 41.0 - 52.0 %    MCV 93 80 - 100 fL    MCH 29.7 26.0 - 34.0 pg    MCHC 31.9 (L) 32.0 - 36.0 g/dL    RDW 13.5 11.5 - 14.5 %    Platelets 199 150 - 450 x10*3/uL    Neutrophils % 83.5 40.0 - 80.0 %    Immature Granulocytes %, Automated 0.5 0.0 - 0.9 %    Lymphocytes % 7.5 13.0 - 44.0 %    Monocytes % 8.3 2.0 - 10.0 %    Eosinophils % 0.0 0.0 - 6.0 %    Basophils % 0.2 0.0 - 2.0 %    Neutrophils Absolute 14.71 (H) 1.20 - 7.70 x10*3/uL    Immature Granulocytes Absolute, Automated 0.09 0.00 - 0.70 x10*3/uL    Lymphocytes Absolute 1.32 1.20 - 4.80 x10*3/uL    Monocytes Absolute 1.46 (H) 0.10 - 1.00 x10*3/uL    Eosinophils Absolute 0.00 0.00 - 0.70 x10*3/uL    Basophils Absolute 0.04 0.00 - 0.10 x10*3/uL   Magnesium    Collection Time: 11/19/24  8:48 AM   Result Value Ref Range    Magnesium 2.19 1.60 - 2.40 mg/dL   POCT GLUCOSE    Collection Time: 11/19/24 11:03 AM   Result Value Ref Range    POCT Glucose 235 (H) 74 - 99 mg/dL     Imaging:  All Radiology results interpreted by Radiologist unless otherwise noted.  CT hip left wo IV contrast   Final Result   1.  Displaced fracture involving the left femoral neck as described   above.  No evidence of an associated dislocation.   2.  Mild to moderate arthrosis of the left hip joint.   3.  Possible small left hip joint effusion.   Signed by Nicola Mortensen MD      CT head wo IV contrast   Final Result   No acute intracranial pathology.        MACRO:   None        Signed by: Alma Cheek 11/17/2024 11:28 AM   Dictation workstation:   [id number]      CT cervical spine wo IV contrast   Final Result   No evidence for an acute fracture or subluxation of the cervical   spine. Torticollis.        MACRO:   None        Signed by: Alma Cheek 11/17/2024 11:31 AM   Dictation workstation:   [id number]      XR femur left 2+ views   Final Result   Left femoral  neck fracture.  Consider CT.  Fracture suboptimally   visualized.   Signed by Bandar Virk,       XR pelvis 1-2 views   Final Result   1.  Displaced fracture involving the left femoral neck.   2.  Moderate arthrosis of the hips bilaterally..   Signed by Nicola Mortensen MD      XR elbow left 3+ views    (Results Pending)       ED Course / Medical Decision Making     Medications   sodium chloride 0.9% infusion ( intravenous Anesthesia Volume Adjustment 11/18/24 1628)   acetaminophen (Tylenol) tablet 650 mg (has no administration in time range)     Or   acetaminophen (Tylenol) oral liquid 650 mg (has no administration in time range)     Or   acetaminophen (Tylenol) suppository 650 mg (has no administration in time range)   ondansetron (Zofran) tablet 4 mg ( oral See Alternative 11/18/24 1609)     Or   ondansetron (Zofran) injection 4 mg (4 mg intravenous Given 11/18/24 1609)   polyethylene glycol (Glycolax, Miralax) packet 17 g (17 g oral Not Given 11/19/24 0852)   bisacodyl (Dulcolax) EC tablet 10 mg (has no administration in time range)   HYDROmorphone PF (Dilaudid) injection 0.2 mg (0.2 mg intravenous Given 11/18/24 0919)   oxyCODONE (Roxicodone) immediate release tablet 5 mg (5 mg oral Given 11/19/24 0905)   glucagon (Glucagen) injection 1 mg (has no administration in time range)   dextrose 50 % injection 25 g (has no administration in time range)   glucagon (Glucagen) injection 1 mg (has no administration in time range)   dextrose 50 % injection 12.5 g (has no administration in time range)   insulin lispro injection 0-10 Units (4 Units subcutaneous Given 11/19/24 1108)   apixaban (Eliquis) tablet 2.5 mg (2.5 mg oral Given 11/19/24 0854)   promethazine (Phenergan) 6.25 mg in sodium chloride 0.9% 50 mL IV (has no administration in time range)   insulin lispro injection 0-10 Units (6 Units subcutaneous Given 11/18/24 2131)   morphine injection 4 mg (4 mg intravenous Given 11/17/24 1443)   ondansetron (Zofran)  injection 4 mg (4 mg intravenous Given 11/17/24 1443)   ceFAZolin (Ancef) 2 g in dextrose (iso)  mL (2 g intravenous Given 11/18/24 1442)   tranexamic acid (Lysteda) tablet 1,950 mg (1,950 mg oral Given 11/18/24 1332)   ceFAZolin (Ancef) 2 g in dextrose (iso)  mL (0 g intravenous Stopped 11/19/24 1139)   tranexamic acid (Lysteda) tablet 1,950 mg (1,950 mg oral Given 11/18/24 2133)   tranexamic acid (Lysteda) tablet 1,950 mg (1,950 mg oral Given 11/19/24 0625)   heparin (porcine) injection 5,000 Units (5,000 Units subcutaneous Given 11/18/24 2109)     ED Course as of 11/19/24 1354   Sun Nov 17, 2024   1145 Updated pt, still does not want any pain medications for the frature [PK]   1159 IMPRESSION:  1.  Displaced fracture involving the left femoral neck as described  above.  No evidence of an associated dislocation.  2.  Mild to moderate arthrosis of the left hip joint.  3.  Possible small left hip joint effusion.  Signed by Nicola Mortensen MD   [PK]   1159 Call to ortho on Call [PK]   1201 XR femur left 2+ views [PK]      ED Course User Index  [PK] Aranza Curiel, APRN-CNP         Diagnoses as of 11/19/24 1354   Fall, initial encounter   Left hip pain   Closed fracture of left hip, initial encounter   Multiple comorbid conditions     Re-examination:    Patient’s condition .  PARVIN ZUÑIGA    Consult(s):  CONSULTED ORTHO      MDM:        Lito Osei is a 64 y.o. male who presents to the ED for evaluation of fall.    Patient was walking in his kitchen and had some sort of muscle spasm in his left leg which is not new and fall and landed on left hip/femur. He was unable to bear weight and 911 was called. He feels painful to lift left leg but denied it caused it any back issues. No chest pain sob abdominal pain dizziness.     Does not think hit head or neck. Nothing else feels painful. He denied it was a syncopal episode. Hx of previous cva 4 to 5 yrs ago with left sided weakness. No blood thinners. Glucose  was okay this am and did take his medication. Glucose around 188 per his recollection. He thinks he ate food. No recent illness.  Lives alone and does not use assist device.    ED course stable  Pt declined pain med on multiple occasion except when he was getting ready to go upstairs to the floor. He does get his care at [facility] and [facility]. He accepted admission at [facility] but preferred to not being admitted but realized that his was not an option.     He does have left hip fracture from his face. Head ct and neck stable with no acute finding.  Wbc 14  HNH 11.8/35.8  Glucose 166  Bun creat 37/1.76 gfr 43.   He was not on supplemental 02 during ED course.,  He was admitted to hospitalist after consultation with orthopedic with planned repair in AM  Ddx: fall left hip fracture    Plan of Care/Counseling:  I reviewed today's visit with the patient  in addition to providing specific details for the plan of care and counseling regarding the diagnosis and prognosis.  Questions are answered at this time and are agreeable with the plan.    ASSESSMENT     1. Fall, initial encounter    2. Left hip pain    3. Closed fracture of left hip, initial encounter    4. Multiple comorbid conditions      PLAN   Admitted to hospitalist  Consulted ortho for l hip fracture    New Medications     New Medications Ordered This Visit   Medications    sodium chloride 0.9% infusion    OR Linked Order Group     acetaminophen (Tylenol) tablet 650 mg      Order Specific Question:   If ordered PRN for pain, nurse is permitted to administer this medication for higher pain scores based on patient preference?      Answer:   Yes     acetaminophen (Tylenol) oral liquid 650 mg     acetaminophen (Tylenol) suppository 650 mg      Order Specific Question:   If ordered PRN for pain, nurse is permitted to administer this medication for higher pain scores based on patient preference?      Answer:   Yes    OR Linked Order Group     ondansetron (Zofran)  tablet 4 mg     ondansetron (Zofran) injection 4 mg    polyethylene glycol (Glycolax, Miralax) packet 17 g    bisacodyl (Dulcolax) EC tablet 10 mg    HYDROmorphone PF (Dilaudid) injection 0.2 mg    oxyCODONE (Roxicodone) immediate release tablet 5 mg     Order Specific Question:   If ordered PRN for pain, nurse is permitted to administer this medication for higher pain scores based on patient preference?     Answer:   Yes    glucagon (Glucagen) injection 1 mg    dextrose 50 % injection 25 g    glucagon (Glucagen) injection 1 mg    dextrose 50 % injection 12.5 g    insulin lispro injection 0-10 Units    morphine injection 4 mg    ondansetron (Zofran) injection 4 mg    insulin asp prt-insulin aspart (NovoLOG Mix 70-30 U-100 Insuln) 100 unit/mL (70-30) injection     Sig: Inject 8 Units under the skin 2 times daily (morning and late afternoon). Take as directed per insulin instructions.    lisinopril 10 mg tablet     Sig: Take 1 tablet (10 mg) by mouth once daily.    dapagliflozin propanediol (Farxiga) 10 mg     Sig: Take 1 tablet (10 mg) by mouth once every 24 hours.    atorvastatin (Lipitor) 80 mg tablet     Sig: Take 1 tablet (80 mg) by mouth once daily.    ceFAZolin (Ancef) 2 g in dextrose (iso)  mL     For Na-restricted patients, note sodium content of ~50 mg per gram cefazolin.     Order Specific Question:   Dosing of this medication varies based on severity of illness. Does this patient have sepsis or concern for sepsis (probable or documented infection plus systemic manifestations of infection)?     Answer:   No     Order Specific Question:   Suspected Indication (Select all that apply)     Answer:   Surgical Prophylaxis    tranexamic acid (Lysteda) tablet 1,950 mg    ceFAZolin (Ancef) 2 g in dextrose (iso)  mL     For Na-restricted patients, note sodium content of ~50 mg per gram cefazolin.     Order Specific Question:   Dosing of this medication varies based on severity of illness. Does this  patient have sepsis or concern for sepsis (probable or documented infection plus systemic manifestations of infection)?     Answer:   No     Order Specific Question:   Suspected Indication (Select all that apply)     Answer:   Surgical Prophylaxis    tranexamic acid (Lysteda) tablet 1,950 mg    tranexamic acid (Lysteda) tablet 1,950 mg    apixaban (Eliquis) tablet 2.5 mg    heparin (porcine) injection 5,000 Units    promethazine (Phenergan) 6.25 mg in sodium chloride 0.9% 50 mL IV    insulin lispro injection 0-10 Units     Electronically signed by ELLIOTT Chacon     **This report was transcribed using voice recognition software. Every effort was made to ensure accuracy; however, inadvertent computerized transcription errors may be present.  END OF ED PROVIDER NOTE     ELLIOTT Chacon  11/19/24 2186

## 2024-11-17 NOTE — H&P
History Of Present Illness  Lito Osei is a 64 y.o. male with past medical history of hypertension, hyperlipidemia, history of CVA with left-sided residual weakness, type 2 diabetes mellitus insulin-dependent, CKD stage III comes into the hospital after he fell at home in his kitchen.  The patient states he has weakness in his left lower extremity from previous stroke and he lost his balance and experienced a mechanical fall and landed on his left hip.  He was unable to get up and call 911.  Currently complains of left hip pain.  He states he did not lose consciousness or denied any dizziness or lightheadedness or any type of chest pain or palpitations.  Denies any seizure-like activity.  While in ED he was found to have displaced fracture involving the left femoral neck.  ED reached out to orthopedic surgery and they are planning for surgical intervention tomorrow.  The patient is admitted for further care and management.     Past Medical History  No past medical history on file.    Surgical History  No past surgical history on file.     Social History  He has no history on file for tobacco use, alcohol use, and drug use.    Family History  No family history on file.     Allergies  Patient has no known allergies.    Review of Systems   Constitutional: Negative.    HENT: Negative.     Eyes: Negative.    Respiratory: Negative.     Cardiovascular: Negative.    Gastrointestinal: Negative.    Endocrine: Negative.    Musculoskeletal:  Positive for gait problem.   Skin: Negative.    Allergic/Immunologic: Negative.    Neurological:  Positive for weakness.   Psychiatric/Behavioral: Negative.     All other systems reviewed and are negative.       Physical Exam  Constitutional:       Appearance: He is ill-appearing.   HENT:      Head: Normocephalic and atraumatic.      Nose: Nose normal.      Mouth/Throat:      Mouth: Mucous membranes are dry.   Eyes:      Extraocular Movements: Extraocular movements intact.       "Conjunctiva/sclera: Conjunctivae normal.   Cardiovascular:      Rate and Rhythm: Normal rate and regular rhythm.      Pulses: Normal pulses.      Heart sounds: Normal heart sounds.   Pulmonary:      Effort: Pulmonary effort is normal.      Breath sounds: Normal breath sounds.   Abdominal:      General: Bowel sounds are normal.      Palpations: Abdomen is soft.   Musculoskeletal:         General: Normal range of motion.      Cervical back: Normal range of motion and neck supple.      Comments: Left hip pain.  Neurovascular intact distally but externally rotated.   Skin:     General: Skin is warm and dry.   Neurological:      General: No focal deficit present.      Mental Status: He is alert and oriented to person, place, and time. Mental status is at baseline.      Motor: Weakness present.   Psychiatric:         Mood and Affect: Mood normal.          Last Recorded Vitals  Blood pressure (!) 168/104, pulse 92, temperature 36.7 °C (98.1 °F), temperature source Temporal, resp. rate 18, height 1.676 m (5' 6\"), weight 77.1 kg (170 lb), SpO2 98%.    Relevant Results        Lito Osei is a 64 y.o. male with past medical history of hypertension, hyperlipidemia, history of CVA with left-sided residual weakness, type 2 diabetes mellitus insulin-dependent, CKD stage III comes into the hospital after he fell at home in his kitchen.      Assessment/Plan   Assessment & Plan  Fall, initial encounter      Mechanical fall  Displaced left femoral neck fracture   -Continue with pain control  -Orthopedic surgery has been consulted plans for surgical intervention in a.m.  -N.p.o. after midnight  -Postop VTE per orthopedic surgery  -Postoperative PT OT and possible placement    Diabetes mellitus type 2  -Continue sliding scale insulin and adjust insulins based upon home medications  -[facility] Accu-Cheks    CKD stage III  -Trend renal functions avoid nephrotoxins    History of CVA with left-sided deficits  -Resume home medications once " verified    Hypertension  Hyperlipidemia  -Continue with home medications    VTE prophylaxis-heparin, postop VTE prophylaxis per orthopedic surgery               I spent 50 minutes in the professional and overall care of this patient.      Santhosh Waterman MD

## 2024-11-18 ENCOUNTER — ANESTHESIA (OUTPATIENT)
Dept: OPERATING ROOM | Facility: HOSPITAL | Age: 64
DRG: 522 | End: 2024-11-18
Payer: MEDICARE

## 2024-11-18 ENCOUNTER — ANESTHESIA EVENT (OUTPATIENT)
Dept: OPERATING ROOM | Facility: HOSPITAL | Age: 64
DRG: 522 | End: 2024-11-18
Payer: MEDICARE

## 2024-11-18 PROBLEM — S72.002A CLOSED FRACTURE OF LEFT HIP: Status: ACTIVE | Noted: 2024-11-17

## 2024-11-18 LAB
ANION GAP SERPL CALC-SCNC: 19 MMOL/L (ref 10–20)
APPEARANCE UR: CLEAR
BILIRUB UR STRIP.AUTO-MCNC: NEGATIVE MG/DL
BUN SERPL-MCNC: 39 MG/DL (ref 6–23)
CALCIUM SERPL-MCNC: 8.5 MG/DL (ref 8.6–10.3)
CHLORIDE SERPL-SCNC: 106 MMOL/L (ref 98–107)
CO2 SERPL-SCNC: 19 MMOL/L (ref 21–32)
COLOR UR: ABNORMAL
CREAT SERPL-MCNC: 1.93 MG/DL (ref 0.5–1.3)
EGFRCR SERPLBLD CKD-EPI 2021: 39 ML/MIN/1.73M*2
ERYTHROCYTE [DISTWIDTH] IN BLOOD BY AUTOMATED COUNT: 13.1 % (ref 11.5–14.5)
GLUCOSE BLD MANUAL STRIP-MCNC: 171 MG/DL (ref 74–99)
GLUCOSE BLD MANUAL STRIP-MCNC: 193 MG/DL (ref 74–99)
GLUCOSE BLD MANUAL STRIP-MCNC: 253 MG/DL (ref 74–99)
GLUCOSE BLD MANUAL STRIP-MCNC: 278 MG/DL (ref 74–99)
GLUCOSE SERPL-MCNC: 209 MG/DL (ref 74–99)
GLUCOSE UR STRIP.AUTO-MCNC: ABNORMAL MG/DL
HCT VFR BLD AUTO: 38.9 % (ref 41–52)
HGB BLD-MCNC: 12.4 G/DL (ref 13.5–17.5)
HOLD SPECIMEN: NORMAL
KETONES UR STRIP.AUTO-MCNC: ABNORMAL MG/DL
LEUKOCYTE ESTERASE UR QL STRIP.AUTO: NEGATIVE
MCH RBC QN AUTO: 29.5 PG (ref 26–34)
MCHC RBC AUTO-ENTMCNC: 31.9 G/DL (ref 32–36)
MCV RBC AUTO: 93 FL (ref 80–100)
NITRITE UR QL STRIP.AUTO: NEGATIVE
NRBC BLD-RTO: 0 /100 WBCS (ref 0–0)
PH UR STRIP.AUTO: 5 [PH]
PLATELET # BLD AUTO: 230 X10*3/UL (ref 150–450)
POTASSIUM SERPL-SCNC: 5.1 MMOL/L (ref 3.5–5.3)
PROT UR STRIP.AUTO-MCNC: NEGATIVE MG/DL
RBC # BLD AUTO: 4.2 X10*6/UL (ref 4.5–5.9)
RBC # UR STRIP.AUTO: NEGATIVE /UL
SODIUM SERPL-SCNC: 139 MMOL/L (ref 136–145)
SP GR UR STRIP.AUTO: 1.02
UROBILINOGEN UR STRIP.AUTO-MCNC: NORMAL MG/DL
WBC # BLD AUTO: 13 X10*3/UL (ref 4.4–11.3)

## 2024-11-18 PROCEDURE — 2500000005 HC RX 250 GENERAL PHARMACY W/O HCPCS: Performed by: ORTHOPAEDIC SURGERY

## 2024-11-18 PROCEDURE — 36415 COLL VENOUS BLD VENIPUNCTURE: CPT | Performed by: INTERNAL MEDICINE

## 2024-11-18 PROCEDURE — 7100000002 HC RECOVERY ROOM TIME - EACH INCREMENTAL 1 MINUTE: Performed by: ORTHOPAEDIC SURGERY

## 2024-11-18 PROCEDURE — 99232 SBSQ HOSP IP/OBS MODERATE 35: CPT | Performed by: INTERNAL MEDICINE

## 2024-11-18 PROCEDURE — 81003 URINALYSIS AUTO W/O SCOPE: CPT | Performed by: INTERNAL MEDICINE

## 2024-11-18 PROCEDURE — 27236 TREAT THIGH FRACTURE: CPT | Performed by: ORTHOPAEDIC SURGERY

## 2024-11-18 PROCEDURE — 82947 ASSAY GLUCOSE BLOOD QUANT: CPT

## 2024-11-18 PROCEDURE — 2500000004 HC RX 250 GENERAL PHARMACY W/ HCPCS (ALT 636 FOR OP/ED): Performed by: INTERNAL MEDICINE

## 2024-11-18 PROCEDURE — 82374 ASSAY BLOOD CARBON DIOXIDE: CPT | Performed by: INTERNAL MEDICINE

## 2024-11-18 PROCEDURE — A9999 DME SUPPLY OR ACCESSORY, NOS: HCPCS | Performed by: ORTHOPAEDIC SURGERY

## 2024-11-18 PROCEDURE — 3700000002 HC GENERAL ANESTHESIA TIME - EACH INCREMENTAL 1 MINUTE: Performed by: ORTHOPAEDIC SURGERY

## 2024-11-18 PROCEDURE — 3600000005 HC OR TIME - INITIAL BASE CHARGE - PROCEDURE LEVEL FIVE: Performed by: ORTHOPAEDIC SURGERY

## 2024-11-18 PROCEDURE — 2500000002 HC RX 250 W HCPCS SELF ADMINISTERED DRUGS (ALT 637 FOR MEDICARE OP, ALT 636 FOR OP/ED)

## 2024-11-18 PROCEDURE — 7100000001 HC RECOVERY ROOM TIME - INITIAL BASE CHARGE: Performed by: ORTHOPAEDIC SURGERY

## 2024-11-18 PROCEDURE — 2500000004 HC RX 250 GENERAL PHARMACY W/ HCPCS (ALT 636 FOR OP/ED)

## 2024-11-18 PROCEDURE — 1200000002 HC GENERAL ROOM WITH TELEMETRY DAILY

## 2024-11-18 PROCEDURE — 0SRS019 REPLACEMENT OF LEFT HIP JOINT, FEMORAL SURFACE WITH METAL SYNTHETIC SUBSTITUTE, CEMENTED, OPEN APPROACH: ICD-10-PCS | Performed by: ORTHOPAEDIC SURGERY

## 2024-11-18 PROCEDURE — 99221 1ST HOSP IP/OBS SF/LOW 40: CPT | Performed by: ORTHOPAEDIC SURGERY

## 2024-11-18 PROCEDURE — C1776 JOINT DEVICE (IMPLANTABLE): HCPCS | Performed by: ORTHOPAEDIC SURGERY

## 2024-11-18 PROCEDURE — 2500000002 HC RX 250 W HCPCS SELF ADMINISTERED DRUGS (ALT 637 FOR MEDICARE OP, ALT 636 FOR OP/ED): Performed by: STUDENT IN AN ORGANIZED HEALTH CARE EDUCATION/TRAINING PROGRAM

## 2024-11-18 PROCEDURE — 2780000003 HC OR 278 NO HCPCS: Performed by: ORTHOPAEDIC SURGERY

## 2024-11-18 PROCEDURE — C1713 ANCHOR/SCREW BN/BN,TIS/BN: HCPCS | Performed by: ORTHOPAEDIC SURGERY

## 2024-11-18 PROCEDURE — 3600000010 HC OR TIME - EACH INCREMENTAL 1 MINUTE - PROCEDURE LEVEL FIVE: Performed by: ORTHOPAEDIC SURGERY

## 2024-11-18 PROCEDURE — 85027 COMPLETE CBC AUTOMATED: CPT | Performed by: INTERNAL MEDICINE

## 2024-11-18 PROCEDURE — 2500000004 HC RX 250 GENERAL PHARMACY W/ HCPCS (ALT 636 FOR OP/ED): Performed by: ANESTHESIOLOGY

## 2024-11-18 PROCEDURE — 2500000004 HC RX 250 GENERAL PHARMACY W/ HCPCS (ALT 636 FOR OP/ED): Mod: JZ

## 2024-11-18 PROCEDURE — 2720000007 HC OR 272 NO HCPCS: Performed by: ORTHOPAEDIC SURGERY

## 2024-11-18 PROCEDURE — 27236 TREAT THIGH FRACTURE: CPT | Performed by: PHYSICIAN ASSISTANT

## 2024-11-18 PROCEDURE — 3700000001 HC GENERAL ANESTHESIA TIME - INITIAL BASE CHARGE: Performed by: ORTHOPAEDIC SURGERY

## 2024-11-18 DEVICE — IMPLANTABLE DEVICE
Type: IMPLANTABLE DEVICE | Site: HIP | Status: NON-FUNCTIONAL
Brand: QUIK-USE®

## 2024-11-18 DEVICE — IMPLANTABLE DEVICE
Type: IMPLANTABLE DEVICE | Site: HIP | Status: FUNCTIONAL
Brand: VERSYS®

## 2024-11-18 DEVICE — FULL DOSE BONE CEMENT, 10 PACK CATALOG NUMBER IS 6191-1-010
Type: IMPLANTABLE DEVICE | Site: HIP | Status: FUNCTIONAL
Brand: SIMPLEX

## 2024-11-18 DEVICE — IMPLANTABLE DEVICE
Type: IMPLANTABLE DEVICE | Site: HIP | Status: FUNCTIONAL
Brand: ECHO FX HIP SYSTEM

## 2024-11-18 RX ORDER — PROPOFOL 10 MG/ML
INJECTION, EMULSION INTRAVENOUS AS NEEDED
Status: DISCONTINUED | OUTPATIENT
Start: 2024-11-18 | End: 2024-11-18

## 2024-11-18 RX ORDER — DIPHENHYDRAMINE HYDROCHLORIDE 50 MG/ML
INJECTION INTRAMUSCULAR; INTRAVENOUS AS NEEDED
Status: DISCONTINUED | OUTPATIENT
Start: 2024-11-18 | End: 2024-11-18

## 2024-11-18 RX ORDER — MEPERIDINE HYDROCHLORIDE 25 MG/ML
12.5 INJECTION INTRAMUSCULAR; INTRAVENOUS; SUBCUTANEOUS EVERY 10 MIN PRN
Status: DISCONTINUED | OUTPATIENT
Start: 2024-11-18 | End: 2024-11-18 | Stop reason: HOSPADM

## 2024-11-18 RX ORDER — HEPARIN SODIUM 5000 [USP'U]/ML
5000 INJECTION, SOLUTION INTRAVENOUS; SUBCUTANEOUS EVERY 8 HOURS SCHEDULED
Status: COMPLETED | OUTPATIENT
Start: 2024-11-18 | End: 2024-11-18

## 2024-11-18 RX ORDER — SODIUM CHLORIDE, SODIUM LACTATE, POTASSIUM CHLORIDE, CALCIUM CHLORIDE 600; 310; 30; 20 MG/100ML; MG/100ML; MG/100ML; MG/100ML
100 INJECTION, SOLUTION INTRAVENOUS CONTINUOUS
Status: DISCONTINUED | OUTPATIENT
Start: 2024-11-18 | End: 2024-11-18 | Stop reason: HOSPADM

## 2024-11-18 RX ORDER — PHENYLEPHRINE HCL IN 0.9% NACL 1 MG/10 ML
SYRINGE (ML) INTRAVENOUS AS NEEDED
Status: DISCONTINUED | OUTPATIENT
Start: 2024-11-18 | End: 2024-11-18

## 2024-11-18 RX ORDER — CEFAZOLIN SODIUM 2 G/100ML
2 INJECTION, SOLUTION INTRAVENOUS ONCE
Status: COMPLETED | OUTPATIENT
Start: 2024-11-18 | End: 2024-11-18

## 2024-11-18 RX ORDER — TRANEXAMIC ACID 650 MG/1
1950 TABLET ORAL ONCE
Status: COMPLETED | OUTPATIENT
Start: 2024-11-18 | End: 2024-11-18

## 2024-11-18 RX ORDER — FENTANYL CITRATE 50 UG/ML
25 INJECTION, SOLUTION INTRAMUSCULAR; INTRAVENOUS EVERY 5 MIN PRN
Status: DISCONTINUED | OUTPATIENT
Start: 2024-11-18 | End: 2024-11-18 | Stop reason: HOSPADM

## 2024-11-18 RX ORDER — FENTANYL CITRATE 50 UG/ML
50 INJECTION, SOLUTION INTRAMUSCULAR; INTRAVENOUS EVERY 5 MIN PRN
Status: DISCONTINUED | OUTPATIENT
Start: 2024-11-18 | End: 2024-11-18 | Stop reason: HOSPADM

## 2024-11-18 RX ORDER — INSULIN LISPRO 100 [IU]/ML
0-10 INJECTION, SOLUTION INTRAVENOUS; SUBCUTANEOUS NIGHTLY
Status: DISCONTINUED | OUTPATIENT
Start: 2024-11-18 | End: 2024-11-20 | Stop reason: HOSPADM

## 2024-11-18 RX ORDER — CEFAZOLIN SODIUM 2 G/100ML
2 INJECTION, SOLUTION INTRAVENOUS EVERY 8 HOURS
Status: COMPLETED | OUTPATIENT
Start: 2024-11-18 | End: 2024-11-19

## 2024-11-18 RX ORDER — TRANEXAMIC ACID 650 MG/1
1950 TABLET ORAL ONCE
Status: COMPLETED | OUTPATIENT
Start: 2024-11-19 | End: 2024-11-19

## 2024-11-18 RX ORDER — ROCURONIUM BROMIDE 10 MG/ML
INJECTION, SOLUTION INTRAVENOUS AS NEEDED
Status: DISCONTINUED | OUTPATIENT
Start: 2024-11-18 | End: 2024-11-18

## 2024-11-18 RX ORDER — LIDOCAINE HYDROCHLORIDE 20 MG/ML
INJECTION, SOLUTION INFILTRATION; PERINEURAL AS NEEDED
Status: DISCONTINUED | OUTPATIENT
Start: 2024-11-18 | End: 2024-11-18

## 2024-11-18 RX ORDER — WATER 1 ML/ML
IRRIGANT IRRIGATION AS NEEDED
Status: DISCONTINUED | OUTPATIENT
Start: 2024-11-18 | End: 2024-11-18 | Stop reason: HOSPADM

## 2024-11-18 RX ADMIN — HEPARIN SODIUM 5000 UNITS: 5000 INJECTION INTRAVENOUS; SUBCUTANEOUS at 21:09

## 2024-11-18 RX ADMIN — SODIUM CHLORIDE 100 ML/HR: 9 INJECTION, SOLUTION INTRAVENOUS at 09:10

## 2024-11-18 RX ADMIN — HYDROMORPHONE HYDROCHLORIDE 0.2 MG: 0.2 INJECTION, SOLUTION INTRAMUSCULAR; INTRAVENOUS; SUBCUTANEOUS at 09:19

## 2024-11-18 RX ADMIN — TRANEXAMIC ACID 1950 MG: 650 TABLET ORAL at 13:32

## 2024-11-18 RX ADMIN — INSULIN LISPRO 6 UNITS: 100 INJECTION, SOLUTION INTRAVENOUS; SUBCUTANEOUS at 21:31

## 2024-11-18 RX ADMIN — TRANEXAMIC ACID 1950 MG: 650 TABLET ORAL at 21:33

## 2024-11-18 ASSESSMENT — PAIN SCALES - GENERAL
PAINLEVEL_OUTOF10: 0 - NO PAIN
PAINLEVEL_OUTOF10: 8
PAINLEVEL_OUTOF10: 0 - NO PAIN
PAINLEVEL_OUTOF10: 0 - NO PAIN
PAIN_LEVEL: 0
PAINLEVEL_OUTOF10: 5 - MODERATE PAIN
PAINLEVEL_OUTOF10: 0 - NO PAIN
PAINLEVEL_OUTOF10: 5 - MODERATE PAIN
PAINLEVEL_OUTOF10: 0 - NO PAIN
PAINLEVEL_OUTOF10: 5 - MODERATE PAIN

## 2024-11-18 ASSESSMENT — PAIN - FUNCTIONAL ASSESSMENT
PAIN_FUNCTIONAL_ASSESSMENT: 0-10

## 2024-11-18 ASSESSMENT — COGNITIVE AND FUNCTIONAL STATUS - GENERAL
TURNING FROM BACK TO SIDE WHILE IN FLAT BAD: A LOT
STANDING UP FROM CHAIR USING ARMS: A LOT
TOILETING: A LITTLE
MOVING FROM LYING ON BACK TO SITTING ON SIDE OF FLAT BED WITH BEDRAILS: A LOT
TOILETING: A LITTLE
HELP NEEDED FOR BATHING: A LITTLE
DRESSING REGULAR LOWER BODY CLOTHING: A LITTLE
MOVING TO AND FROM BED TO CHAIR: A LOT
CLIMB 3 TO 5 STEPS WITH RAILING: TOTAL
DRESSING REGULAR UPPER BODY CLOTHING: A LITTLE
DAILY ACTIVITIY SCORE: 20
DRESSING REGULAR UPPER BODY CLOTHING: A LITTLE
DRESSING REGULAR LOWER BODY CLOTHING: A LITTLE
WALKING IN HOSPITAL ROOM: TOTAL
HELP NEEDED FOR BATHING: A LITTLE
MOBILITY SCORE: 10
DAILY ACTIVITIY SCORE: 20

## 2024-11-18 ASSESSMENT — ENCOUNTER SYMPTOMS
ARTHRALGIAS: 1
MYALGIAS: 1

## 2024-11-18 ASSESSMENT — ACTIVITIES OF DAILY LIVING (ADL): LACK_OF_TRANSPORTATION: NO

## 2024-11-18 NOTE — ANESTHESIA PREPROCEDURE EVALUATION
"Lito Osei is a 64 y.o. male here for:      Arthroplasty Partial Hip  With Nicola Webster MD  Pre-Op Diagnosis Codes:      * Closed fracture of left hip, initial encounter [S72.002A]    Lab Results   Component Value Date    HGB 12.4 (L) 11/18/2024    HCT 38.9 (L) 11/18/2024    WBC 13.0 (H) 11/18/2024     11/18/2024     11/18/2024    K 5.1 11/18/2024     11/18/2024    CREATININE 1.93 (H) 11/18/2024    BUN 39 (H) 11/18/2024       Social History     Substance and Sexual Activity   Drug Use Never      Tobacco Use: Unknown (11/17/2024)    Patient History     Smoking Tobacco Use: Never     Smokeless Tobacco Use: Unknown     Passive Exposure: Not on file      Social History     Substance and Sexual Activity   Alcohol Use Never        No Known Allergies    Current Outpatient Medications   Medication Instructions    atorvastatin (LIPITOR) 80 mg, Daily    dapagliflozin propanediol (FARXIGA) 10 mg, Every 24 hours    insulin asp prt-insulin aspart (NovoLOG Mix 70-30 U-100 Insuln) 100 unit/mL (70-30) injection 8 Units, 2 times daily (morning and late afternoon)    lisinopril 10 mg, Daily       Past Medical History:   Diagnosis Date    CKD (chronic kidney disease)     Diabetes mellitus (Multi)     HLD (hyperlipidemia)     Hypertension     Stroke (Multi)        Past Surgical History:   Procedure Laterality Date    ANKLE SURGERY Right        No family history on file.    Relevant Problems   No relevant active problems       Visit Vitals  /77 (BP Location: Right arm, Patient Position: Sitting)   Pulse 110   Temp 36 °C (96.8 °F) (Temporal)   Resp 18   Ht 1.676 m (5' 6\")   Wt 77.1 kg (170 lb)   SpO2 97%   BMI 27.44 kg/m²   Smoking Status Never   BSA 1.89 m²       NPO Details:  NPO/Void Status  Date of Last Liquid: 11/17/24  Date of Last Solid: 11/17/24        Physical Exam    Airway  Mallampati: II     Cardiovascular - normal exam     Dental    Pulmonary - normal exam     Abdominal   Abdomen: soft   "           Anesthesia Plan    History of general anesthesia?: yes  History of complications of general anesthesia?: no    ASA 3     general     intravenous induction   Anesthetic plan and risks discussed with patient.

## 2024-11-18 NOTE — OP NOTE
Arthroplasty Partial Hip (L) Operative Note     Date: 2024 - 2024  OR Location: [facility]    Name: Lito Osei, : 1960, Age: 64 y.o., MRN: [id number], Sex: male    Diagnosis  Pre-op Diagnosis      * Closed fracture of left hip, initial encounter [S72.002A] Post-op Diagnosis     * Closed fracture of left hip, initial encounter [S72.002A]     Procedures  Arthroplasty Partial Hip  58634 - [place] HEMIARTHROPLASTY HIP PARTIAL  Left hip hemiarthroplasty for femoral neck fracture    Surgeons      * Nicola Webster - Primary    Resident/Fellow/Other Assistant:  Surgeons and Role:  * No surgeons found with a matching role *Farzaneh Bonilla PA-C    Staff:   Circulator: Yasir Quinn Person: Isaura Lopez Circulator: Tejas Lopez Scrub: Haily    Anesthesia Staff: Anesthesiologist: Steven Andujar MD; Javy Yanez MD    Procedure Summary  Anesthesia: General  ASA: III  Estimated Blood Loss: 100 mL  Intra-op Medications:   Administrations occurring from 1530 to 1700 on 24:   Medication Name Total Dose   phenylephrine 100 mcg/mL syringe 10 mL (prefilled) 300 mcg              Anesthesia Record               Intraprocedure I/O Totals          Intake    ceFAZolin (Ancef) 2 g in dextrose (iso)  mL 100.00 mL    Total Intake 100 mL          Specimen: No specimens collected              Drains and/or Catheters:   External Urinary Catheter Male (Active)   Output (mL) 800 mL 24 0513       Tourniquet Times:         Implants:  Implants       Type Name Action Serial No.       FEMORAL BONE CEMENT PREP KIT Implanted      Implant CEMENT, BONE, SIMPLEX P, RADIOPAQUE, FULL DOSE, 40 GM - [id number] Implanted      Joint Hip CENTRALIZER, FEMORAL, DISTAL, VERSYS, 10 MM, PMMA, STERILE - [id number] Implanted      Joint Hip COMPONENT, ECHO FX FEMORAL, 11MM - [id number] Implanted       28 MM DIAM COCR MODULAR FEMORAL HEAD, +3 MM NECK, NO SKIRT Implanted       49 MM SHELL DIAM X 28 MM CUP (HEAD DIAM), RINGLOC BI-POLAR  IMPLANT Implanted             Indications: 64-year-old male fell injuring his left hip resulting displaced femoral neck fracture was recommended for left hip hemiarthroplasty for fracture    Risks benefits and alternatives to surgery were discussed including but not limited to Infection, bleeding, neurovascular injury, pain and dysfunction, hardware related complications including cutout failure breakage, loss of function, motion, and permanent disability as well as the cardiovascular and pulmonary complications from anesthesia including death and DVT. Patient and family accept these risks.  We discussed specifically hardware complications going cut out, failure, breakage, instability, dislocation, persistent pain, limp, loss in strength/function/motion, and the need for future revision surgeries including revision arthroplasty    Procedure:   Patient identified in the preop area.  Left lower extremity marked and confirmed with patient as the operative site.  Brought back to the operating room and anesthetized under general anesthesia.  Left lower extremity was then prepped and draped in standard sterile fashion.  Patient, site and procedure were confirmed with timeout.  Everyone in the room agreed.  Preop antibiotics were given.  Patient is placed in the lateral decubitus position on a pegboard.  Bony prominences were well-padded. We then approached the hip through a standard posterior approach.  Skin was incised over the trochanter.  Skin was incised obtains fat and the gluteal fascia was divided.  We then identified the short external rotators and the a piriformis.  We then sharply cut the short external rotators and piriformis off of the femoral neck in a standard T fashion.  The capsule and external rotators were tagged for a later repair.  Patient had a femoral neck fracture was then identified.  We placed the osteotome along our cutting guide.  We made a provisional neck cut.  This was a completed with a  oscillating saw.  We then remove the femoral head.  It was sized on the back table.  It was a size of 49 mm head.  We then confirmed the size with acetabular shells.  The final shell size was then selected.  We then prepared the proximal femur with a box osteotome, lateralizing and centralizing reamers.  We then progressively broached up to the appropriate size and fill.  The final broach was placed in approximately 10 to 15 degrees of anteversion.  We then trialed off of the stem.  We are overall satisfied with the soft tissue tension, length and hip stability.  We then irrigated the proximal femur.  We placed a cement restrictor.  We dried the approximal canal while the cement was mixed on the back table.  Injected the approximate femur with cement.  We placed our final component stem.  We selected a 11 mm stem and was held approximately 10 degrees of increased anteversion.  Once the cement was allowed to cure, we trialed off of the final stem.  We went up to a +3 mm neck length.  This gives improved of soft tissue tension and stability.  The hip was flexed at 90 degrees at neutral abduction and internally rotated to approximately 80 degrees before subluxation was felt.  We are satisfied with the trial stability and then malleted onto the trunnion our final components.  The hip was reduced.  It was confirmed again for stability without impingement and good soft tissue attention.  This concluded the procedure.  Wounds were irrigated with normal saline.  Closed the piriformis and the capsule with the interrupted #5 Ethibond suture.  Wounds irrigated with Betadine and Irrisept solution al.  We then closed the fascia with a #1-0 Vicryl and then #1 strata fix.  2-0 Vicryl used for subcutaneous fat closure.  Monocryl in the skin.  Sterile dressings and the Aquacel dressing was applied.  Patient was awoken from anesthesia and sent to the PACU in stable condition.    Farzaneh Bonilla PA-C was present throughout the entire  case. Given the nature of the disease process and the procedure, a skilled surgical first assistant was necessary during the case. The assistant was necessary to hold retractors and to manipulate the extremity during the procedure. A certified scrub tech was at the back table managing the instruments and supplies for the surgical case.

## 2024-11-18 NOTE — DISCHARGE INSTRUCTIONS
Total Hip Replacement   Discharge Instructions    To prevent Clot formation, you have been placed on the following medication:  Eliquis 2.5 mg twice a day for 28 days started on 11/19/24    Surgical Site Care:  Change dressing once a day and PRN (as needed). Apply 4 x 4 sponge and light tape. If glue present, leave open to air.  You may leave wound open to air after initial dressing removal, if wound is clean, dry and intact  If Aquacel Ag dressing is present, do not remove dressing for 7 days, unless heavily saturated. If heavily saturated, remove dressing and start using instructions above  Staples will be removed on post-operative day 14 and steri-strips applied  Showering is permitted starting POD1 if waterproof Aquacel dressing is present or when the incision is covered with 4 x 4 and Tegaderm waterproof dressing  Until all areas of incision are healed.    Physical Therapy:  Weight Bearing Status:  Weight-bearing as tolerated  Posterior Hip precautions, per therapy handout     Pain Medications  You were given  Oxycodone  Wean off pain medications as you deem appropriate as long as pain is under control  Do not exceed 4,000 mg of acetaminophen from all sources in a 24 hour period    Cold packs/Ice packs/Machine  May be used 5 times daily for 15-30 minutes as necessary  Be sure to have a barrier (cloth, clothing, towel) between the site and the ice pack to prevent frostbite    Contact [facility] office if  Increased redness, swelling, drainage of any kind, and/or pain to surgery site.  As well as new onset fevers and or chills.  These could signify an infection.  Calf or thigh tenderness to touch as well as increased swelling or redness.  This could signify a clot formation.  Numbness or tingling to an area around the incision site or below the incision site (toes).  Any rash appears, increased  or new onset nausea/vomiting occur.  This may indicate a reaction to a medication.  Phone #  [phone].  Follow up with Surgeon in 2 weeks  I acknowledge that I have received giovanna hose and understand the instructions on how and when to wear them (on during the day, off at night)   Discharging RN who has gone over instructions and acknowledges giovanna hose have been received     Ice 5 times a day for 20 minutes each session to operative extremity for two weeks.   GIOVANNA hose to be worn for three weeks. Can be removed for skin care and hygiene.   Incentive spirometer 10 times every hour while awake for two weeks.

## 2024-11-18 NOTE — PROGRESS NOTES
Lito Osei is a 64 y.o. male on day 1 of admission presenting with Fall, initial encounter.      Subjective   Patient seen evaluate this morning.  He states that his pain is currently controlled with medications.  Denies any nausea vomiting fever chills.  Plans for the OR later today       Objective     Last Recorded Vitals  /84   Pulse 103   Temp 36 °C (96.8 °F) (Temporal)   Resp 18   Wt 77.1 kg (170 lb)   SpO2 95%   Intake/Output last 3 Shifts:    Intake/Output Summary (Last 24 hours) at 11/18/2024 1217  Last data filed at 11/18/2024 1146  Gross per 24 hour   Intake 918.75 ml   Output 1150 ml   Net -231.25 ml       Admission Weight  Weight: 77.1 kg (170 lb) (11/17/24 1137)    Daily Weight  11/17/24 : 77.1 kg (170 lb)    Image Results  CT hip left wo IV contrast  Narrative: STUDY:  CT Extremity; Completed Time: 11/17/2024 11:22 AM  INDICATION:  Left hip pain.  Evaluate for fracture.  COMPARISON:  XR left femur & XR pelvis 11/17/2024.  ACCESSION NUMBER(S):  [id number]  ORDERING CLINICIAN:  CHERELLE FUENTES  TECHNIQUE:  Thin section axial images were obtained through the left  hip without intravenous contrast.  Orthogonal reconstructed images  were obtained and reviewed.    Automated mA/kV exposure control was utilized and patient examination  was performed in strict accordance with principles of ALARA.  FINDINGS:    Examination of the bony structures demonstrates a displaced left  femoral neck fracture (203-45) sees.  There is no evidence of an  associated dislocation.  There is a mild to moderate arthrosis of the  left hip noted.  There is mild bony demineralization.  The soft tissues demonstrate a possible small left hip effusion.  The  muscular/soft tissue planes are well-maintained.  The visualized  pelvic structures demonstrate no acute abnormalities.  Vascular  calcifications are visualized..  Impression: 1.  Displaced fracture involving the left femoral neck as described  above.  No evidence  of an associated dislocation.  2.  Mild to moderate arthrosis of the left hip joint.  3.  Possible small left hip joint effusion.  Signed by Nicola Mortensen MD  CT cervical spine wo IV contrast  Narrative: Interpreted By:  Alma Cheek,   STUDY:  CT CERVICAL SPINE WO IV CONTRAST;  11/17/2024 11:20 am      INDICATION:  Signs/Symptoms:fall pain.          COMPARISON:  None.      ACCESSION NUMBER(S):  [id number]      ORDERING CLINICIAN:  CHERELLE FUENTES      TECHNIQUE:  Axial CT images of the cervical spine are obtained. Axial, coronal  and sagittal reconstructions are provided for review.      FINDINGS:  There is curvature of the cervical spine with convexity to the right  consistent with torticollis.      There is degenerative disc disease.  There is moderate anterior and moderate posterior bony spondylosis at  C4-5 through C6-7.      Fractures: There is no evidence for an acute fracture of the cervical  spine.      Vertebral Alignment: Within normal limits.      Craniocervical Junction: The odontoid process and craniocervical  junction are intact.      Vertebrae/Disc Spaces:  The cervical vertebral body heights are  intact and the disc spaces are preserved.      Prevertebral/Paraspinal Soft Tissues: The prevertebral and paraspinal  soft tissues are unremarkable.          Impression: No evidence for an acute fracture or subluxation of the cervical  spine. Torticollis.      MACRO:  None      Signed by: Alma Cheek 11/17/2024 11:31 AM  Dictation workstation:   [id number]  CT head wo IV contrast  Narrative: Interpreted By:  Alma Cheek,   STUDY:  CT HEAD WO IV CONTRAST;  11/17/2024 11:20 am      INDICATION:  Signs/Symptoms:fall pain.      COMPARISON:  10/21/2015      ACCESSION NUMBER(S):  [id number]      ORDERING CLINICIAN:  CHERELLE FUENTES      TECHNIQUE:  Examination was performed in the axial plane using soft tissue and  bone algorithm.      FINDINGS:  INTRACRANIAL:  There is prominence of the ventricular  system and cerebral sulci  consistent with cerebral atrophy. There are periventricular  hypodensities consistent with  marked small vessel disease. No mass  or mass effect is identified. There is no hemorrhage or subdural  fluid collection. There is no acute infarct.  There is a remote left occipital infarct.  There is a remote infarct along the right cerebral convexity in the  vascular territory of the anterior cerebral artery. There is no  fracture of the calvarium.      EXTRACRANIAL:  Visualized paranasal sinuses and mastoids are clear.      Impression: No acute intracranial pathology.      MACRO:  None      Signed by: Alma Cheek 11/17/2024 11:28 AM  Dictation workstation:   [id number]  XR pelvis 1-2 views  Narrative: STUDY:  Pelvis Radiographs; 11/17/2024 at 11:13am  INDICATION:  Pelvis pain post fall.  COMPARISON:  None Available.  ACCESSION NUMBER(S):  [id number]  ORDERING CLINICIAN:  CHERELLE FUENTES  TECHNIQUE:  One view(s) of the pelvis.  FINDINGS:    The pelvic ring is intact.  There is a displaced fracture involving  the left femoral neck.  No evidence of associated dislocation.  There  is a moderate arthrosis noted involving the hips bilaterally..    Impression: 1.  Displaced fracture involving the left femoral neck.  2.  Moderate arthrosis of the hips bilaterally..  Signed by Nicola Mortensen MD  XR femur left 2+ views  Narrative: STUDY:  Femur Radiographs; 11/17/2024, 1113  INDICATION:  Fall.  COMPARISON:  None Available.  ACCESSION NUMBER(S):  [id number]  ORDERING CLINICIAN:  CHERELLE FUENTES  TECHNIQUE:  2 view(s) (4 images) of the left femur.  FINDINGS:    Lucency of the left femoral neck.  Left femoral neck is suboptimally  visualized due to overlapping anatomy/internal rotation.  The  diaphysis of the left femur is intact and there is no dislocation.  Impression: Left femoral neck fracture.  Consider CT.  Fracture suboptimally  visualized.  Signed by Bandar Virk DO      Physical  Exam  Constitutional:       Appearance: He is ill-appearing.   HENT:      Head: Normocephalic and atraumatic.      Nose: Nose normal.      Mouth/Throat:      Mouth: Mucous membranes are dry.   Eyes:      Extraocular Movements: Extraocular movements intact.      Conjunctiva/sclera: Conjunctivae normal.   Cardiovascular:      Rate and Rhythm: Normal rate and regular rhythm.      Pulses: Normal pulses.      Heart sounds: Normal heart sounds.   Pulmonary:      Effort: Pulmonary effort is normal.      Breath sounds: Normal breath sounds.   Abdominal:      General: Bowel sounds are normal.      Palpations: Abdomen is soft.   Musculoskeletal:         General: Normal range of motion.      Cervical back: Normal range of motion and neck supple.      Comments: Left hip pain.  Neurovascular intact distally but externally rotated.   Skin:     General: Skin is warm and dry.   Neurological:      General: No focal deficit present.      Mental Status: He is alert and oriented to person, place, and time. Mental status is at baseline.      Motor: Weakness present.   Psychiatric:         Mood and Affect: Mood normal.     Relevant Results               Assessment/Plan            Lito Osei is a 64 y.o. male with past medical history of hypertension, hyperlipidemia, history of CVA with left-sided residual weakness, type 2 diabetes mellitus insulin-dependent, CKD stage III comes into the hospital after he fell at home in his kitchen.       Assessment & Plan  Fall, initial encounter    Closed fracture of left hip    Mechanical fall  Displaced left femoral neck fracture   -Continue with pain control  -Orthopedic surgery has been consulted plans for surgical intervention today  -Please keep the patient n.p.o.  -Postop VTE per orthopedic surgery  -Postoperative PT OT and possible placement     Diabetes mellitus type 2  -Continue sliding scale insulin and adjust insulins as needed  -ACHS Accu-Cheks     CKD stage III  -Trend renal functions  avoid nephrotoxins     History of CVA with left-sided deficits  -Resume home medications as able     Hypertension  Hyperlipidemia  -Continue with home medications     VTE prophylaxis-heparin, postop VTE prophylaxis per orthopedic surgery              Santhosh Waterman MD

## 2024-11-18 NOTE — CONSULTS
Consult Note  Patient: Lito Osei, Unit/Bed: 608/608-A, YOB: 1960  MRN: [id number], Acct: [id number]   Admitting Diagnosis: Left hip pain [M25.552]  Closed fracture of left hip, initial encounter [S72.002A]  Fall, initial encounter [W19.XXXA]  Multiple comorbid conditions [R69], Date:  11/17/2024  Assessment:    @[address]@    Assessment/plan:  Displaced left femoral neck fracture    Recommended left hip hemiarthroplasty for fracture  Plan: Hip arthroplasty for fracture    Risks benefits and alternatives to surgery were discussed including but not limited to Infection, bleeding, neurovascular injury, pain and dysfunction, hardware related complications including cutout failure breakage, loss of function, motion, and permanent disability as well as the cardiovascular and pulmonary complications from anesthesia including death and DVT. Patient and family accept these risks.  We discussed specifically hardware complications going cut out, failure, breakage, instability, dislocation, persistent pain, limp, loss in strength/function/motion, and the need for future revision surgeries including revision arthroplasty      Chief Complaint:  Left hip pain after fall    History of Present Illness:  64-year-old male fell injuring his left hip resulting displaced femoral neck fracture.  He is admitted to the hospital orthopedics consult for fracture care management.  Patient plan moderate severe intensity pain in the left hip.  Denies injuries to the medial extremities no loss consciousness during his fall.  Patient is notable history of limited ambulation and weakness on the left side as a result of a prior CVA    PMHx:  Past Medical History:   Diagnosis Date    CKD (chronic kidney disease)     Diabetes mellitus (Multi)     HLD (hyperlipidemia)     Hypertension     Stroke (Multi)        PSHx:  Past Surgical History:   Procedure Laterality Date    ANKLE SURGERY Right        Social Hx:  Social History      Socioeconomic History    Marital status:    Tobacco Use    Smoking status: Never   Substance and Sexual Activity    Alcohol use: Never    Drug use: Never    Sexual activity: Defer     Social Drivers of Health     Financial Resource Strain: Low Risk  (11/18/2024)    Overall Financial Resource Strain (CARDIA)     Difficulty of Paying Living Expenses: Not hard at all   Food Insecurity: No Food Insecurity (11/17/2024)    Hunger Vital Sign     Worried About Running Out of Food in the Last Year: Never true     Ran Out of Food in the Last Year: Never true   Transportation Needs: No Transportation Needs (11/18/2024)    PRAPARE - Transportation     Lack of Transportation (Medical): No     Lack of Transportation (Non-Medical): No   Intimate Partner Violence: Not At Risk (11/17/2024)    Humiliation, Afraid, Rape, and Kick questionnaire     Fear of Current or Ex-Partner: No     Emotionally Abused: No     Physically Abused: No     Sexually Abused: No   Housing Stability: Low Risk  (11/18/2024)    Housing Stability Vital Sign     Unable to Pay for Housing in the Last Year: No     Number of Times Moved in the Last Year: 0     Homeless in the Last Year: No       Family Hx:  No family history on file.    Review of Systems:   Review of Systems   Musculoskeletal:  Positive for arthralgias, gait problem and myalgias.   All other systems reviewed and are negative.        Physical Examination:    Visit Vitals  /84   Pulse 103   Temp 36 °C (96.8 °F) (Temporal)   Resp 18      Physical Exam  Constitutional:       Appearance: Normal appearance.   HENT:      Head: Atraumatic.   Eyes:      Extraocular Movements: Extraocular movements intact.   Cardiovascular:      Rate and Rhythm: Normal rate.   Pulmonary:      Effort: Pulmonary effort is normal.   Abdominal:      General: Abdomen is flat.   Musculoskeletal:      Cervical back: Normal range of motion.   Skin:     General: Skin is warm and dry.   Neurological:      General: No  "focal deficit present.      Mental Status: He is alert.   Psychiatric:         Mood and Affect: Mood normal.       Left lower extremity: Neurovascular intact.  Palpable pedal pulse, warm well-perfused, skin intact, station tact light touch.  Hip flexion externally rotated secondary to pain.  No strength or stability secondary to fracture  LABS:  CBC:   Lab Results   Component Value Date    WBC 13.0 (H) 11/18/2024    RBC 4.20 (L) 11/18/2024    HGB 12.4 (L) 11/18/2024    HCT 38.9 (L) 11/18/2024    MCV 93 11/18/2024    MCH 29.5 11/18/2024    MCHC 31.9 (L) 11/18/2024    RDW 13.1 11/18/2024     11/18/2024     CBC with Differential:    Lab Results   Component Value Date    WBC 13.0 (H) 11/18/2024    RBC 4.20 (L) 11/18/2024    HGB 12.4 (L) 11/18/2024    HCT 38.9 (L) 11/18/2024     11/18/2024    MCV 93 11/18/2024    MCH 29.5 11/18/2024    MCHC 31.9 (L) 11/18/2024    RDW 13.1 11/18/2024    NRBC 0.0 11/18/2024    LYMPHOPCT 11.9 11/17/2024    MONOPCT 4.6 11/17/2024    EOSPCT 1.0 11/17/2024    BASOPCT 0.6 11/17/2024    MONOSABS 0.64 11/17/2024    LYMPHSABS 1.66 11/17/2024    EOSABS 0.14 11/17/2024    BASOSABS 0.08 11/17/2024     CMP:    Lab Results   Component Value Date     11/18/2024    K 5.1 11/18/2024     11/18/2024    CO2 19 (L) 11/18/2024    BUN 39 (H) 11/18/2024    CREATININE 1.93 (H) 11/18/2024    GLUCOSE 209 (H) 11/18/2024    PROT 6.6 11/17/2024    CALCIUM 8.5 (L) 11/18/2024    BILITOT 0.5 11/17/2024    ALKPHOS 100 11/17/2024    AST 19 11/17/2024    ALT 19 11/17/2024     BMP:    Lab Results   Component Value Date     11/18/2024    K 5.1 11/18/2024     11/18/2024    CO2 19 (L) 11/18/2024    BUN 39 (H) 11/18/2024    CREATININE 1.93 (H) 11/18/2024    CALCIUM 8.5 (L) 11/18/2024    GLUCOSE 209 (H) 11/18/2024     Magnesium:No results found for: \"MG\"  Troponin:  No results found for: \"TROPONINI\"      X-ray/CT scan results: Displaced left femoral neck fracture      Electronically signed by " Nicola Webster MD on 11/18/2024 at 12:16 PM

## 2024-11-18 NOTE — PROGRESS NOTES
11/18/24 1148   Discharge Planning   Living Arrangements Alone   Support Systems Family members   Assistance Needed none, Pt has hx of stroke in 2015 with left sided weakness but states Independent ADLS and IALDS no AD, drives, retired since stroke, pt has tub shower, doesn't own any DME   Type of Residence Private residence  (1 level apartment on 2nd floor, no elevator)   Number of Stairs to Enter Residence 7   Number of Stairs Within Residence 0   Do you have animals or pets at home? Yes   Type of Animals or Pets tropical fish   Home or Post Acute Services In home services   Type of Home Care Services Home OT;Home PT   Expected Discharge Disposition Home H   Does the patient need discharge transport arranged? Yes   RoundTrip coordination needed? Yes   Has discharge transport been arranged? No   Financial Resource Strain   How hard is it for you to pay for the very basics like food, housing, medical care, and heating? Not hard   Housing Stability   In the last 12 months, was there a time when you were not able to pay the mortgage or rent on time? N   In the past 12 months, how many times have you moved where you were living? 0   At any time in the past 12 months, were you homeless or living in a shelter (including now)? N   Transportation Needs   In the past 12 months, has lack of transportation kept you from medical appointments or from getting medications? no   In the past 12 months, has lack of transportation kept you from meetings, work, or from getting things needed for daily living? No   Patient Choice   Provider Choice list and CMS website (https://medicare.gov/care-compare#search) for post-acute Quality and Resource Measure Data were provided and reviewed with: Patient   Stroke Family Assessment   Stroke Family Assessment Needed No   Intensity of Service   Intensity of Service 0-30 min     Pt admitted after fall with Left displaced femoral neck fracture and is scheduled to go to OR today with orthopedic  surgeon. Pt from home alone, states fall due to lost balance. Pt doesn't own any DME, states PTA independent, drives, denies other falls. Pt states DC preference is home with [facility]. CT team will monitor case for progression and follow up for post op PT/OT eval AMPAC scores to aide in DC planning.

## 2024-11-18 NOTE — CARE PLAN
The patient's goals for the shift include      The clinical goals for the shift include Patient pain will be well-controlled throughout this shift    Problem: Pain - Adult  Goal: Verbalizes/displays adequate comfort level or baseline comfort level  Outcome: Progressing     Problem: Safety - Adult  Goal: Free from fall injury  Outcome: Progressing     Problem: Skin  Goal: Prevent/manage excess moisture  Outcome: Progressing  Flowsheets (Taken 11/18/2024 0050)  Prevent/manage excess moisture: Cleanse incontinence/protect with barrier cream     Problem: Skin  Goal: Prevent/minimize sheer/friction injuries  Outcome: Progressing  Flowsheets (Taken 11/18/2024 0050)  Prevent/minimize sheer/friction injuries: Turn/reposition every 2 hours/use positioning/transfer devices     Problem: Skin  Goal: Promote skin healing  Outcome: Progressing     Problem: Safety - Adult  Goal: Free from fall injury  Outcome: Progressing

## 2024-11-18 NOTE — ANESTHESIA PROCEDURE NOTES
Airway  Date/Time: 11/18/2024 2:35 PM  Urgency: elective    Airway not difficult    Staffing  Performed: attending   Authorized by: Steven Andujar MD    Performed by: Steven Andujar MD  Patient location during procedure: OR    Indications and Patient Condition  Indications for airway management: anesthesia  Spontaneous ventilation: present  Sedation level: no sedation  Preoxygenated: yes  Patient position: sniffing  MILS not maintained throughout  Mask difficulty assessment: 1 - vent by mask  Planned trial extubation    Final Airway Details  Final airway type: endotracheal airway      Successful airway: ETT  Cuffed: yes   Successful intubation technique: video laryngoscopy  Facilitating devices/methods: intubating stylet  Endotracheal tube insertion site: oral  Blade type: heart.  Blade size: #4  ETT size (mm): 7.5  Cormack-Lehane Classification: grade I - full view of glottis  Placement verified by: capnometry   Measured from: lips  ETT to lips (cm): 23  Number of attempts at approach: 1  Ventilation between attempts: none  Number of other approaches attempted: 0

## 2024-11-19 ENCOUNTER — APPOINTMENT (OUTPATIENT)
Dept: RADIOLOGY | Facility: HOSPITAL | Age: 64
DRG: 522 | End: 2024-11-19
Payer: MEDICARE

## 2024-11-19 LAB
ANION GAP SERPL CALC-SCNC: 15 MMOL/L (ref 10–20)
BASOPHILS # BLD AUTO: 0.04 X10*3/UL (ref 0–0.1)
BASOPHILS NFR BLD AUTO: 0.2 %
BUN SERPL-MCNC: 45 MG/DL (ref 6–23)
CALCIUM SERPL-MCNC: 8.4 MG/DL (ref 8.6–10.3)
CHLORIDE SERPL-SCNC: 108 MMOL/L (ref 98–107)
CO2 SERPL-SCNC: 21 MMOL/L (ref 21–32)
CREAT SERPL-MCNC: 2.64 MG/DL (ref 0.5–1.3)
EGFRCR SERPLBLD CKD-EPI 2021: 27 ML/MIN/1.73M*2
EOSINOPHIL # BLD AUTO: 0 X10*3/UL (ref 0–0.7)
EOSINOPHIL NFR BLD AUTO: 0 %
ERYTHROCYTE [DISTWIDTH] IN BLOOD BY AUTOMATED COUNT: 13.5 % (ref 11.5–14.5)
GLUCOSE BLD MANUAL STRIP-MCNC: 235 MG/DL (ref 74–99)
GLUCOSE BLD MANUAL STRIP-MCNC: 273 MG/DL (ref 74–99)
GLUCOSE BLD MANUAL STRIP-MCNC: 282 MG/DL (ref 74–99)
GLUCOSE BLD MANUAL STRIP-MCNC: 288 MG/DL (ref 74–99)
GLUCOSE BLD MANUAL STRIP-MCNC: 309 MG/DL (ref 74–99)
GLUCOSE SERPL-MCNC: 247 MG/DL (ref 74–99)
HCT VFR BLD AUTO: 36.4 % (ref 41–52)
HGB BLD-MCNC: 11.6 G/DL (ref 13.5–17.5)
HOLD SPECIMEN: NORMAL
IMM GRANULOCYTES # BLD AUTO: 0.09 X10*3/UL (ref 0–0.7)
IMM GRANULOCYTES NFR BLD AUTO: 0.5 % (ref 0–0.9)
LYMPHOCYTES # BLD AUTO: 1.32 X10*3/UL (ref 1.2–4.8)
LYMPHOCYTES NFR BLD AUTO: 7.5 %
MAGNESIUM SERPL-MCNC: 2.19 MG/DL (ref 1.6–2.4)
MCH RBC QN AUTO: 29.7 PG (ref 26–34)
MCHC RBC AUTO-ENTMCNC: 31.9 G/DL (ref 32–36)
MCV RBC AUTO: 93 FL (ref 80–100)
MONOCYTES # BLD AUTO: 1.46 X10*3/UL (ref 0.1–1)
MONOCYTES NFR BLD AUTO: 8.3 %
NEUTROPHILS # BLD AUTO: 14.71 X10*3/UL (ref 1.2–7.7)
NEUTROPHILS NFR BLD AUTO: 83.5 %
NRBC BLD-RTO: 0 /100 WBCS (ref 0–0)
PLATELET # BLD AUTO: 199 X10*3/UL (ref 150–450)
POTASSIUM SERPL-SCNC: 5.1 MMOL/L (ref 3.5–5.3)
RBC # BLD AUTO: 3.91 X10*6/UL (ref 4.5–5.9)
SODIUM SERPL-SCNC: 139 MMOL/L (ref 136–145)
WBC # BLD AUTO: 17.6 X10*3/UL (ref 4.4–11.3)

## 2024-11-19 PROCEDURE — 85025 COMPLETE CBC W/AUTO DIFF WBC: CPT | Performed by: STUDENT IN AN ORGANIZED HEALTH CARE EDUCATION/TRAINING PROGRAM

## 2024-11-19 PROCEDURE — 2500000004 HC RX 250 GENERAL PHARMACY W/ HCPCS (ALT 636 FOR OP/ED): Mod: JZ

## 2024-11-19 PROCEDURE — 2500000002 HC RX 250 W HCPCS SELF ADMINISTERED DRUGS (ALT 637 FOR MEDICARE OP, ALT 636 FOR OP/ED): Performed by: STUDENT IN AN ORGANIZED HEALTH CARE EDUCATION/TRAINING PROGRAM

## 2024-11-19 PROCEDURE — 83735 ASSAY OF MAGNESIUM: CPT | Performed by: STUDENT IN AN ORGANIZED HEALTH CARE EDUCATION/TRAINING PROGRAM

## 2024-11-19 PROCEDURE — 97116 GAIT TRAINING THERAPY: CPT | Mod: GP,CQ

## 2024-11-19 PROCEDURE — 1200000002 HC GENERAL ROOM WITH TELEMETRY DAILY

## 2024-11-19 PROCEDURE — 73080 X-RAY EXAM OF ELBOW: CPT | Mod: LT

## 2024-11-19 PROCEDURE — 80048 BASIC METABOLIC PNL TOTAL CA: CPT | Performed by: STUDENT IN AN ORGANIZED HEALTH CARE EDUCATION/TRAINING PROGRAM

## 2024-11-19 PROCEDURE — 73080 X-RAY EXAM OF ELBOW: CPT | Mod: LEFT SIDE | Performed by: RADIOLOGY

## 2024-11-19 PROCEDURE — 82947 ASSAY GLUCOSE BLOOD QUANT: CPT

## 2024-11-19 PROCEDURE — 36415 COLL VENOUS BLD VENIPUNCTURE: CPT | Performed by: STUDENT IN AN ORGANIZED HEALTH CARE EDUCATION/TRAINING PROGRAM

## 2024-11-19 PROCEDURE — 99233 SBSQ HOSP IP/OBS HIGH 50: CPT | Performed by: STUDENT IN AN ORGANIZED HEALTH CARE EDUCATION/TRAINING PROGRAM

## 2024-11-19 PROCEDURE — 2500000001 HC RX 250 WO HCPCS SELF ADMINISTERED DRUGS (ALT 637 FOR MEDICARE OP)

## 2024-11-19 PROCEDURE — 97165 OT EVAL LOW COMPLEX 30 MIN: CPT | Mod: GO

## 2024-11-19 PROCEDURE — 97161 PT EVAL LOW COMPLEX 20 MIN: CPT | Mod: GP | Performed by: PHYSICAL THERAPIST

## 2024-11-19 PROCEDURE — 97110 THERAPEUTIC EXERCISES: CPT | Mod: GP,CQ

## 2024-11-19 PROCEDURE — 2500000002 HC RX 250 W HCPCS SELF ADMINISTERED DRUGS (ALT 637 FOR MEDICARE OP, ALT 636 FOR OP/ED)

## 2024-11-19 RX ADMIN — INSULIN LISPRO 6 UNITS: 100 INJECTION, SOLUTION INTRAVENOUS; SUBCUTANEOUS at 16:39

## 2024-11-19 RX ADMIN — OXYCODONE 5 MG: 5 TABLET ORAL at 09:05

## 2024-11-19 RX ADMIN — INSULIN LISPRO 6 UNITS: 100 INJECTION, SOLUTION INTRAVENOUS; SUBCUTANEOUS at 06:28

## 2024-11-19 RX ADMIN — APIXABAN 2.5 MG: 5 TABLET, FILM COATED ORAL at 20:52

## 2024-11-19 RX ADMIN — CEFAZOLIN SODIUM 2 G: 2 INJECTION, SOLUTION INTRAVENOUS at 11:08

## 2024-11-19 RX ADMIN — CEFAZOLIN SODIUM 2 G: 2 INJECTION, SOLUTION INTRAVENOUS at 00:38

## 2024-11-19 RX ADMIN — TRANEXAMIC ACID 1950 MG: 650 TABLET ORAL at 06:25

## 2024-11-19 RX ADMIN — INSULIN LISPRO 8 UNITS: 100 INJECTION, SOLUTION INTRAVENOUS; SUBCUTANEOUS at 20:52

## 2024-11-19 RX ADMIN — APIXABAN 2.5 MG: 5 TABLET, FILM COATED ORAL at 08:54

## 2024-11-19 RX ADMIN — INSULIN LISPRO 4 UNITS: 100 INJECTION, SOLUTION INTRAVENOUS; SUBCUTANEOUS at 11:08

## 2024-11-19 ASSESSMENT — COGNITIVE AND FUNCTIONAL STATUS - GENERAL
DRESSING REGULAR UPPER BODY CLOTHING: A LOT
CLIMB 3 TO 5 STEPS WITH RAILING: TOTAL
MOBILITY SCORE: 9
STANDING UP FROM CHAIR USING ARMS: A LOT
TURNING FROM BACK TO SIDE WHILE IN FLAT BAD: A LOT
DAILY ACTIVITIY SCORE: 20
MOVING FROM LYING ON BACK TO SITTING ON SIDE OF FLAT BED WITH BEDRAILS: A LOT
CLIMB 3 TO 5 STEPS WITH RAILING: TOTAL
STANDING UP FROM CHAIR USING ARMS: A LOT
STANDING UP FROM CHAIR USING ARMS: A LOT
MOBILITY SCORE: 10
TURNING FROM BACK TO SIDE WHILE IN FLAT BAD: A LOT
MOVING TO AND FROM BED TO CHAIR: A LOT
WALKING IN HOSPITAL ROOM: TOTAL
WALKING IN HOSPITAL ROOM: TOTAL
MOVING TO AND FROM BED TO CHAIR: TOTAL
MOVING TO AND FROM BED TO CHAIR: A LOT
WALKING IN HOSPITAL ROOM: TOTAL
MOBILITY SCORE: 10
STANDING UP FROM CHAIR USING ARMS: A LOT
TOILETING: TOTAL
WALKING IN HOSPITAL ROOM: TOTAL
HELP NEEDED FOR BATHING: A LOT
HELP NEEDED FOR BATHING: A LITTLE
DRESSING REGULAR UPPER BODY CLOTHING: A LITTLE
MOVING FROM LYING ON BACK TO SITTING ON SIDE OF FLAT BED WITH BEDRAILS: A LOT
MOVING FROM LYING ON BACK TO SITTING ON SIDE OF FLAT BED WITH BEDRAILS: A LOT
HELP NEEDED FOR BATHING: A LITTLE
TURNING FROM BACK TO SIDE WHILE IN FLAT BAD: A LOT
TOILETING: A LITTLE
TOILETING: A LITTLE
DRESSING REGULAR LOWER BODY CLOTHING: TOTAL
PERSONAL GROOMING: A LOT
TURNING FROM BACK TO SIDE WHILE IN FLAT BAD: A LOT
DRESSING REGULAR LOWER BODY CLOTHING: A LITTLE
EATING MEALS: A LITTLE
DRESSING REGULAR LOWER BODY CLOTHING: A LITTLE
DAILY ACTIVITIY SCORE: 20
CLIMB 3 TO 5 STEPS WITH RAILING: TOTAL
CLIMB 3 TO 5 STEPS WITH RAILING: TOTAL
MOVING FROM LYING ON BACK TO SITTING ON SIDE OF FLAT BED WITH BEDRAILS: A LOT
MOVING TO AND FROM BED TO CHAIR: TOTAL
DRESSING REGULAR UPPER BODY CLOTHING: A LITTLE
MOBILITY SCORE: 9
DAILY ACTIVITIY SCORE: 11

## 2024-11-19 ASSESSMENT — PAIN SCALES - GENERAL
PAINLEVEL_OUTOF10: 5 - MODERATE PAIN
PAINLEVEL_OUTOF10: 8
PAINLEVEL_OUTOF10: 0 - NO PAIN

## 2024-11-19 ASSESSMENT — PAIN - FUNCTIONAL ASSESSMENT
PAIN_FUNCTIONAL_ASSESSMENT: 0-10

## 2024-11-19 ASSESSMENT — ACTIVITIES OF DAILY LIVING (ADL): BATHING_ASSISTANCE: MAXIMAL

## 2024-11-19 NOTE — PROGRESS NOTES
Medical Group Progress Note  ASSESSMENT & PLAN:     Mechanical fall  Left femoral neck fracture status post hemiarthroplasty (11/18)  - Orthopedic surgery following  - Status post above procedure on 11/18  - Continue DVT prophylaxis with apixaban  - PT/OT  - Will need pre-CERT for SNF placement    Leukocytosis, reactive to above  - Slight increase in WBC count postoperatively, no clear source of infection at this time however    Type II DM  Personal history of CVA with left-sided weakness  Essential hypertension  Hyperlipidemia  - Continue home medications as ordered    HARSHIL on CKD stage IV  - Will watch renal function over the next 24 hours  - Creatinine 2.6 today (baseline approximately 2)     VTE Prophylaxis: Apixaban    Disposition/Daily update: Postop day #1 from a left hip hemiarthroplasty after mechanical fall with a femoral neck fracture.  Worked with PT/OT and now working on pre-CERT.  No significant drop in hemoglobin today however WBCs did go up slightly.  Renal function slightly uptrending as well.  Anticipate discharge once pre-CERT is received in the next 24 to 48 hours.      ---Of note, this documentation is completed using the Dragon Dictation system (voice recognition software). There may be spelling and/or grammatical errors that were not corrected prior to final submission.---    Luis Fernando Gross MD    SUBJECTIVE     Patient was seen and examined bedside this morning.  Worked with PT today, and otherwise had no pain complaints for me.    OBJECTIVE:     Last Recorded Vitals:  Vitals:    11/18/24 1720 11/18/24 1746 11/18/24 2114 11/19/24 0719   BP: 123/64 130/76 135/81 102/59   BP Location:   Right arm    Patient Position:   Lying    Pulse: 106 107 107 (!) 113   Resp: 19 18 18 15   Temp:  35.6 °C (96.1 °F) 36.4 °C (97.5 °F) 35.9 °C (96.6 °F)   TempSrc:  Temporal Temporal    SpO2: 95% 93% 92% 92%   Weight:       Height:         Last I/O:  I/O last 3 completed shifts:  In: 2820 (36.6 mL/kg)  [P.O.:220; I.V.:2500 (32.4 mL/kg); IV Piggyback:100]  Out: 1150 (14.9 mL/kg) [Urine:1150 (0.4 mL/kg/hr)]  Weight: 77.1 kg     Physical Exam  Vitals reviewed.   Constitutional:       General: He is not in acute distress.     Appearance: Normal appearance. He is not ill-appearing.   HENT:      Head: Normocephalic and atraumatic.   Eyes:      Extraocular Movements: Extraocular movements intact.      Conjunctiva/sclera: Conjunctivae normal.   Cardiovascular:      Rate and Rhythm: Normal rate and regular rhythm.      Pulses: Normal pulses.      Heart sounds: Normal heart sounds.   Pulmonary:      Effort: Pulmonary effort is normal.      Breath sounds: Normal breath sounds.   Abdominal:      General: Bowel sounds are normal. There is no distension.      Palpations: Abdomen is soft.      Tenderness: There is no abdominal tenderness. There is no guarding.   Musculoskeletal:         General: No swelling or tenderness. Normal range of motion.      Cervical back: Normal range of motion and neck supple.      Comments: Left lower extremity range of motion limited.  No drainage seen on dressing   Neurological:      General: No focal deficit present.      Mental Status: He is alert and oriented to person, place, and time. Mental status is at baseline.   Psychiatric:         Mood and Affect: Mood normal.         Behavior: Behavior normal.     Inpatient Medications:  apixaban, 2.5 mg, oral, BID  insulin lispro, 0-10 Units, subcutaneous, TID AC  insulin lispro, 0-10 Units, subcutaneous, Nightly  polyethylene glycol, 17 g, oral, Daily    PRN Medications  PRN medications: acetaminophen **OR** acetaminophen **OR** acetaminophen, bisacodyl, dextrose, dextrose, glucagon, glucagon, HYDROmorphone, ondansetron **OR** ondansetron, oxyCODONE, promethazine  Continuous Medications:     LABS AND IMAGING:     Labs:  Results from last 7 days   Lab Units 11/19/24  0848 11/18/24  0534 11/17/24  1124   WBC AUTO x10*3/uL 17.6* 13.0* 14.0*   RBC AUTO  x10*6/uL 3.91* 4.20* 3.97*   HEMOGLOBIN g/dL 11.6* 12.4* 11.8*   HEMATOCRIT % 36.4* 38.9* 35.9*   MCV fL 93 93 90   MCH pg 29.7 29.5 29.7   MCHC g/dL 31.9* 31.9* 32.9   RDW % 13.5 13.1 13.0   PLATELETS AUTO x10*3/uL 199 230 186     Results from last 7 days   Lab Units 11/19/24  0848 11/18/24  0534 11/17/24  1124   SODIUM mmol/L 139 139 137   POTASSIUM mmol/L 5.1 5.1 4.8   CHLORIDE mmol/L 108* 106 105   CO2 mmol/L 21 19* 23   BUN mg/dL 45* 39* 37*   CREATININE mg/dL 2.64* 1.93* 1.76*   GLUCOSE mg/dL 247* 209* 166*   PROTEIN TOTAL g/dL  --   --  6.6   CALCIUM mg/dL 8.4* 8.5* 8.6   BILIRUBIN TOTAL mg/dL  --   --  0.5   ALK PHOS U/L  --   --  100   AST U/L  --   --  19   ALT U/L  --   --  19     Results from last 7 days   Lab Units 11/19/24  0848   MAGNESIUM mg/dL 2.19       Imaging:  CT hip left wo IV contrast  Narrative: STUDY:  CT Extremity; Completed Time: 11/17/2024 11:22 AM  INDICATION:  Left hip pain.  Evaluate for fracture.  COMPARISON:  XR left femur & XR pelvis 11/17/2024.  ACCESSION NUMBER(S):  [id number]  ORDERING CLINICIAN:  CHERELLE FUENTES  TECHNIQUE:  Thin section axial images were obtained through the left  hip without intravenous contrast.  Orthogonal reconstructed images  were obtained and reviewed.    Automated mA/kV exposure control was utilized and patient examination  was performed in strict accordance with principles of ALARA.  FINDINGS:    Examination of the bony structures demonstrates a displaced left  femoral neck fracture (203-45) sees.  There is no evidence of an  associated dislocation.  There is a mild to moderate arthrosis of the  left hip noted.  There is mild bony demineralization.  The soft tissues demonstrate a possible small left hip effusion.  The  muscular/soft tissue planes are well-maintained.  The visualized  pelvic structures demonstrate no acute abnormalities.  Vascular  calcifications are visualized..  Impression: 1.  Displaced fracture involving the left femoral neck as  described  above.  No evidence of an associated dislocation.  2.  Mild to moderate arthrosis of the left hip joint.  3.  Possible small left hip joint effusion.  Signed by Nicola Mortensen MD  CT cervical spine wo IV contrast  Narrative: Interpreted By:  Alma Cheek,   STUDY:  CT CERVICAL SPINE WO IV CONTRAST;  11/17/2024 11:20 am      INDICATION:  Signs/Symptoms:fall pain.          COMPARISON:  None.      ACCESSION NUMBER(S):  [id number]      ORDERING CLINICIAN:  CHERELLE FUENTES      TECHNIQUE:  Axial CT images of the cervical spine are obtained. Axial, coronal  and sagittal reconstructions are provided for review.      FINDINGS:  There is curvature of the cervical spine with convexity to the right  consistent with torticollis.      There is degenerative disc disease.  There is moderate anterior and moderate posterior bony spondylosis at  C4-5 through C6-7.      Fractures: There is no evidence for an acute fracture of the cervical  spine.      Vertebral Alignment: Within normal limits.      Craniocervical Junction: The odontoid process and craniocervical  junction are intact.      Vertebrae/Disc Spaces:  The cervical vertebral body heights are  intact and the disc spaces are preserved.      Prevertebral/Paraspinal Soft Tissues: The prevertebral and paraspinal  soft tissues are unremarkable.          Impression: No evidence for an acute fracture or subluxation of the cervical  spine. Torticollis.      MACRO:  None      Signed by: Alma Cheek 11/17/2024 11:31 AM  Dictation workstation:   [id number]  CT head wo IV contrast  Narrative: Interpreted By:  Alma Cheek,   STUDY:  CT HEAD WO IV CONTRAST;  11/17/2024 11:20 am      INDICATION:  Signs/Symptoms:fall pain.      COMPARISON:  10/21/2015      ACCESSION NUMBER(S):  [id number]      ORDERING CLINICIAN:  CHERELLE FUENTES      TECHNIQUE:  Examination was performed in the axial plane using soft tissue and  bone algorithm.      FINDINGS:  INTRACRANIAL:  There  is prominence of the ventricular system and cerebral sulci  consistent with cerebral atrophy. There are periventricular  hypodensities consistent with  marked small vessel disease. No mass  or mass effect is identified. There is no hemorrhage or subdural  fluid collection. There is no acute infarct.  There is a remote left occipital infarct.  There is a remote infarct along the right cerebral convexity in the  vascular territory of the anterior cerebral artery. There is no  fracture of the calvarium.      EXTRACRANIAL:  Visualized paranasal sinuses and mastoids are clear.      Impression: No acute intracranial pathology.      MACRO:  None      Signed by: Alma Cheek 11/17/2024 11:28 AM  Dictation workstation:   [id number]  XR pelvis 1-2 views  Narrative: STUDY:  Pelvis Radiographs; 11/17/2024 at 11:13am  INDICATION:  Pelvis pain post fall.  COMPARISON:  None Available.  ACCESSION NUMBER(S):  [id number]  ORDERING CLINICIAN:  CHERELLE FUENTES  TECHNIQUE:  One view(s) of the pelvis.  FINDINGS:    The pelvic ring is intact.  There is a displaced fracture involving  the left femoral neck.  No evidence of associated dislocation.  There  is a moderate arthrosis noted involving the hips bilaterally..    Impression: 1.  Displaced fracture involving the left femoral neck.  2.  Moderate arthrosis of the hips bilaterally..  Signed by Nicola Mortensen MD  XR femur left 2+ views  Narrative: STUDY:  Femur Radiographs; 11/17/2024, 1113  INDICATION:  Fall.  COMPARISON:  None Available.  ACCESSION NUMBER(S):  [id number]  ORDERING CLINICIAN:  CHERELLE FUENTES  TECHNIQUE:  2 view(s) (4 images) of the left femur.  FINDINGS:    Lucency of the left femoral neck.  Left femoral neck is suboptimally  visualized due to overlapping anatomy/internal rotation.  The  diaphysis of the left femur is intact and there is no dislocation.  Impression: Left femoral neck fracture.  Consider CT.  Fracture suboptimally  visualized.  Signed by Bandar  Sully, DO

## 2024-11-19 NOTE — PROGRESS NOTES
Physical Therapy    Physical Therapy Evaluation    Patient Name: Lito Osei  MRN: [id number]  Department: [facility]  Room: [address] [place] [address]  Today's Date: 11/19/2024   Time Calculation  Start Time: 0724  Stop Time: 0744  Time Calculation (min): 20 min    Assessment/Plan   PT Assessment  PT Assessment Results: Decreased strength, Decreased endurance, Decreased mobility, Impaired balance, Decreased safety awareness, Impaired hearing  Rehab Prognosis: Good  Barriers to Discharge: LIves alone , has to navigate flight of steps  End of Session Communication: Care Coordinator  Assessment Comment: Patient will benefit from additional PT to increase activity mobility educate in Posterior Hip Precautions. Progress mobilty  End of Session Patient Position: Bed, 3 rail up (blanket roll between knees)  IP OR SWING BED PT PLAN  Inpatient or Swing Bed: Inpatient  PT Plan  Treatment/Interventions: Bed mobility, Transfer training, Gait training, Balance training, Therapeutic exercise, Therapeutic activity, Home exercise program  PT Frequency: Daily  PT Discharge Recommendations:  (Contiued PT at moderate intensity at moderate frequency in 24/7 setting)  Equipment Recommended upon Discharge: Wheeled walker  PT Recommended Transfer Status: Assist x2  PT - OK to Discharge: (once deemed medically appropriate with continued PT)    Subjective   General Visit Information:  General  Reason for Referral: s/p Left hip hemiarthroplasty 11/18/24  Referred By: PT/OT 11/18/24 Roger CONNER/ Darin NIELSEN WBAT POSTERIOR HIP PRECAUTIONS  Past Medical History Relevant to Rehab: dyslipidemia, HTN, CKD, DM, R ankle surgery, L hemiparesis  Co-Treatment: OT  Co-Treatment Reason: to maximize pt/staff safety  Patient Position Received: Bed, 4 rail up, Alarm on  General Comment: To  ED 11/17 /24 s/p fall secondary to LOB. CT 11/1924 head 1(-); Cervical  spine(-); Left hip hip displaced femoral neck fracture; xR 11/17/24 left feemur Left femoral neck fracture  Home  Living:  Home Living  Home Living Comments: Per pateint report resides alone in 1 story apartment, Has to navigate flight of steps ot apartment with handrailson both sides. Laundry located on floor above or below his floor. Tub shower no seat, no grab bar. Owns ww, cane.  Prior Level of Function:  Prior Function Per Pt/Caregiver Report  Prior Function Comments: Per patient report independent in mobilty, ADLs and IADLs without assistive device. Denies any other falls. Drives.  Precautions:  Precautions  LE Weight Bearing Status:  (WBAT LLE)  Medical Precautions: Fall precautions  Post-Surgical Precautions:  (Posterior Hip precautions)             Objective   Pain:  Pain Assessment  0-10 (Numeric) Pain Score: 5 - Moderate pain  Pain Type: Acute pain  Pain Location: Hip  Pain Orientation: Left  Cognition:  Cognition  Overall Cognitive Status:  (Delayed, dysarthric speech, easily agitated)  Orientation Level: Oriented X4    General Assessments:        Activity Tolerance  Endurance:  (Fair)    Static Sitting Balance  Static Sitting-: Good  Dynamic Sitting Balance  Dynamic Sitting- Fair    Static Standing Balance  Static Standing Poor  Dynamic Standing Balance  Dynamic Standing-Absent  Functional Assessments:  Bed Mobility  Bed Mobility:  (Supine > sit with HOB 50 degress + 2 maximal assist Patient able to reach across, but unable to move LEs off EOB. Use of bed pad. Patient able to assist in scooting hips closer to EOB once seated. Sit > supine + 2 moderate assist. Dependent in  scooting up toward HOB with use of bed pad    Transfers  Transfer:  (Sit < > stand at EOB difficulty initiating stand multiple VC for hand position, limited by left elbow PA made aware. Patient pushing heavily to right in stand, unable to attain reasonable midline posiition to attempt ambulation. + 2maximal assist.  2 trials attempted with continued pushing to right        Extremity/Trunk Assessments:  LORELEI   RUE : Within Functional Limits  RAHCEL    LUE: Within Functional Limits  RLE   RLE :  (AROM HIp/knee/ankle WFL MMT 5/5grossly)  LLE   LLE :  (AAROM hip 90 degrees, knee/ankle WFL MMT ankle dorsiflexors 4/5)  Outcome Measures:  [facility] Basic Mobility  Turning from your back to your side while in a flat bed without using bedrails: A lot  Moving from lying on your back to sitting on the side of a flat bed without using bedrails: A lot  Moving to and from bed to chair (including a wheelchair): Total  Standing up from a chair using your arms (e.g. wheelchair or bedside chair): A lot  To walk in hospital room: Total  Climbing 3-5 steps with railing: Total  Basic Mobility - Total Score: 9    Encounter Problems       Encounter Problems (Active)       PT Problem       Bed mobility supine <> sit + 1 minimal assist  (Progressing)       Start:  11/19/24    Expected End:  12/03/24            Transfers sit <> stand with ww + 1 minimal assist  (Progressing)       Start:  11/19/24    Expected End:  12/03/24            Patient ambulated with ww 20' + 1 minimal assist  (Not Progressing)       Start:  11/19/24    Expected End:  12/03/24            Patient independent in HEP  (Not Progressing)       Start:  11/19/24    Expected End:  12/03/24                   Education Documentation  Precautions, taught by Denise Naranjo PT at 11/19/2024 11:36 AM.  Learner: Patient  Readiness: Acceptance  Method: Explanation, Handout  Response: Needs Reinforcement    Mobility Training, taught by Denise Naranjo PT at 11/19/2024 11:36 AM.  Learner: Patient  Readiness: Acceptance  Method: Explanation, Handout  Response: Needs Reinforcement

## 2024-11-19 NOTE — PROGRESS NOTES
Physical Therapy    Physical Therapy    Physical Therapy Treatment    Patient Name: Lito Osei  MRN: [id number]  Today's Date: 11/19/2024  Time Calculation  Start Time: 1300  Stop Time: 1325  Time Calculation (min): 25 min       Assessment/Plan   PT Assessment  PT Assessment Results: Decreased strength, Decreased endurance, Decreased mobility, Impaired balance, Decreased safety awareness, Impaired hearing  Rehab Prognosis: Good  Barriers to Discharge: LIves alone , has to navigate flight of steps  End of Session Communication: Bedside nurse  Assessment Comment: Patient will benefit from additional PT to increase activity mobility educate in Posterior Hip Precautions. Progress mobilty  End of Session Patient Position: Bed, 3 rail up     PT Plan  Treatment/Interventions: Bed mobility, Transfer training, Gait training, Balance training, Therapeutic exercise, Therapeutic activity, Home exercise program  PT Frequency: Daily  PT Discharge Recommendations:  (Contiued PT at moderate intensity at moderate frequency in 24/7 setting)  Equipment Recommended upon Discharge: Wheeled walker  PT Recommended Transfer Status: Assist x2      Current Problem:  1. Fall, initial encounter        2. Left hip pain        3. Closed fracture of left hip, initial encounter  Case Request Operating Room: Arthroplasty Partial Hip    Case Request Operating Room: Arthroplasty Partial Hip      4. Multiple comorbid conditions            General Visit Information:   PT  Visit  PT Received On: 11/19/24  General  Reason for Referral: s/p Left hip hemiarthroplasty 11/18/24  Patient Position Received: Bed, 3 rail up  Preferred Learning Style: verbal  General Comment:  (Pt laying in bed- pur wick in tact. Ice to L hip..)  Subjective     Precautions:  Precautions  LE Weight Bearing Status: Weight Bearing as Tolerated  Medical Precautions: Fall precautions  Post-Surgical Precautions: Left hip precautions  Precautions Comment:  (Discussed/ reviewed  posterior hip precautions.)         Objective     Pain:  Pain Assessment  Pain Assessment: 0-10  0-10 (Numeric) Pain Score: 5 - Moderate pain  Pain Type: Acute pain, Surgical pain  Pain Location: Hip  Pain Orientation: Left  Pain Interventions: Cold applied                        Treatments:  Therapeutic Exercise  Therapeutic Exercise Performed: Yes  Therapeutic Exercise Activity 1: Supine exercises- AP, QS, GS, SAQ, hip abd/ add, HS x 10 reps- A/AA on LLE.        Bed Mobility  Bed Mobility: Yes  Bed Mobility 1  Bed Mobility 1: Supine to sitting  Level of Assistance 1: Maximum assistance, +2  Bed Mobility Comments 1:  (Pt transferred onto EOB with max A x2 with v/c for technique adhering to hip precauytions with HOB elevated.)  Bed Mobility 2  Bed Mobility  2: Sitting to supine  Level of Assistance 2: Maximum assistance, +2  Bed Mobility Comments 2:  (Pt transferred back into bed with max A x2 with v/c for technique.  Pt require maxA x2 to pull pt up toeard HOB using bed pad.)     Transfers  Transfer: Yes  Transfer 1  Technique 1: Sit to stand, Stand to sit  Transfer Device 1: Walker  Transfer Level of Assistance 1: Maximum assistance, +2  Trials/Comments 1:  (Pt transfers with WW  WBAT with max A x2 with v/c for proper technique/ hand placement.  Pt leans heavily to R and posteriorly.  Pt unable to get up into a upright standing postion.  Trials x2-  pt not understanding how heavily he is leaning to the R .)          Outcome Measures:  [facility] Basic Mobility  Turning from your back to your side while in a flat bed without using bedrails: A lot  Moving from lying on your back to sitting on the side of a flat bed without using bedrails: A lot  Moving to and from bed to chair (including a wheelchair): Total  Standing up from a chair using your arms (e.g. wheelchair or bedside chair): A lot  To walk in hospital room: Total  Climbing 3-5 steps with railing: Total  Basic Mobility - Total Score: 9  Education  Documentation  No documentation found.  Education Comments  No comments found.        EDUCATION:    Individual(s) Educated: Patient  Education Comment: Pt demonstrates diffic ulty following upon instructions.  Encounter Problems       Encounter Problems (Active)       PT Problem       Bed mobility supine <> sit + 1 minimal assist  (Progressing)       Start:  11/19/24    Expected End:  12/03/24            Transfers sit <> stand with ww + 1 minimal assist  (Progressing)       Start:  11/19/24    Expected End:  12/03/24            Patient ambulated with ww 20' + 1 minimal assist  (Progressing)       Start:  11/19/24    Expected End:  12/03/24            Patient independent in HEP  (Progressing)       Start:  11/19/24    Expected End:  12/03/24

## 2024-11-19 NOTE — PROGRESS NOTES
11/19/24 1155   Discharge Planning   Home or Post Acute Services Post acute facilities (Rehab/SNF/etc)   Type of Post Acute Facility Services Rehab;Skilled nursing   Expected Discharge Disposition SNF   Does the patient need discharge transport arranged? Yes   RoundTrip coordination needed? Yes   Has discharge transport been arranged? No     Pt admitted after fall with Left displaced femoral neck fracture and is s/p POD #1 Left partial hip arthroplasty 11/18/24 with Dr. Nicola Webster. Pt is weight bearing as tolerated with posterior hip precautions. AMPAC scores are PT (9) OT (11) recommending continued therapy at moderate level/intensity. Pt from home alone, states fall due to lost balance and 7 steps to enter to apt. Pt is in agreement to SNF and FOC is #1 [facility], and #2 [facility] NR. [facility] confirms able to accept pending auth. Team notified to initiate SNF auth . CT team will continue monitoring case for progression and DC planning.    Update: notified by team that Precert Status: Approved for [facility]  Tommy Auth. ID: [id number]  Dates: 11/19/2024 - 11/25/2024

## 2024-11-19 NOTE — PROGRESS NOTES
Hip surgery    Progress Note    Subjective:     Post-Operative Day # 1   Status Post left Hip Robert-Arthroplasty    Systemic or Specific Complaints:   Doing well.  Sitting up in bed eating breakfast.  Pain well controlled.  Admits to Lt elbow pain during PT session while pushing down on walker - patient did fall onto Lt side resulting in Lt hip fx.       Objective:     Visit Vitals  /81 (BP Location: Right arm, Patient Position: Lying)   Pulse 107   Temp 36.4 °C (97.5 °F) (Temporal)   Resp 18       General: alert and oriented, in no acute distress, appears stated age, and cooperative   Wound: Lt hip dressing CDI   Motion: Minimal pain with slight Lt hip flexion   DVT Exam: No evidence of DVT seen on physical exam.       NVI in lower extremity. left thigh soft to palpation.  Dorsi/plantar flexion 5/5 on Rt vs 4/5 on Lt (chronic LLE weakness residual CVA deficit). Good distal pulses bilaterally.    Lt elbow with minimal swelling, no ecchymosis, no bony deformity, no tenderness to joint palpation    Data Review  Recent Results (from the past 24 hours)   POCT GLUCOSE    Collection Time: 11/18/24  7:45 AM   Result Value Ref Range    POCT Glucose 171 (H) 74 - 99 mg/dL   POCT GLUCOSE    Collection Time: 11/18/24 11:23 AM   Result Value Ref Range    POCT Glucose 193 (H) 74 - 99 mg/dL   Urinalysis with Reflex Culture and Microscopic    Collection Time: 11/18/24 12:01 PM   Result Value Ref Range    Color, Urine Light-Yellow Light-Yellow, Yellow, Dark-Yellow    Appearance, Urine Clear Clear    Specific Gravity, Urine 1.016 1.005 - 1.035    pH, Urine 5.0 5.0, 5.5, 6.0, 6.5, 7.0, 7.5, 8.0    Protein, Urine NEGATIVE NEGATIVE, 10 (TRACE), 20 (TRACE) mg/dL    Glucose, Urine OVER (4+) (A) Normal mg/dL    Blood, Urine NEGATIVE NEGATIVE    Ketones, Urine 40 (2+) (A) NEGATIVE mg/dL    Bilirubin, Urine NEGATIVE NEGATIVE    Urobilinogen, Urine Normal Normal mg/dL    Nitrite, Urine NEGATIVE NEGATIVE    Leukocyte Esterase, Urine  NEGATIVE NEGATIVE   Extra Urine Gray Tube    Collection Time: 11/18/24 12:01 PM   Result Value Ref Range    Extra Tube Hold for add-ons.    POCT GLUCOSE    Collection Time: 11/18/24  5:50 PM   Result Value Ref Range    POCT Glucose 253 (H) 74 - 99 mg/dL   POCT GLUCOSE    Collection Time: 11/18/24  9:02 PM   Result Value Ref Range    POCT Glucose 278 (H) 74 - 99 mg/dL     CT hip left wo IV contrast    Result Date: 11/17/2024  STUDY: CT Extremity; Completed Time: 11/17/2024 11:22 AM INDICATION: Left hip pain.  Evaluate for fracture. COMPARISON: XR left femur & XR pelvis 11/17/2024. ACCESSION NUMBER(S): [id number] ORDERING CLINICIAN: CHERELLE FUENTES TECHNIQUE:  Thin section axial images were obtained through the left hip without intravenous contrast.  Orthogonal reconstructed images were obtained and reviewed.  Automated mA/kV exposure control was utilized and patient examination was performed in strict accordance with principles of ALARA. FINDINGS:  Examination of the bony structures demonstrates a displaced left femoral neck fracture (203-45) sees.  There is no evidence of an associated dislocation.  There is a mild to moderate arthrosis of the left hip noted.  There is mild bony demineralization. The soft tissues demonstrate a possible small left hip effusion.  The muscular/soft tissue planes are well-maintained.  The visualized pelvic structures demonstrate no acute abnormalities.  Vascular calcifications are visualized..    1.  Displaced fracture involving the left femoral neck as described above.  No evidence of an associated dislocation. 2.  Mild to moderate arthrosis of the left hip joint. 3.  Possible small left hip joint effusion. Signed by Nicola Mortensen MD    CT cervical spine wo IV contrast    Result Date: 11/17/2024  Interpreted By:  Alma Cheek, STUDY: CT CERVICAL SPINE WO IV CONTRAST;  11/17/2024 11:20 am   INDICATION: Signs/Symptoms:fall pain.     COMPARISON: None.   ACCESSION NUMBER(S):  [id number]   ORDERING CLINICIAN: CHERELLE FUENTES   TECHNIQUE: Axial CT images of the cervical spine are obtained. Axial, coronal and sagittal reconstructions are provided for review.   FINDINGS: There is curvature of the cervical spine with convexity to the right consistent with torticollis.   There is degenerative disc disease. There is moderate anterior and moderate posterior bony spondylosis at C4-5 through C6-7.   Fractures: There is no evidence for an acute fracture of the cervical spine.   Vertebral Alignment: Within normal limits.   Craniocervical Junction: The odontoid process and craniocervical junction are intact.   Vertebrae/Disc Spaces:  The cervical vertebral body heights are intact and the disc spaces are preserved.   Prevertebral/Paraspinal Soft Tissues: The prevertebral and paraspinal soft tissues are unremarkable.         No evidence for an acute fracture or subluxation of the cervical spine. Torticollis.   MACRO: None   Signed by: Alma Cheek 11/17/2024 11:31 AM Dictation workstation:   [id number]    CT head wo IV contrast    Result Date: 11/17/2024  Interpreted By:  Alma Cheek, STUDY: CT HEAD WO IV CONTRAST;  11/17/2024 11:20 am   INDICATION: Signs/Symptoms:fall pain.   COMPARISON: 10/21/2015   ACCESSION NUMBER(S): [id number]   ORDERING CLINICIAN: CHERELLE FUENTES   TECHNIQUE: Examination was performed in the axial plane using soft tissue and bone algorithm.   FINDINGS: INTRACRANIAL: There is prominence of the ventricular system and cerebral sulci consistent with cerebral atrophy. There are periventricular hypodensities consistent with  marked small vessel disease. No mass or mass effect is identified. There is no hemorrhage or subdural fluid collection. There is no acute infarct. There is a remote left occipital infarct. There is a remote infarct along the right cerebral convexity in the vascular territory of the anterior cerebral artery. There is no fracture of the calvarium.    EXTRACRANIAL: Visualized paranasal sinuses and mastoids are clear.       No acute intracranial pathology.   MACRO: None   Signed by: Alma Adia 11/17/2024 11:28 AM Dictation workstation:   [id number]    XR pelvis 1-2 views    Result Date: 11/17/2024  STUDY: Pelvis Radiographs; 11/17/2024 at 11:13am INDICATION: Pelvis pain post fall. COMPARISON: None Available. ACCESSION NUMBER(S): [id number] ORDERING CLINICIAN: CHERELLE FUENTES TECHNIQUE:  One view(s) of the pelvis. FINDINGS:  The pelvic ring is intact.  There is a displaced fracture involving the left femoral neck.  No evidence of associated dislocation.  There is a moderate arthrosis noted involving the hips bilaterally..      1.  Displaced fracture involving the left femoral neck. 2.  Moderate arthrosis of the hips bilaterally.. Signed by Nicola Mortensen MD    XR femur left 2+ views    Result Date: 11/17/2024  STUDY: Femur Radiographs; 11/17/2024, 1113 INDICATION: Fall. COMPARISON: None Available. ACCESSION NUMBER(S): [id number] ORDERING CLINICIAN: CHERELLE FUENTES TECHNIQUE:  2 view(s) (4 images) of the left femur. FINDINGS:  Lucency of the left femoral neck.  Left femoral neck is suboptimally visualized due to overlapping anatomy/internal rotation.  The diaphysis of the left femur is intact and there is no dislocation.    Left femoral neck fracture.  Consider CT.  Fracture suboptimally visualized. Signed by Bandar Virk DO      Assessment:     Status Post left Hip Robert-Arthroplasty.  No acute events overnight.     Acute postoperative pain: Reports minimal pain to Lt hip - well controlled with tylenol, oxycodone, ice.       Lt elbow swelling and pain with ambulation using walker     Acute postoperative blood loss anemia: Secondary to expected surgical blood loss. Hemoglobin this A.M. 11.6 .  Patient asymptomatic, and remains hemodynamically stable with no signs of active bleeding.     Leukocytosis: WBC count this morning elevated at 17.6 from 13.   No acute signs of infection.  Patient afebrile, remains hemodynamically stable denies any cough, abdominal pain, or urinary symptoms. Transient elevation in white blood count is most likely secondary to stress and inflammatory process from surgery.    Plan:      1.  Continues current post-op course   2.  Continue Deep venous thrombosis prophylaxis:  Eliquis  3.  Continue physical therapy:  Wt bearing as tolerated with assistance of walker.  Posterior hip precautions  4.  Continue Pain Control: Tylenol/ Oxycodone/Ice PRN  5.  Discharge planning: patient prefers for discharge to home however has several stairs leading to apartment  - Await PT/OT recommendations.   SW and TCC following for discharge planning.   6.  2 view xray Lt elbow      Olivia Carr PA-C   11/19/2024 5:55 AM

## 2024-11-19 NOTE — PROGRESS NOTES
Occupational Therapy    Evaluation/Treatment    Patient Name: Lito Osei  MRN: [id number]  : 1960  Today's Date: 24  Time Calculation  Start Time: 723  Stop Time: 746  Time Calculation (min): 23 min     608/608-A    Assessment:  OT Assessment: pt presents with decreased indep with ADLS, decreased safety with functional mobility  Prognosis: Fair  Barriers to Discharge: None  End of Session Patient Position: Bed, 3 rail up (rolled blanket between knees)  OT Assessment Results: Decreased ADL status, Decreased endurance, Decreased functional mobility  Prognosis: Fair  Barriers to Discharge: None    Plan:  Treatment Interventions: ADL retraining, Functional transfer training, Endurance training, Patient/family training  OT Frequency: 2 times per week  OT Discharge Recommendations: Moderate intensity level of continued care  Equipment Recommended upon Discharge:  (tub seat)  OT Recommended Transfer Status: Maximum assist, Assist of 2 (gait belt)  OT - OK to Discharge: Yes  Treatment Interventions: ADL retraining, Functional transfer training, Endurance training, Patient/family training  Subjective     Current Problem:  1. Fall, initial encounter        2. Left hip pain        3. Closed fracture of left hip, initial encounter  Case Request Operating Room: Arthroplasty Partial Hip    Case Request Operating Room: Arthroplasty Partial Hip      4. Multiple comorbid conditions                General:   OT Received On: 24  General  Reason for Referral: L hip hemiarthroplasty 24  Referred By: Radha/GERONIMO thompson and tx 24  Past Medical History Relevant to Rehab: dyslipidemia, HTN, CKD, DM, R ankle surgery, L hemiparesis  Family/Caregiver Present: No  Co-Treatment: PT  Co-Treatment Reason: to maximize pt/staff safety  Patient Position Received: Bed, 4 rail up, Alarm on  General Comment: pt to ED  s/p fall secondary to LOB. CT head  negative, CT c spine negative, CT L hip displaced femoral  neck fx    Precautions:  LE Weight Bearing Status:  (WBAT L LE)  Medical Precautions: Fall precautions  Post-Surgical Precautions:  (L posterior hip precautions)           Pain:  Pain Assessment  Pain Assessment: 0-10  0-10 (Numeric) Pain Score: 8  Pain Type: Acute pain  Pain Location: Hip  Pain Orientation: Left  Pain Interventions: Cold applied  Objective     Cognition:  Overall Cognitive Status: Within Functional Limits (dysarthric speech, poor insight, decreased safety awareness)  Orientation Level: Oriented X4  Impulsive: Mildly  Processing Speed: Delayed             Home Living:  Home Living Comments: lives alone, 1 story apt, 7 DIPIKA with HR, laundry 7steps up/down. Tub/shower with suction cup grab bar. No seat    Prior Function:  Prior Function Comments: pt reports indep with ADLS/IADLS including med mgmt/diabetes mgmt. Reports one fall, drives           Activities of Daily Living:   Eating Assistance: Stand by  Grooming Assistance: Maximal  Bathing Assistance: Maximal  UE Dressing Assistance: Moderate  LE Dressing Assistance: Total  Toileting Assistance with Device: Total  Toileting Deficit:  (has male external catheter)  Functional Assistance:  (pt unable to weight shift or ambulate this date due to heavy pushing to R in standing)                         Activity Tolerance:  Endurance:  (poor)           Bed Mobility/Transfers: Bed Mobility  Bed Mobility:  (sup to sit max A x 2 to move L LE and lift trunk up off mattress with HOB at 40. Sit to sup max A x 2 for  lifting LE's back into bed and guiding trunk)  Transfers  Transfer:  (sit to stand at EOB max A x2 with 2 trials, pt initially dependent to initiatie but progressed to max A x2, heavy pushing to R, pt states he is leaning to L in standing at EOB. Pt uanble to weight shift in std at ww, returned to sit at EOB)                Balance:  Static Sitting: fair   Dynamic Sitting: fair -  Static Standing: absent        Vision:Vision - Basic  Assessment  Current Vision: Wears glasses only for reading        Sensation:  Light Touch: No apparent deficits    Strength:  Strength Comments: R UE 4 /5 throughout, L UE 4-/5 throughout              Extremities: RUE   RUE : Within Functional Limits and LUE   LUE: Within Functional Limits    Outcome Measures: [facility] Daily Activity  Putting on and taking off regular lower body clothing: Total  Bathing (including washing, rinsing, drying): A lot  Putting on and taking off regular upper body clothing: A lot  Toileting, which includes using toilet, bedpan or urinal: Total  Taking care of personal grooming such as brushing teeth: A lot  Eating Meals: A little  Daily Activity - Total Score: 11    Education Documentation  ADL Training, taught by Bell Teran OT at 11/19/2024  8:20 AM.  Learner: Patient  Readiness: Acceptance  Method: Explanation  Response: Verbalizes Understanding, Needs Reinforcement          EDUCATION:  Education  Education Comment: instructed in L hip posterior precautions, pt will benefit from written instructions and further re-itration of hip precautions relating to LB ADLS    Goals:  Encounter Problems       Encounter Problems (Active)       OT Goals       Pt will bathe/dress UB with SBA (Progressing)       Start:  11/19/24    Expected End:  12/03/24            Pt will verbalize and adhere to L hip precautions indep (Progressing)       Start:  11/19/24    Expected End:  12/03/24            Pt will complete grooming tasks with SBA seated after set up (Progressing)       Start:  11/19/24    Expected End:  12/03/24            Pt will demo good dyn sitting balance with ADLS (Progressing)       Start:  11/19/24    Expected End:  12/03/24            pt will complete sit to stand at EOB/chair with mod A, Wbing through B LE's, std at midline (Progressing)       Start:  11/19/24    Expected End:  12/03/24

## 2024-11-20 VITALS
OXYGEN SATURATION: 92 % | BODY MASS INDEX: 27.32 KG/M2 | DIASTOLIC BLOOD PRESSURE: 72 MMHG | SYSTOLIC BLOOD PRESSURE: 118 MMHG | WEIGHT: 170 LBS | HEIGHT: 66 IN | TEMPERATURE: 96.6 F | RESPIRATION RATE: 16 BRPM | HEART RATE: 118 BPM

## 2024-11-20 DIAGNOSIS — S72.002A CLOSED FRACTURE OF LEFT HIP, INITIAL ENCOUNTER: ICD-10-CM

## 2024-11-20 LAB
ANION GAP SERPL CALC-SCNC: 17 MMOL/L (ref 10–20)
BASOPHILS # BLD AUTO: 0.05 X10*3/UL (ref 0–0.1)
BASOPHILS NFR BLD AUTO: 0.3 %
BUN SERPL-MCNC: 54 MG/DL (ref 6–23)
CALCIUM SERPL-MCNC: 8.4 MG/DL (ref 8.6–10.3)
CHLORIDE SERPL-SCNC: 105 MMOL/L (ref 98–107)
CO2 SERPL-SCNC: 19 MMOL/L (ref 21–32)
CREAT SERPL-MCNC: 2.86 MG/DL (ref 0.5–1.3)
EGFRCR SERPLBLD CKD-EPI 2021: 24 ML/MIN/1.73M*2
EOSINOPHIL # BLD AUTO: 0.01 X10*3/UL (ref 0–0.7)
EOSINOPHIL NFR BLD AUTO: 0.1 %
ERYTHROCYTE [DISTWIDTH] IN BLOOD BY AUTOMATED COUNT: 13.6 % (ref 11.5–14.5)
GLUCOSE BLD MANUAL STRIP-MCNC: 299 MG/DL (ref 74–99)
GLUCOSE BLD MANUAL STRIP-MCNC: 335 MG/DL (ref 74–99)
GLUCOSE SERPL-MCNC: 323 MG/DL (ref 74–99)
HCT VFR BLD AUTO: 34.5 % (ref 41–52)
HGB BLD-MCNC: 10.9 G/DL (ref 13.5–17.5)
IMM GRANULOCYTES # BLD AUTO: 0.16 X10*3/UL (ref 0–0.7)
IMM GRANULOCYTES NFR BLD AUTO: 0.9 % (ref 0–0.9)
LYMPHOCYTES # BLD AUTO: 1.24 X10*3/UL (ref 1.2–4.8)
LYMPHOCYTES NFR BLD AUTO: 7.3 %
MAGNESIUM SERPL-MCNC: 2.24 MG/DL (ref 1.6–2.4)
MCH RBC QN AUTO: 29.3 PG (ref 26–34)
MCHC RBC AUTO-ENTMCNC: 31.6 G/DL (ref 32–36)
MCV RBC AUTO: 93 FL (ref 80–100)
MONOCYTES # BLD AUTO: 1.39 X10*3/UL (ref 0.1–1)
MONOCYTES NFR BLD AUTO: 8.2 %
NEUTROPHILS # BLD AUTO: 14.08 X10*3/UL (ref 1.2–7.7)
NEUTROPHILS NFR BLD AUTO: 83.2 %
NRBC BLD-RTO: 0 /100 WBCS (ref 0–0)
PLATELET # BLD AUTO: 181 X10*3/UL (ref 150–450)
POTASSIUM SERPL-SCNC: 5 MMOL/L (ref 3.5–5.3)
RBC # BLD AUTO: 3.72 X10*6/UL (ref 4.5–5.9)
SODIUM SERPL-SCNC: 136 MMOL/L (ref 136–145)
WBC # BLD AUTO: 16.9 X10*3/UL (ref 4.4–11.3)

## 2024-11-20 PROCEDURE — 85025 COMPLETE CBC W/AUTO DIFF WBC: CPT | Performed by: STUDENT IN AN ORGANIZED HEALTH CARE EDUCATION/TRAINING PROGRAM

## 2024-11-20 PROCEDURE — 36415 COLL VENOUS BLD VENIPUNCTURE: CPT | Performed by: STUDENT IN AN ORGANIZED HEALTH CARE EDUCATION/TRAINING PROGRAM

## 2024-11-20 PROCEDURE — 97535 SELF CARE MNGMENT TRAINING: CPT | Mod: GO,CO

## 2024-11-20 PROCEDURE — 2500000002 HC RX 250 W HCPCS SELF ADMINISTERED DRUGS (ALT 637 FOR MEDICARE OP, ALT 636 FOR OP/ED)

## 2024-11-20 PROCEDURE — 97112 NEUROMUSCULAR REEDUCATION: CPT | Mod: GO,CO

## 2024-11-20 PROCEDURE — 82374 ASSAY BLOOD CARBON DIOXIDE: CPT | Performed by: STUDENT IN AN ORGANIZED HEALTH CARE EDUCATION/TRAINING PROGRAM

## 2024-11-20 PROCEDURE — 99239 HOSP IP/OBS DSCHRG MGMT >30: CPT | Performed by: STUDENT IN AN ORGANIZED HEALTH CARE EDUCATION/TRAINING PROGRAM

## 2024-11-20 PROCEDURE — 83735 ASSAY OF MAGNESIUM: CPT | Performed by: STUDENT IN AN ORGANIZED HEALTH CARE EDUCATION/TRAINING PROGRAM

## 2024-11-20 PROCEDURE — 2500000004 HC RX 250 GENERAL PHARMACY W/ HCPCS (ALT 636 FOR OP/ED)

## 2024-11-20 PROCEDURE — 2500000004 HC RX 250 GENERAL PHARMACY W/ HCPCS (ALT 636 FOR OP/ED): Performed by: STUDENT IN AN ORGANIZED HEALTH CARE EDUCATION/TRAINING PROGRAM

## 2024-11-20 PROCEDURE — 2500000001 HC RX 250 WO HCPCS SELF ADMINISTERED DRUGS (ALT 637 FOR MEDICARE OP)

## 2024-11-20 PROCEDURE — 82947 ASSAY GLUCOSE BLOOD QUANT: CPT

## 2024-11-20 RX ORDER — ACETAMINOPHEN 325 MG/1
650 TABLET ORAL EVERY 6 HOURS
Start: 2024-11-20 | End: 2024-11-27

## 2024-11-20 RX ORDER — OXYCODONE HYDROCHLORIDE 5 MG/1
5 TABLET ORAL EVERY 6 HOURS PRN
Qty: 28 TABLET | Refills: 0 | Status: ON HOLD | OUTPATIENT
Start: 2024-11-20 | End: 2024-11-27

## 2024-11-20 RX ORDER — OXYCODONE HYDROCHLORIDE 5 MG/1
5 TABLET ORAL EVERY 6 HOURS PRN
Qty: 28 TABLET | Refills: 0 | Status: SHIPPED | OUTPATIENT
Start: 2024-11-20 | End: 2024-11-20 | Stop reason: SDUPTHER

## 2024-11-20 RX ORDER — LISINOPRIL 10 MG/1
10 TABLET ORAL DAILY
Start: 2024-11-20 | End: 2024-11-26 | Stop reason: HOSPADM

## 2024-11-20 RX ADMIN — INSULIN LISPRO 6 UNITS: 100 INJECTION, SOLUTION INTRAVENOUS; SUBCUTANEOUS at 06:05

## 2024-11-20 RX ADMIN — POLYETHYLENE GLYCOL 3350 17 G: 17 POWDER, FOR SOLUTION ORAL at 08:29

## 2024-11-20 RX ADMIN — OXYCODONE 5 MG: 5 TABLET ORAL at 08:29

## 2024-11-20 RX ADMIN — INSULIN LISPRO 8 UNITS: 100 INJECTION, SOLUTION INTRAVENOUS; SUBCUTANEOUS at 11:56

## 2024-11-20 RX ADMIN — APIXABAN 2.5 MG: 5 TABLET, FILM COATED ORAL at 08:28

## 2024-11-20 RX ADMIN — SODIUM CHLORIDE, POTASSIUM CHLORIDE, SODIUM LACTATE AND CALCIUM CHLORIDE 500 ML: 600; 310; 30; 20 INJECTION, SOLUTION INTRAVENOUS at 08:28

## 2024-11-20 ASSESSMENT — COGNITIVE AND FUNCTIONAL STATUS - GENERAL
CLIMB 3 TO 5 STEPS WITH RAILING: TOTAL
MOBILITY SCORE: 10
TOILETING: A LITTLE
DAILY ACTIVITIY SCORE: 20
EATING MEALS: A LITTLE
TOILETING: TOTAL
PERSONAL GROOMING: A LITTLE
WALKING IN HOSPITAL ROOM: TOTAL
DRESSING REGULAR LOWER BODY CLOTHING: A LITTLE
MOVING FROM LYING ON BACK TO SITTING ON SIDE OF FLAT BED WITH BEDRAILS: A LOT
DRESSING REGULAR LOWER BODY CLOTHING: TOTAL
MOVING TO AND FROM BED TO CHAIR: A LOT
HELP NEEDED FOR BATHING: A LOT
DRESSING REGULAR UPPER BODY CLOTHING: A LITTLE
TURNING FROM BACK TO SIDE WHILE IN FLAT BAD: A LOT
DAILY ACTIVITIY SCORE: 13
HELP NEEDED FOR BATHING: A LITTLE
DRESSING REGULAR UPPER BODY CLOTHING: A LITTLE
STANDING UP FROM CHAIR USING ARMS: A LOT

## 2024-11-20 ASSESSMENT — ACTIVITIES OF DAILY LIVING (ADL): HOME_MANAGEMENT_TIME_ENTRY: 13

## 2024-11-20 ASSESSMENT — PAIN - FUNCTIONAL ASSESSMENT
PAIN_FUNCTIONAL_ASSESSMENT: 0-10
PAIN_FUNCTIONAL_ASSESSMENT: 0-10

## 2024-11-20 ASSESSMENT — PAIN SCALES - GENERAL
PAINLEVEL_OUTOF10: 5 - MODERATE PAIN
PAINLEVEL_OUTOF10: 3

## 2024-11-20 NOTE — CARE PLAN
The patient's goals for the shift include      The clinical goals for the shift include Patient will remain safe and HD stable during this shift      Problem: Chronic Conditions and Co-morbidities  Goal: Patient's chronic conditions and co-morbidity symptoms are monitored and maintained or improved  11/20/2024 0625 by Dany Oreilly RN  Outcome: Progressing  11/19/2024 2200 by Dany Oreilly RN  Flowsheets (Taken 11/18/2024 2150)  Care Plan - Patient's Chronic Conditions and Co-Morbidity Symptoms are Monitored and Maintained or Improved:   Monitor and assess patient's chronic conditions and comorbid symptoms for stability, deterioration, or improvement   Collaborate with multidisciplinary team to address chronic and comorbid conditions and prevent exacerbation or deterioration   Update acute care plan with appropriate goals if chronic or comorbid symptoms are exacerbated and prevent overall improvement and discharge     Problem: Skin  Goal: Decreased wound size/increased tissue granulation at next dressing change  11/20/2024 0625 by Dany Oreilly RN  Outcome: Progressing  11/19/2024 2200 by Dany Oreilly RN  Flowsheets (Taken 11/18/2024 2150)  Decreased wound size/increased tissue granulation at next dressing change:   Promote sleep for wound healing   Protective dressings over bony prominences   Utilize specialty bed per algorithm  Goal: Participates in plan/prevention/treatment measures  11/20/2024 0625 by Dany Oreilly RN  Outcome: Progressing  11/19/2024 2200 by Dany Oreilly RN  Flowsheets (Taken 11/18/2024 0050 by Gini Worley RN)  Participates in plan/prevention/treatment measures: Elevate heels  Goal: Prevent/manage excess moisture  11/20/2024 0625 by Dany Oreilly RN  Outcome: Progressing  11/19/2024 2200 by Dany Oreilly RN  Flowsheets (Taken 11/18/2024 0050 by Gini Worley RN)  Prevent/manage excess moisture: Cleanse incontinence/protect with barrier cream  Goal: Prevent/minimize sheer/friction  injuries  11/20/2024 0625 by Dany Oreilly RN  Outcome: Progressing  11/19/2024 2200 by Dany Oreilly RN  Flowsheets (Taken 11/18/2024 0050 by Gini Worley RN)  Prevent/minimize sheer/friction injuries: Turn/reposition every 2 hours/use positioning/transfer devices  Goal: Promote/optimize nutrition  11/20/2024 0625 by Dany Oreilly RN  Outcome: Progressing  11/19/2024 2200 by Dany Oreilly RN  Flowsheets (Taken 11/19/2024 2200)  Promote/optimize nutrition:   Assist with feeding   Consume > 50% meals/supplements   Monitor/record intake including meals   Offer water/supplements/favorite foods   Discuss with provider if NPO > 2 days   Reassess MST if dietician not consulted  Goal: Promote skin healing  11/20/2024 0625 by Dany Oreilly RN  Outcome: Progressing  11/19/2024 2200 by Dany Oreilly RN  Flowsheets (Taken 11/18/2024 2150)  Promote skin healing:   Ensure correct size (line/device) and apply per  instructions   Protective dressings over bony prominences   Assess skin/pad under line(s)/device(s)   Rotate device position/do not position patient on device   Turn/reposition every 2 hours/use positioning/transfer devices

## 2024-11-20 NOTE — DISCHARGE SUMMARY
[facility] Discharge Summary  DISCHARGE DIAGNOSIS     Mechanical fall  Left femoral neck fracture status post left hip partial hemiarthroplasty  Leukocytosis, reactive to above  HARSHIL on CKD stage IV  Type II DM  Personal history of CVA with residual left-sided weakness    HOSPITAL COURSE AND DETAILS     This is a 64-year-old male with a significant past medical history of hypertension, hyperlipidemia, previous CVA with residual lower extremity and left-sided weakness, type II IDDM, CKD stage IV presented from home after a fall at home.  Patient states that with his baseline weakness from his previous stroke, he was walking in his kitchen when he lost balance landing on his left side.  Denies any loss of consciousness, head trauma at that time.    Patient was found to have a left femoral neck fracture and required partial hemiarthroplasty of the left hip on 11/18 by orthopedic surgery.  Postoperatively was seen by orthopedic surgery and recommended continuing apixaban twice daily for VTE prophylaxis.  Worked with PT/OT and deemed a candidate for SNF.  No significant drop in hemoglobin hospitalization.  WBCs trended down slowly.  Did have a slight uptrend in his overall renal function from his baseline and received IV fluids.    Patient is being discharged to SNF today.  Weightbearing as tolerated per orthopedic surgery.  Will need labs for BMP in 2 to 3 days.  Needs outpatient follow-up with orthopedic surgery in 2 weeks as well as PCP to discuss hospitalization.    Total time spent on discharge: >30min      ---Of note, this documentation is completed using the Dragon Dictation system (voice recognition software). There may be spelling and/or grammatical errors that were not corrected prior to final submission.---    Luis Fernando Gross MD    DISCHARGE PHYSICAL EXAM     Last Recorded Vitals:  Vitals:    11/19/24 1953 11/20/24 0446 11/20/24 0724 11/20/24 1214   BP: 103/63 128/76 118/69 118/72   BP Location: Right  arm   Right arm   Patient Position: Sitting   Sitting   Pulse: 109 109 (!) 115 (!) 118   Resp: 16  16 16   Temp:  35.8 °C (96.4 °F) 35.4 °C (95.7 °F) 35.9 °C (96.6 °F)   TempSrc:    Temporal   SpO2: 92% 92% 90% 92%   Weight:       Height:           Physical Exam  Vitals reviewed.   Constitutional:       General: He is not in acute distress.     Appearance: Normal appearance. He is not ill-appearing.   HENT:      Head: Normocephalic and atraumatic.   Eyes:      Extraocular Movements: Extraocular movements intact.      Conjunctiva/sclera: Conjunctivae normal.   Cardiovascular:      Rate and Rhythm: Normal rate and regular rhythm.      Pulses: Normal pulses.      Heart sounds: Normal heart sounds.   Pulmonary:      Effort: Pulmonary effort is normal.      Breath sounds: Normal breath sounds.   Abdominal:      General: Bowel sounds are normal. There is no distension.      Palpations: Abdomen is soft.      Tenderness: There is no abdominal tenderness. There is no guarding.   Musculoskeletal:         General: No swelling or tenderness. Normal range of motion.      Cervical back: Normal range of motion and neck supple.      Comments: Left lower extremity range of motion limited.  No drainage seen on dressing   Neurological:      General: No focal deficit present.      Mental Status: He is alert and oriented to person, place, and time. Mental status is at baseline.   Psychiatric:         Mood and Affect: Mood normal.         Behavior: Behavior normal.           DISCHARGE MEDICATIONS        Your medication list        START taking these medications        Instructions Last Dose Given Next Dose Due   acetaminophen 325 mg tablet  Commonly known as: Tylenol      Take 2 tablets (650 mg) by mouth every 6 hours for 7 days.       apixaban 2.5 mg tablet  Commonly known as: Eliquis      Take 1 tablet (2.5 mg) by mouth 2 times a day for 28 days.       oxyCODONE 5 mg immediate release tablet  Commonly known as: Roxicodone      Take 1  tablet (5 mg) by mouth every 6 hours if needed for severe pain (7 - 10) for up to 7 days.              CHANGE how you take these medications        Instructions Last Dose Given Next Dose Due   lisinopril 10 mg tablet  What changed: additional instructions      Take 1 tablet (10 mg) by mouth once daily. HOLD and resume on 11/23 after repeat BMP              CONTINUE taking these medications        Instructions Last Dose Given Next Dose Due   atorvastatin 80 mg tablet  Commonly known as: Lipitor           Farxiga 10 mg  Generic drug: dapagliflozin propanediol           NovoLOG Mix 70-30 U-100 Insuln 100 unit/mL (70-30) injection  Generic drug: insulin asp prt-insulin aspart                     Where to Get Your Medications        You can get these medications from any pharmacy    Bring a paper prescription for each of these medications  oxyCODONE 5 mg immediate release tablet       Information about where to get these medications is not yet available    Ask your nurse or doctor about these medications  acetaminophen 325 mg tablet  apixaban 2.5 mg tablet  lisinopril 10 mg tablet           OUTPATIENT FOLLOW-UP     Future Appointments   Date Time Provider Department Center   12/3/2024  9:00 AM Nicola Webster MD [id number] [place]

## 2024-11-20 NOTE — PROGRESS NOTES
Social Work Note Per physician order, pt is to discharge to SNF today.  Transportation via ambulance arranged per [facility] for 2PM pickup.  7000 submitted via DSC.  Sent gold form and AVS in Care Port to SNF.  Provided transport time and number for report to RN.  Advised facility of transport time.  Per [facility], pt is notifying family of discharge time.  OSCAR Gonzalez

## 2024-11-20 NOTE — CARE PLAN
Problem: Chronic Conditions and Co-morbidities  Goal: Patient's chronic conditions and co-morbidity symptoms are monitored and maintained or improved  Outcome: Progressing     Problem: Skin  Goal: Decreased wound size/increased tissue granulation at next dressing change  Outcome: Progressing  Goal: Participates in plan/prevention/treatment measures  Outcome: Progressing  Goal: Prevent/manage excess moisture  Outcome: Progressing  Goal: Prevent/minimize sheer/friction injuries  Outcome: Progressing  Goal: Promote/optimize nutrition  Outcome: Progressing  Goal: Promote skin healing  Outcome: Progressing   The patient's goals for the shift include      The clinical goals for the shift include Pain management

## 2024-11-20 NOTE — PROGRESS NOTES
11/20/24 1141   Discharge Planning   Home or Post Acute Services Post acute facilities (Rehab/SNF/etc)   Type of Post Acute Facility Services Rehab;Skilled nursing   Expected Discharge Disposition SNF  ([facility])   Does the patient need discharge transport arranged? Yes   [facility] coordination needed? Yes   Has discharge transport been arranged? Yes   What day is the transport expected? 11/20/24   What time is the transport expected? 1400     Pt admitted after fall with Left displaced femoral neck fracture and is s/p POD #2 Left partial hip arthroplasty 11/18/24 with Dr. Nicola Webster. Pt is weight bearing as tolerated with posterior hip precautions. Pt will DC on Eliquis PO BID x 28 days for DVT prophylaxis.  [facility] scores are PT (9) OT (11) recommending continued therapy at moderate level/intensity. Pt from home alone, states fall due to lost balance and 7 steps to enter to apt. Pt is in agreement to SNF and FOC is #1 [place], and #2 [facility] NR. [facility] confirms able to accept and auth was received. Per Dr. Gross pt medically ready to DC to [facility] today. Pt notified and states emergency contact his brother Rosales [phone] aware. MELODIE Manuel notified and set up confirmed 2 PM transport via cot, sent for 7000, sent GF and AVS and gave pt RN report number t [phone], IMM updated today.

## 2024-11-20 NOTE — PROGRESS NOTES
Occupational Therapy    OT Treatment    Patient Name: Lito Osei  MRN: [id number]  Department:   Room: [address] [place] [address]  Today's Date: 11/20/2024  Time Calculation  Start Time: 0820  Stop Time: 0908  Time Calculation (min): 48 min        Assessment:  End of Session Communication: Bedside nurse, PCT/NA/CTA  End of Session Patient Position: Bed, 3 rail up, Alarm on (all needs met, call light with in reach.  Bed in chair position)     Plan:  Treatment Interventions: ADL retraining, Functional transfer training, Endurance training, Patient/family training  OT Frequency: 2 times per week  OT Discharge Recommendations: Moderate intensity level of continued care  Equipment Recommended upon Discharge:  (tub seat)  OT Recommended Transfer Status: Assist of 2, Maximum assist  OT - OK to Discharge: Yes  Treatment Interventions: ADL retraining, Functional transfer training, Endurance training, Patient/family training    Subjective   Previous Visit Info:  OT Last Visit  OT Received On: 11/20/24  General:  General  Reason for Referral: s/p Left hip hemiarthroplasty 11/18/24  Past Medical History Relevant to Rehab: dyslipidemia, HTN, CKD, DM, R ankle surgery, L hemiparesis  Prior to Session Communication: Bedside nurse, PCT/NA/CTA (RN cleared for participation)  Patient Position Received: Bed, 3 rail up, Alarm on  General Comment: Pt agreeable to OT, pleasant and cooperative  Precautions:  LE Weight Bearing Status: Weight Bearing as Tolerated  Medical Precautions: Fall precautions  Post-Surgical Precautions: Left hip precautions (posterior hip precautions)  Precautions Comment: Pt unable to recall hip precautions      Vital Signs Comment: -130 during tx; RN aware and monitoring.  MD also aware     Pain:  Pain Assessment  Pain Assessment: 0-10  0-10 (Numeric) Pain Score: 5 - Moderate pain  Pain Orientation:  (left hip and left elbow)  Pain Interventions: Repositioned, Cold pack    Objective    Cognition:  Cognition  Overall  Cognitive Status: Impaired  Arousal/Alertness: Delayed responses to stimuli  Following Commands:  (follows 75% commands with increased time/cues)  Safety/Judgement: Exceptions to WFL  Insight: Severe  Impulsive: Mildly  Processing Speed: Delayed       Activities of Daily Living: Grooming  Grooming Where Assessed: Edge of bed  Grooming Comments: pt able to wash face with right UE with s/u and CGA in unsupported sitting    UE Dressing  UE Dressing Comments: doff/don gown seated at EOB with mod assist and mod verbal/visual cues       Bed Mobility/Transfers: Bed Mobility 1  Bed Mobility 1: Supine to sitting  Level of Assistance 1: Maximum assistance  Bed Mobility Comments 1: HOB elevated, use of rails.  Assist to advance LE's and elevate trunk.  Assist to scoot hips forward  Bed Mobility 2  Bed Mobility  2: Sitting to supine  Level of Assistance 2: Maximum assistance, +2  Bed Mobility Comments 2: assist to lower trunk, assist to lift LE's onto bed, assist to boost towards top of bed.  Bed Mobility 3  Bed Mobility 3: Rolling right, Rolling left  Level of Assistance 3: Maximum assistance  Bed Mobility Comments 3: assist to maintain side lying position    Transfer 1  Technique 1: Sit to stand, Stand to sit  Transfer Device 1:  (fww)  Transfer Level of Assistance 1: Maximum assistance, +2, Maximum tactile cues, Maximum verbal cues  Trials/Comments 1: x 2 standing trials (60 seconds, 30 seconds).  Significant lateral lean towards right.  Very NBOS, unable to correct.  Unable to follow simple one step commands to correct foot placement/posture.  2nd attempt pt with lateral right lean with forward flexed posture    Sitting Balance:  Static Sitting Balance  Static Sitting-Comment/Number of Minutes: fair  Dynamic Sitting Balance  Dynamic Sitting-Comments: fair-  Standing Balance:  Static Standing Balance  Static Standing-Comment/Number of Minutes: poor-  Dynamic Standing Balance  Dynamic Standing-Comments: poor-      Outcome  Measures:[facility] Daily Activity  Putting on and taking off regular lower body clothing: Total  Bathing (including washing, rinsing, drying): A lot  Putting on and taking off regular upper body clothing: A little  Toileting, which includes using toilet, bedpan or urinal: Total  Taking care of personal grooming such as brushing teeth: A little  Eating Meals: A little  Daily Activity - Total Score: 13        Education Documentation  ADL Training, taught by Jennifer L Felty, OTA at 11/20/2024  2:36 PM.  Learner: Patient  Readiness: Acceptance  Method: Explanation, Demonstration  Response: Needs Reinforcement    EDUCATION: Educated on hip precautions; bed mobility and safe transfer techniques       Goals:  Encounter Problems       Encounter Problems (Active)       OT Goals       Pt will bathe/dress UB with SBA (Progressing)       Start:  11/19/24    Expected End:  12/03/24            Pt will verbalize and adhere to L hip precautions indep (Progressing)       Start:  11/19/24    Expected End:  12/03/24            Pt will complete grooming tasks with SBA seated after set up (Progressing)       Start:  11/19/24    Expected End:  12/03/24            Pt will demo good dyn sitting balance with ADLS (Progressing)       Start:  11/19/24    Expected End:  12/03/24            pt will complete sit to stand at EOB/chair with mod A, Wbing through B LE's, std at midline (Progressing)       Start:  11/19/24    Expected End:  12/03/24       INTERVENTIONS:

## 2024-11-20 NOTE — PROGRESS NOTES
Hip surgery    Progress Note    Subjective:     Post-Operative Day # 1   Status Post left Hip Robert Arthroplasty    Systemic or Specific Complaints: No Complaints    Objective:     Visit Vitals  /69   Pulse (!) 115   Temp 35.4 °C (95.7 °F)   Resp 16       General: alert and oriented, in no acute distress, appears stated age, and cooperative   Wound: no erythema, no edema, no drainage, and dressing remains clean, dry, and intact   Motion: Painful range of Motion   DVT Exam: No evidence of DVT seen on physical exam.  Negative Umm's sign.  No significant calf/ankle edema.       NVI in lower extremity. left thigh soft to palpation. Strong equal dorsi/plantar flexion bilaterally. Good distal pulses bilaterally.      Data Review  Recent Results (from the past 24 hours)   POCT GLUCOSE    Collection Time: 11/19/24 11:03 AM   Result Value Ref Range    POCT Glucose 235 (H) 74 - 99 mg/dL   POCT GLUCOSE    Collection Time: 11/19/24  4:29 PM   Result Value Ref Range    POCT Glucose 288 (H) 74 - 99 mg/dL   POCT GLUCOSE    Collection Time: 11/19/24  8:44 PM   Result Value Ref Range    POCT Glucose 309 (H) 74 - 99 mg/dL   Basic Metabolic Panel    Collection Time: 11/20/24  4:37 AM   Result Value Ref Range    Glucose 323 (H) 74 - 99 mg/dL    Sodium 136 136 - 145 mmol/L    Potassium 5.0 3.5 - 5.3 mmol/L    Chloride 105 98 - 107 mmol/L    Bicarbonate 19 (L) 21 - 32 mmol/L    Anion Gap 17 10 - 20 mmol/L    Urea Nitrogen 54 (H) 6 - 23 mg/dL    Creatinine 2.86 (H) 0.50 - 1.30 mg/dL    eGFR 24 (L) >60 mL/min/1.73m*2    Calcium 8.4 (L) 8.6 - 10.3 mg/dL   CBC and Auto Differential    Collection Time: 11/20/24  4:37 AM   Result Value Ref Range    WBC 16.9 (H) 4.4 - 11.3 x10*3/uL    nRBC 0.0 0.0 - 0.0 /100 WBCs    RBC 3.72 (L) 4.50 - 5.90 x10*6/uL    Hemoglobin 10.9 (L) 13.5 - 17.5 g/dL    Hematocrit 34.5 (L) 41.0 - 52.0 %    MCV 93 80 - 100 fL    MCH 29.3 26.0 - 34.0 pg    MCHC 31.6 (L) 32.0 - 36.0 g/dL    RDW 13.6 11.5 - 14.5 %     Platelets 181 150 - 450 x10*3/uL    Neutrophils % 83.2 40.0 - 80.0 %    Immature Granulocytes %, Automated 0.9 0.0 - 0.9 %    Lymphocytes % 7.3 13.0 - 44.0 %    Monocytes % 8.2 2.0 - 10.0 %    Eosinophils % 0.1 0.0 - 6.0 %    Basophils % 0.3 0.0 - 2.0 %    Neutrophils Absolute 14.08 (H) 1.20 - 7.70 x10*3/uL    Immature Granulocytes Absolute, Automated 0.16 0.00 - 0.70 x10*3/uL    Lymphocytes Absolute 1.24 1.20 - 4.80 x10*3/uL    Monocytes Absolute 1.39 (H) 0.10 - 1.00 x10*3/uL    Eosinophils Absolute 0.01 0.00 - 0.70 x10*3/uL    Basophils Absolute 0.05 0.00 - 0.10 x10*3/uL   Magnesium    Collection Time: 11/20/24  4:37 AM   Result Value Ref Range    Magnesium 2.24 1.60 - 2.40 mg/dL   POCT GLUCOSE    Collection Time: 11/20/24  5:33 AM   Result Value Ref Range    POCT Glucose 299 (H) 74 - 99 mg/dL     XR elbow left 3+ views    Result Date: 11/19/2024  Interpreted By:  Greg Faustin, STUDY: XR ELBOW LEFT 3+ VIEWS;  11/19/2024 10:14 am   INDICATION: Signs/Symptoms:c/o left elbow pain, left elbow swollen - h/o recent fall onto Lt side.     COMPARISON: None.   ACCESSION NUMBER(S): [id number]   ORDERING CLINICIAN: NINA SORIA   TECHNIQUE: Left elbow series performed with 5 images.   FINDINGS: Alignment appears intact. Mild marginal spurring is seen throughout the elbow along with tiny enthesophyte along the medial humeral condyle. No appreciable acute fracture. No subluxation. Soft tissue vascular calcifications are present. IV tubing is seen in the antecubital fossa region.       No acute fracture or subluxation.   MACRO: None.   Signed by: Greg Faustin 11/19/2024 3:20 PM Dictation workstation:   [id number]    CT hip left wo IV contrast    Result Date: 11/17/2024  STUDY: CT Extremity; Completed Time: 11/17/2024 11:22 AM INDICATION: Left hip pain.  Evaluate for fracture. COMPARISON: XR left femur & XR pelvis 11/17/2024. ACCESSION NUMBER(S): [id number] ORDERING CLINICIAN: CHERLELE FUENTES TECHNIQUE:   Thin section axial images were obtained through the left hip without intravenous contrast.  Orthogonal reconstructed images were obtained and reviewed.  Automated mA/kV exposure control was utilized and patient examination was performed in strict accordance with principles of ALARA. FINDINGS:  Examination of the bony structures demonstrates a displaced left femoral neck fracture (203-45) sees.  There is no evidence of an associated dislocation.  There is a mild to moderate arthrosis of the left hip noted.  There is mild bony demineralization. The soft tissues demonstrate a possible small left hip effusion.  The muscular/soft tissue planes are well-maintained.  The visualized pelvic structures demonstrate no acute abnormalities.  Vascular calcifications are visualized..    1.  Displaced fracture involving the left femoral neck as described above.  No evidence of an associated dislocation. 2.  Mild to moderate arthrosis of the left hip joint. 3.  Possible small left hip joint effusion. Signed by Nicola Mortensen MD    CT cervical spine wo IV contrast    Result Date: 11/17/2024  Interpreted By:  Alma Cheek, STUDY: CT CERVICAL SPINE WO IV CONTRAST;  11/17/2024 11:20 am   INDICATION: Signs/Symptoms:fall pain.     COMPARISON: None.   ACCESSION NUMBER(S): [id number]   ORDERING CLINICIAN: CHERELLE FUENTES   TECHNIQUE: Axial CT images of the cervical spine are obtained. Axial, coronal and sagittal reconstructions are provided for review.   FINDINGS: There is curvature of the cervical spine with convexity to the right consistent with torticollis.   There is degenerative disc disease. There is moderate anterior and moderate posterior bony spondylosis at C4-5 through C6-7.   Fractures: There is no evidence for an acute fracture of the cervical spine.   Vertebral Alignment: Within normal limits.   Craniocervical Junction: The odontoid process and craniocervical junction are intact.   Vertebrae/Disc Spaces:  The cervical  vertebral body heights are intact and the disc spaces are preserved.   Prevertebral/Paraspinal Soft Tissues: The prevertebral and paraspinal soft tissues are unremarkable.         No evidence for an acute fracture or subluxation of the cervical spine. Torticollis.   MACRO: None   Signed by: Alma Cheek 11/17/2024 11:31 AM Dictation workstation:   [id number]    CT head wo IV contrast    Result Date: 11/17/2024  Interpreted By:  Alma Cheek, STUDY: CT HEAD WO IV CONTRAST;  11/17/2024 11:20 am   INDICATION: Signs/Symptoms:fall pain.   COMPARISON: 10/21/2015   ACCESSION NUMBER(S): [id number]   ORDERING CLINICIAN: CHERELLE FUENTES   TECHNIQUE: Examination was performed in the axial plane using soft tissue and bone algorithm.   FINDINGS: INTRACRANIAL: There is prominence of the ventricular system and cerebral sulci consistent with cerebral atrophy. There are periventricular hypodensities consistent with  marked small vessel disease. No mass or mass effect is identified. There is no hemorrhage or subdural fluid collection. There is no acute infarct. There is a remote left occipital infarct. There is a remote infarct along the right cerebral convexity in the vascular territory of the anterior cerebral artery. There is no fracture of the calvarium.   EXTRACRANIAL: Visualized paranasal sinuses and mastoids are clear.       No acute intracranial pathology.   MACRO: None   Signed by: Alma Cheek 11/17/2024 11:28 AM Dictation workstation:   [id number]    XR pelvis 1-2 views    Result Date: 11/17/2024  STUDY: Pelvis Radiographs; 11/17/2024 at 11:13am INDICATION: Pelvis pain post fall. COMPARISON: None Available. ACCESSION NUMBER(S): [id number] ORDERING CLINICIAN: CHERELLE FUENTES TECHNIQUE:  One view(s) of the pelvis. FINDINGS:  The pelvic ring is intact.  There is a displaced fracture involving the left femoral neck.  No evidence of associated dislocation.  There is a moderate arthrosis noted involving the hips  bilaterally..      1.  Displaced fracture involving the left femoral neck. 2.  Moderate arthrosis of the hips bilaterally.. Signed by Nicola Mortensen MD    XR femur left 2+ views    Result Date: 11/17/2024  STUDY: Femur Radiographs; 11/17/2024, 1113 INDICATION: Fall. COMPARISON: None Available. ACCESSION NUMBER(S): [id number] ORDERING CLINICIAN: CHERELLE FUENTES TECHNIQUE:  2 view(s) (4 images) of the left femur. FINDINGS:  Lucency of the left femoral neck.  Left femoral neck is suboptimally visualized due to overlapping anatomy/internal rotation.  The diaphysis of the left femur is intact and there is no dislocation.    Left femoral neck fracture.  Consider CT.  Fracture suboptimally visualized. Signed by Bandar Virk DO      Assessment:     Status Post left Hip Robert Arthroplasty. Doing well postoperatively. No acute events overnight.     Acute postoperative pain: Reports mild to moderate pain to operative extremity. Exacerbated by movement, relieved by rest ice and pain medication. Continue current pain management.     Acute postoperative blood loss anemia: Secondary to expected surgical blood loss. Hemoglobin this A.M. 10.9.  Patient asymptomatic, and remains hemodynamically stable with no signs of active bleeding.     Leukocytosis: WBC count this morning 16.9.  No acute signs of infection.  Patient afebrile, remains hemodynamically stable denies any cough, abdominal pain, or urinary symptoms. Transient elevation in white blood count is most likely secondary to stress and inflammatory process from surgery.    Plan:      1.  Continues current post-op course   2.  Continue Deep venous thrombosis prophylaxis: Eliquis 2.5 mg BID for 28 days   3.  Continue physical therapy: Weight bearing as tolerated with posterior hip precautions to operative extremity  4.  Continue Pain Control: script in chart  5.  Discharge planning: patient plans to go to [facility] upon discharge. SW and TCC following for  discharge planning. Okay to discharge from orthopedic standpoint when medically clear.   6.  Follow up in office in 2 weeks.       ELLIOTT Reid   11/20/2024 10:35 AM

## 2024-11-20 NOTE — CARE PLAN
Problem: Chronic Conditions and Co-morbidities  Goal: Patient's chronic conditions and co-morbidity symptoms are monitored and maintained or improved  Outcome: Progressing     Problem: Skin  Goal: Decreased wound size/increased tissue granulation at next dressing change  Outcome: Progressing  Goal: Participates in plan/prevention/treatment measures  Outcome: Progressing  Goal: Prevent/manage excess moisture  Outcome: Progressing  Goal: Prevent/minimize sheer/friction injuries  Outcome: Progressing  Goal: Promote/optimize nutrition  11/19/2024 1912 by Van Quispe RN  Outcome: Progressing  11/19/2024 1354 by Van Quispe RN  Flowsheets (Taken 11/19/2024 1354)  Promote/optimize nutrition: Consume > 50% meals/supplements  Goal: Promote skin healing  Outcome: Progressing   The patient's goals for the shift include      The clinical goals for the shift include Pain management

## 2024-11-21 ENCOUNTER — APPOINTMENT (OUTPATIENT)
Dept: CARDIOLOGY | Facility: HOSPITAL | Age: 64
End: 2024-11-21
Payer: MEDICARE

## 2024-11-21 ENCOUNTER — HOSPITAL ENCOUNTER (INPATIENT)
Facility: HOSPITAL | Age: 64
DRG: 682 | End: 2024-11-21
Attending: EMERGENCY MEDICINE | Admitting: INTERNAL MEDICINE
Payer: MEDICARE

## 2024-11-21 ENCOUNTER — APPOINTMENT (OUTPATIENT)
Dept: RADIOLOGY | Facility: HOSPITAL | Age: 64
End: 2024-11-21
Payer: MEDICARE

## 2024-11-21 ENCOUNTER — NURSING HOME VISIT (OUTPATIENT)
Dept: POST ACUTE CARE | Facility: EXTERNAL LOCATION | Age: 64
End: 2024-11-21
Payer: MEDICARE

## 2024-11-21 VITALS
OXYGEN SATURATION: 98 % | RESPIRATION RATE: 18 BRPM | HEART RATE: 76 BPM | SYSTOLIC BLOOD PRESSURE: 128 MMHG | TEMPERATURE: 98.2 F | BODY MASS INDEX: 27.44 KG/M2 | WEIGHT: 170 LBS | DIASTOLIC BLOOD PRESSURE: 68 MMHG

## 2024-11-21 DIAGNOSIS — E11.22 TYPE 2 DIABETES MELLITUS WITH STAGE 4 CHRONIC KIDNEY DISEASE, WITH LONG-TERM CURRENT USE OF INSULIN (MULTI): ICD-10-CM

## 2024-11-21 DIAGNOSIS — I10 PRIMARY HYPERTENSION: ICD-10-CM

## 2024-11-21 DIAGNOSIS — N28.9 ACUTE ON CHRONIC RENAL INSUFFICIENCY: ICD-10-CM

## 2024-11-21 DIAGNOSIS — R31.0 GROSS HEMATURIA: ICD-10-CM

## 2024-11-21 DIAGNOSIS — E11.65 HYPERGLYCEMIA DUE TO DIABETES MELLITUS (MULTI): Primary | ICD-10-CM

## 2024-11-21 DIAGNOSIS — Z86.73 HISTORY OF CVA (CEREBROVASCULAR ACCIDENT): ICD-10-CM

## 2024-11-21 DIAGNOSIS — M79.89 OTHER SPECIFIED SOFT TISSUE DISORDERS: ICD-10-CM

## 2024-11-21 DIAGNOSIS — N18.4 TYPE 2 DIABETES MELLITUS WITH STAGE 4 CHRONIC KIDNEY DISEASE, WITH LONG-TERM CURRENT USE OF INSULIN (MULTI): ICD-10-CM

## 2024-11-21 DIAGNOSIS — N18.4 CKD (CHRONIC KIDNEY DISEASE) STAGE 4, GFR 15-29 ML/MIN (MULTI): ICD-10-CM

## 2024-11-21 DIAGNOSIS — N18.9 ACUTE ON CHRONIC RENAL INSUFFICIENCY: ICD-10-CM

## 2024-11-21 DIAGNOSIS — S72.002S CLOSED FRACTURE OF LEFT HIP, SEQUELA: Primary | ICD-10-CM

## 2024-11-21 DIAGNOSIS — Z79.4 TYPE 2 DIABETES MELLITUS WITH STAGE 4 CHRONIC KIDNEY DISEASE, WITH LONG-TERM CURRENT USE OF INSULIN (MULTI): ICD-10-CM

## 2024-11-21 DIAGNOSIS — R33.8 ACUTE URINARY RETENTION: ICD-10-CM

## 2024-11-21 PROBLEM — I63.9 CVA (CEREBRAL VASCULAR ACCIDENT) (MULTI): Status: ACTIVE | Noted: 2024-11-21

## 2024-11-21 PROBLEM — I63.9 CVA (CEREBRAL VASCULAR ACCIDENT) (MULTI): Status: RESOLVED | Noted: 2024-11-21 | Resolved: 2024-11-21

## 2024-11-21 PROBLEM — E11.29 TYPE 2 DIABETES MELLITUS WITH KIDNEY COMPLICATION, WITH LONG-TERM CURRENT USE OF INSULIN: Status: ACTIVE | Noted: 2024-11-21

## 2024-11-21 PROBLEM — N17.9 ACUTE KIDNEY INJURY SUPERIMPOSED ON CKD (CMS-HCC): Status: ACTIVE | Noted: 2024-11-21

## 2024-11-21 LAB
ALBUMIN SERPL BCP-MCNC: 3.4 G/DL (ref 3.4–5)
ALP SERPL-CCNC: 89 U/L (ref 33–136)
ALT SERPL W P-5'-P-CCNC: 3 U/L (ref 10–52)
ANION GAP BLDV CALCULATED.4IONS-SCNC: 13 MMOL/L (ref 10–25)
ANION GAP BLDV CALCULATED.4IONS-SCNC: 9 MMOL/L (ref 10–25)
ANION GAP SERPL CALC-SCNC: 15 MMOL/L (ref 10–20)
APPEARANCE UR: CLEAR
AST SERPL W P-5'-P-CCNC: 43 U/L (ref 9–39)
B-OH-BUTYR SERPL-SCNC: 0.24 MMOL/L (ref 0.02–0.27)
BASE EXCESS BLDV CALC-SCNC: -4.6 MMOL/L (ref -2–3)
BASE EXCESS BLDV CALC-SCNC: -5.5 MMOL/L (ref -2–3)
BASOPHILS # BLD AUTO: 0.07 X10*3/UL (ref 0–0.1)
BASOPHILS NFR BLD AUTO: 0.6 %
BILIRUB SERPL-MCNC: 0.5 MG/DL (ref 0–1.2)
BILIRUB UR STRIP.AUTO-MCNC: NEGATIVE MG/DL
BODY TEMPERATURE: ABNORMAL
BODY TEMPERATURE: ABNORMAL
BUN SERPL-MCNC: 76 MG/DL (ref 6–23)
CA-I BLDV-SCNC: 1.29 MMOL/L (ref 1.1–1.33)
CA-I BLDV-SCNC: 1.31 MMOL/L (ref 1.1–1.33)
CALCIUM SERPL-MCNC: 8.4 MG/DL (ref 8.6–10.3)
CHLORIDE BLDV-SCNC: 103 MMOL/L (ref 98–107)
CHLORIDE BLDV-SCNC: 108 MMOL/L (ref 98–107)
CHLORIDE SERPL-SCNC: 101 MMOL/L (ref 98–107)
CO2 SERPL-SCNC: 20 MMOL/L (ref 21–32)
COLOR UR: ABNORMAL
CREAT SERPL-MCNC: 4 MG/DL (ref 0.5–1.3)
EGFRCR SERPLBLD CKD-EPI 2021: 16 ML/MIN/1.73M*2
EOSINOPHIL # BLD AUTO: 0.07 X10*3/UL (ref 0–0.7)
EOSINOPHIL NFR BLD AUTO: 0.6 %
ERYTHROCYTE [DISTWIDTH] IN BLOOD BY AUTOMATED COUNT: 13.3 % (ref 11.5–14.5)
GLUCOSE BLD MANUAL STRIP-MCNC: 269 MG/DL (ref 74–99)
GLUCOSE BLD MANUAL STRIP-MCNC: 393 MG/DL (ref 74–99)
GLUCOSE BLDV-MCNC: 286 MG/DL (ref 74–99)
GLUCOSE BLDV-MCNC: 429 MG/DL (ref 74–99)
GLUCOSE SERPL-MCNC: 454 MG/DL (ref 74–99)
GLUCOSE UR STRIP.AUTO-MCNC: ABNORMAL MG/DL
HCO3 BLDV-SCNC: 21.1 MMOL/L (ref 22–26)
HCO3 BLDV-SCNC: 22.1 MMOL/L (ref 22–26)
HCT VFR BLD AUTO: 32.3 % (ref 41–52)
HCT VFR BLD EST: 31 % (ref 41–52)
HCT VFR BLD EST: 32 % (ref 41–52)
HGB BLD-MCNC: 10.5 G/DL (ref 13.5–17.5)
HGB BLDV-MCNC: 10.4 G/DL (ref 13.5–17.5)
HGB BLDV-MCNC: 10.7 G/DL (ref 13.5–17.5)
HOLD SPECIMEN: NORMAL
HOLD SPECIMEN: NORMAL
IMM GRANULOCYTES # BLD AUTO: 0.1 X10*3/UL (ref 0–0.7)
IMM GRANULOCYTES NFR BLD AUTO: 0.8 % (ref 0–0.9)
INHALED O2 CONCENTRATION: 21 %
INHALED O2 CONCENTRATION: 21 %
KETONES UR STRIP.AUTO-MCNC: NEGATIVE MG/DL
LACTATE BLDV-SCNC: 2.6 MMOL/L (ref 0.4–2)
LACTATE BLDV-SCNC: 2.7 MMOL/L (ref 0.4–2)
LACTATE BLDV-SCNC: 3.2 MMOL/L (ref 0.4–2)
LEUKOCYTE ESTERASE UR QL STRIP.AUTO: NEGATIVE
LIPASE SERPL-CCNC: 6 U/L (ref 9–82)
LYMPHOCYTES # BLD AUTO: 1.14 X10*3/UL (ref 1.2–4.8)
LYMPHOCYTES NFR BLD AUTO: 9.4 %
MAGNESIUM SERPL-MCNC: 2.3 MG/DL (ref 1.6–2.4)
MCH RBC QN AUTO: 29.4 PG (ref 26–34)
MCHC RBC AUTO-ENTMCNC: 32.5 G/DL (ref 32–36)
MCV RBC AUTO: 91 FL (ref 80–100)
MONOCYTES # BLD AUTO: 0.84 X10*3/UL (ref 0.1–1)
MONOCYTES NFR BLD AUTO: 7 %
NEUTROPHILS # BLD AUTO: 9.85 X10*3/UL (ref 1.2–7.7)
NEUTROPHILS NFR BLD AUTO: 81.6 %
NITRITE UR QL STRIP.AUTO: NEGATIVE
NRBC BLD-RTO: 0 /100 WBCS (ref 0–0)
OXYHGB MFR BLDV: 55.2 % (ref 45–75)
OXYHGB MFR BLDV: 55.7 % (ref 45–75)
PCO2 BLDV: 45 MM HG (ref 41–51)
PCO2 BLDV: 47 MM HG (ref 41–51)
PH BLDV: 7.28 PH (ref 7.33–7.43)
PH BLDV: 7.28 PH (ref 7.33–7.43)
PH UR STRIP.AUTO: 5.5 [PH]
PHOSPHATE SERPL-MCNC: 3.5 MG/DL (ref 2.5–4.9)
PLATELET # BLD AUTO: 191 X10*3/UL (ref 150–450)
PO2 BLDV: 40 MM HG (ref 35–45)
PO2 BLDV: 40 MM HG (ref 35–45)
POTASSIUM BLDV-SCNC: 4.9 MMOL/L (ref 3.5–5.3)
POTASSIUM BLDV-SCNC: 5.2 MMOL/L (ref 3.5–5.3)
POTASSIUM SERPL-SCNC: 4.6 MMOL/L (ref 3.5–5.3)
PROT SERPL-MCNC: 6.6 G/DL (ref 6.4–8.2)
PROT UR STRIP.AUTO-MCNC: ABNORMAL MG/DL
RBC # BLD AUTO: 3.57 X10*6/UL (ref 4.5–5.9)
RBC # UR STRIP.AUTO: ABNORMAL /UL
RBC #/AREA URNS AUTO: NORMAL /HPF
SAO2 % BLDV: 56 % (ref 45–75)
SAO2 % BLDV: 56 % (ref 45–75)
SODIUM BLDV-SCNC: 132 MMOL/L (ref 136–145)
SODIUM BLDV-SCNC: 134 MMOL/L (ref 136–145)
SODIUM SERPL-SCNC: 131 MMOL/L (ref 136–145)
SP GR UR STRIP.AUTO: 1.02
UROBILINOGEN UR STRIP.AUTO-MCNC: NORMAL MG/DL
WBC # BLD AUTO: 12.1 X10*3/UL (ref 4.4–11.3)
WBC #/AREA URNS AUTO: NORMAL /HPF

## 2024-11-21 PROCEDURE — 2500000002 HC RX 250 W HCPCS SELF ADMINISTERED DRUGS (ALT 637 FOR MEDICARE OP, ALT 636 FOR OP/ED)

## 2024-11-21 PROCEDURE — 96361 HYDRATE IV INFUSION ADD-ON: CPT | Mod: 59

## 2024-11-21 PROCEDURE — 84132 ASSAY OF SERUM POTASSIUM: CPT

## 2024-11-21 PROCEDURE — 93010 ELECTROCARDIOGRAM REPORT: CPT | Performed by: STUDENT IN AN ORGANIZED HEALTH CARE EDUCATION/TRAINING PROGRAM

## 2024-11-21 PROCEDURE — 81001 URINALYSIS AUTO W/SCOPE: CPT

## 2024-11-21 PROCEDURE — 2500000004 HC RX 250 GENERAL PHARMACY W/ HCPCS (ALT 636 FOR OP/ED)

## 2024-11-21 PROCEDURE — 36415 COLL VENOUS BLD VENIPUNCTURE: CPT

## 2024-11-21 PROCEDURE — 71045 X-RAY EXAM CHEST 1 VIEW: CPT

## 2024-11-21 PROCEDURE — 82436 ASSAY OF URINE CHLORIDE: CPT

## 2024-11-21 PROCEDURE — 83935 ASSAY OF URINE OSMOLALITY: CPT | Mod: STJLAB

## 2024-11-21 PROCEDURE — 71045 X-RAY EXAM CHEST 1 VIEW: CPT | Performed by: STUDENT IN AN ORGANIZED HEALTH CARE EDUCATION/TRAINING PROGRAM

## 2024-11-21 PROCEDURE — 51702 INSERT TEMP BLADDER CATH: CPT

## 2024-11-21 PROCEDURE — 83690 ASSAY OF LIPASE: CPT

## 2024-11-21 PROCEDURE — 87040 BLOOD CULTURE FOR BACTERIA: CPT | Mod: STJLAB | Performed by: STUDENT IN AN ORGANIZED HEALTH CARE EDUCATION/TRAINING PROGRAM

## 2024-11-21 PROCEDURE — 2500000005 HC RX 250 GENERAL PHARMACY W/O HCPCS

## 2024-11-21 PROCEDURE — 96375 TX/PRO/DX INJ NEW DRUG ADDON: CPT | Mod: 59

## 2024-11-21 PROCEDURE — 83930 ASSAY OF BLOOD OSMOLALITY: CPT | Mod: STJLAB

## 2024-11-21 PROCEDURE — 83735 ASSAY OF MAGNESIUM: CPT

## 2024-11-21 PROCEDURE — 99285 EMERGENCY DEPT VISIT HI MDM: CPT | Mod: 25

## 2024-11-21 PROCEDURE — 83605 ASSAY OF LACTIC ACID: CPT

## 2024-11-21 PROCEDURE — 82947 ASSAY GLUCOSE BLOOD QUANT: CPT

## 2024-11-21 PROCEDURE — 93005 ELECTROCARDIOGRAM TRACING: CPT

## 2024-11-21 PROCEDURE — 84100 ASSAY OF PHOSPHORUS: CPT

## 2024-11-21 PROCEDURE — 82570 ASSAY OF URINE CREATININE: CPT

## 2024-11-21 PROCEDURE — 99310 SBSQ NF CARE HIGH MDM 45: CPT | Performed by: NURSE PRACTITIONER

## 2024-11-21 PROCEDURE — 99285 EMERGENCY DEPT VISIT HI MDM: CPT | Performed by: STUDENT IN AN ORGANIZED HEALTH CARE EDUCATION/TRAINING PROGRAM

## 2024-11-21 PROCEDURE — 82010 KETONE BODYS QUAN: CPT

## 2024-11-21 PROCEDURE — 85025 COMPLETE CBC W/AUTO DIFF WBC: CPT

## 2024-11-21 PROCEDURE — 93971 EXTREMITY STUDY: CPT | Performed by: INTERNAL MEDICINE

## 2024-11-21 PROCEDURE — 93971 EXTREMITY STUDY: CPT

## 2024-11-21 RX ORDER — LIDOCAINE HYDROCHLORIDE 20 MG/ML
JELLY TOPICAL
Status: COMPLETED
Start: 2024-11-21 | End: 2024-11-21

## 2024-11-21 RX ORDER — LIDOCAINE HYDROCHLORIDE 20 MG/ML
1 JELLY TOPICAL ONCE
Status: COMPLETED | OUTPATIENT
Start: 2024-11-21 | End: 2024-11-21

## 2024-11-21 RX ADMIN — SODIUM CHLORIDE, POTASSIUM CHLORIDE, SODIUM LACTATE AND CALCIUM CHLORIDE 1000 ML: 600; 310; 30; 20 INJECTION, SOLUTION INTRAVENOUS at 20:10

## 2024-11-21 RX ADMIN — LIDOCAINE HYDROCHLORIDE 1 APPLICATION: 20 JELLY TOPICAL at 20:25

## 2024-11-21 RX ADMIN — INSULIN HUMAN 10 UNITS: 100 INJECTION, SOLUTION PARENTERAL at 20:49

## 2024-11-21 RX ADMIN — SODIUM CHLORIDE 1000 ML: 9 INJECTION, SOLUTION INTRAVENOUS at 18:44

## 2024-11-21 ASSESSMENT — LIFESTYLE VARIABLES
TOTAL SCORE: 0
EVER FELT BAD OR GUILTY ABOUT YOUR DRINKING: NO
EVER HAD A DRINK FIRST THING IN THE MORNING TO STEADY YOUR NERVES TO GET RID OF A HANGOVER: NO
HAVE YOU EVER FELT YOU SHOULD CUT DOWN ON YOUR DRINKING: NO
HAVE PEOPLE ANNOYED YOU BY CRITICIZING YOUR DRINKING: NO

## 2024-11-21 ASSESSMENT — PAIN SCALES - GENERAL
PAINLEVEL_OUTOF10: 0 - NO PAIN
PAINLEVEL_OUTOF10: 0 - NO PAIN

## 2024-11-21 ASSESSMENT — ENCOUNTER SYMPTOMS
DIFFICULTY URINATING: 0
VOMITING: 0
DIARRHEA: 0
NAUSEA: 0
ABDOMINAL PAIN: 0
PALPITATIONS: 0
COUGH: 0
SHORTNESS OF BREATH: 0
FEVER: 0
FATIGUE: 0
CHILLS: 0
CONSTIPATION: 0

## 2024-11-21 ASSESSMENT — PAIN - FUNCTIONAL ASSESSMENT: PAIN_FUNCTIONAL_ASSESSMENT: 0-10

## 2024-11-21 NOTE — ASSESSMENT & PLAN NOTE
Left hip partial hemiarthoplasty.  continue with therapy as able.  Follow up with orthopedics as scheduled.  He resides alone.

## 2024-11-21 NOTE — PROGRESS NOTES
Subjective   Lito Osei is a 64 y.o. male Here for skilled admission following hospitalization due to fall with femoral neck fracture.      HPI  Past medical hx includes CVA 2015 with left sided weakness, DM, CKD IV, who fell at home, sustained left femoral neck fracture.   He had partial left hip hemiarthoplasty 11/18 and is here for continued therapy.  He is on eliquis  for dvt ppx.  Oxycodone prn for pain for which he denies.  He has left sided weakness from cva 2015, does not use assistive device.   A1C 8.2.   He is resting in bed, denies any complaints when asked.  He is a former ,  stopped working after CVA.  He resides alone in an apartment.   Hospital chart and health problems reviewed.    No concerns per staff.   Lab Results   Component Value Date    WBC 16.9 (H) 11/20/2024    HGB 10.9 (L) 11/20/2024    HCT 34.5 (L) 11/20/2024    MCV 93 11/20/2024     11/20/2024     Lab Results   Component Value Date    GLUCOSE 323 (H) 11/20/2024    CALCIUM 8.4 (L) 11/20/2024     11/20/2024    K 5.0 11/20/2024    CO2 19 (L) 11/20/2024     11/20/2024    BUN 54 (H) 11/20/2024    CREATININE 2.86 (H) 11/20/2024     Lab Results   Component Value Date    ALT 19 11/17/2024    AST 19 11/17/2024    ALKPHOS 100 11/17/2024    BILITOT 0.5 11/17/2024     MEDS:  Apixaban through 12/18  Atorvastatin  Dapagliflozin  Lisinopril  Novolog 70/30 routine   Oxycodone prn      Review of Systems   Constitutional:  Negative for chills, fatigue and fever.   Respiratory:  Negative for cough and shortness of breath.    Cardiovascular:  Negative for chest pain and palpitations.   Gastrointestinal:  Negative for abdominal pain, constipation, diarrhea, nausea and vomiting.   Genitourinary:  Negative for difficulty urinating.       Objective   /68   Pulse 76   Temp 36.8 °C (98.2 °F)   Resp 18   Wt 77.1 kg (170 lb)   SpO2 98%   BMI 27.44 kg/m²     Physical Exam  Constitutional:       General: He is not in  acute distress.     Comments: Appears older than stated age.   Resting in bed   HENT:      Head: Normocephalic and atraumatic.   Eyes:      Conjunctiva/sclera: Conjunctivae normal.   Cardiovascular:      Rate and Rhythm: Normal rate and regular rhythm.   Pulmonary:      Effort: Pulmonary effort is normal. No respiratory distress.      Breath sounds: Normal breath sounds.   Abdominal:      General: Bowel sounds are normal. There is no distension.      Palpations: Abdomen is soft.      Tenderness: There is no abdominal tenderness.   Musculoskeletal:      Right lower leg: No edema.      Left lower leg: No edema.      Comments: Left leg weakness  Left hip dressing clean dry and intact   Skin:     General: Skin is warm and dry.   Neurological:      Mental Status: He is alert and oriented to person, place, and time.      Comments: Mild left sided weakness   Psychiatric:         Mood and Affect: Mood normal.         Behavior: Behavior normal.         Assessment & Plan  Closed fracture of left hip, sequela  Left hip partial hemiarthoplasty.  continue with therapy as able.  Follow up with orthopedics as scheduled.  He resides alone.    Primary hypertension  On lisinopril.  Continue to monitor and adjust meds based on clinical course.    Type 2 diabetes mellitus with stage 4 chronic kidney disease, with long-term current use of insulin (Multi)  Novolog 70/30. 8uints premeal and dapgliflozin.  Accuchecks ac and hs, if needed will start sliding scale.   History of CVA (cerebrovascular accident)  2015.  Left sided weakness,  does not use assistive device at home.     CKD (chronic kidney disease) stage 4, GFR 15-29 ml/min (Multi)  Bmp ordered for 11/22.    labs/meds/orders reviewed  staff to monitor and notify for any changes.  Hospital records reviewed.  Add cbc to bmp scheduled for 11/22, wbc 16 on discharge  Pox low this morning, o2 applied, he deneis any sob., prn o2 ordered. Staff to monitor and notify for any recurrence of  symptoms.  He resides alone in an apartment,  for discharge planning.   Time for coordination of care was greater than 35 minutes with hospital chart review, visit and exam, discussion of treatment plan with patient and also discussion of case with staff.

## 2024-11-21 NOTE — ED PROVIDER NOTES
EMERGENCY DEPARTMENT ENCOUNTER      Pt Name: Lito Osei  MRN: [id number]  Birthdate 1960  Date of evaluation: 11/21/2024  Provider: Enrique Perez MD    CHIEF COMPLAINT       Chief Complaint   Patient presents with    Hyperglycemia         HISTORY OF PRESENT ILLNESS    HPI  Patient 62-year-old male with history of insulin-dependent type 2 diabetes, CKD, CVA, HTN presenting with hyperglycemia and worsening renal function per laboratory work at his nursing home.  Blood sugar was reportedly 550 despite receiving his appropriate insulin.  BUN elevated in the 70s, creatinine to almost 5.  No profound electrolyte abnormalities.  At presentation, patient is reporting mild suprapubic discomfort at 5/10, worsening when he tries to pee, burning, without consistent alleviating factors.  He is only able to urinate a small amount.  He otherwise denies fever, chills, sweats, chest pain, shortness of breath, nausea, vomiting, hematuria.    Nursing Notes were reviewed.    PAST MEDICAL HISTORY     Past Medical History:   Diagnosis Date    CKD (chronic kidney disease)     CVA (cerebral vascular accident) (Multi) 11/21/2024    Diabetes mellitus (Multi)     HLD (hyperlipidemia)     Hypertension     Stroke (Multi)          SURGICAL HISTORY       Past Surgical History:   Procedure Laterality Date    ANKLE SURGERY Right          CURRENT MEDICATIONS       Previous Medications    ACETAMINOPHEN (TYLENOL) 325 MG TABLET    Take 2 tablets (650 mg) by mouth every 6 hours for 7 days.    APIXABAN (ELIQUIS) 2.5 MG TABLET    Take 1 tablet (2.5 mg) by mouth 2 times a day for 28 days.    ATORVASTATIN (LIPITOR) 80 MG TABLET    Take 1 tablet (80 mg) by mouth once daily.    DAPAGLIFLOZIN PROPANEDIOL (FARXIGA) 10 MG    Take 1 tablet (10 mg) by mouth once every 24 hours.    INSULIN ASP PRT-INSULIN ASPART (NOVOLOG MIX 70-30 U-100 INSULN) 100 UNIT/ML (70-30) INJECTION    Inject 8 Units under the skin 2 times daily (morning and late afternoon). Take as  directed per insulin instructions.    LISINOPRIL 10 MG TABLET    Take 1 tablet (10 mg) by mouth once daily. HOLD and resume on 11/23 after repeat BMP    OXYCODONE (ROXICODONE) 5 MG IMMEDIATE RELEASE TABLET    Take 1 tablet (5 mg) by mouth every 6 hours if needed for severe pain (7 - 10) for up to 7 days.       ALLERGIES     Patient has no known allergies.    FAMILY HISTORY     No family history on file.       SOCIAL HISTORY       Social History     Socioeconomic History    Marital status:    Tobacco Use    Smoking status: Never   Substance and Sexual Activity    Alcohol use: Never    Drug use: Never    Sexual activity: Defer     Social Drivers of Health     Financial Resource Strain: Low Risk  (11/18/2024)    Overall Financial Resource Strain (CARDIA)     Difficulty of Paying Living Expenses: Not hard at all   Food Insecurity: No Food Insecurity (11/17/2024)    Hunger Vital Sign     Worried About Running Out of Food in the Last Year: Never true     Ran Out of Food in the Last Year: Never true   Transportation Needs: No Transportation Needs (11/18/2024)    PRAPARE - Transportation     Lack of Transportation (Medical): No     Lack of Transportation (Non-Medical): No   Intimate Partner Violence: Not At Risk (11/17/2024)    Humiliation, Afraid, Rape, and Kick questionnaire     Fear of Current or Ex-Partner: No     Emotionally Abused: No     Physically Abused: No     Sexually Abused: No   Housing Stability: Low Risk  (11/18/2024)    Housing Stability Vital Sign     Unable to Pay for Housing in the Last Year: No     Number of Times Moved in the Last Year: 0     Homeless in the Last Year: No       SCREENINGS                        PHYSICAL EXAM    (up to 7 for level 4, 8 or more for level 5)     ED Triage Vitals [11/21/24 1836]   Temperature Heart Rate Respirations BP   36.2 °C (97.2 °F) (!) 120 20 121/76      Pulse Ox Temp Source Heart Rate Source Patient Position   98 % Temporal Monitor --      BP Location FiO2  (%)     -- --       Physical Exam  Constitutional:       General: He is not in acute distress.     Appearance: He is not toxic-appearing.   HENT:      Head: Normocephalic and atraumatic.      Nose: Nose normal.      Mouth/Throat:      Mouth: Mucous membranes are moist.      Pharynx: Oropharynx is clear.   Eyes:      Extraocular Movements: Extraocular movements intact.      Conjunctiva/sclera: Conjunctivae normal.   Cardiovascular:      Rate and Rhythm: Regular rhythm. Tachycardia present.      Heart sounds: Normal heart sounds.   Pulmonary:      Effort: Pulmonary effort is normal. No respiratory distress.      Breath sounds: Normal breath sounds.   Abdominal:      General: There is no distension.      Palpations: Abdomen is soft.      Tenderness: There is no abdominal tenderness.   Musculoskeletal:         General: No deformity or signs of injury.      Cervical back: Normal range of motion and neck supple.   Skin:     General: Skin is warm and dry.      Capillary Refill: Capillary refill takes less than 2 seconds.   Neurological:      Mental Status: Mental status is at baseline.          DIAGNOSTIC RESULTS     LABS:  Labs Reviewed   CBC WITH AUTO DIFFERENTIAL - Abnormal       Result Value    WBC 12.1 (*)     nRBC 0.0      RBC 3.57 (*)     Hemoglobin 10.5 (*)     Hematocrit 32.3 (*)     MCV 91      MCH 29.4      MCHC 32.5      RDW 13.3      Platelets 191      Neutrophils % 81.6      Immature Granulocytes %, Automated 0.8      Lymphocytes % 9.4      Monocytes % 7.0      Eosinophils % 0.6      Basophils % 0.6      Neutrophils Absolute 9.85 (*)     Immature Granulocytes Absolute, Automated 0.10      Lymphocytes Absolute 1.14 (*)     Monocytes Absolute 0.84      Eosinophils Absolute 0.07      Basophils Absolute 0.07     COMPREHENSIVE METABOLIC PANEL - Abnormal    Glucose 454 (*)     Sodium 131 (*)     Potassium 4.6      Chloride 101      Bicarbonate 20 (*)     Anion Gap 15      Urea Nitrogen 76 (*)     Creatinine 4.00  (*)     eGFR 16 (*)     Calcium 8.4 (*)     Albumin 3.4      Alkaline Phosphatase 89      Total Protein 6.6      AST 43 (*)     Bilirubin, Total 0.5      ALT 3 (*)    LIPASE - Abnormal    Lipase 6 (*)     Narrative:     Venipuncture immediately after or during the administration of Metamizole may lead to falsely low results. Testing should be performed immediately prior to Metamizole dosing.   BLOOD GAS VENOUS FULL PANEL - Abnormal    POCT pH, Venous 7.28 (*)     POCT pCO2, Venous 45      POCT pO2, Venous 40      POCT SO2, Venous 56      POCT Oxy Hemoglobin, Venous 55.7      POCT Hematocrit Calculated, Venous 32.0 (*)     POCT Sodium, Venous 132 (*)     POCT Potassium, Venous 4.9      POCT Chloride, Venous 103      POCT Ionized Calicum, Venous 1.31      POCT Glucose, Venous 429 (*)     POCT Lactate, Venous 3.2 (*)     POCT Base Excess, Venous -5.5 (*)     POCT HCO3 Calculated, Venous 21.1 (*)     POCT Hemoglobin, Venous 10.7 (*)     POCT Anion Gap, Venous 13.0      Patient Temperature        FiO2 21     URINALYSIS WITH REFLEX CULTURE AND MICROSCOPIC - Abnormal    Color, Urine Light-Yellow      Appearance, Urine Clear      Specific Gravity, Urine 1.016      pH, Urine 5.5      Protein, Urine 10 (TRACE)      Glucose, Urine OVER (4+) (*)     Blood, Urine 1.0 (3+) (*)     Ketones, Urine NEGATIVE      Bilirubin, Urine NEGATIVE      Urobilinogen, Urine Normal      Nitrite, Urine NEGATIVE      Leukocyte Esterase, Urine NEGATIVE      Narrative:     OVER is reported when the result is greater than the clinically reportable range.   BLOOD GAS LACTIC ACID, VENOUS - Abnormal    POCT Lactate, Venous 2.7 (*)    BLOOD GAS VENOUS FULL PANEL - Abnormal    POCT pH, Venous 7.28 (*)     POCT pCO2, Venous 47      POCT pO2, Venous 40      POCT SO2, Venous 56      POCT Oxy Hemoglobin, Venous 55.2      POCT Hematocrit Calculated, Venous 31.0 (*)     POCT Sodium, Venous 134 (*)     POCT Potassium, Venous 5.2      POCT Chloride, Venous 108  (*)     POCT Ionized Calicum, Venous 1.29      POCT Glucose, Venous 286 (*)     POCT Lactate, Venous 2.6 (*)     POCT Base Excess, Venous -4.6 (*)     POCT HCO3 Calculated, Venous 22.1      POCT Hemoglobin, Venous 10.4 (*)     POCT Anion Gap, Venous 9.0 (*)     Patient Temperature        FiO2 21     POCT GLUCOSE - Abnormal    POCT Glucose 393 (*)    POCT GLUCOSE - Abnormal    POCT Glucose 269 (*)    MAGNESIUM - Normal    Magnesium 2.30     BETA HYDROXYBUTYRATE - Normal    Beta-Hydroxybutyrate 0.24      Narrative:     The beta-hydroxybutyrate test performance characteristics have been validated by  [facility]. This test has not been approved by the FDA; however,such approval is not necessary.     PHOSPHORUS - Normal    Phosphorus 3.5     URINALYSIS MICROSCOPIC WITH REFLEX CULTURE - Normal    WBC, Urine 1-5      RBC, Urine NONE     URINALYSIS WITH REFLEX CULTURE AND MICROSCOPIC    Narrative:     The following orders were created for panel order Urinalysis with Reflex Culture and Microscopic.  Procedure                               Abnormality         Status                     ---------                               -----------         ------                     Urinalysis with Reflex C...[[id number]]  Abnormal            Final result               Extra Urine Gray Tube[[id number]]                            In process                   Please view results for these tests on the individual orders.   EXTRA URINE GRAY TUBE   BLOOD GAS LACTIC ACID, VENOUS       All other labs were within normal range or not returned as of this dictation.    Imaging  XR chest 1 view   Final Result   No acute cardiopulmonary process.        Mild left basilar atelectasis.        MACRO:   None.        Signed by: Bro Wilkerson 11/21/2024 7:14 PM   Dictation workstation:   [id number]           Procedures  Procedures     EMERGENCY DEPARTMENT COURSE/MDM:     Diagnoses as of 11/21/24 0773    Hyperglycemia due to diabetes mellitus (Multi)   Acute urinary retention   Acute on chronic renal insufficiency   Gross hematuria        Medical Decision Making  History obtained from the patient, EMS.  Records including labs, imaging, notes reviewed.  Presentation most concerning for possible DKA, HHS, symptomatic hyperglycemia, acute on chronic renal insufficiency, urinary retention.  Patient initially given 1 L fluid bolus.  This was temporarily stopped when it was realized the patient was not reducing a large amount of urine and had a palpable bladder on exam.  Bladder scan revealed greater than 1 L.  We attempted to straight cath with return of blood.  We suspect this is likely post renal acute on chronic renal insufficiency in the setting of enlarged prostate.  A Hernandez was placed with the help of a coudé.  Urine returned clear after a small amount of blood.  I suspect it was traumatic insertion initially rather than any hemorrhage within the more proximal urinary tract.  Fluids were resumed.  Point-of-care glucose at presentation and elevated at 390.  Formal glucose on the metabolic panel elevated at 454.  Serum sodium correctable in the setting of hyperglycemia.  Also was noted acute on chronic renal insufficiency with a BUN of 76, creatinine of 4.0, likely in the setting of post renal due to enlarged prostate.  CBC notable for stable chronic anemia of hemo and 10.5, leukocytosis of 12.1.  Beta hydroxybutyrate normal, VBG with a mild acidosis, elevated lactate of 3.2 which decreased to 2.7 on repeat.  Urinalysis with ketones but no evidence of UTI.  Patient without anion gap to suspect DKA at this time.  Patient given 10 units of subcutaneous regular insulin and an additional liter fluid bolus.  Given no evidence of HARSHIL but with lactic acidosis in the setting of hyperglycemia, patient to be admitted to the medicine service for further evaluation and treatment.  Position pending at the time of handoff to the  oncoming provider Dr. Fernando.  Recommendation for urologic consults due to acute urinary retention resulting in HARSHIL.    EKG demonstrated sinus tachycardia at a rate of 124, normal intervals.  No acute injury pattern.    Patient and or family in agreement and understanding of treatment plan.  All questions answered.      I reviewed the case with the attending ED physician. The attending ED physician agrees with the plan. Patient and/or patient´s representative was counseled regarding labs, imaging, likely diagnosis, and plan. All questions were answered.    ED Medications administered this visit:    Medications   sodium chloride 0.9 % bolus 1,000 mL (0 mL intravenous Stopped 11/21/24 2009)   lidocaine 2 % mucosal jelly (Uro-Jet) 1 Application (1 Application urethral Given 11/21/24 2025)   lactated Ringer's bolus 1,000 mL (0 mL intravenous Stopped 11/21/24 2101)   insulin regular (HumuLIN R,NovoLIN R) injection 10 Units (10 Units subcutaneous Given 11/21/24 2049)       New Prescriptions from this visit:    New Prescriptions    No medications on file       Follow-up:  No follow-up provider specified.      Final Impression:   1. Hyperglycemia due to diabetes mellitus (Multi)    2. Acute urinary retention    3. Acute on chronic renal insufficiency    4. Gross hematuria          (Please note that portions of this note were completed with a voice recognition program.  Efforts were made to edit the dictations but occasionally words are mis-transcribed.)     Enrique Perez MD  Resident  11/21/24 2227       Enrique Perez MD  Resident  11/21/24 2228

## 2024-11-21 NOTE — LETTER
Patient: Lito Osei  : 1960    Encounter Date: 2024    Subjective  Lito Osei is a 64 y.o. male Here for skilled admission following hospitalization due to fall with femoral neck fracture.      HPI  Past medical hx includes CVA  with left sided weakness, DM, CKD IV, who fell at home, sustained left femoral neck fracture.   He had partial left hip hemiarthoplasty  and is here for continued therapy.  He is on eliquis  for dvt ppx.  Oxycodone prn for pain for which he denies.  He has left sided weakness from cva , does not use assistive device.   A1C 8.2.   He is resting in bed, denies any complaints when asked.  He is a former ,  stopped working after CVA.  He resides alone in an apartment.   Hospital chart and health problems reviewed.    No concerns per staff.   Lab Results   Component Value Date    WBC 16.9 (H) 2024    HGB 10.9 (L) 2024    HCT 34.5 (L) 2024    MCV 93 2024     2024     Lab Results   Component Value Date    GLUCOSE 323 (H) 2024    CALCIUM 8.4 (L) 2024     2024    K 5.0 2024    CO2 19 (L) 2024     2024    BUN 54 (H) 2024    CREATININE 2.86 (H) 2024     Lab Results   Component Value Date    ALT 19 2024    AST 19 2024    ALKPHOS 100 2024    BILITOT 0.5 2024     MEDS:  Apixaban through   Atorvastatin  Dapagliflozin  Lisinopril  Novolog 70/30 routine   Oxycodone prn      Review of Systems   Constitutional:  Negative for chills, fatigue and fever.   Respiratory:  Negative for cough and shortness of breath.    Cardiovascular:  Negative for chest pain and palpitations.   Gastrointestinal:  Negative for abdominal pain, constipation, diarrhea, nausea and vomiting.   Genitourinary:  Negative for difficulty urinating.       Objective  /68   Pulse 76   Temp 36.8 °C (98.2 °F)   Resp 18   Wt 77.1 kg (170 lb)   SpO2 98%   BMI 27.44  kg/m²     Physical Exam  Constitutional:       General: He is not in acute distress.     Comments: Appears older than stated age.   Resting in bed   HENT:      Head: Normocephalic and atraumatic.   Eyes:      Conjunctiva/sclera: Conjunctivae normal.   Cardiovascular:      Rate and Rhythm: Normal rate and regular rhythm.   Pulmonary:      Effort: Pulmonary effort is normal. No respiratory distress.      Breath sounds: Normal breath sounds.   Abdominal:      General: Bowel sounds are normal. There is no distension.      Palpations: Abdomen is soft.      Tenderness: There is no abdominal tenderness.   Musculoskeletal:      Right lower leg: No edema.      Left lower leg: No edema.      Comments: Left leg weakness  Left hip dressing clean dry and intact   Skin:     General: Skin is warm and dry.   Neurological:      Mental Status: He is alert and oriented to person, place, and time.      Comments: Mild left sided weakness   Psychiatric:         Mood and Affect: Mood normal.         Behavior: Behavior normal.         Assessment & Plan  Closed fracture of left hip, sequela  Left hip partial hemiarthoplasty.  continue with therapy as able.  Follow up with orthopedics as scheduled.  He resides alone.    Primary hypertension  On lisinopril.  Continue to monitor and adjust meds based on clinical course.    Type 2 diabetes mellitus with stage 4 chronic kidney disease, with long-term current use of insulin (Multi)  Novolog 70/30. 8uints premeal and dapgliflozin.  Accuchecks ac and hs, if needed will start sliding scale.   History of CVA (cerebrovascular accident)  2015.  Left sided weakness,  does not use assistive device at home.     CKD (chronic kidney disease) stage 4, GFR 15-29 ml/min (Multi)  Bmp ordered for 11/22.    labs/meds/orders reviewed  staff to monitor and notify for any changes.  Hospital records reviewed.  Add cbc to bmp scheduled for 11/22, wbc 16 on discharge  Pox low this morning, o2 applied, he deneis any  sob., prn o2 ordered. Staff to monitor and notify for any recurrence of symptoms.  He resides alone in an apartment,  for discharge planning.   Time for coordination of care was greater than 35 minutes with hospital chart review, visit and exam, discussion of treatment plan with patient and also discussion of case with staff.      Electronically Signed By: ELLIOTT Willett   11/21/24 12:00 PM

## 2024-11-21 NOTE — ASSESSMENT & PLAN NOTE
Novolog 70/30. 8uints premeal and dapgliflozin.  Accuchecks ac and hs, if needed will start sliding scale.

## 2024-11-22 ENCOUNTER — APPOINTMENT (OUTPATIENT)
Dept: RADIOLOGY | Facility: HOSPITAL | Age: 64
End: 2024-11-22
Payer: MEDICARE

## 2024-11-22 PROBLEM — D72.828 NEUTROPHILIC LEUKOCYTOSIS: Status: ACTIVE | Noted: 2024-11-22

## 2024-11-22 PROBLEM — Z98.890 STATUS POST HIP SURGERY: Status: ACTIVE | Noted: 2024-11-22

## 2024-11-22 PROBLEM — R33.8 ACUTE URINARY RETENTION: Status: ACTIVE | Noted: 2024-11-22

## 2024-11-22 PROBLEM — N18.4 ACUTE KIDNEY INJURY SUPERIMPOSED ON STAGE 4 CHRONIC KIDNEY DISEASE: Status: ACTIVE | Noted: 2024-11-21

## 2024-11-22 PROBLEM — E11.10 METABOLIC ACIDOSIS DUE TO DIABETES MELLITUS (MULTI): Status: ACTIVE | Noted: 2024-11-22

## 2024-11-22 PROBLEM — E87.1 HYPONATREMIA: Status: ACTIVE | Noted: 2024-11-22

## 2024-11-22 LAB
ANION GAP BLDV CALCULATED.4IONS-SCNC: 6 MMOL/L (ref 10–25)
ANION GAP SERPL CALC-SCNC: 13 MMOL/L (ref 10–20)
ATRIAL RATE: 124 BPM
BASE EXCESS BLDV CALC-SCNC: -4 MMOL/L (ref -2–3)
BODY TEMPERATURE: ABNORMAL
BUN SERPL-MCNC: 64 MG/DL (ref 6–23)
CA-I BLDV-SCNC: 1.31 MMOL/L (ref 1.1–1.33)
CALCIUM SERPL-MCNC: 8.1 MG/DL (ref 8.6–10.3)
CHLORIDE BLDV-SCNC: 114 MMOL/L (ref 98–107)
CHLORIDE SERPL-SCNC: 111 MMOL/L (ref 98–107)
CHLORIDE UR-SCNC: 15 MMOL/L
CHLORIDE/CREATININE (MMOL/G) IN URINE: 23 MMOL/G CREAT (ref 23–275)
CO2 SERPL-SCNC: 19 MMOL/L (ref 21–32)
CREAT SERPL-MCNC: 2.78 MG/DL (ref 0.5–1.3)
CREAT UR-MCNC: 63.9 MG/DL (ref 20–370)
EGFRCR SERPLBLD CKD-EPI 2021: 25 ML/MIN/1.73M*2
ERYTHROCYTE [DISTWIDTH] IN BLOOD BY AUTOMATED COUNT: 13.5 % (ref 11.5–14.5)
GLUCOSE BLD MANUAL STRIP-MCNC: 167 MG/DL (ref 74–99)
GLUCOSE BLD MANUAL STRIP-MCNC: 177 MG/DL (ref 74–99)
GLUCOSE BLD MANUAL STRIP-MCNC: 198 MG/DL (ref 74–99)
GLUCOSE BLD MANUAL STRIP-MCNC: 266 MG/DL (ref 74–99)
GLUCOSE BLD MANUAL STRIP-MCNC: 268 MG/DL (ref 74–99)
GLUCOSE BLDV-MCNC: 223 MG/DL (ref 74–99)
GLUCOSE SERPL-MCNC: 204 MG/DL (ref 74–99)
HCO3 BLDV-SCNC: 22.6 MMOL/L (ref 22–26)
HCT VFR BLD AUTO: 29.8 % (ref 41–52)
HCT VFR BLD EST: 31 % (ref 41–52)
HGB BLD-MCNC: 9.3 G/DL (ref 13.5–17.5)
HGB BLDV-MCNC: 10.3 G/DL (ref 13.5–17.5)
HOLD SPECIMEN: NORMAL
INHALED O2 CONCENTRATION: 31 %
LACTATE BLDV-SCNC: 1.4 MMOL/L (ref 0.4–2)
LACTATE SERPL-SCNC: 1.1 MMOL/L (ref 0.4–2)
MAGNESIUM SERPL-MCNC: 2.24 MG/DL (ref 1.6–2.4)
MCH RBC QN AUTO: 29.1 PG (ref 26–34)
MCHC RBC AUTO-ENTMCNC: 31.2 G/DL (ref 32–36)
MCV RBC AUTO: 93 FL (ref 80–100)
NRBC BLD-RTO: 0 /100 WBCS (ref 0–0)
OSMOLALITY SERPL: 330 MOSM/KG (ref 280–300)
OSMOLALITY UR: 411 MOSM/KG (ref 200–1200)
OXYHGB MFR BLDV: 57 % (ref 45–75)
P AXIS: 52 DEGREES
P OFFSET: 202 MS
P ONSET: 145 MS
PCO2 BLDV: 47 MM HG (ref 41–51)
PH BLDV: 7.29 PH (ref 7.33–7.43)
PHOSPHATE SERPL-MCNC: 3.7 MG/DL (ref 2.5–4.9)
PLATELET # BLD AUTO: 164 X10*3/UL (ref 150–450)
PO2 BLDV: 40 MM HG (ref 35–45)
POTASSIUM BLDV-SCNC: 5.8 MMOL/L (ref 3.5–5.3)
POTASSIUM SERPL-SCNC: 4.5 MMOL/L (ref 3.5–5.3)
POTASSIUM UR-SCNC: 23 MMOL/L
POTASSIUM/CREAT UR-RTO: 36 MMOL/G CREAT
PR INTERVAL: 152 MS
Q ONSET: 221 MS
QRS COUNT: 20 BEATS
QRS DURATION: 76 MS
QT INTERVAL: 290 MS
QTC CALCULATION(BAZETT): 416 MS
QTC FREDERICIA: 369 MS
R AXIS: 29 DEGREES
RBC # BLD AUTO: 3.2 X10*6/UL (ref 4.5–5.9)
SAO2 % BLDV: 57 % (ref 45–75)
SODIUM BLDV-SCNC: 137 MMOL/L (ref 136–145)
SODIUM SERPL-SCNC: 138 MMOL/L (ref 136–145)
SODIUM UR-SCNC: 22 MMOL/L
SODIUM/CREAT UR-RTO: 34 MMOL/G CREAT
T AXIS: 82 DEGREES
T OFFSET: 366 MS
VENTRICULAR RATE: 124 BPM
WBC # BLD AUTO: 11.6 X10*3/UL (ref 4.4–11.3)

## 2024-11-22 PROCEDURE — 36415 COLL VENOUS BLD VENIPUNCTURE: CPT

## 2024-11-22 PROCEDURE — 83605 ASSAY OF LACTIC ACID: CPT

## 2024-11-22 PROCEDURE — 78830 RP LOCLZJ TUM SPECT W/CT 1: CPT | Performed by: STUDENT IN AN ORGANIZED HEALTH CARE EDUCATION/TRAINING PROGRAM

## 2024-11-22 PROCEDURE — 99222 1ST HOSP IP/OBS MODERATE 55: CPT

## 2024-11-22 PROCEDURE — 2500000001 HC RX 250 WO HCPCS SELF ADMINISTERED DRUGS (ALT 637 FOR MEDICARE OP): Performed by: PHYSICIAN ASSISTANT

## 2024-11-22 PROCEDURE — 85027 COMPLETE CBC AUTOMATED: CPT

## 2024-11-22 PROCEDURE — A9540 TC99M MAA: HCPCS | Performed by: INTERNAL MEDICINE

## 2024-11-22 PROCEDURE — 82947 ASSAY GLUCOSE BLOOD QUANT: CPT

## 2024-11-22 PROCEDURE — 3430000001 HC RX 343 DIAGNOSTIC RADIOPHARMACEUTICALS: Performed by: INTERNAL MEDICINE

## 2024-11-22 PROCEDURE — 2500000002 HC RX 250 W HCPCS SELF ADMINISTERED DRUGS (ALT 637 FOR MEDICARE OP, ALT 636 FOR OP/ED): Performed by: PHYSICIAN ASSISTANT

## 2024-11-22 PROCEDURE — 80048 BASIC METABOLIC PNL TOTAL CA: CPT

## 2024-11-22 PROCEDURE — 2500000004 HC RX 250 GENERAL PHARMACY W/ HCPCS (ALT 636 FOR OP/ED)

## 2024-11-22 PROCEDURE — 1200000002 HC GENERAL ROOM WITH TELEMETRY DAILY

## 2024-11-22 PROCEDURE — 83735 ASSAY OF MAGNESIUM: CPT

## 2024-11-22 PROCEDURE — 96374 THER/PROPH/DIAG INJ IV PUSH: CPT

## 2024-11-22 PROCEDURE — 84132 ASSAY OF SERUM POTASSIUM: CPT

## 2024-11-22 PROCEDURE — 2500000005 HC RX 250 GENERAL PHARMACY W/O HCPCS

## 2024-11-22 PROCEDURE — 2500000002 HC RX 250 W HCPCS SELF ADMINISTERED DRUGS (ALT 637 FOR MEDICARE OP, ALT 636 FOR OP/ED)

## 2024-11-22 PROCEDURE — 78580 LUNG PERFUSION IMAGING: CPT | Performed by: STUDENT IN AN ORGANIZED HEALTH CARE EDUCATION/TRAINING PROGRAM

## 2024-11-22 PROCEDURE — 84100 ASSAY OF PHOSPHORUS: CPT

## 2024-11-22 PROCEDURE — 78803 RP LOCLZJ TUM SPECT 1 AREA: CPT

## 2024-11-22 RX ORDER — DEXTROSE 50 % IN WATER (D50W) INTRAVENOUS SYRINGE
12.5
Status: DISCONTINUED | OUTPATIENT
Start: 2024-11-22 | End: 2024-11-26 | Stop reason: HOSPADM

## 2024-11-22 RX ORDER — ADHESIVE BANDAGE
30 BANDAGE TOPICAL DAILY PRN
COMMUNITY

## 2024-11-22 RX ORDER — METOPROLOL TARTRATE 25 MG/1
12.5 TABLET, FILM COATED ORAL ONCE
Status: DISCONTINUED | OUTPATIENT
Start: 2024-11-22 | End: 2024-11-22

## 2024-11-22 RX ORDER — ACETAMINOPHEN 650 MG/1
650 SUPPOSITORY RECTAL EVERY 6 HOURS PRN
Status: DISCONTINUED | OUTPATIENT
Start: 2024-11-22 | End: 2024-11-26 | Stop reason: HOSPADM

## 2024-11-22 RX ORDER — TAMSULOSIN HYDROCHLORIDE 0.4 MG/1
0.4 CAPSULE ORAL DAILY
Status: DISCONTINUED | OUTPATIENT
Start: 2024-11-22 | End: 2024-11-26 | Stop reason: HOSPADM

## 2024-11-22 RX ORDER — TALC
3 POWDER (GRAM) TOPICAL NIGHTLY PRN
Status: DISCONTINUED | OUTPATIENT
Start: 2024-11-22 | End: 2024-11-26 | Stop reason: HOSPADM

## 2024-11-22 RX ORDER — OXYCODONE HYDROCHLORIDE 5 MG/1
5 TABLET ORAL EVERY 6 HOURS PRN
Status: DISCONTINUED | OUTPATIENT
Start: 2024-11-22 | End: 2024-11-26 | Stop reason: HOSPADM

## 2024-11-22 RX ORDER — INSULIN LISPRO 100 [IU]/ML
0-10 INJECTION, SOLUTION INTRAVENOUS; SUBCUTANEOUS
Status: DISCONTINUED | OUTPATIENT
Start: 2024-11-22 | End: 2024-11-26 | Stop reason: HOSPADM

## 2024-11-22 RX ORDER — SODIUM CHLORIDE 9 MG/ML
100 INJECTION, SOLUTION INTRAVENOUS CONTINUOUS
Status: ACTIVE | OUTPATIENT
Start: 2024-11-22 | End: 2024-11-23

## 2024-11-22 RX ORDER — DEXTROSE 50 % IN WATER (D50W) INTRAVENOUS SYRINGE
25
Status: DISCONTINUED | OUTPATIENT
Start: 2024-11-22 | End: 2024-11-26 | Stop reason: HOSPADM

## 2024-11-22 RX ORDER — DAPAGLIFLOZIN 10 MG/1
10 TABLET, FILM COATED ORAL EVERY 24 HOURS
Status: DISCONTINUED | OUTPATIENT
Start: 2024-11-22 | End: 2024-11-26 | Stop reason: HOSPADM

## 2024-11-22 RX ORDER — ALUMINUM HYDROXIDE, MAGNESIUM HYDROXIDE, AND SIMETHICONE 1200; 120; 1200 MG/30ML; MG/30ML; MG/30ML
30 SUSPENSION ORAL DAILY PRN
COMMUNITY

## 2024-11-22 RX ORDER — GUAIFENESIN 100 MG/5ML
30 SOLUTION ORAL 4 TIMES DAILY PRN
COMMUNITY

## 2024-11-22 RX ORDER — LISINOPRIL 10 MG/1
10 TABLET ORAL DAILY
Status: DISCONTINUED | OUTPATIENT
Start: 2024-11-22 | End: 2024-11-26 | Stop reason: HOSPADM

## 2024-11-22 RX ORDER — ACETAMINOPHEN 160 MG/5ML
650 SOLUTION ORAL EVERY 6 HOURS PRN
Status: DISCONTINUED | OUTPATIENT
Start: 2024-11-22 | End: 2024-11-26 | Stop reason: HOSPADM

## 2024-11-22 RX ORDER — METOPROLOL TARTRATE 1 MG/ML
5 INJECTION, SOLUTION INTRAVENOUS ONCE
Status: COMPLETED | OUTPATIENT
Start: 2024-11-22 | End: 2024-11-22

## 2024-11-22 RX ORDER — POLYETHYLENE GLYCOL 3350 17 G/17G
17 POWDER, FOR SOLUTION ORAL DAILY PRN
Status: DISCONTINUED | OUTPATIENT
Start: 2024-11-22 | End: 2024-11-26 | Stop reason: HOSPADM

## 2024-11-22 RX ORDER — ACETAMINOPHEN 325 MG/1
650 TABLET ORAL EVERY 4 HOURS PRN
COMMUNITY

## 2024-11-22 RX ORDER — ACETAMINOPHEN 325 MG/1
650 TABLET ORAL EVERY 6 HOURS PRN
Status: DISCONTINUED | OUTPATIENT
Start: 2024-11-22 | End: 2024-11-26 | Stop reason: HOSPADM

## 2024-11-22 RX ORDER — ATORVASTATIN CALCIUM 80 MG/1
80 TABLET, FILM COATED ORAL DAILY
Status: DISCONTINUED | OUTPATIENT
Start: 2024-11-22 | End: 2024-11-26 | Stop reason: HOSPADM

## 2024-11-22 RX ADMIN — INSULIN LISPRO 6 UNITS: 100 INJECTION, SOLUTION INTRAVENOUS; SUBCUTANEOUS at 20:13

## 2024-11-22 RX ADMIN — KIT FOR THE PREPARATION OF TECHNETIUM TC 99M ALBUMIN AGGREGATED 4.3 MILLICURIE: 2.5 INJECTION, POWDER, FOR SOLUTION INTRAVENOUS at 12:42

## 2024-11-22 RX ADMIN — INSULIN LISPRO 2 UNITS: 100 INJECTION, SOLUTION INTRAVENOUS; SUBCUTANEOUS at 02:29

## 2024-11-22 RX ADMIN — SODIUM CHLORIDE 100 ML/HR: 9 INJECTION, SOLUTION INTRAVENOUS at 02:24

## 2024-11-22 RX ADMIN — APIXABAN 10 MG: 5 TABLET, FILM COATED ORAL at 17:06

## 2024-11-22 RX ADMIN — SODIUM CHLORIDE 100 ML/HR: 9 INJECTION, SOLUTION INTRAVENOUS at 10:41

## 2024-11-22 RX ADMIN — Medication 2 L/MIN: at 04:19

## 2024-11-22 RX ADMIN — INSULIN LISPRO 6 UNITS: 100 INJECTION, SOLUTION INTRAVENOUS; SUBCUTANEOUS at 17:07

## 2024-11-22 RX ADMIN — METOPROLOL TARTRATE 5 MG: 5 INJECTION INTRAVENOUS at 04:32

## 2024-11-22 RX ADMIN — TAMSULOSIN HYDROCHLORIDE 0.4 MG: 0.4 CAPSULE ORAL at 12:10

## 2024-11-22 RX ADMIN — INSULIN LISPRO 8 UNITS: 100 INJECTION, SUSPENSION SUBCUTANEOUS at 18:36

## 2024-11-22 RX ADMIN — DAPAGLIFLOZIN 10 MG: 10 TABLET, FILM COATED ORAL at 17:07

## 2024-11-22 RX ADMIN — INSULIN LISPRO 2 UNITS: 100 INJECTION, SOLUTION INTRAVENOUS; SUBCUTANEOUS at 10:21

## 2024-11-22 RX ADMIN — ATORVASTATIN CALCIUM 80 MG: 80 TABLET, FILM COATED ORAL at 17:07

## 2024-11-22 SDOH — ECONOMIC STABILITY: HOUSING INSECURITY: IN THE LAST 12 MONTHS, WAS THERE A TIME WHEN YOU WERE NOT ABLE TO PAY THE MORTGAGE OR RENT ON TIME?: NO

## 2024-11-22 SDOH — ECONOMIC STABILITY: INCOME INSECURITY: IN THE PAST 12 MONTHS HAS THE ELECTRIC, GAS, OIL, OR WATER COMPANY THREATENED TO SHUT OFF SERVICES IN YOUR HOME?: NO

## 2024-11-22 SDOH — ECONOMIC STABILITY: TRANSPORTATION INSECURITY: IN THE PAST 12 MONTHS, HAS LACK OF TRANSPORTATION KEPT YOU FROM MEDICAL APPOINTMENTS OR FROM GETTING MEDICATIONS?: NO

## 2024-11-22 SDOH — SOCIAL STABILITY: SOCIAL INSECURITY: WITHIN THE LAST YEAR, HAVE YOU BEEN AFRAID OF YOUR PARTNER OR EX-PARTNER?: NO

## 2024-11-22 SDOH — SOCIAL STABILITY: SOCIAL INSECURITY: WITHIN THE LAST YEAR, HAVE YOU BEEN HUMILIATED OR EMOTIONALLY ABUSED IN OTHER WAYS BY YOUR PARTNER OR EX-PARTNER?: NO

## 2024-11-22 SDOH — ECONOMIC STABILITY: FOOD INSECURITY: WITHIN THE PAST 12 MONTHS, THE FOOD YOU BOUGHT JUST DIDN'T LAST AND YOU DIDN'T HAVE MONEY TO GET MORE.: NEVER TRUE

## 2024-11-22 SDOH — SOCIAL STABILITY: SOCIAL INSECURITY: ABUSE: ADULT

## 2024-11-22 SDOH — ECONOMIC STABILITY: FOOD INSECURITY: HOW HARD IS IT FOR YOU TO PAY FOR THE VERY BASICS LIKE FOOD, HOUSING, MEDICAL CARE, AND HEATING?: NOT HARD AT ALL

## 2024-11-22 SDOH — SOCIAL STABILITY: SOCIAL INSECURITY: ARE YOU OR HAVE YOU BEEN THREATENED OR ABUSED PHYSICALLY, EMOTIONALLY, OR SEXUALLY BY ANYONE?: NO

## 2024-11-22 SDOH — ECONOMIC STABILITY: HOUSING INSECURITY: AT ANY TIME IN THE PAST 12 MONTHS, WERE YOU HOMELESS OR LIVING IN A SHELTER (INCLUDING NOW)?: NO

## 2024-11-22 SDOH — HEALTH STABILITY: PHYSICAL HEALTH
HOW OFTEN DO YOU NEED TO HAVE SOMEONE HELP YOU WHEN YOU READ INSTRUCTIONS, PAMPHLETS, OR OTHER WRITTEN MATERIAL FROM YOUR DOCTOR OR PHARMACY?: RARELY

## 2024-11-22 SDOH — SOCIAL STABILITY: SOCIAL INSECURITY: HAS ANYONE EVER THREATENED TO HURT YOUR FAMILY OR YOUR PETS?: NO

## 2024-11-22 SDOH — ECONOMIC STABILITY: FOOD INSECURITY: WITHIN THE PAST 12 MONTHS, YOU WORRIED THAT YOUR FOOD WOULD RUN OUT BEFORE YOU GOT THE MONEY TO BUY MORE.: NEVER TRUE

## 2024-11-22 SDOH — SOCIAL STABILITY: SOCIAL INSECURITY: DO YOU FEEL UNSAFE GOING BACK TO THE PLACE WHERE YOU ARE LIVING?: NO

## 2024-11-22 SDOH — SOCIAL STABILITY: SOCIAL INSECURITY: DO YOU FEEL ANYONE HAS EXPLOITED OR TAKEN ADVANTAGE OF YOU FINANCIALLY OR OF YOUR PERSONAL PROPERTY?: NO

## 2024-11-22 SDOH — SOCIAL STABILITY: SOCIAL INSECURITY: HAVE YOU HAD THOUGHTS OF HARMING ANYONE ELSE?: NO

## 2024-11-22 SDOH — SOCIAL STABILITY: SOCIAL INSECURITY: DOES ANYONE TRY TO KEEP YOU FROM HAVING/CONTACTING OTHER FRIENDS OR DOING THINGS OUTSIDE YOUR HOME?: NO

## 2024-11-22 SDOH — ECONOMIC STABILITY: HOUSING INSECURITY: IN THE PAST 12 MONTHS, HOW MANY TIMES HAVE YOU MOVED WHERE YOU WERE LIVING?: 0

## 2024-11-22 SDOH — SOCIAL STABILITY: SOCIAL INSECURITY: ARE THERE ANY APPARENT SIGNS OF INJURIES/BEHAVIORS THAT COULD BE RELATED TO ABUSE/NEGLECT?: NO

## 2024-11-22 SDOH — SOCIAL STABILITY: SOCIAL INSECURITY: HAVE YOU HAD ANY THOUGHTS OF HARMING ANYONE ELSE?: NO

## 2024-11-22 ASSESSMENT — ACTIVITIES OF DAILY LIVING (ADL)
LACK_OF_TRANSPORTATION: NO
HEARING - LEFT EAR: HEARING AID
LACK_OF_TRANSPORTATION: NO
BATHING: NEEDS ASSISTANCE
TOILETING: NEEDS ASSISTANCE
JUDGMENT_ADEQUATE_SAFELY_COMPLETE_DAILY_ACTIVITIES: YES
PATIENT'S MEMORY ADEQUATE TO SAFELY COMPLETE DAILY ACTIVITIES?: YES
FEEDING YOURSELF: INDEPENDENT
HEARING - RIGHT EAR: HEARING AID
DRESSING YOURSELF: NEEDS ASSISTANCE
GROOMING: NEEDS ASSISTANCE
ADEQUATE_TO_COMPLETE_ADL: YES
ASSISTIVE_DEVICE: EYEGLASSES
WALKS IN HOME: NEEDS ASSISTANCE

## 2024-11-22 ASSESSMENT — COGNITIVE AND FUNCTIONAL STATUS - GENERAL
STANDING UP FROM CHAIR USING ARMS: A LOT
DRESSING REGULAR LOWER BODY CLOTHING: A LOT
MOBILITY SCORE: 12
HELP NEEDED FOR BATHING: A LOT
DRESSING REGULAR UPPER BODY CLOTHING: A LOT
STANDING UP FROM CHAIR USING ARMS: A LOT
MOVING TO AND FROM BED TO CHAIR: A LOT
DAILY ACTIVITIY SCORE: 16
HELP NEEDED FOR BATHING: A LOT
WALKING IN HOSPITAL ROOM: A LOT
MOBILITY SCORE: 12
PATIENT BASELINE BEDBOUND: NO
MOVING TO AND FROM BED TO CHAIR: A LOT
TOILETING: A LOT
TURNING FROM BACK TO SIDE WHILE IN FLAT BAD: A LOT
TURNING FROM BACK TO SIDE WHILE IN FLAT BAD: A LOT
MOVING FROM LYING ON BACK TO SITTING ON SIDE OF FLAT BED WITH BEDRAILS: A LOT
CLIMB 3 TO 5 STEPS WITH RAILING: A LOT
TOILETING: A LOT
DAILY ACTIVITIY SCORE: 16
DRESSING REGULAR UPPER BODY CLOTHING: A LOT
WALKING IN HOSPITAL ROOM: A LOT
CLIMB 3 TO 5 STEPS WITH RAILING: A LOT
MOVING FROM LYING ON BACK TO SITTING ON SIDE OF FLAT BED WITH BEDRAILS: A LOT
DRESSING REGULAR LOWER BODY CLOTHING: A LOT

## 2024-11-22 ASSESSMENT — ENCOUNTER SYMPTOMS
DYSURIA: 1
DIZZINESS: 0
CONSTIPATION: 0
CONFUSION: 0
PALPITATIONS: 0
NAUSEA: 0
FLANK PAIN: 0
HEADACHES: 0
CHEST TIGHTNESS: 0
SORE THROAT: 0
NERVOUS/ANXIOUS: 0
DYSPHORIC MOOD: 0
DIFFICULTY URINATING: 1
FREQUENCY: 0
CHILLS: 0
HEMATURIA: 0
ABDOMINAL DISTENTION: 0
WEAKNESS: 1
COUGH: 0
BACK PAIN: 0
COLOR CHANGE: 0
TROUBLE SWALLOWING: 0
AGITATION: 0
WOUND: 0
FATIGUE: 1
SHORTNESS OF BREATH: 0
DIARRHEA: 0
FEVER: 0
LIGHT-HEADEDNESS: 0
ABDOMINAL PAIN: 0
VOMITING: 0

## 2024-11-22 ASSESSMENT — LIFESTYLE VARIABLES
AUDIT-C TOTAL SCORE: 0
AUDIT-C TOTAL SCORE: 0
HOW OFTEN DO YOU HAVE 6 OR MORE DRINKS ON ONE OCCASION: NEVER
HOW MANY STANDARD DRINKS CONTAINING ALCOHOL DO YOU HAVE ON A TYPICAL DAY: PATIENT DOES NOT DRINK
SKIP TO QUESTIONS 9-10: 1
HOW OFTEN DO YOU HAVE A DRINK CONTAINING ALCOHOL: NEVER

## 2024-11-22 ASSESSMENT — VISUAL ACUITY: OU: 1

## 2024-11-22 ASSESSMENT — PAIN SCALES - GENERAL
PAINLEVEL_OUTOF10: 0 - NO PAIN
PAINLEVEL_OUTOF10: 0 - NO PAIN

## 2024-11-22 NOTE — H&P
History Of Present Illness  Lito Osei is a 64 y.o. male with past medical history of hypertension, hyperlipidemia, CVA without residual deficits, CKD stage IV, insulin-dependent type 2 diabetes mellitus, and recent left hip fracture s/p left hip hemiarthroplasty on 11/18/2024 presents to [facility] on 11/21/2024 from [facility] due to concern of worsening renal function and hyperglycemia on outpatient lab work.    Per documentation the nursing facility was concerned as the patient's outpatient lab work done earlier on 11/21 demonstrated a blood glucose level greater than 500 and patient was having some difficulty urinating. Patient states that when he arrived to the ER earlier he was having some difficulty urinating and had noticed some burning with urination at the facility. However, this has resolved since ER physician placed a chavarria catheter. He tells me that at the nursing facility they were not giving him a carb controlled diet which he believes is the reason his blood sugar was significantly elevated today and he has also reportedly not staying well hydrated either . Apart from that, his only major complaint is being fatigued and wanting to get enough rest. Patient states he had left hip surgery three days ago, but denies any pain at surgical site. He denies any recent fever/chills, headache, dizziness, chest pain / palpitations, shortness of breath, cough, abdominal pain, N/V/D/C or hematuria.     ED Course:  Vital signs: Temp. 97.2 F,  bpm, RR 20, /76, SPO2 98% on room air   CMP: Glucose 454, Na+ 131, bicarb 20, BUN 76, creat 4.00, EGFR 16, calcium 8.4, ALT 3, AST 43, lipase 6  CBC w/diff: WBC 12.1, H&H 10.5/32.3, neutrophils 9.85 (baseline Hgb 11.6-12.4)  Beta hydroxybutyrate: 0.24  VBG x 2: mild metabolic acidosis with elevated venous lactate of 3.2 > 2.7  UA: +3 blood, glucosuria consistent with presentation of hyperglycemia, negative for evidence of UTI  CXR: No acute  cardiopulmonary process.  Mild left basilar atelectasis  EKG: Sinus tachycardia, ventricular rate 124 bpm,  ms, QRS 76 ms, QTc 416 ms. No ST elevation or depression noted.   Medications Given: Regular insulin 10 units subcutaneous x 1, LR 1 L IV bolus x 1, 0.9% sodium chloride 1 L IV bolus x 1  Coude Chavarria Catheter placed in ER, noted to have initial hematuria due to traumatic insertion, now draining with dark yellow urine  Disposition: Patient admitted to [facility] due to hyperglycemia in the setting of uncontrolled type two diabetes mellitus with mild metabolic acidosis, acute urinary retention requiring chavarria catheter placement, and HARSHIL on CKD 4.    Past Medical History  Past Medical History:   Diagnosis Date    CKD (chronic kidney disease)     CVA (cerebral vascular accident) (Multi) 11/21/2024    HLD (hyperlipidemia)     Hypertension     Stroke (Multi)     Type 2 diabetes mellitus with kidney complication, with long-term current use of insulin      Surgical History  He has a past surgical history that includes Ankle surgery (Right) and Partial hip arthroplasty (Left).     Social History  He reports that he has never smoked. He does not have any smokeless tobacco history on file. He reports that he does not drink alcohol and does not use drugs.    Family History  Family History   Problem Relation Name Age of Onset    Diabetes Mother      Hypertension Mother      Coronary artery disease Father      Diabetes Father      Other (Ischemic heart disease) Father      Stroke Maternal Grandmother       Allergies  Patient has no known allergies.    Review of Systems   Constitutional:  Positive for fatigue. Negative for chills and fever.   HENT:  Positive for hearing loss (bilateral; extremely hard of hearing). Negative for sore throat and trouble swallowing.    Eyes:  Negative for visual disturbance.   Respiratory:  Negative for cough, chest tightness and shortness of breath.    Cardiovascular:  Negative for chest pain  and palpitations.   Gastrointestinal:  Negative for abdominal distention, abdominal pain, constipation, diarrhea, nausea and vomiting.   Genitourinary:  Positive for difficulty urinating (resolved since chavarria catheter placement) and dysuria. Negative for flank pain, frequency, hematuria and urgency.   Musculoskeletal:  Negative for back pain and gait problem.   Skin:  Negative for color change, rash and wound.   Neurological:  Positive for weakness. Negative for dizziness, syncope, light-headedness and headaches.   Psychiatric/Behavioral:  Negative for agitation, confusion and dysphoric mood. The patient is not nervous/anxious.      Physical Exam  Constitutional:       General: He is not in acute distress.     Appearance: He is ill-appearing.   HENT:      Right Ear: External ear normal. Decreased hearing noted.      Left Ear: External ear normal. Decreased hearing noted.      Nose: Nose normal.      Mouth/Throat:      Mouth: Mucous membranes are dry.      Pharynx: Oropharynx is clear.   Eyes:      General: Lids are normal. Vision grossly intact.      Pupils: Pupils are equal, round, and reactive to light.   Cardiovascular:      Rate and Rhythm: Regular rhythm. Tachycardia present.      Pulses: Normal pulses.           Radial pulses are 2+ on the right side and 2+ on the left side.        Dorsalis pedis pulses are 2+ on the right side and 2+ on the left side.        Posterior tibial pulses are 2+ on the right side and 2+ on the left side.      Heart sounds: Normal heart sounds. No murmur heard.  Pulmonary:      Effort: Pulmonary effort is normal. No respiratory distress.      Breath sounds: Normal breath sounds. No wheezing, rhonchi or rales.   Abdominal:      General: Bowel sounds are normal. There is no distension.      Palpations: Abdomen is soft.      Tenderness: There is no abdominal tenderness.   Genitourinary:     Comments: Chavarria catheter noted, draining dark yellow urine. No obvious/gross  "hematuria  Musculoskeletal:      Right hip: Normal.      Right lower le+ Pitting Edema present.      Left lower le+ Pitting Edema present.      Comments: Left hip wound: no erythema, no edema, no drainage, and dressing remains clean, dry, and intact   Skin:     General: Skin is warm and dry.      Capillary Refill: Capillary refill takes less than 2 seconds.   Neurological:      General: No focal deficit present.      Mental Status: He is alert and oriented to person, place, and time. Mental status is at baseline.      Motor: Motor function is intact.      Comments: Strong bilateral hand grasps and foot dorsi/plantar flexion     Psychiatric:         Attention and Perception: Attention normal.         Mood and Affect: Mood normal.         Speech: Speech normal.         Behavior: Behavior normal. Behavior is cooperative.         Thought Content: Thought content normal.       Last Recorded Vitals  Blood pressure 102/63, pulse (!) 102, temperature 36.2 °C (97.2 °F), temperature source Temporal, resp. rate 20, height 1.753 m (5' 9\"), weight 77.1 kg (170 lb), SpO2 96%.  Visit Vitals  /63   Pulse (!) 102   Temp 36.2 °C (97.2 °F) (Temporal)   Resp 20   Ht 1.753 m (5' 9\")   Wt 77.1 kg (170 lb)   SpO2 96%   BMI 25.10 kg/m²   Smoking Status Never   BSA 1.94 m²     Weight: 77.1 kg (170 lb)   Pain Assessment: 0-10  0-10 (Numeric) Pain Score: 0 - No pain    Relevant Results  Results for orders placed or performed during the hospital encounter of 24 (from the past 24 hours)   POCT GLUCOSE   Result Value Ref Range    POCT Glucose 393 (H) 74 - 99 mg/dL   CBC and Auto Differential   Result Value Ref Range    WBC 12.1 (H) 4.4 - 11.3 x10*3/uL    nRBC 0.0 0.0 - 0.0 /100 WBCs    RBC 3.57 (L) 4.50 - 5.90 x10*6/uL    Hemoglobin 10.5 (L) 13.5 - 17.5 g/dL    Hematocrit 32.3 (L) 41.0 - 52.0 %    MCV 91 80 - 100 fL    MCH 29.4 26.0 - 34.0 pg    MCHC 32.5 32.0 - 36.0 g/dL    RDW 13.3 11.5 - 14.5 %    Platelets 191 150 - 450 " x10*3/uL    Neutrophils % 81.6 40.0 - 80.0 %    Immature Granulocytes %, Automated 0.8 0.0 - 0.9 %    Lymphocytes % 9.4 13.0 - 44.0 %    Monocytes % 7.0 2.0 - 10.0 %    Eosinophils % 0.6 0.0 - 6.0 %    Basophils % 0.6 0.0 - 2.0 %    Neutrophils Absolute 9.85 (H) 1.20 - 7.70 x10*3/uL    Immature Granulocytes Absolute, Automated 0.10 0.00 - 0.70 x10*3/uL    Lymphocytes Absolute 1.14 (L) 1.20 - 4.80 x10*3/uL    Monocytes Absolute 0.84 0.10 - 1.00 x10*3/uL    Eosinophils Absolute 0.07 0.00 - 0.70 x10*3/uL    Basophils Absolute 0.07 0.00 - 0.10 x10*3/uL   Comprehensive metabolic panel   Result Value Ref Range    Glucose 454 (HH) 74 - 99 mg/dL    Sodium 131 (L) 136 - 145 mmol/L    Potassium 4.6 3.5 - 5.3 mmol/L    Chloride 101 98 - 107 mmol/L    Bicarbonate 20 (L) 21 - 32 mmol/L    Anion Gap 15 10 - 20 mmol/L    Urea Nitrogen 76 (H) 6 - 23 mg/dL    Creatinine 4.00 (H) 0.50 - 1.30 mg/dL    eGFR 16 (L) >60 mL/min/1.73m*2    Calcium 8.4 (L) 8.6 - 10.3 mg/dL    Albumin 3.4 3.4 - 5.0 g/dL    Alkaline Phosphatase 89 33 - 136 U/L    Total Protein 6.6 6.4 - 8.2 g/dL    AST 43 (H) 9 - 39 U/L    Bilirubin, Total 0.5 0.0 - 1.2 mg/dL    ALT 3 (L) 10 - 52 U/L   Magnesium   Result Value Ref Range    Magnesium 2.30 1.60 - 2.40 mg/dL   Lipase   Result Value Ref Range    Lipase 6 (L) 9 - 82 U/L   Beta Hydroxybutyrate   Result Value Ref Range    Beta-Hydroxybutyrate 0.24 0.02 - 0.27 mmol/L   Phosphorus   Result Value Ref Range    Phosphorus 3.5 2.5 - 4.9 mg/dL   Light Blue Top   Result Value Ref Range    Extra Tube Hold for add-ons.    PST Top   Result Value Ref Range    Extra Tube Hold for add-ons.    Blood Gas Venous Full Panel   Result Value Ref Range    POCT pH, Venous 7.28 (L) 7.33 - 7.43 pH    POCT pCO2, Venous 45 41 - 51 mm Hg    POCT pO2, Venous 40 35 - 45 mm Hg    POCT SO2, Venous 56 45 - 75 %    POCT Oxy Hemoglobin, Venous 55.7 45.0 - 75.0 %    POCT Hematocrit Calculated, Venous 32.0 (L) 41.0 - 52.0 %    POCT Sodium, Venous 132  (L) 136 - 145 mmol/L    POCT Potassium, Venous 4.9 3.5 - 5.3 mmol/L    POCT Chloride, Venous 103 98 - 107 mmol/L    POCT Ionized Calicum, Venous 1.31 1.10 - 1.33 mmol/L    POCT Glucose, Venous 429 (H) 74 - 99 mg/dL    POCT Lactate, Venous 3.2 (H) 0.4 - 2.0 mmol/L    POCT Base Excess, Venous -5.5 (L) -2.0 - 3.0 mmol/L    POCT HCO3 Calculated, Venous 21.1 (L) 22.0 - 26.0 mmol/L    POCT Hemoglobin, Venous 10.7 (L) 13.5 - 17.5 g/dL    POCT Anion Gap, Venous 13.0 10.0 - 25.0 mmol/L    Patient Temperature      FiO2 21 %   Urinalysis with Reflex Culture and Microscopic   Result Value Ref Range    Color, Urine Light-Yellow Light-Yellow, Yellow, Dark-Yellow    Appearance, Urine Clear Clear    Specific Gravity, Urine 1.016 1.005 - 1.035    pH, Urine 5.5 5.0, 5.5, 6.0, 6.5, 7.0, 7.5, 8.0    Protein, Urine 10 (TRACE) NEGATIVE, 10 (TRACE), 20 (TRACE) mg/dL    Glucose, Urine OVER (4+) (A) Normal mg/dL    Blood, Urine 1.0 (3+) (A) NEGATIVE    Ketones, Urine NEGATIVE NEGATIVE mg/dL    Bilirubin, Urine NEGATIVE NEGATIVE    Urobilinogen, Urine Normal Normal mg/dL    Nitrite, Urine NEGATIVE NEGATIVE    Leukocyte Esterase, Urine NEGATIVE NEGATIVE   Urinalysis Microscopic   Result Value Ref Range    WBC, Urine 1-5 1-5, NONE /HPF    RBC, Urine NONE NONE, 1-2, 3-5 /HPF   Blood Gas Lactic Acid, Venous   Result Value Ref Range    POCT Lactate, Venous 2.7 (H) 0.4 - 2.0 mmol/L   POCT GLUCOSE   Result Value Ref Range    POCT Glucose 269 (H) 74 - 99 mg/dL   Blood Gas Venous Full Panel   Result Value Ref Range    POCT pH, Venous 7.28 (L) 7.33 - 7.43 pH    POCT pCO2, Venous 47 41 - 51 mm Hg    POCT pO2, Venous 40 35 - 45 mm Hg    POCT SO2, Venous 56 45 - 75 %    POCT Oxy Hemoglobin, Venous 55.2 45.0 - 75.0 %    POCT Hematocrit Calculated, Venous 31.0 (L) 41.0 - 52.0 %    POCT Sodium, Venous 134 (L) 136 - 145 mmol/L    POCT Potassium, Venous 5.2 3.5 - 5.3 mmol/L    POCT Chloride, Venous 108 (H) 98 - 107 mmol/L    POCT Ionized Calicum, Venous 1.29  1.10 - 1.33 mmol/L    POCT Glucose, Venous 286 (H) 74 - 99 mg/dL    POCT Lactate, Venous 2.6 (H) 0.4 - 2.0 mmol/L    POCT Base Excess, Venous -4.6 (L) -2.0 - 3.0 mmol/L    POCT HCO3 Calculated, Venous 22.1 22.0 - 26.0 mmol/L    POCT Hemoglobin, Venous 10.4 (L) 13.5 - 17.5 g/dL    POCT Anion Gap, Venous 9.0 (L) 10.0 - 25.0 mmol/L    Patient Temperature      FiO2 21 %      Lower extremity venous duplex left    Result Date: 11/21/2024  Preliminary Cardiology Report            [facility] [place] [address] [id number]     Tel [phone] Fax [phone]       Preliminary Vascular Lab Report  VASC US LOWER EXTREMITY VENOUS DUPLEX LEFT  Patient Name:     THA Scales Physician: [id number] Cuca Acuña MD,                                                    RPVI Study Date:       11/21/2024    Ordering Provider: [id number] AMA HENSLEY MRN/PID:          [id number]      Fellow: Accession#:       [id number]  Technologist:      Jessica Quarles RVT YOB: 1960     Technologist 2: Gender:           M             Encounter#:        [id number] Admission Status: Emergency     Location           [facility]                                 Performed:  Diagnosis/ICD: Other specified soft tissue disorders-M79.89 CPT Codes:     80956 Peripheral venous duplex scan for DVT Limited  Pertinent History: Immobility.  PRELIMINARY CONCLUSIONS:  Right Lower Venous: The right common femoral vein demonstrates normal spontaneous and respirophasic flow. Left Lower Venous: No evidence of acute deep vein thrombus visualized in the left lower extremity. Poor visualization of peroneal veins in grayscale. Peroneal veins visualized in segments with color doppler. Additional Findings: Technically difficult exam due to body habitus.  Imaging & Doppler Findings:  Right        Flow CFV   Spontaneous/Phasic  Left                  Compress Thrombus        Flow Distal External Iliac   Yes      None    Spontaneous/Phasic CFV                     Yes      None   Spontaneous/Phasic PFV                     Yes      None FV Proximal             Yes      None   Spontaneous/Phasic FV Mid                  Yes      None FV Distal               Yes      None Popliteal               Yes      None   Spontaneous/Phasic Peroneal                Yes      None PTV                     Yes      None  VASCULAR PRELIMINARY REPORT completed by Jessica Quarles [facility], T on 11/21/2024 at 11:29:11 PM  ** Final **     XR chest 1 view    Result Date: 11/21/2024  Interpreted By:  Bro Wilkerson, STUDY: XR CHEST 1 VIEW;  11/21/2024 6:57 pm   INDICATION: Signs/Symptoms:check for pneumonia, cause for dka.     COMPARISON: 10/21/2015   ACCESSION NUMBER(S): [id number]   ORDERING CLINICIAN: HAFSA EATON   FINDINGS:     The cardiomediastinal silhouette and pulmonary vasculature are within normal limits. Mild left basilar atelectasis.   No consolidation, pleural effusion or pneumothorax.         No acute cardiopulmonary process.   Mild left basilar atelectasis.   MACRO: None.   Signed by: Bro Wilkerson 11/21/2024 7:14 PM Dictation workstation:   [id number]        Home Medications  Prior to Admission medications    Medication Sig Start Date End Date Taking? Authorizing Provider   acetaminophen (Tylenol) 325 mg tablet Take 2 tablets (650 mg) by mouth every 6 hours for 7 days. 11/20/24 11/27/24  ELLIOTT Reid   apixaban (Eliquis) 2.5 mg tablet Take 1 tablet (2.5 mg) by mouth 2 times a day for 28 days. 11/20/24 12/18/24  ELLIOTT Reid   atorvastatin (Lipitor) 80 mg tablet Take 1 tablet (80 mg) by mouth once daily.    Historical Provider, MD   dapagliflozin propanediol (Farxiga) 10 mg Take 1 tablet (10 mg) by mouth once every 24 hours.    Historical Provider, MD   insulin asp prt-insulin aspart (NovoLOG Mix 70-30 U-100 Insuln) 100 unit/mL (70-30) injection Inject 8 Units under the skin 2 times daily (morning and late  afternoon). Take as directed per insulin instructions.    Historical Provider, MD   lisinopril 10 mg tablet Take 1 tablet (10 mg) by mouth once daily. HOLD and resume on 11/23 after repeat BMP 11/20/24   Luis Fernando Gross MD   oxyCODONE (Roxicodone) 5 mg immediate release tablet Take 1 tablet (5 mg) by mouth every 6 hours if needed for severe pain (7 - 10) for up to 7 days. 11/20/24 11/27/24  Mansi Shukla APRN-CNP   lisinopril 10 mg tablet Take 1 tablet (10 mg) by mouth once daily.  11/20/24  Historical Provider, MD   lisinopril 40 mg tablet Take 1 tablet (40 mg) by mouth once daily.  11/17/24  Historical Provider, MD   oxyCODONE (Roxicodone) 5 mg immediate release tablet Take 1 tablet (5 mg) by mouth every 6 hours if needed for severe pain (7 - 10) for up to 7 days. 11/20/24 11/20/24  JOSE Reid-CNP       Medications  Scheduled medications  insulin lispro, 0-10 Units, subcutaneous, Before meals & nightly  tamsulosin, 0.4 mg, oral, Daily      Continuous medications  sodium chloride 0.9%, 100 mL/hr      PRN medications  PRN medications: acetaminophen **OR** acetaminophen **OR** acetaminophen, dextrose, dextrose, glucagon, glucagon, melatonin, polyethylene glycol        Assessment/Plan   Assessment & Plan  Hyperglycemia due to diabetes mellitus (Multi)    Metabolic acidosis due to diabetes mellitus (Multi)    Acute kidney injury superimposed on stage 4 chronic kidney disease    Acute urinary retention    Neutrophilic leukocytosis    Status post hip surgery    Type 2 diabetes mellitus with kidney complication, with long-term current use of insulin    Hyponatremia        Plan:  Code Status: Full Code   Consult: Deferred nephrology consult to attending. Consider urology consult given acute urinary retention and possibly endocrinology for uncontrolled type two diabetes    ATB: Although patient is noted to have neutrophilic leukocytosis with WBC 12.1, it is down trending compared to 16.9 at discharge from the  hospital on 11/20/24. No obvious sources of infection, patient is afebrile, only mildly tachycardic and hip incision without erythema or drainage. Likely reactive due to recent surgery. Holding off on antibiotics at this time.     IVFs: 0.9% sodium chloride at 100 mL/hr continuous x 24 hours for mild hyponatremia and HARSHIL on CKD 4    Meds: Sliding scale insulin with hypoglycemia protocol. Started daily flomax due to acute urinary retention  Tylenol 650 mg p.o. every 6 hours as needed for pain 1-6/headaches/fever, melatonin 3 milligrams p.o. daily as needed for insomnia, MiraLAX 17 gms p.o daily as needed for constipation.  Avoid nephrotoxic medications   Monitor for hypo/hyperglycemia signs and symptoms     Diet: Cardiac, Carb Consistent    Labs ordered: CBC, BMP, Mg, Phos. Lactate. Blood cultures x 2 and urine culture collected in the ER, pending results at this time.   - Mild hyponatremia: urine electrolytes, urine osmolality, serum osmolality  - Monitor / trend labs while inpatient.  - Monitor and replace electrolytes as needed.  - Transfuse with PRBC for hemoglobin<7.0     Test ordered: VQ scan ordered in ER, pending completion. Left lower extremity duplex done with preliminary results negative for DVT, awaiting final read. Additional testing deferred to attending.     Additional Orders:   Telemetry monitoring   Vital signs with BP, HR, & RR Q 4 hours.  Pulse ox Q 4 hours.   Admission height and weight.  Maintain chavarria catheter at this time, can attempt void trial prior to discharge  Strict I&Os   Fall precautions   Out of bed with assistance   PT/OT eval and treat as indicated, patient with recent left hip hemiarthroplasty  Call physician for temperature < 36.5 or >38.0 degrees celsius.  Call physician for pulse <50 or >120.  Call physician for respiratory rate <10 or >22.  Call physician for systolic blood pressure <90 or >170.  Call physician for diastolic blood pressure < 50 or >100.  Call physician for pulse  ox <92%.    VTE prophylaxis:   On eliquis BID, renally dosed. Patient unsure if he took all of his evening medications prior to coming in. Can restart once med rec is completed  BLE SCDs.  Monitor for s/s of bleeding    Medication reconciliation to be completed when nursing/pharmacy  are able to verify patient's accurate home medications.    See additional orders for further plan of care. Any further evaluation and management per attending and consulting physicians.      This note has been transcribed using Dragon voice recognition system and there is a possibility of unintentional typing misprints.  Any information found to be copied from previous providers is done in the best interest of the patient to provide accurate, quality, and continuity of care.     I spent 60 minutes in the professional and overall care of this patient.      JOSE Estrada-CNP  [facility]

## 2024-11-22 NOTE — PROGRESS NOTES
Spiritual Care Visit    Clinical Encounter Type  Visited With: Patient and family together  Routine Visit: Introduction         Values/Beliefs  Cultural Requests During Hospitalization: no  Spiritual Requests During Hospitalization: no    Sacramental Encounters  Sacrament of Sick-Anointing: Anointed              Advance Directives  Advance Directives Reviewed Date: 11/22/24  Advance Directives Reviewed with: Patient (Brother)  Advance Directives Completed Date: 11/22/24  Advance Directives Type: Living Will, Power of  for Healthcare              Taxonomy  Intended Effects: Build relationship of care and support, Preserve dignity and respect, Establish rapport and connectedness, Demonstrate caring and concern  Methods: Offer spiritual/Jainism support  Interventions: Active listening, Ask guided questions, Perform a Jainism rite or ritual, Share words of hope and inspiration

## 2024-11-22 NOTE — H&P
History Of Present Illness  Lito Osei is a 64 y.o. male with past medical history of hypertension, hyperlipidemia, CVA without residual deficits, CKD stage IV, insulin-dependent type 2 diabetes mellitus, and recent left hip fracture s/p left hip hemiarthroplasty on 11/18/2024 presents to [facility] on 11/21/2024 from [facility] due to concern of worsening renal function and hyperglycemia on outpatient lab work.    Per documentation the nursing facility was concerned as the patient's outpatient lab work done earlier on 11/21 demonstrated a blood glucose level greater than 500 and patient was having some difficulty urinating. Patient states that when he arrived to the ER earlier he was having some difficulty urinating and had noticed some burning with urination at the facility. However, this has resolved since ER physician placed a chavarria catheter. He tells me that at the nursing facility they were not giving him a carb controlled diet which he believes is the reason his blood sugar was significantly elevated today and he has also reportedly not staying well hydrated either . Apart from that, his only major complaint is being fatigued and wanting to get enough rest. Patient states he had left hip surgery three days ago, but denies any pain at surgical site. He denies any recent fever/chills, headache, dizziness, chest pain / palpitations, shortness of breath, cough, abdominal pain, N/V/D/C or hematuria.     ED Course:  Vital signs: Temp. 97.2 F,  bpm, RR 20, /76, SPO2 98% on room air   CMP: Glucose 454, Na+ 131, bicarb 20, BUN 76, creat 4.00, EGFR 16, calcium 8.4, ALT 3, AST 43, lipase 6  CBC w/diff: WBC 12.1, H&H 10.5/32.3, neutrophils 9.85 (baseline Hgb 11.6-12.4)  Beta hydroxybutyrate: 0.24  VBG x 2: mild metabolic acidosis with elevated venous lactate of 3.2 > 2.7  UA: +3 blood, glucosuria consistent with presentation of hyperglycemia, negative for evidence of UTI  CXR: No acute  cardiopulmonary process.  Mild left basilar atelectasis  EKG: Sinus tachycardia, ventricular rate 124 bpm,  ms, QRS 76 ms, QTc 416 ms. No ST elevation or depression noted.   Medications Given: Regular insulin 10 units subcutaneous x 1, LR 1 L IV bolus x 1, 0.9% sodium chloride 1 L IV bolus x 1  Coude Chavarria Catheter placed in ER, noted to have initial hematuria due to traumatic insertion, now draining with dark yellow urine  Disposition: Patient admitted to [facility] due to hyperglycemia in the setting of uncontrolled type two diabetes mellitus with mild metabolic acidosis, acute urinary retention requiring chavarria catheter placement, and HARSHIL on CKD 4.    Past Medical History  Past Medical History:   Diagnosis Date    CKD (chronic kidney disease)     CVA (cerebral vascular accident) (Multi) 11/21/2024    HLD (hyperlipidemia)     Hypertension     Stroke (Multi)     Type 2 diabetes mellitus with kidney complication, with long-term current use of insulin      Surgical History  He has a past surgical history that includes Ankle surgery (Right) and Partial hip arthroplasty (Left).     Social History  He reports that he has never smoked. He does not have any smokeless tobacco history on file. He reports that he does not drink alcohol and does not use drugs.    Family History  Family History   Problem Relation Name Age of Onset    Diabetes Mother      Hypertension Mother      Coronary artery disease Father      Diabetes Father      Other (Ischemic heart disease) Father      Stroke Maternal Grandmother       Allergies  Patient has no known allergies.    Review of Systems   Constitutional:  Positive for fatigue. Negative for chills and fever.   HENT:  Positive for hearing loss (bilateral; extremely hard of hearing). Negative for sore throat and trouble swallowing.    Eyes:  Negative for visual disturbance.   Respiratory:  Negative for cough, chest tightness and shortness of breath.    Cardiovascular:  Negative for chest pain  and palpitations.   Gastrointestinal:  Negative for abdominal distention, abdominal pain, constipation, diarrhea, nausea and vomiting.   Genitourinary:  Positive for difficulty urinating (resolved since chavarria catheter placement) and dysuria. Negative for flank pain, frequency, hematuria and urgency.   Musculoskeletal:  Negative for back pain and gait problem.   Skin:  Negative for color change, rash and wound.   Neurological:  Positive for weakness. Negative for dizziness, syncope, light-headedness and headaches.   Psychiatric/Behavioral:  Negative for agitation, confusion and dysphoric mood. The patient is not nervous/anxious.      Physical Exam  Constitutional:       General: He is not in acute distress.     Appearance: He is ill-appearing.   HENT:      Right Ear: External ear normal. Decreased hearing noted.      Left Ear: External ear normal. Decreased hearing noted.      Nose: Nose normal.      Mouth/Throat:      Mouth: Mucous membranes are dry.      Pharynx: Oropharynx is clear.   Eyes:      General: Lids are normal. Vision grossly intact.      Pupils: Pupils are equal, round, and reactive to light.   Cardiovascular:      Rate and Rhythm: Regular rhythm. Tachycardia present.      Pulses: Normal pulses.           Radial pulses are 2+ on the right side and 2+ on the left side.        Dorsalis pedis pulses are 2+ on the right side and 2+ on the left side.        Posterior tibial pulses are 2+ on the right side and 2+ on the left side.      Heart sounds: Normal heart sounds. No murmur heard.  Pulmonary:      Effort: Pulmonary effort is normal. No respiratory distress.      Breath sounds: Normal breath sounds. No wheezing, rhonchi or rales.   Abdominal:      General: Bowel sounds are normal. There is no distension.      Palpations: Abdomen is soft.      Tenderness: There is no abdominal tenderness.   Genitourinary:     Comments: Chavarria catheter noted, draining dark yellow urine. No obvious/gross  "hematuria  Musculoskeletal:      Right hip: Normal.      Right lower le+ Pitting Edema present.      Left lower le+ Pitting Edema present.      Comments: Left hip wound: no erythema, no edema, no drainage, and dressing remains clean, dry, and intact   Skin:     General: Skin is warm and dry.      Capillary Refill: Capillary refill takes less than 2 seconds.   Neurological:      General: No focal deficit present.      Mental Status: He is alert and oriented to person, place, and time. Mental status is at baseline.      Motor: Motor function is intact.      Comments: Strong bilateral hand grasps and foot dorsi/plantar flexion     Psychiatric:         Attention and Perception: Attention normal.         Mood and Affect: Mood normal.         Speech: Speech normal.         Behavior: Behavior normal. Behavior is cooperative.         Thought Content: Thought content normal.       Last Recorded Vitals  Blood pressure 143/80, pulse (!) 112, temperature 37.1 °C (98.8 °F), resp. rate 18, height 1.753 m (5' 9\"), weight 77.1 kg (170 lb), SpO2 94%.  Visit Vitals  /80   Pulse (!) 112   Temp 37.1 °C (98.8 °F)   Resp 18   Ht 1.753 m (5' 9\")   Wt 77.1 kg (170 lb)   SpO2 94%   BMI 25.10 kg/m²   Smoking Status Never   BSA 1.94 m²     Weight: 77.1 kg (170 lb)   Pain Assessment: 0-10  0-10 (Numeric) Pain Score: 0 - No pain    Relevant Results  Results for orders placed or performed during the hospital encounter of 24 (from the past 24 hours)   POCT GLUCOSE   Result Value Ref Range    POCT Glucose 393 (H) 74 - 99 mg/dL   CBC and Auto Differential   Result Value Ref Range    WBC 12.1 (H) 4.4 - 11.3 x10*3/uL    nRBC 0.0 0.0 - 0.0 /100 WBCs    RBC 3.57 (L) 4.50 - 5.90 x10*6/uL    Hemoglobin 10.5 (L) 13.5 - 17.5 g/dL    Hematocrit 32.3 (L) 41.0 - 52.0 %    MCV 91 80 - 100 fL    MCH 29.4 26.0 - 34.0 pg    MCHC 32.5 32.0 - 36.0 g/dL    RDW 13.3 11.5 - 14.5 %    Platelets 191 150 - 450 x10*3/uL    Neutrophils % 81.6 40.0 - " 80.0 %    Immature Granulocytes %, Automated 0.8 0.0 - 0.9 %    Lymphocytes % 9.4 13.0 - 44.0 %    Monocytes % 7.0 2.0 - 10.0 %    Eosinophils % 0.6 0.0 - 6.0 %    Basophils % 0.6 0.0 - 2.0 %    Neutrophils Absolute 9.85 (H) 1.20 - 7.70 x10*3/uL    Immature Granulocytes Absolute, Automated 0.10 0.00 - 0.70 x10*3/uL    Lymphocytes Absolute 1.14 (L) 1.20 - 4.80 x10*3/uL    Monocytes Absolute 0.84 0.10 - 1.00 x10*3/uL    Eosinophils Absolute 0.07 0.00 - 0.70 x10*3/uL    Basophils Absolute 0.07 0.00 - 0.10 x10*3/uL   Comprehensive metabolic panel   Result Value Ref Range    Glucose 454 (HH) 74 - 99 mg/dL    Sodium 131 (L) 136 - 145 mmol/L    Potassium 4.6 3.5 - 5.3 mmol/L    Chloride 101 98 - 107 mmol/L    Bicarbonate 20 (L) 21 - 32 mmol/L    Anion Gap 15 10 - 20 mmol/L    Urea Nitrogen 76 (H) 6 - 23 mg/dL    Creatinine 4.00 (H) 0.50 - 1.30 mg/dL    eGFR 16 (L) >60 mL/min/1.73m*2    Calcium 8.4 (L) 8.6 - 10.3 mg/dL    Albumin 3.4 3.4 - 5.0 g/dL    Alkaline Phosphatase 89 33 - 136 U/L    Total Protein 6.6 6.4 - 8.2 g/dL    AST 43 (H) 9 - 39 U/L    Bilirubin, Total 0.5 0.0 - 1.2 mg/dL    ALT 3 (L) 10 - 52 U/L   Magnesium   Result Value Ref Range    Magnesium 2.30 1.60 - 2.40 mg/dL   Lipase   Result Value Ref Range    Lipase 6 (L) 9 - 82 U/L   Beta Hydroxybutyrate   Result Value Ref Range    Beta-Hydroxybutyrate 0.24 0.02 - 0.27 mmol/L   Phosphorus   Result Value Ref Range    Phosphorus 3.5 2.5 - 4.9 mg/dL   ECG 12 lead   Result Value Ref Range    Ventricular Rate 124 BPM    Atrial Rate 124 BPM    AK Interval 152 ms    QRS Duration 76 ms    QT Interval 290 ms    QTC Calculation(Bazett) 416 ms    P Axis 52 degrees    R Axis 29 degrees    T Axis 82 degrees    QRS Count 20 beats    Q Onset 221 ms    P Onset 145 ms    P Offset 202 ms    T Offset 366 ms    QTC Fredericia 369 ms   Light Blue Top   Result Value Ref Range    Extra Tube Hold for add-ons.    PST Top   Result Value Ref Range    Extra Tube Hold for add-ons.     Osmolality   Result Value Ref Range    Osmolality, Serum 330 (H) 280 - 300 mOsm/kg   Blood Gas Venous Full Panel   Result Value Ref Range    POCT pH, Venous 7.28 (L) 7.33 - 7.43 pH    POCT pCO2, Venous 45 41 - 51 mm Hg    POCT pO2, Venous 40 35 - 45 mm Hg    POCT SO2, Venous 56 45 - 75 %    POCT Oxy Hemoglobin, Venous 55.7 45.0 - 75.0 %    POCT Hematocrit Calculated, Venous 32.0 (L) 41.0 - 52.0 %    POCT Sodium, Venous 132 (L) 136 - 145 mmol/L    POCT Potassium, Venous 4.9 3.5 - 5.3 mmol/L    POCT Chloride, Venous 103 98 - 107 mmol/L    POCT Ionized Calicum, Venous 1.31 1.10 - 1.33 mmol/L    POCT Glucose, Venous 429 (H) 74 - 99 mg/dL    POCT Lactate, Venous 3.2 (H) 0.4 - 2.0 mmol/L    POCT Base Excess, Venous -5.5 (L) -2.0 - 3.0 mmol/L    POCT HCO3 Calculated, Venous 21.1 (L) 22.0 - 26.0 mmol/L    POCT Hemoglobin, Venous 10.7 (L) 13.5 - 17.5 g/dL    POCT Anion Gap, Venous 13.0 10.0 - 25.0 mmol/L    Patient Temperature      FiO2 21 %   Urinalysis with Reflex Culture and Microscopic   Result Value Ref Range    Color, Urine Light-Yellow Light-Yellow, Yellow, Dark-Yellow    Appearance, Urine Clear Clear    Specific Gravity, Urine 1.016 1.005 - 1.035    pH, Urine 5.5 5.0, 5.5, 6.0, 6.5, 7.0, 7.5, 8.0    Protein, Urine 10 (TRACE) NEGATIVE, 10 (TRACE), 20 (TRACE) mg/dL    Glucose, Urine OVER (4+) (A) Normal mg/dL    Blood, Urine 1.0 (3+) (A) NEGATIVE    Ketones, Urine NEGATIVE NEGATIVE mg/dL    Bilirubin, Urine NEGATIVE NEGATIVE    Urobilinogen, Urine Normal Normal mg/dL    Nitrite, Urine NEGATIVE NEGATIVE    Leukocyte Esterase, Urine NEGATIVE NEGATIVE   Extra Urine Gray Tube   Result Value Ref Range    Extra Tube Hold for add-ons.    Urinalysis Microscopic   Result Value Ref Range    WBC, Urine 1-5 1-5, NONE /HPF    RBC, Urine NONE NONE, 1-2, 3-5 /HPF   Urine electrolytes   Result Value Ref Range    Sodium, Urine Random 22 mmol/L    Sodium/Creatinine Ratio 34 Not established. mmol/g Creat    Potassium, Urine Random 23  mmol/L    Potassium/Creatinine Ratio 36 Not established mmol/g Creat    Chloride, Urine Random 15 mmol/L    Chloride/Creatinine Ratio 23 23 - 275 mmol/g creat    Creatinine, Urine Random 63.9 20.0 - 370.0 mg/dL   Osmolality, Urine   Result Value Ref Range    Osmolality, Urine Random 411 200 - 1,200 mOsm/kg   Blood Gas Lactic Acid, Venous   Result Value Ref Range    POCT Lactate, Venous 2.7 (H) 0.4 - 2.0 mmol/L   POCT GLUCOSE   Result Value Ref Range    POCT Glucose 269 (H) 74 - 99 mg/dL   Blood Gas Venous Full Panel   Result Value Ref Range    POCT pH, Venous 7.28 (L) 7.33 - 7.43 pH    POCT pCO2, Venous 47 41 - 51 mm Hg    POCT pO2, Venous 40 35 - 45 mm Hg    POCT SO2, Venous 56 45 - 75 %    POCT Oxy Hemoglobin, Venous 55.2 45.0 - 75.0 %    POCT Hematocrit Calculated, Venous 31.0 (L) 41.0 - 52.0 %    POCT Sodium, Venous 134 (L) 136 - 145 mmol/L    POCT Potassium, Venous 5.2 3.5 - 5.3 mmol/L    POCT Chloride, Venous 108 (H) 98 - 107 mmol/L    POCT Ionized Calicum, Venous 1.29 1.10 - 1.33 mmol/L    POCT Glucose, Venous 286 (H) 74 - 99 mg/dL    POCT Lactate, Venous 2.6 (H) 0.4 - 2.0 mmol/L    POCT Base Excess, Venous -4.6 (L) -2.0 - 3.0 mmol/L    POCT HCO3 Calculated, Venous 22.1 22.0 - 26.0 mmol/L    POCT Hemoglobin, Venous 10.4 (L) 13.5 - 17.5 g/dL    POCT Anion Gap, Venous 9.0 (L) 10.0 - 25.0 mmol/L    Patient Temperature      FiO2 21 %   Blood Culture    Specimen: Peripheral Venipuncture; Blood culture   Result Value Ref Range    Blood Culture Loaded on Instrument - Culture in progress    Blood Culture    Specimen: Peripheral Venipuncture; Blood culture   Result Value Ref Range    Blood Culture Loaded on Instrument - Culture in progress    POCT GLUCOSE   Result Value Ref Range    POCT Glucose 177 (H) 74 - 99 mg/dL   Lactate   Result Value Ref Range    Lactate 1.1 0.4 - 2.0 mmol/L   POCT GLUCOSE   Result Value Ref Range    POCT Glucose 167 (H) 74 - 99 mg/dL   Basic metabolic panel   Result Value Ref Range    Glucose  204 (H) 74 - 99 mg/dL    Sodium 138 136 - 145 mmol/L    Potassium 4.5 3.5 - 5.3 mmol/L    Chloride 111 (H) 98 - 107 mmol/L    Bicarbonate 19 (L) 21 - 32 mmol/L    Anion Gap 13 10 - 20 mmol/L    Urea Nitrogen 64 (H) 6 - 23 mg/dL    Creatinine 2.78 (H) 0.50 - 1.30 mg/dL    eGFR 25 (L) >60 mL/min/1.73m*2    Calcium 8.1 (L) 8.6 - 10.3 mg/dL   CBC   Result Value Ref Range    WBC 11.6 (H) 4.4 - 11.3 x10*3/uL    nRBC 0.0 0.0 - 0.0 /100 WBCs    RBC 3.20 (L) 4.50 - 5.90 x10*6/uL    Hemoglobin 9.3 (L) 13.5 - 17.5 g/dL    Hematocrit 29.8 (L) 41.0 - 52.0 %    MCV 93 80 - 100 fL    MCH 29.1 26.0 - 34.0 pg    MCHC 31.2 (L) 32.0 - 36.0 g/dL    RDW 13.5 11.5 - 14.5 %    Platelets 164 150 - 450 x10*3/uL   Magnesium   Result Value Ref Range    Magnesium 2.24 1.60 - 2.40 mg/dL   Phosphorus   Result Value Ref Range    Phosphorus 3.7 2.5 - 4.9 mg/dL   BLOOD GAS VENOUS FULL PANEL   Result Value Ref Range    POCT pH, Venous 7.29 (L) 7.33 - 7.43 pH    POCT pCO2, Venous 47 41 - 51 mm Hg    POCT pO2, Venous 40 35 - 45 mm Hg    POCT SO2, Venous 57 45 - 75 %    POCT Oxy Hemoglobin, Venous 57.0 45.0 - 75.0 %    POCT Hematocrit Calculated, Venous 31.0 (L) 41.0 - 52.0 %    POCT Sodium, Venous 137 136 - 145 mmol/L    POCT Potassium, Venous 5.8 (H) 3.5 - 5.3 mmol/L    POCT Chloride, Venous 114 (H) 98 - 107 mmol/L    POCT Ionized Calicum, Venous 1.31 1.10 - 1.33 mmol/L    POCT Glucose, Venous 223 (H) 74 - 99 mg/dL    POCT Lactate, Venous 1.4 0.4 - 2.0 mmol/L    POCT Base Excess, Venous -4.0 (L) -2.0 - 3.0 mmol/L    POCT HCO3 Calculated, Venous 22.6 22.0 - 26.0 mmol/L    POCT Hemoglobin, Venous 10.3 (L) 13.5 - 17.5 g/dL    POCT Anion Gap, Venous 6.0 (L) 10.0 - 25.0 mmol/L    Patient Temperature      FiO2 31 %   POCT GLUCOSE   Result Value Ref Range    POCT Glucose 198 (H) 74 - 99 mg/dL   POCT GLUCOSE   Result Value Ref Range    POCT Glucose 266 (H) 74 - 99 mg/dL      NM Lung perfusion with spect    Result Date: 11/22/2024  Interpreted By:  Nuria  Lalo Benavidez STUDY: NM LUNG PERFUSION WITH SPECT;  11/22/2024 1:45 pm   INDICATION: Signs/Symptoms:persistent tachycardia, recent hip arthroplasty.   COMPARISON: 11/21/2024 chest radiograph   ACCESSION NUMBER(S): [id number]   ORDERING CLINICIAN: TOI HALE   TECHNIQUE: DIVISION OF NUCLEAR MEDICINE PERFUSION LUNG SCANS   Multiple perfusion images of the lungs were acquired after the intravenous administration of 4.3 mCi of Tc-99m macroaggregated albumin (MAA). In addition, SPECT/CT of the chest was performed.   FINDINGS: Perfusion images of both lungs demonstrate mild heterogeneity throughout the lung fields bilaterally. There are bilateral wedge-shaped segmental and subsegmental perfusion defects most conspicuous in the lateral right upper lobe, superior left lower lobe, posterior left lower lobe and lateral left upper lobe.       1. Bilateral wedge-shaped segmental and subsegmental perfusion defects as described above suggestive of acute pulmonary embolism (high probability). 2. An epic secure chat message was sent to Den Potts MD at 20:06 p.m. on 11/22/2024 with message acknowledgement.   I personally reviewed the images/study and I agree with the resident Sixto Diaz's findings as stated. This study was interpreted at [facility], [place], [place].   MACRO: Critical Finding:  See findings. Notification was initiated on 11/22/2024 at 1:57 pm by  Sixto Diaz.  (**-YCF-**) Instructions:       Signed by: Reema Quiñones 11/22/2024 2:11 PM Dictation workstation:   [id number]    Lower extremity venous duplex left    Result Date: 11/22/2024            [facility] [address] [place] [address]     Tel [phone] Fax [phone]  Vascular Lab Report  VASC US LOWER EXTREMITY VENOUS DUPLEX LEFT Patient Name:      THA Scales Physician:  [id number] Cuca Acuña MD,  RPVI Study Date:        11/21/2024           Ordering Provider:  [id number] AMA HENSLEY MRN/PID:           [id number]             Fellow: Accession#:        [id number]         Technologist:       Jessica Quarles RVT Date of Birth/Age: 1960 / 64 years Technologist 2: Gender:            M                    Encounter#:         [id number] Admission Status:  Emergency            Location Performed: [facility]  Diagnosis/ICD: Other specified soft tissue disorders-M79.89 CPT Codes:     00476 Peripheral venous duplex scan for DVT Limited  Pertinent History: Immobility.  CONCLUSIONS: Right Lower Venous: The right common femoral vein demonstrates normal spontaneous and respirophasic flow. Left Lower Venous: No evidence of acute deep vein thrombus visualized in the left lower extremity. Poor visualization of peroneal veins in grayscale. Peroneal veins visualized in segments with color Doppler. Additional Findings: Technically difficult exam due to body habitus.  Imaging & Doppler Findings:  Right        Flow CFV   Spontaneous/Phasic  Left                  Compress Thrombus        Flow Distal External Iliac   Yes      None   Spontaneous/Phasic CFV                     Yes      None   Spontaneous/Phasic PFV                     Yes      None FV Proximal             Yes      None   Spontaneous/Phasic FV Mid                  Yes      None FV Distal               Yes      None Popliteal               Yes      None   Spontaneous/Phasic Peroneal                Yes      None PTV                     Yes      None  [id number] Cuca Acuña MD, RPVI Electronically signed by [id number] Cuca Acuña MD, RPVI on 11/22/2024 at 9:14:44 AM  ** Final **     ECG 12 lead    Result Date: 11/22/2024  Sinus tachycardia Minimal voltage criteria for LVH, may be normal variant Borderline ECG When compared with ECG of 21-OCT-2015 18:06, No significant change was found    XR chest 1 view    Result Date: 11/21/2024  Interpreted By:  Rhea  Bro, STUDY: XR CHEST 1 VIEW;  11/21/2024 6:57 pm   INDICATION: Signs/Symptoms:check for pneumonia, cause for dka.     COMPARISON: 10/21/2015   ACCESSION NUMBER(S): [id number]   ORDERING CLINICIAN: HAFSA EATON   FINDINGS:     The cardiomediastinal silhouette and pulmonary vasculature are within normal limits. Mild left basilar atelectasis.   No consolidation, pleural effusion or pneumothorax.         No acute cardiopulmonary process.   Mild left basilar atelectasis.   MACRO: None.   Signed by: Bro Wilkerson 11/21/2024 7:14 PM Dictation workstation:   [id number]        Home Medications  Prior to Admission medications    Medication Sig Start Date End Date Taking? Authorizing Provider   acetaminophen (Tylenol) 325 mg tablet Take 2 tablets (650 mg) by mouth every 6 hours for 7 days. 11/20/24 11/27/24  ELLIOTT Reid   apixaban (Eliquis) 2.5 mg tablet Take 1 tablet (2.5 mg) by mouth 2 times a day for 28 days. 11/20/24 12/18/24  ELLIOTT Reid   atorvastatin (Lipitor) 80 mg tablet Take 1 tablet (80 mg) by mouth once daily.    Historical Provider, MD   dapagliflozin propanediol (Farxiga) 10 mg Take 1 tablet (10 mg) by mouth once every 24 hours.    Historical Provider, MD   insulin asp prt-insulin aspart (NovoLOG Mix 70-30 U-100 Insuln) 100 unit/mL (70-30) injection Inject 8 Units under the skin 2 times daily (morning and late afternoon). Take as directed per insulin instructions.    Historical Provider, MD   lisinopril 10 mg tablet Take 1 tablet (10 mg) by mouth once daily. HOLD and resume on 11/23 after repeat BMP 11/20/24   Luis Fernando Gross MD   oxyCODONE (Roxicodone) 5 mg immediate release tablet Take 1 tablet (5 mg) by mouth every 6 hours if needed for severe pain (7 - 10) for up to 7 days. 11/20/24 11/27/24  ELLIOTT Willett   lisinopril 10 mg tablet Take 1 tablet (10 mg) by mouth once daily.  11/20/24  Historical Provider, MD   lisinopril 40 mg tablet Take 1 tablet (40 mg) by mouth  once daily.  11/17/24  Historical Provider, MD   oxyCODONE (Roxicodone) 5 mg immediate release tablet Take 1 tablet (5 mg) by mouth every 6 hours if needed for severe pain (7 - 10) for up to 7 days. 11/20/24 11/20/24  JOSE Reid-CNP       Medications  Scheduled medications  apixaban, 10 mg, oral, BID   Followed by  [START ON 11/29/2024] apixaban, 5 mg, oral, BID  atorvastatin, 80 mg, oral, Daily  dapagliflozin propanediol, 10 mg, oral, q24h  insulin lispro, 0-10 Units, subcutaneous, Before meals & nightly  insulin lispro protamin-lispro, 8 Units, subcutaneous, BID AC  [Held by provider] lisinopril, 10 mg, oral, Daily  tamsulosin, 0.4 mg, oral, Daily      Continuous medications  oxygen, , Last Rate: 2 L/min (11/22/24 8993)  sodium chloride 0.9%, 100 mL/hr, Last Rate: 100 mL/hr (11/22/24 1041)      PRN medications  PRN medications: acetaminophen **OR** acetaminophen **OR** acetaminophen, dextrose, dextrose, glucagon, glucagon, melatonin, oxyCODONE, polyethylene glycol        Assessment/Plan   Assessment & Plan  Hyperglycemia due to diabetes mellitus (Multi)    Type 2 diabetes mellitus with kidney complication, with long-term current use of insulin    Acute kidney injury superimposed on stage 4 chronic kidney disease    Metabolic acidosis due to diabetes mellitus (Multi)    Status post hip surgery    Acute urinary retention    Neutrophilic leukocytosis    Hyponatremia      // HARSHIL   - likely pre renal due to hyperglycemia.     // hyperglycemia.       Plan:  Code Status: Full Code   Consult: Deferred nephrology consult to attending. Consider urology consult given acute urinary retention and possibly endocrinology for uncontrolled type two diabetes    ATB: Although patient is noted to have neutrophilic leukocytosis with WBC 12.1, it is down trending compared to 16.9 at discharge from the hospital on 11/20/24. No obvious sources of infection, patient is afebrile, only mildly tachycardic and hip incision without  erythema or drainage. Likely reactive due to recent surgery. Holding off on antibiotics at this time.     IVFs: 0.9% sodium chloride at 100 mL/hr continuous x 24 hours for mild hyponatremia and HARSHIL on CKD 4    Meds: Sliding scale insulin with hypoglycemia protocol. Started daily flomax due to acute urinary retention  Tylenol 650 mg p.o. every 6 hours as needed for pain 1-6/headaches/fever, melatonin 3 milligrams p.o. daily as needed for insomnia, MiraLAX 17 gms p.o daily as needed for constipation.  Avoid nephrotoxic medications   Monitor for hypo/hyperglycemia signs and symptoms     Diet: Cardiac, Carb Consistent    Labs ordered: CBC, BMP, Mg, Phos. Lactate. Blood cultures x 2 and urine culture collected in the ER, pending results at this time.   - Mild hyponatremia: urine electrolytes, urine osmolality, serum osmolality  - Monitor / trend labs while inpatient.  - Monitor and replace electrolytes as needed.  - Transfuse with PRBC for hemoglobin<7.0     Test ordered: VQ scan ordered in ER, pending completion. Left lower extremity duplex done with preliminary results negative for DVT, awaiting final read. Additional testing deferred to attending.     Additional Orders:   Telemetry monitoring   Vital signs with BP, HR, & RR Q 4 hours.  Pulse ox Q 4 hours.   Admission height and weight.  Maintain chavarria catheter at this time, can attempt void trial prior to discharge  Strict I&Os   Fall precautions   Out of bed with assistance   PT/OT eval and treat as indicated, patient with recent left hip hemiarthroplasty  Call physician for temperature < 36.5 or >38.0 degrees celsius.  Call physician for pulse <50 or >120.  Call physician for respiratory rate <10 or >22.  Call physician for systolic blood pressure <90 or >170.  Call physician for diastolic blood pressure < 50 or >100.  Call physician for pulse ox <92%.    VTE prophylaxis:   On eliquis BID, renally dosed. Patient unsure if he took all of his evening medications prior  to coming in. Can restart once med rec is completed  BLE SCDs.  Monitor for s/s of bleeding      Den Potts MD

## 2024-11-23 LAB
ANION GAP SERPL CALC-SCNC: 13 MMOL/L (ref 10–20)
BUN SERPL-MCNC: 55 MG/DL (ref 6–23)
CALCIUM SERPL-MCNC: 8.4 MG/DL (ref 8.6–10.3)
CHLORIDE SERPL-SCNC: 110 MMOL/L (ref 98–107)
CO2 SERPL-SCNC: 22 MMOL/L (ref 21–32)
CREAT SERPL-MCNC: 1.95 MG/DL (ref 0.5–1.3)
EGFRCR SERPLBLD CKD-EPI 2021: 38 ML/MIN/1.73M*2
ERYTHROCYTE [DISTWIDTH] IN BLOOD BY AUTOMATED COUNT: 13.8 % (ref 11.5–14.5)
GLUCOSE BLD MANUAL STRIP-MCNC: 147 MG/DL (ref 74–99)
GLUCOSE BLD MANUAL STRIP-MCNC: 175 MG/DL (ref 74–99)
GLUCOSE BLD MANUAL STRIP-MCNC: 217 MG/DL (ref 74–99)
GLUCOSE BLD MANUAL STRIP-MCNC: 229 MG/DL (ref 74–99)
GLUCOSE SERPL-MCNC: 187 MG/DL (ref 74–99)
HCT VFR BLD AUTO: 29.9 % (ref 41–52)
HGB BLD-MCNC: 9.3 G/DL (ref 13.5–17.5)
MAGNESIUM SERPL-MCNC: 2.24 MG/DL (ref 1.6–2.4)
MCH RBC QN AUTO: 28.8 PG (ref 26–34)
MCHC RBC AUTO-ENTMCNC: 31.1 G/DL (ref 32–36)
MCV RBC AUTO: 93 FL (ref 80–100)
NRBC BLD-RTO: 0 /100 WBCS (ref 0–0)
PLATELET # BLD AUTO: 190 X10*3/UL (ref 150–450)
POTASSIUM SERPL-SCNC: 4.4 MMOL/L (ref 3.5–5.3)
RBC # BLD AUTO: 3.23 X10*6/UL (ref 4.5–5.9)
SODIUM SERPL-SCNC: 141 MMOL/L (ref 136–145)
WBC # BLD AUTO: 10.7 X10*3/UL (ref 4.4–11.3)

## 2024-11-23 PROCEDURE — 2500000001 HC RX 250 WO HCPCS SELF ADMINISTERED DRUGS (ALT 637 FOR MEDICARE OP): Performed by: PHYSICIAN ASSISTANT

## 2024-11-23 PROCEDURE — 85027 COMPLETE CBC AUTOMATED: CPT

## 2024-11-23 PROCEDURE — 82947 ASSAY GLUCOSE BLOOD QUANT: CPT

## 2024-11-23 PROCEDURE — 2500000002 HC RX 250 W HCPCS SELF ADMINISTERED DRUGS (ALT 637 FOR MEDICARE OP, ALT 636 FOR OP/ED)

## 2024-11-23 PROCEDURE — 80048 BASIC METABOLIC PNL TOTAL CA: CPT

## 2024-11-23 PROCEDURE — 51702 INSERT TEMP BLADDER CATH: CPT

## 2024-11-23 PROCEDURE — 2500000002 HC RX 250 W HCPCS SELF ADMINISTERED DRUGS (ALT 637 FOR MEDICARE OP, ALT 636 FOR OP/ED): Performed by: PHYSICIAN ASSISTANT

## 2024-11-23 PROCEDURE — 36415 COLL VENOUS BLD VENIPUNCTURE: CPT

## 2024-11-23 PROCEDURE — 83735 ASSAY OF MAGNESIUM: CPT

## 2024-11-23 PROCEDURE — 1200000002 HC GENERAL ROOM WITH TELEMETRY DAILY

## 2024-11-23 RX ADMIN — APIXABAN 10 MG: 5 TABLET, FILM COATED ORAL at 21:28

## 2024-11-23 RX ADMIN — APIXABAN 10 MG: 5 TABLET, FILM COATED ORAL at 08:39

## 2024-11-23 RX ADMIN — INSULIN LISPRO 8 UNITS: 100 INJECTION, SUSPENSION SUBCUTANEOUS at 08:39

## 2024-11-23 RX ADMIN — TAMSULOSIN HYDROCHLORIDE 0.4 MG: 0.4 CAPSULE ORAL at 08:39

## 2024-11-23 RX ADMIN — INSULIN LISPRO 4 UNITS: 100 INJECTION, SOLUTION INTRAVENOUS; SUBCUTANEOUS at 08:39

## 2024-11-23 RX ADMIN — ATORVASTATIN CALCIUM 80 MG: 80 TABLET, FILM COATED ORAL at 08:39

## 2024-11-23 RX ADMIN — INSULIN LISPRO 4 UNITS: 100 INJECTION, SOLUTION INTRAVENOUS; SUBCUTANEOUS at 21:28

## 2024-11-23 RX ADMIN — DAPAGLIFLOZIN 10 MG: 10 TABLET, FILM COATED ORAL at 14:15

## 2024-11-23 ASSESSMENT — COGNITIVE AND FUNCTIONAL STATUS - GENERAL
STANDING UP FROM CHAIR USING ARMS: A LOT
TOILETING: A LOT
DRESSING REGULAR UPPER BODY CLOTHING: A LOT
HELP NEEDED FOR BATHING: A LOT
MOVING TO AND FROM BED TO CHAIR: A LOT
TURNING FROM BACK TO SIDE WHILE IN FLAT BAD: A LOT
WALKING IN HOSPITAL ROOM: A LOT
STANDING UP FROM CHAIR USING ARMS: A LOT
MOVING TO AND FROM BED TO CHAIR: A LOT
MOVING FROM LYING ON BACK TO SITTING ON SIDE OF FLAT BED WITH BEDRAILS: A LOT
DRESSING REGULAR UPPER BODY CLOTHING: A LOT
DAILY ACTIVITIY SCORE: 16
MOVING FROM LYING ON BACK TO SITTING ON SIDE OF FLAT BED WITH BEDRAILS: A LOT
WALKING IN HOSPITAL ROOM: A LOT
CLIMB 3 TO 5 STEPS WITH RAILING: A LOT
DRESSING REGULAR LOWER BODY CLOTHING: A LOT
CLIMB 3 TO 5 STEPS WITH RAILING: A LOT
TURNING FROM BACK TO SIDE WHILE IN FLAT BAD: A LOT
DRESSING REGULAR LOWER BODY CLOTHING: A LOT
MOBILITY SCORE: 12
TOILETING: A LOT
MOBILITY SCORE: 12
DAILY ACTIVITIY SCORE: 16
HELP NEEDED FOR BATHING: A LOT

## 2024-11-23 ASSESSMENT — PAIN SCALES - GENERAL
PAINLEVEL_OUTOF10: 0 - NO PAIN
PAINLEVEL_OUTOF10: 0 - NO PAIN

## 2024-11-23 ASSESSMENT — PAIN - FUNCTIONAL ASSESSMENT: PAIN_FUNCTIONAL_ASSESSMENT: 0-10

## 2024-11-23 ASSESSMENT — PAIN SCALES - WONG BAKER: WONGBAKER_NUMERICALRESPONSE: NO HURT

## 2024-11-23 NOTE — NURSING NOTE
Patient admitted from the ER for hyperglycemia and urinary retention. The ER placed a Hernandez catheter in the patient, which is draining clear, yellow urine. Patient has a cough at this time. Blood sugar was 266 for me, coverage given. Patient ate dinner and has no additional complaints, dozing off and on throughout the shift.

## 2024-11-23 NOTE — PROGRESS NOTES
Lito Osei is a 64 y.o. male on day 1 of admission presenting with Hyperglycemia due to diabetes mellitus (Multi).      Subjective   64 y.o. male with past medical history of hypertension, hyperlipidemia, CVA without residual deficits, CKD stage IV, insulin-dependent type 2 diabetes mellitus, and recent left hip fracture s/p left hip hemiarthroplasty on 11/18/2024 presents to [facility] on 11/21/2024 from [facility] due to concern of worsening renal function and hyperglycemia on outpatient lab work.     Patient is feeling well, no N/V, no CP or SOB.          Objective          Vitals 24HR  Heart Rate:  [105-112]   Temp:  [36.7 °C (98.1 °F)-37.7 °C (99.9 °F)]   Resp:  [17-22]   BP: (101-143)/(65-87)   SpO2:  [90 %-94 %]     Intake/Output last 3 Shifts:    Intake/Output Summary (Last 24 hours) at 11/23/2024 0933  Last data filed at 11/23/2024 0836  Gross per 24 hour   Intake 1000 ml   Output 2370 ml   Net -1370 ml       Physical Exam  GENERAL: Alert, no distress, cooperative  SKIN: Skin color, texture, turgor normal. No rashes or lesions.  EYES: PERRLA, EOMI  HEENT: Head: Normocephalic  Neck: supple and no adenopathy  LUNGS: Lungs clear to auscultation. Good diaphragmatic excursion.  CARDIAC: Normal S1 and S2; no rubs, murmurs, or gallops  ABDOMEN: Abdomen soft, non-tender. BS normal. No masses or organomegaly.  EXTREMITIES: No ulcers, Extremities normal. No deformities, edema, clubbing or skin discoloration.  NEURO: Cranial nerves II-XII intact, no focal deficit.     Relevant Results             Scheduled medications  apixaban, 10 mg, oral, BID   Followed by  [START ON 11/29/2024] apixaban, 5 mg, oral, BID  atorvastatin, 80 mg, oral, Daily  dapagliflozin propanediol, 10 mg, oral, q24h  insulin lispro, 0-10 Units, subcutaneous, Before meals & nightly  insulin lispro protamin-lispro, 8 Units, subcutaneous, BID AC  [Held by provider] lisinopril, 10 mg, oral, Daily  tamsulosin, 0.4 mg, oral,  Daily      Continuous medications  oxygen, , Last Rate: 2 L/min (11/22/24 0292)      PRN medications  PRN medications: acetaminophen **OR** acetaminophen **OR** acetaminophen, dextrose, dextrose, glucagon, glucagon, melatonin, oxyCODONE, polyethylene glycol  Results for orders placed or performed during the hospital encounter of 11/21/24 (from the past 24 hours)   POCT GLUCOSE   Result Value Ref Range    POCT Glucose 198 (H) 74 - 99 mg/dL   POCT GLUCOSE   Result Value Ref Range    POCT Glucose 266 (H) 74 - 99 mg/dL   POCT GLUCOSE   Result Value Ref Range    POCT Glucose 268 (H) 74 - 99 mg/dL   Basic metabolic panel   Result Value Ref Range    Glucose 187 (H) 74 - 99 mg/dL    Sodium 141 136 - 145 mmol/L    Potassium 4.4 3.5 - 5.3 mmol/L    Chloride 110 (H) 98 - 107 mmol/L    Bicarbonate 22 21 - 32 mmol/L    Anion Gap 13 10 - 20 mmol/L    Urea Nitrogen 55 (H) 6 - 23 mg/dL    Creatinine 1.95 (H) 0.50 - 1.30 mg/dL    eGFR 38 (L) >60 mL/min/1.73m*2    Calcium 8.4 (L) 8.6 - 10.3 mg/dL   CBC   Result Value Ref Range    WBC 10.7 4.4 - 11.3 x10*3/uL    nRBC 0.0 0.0 - 0.0 /100 WBCs    RBC 3.23 (L) 4.50 - 5.90 x10*6/uL    Hemoglobin 9.3 (L) 13.5 - 17.5 g/dL    Hematocrit 29.9 (L) 41.0 - 52.0 %    MCV 93 80 - 100 fL    MCH 28.8 26.0 - 34.0 pg    MCHC 31.1 (L) 32.0 - 36.0 g/dL    RDW 13.8 11.5 - 14.5 %    Platelets 190 150 - 450 x10*3/uL   Magnesium   Result Value Ref Range    Magnesium 2.24 1.60 - 2.40 mg/dL   POCT GLUCOSE   Result Value Ref Range    POCT Glucose 217 (H) 74 - 99 mg/dL       Assessment/Plan          Assessment & Plan  Hyperglycemia due to diabetes mellitus (Multi)    Type 2 diabetes mellitus with kidney complication, with long-term current use of insulin    Acute kidney injury superimposed on stage 4 chronic kidney disease    Metabolic acidosis due to diabetes mellitus (Multi)    Status post hip surgery    Acute urinary retention    Neutrophilic leukocytosis    Hyponatremia      // HARSHIL   - likely pre renal due  to hyperglycemia.      // hyperglycemia.     // Acute PE  - on eliquis at home but only 2.5 bid.   - subtherapeutic anticoagulation.   - start eliquis 10 mg BID for a week and then 5 mg bid.   - Monitor oxygenation.         Plan:  Code Status: Full Code   Consult: Deferred nephrology consult to attending. Consider urology consult given acute urinary retention and possibly endocrinology for uncontrolled type two diabetes     ATB: Although patient is noted to have neutrophilic leukocytosis with WBC 12.1, it is down trending compared to 16.9 at discharge from the hospital on 11/20/24. No obvious sources of infection, patient is afebrile, only mildly tachycardic and hip incision without erythema or drainage. Likely reactive due to recent surgery. Holding off on antibiotics at this time.      IVFs: 0.9% sodium chloride at 100 mL/hr continuous x 24 hours for mild hyponatremia and HARSHIL on CKD 4     Meds: Sliding scale insulin with hypoglycemia protocol. Started daily flomax due to acute urinary retention  Tylenol 650 mg p.o. every 6 hours as needed for pain 1-6/headaches/fever, melatonin 3 milligrams p.o. daily as needed for insomnia, MiraLAX 17 gms p.o daily as needed for constipation.  Avoid nephrotoxic medications   Monitor for hypo/hyperglycemia signs and symptoms      Diet: Cardiac, Carb Consistent     Labs ordered: CBC, BMP, Mg, Phos. Lactate. Blood cultures x 2 and urine culture collected in the ER, pending results at this time.   - Mild hyponatremia: urine electrolytes, urine osmolality, serum osmolality  - Monitor / trend labs while inpatient.  - Monitor and replace electrolytes as needed.  - Transfuse with PRBC for hemoglobin<7.0      Test ordered: VQ scan ordered in ER, pending completion. Left lower extremity duplex done with preliminary results negative for DVT, awaiting final read. Additional testing deferred to attending.      VTE prophylaxis:   On eliquis BID, renally dosed. Patient unsure if he took all of  his evening medications prior to coming in. Can restart once med rec is completed  BLE SCDs.  Monitor for s/s of bleeding    Den Potts MD

## 2024-11-23 NOTE — CARE PLAN
The patient's goals for the shift include      The clinical goals for the shift include See POC  Assumed care at 0700  Pt is A/Ox4  No complaints of pain, VSS, 0800 , gave 4 units of insulin and 8 units and the order of 8 units. Chris on tele    Hernandez pulled at 1430, about 3-4 mls of blood followed removal, team aware    1630 bladder scan shows 225, has not voided   1600 insulin not given, pt prefers to sleep right now

## 2024-11-24 VITALS
RESPIRATION RATE: 18 BRPM | BODY MASS INDEX: 25.18 KG/M2 | HEART RATE: 112 BPM | SYSTOLIC BLOOD PRESSURE: 115 MMHG | WEIGHT: 170 LBS | OXYGEN SATURATION: 92 % | DIASTOLIC BLOOD PRESSURE: 75 MMHG | TEMPERATURE: 97.2 F | HEIGHT: 69 IN

## 2024-11-24 LAB
ANION GAP SERPL CALC-SCNC: 13 MMOL/L (ref 10–20)
BACTERIA BLD CULT: NORMAL
BACTERIA BLD CULT: NORMAL
BUN SERPL-MCNC: 50 MG/DL (ref 6–23)
CALCIUM SERPL-MCNC: 8.5 MG/DL (ref 8.6–10.3)
CHLORIDE SERPL-SCNC: 109 MMOL/L (ref 98–107)
CO2 SERPL-SCNC: 20 MMOL/L (ref 21–32)
CREAT SERPL-MCNC: 1.69 MG/DL (ref 0.5–1.3)
EGFRCR SERPLBLD CKD-EPI 2021: 45 ML/MIN/1.73M*2
ERYTHROCYTE [DISTWIDTH] IN BLOOD BY AUTOMATED COUNT: 13.6 % (ref 11.5–14.5)
GLUCOSE BLD MANUAL STRIP-MCNC: 147 MG/DL (ref 74–99)
GLUCOSE BLD MANUAL STRIP-MCNC: 213 MG/DL (ref 74–99)
GLUCOSE BLD MANUAL STRIP-MCNC: 225 MG/DL (ref 74–99)
GLUCOSE BLD MANUAL STRIP-MCNC: 275 MG/DL (ref 74–99)
GLUCOSE SERPL-MCNC: 199 MG/DL (ref 74–99)
HCT VFR BLD AUTO: 30.1 % (ref 41–52)
HGB BLD-MCNC: 9.3 G/DL (ref 13.5–17.5)
MAGNESIUM SERPL-MCNC: 2.22 MG/DL (ref 1.6–2.4)
MCH RBC QN AUTO: 28.9 PG (ref 26–34)
MCHC RBC AUTO-ENTMCNC: 30.9 G/DL (ref 32–36)
MCV RBC AUTO: 94 FL (ref 80–100)
NRBC BLD-RTO: 0 /100 WBCS (ref 0–0)
PLATELET # BLD AUTO: 216 X10*3/UL (ref 150–450)
POTASSIUM SERPL-SCNC: 4.7 MMOL/L (ref 3.5–5.3)
RBC # BLD AUTO: 3.22 X10*6/UL (ref 4.5–5.9)
SODIUM SERPL-SCNC: 137 MMOL/L (ref 136–145)
WBC # BLD AUTO: 10.6 X10*3/UL (ref 4.4–11.3)

## 2024-11-24 PROCEDURE — 80048 BASIC METABOLIC PNL TOTAL CA: CPT

## 2024-11-24 PROCEDURE — 2500000004 HC RX 250 GENERAL PHARMACY W/ HCPCS (ALT 636 FOR OP/ED): Performed by: NURSE PRACTITIONER

## 2024-11-24 PROCEDURE — 1200000002 HC GENERAL ROOM WITH TELEMETRY DAILY

## 2024-11-24 PROCEDURE — 2500000002 HC RX 250 W HCPCS SELF ADMINISTERED DRUGS (ALT 637 FOR MEDICARE OP, ALT 636 FOR OP/ED)

## 2024-11-24 PROCEDURE — 36415 COLL VENOUS BLD VENIPUNCTURE: CPT

## 2024-11-24 PROCEDURE — 2500000002 HC RX 250 W HCPCS SELF ADMINISTERED DRUGS (ALT 637 FOR MEDICARE OP, ALT 636 FOR OP/ED): Performed by: PHYSICIAN ASSISTANT

## 2024-11-24 PROCEDURE — 83735 ASSAY OF MAGNESIUM: CPT

## 2024-11-24 PROCEDURE — 97530 THERAPEUTIC ACTIVITIES: CPT | Mod: GP

## 2024-11-24 PROCEDURE — 97162 PT EVAL MOD COMPLEX 30 MIN: CPT | Mod: GP

## 2024-11-24 PROCEDURE — 2500000001 HC RX 250 WO HCPCS SELF ADMINISTERED DRUGS (ALT 637 FOR MEDICARE OP): Performed by: PHYSICIAN ASSISTANT

## 2024-11-24 PROCEDURE — 82947 ASSAY GLUCOSE BLOOD QUANT: CPT

## 2024-11-24 PROCEDURE — 85027 COMPLETE CBC AUTOMATED: CPT

## 2024-11-24 RX ADMIN — SODIUM CHLORIDE 500 ML: 9 INJECTION, SOLUTION INTRAVENOUS at 12:14

## 2024-11-24 RX ADMIN — INSULIN LISPRO 8 UNITS: 100 INJECTION, SUSPENSION SUBCUTANEOUS at 09:18

## 2024-11-24 RX ADMIN — APIXABAN 10 MG: 5 TABLET, FILM COATED ORAL at 21:03

## 2024-11-24 RX ADMIN — TAMSULOSIN HYDROCHLORIDE 0.4 MG: 0.4 CAPSULE ORAL at 09:16

## 2024-11-24 RX ADMIN — APIXABAN 10 MG: 5 TABLET, FILM COATED ORAL at 09:16

## 2024-11-24 RX ADMIN — SODIUM CHLORIDE 500 ML: 9 INJECTION, SOLUTION INTRAVENOUS at 14:44

## 2024-11-24 RX ADMIN — INSULIN LISPRO 4 UNITS: 100 INJECTION, SOLUTION INTRAVENOUS; SUBCUTANEOUS at 12:14

## 2024-11-24 RX ADMIN — DAPAGLIFLOZIN 10 MG: 10 TABLET, FILM COATED ORAL at 14:43

## 2024-11-24 RX ADMIN — ATORVASTATIN CALCIUM 80 MG: 80 TABLET, FILM COATED ORAL at 09:16

## 2024-11-24 RX ADMIN — INSULIN LISPRO 8 UNITS: 100 INJECTION, SUSPENSION SUBCUTANEOUS at 17:53

## 2024-11-24 RX ADMIN — INSULIN LISPRO 4 UNITS: 100 INJECTION, SOLUTION INTRAVENOUS; SUBCUTANEOUS at 09:17

## 2024-11-24 RX ADMIN — INSULIN LISPRO 6 UNITS: 100 INJECTION, SOLUTION INTRAVENOUS; SUBCUTANEOUS at 17:53

## 2024-11-24 ASSESSMENT — COGNITIVE AND FUNCTIONAL STATUS - GENERAL
MOVING TO AND FROM BED TO CHAIR: A LOT
STANDING UP FROM CHAIR USING ARMS: A LITTLE
DAILY ACTIVITIY SCORE: 19
TURNING FROM BACK TO SIDE WHILE IN FLAT BAD: A LOT
DRESSING REGULAR LOWER BODY CLOTHING: A LITTLE
HELP NEEDED FOR BATHING: A LITTLE
MOBILITY SCORE: 13
MOVING FROM LYING ON BACK TO SITTING ON SIDE OF FLAT BED WITH BEDRAILS: A LITTLE
DRESSING REGULAR LOWER BODY CLOTHING: A LITTLE
MOVING FROM LYING ON BACK TO SITTING ON SIDE OF FLAT BED WITH BEDRAILS: A LITTLE
TOILETING: A LITTLE
DRESSING REGULAR UPPER BODY CLOTHING: A LITTLE
STANDING UP FROM CHAIR USING ARMS: A LITTLE
CLIMB 3 TO 5 STEPS WITH RAILING: TOTAL
WALKING IN HOSPITAL ROOM: A LOT
CLIMB 3 TO 5 STEPS WITH RAILING: TOTAL
DRESSING REGULAR UPPER BODY CLOTHING: A LITTLE
PERSONAL GROOMING: A LITTLE
WALKING IN HOSPITAL ROOM: TOTAL
TURNING FROM BACK TO SIDE WHILE IN FLAT BAD: A LITTLE
HELP NEEDED FOR BATHING: A LITTLE
DAILY ACTIVITIY SCORE: 19
CLIMB 3 TO 5 STEPS WITH RAILING: TOTAL
MOVING FROM LYING ON BACK TO SITTING ON SIDE OF FLAT BED WITH BEDRAILS: A LITTLE
STANDING UP FROM CHAIR USING ARMS: A LOT
WALKING IN HOSPITAL ROOM: TOTAL
TURNING FROM BACK TO SIDE WHILE IN FLAT BAD: A LITTLE
TOILETING: A LITTLE
MOVING TO AND FROM BED TO CHAIR: A LOT
MOBILITY SCORE: 11
MOBILITY SCORE: 14
MOVING TO AND FROM BED TO CHAIR: A LOT
PERSONAL GROOMING: A LITTLE

## 2024-11-24 ASSESSMENT — PAIN - FUNCTIONAL ASSESSMENT
PAIN_FUNCTIONAL_ASSESSMENT: 0-10
PAIN_FUNCTIONAL_ASSESSMENT: 0-10

## 2024-11-24 ASSESSMENT — PAIN SCALES - GENERAL
PAINLEVEL_OUTOF10: 0 - NO PAIN

## 2024-11-24 NOTE — DISCHARGE INSTRUCTIONS
Please follow-up with Dr. Aristeo Somers in 2 weeks in his office for void trial. Call [phone] for an appointment.     Your lisinopril was stopped on discharge due to kidney function. This may need to be resumed at a later date. Please follow up regarding this medication with your primary care doctor.

## 2024-11-24 NOTE — PROGRESS NOTES
Lito Osei is a 64 y.o. male on day 2 of admission presenting with Hyperglycemia due to diabetes mellitus (Multi).      Subjective   64 y.o. male with past medical history of hypertension, hyperlipidemia, CVA without residual deficits, CKD stage IV, insulin-dependent type 2 diabetes mellitus, and recent left hip fracture s/p left hip hemiarthroplasty on 11/18/2024 presents to [facility] on 11/21/2024 from [facility] due to concern of worsening renal function and hyperglycemia on outpatient lab work.     Patient is feeling well, no N/V, no CP or SOB.          Objective          Vitals 24HR  Heart Rate:  []   Temp:  [36.1 °C (97 °F)-36.9 °C (98.4 °F)]   Resp:  [18]   BP: (114-154)/(61-90)   SpO2:  [91 %-96 %]     Intake/Output last 3 Shifts:    Intake/Output Summary (Last 24 hours) at 11/24/2024 1038  Last data filed at 11/24/2024 0425  Gross per 24 hour   Intake --   Output 1225 ml   Net -1225 ml       Physical Exam  GENERAL: Alert, no distress, cooperative  SKIN: Skin color, texture, turgor normal. No rashes or lesions.  EYES: PERRLA, EOMI  HEENT: Head: Normocephalic  Neck: supple and no adenopathy  LUNGS: Lungs clear to auscultation. Good diaphragmatic excursion.  CARDIAC: Normal S1 and S2; no rubs, murmurs, or gallops  ABDOMEN: Abdomen soft, non-tender. BS normal. No masses or organomegaly.  EXTREMITIES: No ulcers, Extremities normal. No deformities, edema, clubbing or skin discoloration.  NEURO: Cranial nerves II-XII intact, no focal deficit.     Relevant Results             Scheduled medications  apixaban, 10 mg, oral, BID   Followed by  [START ON 11/29/2024] apixaban, 5 mg, oral, BID  atorvastatin, 80 mg, oral, Daily  dapagliflozin propanediol, 10 mg, oral, q24h  insulin lispro, 0-10 Units, subcutaneous, Before meals & nightly  insulin lispro protamin-lispro, 8 Units, subcutaneous, BID AC  [Held by provider] lisinopril, 10 mg, oral, Daily  tamsulosin, 0.4 mg, oral, Daily      Continuous  medications  oxygen, , Last Rate: 2 L/min (11/22/24 0419)      PRN medications  PRN medications: acetaminophen **OR** acetaminophen **OR** acetaminophen, dextrose, dextrose, glucagon, glucagon, melatonin, oxyCODONE, polyethylene glycol  Results for orders placed or performed during the hospital encounter of 11/21/24 (from the past 24 hours)   POCT GLUCOSE   Result Value Ref Range    POCT Glucose 147 (H) 74 - 99 mg/dL   POCT GLUCOSE   Result Value Ref Range    POCT Glucose 175 (H) 74 - 99 mg/dL   POCT GLUCOSE   Result Value Ref Range    POCT Glucose 229 (H) 74 - 99 mg/dL   Basic metabolic panel   Result Value Ref Range    Glucose 199 (H) 74 - 99 mg/dL    Sodium 137 136 - 145 mmol/L    Potassium 4.7 3.5 - 5.3 mmol/L    Chloride 109 (H) 98 - 107 mmol/L    Bicarbonate 20 (L) 21 - 32 mmol/L    Anion Gap 13 10 - 20 mmol/L    Urea Nitrogen 50 (H) 6 - 23 mg/dL    Creatinine 1.69 (H) 0.50 - 1.30 mg/dL    eGFR 45 (L) >60 mL/min/1.73m*2    Calcium 8.5 (L) 8.6 - 10.3 mg/dL   CBC   Result Value Ref Range    WBC 10.6 4.4 - 11.3 x10*3/uL    nRBC 0.0 0.0 - 0.0 /100 WBCs    RBC 3.22 (L) 4.50 - 5.90 x10*6/uL    Hemoglobin 9.3 (L) 13.5 - 17.5 g/dL    Hematocrit 30.1 (L) 41.0 - 52.0 %    MCV 94 80 - 100 fL    MCH 28.9 26.0 - 34.0 pg    MCHC 30.9 (L) 32.0 - 36.0 g/dL    RDW 13.6 11.5 - 14.5 %    Platelets 216 150 - 450 x10*3/uL   Magnesium   Result Value Ref Range    Magnesium 2.22 1.60 - 2.40 mg/dL   POCT GLUCOSE   Result Value Ref Range    POCT Glucose 213 (H) 74 - 99 mg/dL       Assessment/Plan          Assessment & Plan  Hyperglycemia due to diabetes mellitus (Multi)    Type 2 diabetes mellitus with kidney complication, with long-term current use of insulin    Acute kidney injury superimposed on stage 4 chronic kidney disease    Metabolic acidosis due to diabetes mellitus (Multi)    Status post hip surgery    Acute urinary retention    Neutrophilic leukocytosis    Hyponatremia      // HARSHIL   - likely pre renal due to hyperglycemia,  but also a component of urinary retention.   - Cr is improving.   - Baseline Cr. 1.7-1.8 on admission. 1.2-1.3 mg/dl in 2016.  - retention of urine since chavarria removal, will place again and consult urology.        // hyperglycemia.     // Acute PE  - on eliquis at home but only 2.5 bid.   - subtherapeutic anticoagulation.   - start eliquis 10 mg BID for a week and then 5 mg bid.   - Monitor oxygenation.         Plan:  Code Status: Full Code   Consult: Deferred nephrology consult to attending. Consider urology consult given acute urinary retention and possibly endocrinology for uncontrolled type two diabetes     ATB: Although patient is noted to have neutrophilic leukocytosis with WBC 12.1, it is down trending compared to 16.9 at discharge from the hospital on 11/20/24. No obvious sources of infection, patient is afebrile, only mildly tachycardic and hip incision without erythema or drainage. Likely reactive due to recent surgery. Holding off on antibiotics at this time.      IVFs: 0.9% sodium chloride at 100 mL/hr continuous x 24 hours for mild hyponatremia and HARSHIL on CKD 4     Meds: Sliding scale insulin with hypoglycemia protocol. Started daily flomax due to acute urinary retention  Tylenol 650 mg p.o. every 6 hours as needed for pain 1-6/headaches/fever, melatonin 3 milligrams p.o. daily as needed for insomnia, MiraLAX 17 gms p.o daily as needed for constipation.  Avoid nephrotoxic medications   Monitor for hypo/hyperglycemia signs and symptoms      Diet: Cardiac, Carb Consistent     Labs ordered: CBC, BMP, Mg, Phos. Lactate. Blood cultures x 2 and urine culture collected in the ER, pending results at this time.   - Mild hyponatremia: urine electrolytes, urine osmolality, serum osmolality  - Monitor / trend labs while inpatient.  - Monitor and replace electrolytes as needed.  - Transfuse with PRBC for hemoglobin<7.0      Test ordered: VQ scan ordered in ER, pending completion. Left lower extremity duplex done  with preliminary results negative for DVT, awaiting final read. Additional testing deferred to attending.      VTE prophylaxis:   On eliquis BID, renally dosed. Patient unsure if he took all of his evening medications prior to coming in. Can restart once med rec is completed  BLE SCDs.  Monitor for s/s of bleeding    Den Potts MD

## 2024-11-24 NOTE — PROGRESS NOTES
11/24/24 1542   Discharge Planning   Type of Post Acute Facility Services Skilled nursing   Expected Discharge Disposition SNF   Patient Choice   Provider Choice list and CMS website (https://medicare.gov/care-compare#search) for post-acute Quality and Resource Measure Data were provided and reviewed with: Patient   Intensity of Service   Intensity of Service 0-30 min     Met with patient at bedside to discuss therapy recommendation of moderate intensity rehab. Patient came in from [facility]. He remains uncertain if he wants to return there at d/c. Provided him with a SNF list to review. Care transitions team to follow up for SNF choices tomorrow. Patient will need precert.

## 2024-11-24 NOTE — NURSING NOTE
0830- Melina Connor NP notified about bladder scan of 1143.    1150- Melina Connor NP and Dr Potts notified of patient blood pressure of 74/52 manually. Patient is sitting up in the chair and asymptomatic.     1345- Melina Connor NP and Dr Potts notified of updated blood pressure after fluid bolus which was 98/65. The doctor inquired about the patient heart rate which was 110, so another bolus was ordered.    EOS- Chavarria catheter replaced for retention. Dr Somers in to see patient this evening and recommending chavarria for another 2 weeks then another void trial. Brother at bedside this morning. Patient denies any complaints of pain. Therapy worked with patient and patient up inc hair for a couple hours. Patient up to bedside commode for a bowel movement. Blood pressure improved after boluses but patient still remains tachycardic between 100 and 110. Safety maintained and call light within reach.

## 2024-11-24 NOTE — PROGRESS NOTES
"Physical Therapy    Physical Therapy Evaluation & Treatment    Patient Name: Lito Osei  MRN: [id number]  Department: [facility] 3 N  Room: [facility]-  Today's Date: 11/24/2024   Time Calculation  Start Time: 0959  Stop Time: 1023  Time Calculation (min): 24 min    Assessment/Plan   PT Assessment  PT Assessment Results: Decreased strength, Decreased range of motion, Decreased endurance, Impaired balance, Decreased mobility, Decreased safety awareness, Orthopedic restrictions, Pain  Rehab Prognosis: Good  Medical Staff Made Aware: Yes  End of Session Communication: Bedside nurse  Assessment Comment: Pt's impairments include LLE weakness, impaired balance, L posterior hip precautions, impaired balance and decreased activity tolerance. Pt's functional limitations include bed mobility, transfers, gait and elevations. Pt would benefit from continued acute care PT during hospital LOS and upon discharge at a moderate level intensity.  End of Session Patient Position: Up in chair, Alarm on   IP OR SWING BED PT PLAN  Inpatient or Swing Bed: Inpatient  PT Plan  Treatment/Interventions: Bed mobility, Transfer training, Gait training, Stair training, Balance training, Neuromuscular re-education, Strengthening, Endurance training, Range of motion, Therapeutic exercise, Therapeutic activity, Positioning, Postural re-education  PT Plan: Ongoing PT  PT Frequency: 5 times per week  PT Discharge Recommendations: Moderate intensity level of continued care  Equipment Recommended upon Discharge: Wheeled walker  PT Recommended Transfer Status: Assist x2  PT - OK to Discharge:  (Pt ok to dc from acute care PT to next level of care once cleared by medical team.)      Subjective     General Visit Information:  General  Reason for Referral: Per HPI: \"a 64 y.o. male with past medical history of hypertension, hyperlipidemia, CVA without residual deficits, CKD stage IV, insulin-dependent type 2 diabetes mellitus, and recent left hip fracture s/p " "left hip hemiarthroplasty on 11/18/2024 presents to [facility] on 11/21/2024 from [facility] due to concern of worsening renal function and hyperglycemia on outpatient lab work.     Per documentation the nursing facility was concerned as the patient's outpatient lab work done earlier on 11/21 demonstrated a blood glucose level greater than 500 and patient was having some difficulty urinating. Patient states that when he arrived to the ER earlier he was having some difficulty urinating and had noticed some burning with urination at the facility. However, this has resolved since ER physician placed a chavarria catheter. He tells me that at the nursing facility they were not giving him a carb controlled diet which he believes is the reason his blood sugar was significantly elevated today and he has also reportedly not staying well hydrated either . Apart from that, his only major complaint is being fatigued and wanting to get enough rest. Patient states he had left hip surgery three days ago, but denies any pain at surgical site. He denies any recent fever/chills, headache, dizziness, chest pain / palpitations, shortness of breath, cough, abdominal pain, N/V/D/C or hematuria\"  Referred By: Den Potts MD  Past Medical History Relevant to Rehab:   Past Medical History:   Diagnosis Date    CKD (chronic kidney disease)     CVA (cerebral vascular accident) (Multi) 11/21/2024    HLD (hyperlipidemia)     Hypertension     Stroke (Multi)     Type 2 diabetes mellitus with kidney complication, with long-term current use of insulin        Family/Caregiver Present: Yes  Caregiver Feedback: Brother present for eval.  Prior to Session Communication: Bedside nurse  Patient Position Received:  (Sitting at EOB, brother present in room.)  General Comment: Pt agreeable to PT assessment.  Home Living:  Home Living  Type of Home: Apartment  Lives With: Alone  Home Adaptive Equipment: Cane, Walker rolling or standard  Home " Layout: One level  Home Access:  (6-8 DIPIKA with yayo HR per pt.)  Bathroom Shower/Tub: Tub/shower unit  Prior Level of Function:  Prior Function Per Pt/Caregiver Report  Level of [place]: Independent with homemaking with ambulation, Independent with ADLs and functional transfers  Prior Function Comments: Pt reports IND without a device prior to recent fall and hip surgery.  Precautions:  Precautions  LE Weight Bearing Status: Weight Bearing as Tolerated (LLE WBAT; posterior hip precautions. L hip fracture s/p L hip hemiarthroplasty 11/18/24.)  Medical Precautions: Fall precautions  Precautions Comment: Residual L sided deficits from previous CVA in 2015 per pt report.    Objective   Pain:  Pain Assessment  Pain Assessment: 0-10  0-10 (Numeric) Pain Score: 0 - No pain  Cognition:  Cognition  Orientation Level: Oriented X4    General Assessments:  Sensation  Light Touch: No apparent deficits    Static Sitting Balance  Static Sitting-Comment/Number of Minutes: SBA; midline posture sitting at EOB  Dynamic Sitting Balance  Dynamic Sitting-Comments: SBA to scoot fowards    Static Standing Balance  Static Standing-Comment/Number of Minutes: ModA-MinAx1 with FWW; Pt immediately abducts LLE in standing and avoids weightbearing on LLE.  Dynamic Standing Balance  Dynamic Standing-Comments: MaxAx2 to take step to R to approach chair on R with FWW. Pt avoids weight shifting to L to advance RLE. Pt ultimately reaches for arm rest to pivot remainder of distance rather than take a step.  Functional Assessments:  Transfers  Transfer: Yes  Transfer 1  Technique 1: Sit to stand, Stand to sit  Transfer Device 1: Walker, Gait belt  Trials/Comments 1: x1 trial STS from EOB, MaxAx1 with FWW. Cues for hand placement, bed height elevated, cues for upright posture and controlled descent. Pt avoids weight bearing on LLE.  Transfers 2  Transfer From 2: Bed to  Transfer to 2: Chair with arms  Technique 2: Stand pivot, To right  Transfer  Device 2: Walker, Gait belt  Transfer Level of Assistance 2: Maximum assistance, +2  Trials/Comments 2: Pt able to rise to stand for 2nd trial Sit>stand, ModAx1 with FWW. Pt cued to take lateral step to R to approach chair. Pt able to take x1 small step and then reaches for arm rest to pivot remainder of distance without walker, MaxAx2. Poor safety observed despite cues.  Transfers 3  Technique 3: Sit to stand, Stand to sit  Transfer Device 3: Walker, Gait belt  Transfer Level of Assistance 3: Moderate assistance  Trials/Comments 3: x3 trials STS from chair with arm rests, ModAx1 with FWW. Cues for hand placement, anterior weight shift to rise to stand, upright posture in standing, and LLE placement. On 3rd trial, pt is able to adjust LLE to perform equal WB in standing.       Extremity/Trunk Assessments:  RLE   RLE : Within Functional Limits  LLE   LLE : Exceptions to WFL  Strength LLE  L Hip Flexion: 2+/5  L Knee Extension: 3+/5  L Ankle Dorsiflexion: 3+/5    Outcome Measures:  [facility] Basic Mobility  Turning from your back to your side while in a flat bed without using bedrails: A little  Moving from lying on your back to sitting on the side of a flat bed without using bedrails: A lot  Moving to and from bed to chair (including a wheelchair): A lot  Standing up from a chair using your arms (e.g. wheelchair or bedside chair): A lot  To walk in hospital room: Total  Climbing 3-5 steps with railing: Total  Basic Mobility - Total Score: 11    Encounter Problems       Encounter Problems (Active)       PT Problem       Bed mobility (Progressing)       Start:  11/24/24    Expected End:  12/08/24       Pt will perform supine<>sit with HOB flat, GEOFFREY.           Transfers (Progressing)       Start:  11/24/24    Expected End:  12/08/24       Pt will perform all transfers with LRAD, GEOFFREY.            Gait (Progressing)       Start:  11/24/24    Expected End:  12/08/24       Pt will amb 150' with LRAD, GEOFFREY with reciprocal gait,  upright posture and improved activity tolerance as demonstrated by vitals.             Balance (Progressing)       Start:  11/24/24    Expected End:  12/08/24       Pt will tolerate standing x2 min with BUE support, SBAx1.         Strength (Progressing)       Start:  11/24/24    Expected End:  12/08/24       Pt will improve gross LLE strength to 4-/5.            Pain - Adult              Education Documentation  Mobility Training, taught by Melina De Dios, PT at 11/24/2024 12:03 PM.  Learner: Patient  Readiness: Acceptance  Method: Explanation  Response: Verbalizes Understanding, Demonstrated Understanding    Education Comments  No comments found.

## 2024-11-24 NOTE — CONSULTS
Urology Consultation      Patient:  Lito Osei  MRN: [id number]  YOB: 1960    CHIEF COMPLAINT: Urinary retention    HISTORY OF PRESENT ILLNESS:   The patient is a 64 y.o. male admitted November 21 with worsening renal function and hyperglycemia.  Patient reported suprapubic discomfort and was only able to void a small amount.  A Hernandez catheter was placed at that time for 1200 mL.  Patient was started on Flomax and underwent a void trial yesterday.  Catheter was replaced today for 1300 mL.  He was recently admitted with a left femoral neck fracture and underwent left hip hemiarthroplasty 11/18/2024.  He denies difficulty urinating prior to admission, per nurse the patient's brother reports the patient has had difficulty urinating for several months.  Patient is diabetic but denies peripheral neuropathy.  He does have a history of a CVA.    Patient's old records, notes and chart reviewed and summarized above.    Past Medical History:    Past Medical History:   Diagnosis Date    CKD (chronic kidney disease)     CVA (cerebral vascular accident) (Multi) 11/21/2024    HLD (hyperlipidemia)     Hypertension     Stroke (Multi)     Type 2 diabetes mellitus with kidney complication, with long-term current use of insulin        Past Surgical History:    Past Surgical History:   Procedure Laterality Date    ANKLE SURGERY Right     PARTIAL HIP ARTHROPLASTY Left        Medications:      Current Facility-Administered Medications:     acetaminophen (Tylenol) tablet 650 mg, 650 mg, oral, q6h PRN **OR** acetaminophen (Tylenol) oral liquid 650 mg, 650 mg, oral, q6h PRN **OR** acetaminophen (Tylenol) suppository 650 mg, 650 mg, rectal, q6h PRN, JOSE Estrada-CNP    apixaban (Eliquis) tablet 10 mg, 10 mg, oral, BID, 10 mg at 11/24/24 0916 **FOLLOWED BY** [START ON 11/29/2024] apixaban (Eliquis) tablet 5 mg, 5 mg, oral, BID, Annette Collins PA-C    atorvastatin (Lipitor) tablet 80 mg, 80 mg, oral, Daily, Annette  MOHAN Collins, 80 mg at 11/24/24 0916    dapagliflozin propanediol (Farxiga) tablet 10 mg, 10 mg, oral, q24h, Annette Collins PA-C, 10 mg at 11/24/24 1443    dextrose 50 % injection 12.5 g, 12.5 g, intravenous, q15 min PRN, Latasha Flynn, APRN-CNP    dextrose 50 % injection 25 g, 25 g, intravenous, q15 min PRN, Latasha Flynn, APRN-CNP    glucagon (Glucagen) injection 1 mg, 1 mg, intramuscular, q15 min PRN, Latasha Flynn, APRN-CNP    glucagon (Glucagen) injection 1 mg, 1 mg, intramuscular, q15 min PRN, Latasha Flynn, APRN-CNP    insulin lispro injection 0-10 Units, 0-10 Units, subcutaneous, Before meals & nightly, Latasha Flynn, APRN-CNP, 4 Units at 11/24/24 1214    insulin lispro protamin-lispro (HumaLOG Mix 75-25) 100 unit/mL (75-25) injection 8 Units, 8 Units, subcutaneous, BID AC, Annette Collins PA-C, 8 Units at 11/24/24 0918    [Held by provider] lisinopril tablet 10 mg, 10 mg, oral, Daily, Annette Collins PA-C    melatonin tablet 3 mg, 3 mg, oral, Nightly PRN, Latasha Flynn, APRN-CNP    oxyCODONE (Roxicodone) immediate release tablet 5 mg, 5 mg, oral, q6h PRN, Annette Collins PA-C    oxygen (O2) therapy, , inhalation, Continuous, Annette Collins PA-C, Last Rate: 120,000 mL/hr at 11/22/24 0419, 2 L/min at 11/22/24 0419    polyethylene glycol (Glycolax, Miralax) packet 17 g, 17 g, oral, Daily PRN, Latasha Flynn, APRN-CNP    tamsulosin (Flomax) 24 hr capsule 0.4 mg, 0.4 mg, oral, Daily, Latasha Flynn, APRN-CNP, 0.4 mg at 11/24/24 0916    Allergies:  No Known Allergies    Social History:   Social History     Socioeconomic History    Marital status:      Spouse name: Not on file    Number of children: Not on file    Years of education: Not on file    Highest education level: Not on file   Occupational History    Not on file   Tobacco Use    Smoking status: Never    Smokeless tobacco: Not on file   Vaping Use    Vaping status: Never Used   Substance and Sexual Activity    Alcohol use: Never    Drug use: Never     Sexual activity: Defer   Other Topics Concern    Not on file   Social History Narrative    Not on file     Social Drivers of Health     Financial Resource Strain: Low Risk  (11/22/2024)    Overall Financial Resource Strain (CARDIA)     Difficulty of Paying Living Expenses: Not hard at all   Food Insecurity: No Food Insecurity (11/22/2024)    Hunger Vital Sign     Worried About Running Out of Food in the Last Year: Never true     Ran Out of Food in the Last Year: Never true   Transportation Needs: No Transportation Needs (11/22/2024)    PRAPARE - Transportation     Lack of Transportation (Medical): No     Lack of Transportation (Non-Medical): No   Physical Activity: Not on file   Stress: Not on file   Social Connections: Not on file   Intimate Partner Violence: Not At Risk (11/22/2024)    Humiliation, Afraid, Rape, and Kick questionnaire     Fear of Current or Ex-Partner: No     Emotionally Abused: No     Physically Abused: No     Sexually Abused: No   Housing Stability: Low Risk  (11/22/2024)    Housing Stability Vital Sign     Unable to Pay for Housing in the Last Year: No     Number of Times Moved in the Last Year: 0     Homeless in the Last Year: No       Family History:    Family History   Problem Relation Name Age of Onset    Diabetes Mother      Hypertension Mother      Coronary artery disease Father      Diabetes Father      Other (Ischemic heart disease) Father      Stroke Maternal Grandmother         REVIEW OF SYSTEMS:  A comprehensive 14 point review of systems was obtained.  Constitutional: + fatigue  Eyes: No change in vision  Ears, nose, mouth, throat, face: No ringing in the ears; no facial droop  Respiratory: No shortness of breath  Cardiovascular: No palpitations  Gastrointestinal: No diarrhea  Genitourinary: See HPI  Integument/Skin: No rashes  Hematologic/Lymphatic: No easy bruising  Musculoskeletal: + muscle pains  Neurologic: No acute weakness in the extremities.   Psychiatric: No depression  "or suicidal thoughts.  Endocrine: No heat or cold intolerances.  Allergic/Immunologic: No current seasonal allergies; no skin hives.        Physical Exam:    This a 64 y.o. male   Vitals:    11/24/24 0800 11/24/24 1200 11/24/24 1330 11/24/24 1600   BP: 147/85 74/52 98/65 121/74   BP Location: Right arm Right arm     Patient Position: Lying Sitting     Pulse: (!) 112 110 110 108   Resp: 18 18  18   Temp: 36.1 °C (97 °F) 36.4 °C (97.5 °F)  36.1 °C (97 °F)   TempSrc: Temporal Temporal     SpO2: 96% 94%  95%   Weight:       Height:          Constitutional: Patient in no acute distress.  Neuro: alert and oriented to person place and time.   Head: Atraumatic and normocephalic.  Neck: Trachea midline   Ext: 1+ radial pulses bilaterally.  Psych: Mood and affect normal.  Skin: No rashes or bruising present.  Lungs: Respiratory effort normal.  Cardiovascular:  Regular rate.  Abdomen: Soft, non-tender, non-distended.   CVA tenderness not present  Bladder non-tender and not distended.  Penis: Hernandez catheter draining yellow urine  No calf tenderness to palpation.    Labs:  Results from last 7 days   Lab Units 11/24/24  0537 11/23/24  0552 11/22/24  0719   SODIUM mmol/L 137 141 138   POTASSIUM mmol/L 4.7 4.4 4.5   CHLORIDE mmol/L 109* 110* 111*   CO2 mmol/L 20* 22 19*   BUN mg/dL 50* 55* 64*   CREATININE mg/dL 1.69* 1.95* 2.78*   GLUCOSE mg/dL 199* 187* 204*   CALCIUM mg/dL 8.5* 8.4* 8.1*     Results from last 7 days   Lab Units 11/24/24  0537 11/23/24  0552 11/22/24  0719   WBC AUTO x10*3/uL 10.6 10.7 11.6*   HEMOGLOBIN g/dL 9.3* 9.3* 9.3*   HEMATOCRIT % 30.1* 29.9* 29.8*   PLATELETS AUTO x10*3/uL 216 190 164       Lab Results   Component Value Date    APPEARANCEU Clear 11/21/2024    COLORU Light-Yellow 11/21/2024    KETONESU NEGATIVE 11/21/2024    PROTUR 10 (TRACE) 11/21/2024    SPECGRAVU 1.016 11/21/2024    UROBILINOGEN Normal 11/21/2024    WBCU 1-5 11/21/2024       No results found for: \"PSA\"    Culture " Results:  None      -----------------------------------------------------------------  Imaging Results:  None  Assessment and Plan   Impression:    64-year-old male with urinary retention, recent left femoral neck fracture status post hemiarthroplasty 11/18/2024, benign prostatic hyperplasia with outflow obstruction, acute kidney injury        Plan:   Urine retention likely multifactorial including benign prostatic hyperplasia with outflow obstruction, history of cerebrovascular accident, postoperative status, and narcotic pain medication use, and decreased ambulation.  Patient had acute kidney injury at the time of admission in the setting of urinary retention.  Discussed arranging cystoscopy and CMG prior to catheter removal but patient likely will not be able to be placed in lithotomy position for a few months while he recovers from his hip surgery.  He instead wishes for void trial in 2 weeks.  If he fails void trial at that time, we will then maintain Hernandez catheter while awaiting cystoscopy and CMG after he has fully recovered.  Maintain Hernandez catheter and Flomax upon discharge.    Aristeo Somers MD  4:24 PM 11/24/2024

## 2024-11-25 LAB
ANION GAP SERPL CALC-SCNC: 12 MMOL/L (ref 10–20)
BUN SERPL-MCNC: 56 MG/DL (ref 6–23)
CALCIUM SERPL-MCNC: 8.2 MG/DL (ref 8.6–10.3)
CHLORIDE SERPL-SCNC: 111 MMOL/L (ref 98–107)
CO2 SERPL-SCNC: 19 MMOL/L (ref 21–32)
CREAT SERPL-MCNC: 1.79 MG/DL (ref 0.5–1.3)
EGFRCR SERPLBLD CKD-EPI 2021: 42 ML/MIN/1.73M*2
ERYTHROCYTE [DISTWIDTH] IN BLOOD BY AUTOMATED COUNT: 13.7 % (ref 11.5–14.5)
GLUCOSE BLD MANUAL STRIP-MCNC: 183 MG/DL (ref 74–99)
GLUCOSE BLD MANUAL STRIP-MCNC: 188 MG/DL (ref 74–99)
GLUCOSE BLD MANUAL STRIP-MCNC: 197 MG/DL (ref 74–99)
GLUCOSE BLD MANUAL STRIP-MCNC: 220 MG/DL (ref 74–99)
GLUCOSE BLD MANUAL STRIP-MCNC: 230 MG/DL (ref 74–99)
GLUCOSE SERPL-MCNC: 230 MG/DL (ref 74–99)
HCT VFR BLD AUTO: 28.5 % (ref 41–52)
HGB BLD-MCNC: 8.9 G/DL (ref 13.5–17.5)
MAGNESIUM SERPL-MCNC: 2.1 MG/DL (ref 1.6–2.4)
MCH RBC QN AUTO: 29.2 PG (ref 26–34)
MCHC RBC AUTO-ENTMCNC: 31.2 G/DL (ref 32–36)
MCV RBC AUTO: 93 FL (ref 80–100)
NRBC BLD-RTO: 0 /100 WBCS (ref 0–0)
PLATELET # BLD AUTO: 230 X10*3/UL (ref 150–450)
POTASSIUM SERPL-SCNC: 4.5 MMOL/L (ref 3.5–5.3)
RBC # BLD AUTO: 3.05 X10*6/UL (ref 4.5–5.9)
SODIUM SERPL-SCNC: 137 MMOL/L (ref 136–145)
WBC # BLD AUTO: 11.3 X10*3/UL (ref 4.4–11.3)

## 2024-11-25 PROCEDURE — 80048 BASIC METABOLIC PNL TOTAL CA: CPT

## 2024-11-25 PROCEDURE — 2500000002 HC RX 250 W HCPCS SELF ADMINISTERED DRUGS (ALT 637 FOR MEDICARE OP, ALT 636 FOR OP/ED): Performed by: PHYSICIAN ASSISTANT

## 2024-11-25 PROCEDURE — 1200000002 HC GENERAL ROOM WITH TELEMETRY DAILY

## 2024-11-25 PROCEDURE — 82947 ASSAY GLUCOSE BLOOD QUANT: CPT

## 2024-11-25 PROCEDURE — 85027 COMPLETE CBC AUTOMATED: CPT

## 2024-11-25 PROCEDURE — 36415 COLL VENOUS BLD VENIPUNCTURE: CPT

## 2024-11-25 PROCEDURE — 83735 ASSAY OF MAGNESIUM: CPT

## 2024-11-25 PROCEDURE — 2500000001 HC RX 250 WO HCPCS SELF ADMINISTERED DRUGS (ALT 637 FOR MEDICARE OP): Performed by: PHYSICIAN ASSISTANT

## 2024-11-25 PROCEDURE — 2500000002 HC RX 250 W HCPCS SELF ADMINISTERED DRUGS (ALT 637 FOR MEDICARE OP, ALT 636 FOR OP/ED)

## 2024-11-25 PROCEDURE — 97530 THERAPEUTIC ACTIVITIES: CPT | Mod: GP

## 2024-11-25 RX ADMIN — INSULIN LISPRO 8 UNITS: 100 INJECTION, SUSPENSION SUBCUTANEOUS at 17:55

## 2024-11-25 RX ADMIN — INSULIN LISPRO 8 UNITS: 100 INJECTION, SUSPENSION SUBCUTANEOUS at 09:01

## 2024-11-25 RX ADMIN — INSULIN LISPRO 2 UNITS: 100 INJECTION, SOLUTION INTRAVENOUS; SUBCUTANEOUS at 13:24

## 2024-11-25 RX ADMIN — TAMSULOSIN HYDROCHLORIDE 0.4 MG: 0.4 CAPSULE ORAL at 09:00

## 2024-11-25 RX ADMIN — DAPAGLIFLOZIN 10 MG: 10 TABLET, FILM COATED ORAL at 15:52

## 2024-11-25 RX ADMIN — APIXABAN 10 MG: 5 TABLET, FILM COATED ORAL at 09:00

## 2024-11-25 RX ADMIN — ATORVASTATIN CALCIUM 80 MG: 80 TABLET, FILM COATED ORAL at 09:00

## 2024-11-25 RX ADMIN — INSULIN LISPRO 2 UNITS: 100 INJECTION, SOLUTION INTRAVENOUS; SUBCUTANEOUS at 17:55

## 2024-11-25 RX ADMIN — INSULIN LISPRO 4 UNITS: 100 INJECTION, SOLUTION INTRAVENOUS; SUBCUTANEOUS at 09:01

## 2024-11-25 RX ADMIN — APIXABAN 10 MG: 5 TABLET, FILM COATED ORAL at 21:08

## 2024-11-25 RX ADMIN — INSULIN LISPRO 2 UNITS: 100 INJECTION, SOLUTION INTRAVENOUS; SUBCUTANEOUS at 21:52

## 2024-11-25 ASSESSMENT — COGNITIVE AND FUNCTIONAL STATUS - GENERAL
HELP NEEDED FOR BATHING: A LITTLE
TOILETING: A LITTLE
MOBILITY SCORE: 12
MOVING TO AND FROM BED TO CHAIR: A LOT
MOVING FROM LYING ON BACK TO SITTING ON SIDE OF FLAT BED WITH BEDRAILS: A LITTLE
WALKING IN HOSPITAL ROOM: A LOT
MOBILITY SCORE: 15
TURNING FROM BACK TO SIDE WHILE IN FLAT BAD: A LITTLE
TURNING FROM BACK TO SIDE WHILE IN FLAT BAD: A LITTLE
MOVING TO AND FROM BED TO CHAIR: A LOT
CLIMB 3 TO 5 STEPS WITH RAILING: A LOT
DAILY ACTIVITIY SCORE: 19
WALKING IN HOSPITAL ROOM: TOTAL
DRESSING REGULAR LOWER BODY CLOTHING: A LITTLE
CLIMB 3 TO 5 STEPS WITH RAILING: TOTAL
STANDING UP FROM CHAIR USING ARMS: A LITTLE
MOVING FROM LYING ON BACK TO SITTING ON SIDE OF FLAT BED WITH BEDRAILS: A LITTLE
DRESSING REGULAR UPPER BODY CLOTHING: A LITTLE
PERSONAL GROOMING: A LITTLE
STANDING UP FROM CHAIR USING ARMS: A LOT

## 2024-11-25 ASSESSMENT — PAIN SCALES - GENERAL
PAINLEVEL_OUTOF10: 0 - NO PAIN

## 2024-11-25 ASSESSMENT — PAIN - FUNCTIONAL ASSESSMENT
PAIN_FUNCTIONAL_ASSESSMENT: 0-10

## 2024-11-25 NOTE — PROGRESS NOTES
11/25/24 1634   Discharge Planning   Home or Post Acute Services Post acute facilities (Rehab/SNF/etc)   Type of Post Acute Facility Services Skilled nursing   Expected Discharge Disposition SNF   Does the patient need discharge transport arranged? Yes   RoundTrip coordination needed? Yes   Patient Choice   Provider Choice list and CMS website (https://medicare.gov/care-compare#search) for post-acute Quality and Resource Measure Data were provided and reviewed with: Patient;Family   Intensity of Service   Intensity of Service 0-30 min     Met with pt to discuss discharge options. Pt is not interested in returning to [facility] at discharge. A SNF list was provided. Pt stated he would like to discuss the list with his brother, who will be in later today. Pt gave SW permission to contact his brother to discuss the discharge plan. Spoke with his brother, Rosales. Discussed the need to pick a new facility for placement at discharge. He is aware there is list in pt for there review. He will review it with his brother and leave choices on the list. SW to follow to follow.

## 2024-11-25 NOTE — DOCUMENTATION CLARIFICATION NOTE
"    PATIENT:               THA RODRIGUEZ  ACCT #:                  [id number]  MRN:                       [id number]  :                       1960  ADMIT DATE:       2024 6:35 PM  DISCH DATE:  RESPONDING PROVIDER #:        [id number]          PROVIDER RESPONSE TEXT:    Non-infectious SIRS with acute organ dysfunction of HARSHIL    CDI QUERY TEXT:    Clarification    Instruction:    Based on your assessment of the patient and the clinical information, please provide the requested documentation by clicking on the appropriate radio button and enter any additional information if prompted.    Question: Please further clarify if there is a diagnosis related to the clinical information    When answering this query, please exercise your independent professional judgment. The fact that a question is being asked, does not imply that any particular answer is desired or expected.    The patient's clinical indicators include:  Clinical Information: 65 y/o M admitted with HARSHIL    Clinical Indicators: 24-24    CR: 4.12, 4.00, 2.78, 1.95, 1.69, 1.79  VS on admit: T 97.2, P 120, R 20, 121/76, 98 on RA  Temp: 97-99.9  HR:   Respirs: 18-30    ED note  DR. Perez:  \"presenting with hyperglycemia and worsening renal function per laboratory work at his nursing home.  BUN elevated in the 70s, creatinine to almost 5.  Hyperglycemia due to diabetes mellitus, Acute urinary retention, Acute on chronic renal insufficiency, Gross hematuria, tachycardia\"    H/P  S.Flynn: \"HARSHIL on CKD4.  Although patient is noted to have neutrophilic leukocytosis with WBC 12.1, it is down trending compared to 16.9 at discharge from the hospital on 24. No obvious sources of infection, patient is afebrile, only mildly tachycardic and hip incision without erythema or drainage. Likely reactive due to recent surgery.\"    H/P  Dr. Potts: \"HARSHIL - likely pre renal due to hyperglycemia\"    MD PN  DR. Potts: \"HARSHIL- likely pre renal " "due to hyperglycemia, but also a component of urinary retention.   Baseline Cr. 1.7-1.8 on admission. 1.2-1.3 mg/dl in 2016.\"    Treatment: daily labs, frequent monitoring of vital signs, trend creatinine level, 2L NS bolus, 1L LR bolus, NS at 100ml/hr    Risk Factors: tachycardia, leukocytosis, HARSHIL, acute PE  Options provided:  -- Non-infectious SIRS with acute organ dysfunction of HARSHIL  -- No SIRS  -- Other - I will add my own diagnosis  -- Refer to Clinical Documentation Reviewer    Query created by: Shannon Garcia on 11/25/2024 8:58 AM      Electronically signed by:  YULY GUNN MD 11/25/2024 12:38 PM          "

## 2024-11-25 NOTE — NURSING NOTE
Pt had uneventful night with no acute changes in condition. Pt vitals remained stable and WNL. Pt tolerated all care given throughout shift. Pt safety maintained at all times.

## 2024-11-25 NOTE — NURSING NOTE
EOS- No acute changes throughout the shift. Patient up in chair for a couple hours this morning. Brother at bedside this evening. Patient sitting at edge of bed for meals and up to bedside commode for bowel movements. No complaints of pain or shortness of breath. Safety maintained and call light within reach.

## 2024-11-25 NOTE — PROGRESS NOTES
Physical Therapy Treatment    Patient Name: Lito Osei  MRN: [id number]  Today's Date: 11/25/2024  Time Calculation  Start Time: 0953  Stop Time: 1026  Time Calculation (min): 33 min     3030/3030-A    Assessment/Plan   PT Assessment  PT Assessment Results: Decreased strength, Decreased range of motion, Decreased endurance, Impaired balance, Decreased mobility, Decreased safety awareness, Orthopedic restrictions  Rehab Prognosis: Good  Medical Staff Made Aware: Yes  End of Session Communication: Bedside nurse  Assessment Comment: Pt participated in transfer training, bed mobilty training and LE ther-ex with difficulty 2/2 LLE weakness, possible LLE spasticity (pt has never heard of it), hip precautions, impaired balance and decreased activity tolerance. Pt would benefit from continued acute care PT during hospital LOS and upon dc at a moderate level intensity.  End of Session Patient Position: Up in chair, Alarm on  PT Plan  Inpatient/Swing Bed or Outpatient: Inpatient  PT Plan  Treatment/Interventions: Bed mobility, Transfer training, Gait training, Stair training, Balance training, Neuromuscular re-education, Strengthening, Endurance training, Range of motion, Therapeutic exercise, Therapeutic activity, Positioning, Postural re-education, Home exercise program  PT Plan: Ongoing PT  PT Frequency: 5 times per week  PT Discharge Recommendations: Moderate intensity level of continued care  Equipment Recommended upon Discharge: Wheeled walker  PT Recommended Transfer Status: Assist x2, Assistive device (ModAx1 to transfer to chair today.)  PT - OK to Discharge: Yes (Pt ok to dc from acute care PT to next level of care once cleared by medical team.)    Current Problem:  Patient Active Problem List   Diagnosis    Fall, initial encounter    Closed fracture of left hip    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    HTN (hypertension)    Type 2 diabetes mellitus with kidney complication, with long-term current use of  insulin    History of CVA (cerebrovascular accident)    CKD (chronic kidney disease) stage 4, GFR 15-29 ml/min (Multi)    Hyperglycemia due to diabetes mellitus (Multi)    Acute kidney injury superimposed on stage 4 chronic kidney disease    Metabolic acidosis due to diabetes mellitus (Multi)    Status post hip surgery    Acute urinary retention    Neutrophilic leukocytosis    Hyponatremia       General Visit Information:   PT  Visit  PT Received On: 11/25/24  General  Reason for Referral: Hyperglycemia 2/2 DM. Recent L hip hemiarthroplasty 11/18.  Referred By: Dr. Potts  Past Medical History Relevant to Rehab:   Past Medical History:   Diagnosis Date    CKD (chronic kidney disease)     CVA (cerebral vascular accident) (Multi) 11/21/2024    HLD (hyperlipidemia)     Hypertension     Stroke (Multi)     Type 2 diabetes mellitus with kidney complication, with long-term current use of insulin        Family/Caregiver Present: No  Caregiver Feedback: Brother present for eval.  Prior to Session Communication: Bedside nurse  Patient Position Received: Bed, 3 rail up, Alarm on  General Comment: Pt agreeable to PT assessment.  Subjective     Precautions:  Precautions  LE Weight Bearing Status: Weight Bearing as Tolerated (LLE WBAT; L posterior hip preacautions.)  Medical Precautions: Fall precautions (+ watch for hypotension.)  Precautions Comment: CVA with L sided residual deficits from 2015 per pt.    Vital Signs:  Vital Signs  Vitals Session: Post PT  Heart Rate: 109  BP: 114/78  BP Location: Right arm  BP Method: Automatic  Patient Position: Sitting  Vital Signs Comment: Sitting in chair after transfer training and ther-ex. Legs elevated.  Objective     Pain:  Pain Assessment  Pain Assessment: 0-10  0-10 (Numeric) Pain Score: 0 - No pain    Cognition:  Cognition  Overall Cognitive Status: Within Functional Limits  Orientation Level: Oriented X4    Extremity/Trunk Assessments:  RLE   RLE : Within Functional Limits  LLE    LLE : Exceptions to WFL  Strength LLE  L Hip Flexion: 2+/5  L Knee Extension: 3+/5  L Ankle Dorsiflexion: 3+/5    Treatments:  Therapeutic Exercise  Therapeutic Exercise Performed: Yes  Therapeutic Exercise Activity 1: Seated ther-ex performed in chair: 2x10 alternating LAQ. tactile cues/assist to prevent hip abduction/ER. Pt reports pain at L inner thigh during exercise that improves after heel slides. Possible spasticity LLE. Heel slides LLE 2x10 reps with towel. Hip adduction isometric hold 10 reps x3-5 sec hold. Cues for improved ROM and contraction.        Bed Mobility  Bed Mobility: Yes  Bed Mobility 1  Bed Mobility 1: Supine to sitting  Level of Assistance 1: Contact guard  Bed Mobility Comments 1: HOB elevated to exit L side of bed. Cues for sequencing.     Transfers  Transfer: Yes  Transfer 1  Technique 1: Sit to stand  Transfer Device 1: Walker, Gait belt  Transfer Level of Assistance 1: Moderate assistance  Trials/Comments 1: x1 trial Sit>stand from EOB, ModAx1 with FWW. Cues for hand placement and anterior weight shift. Pt is weight shifted to R side to rise to stand.  Transfers 2  Transfer From 2: Bed to  Transfer to 2: Chair with arms  Technique 2: To right  Transfer Device 2: Walker, Gait belt  Transfer Level of Assistance 2: Moderate assistance  Trials/Comments 2: Pt cued to take lateral step to R to approach chair. Pt able to take x2 small steps and then reaches for arm rest to pivot remainder of distance without walker, ModAx1. Poor safety observed despite cues.  Transfers 3  Technique 3: Sit to stand, Stand to sit  Transfer Device 3: Walker, Gait belt  Transfer Level of Assistance 3: Moderate assistance  Trials/Comments 3: x3 trials Sit<>stand from chair with FWW, ModAx1. Cues for hand placement, anterior weight shift. Tactile cues at L knee/approximation to prevent abduction during transfer. Pt weight shifted to R, cues to weight shift to midline. Pt reports he has been reliant on RLE and this  is a habit since his CVA.    Outcome Measures:     [facility] Basic Mobility  Turning from your back to your side while in a flat bed without using bedrails: A little  Moving from lying on your back to sitting on the side of a flat bed without using bedrails: A little  Moving to and from bed to chair (including a wheelchair): A lot  Standing up from a chair using your arms (e.g. wheelchair or bedside chair): A lot  To walk in hospital room: Total  Climbing 3-5 steps with railing: Total  Basic Mobility - Total Score: 12    Education Documentation  Mobility Training, taught by Melina De Dios PT at 11/25/2024 11:31 AM.  Learner: Patient  Readiness: Acceptance  Method: Explanation  Response: Verbalizes Understanding, Demonstrated Understanding    Mobility Training, taught by Melina De Dios PT at 11/24/2024 12:03 PM.  Learner: Patient  Readiness: Acceptance  Method: Explanation  Response: Verbalizes Understanding, Demonstrated Understanding    Education Comments  No comments found.           EDUCATION:     Encounter Problems       Encounter Problems (Active)       PT Problem       Bed mobility (Progressing)       Start:  11/24/24    Expected End:  12/08/24       Pt will perform supine<>sit with HOB flat, GEOFFREY.           Transfers (Progressing)       Start:  11/24/24    Expected End:  12/08/24       Pt will perform all transfers with LRAD, GEOFFREY.            Gait (Progressing)       Start:  11/24/24    Expected End:  12/08/24       Pt will amb 150' with LRAD, GEOFFREY with reciprocal gait, upright posture and improved activity tolerance as demonstrated by vitals.             Balance (Progressing)       Start:  11/24/24    Expected End:  12/08/24       Pt will tolerate standing x2 min with BUE support, SBAx1.         Strength (Progressing)       Start:  11/24/24    Expected End:  12/08/24       Pt will improve gross LLE strength to 4-/5.            Pain - Adult

## 2024-11-25 NOTE — PROGRESS NOTES
Nutrition Diet Education  Reason for education: uncontrolled DM    Nutrition Note:  Lito Osei is a 64 y.o. male presenting to ED with hyperglycemia from [facility] after being discharged there from [facility] on 11/20.  He had previous hospital stay for fall and surgical repair of hip fracture.    Past Medical History: hypertension, hyperlipidemia, CVA without residual deficits, CKD stage IV, insulin-dependent type 2 diabetes mellitus, and recent left hip fracture s/p left hip hemiarthroplasty on 11/18/2024     Nutrition Significant Labs:  BMP Trend:   Results from last 7 days   Lab Units 11/25/24  0622 11/24/24  0537 11/23/24  0552 11/22/24  0719   GLUCOSE mg/dL 230* 199* 187* 204*   CALCIUM mg/dL 8.2* 8.5* 8.4* 8.1*   SODIUM mmol/L 137 137 141 138   POTASSIUM mmol/L 4.5 4.7 4.4 4.5   CO2 mmol/L 19* 20* 22 19*   CHLORIDE mmol/L 111* 109* 110* 111*   BUN mg/dL 56* 50* 55* 64*   CREATININE mg/dL 1.79* 1.69* 1.95* 2.78*    , A1C:  Lab Results   Component Value Date    HGBA1C 8.2 (H) 11/17/2024       Current Diet: Adult diet Cardiac, Consistent Carb; CCD 45 gm/meal; 70 gm fat; 2 - 3 grams Sodium    Encounter summary: Per H&P pt felt that he was not getting a carb controlled diet at [facility] and this was the reason for his elevated blood sugars.  Met with pt at bedside.  Prior to fall pt lived at home alone.  His blood sugar has been managed for years with insulin.  He monitors BG with a Freestyle Laura and notes that his BG before going to bed is about 130-140mg/dl and when he wakes up its typically around 110mg/dl.  He is a small eater but notes stable weight recently.  Appetite is current fair to poor and was only willing to order a turkey sandwich for lunch.    Typical daily intake:  Breakfast - egg beaters, sausage, and two pieces of white toast  Lunch - if BG >120mg/dl will have half a sandwich, if BG <120mg/dl will allow himself to have a whole sandwich.  Dinner - varies, maybe a personal pan pizza or baked  chicken    Education Documentation:   Education Documentation  Nutrition Care Manual, taught by Sixto Hastings RD at 11/25/2024  1:04 PM.  Learner: Patient  Readiness: Acceptance  Method: Explanation  Response: Verbalizes Understanding  Comment: Discussed need for consistent PO intakes including 3 balanced meals as well as benefits of having an appropriate PM snack for BG control.              Nutrition Prescription/Recommended Diet:  Individualized Nutrition Prescription Provided for : Recommend liberalized diet to allow for up to 60g CHO per meal with cardican modifiers for 70g fat limitation and 2-3g sodium    Time Spent/Follow-up Reminder:   Time Spent (min): 60 minutes  Last Date of Nutrition Visit: 11/25/24  Nutrition Follow-Up Needed?: 5-7 days  Follow up Comment: KEVON

## 2024-11-25 NOTE — PROGRESS NOTES
Lito Osei is a 64 y.o. male on day 3 of admission presenting with Hyperglycemia due to diabetes mellitus (Multi).      Subjective   64 y.o. male with past medical history of hypertension, hyperlipidemia, CVA without residual deficits, CKD stage IV, insulin-dependent type 2 diabetes mellitus, and recent left hip fracture s/p left hip hemiarthroplasty on 11/18/2024 presents to [facility] on 11/21/2024 from [facility] due to concern of worsening renal function and hyperglycemia on outpatient lab work.     Patient is feeling well, no N/V, no CP or SOB.          Objective          Vitals 24HR  Heart Rate:  []   Temp:  [36.1 °C (97 °F)-36.8 °C (98.2 °F)]   Resp:  [18]   BP: ()/(52-86)   SpO2:  [92 %-95 %]     Intake/Output last 3 Shifts:    Intake/Output Summary (Last 24 hours) at 11/25/2024 1109  Last data filed at 11/25/2024 1000  Gross per 24 hour   Intake 1960 ml   Output 1450 ml   Net 510 ml       Physical Exam  GENERAL: Alert, no distress, cooperative  SKIN: Skin color, texture, turgor normal. No rashes or lesions.  EYES: PERRLA, EOMI  HEENT: Head: Normocephalic  Neck: supple and no adenopathy  LUNGS: Lungs clear to auscultation. Good diaphragmatic excursion.  CARDIAC: Normal S1 and S2; no rubs, murmurs, or gallops  ABDOMEN: Abdomen soft, non-tender. BS normal. No masses or organomegaly.  EXTREMITIES: No ulcers, Extremities normal. No deformities, edema, clubbing or skin discoloration.  NEURO: Cranial nerves II-XII intact, no focal deficit.     Relevant Results             Scheduled medications  apixaban, 10 mg, oral, BID   Followed by  [START ON 11/29/2024] apixaban, 5 mg, oral, BID  atorvastatin, 80 mg, oral, Daily  dapagliflozin propanediol, 10 mg, oral, q24h  insulin lispro, 0-10 Units, subcutaneous, Before meals & nightly  insulin lispro protamin-lispro, 8 Units, subcutaneous, BID AC  [Held by provider] lisinopril, 10 mg, oral, Daily  tamsulosin, 0.4 mg, oral, Daily      Continuous  medications  oxygen, , Last Rate: 2 L/min (11/22/24 0419)      PRN medications  PRN medications: acetaminophen **OR** acetaminophen **OR** acetaminophen, dextrose, dextrose, glucagon, glucagon, melatonin, oxyCODONE, polyethylene glycol  Results for orders placed or performed during the hospital encounter of 11/21/24 (from the past 24 hours)   POCT GLUCOSE   Result Value Ref Range    POCT Glucose 225 (H) 74 - 99 mg/dL   POCT GLUCOSE   Result Value Ref Range    POCT Glucose 275 (H) 74 - 99 mg/dL   POCT GLUCOSE   Result Value Ref Range    POCT Glucose 147 (H) 74 - 99 mg/dL   Basic metabolic panel   Result Value Ref Range    Glucose 230 (H) 74 - 99 mg/dL    Sodium 137 136 - 145 mmol/L    Potassium 4.5 3.5 - 5.3 mmol/L    Chloride 111 (H) 98 - 107 mmol/L    Bicarbonate 19 (L) 21 - 32 mmol/L    Anion Gap 12 10 - 20 mmol/L    Urea Nitrogen 56 (H) 6 - 23 mg/dL    Creatinine 1.79 (H) 0.50 - 1.30 mg/dL    eGFR 42 (L) >60 mL/min/1.73m*2    Calcium 8.2 (L) 8.6 - 10.3 mg/dL   CBC   Result Value Ref Range    WBC 11.3 4.4 - 11.3 x10*3/uL    nRBC 0.0 0.0 - 0.0 /100 WBCs    RBC 3.05 (L) 4.50 - 5.90 x10*6/uL    Hemoglobin 8.9 (L) 13.5 - 17.5 g/dL    Hematocrit 28.5 (L) 41.0 - 52.0 %    MCV 93 80 - 100 fL    MCH 29.2 26.0 - 34.0 pg    MCHC 31.2 (L) 32.0 - 36.0 g/dL    RDW 13.7 11.5 - 14.5 %    Platelets 230 150 - 450 x10*3/uL   Magnesium   Result Value Ref Range    Magnesium 2.10 1.60 - 2.40 mg/dL   POCT GLUCOSE   Result Value Ref Range    POCT Glucose 220 (H) 74 - 99 mg/dL   POCT GLUCOSE   Result Value Ref Range    POCT Glucose 230 (H) 74 - 99 mg/dL       Assessment/Plan          Assessment & Plan  Hyperglycemia due to diabetes mellitus (Multi)    Type 2 diabetes mellitus with kidney complication, with long-term current use of insulin    Acute kidney injury superimposed on stage 4 chronic kidney disease    Metabolic acidosis due to diabetes mellitus (Multi)    Status post hip surgery    Acute urinary retention    Neutrophilic  leukocytosis    Hyponatremia      // HARSHIL   // Urinary retention.   - likely pre renal due to hyperglycemia, but also a component of urinary retention.   - Cr is improving.   - Baseline Cr. 1.7-1.8 on admission. 1.2-1.3 mg/dl in 2016.  - urology note reviewed; unable to do cysto now due to patient's condition, plan to maintain chavarria and flomax with outpatient follow up.     Ok to dc. Outpatient follow ups.     // hyperglycemia.     // Acute PE  - on eliquis at home but only 2.5 bid.   - subtherapeutic anticoagulation.   - start eliquis 10 mg BID for a week and then 5 mg bid.   - Monitor oxygenation.         Plan:  Code Status: Full Code   Consult: Deferred nephrology consult to attending. Consider urology consult given acute urinary retention and possibly endocrinology for uncontrolled type two diabetes     ATB: Although patient is noted to have neutrophilic leukocytosis with WBC 12.1, it is down trending compared to 16.9 at discharge from the hospital on 11/20/24. No obvious sources of infection, patient is afebrile, only mildly tachycardic and hip incision without erythema or drainage. Likely reactive due to recent surgery. Holding off on antibiotics at this time.      IVFs: 0.9% sodium chloride at 100 mL/hr continuous x 24 hours for mild hyponatremia and HARSHIL on CKD 4     Meds: Sliding scale insulin with hypoglycemia protocol. Started daily flomax due to acute urinary retention  Tylenol 650 mg p.o. every 6 hours as needed for pain 1-6/headaches/fever, melatonin 3 milligrams p.o. daily as needed for insomnia, MiraLAX 17 gms p.o daily as needed for constipation.  Avoid nephrotoxic medications   Monitor for hypo/hyperglycemia signs and symptoms      Diet: Cardiac, Carb Consistent     Labs ordered: CBC, BMP, Mg, Phos. Lactate. Blood cultures x 2 and urine culture collected in the ER, pending results at this time.   - Mild hyponatremia: urine electrolytes, urine osmolality, serum osmolality  - Monitor / trend labs while  inpatient.  - Monitor and replace electrolytes as needed.  - Transfuse with PRBC for hemoglobin<7.0      Test ordered: VQ scan ordered in ER, pending completion. Left lower extremity duplex done with preliminary results negative for DVT, awaiting final read. Additional testing deferred to attending.      VTE prophylaxis:   On eliquis BID, renally dosed. Patient unsure if he took all of his evening medications prior to coming in. Can restart once med rec is completed  BLE SCDs.  Monitor for s/s of bleeding    Den Potts MD

## 2024-11-26 VITALS
HEART RATE: 101 BPM | OXYGEN SATURATION: 95 % | TEMPERATURE: 96.6 F | SYSTOLIC BLOOD PRESSURE: 131 MMHG | BODY MASS INDEX: 25.18 KG/M2 | DIASTOLIC BLOOD PRESSURE: 89 MMHG | WEIGHT: 170 LBS | HEIGHT: 69 IN | RESPIRATION RATE: 16 BRPM

## 2024-11-26 LAB
ANION GAP SERPL CALC-SCNC: 12 MMOL/L (ref 10–20)
BACTERIA BLD CULT: NORMAL
BACTERIA BLD CULT: NORMAL
BUN SERPL-MCNC: 46 MG/DL (ref 6–23)
CALCIUM SERPL-MCNC: 8.5 MG/DL (ref 8.6–10.3)
CHLORIDE SERPL-SCNC: 111 MMOL/L (ref 98–107)
CO2 SERPL-SCNC: 19 MMOL/L (ref 21–32)
CREAT SERPL-MCNC: 1.58 MG/DL (ref 0.5–1.3)
EGFRCR SERPLBLD CKD-EPI 2021: 49 ML/MIN/1.73M*2
ERYTHROCYTE [DISTWIDTH] IN BLOOD BY AUTOMATED COUNT: 13.8 % (ref 11.5–14.5)
GLUCOSE BLD MANUAL STRIP-MCNC: 137 MG/DL (ref 74–99)
GLUCOSE BLD MANUAL STRIP-MCNC: 186 MG/DL (ref 74–99)
GLUCOSE BLD MANUAL STRIP-MCNC: 246 MG/DL (ref 74–99)
GLUCOSE SERPL-MCNC: 220 MG/DL (ref 74–99)
HCT VFR BLD AUTO: 29.5 % (ref 41–52)
HGB BLD-MCNC: 9.1 G/DL (ref 13.5–17.5)
MAGNESIUM SERPL-MCNC: 1.86 MG/DL (ref 1.6–2.4)
MCH RBC QN AUTO: 28.6 PG (ref 26–34)
MCHC RBC AUTO-ENTMCNC: 30.8 G/DL (ref 32–36)
MCV RBC AUTO: 93 FL (ref 80–100)
NRBC BLD-RTO: 0 /100 WBCS (ref 0–0)
PLATELET # BLD AUTO: 280 X10*3/UL (ref 150–450)
POTASSIUM SERPL-SCNC: 4.4 MMOL/L (ref 3.5–5.3)
RBC # BLD AUTO: 3.18 X10*6/UL (ref 4.5–5.9)
SODIUM SERPL-SCNC: 138 MMOL/L (ref 136–145)
WBC # BLD AUTO: 12.5 X10*3/UL (ref 4.4–11.3)

## 2024-11-26 PROCEDURE — 97165 OT EVAL LOW COMPLEX 30 MIN: CPT | Mod: GO | Performed by: OCCUPATIONAL THERAPIST

## 2024-11-26 PROCEDURE — 82947 ASSAY GLUCOSE BLOOD QUANT: CPT

## 2024-11-26 PROCEDURE — 36415 COLL VENOUS BLD VENIPUNCTURE: CPT

## 2024-11-26 PROCEDURE — 80048 BASIC METABOLIC PNL TOTAL CA: CPT

## 2024-11-26 PROCEDURE — 83735 ASSAY OF MAGNESIUM: CPT

## 2024-11-26 PROCEDURE — 2500000001 HC RX 250 WO HCPCS SELF ADMINISTERED DRUGS (ALT 637 FOR MEDICARE OP): Performed by: PHYSICIAN ASSISTANT

## 2024-11-26 PROCEDURE — 85027 COMPLETE CBC AUTOMATED: CPT

## 2024-11-26 PROCEDURE — 2500000002 HC RX 250 W HCPCS SELF ADMINISTERED DRUGS (ALT 637 FOR MEDICARE OP, ALT 636 FOR OP/ED)

## 2024-11-26 PROCEDURE — 2500000002 HC RX 250 W HCPCS SELF ADMINISTERED DRUGS (ALT 637 FOR MEDICARE OP, ALT 636 FOR OP/ED): Performed by: PHYSICIAN ASSISTANT

## 2024-11-26 RX ORDER — TAMSULOSIN HYDROCHLORIDE 0.4 MG/1
0.4 CAPSULE ORAL DAILY
Start: 2024-11-26 | End: 2024-12-26

## 2024-11-26 RX ORDER — INSULIN LISPRO 100 [IU]/ML
0-10 INJECTION, SOLUTION INTRAVENOUS; SUBCUTANEOUS
Start: 2024-11-26 | End: 2024-12-26

## 2024-11-26 RX ADMIN — ATORVASTATIN CALCIUM 80 MG: 80 TABLET, FILM COATED ORAL at 09:17

## 2024-11-26 RX ADMIN — DAPAGLIFLOZIN 10 MG: 10 TABLET, FILM COATED ORAL at 14:21

## 2024-11-26 RX ADMIN — INSULIN LISPRO 8 UNITS: 100 INJECTION, SUSPENSION SUBCUTANEOUS at 15:42

## 2024-11-26 RX ADMIN — INSULIN LISPRO 8 UNITS: 100 INJECTION, SUSPENSION SUBCUTANEOUS at 09:18

## 2024-11-26 RX ADMIN — INSULIN LISPRO 6 UNITS: 100 INJECTION, SOLUTION INTRAVENOUS; SUBCUTANEOUS at 09:17

## 2024-11-26 RX ADMIN — INSULIN LISPRO 2 UNITS: 100 INJECTION, SOLUTION INTRAVENOUS; SUBCUTANEOUS at 15:41

## 2024-11-26 RX ADMIN — APIXABAN 10 MG: 5 TABLET, FILM COATED ORAL at 09:17

## 2024-11-26 RX ADMIN — TAMSULOSIN HYDROCHLORIDE 0.4 MG: 0.4 CAPSULE ORAL at 09:17

## 2024-11-26 ASSESSMENT — COGNITIVE AND FUNCTIONAL STATUS - GENERAL
TOILETING: A LITTLE
MOVING TO AND FROM BED TO CHAIR: A LOT
HELP NEEDED FOR BATHING: A LOT
DRESSING REGULAR UPPER BODY CLOTHING: A LITTLE
STANDING UP FROM CHAIR USING ARMS: A LITTLE
DAILY ACTIVITIY SCORE: 17
DRESSING REGULAR LOWER BODY CLOTHING: A LITTLE
TURNING FROM BACK TO SIDE WHILE IN FLAT BAD: A LITTLE
DRESSING REGULAR LOWER BODY CLOTHING: A LOT
MOVING FROM LYING ON BACK TO SITTING ON SIDE OF FLAT BED WITH BEDRAILS: A LITTLE
MOBILITY SCORE: 15
CLIMB 3 TO 5 STEPS WITH RAILING: A LOT
PERSONAL GROOMING: A LITTLE
DRESSING REGULAR UPPER BODY CLOTHING: A LITTLE
TOILETING: A LOT
WALKING IN HOSPITAL ROOM: A LOT
HELP NEEDED FOR BATHING: A LITTLE
DAILY ACTIVITIY SCORE: 19

## 2024-11-26 ASSESSMENT — PAIN SCALES - GENERAL
PAINLEVEL_OUTOF10: 0 - NO PAIN

## 2024-11-26 ASSESSMENT — PAIN - FUNCTIONAL ASSESSMENT
PAIN_FUNCTIONAL_ASSESSMENT: 0-10

## 2024-11-26 NOTE — PROGRESS NOTES
Occupational Therapy    Evaluation    Patient Name: Lito Osei  MRN: [id number]  Today's Date: 11/26/2024  Time Calculation  Start Time: 0855  Stop Time: 0910  Time Calculation (min): 15 min  3030/3030-A    Assessment  IP OT Assessment  Prognosis: Good  Evaluation/Treatment Tolerance: Patient tolerated treatment well  Medical Staff Made Aware: Yes    Plan:  Treatment Interventions: ADL retraining, Functional transfer training  OT Frequency: 5 times per week  OT Discharge Recommendations: Moderate intensity level of continued care  Equipment Recommended upon Discharge: Wheeled walker (hip kit)  OT - OK to Discharge: Yes    Subjective     Current Problem:  1. Hyperglycemia due to diabetes mellitus (Multi)        2. Acute urinary retention        3. Acute on chronic renal insufficiency        4. Gross hematuria        5. Other specified soft tissue disorders  Lower extremity venous duplex left          General:  General  Reason for Referral: Decreased ADLs/ Renal function issue/ Hyperglycemia Recent Lt THR 11/18/24  WBAT Lt. was at home in Apt had fall then has been at [facility] for rehab (Hyperglycemia)  Referred By: Dr. Den Potts  Past Medical History Relevant to Rehab: DM2, CKD 4, HTN, HLD, Rt ankle and partial Lt hip arthroplasty  Family/Caregiver Present: No  Prior to Session Communication: Bedside nurse  Patient Position Received: Bed, 3 rail up  General Comment: Telel IV bed check chavarria (Lt side residual affects from CVA)    Precautions:  UE Weight Bearing Status: Weight Bearing as Tolerated (Lt leg)  Medical Precautions: Fall precautions    Vital Signs:       Pain:  Pain Assessment  Pain Assessment: 0-10  0-10 (Numeric) Pain Score: 0 - No pain    Objective     Cognition:  Overall Cognitive Status: Within Functional Limits             Home Living:  Type of Home: Apartment  Lives With: Alone  Home Layout: One level  Home Access: Elevator  Bathroom Shower/Tub: Tub/shower unit  Bathroom Equipment: Grab bars  in shower (no seat)  Home Living Comments: Pt had [facility] for Rehab was in Apt prior     Prior Function:  Level of [place]: Independent with ADLs and functional transfers, Needs assistance with ADLs  Prior Function Comments: Pt had a fall and injured hip had therapy and now back home    IADL History:  Homemaking Responsibilities: Yes  Meal Prep Responsibility: Primary (Family does help)  Mode of Transportation: Car (pt was driving prior to fall and now home family assisting)  IADL Comments: Pt did not have any reachers or LHS S/P hip surgery from 11/18/24    ADL:  UE Dressing Assistance: Stand by  LE Dressing Assistance: Minimal  Toileting Assistance with Device: Minimal  ADL Comments: Pt does need help with ADLs    Activity Tolerance:  Endurance: Tolerates 10 - 20 min exercise with multiple rests    Bed Mobility/Transfers:   Bed Mobility  Bed Mobility: Yes  Transfers  Transfer: Yes  Transfer 1  Transfer From 1: Sit to  Transfer to 1: Stand  Transfer Device 1: Walker, Gait belt  Transfer Level of Assistance 1: Minimum assistance  Trials/Comments 1: Pt able to take a few side steps with Min A using walker (Pt favors Rt leg)    Ambulation/Gait Training:       Sitting Balance:       Standing Balance:       Vision: Vision - Basic Assessment  Current Vision: No visual deficits   and      Sensation:  Light Touch: No apparent deficits    Strength:       Perception:  Inattention/Neglect: Appears intact    Coordination:  Movements are Fluid and Coordinated: Yes     Hand Function:  Hand Function  Gross Grasp: Functional    Extremities: RUE   RUE : Within Functional Limits (4+/5/5) and LUE   LUE: Within Functional Limits (4+/5/5)    Outcome Measures: [facility] Daily Activity  Putting on and taking off regular lower body clothing: A lot  Bathing (including washing, rinsing, drying): A lot  Putting on and taking off regular upper body clothing: A little  Toileting, which includes using toilet, bedpan or urinal: A lot  Taking  care of personal grooming such as brushing teeth: None  Eating Meals: None  Daily Activity - Total Score: 17                       EDUCATION:  Education  Individual(s) Educated: Patient  Education Provided: Fall precautons, Risk and benefits of OT discussed with patient or other  Education Documentation  Handouts, taught by Flakito Alberts OT at 11/26/2024 11:39 AM.  Learner: Patient  Readiness: Acceptance  Method: Explanation  Response: Verbalizes Understanding, Needs Reinforcement    Precautions, taught by Flakito Alberts OT at 11/26/2024 11:39 AM.  Learner: Patient  Readiness: Acceptance  Method: Explanation  Response: Verbalizes Understanding, Needs Reinforcement    Home Exercise Program, taught by Flakito Alberts OT at 11/26/2024 11:39 AM.  Learner: Patient  Readiness: Acceptance  Method: Explanation  Response: Verbalizes Understanding, Needs Reinforcement    Mobility Training, taught by Flakito Albrets OT at 11/26/2024 11:39 AM.  Learner: Patient  Readiness: Acceptance  Method: Explanation  Response: Verbalizes Understanding, Needs Reinforcement    ADL Training, taught by Flakito Alberts OT at 11/26/2024 11:39 AM.  Learner: Patient  Readiness: Acceptance  Method: Explanation  Response: Verbalizes Understanding, Needs Reinforcement    Education Comments  No comments found.        Goals:   Encounter Problems       Encounter Problems (Active)       STG 1       Pt will perform LE dressing with adaptive equipment use with contact guard and set up and cues.      STG - Patient will complete lower body dressing (Progressing)       Start:  11/26/24    Expected End:  12/10/24               STG 2       Pt will be supervised with UE dressing with set up.       STG - Patient will dress upper body (Progressing)       Start:  11/26/24    Expected End:  12/10/24               STG 3       Pt will perform standing tolerance at bedside for assist with ADLs 3-5 minutes with Contact guard assist.       Goal 1  (Progressing)       Start:  11/26/24    Expected End:  12/10/24            STG 4 (Progressing)       Start:  11/26/24    Expected End:  12/10/24       Pt will perform transfers to chair/ commode with device contact guard assist safely with cues, WBAT Lt.             Safety       Goal 5 (Progressing)       Start:  11/26/24    Expected End:  12/10/24       Pt will be indep with Hip WBAT Lt leg and precautions for using adaptive equipment safely.             Toileting       STG 6 (Progressing)       Start:  11/26/24       Pt will be contact guard assist with toileting safely.

## 2024-11-26 NOTE — PROGRESS NOTES
11/26/24 1030   Discharge Planning   Type of Residence Skilled nursing facility   Home or Post Acute Services Post acute facilities (Rehab/SNF/etc)   Type of Post Acute Facility Services Skilled nursing   Expected Discharge Disposition SNF   Does the patient need discharge transport arranged? Yes   RoundTrip coordination needed? Yes   Patient Choice   Provider Choice list and CMS website (https://medicare.gov/care-compare#search) for post-acute Quality and Resource Measure Data were provided and reviewed with: Patient;Family     Met with pt, he reviewed facility list with his brother last night. They are requesting 1. [facility], [facility]. [facility] and 4. [facility]. Request sent to [facility] to send SNF referrals. SW to follow.     12:45 [facility] is out of network, but [facility] can accept. Pre-cert started.

## 2024-11-26 NOTE — PROGRESS NOTES
11/26/24 1636   Discharge Planning   Home or Post Acute Services Post acute facilities (Rehab/SNF/etc)   Type of Post Acute Facility Services Skilled nursing   Expected Discharge Disposition SNF  ([facility])   Has discharge transport been arranged? Yes   What time is the transport expected? 0630   Intensity of Service   Intensity of Service >30 min     Pt cleared for transfer to [facility] today at 6:30. Pt aware and in agreement with the plan. Pt's brother was updated on the discharge plan. Facility and bedside nurse aware of transfer. Nursing will call report prior to discharge.

## 2024-11-26 NOTE — PROGRESS NOTES
Internal Medicine Progress Note    Patient Name: Lito Osei          MRN: [id number]  Today's Date: November 26, 2024          Attending: Den Potts MD    Subjective:  Patient was seen and examined at bedside, he has chavarria catheter.    Review Of Systems:  GENERAL: No malaise or fevers.  CARDIOVASCULAR: Negative for chest pain, leg swelling  RESPIRATORY: Negative for shortness of breath, cough, wheezing  GI: No nausea, vomiting, or diarrhea      Objective:  Vitals:    11/26/24 0800 11/26/24 1200 11/26/24 1600 11/26/24 1847   BP: 134/83 133/80 118/78 131/89   BP Location:    Right arm   Patient Position:   Sitting Sitting   Pulse: 105 96 110 101   Resp: 18 17 16 16   Temp: 36.8 °C (98.2 °F) 36.6 °C (97.9 °F) 36.6 °C (97.9 °F) 35.9 °C (96.6 °F)   TempSrc: Temporal Temporal Temporal Temporal   SpO2: 92% 94% 92% 95%   Weight:       Height:             Physical Exam:   General appearance: Well-appearing alert, in no acute distress   Lungs: Clear to auscultation, no wheezing or rhonchi  Heart: RRR without murmur, gallop, or rubs.  No ectopy  Abdomen: Soft, non-tender. Bowel sounds normal.  Extremities: No deformity, no edema  Neuro: Alert oriented x3, no focal deficit.    Labs:  Results for orders placed or performed during the hospital encounter of 11/21/24 (from the past 24 hours)   POCT GLUCOSE   Result Value Ref Range    POCT Glucose 197 (H) 74 - 99 mg/dL   Basic metabolic panel   Result Value Ref Range    Glucose 220 (H) 74 - 99 mg/dL    Sodium 138 136 - 145 mmol/L    Potassium 4.4 3.5 - 5.3 mmol/L    Chloride 111 (H) 98 - 107 mmol/L    Bicarbonate 19 (L) 21 - 32 mmol/L    Anion Gap 12 10 - 20 mmol/L    Urea Nitrogen 46 (H) 6 - 23 mg/dL    Creatinine 1.58 (H) 0.50 - 1.30 mg/dL    eGFR 49 (L) >60 mL/min/1.73m*2    Calcium 8.5 (L) 8.6 - 10.3 mg/dL   CBC   Result Value Ref Range    WBC 12.5 (H) 4.4 - 11.3 x10*3/uL    nRBC 0.0 0.0 - 0.0 /100 WBCs    RBC 3.18 (L) 4.50 - 5.90 x10*6/uL    Hemoglobin 9.1 (L) 13.5 - 17.5  "g/dL    Hematocrit 29.5 (L) 41.0 - 52.0 %    MCV 93 80 - 100 fL    MCH 28.6 26.0 - 34.0 pg    MCHC 30.8 (L) 32.0 - 36.0 g/dL    RDW 13.8 11.5 - 14.5 %    Platelets 280 150 - 450 x10*3/uL   Magnesium   Result Value Ref Range    Magnesium 1.86 1.60 - 2.40 mg/dL   POCT GLUCOSE   Result Value Ref Range    POCT Glucose 246 (H) 74 - 99 mg/dL   POCT GLUCOSE   Result Value Ref Range    POCT Glucose 137 (H) 74 - 99 mg/dL   POCT GLUCOSE   Result Value Ref Range    POCT Glucose 186 (H) 74 - 99 mg/dL       Medications:  Scheduled medications  apixaban, 10 mg, oral, BID   Followed by  [START ON 11/29/2024] apixaban, 5 mg, oral, BID  atorvastatin, 80 mg, oral, Daily  dapagliflozin propanediol, 10 mg, oral, q24h  insulin lispro, 0-10 Units, subcutaneous, Before meals & nightly  insulin lispro protamin-lispro, 8 Units, subcutaneous, BID AC  [Held by provider] lisinopril, 10 mg, oral, Daily  tamsulosin, 0.4 mg, oral, Daily      Continuous medications  oxygen, , Last Rate: 2 L/min (11/22/24 0419)      PRN medications  PRN medications: acetaminophen **OR** acetaminophen **OR** acetaminophen, dextrose, dextrose, glucagon, glucagon, melatonin, oxyCODONE, polyethylene glycol      Assessment/Plan:    Metabolic acidosis due to diabetes mellitus (Multi)    Acute kidney injury superimposed on stage 4 chronic kidney disease    Acute urinary retention  Patient has chavarria catheter  Monitor kidney function  Discharge plan with chavarria cathter      Hyperglycemia due to diabetes mellitus (Multi)    Type 2 diabetes mellitus with kidney complication, with long-term current use of insulin  Continue current regimen       Acute pE  Continue Eliquis      Discussed with patient, RN    Jovi Raya MD   Date: 11/26/24  Time: 6:58 PM    This note was partially created using voice recognition software and is inherently subject to errors including those of syntax and \"sound-alike\" substitutions which may escape proofreading. In such instances, original meaning " may be extrapolated by contextual derivation

## 2024-11-26 NOTE — CARE PLAN
Problem: Skin  Goal: Participates in plan/prevention/treatment measures  Outcome: Met  Goal: Prevent/manage excess moisture  Outcome: Met     Problem: Pain - Adult  Goal: Verbalizes/displays adequate comfort level or baseline comfort level  Outcome: Met     Problem: Safety - Adult  Goal: Free from fall injury  Outcome: Met     Problem: Discharge Planning  Goal: Discharge to home or other facility with appropriate resources  Outcome: Met     Problem: Fall/Injury  Goal: Not fall by end of shift  Outcome: Met         Patient stable this shift. Denies any pain or discomfort. Up x 2 assist this shift. Patient currently awaiting transport to [facility]. Patient currently resting in bed, bed alarm on, christina light with in reach

## 2024-11-29 LAB
ATRIAL RATE: 124 BPM
P AXIS: 52 DEGREES
P OFFSET: 202 MS
P ONSET: 145 MS
PR INTERVAL: 152 MS
Q ONSET: 221 MS
QRS COUNT: 20 BEATS
QRS DURATION: 76 MS
QT INTERVAL: 290 MS
QTC CALCULATION(BAZETT): 416 MS
QTC FREDERICIA: 369 MS
R AXIS: 29 DEGREES
T AXIS: 82 DEGREES
T OFFSET: 366 MS
VENTRICULAR RATE: 124 BPM

## 2024-12-03 ENCOUNTER — HOSPITAL ENCOUNTER (OUTPATIENT)
Dept: RADIOLOGY | Facility: CLINIC | Age: 64
Discharge: HOME | End: 2024-12-03
Payer: MEDICARE

## 2024-12-03 ENCOUNTER — OFFICE VISIT (OUTPATIENT)
Dept: ORTHOPEDIC SURGERY | Facility: CLINIC | Age: 64
End: 2024-12-03
Payer: MEDICARE

## 2024-12-03 DIAGNOSIS — S72.002A CLOSED FRACTURE OF NECK OF LEFT FEMUR, INITIAL ENCOUNTER: ICD-10-CM

## 2024-12-03 PROCEDURE — 3052F HG A1C>EQUAL 8.0%<EQUAL 9.0%: CPT | Performed by: ORTHOPAEDIC SURGERY

## 2024-12-03 PROCEDURE — 73502 X-RAY EXAM HIP UNI 2-3 VIEWS: CPT | Mod: LEFT SIDE | Performed by: ORTHOPAEDIC SURGERY

## 2024-12-03 PROCEDURE — 73502 X-RAY EXAM HIP UNI 2-3 VIEWS: CPT | Mod: LT

## 2024-12-03 PROCEDURE — 3060F POS MICROALBUMINURIA REV: CPT | Performed by: ORTHOPAEDIC SURGERY

## 2024-12-03 PROCEDURE — 99024 POSTOP FOLLOW-UP VISIT: CPT | Performed by: ORTHOPAEDIC SURGERY

## 2024-12-03 PROCEDURE — 99211 OFF/OP EST MAY X REQ PHY/QHP: CPT | Performed by: ORTHOPAEDIC SURGERY

## 2024-12-12 ENCOUNTER — TELEPHONE (OUTPATIENT)
Dept: ENDOCRINOLOGY | Age: 64
End: 2024-12-12

## 2024-12-12 NOTE — TELEPHONE ENCOUNTER
Tash from [facility] calling, Marco doesn't currently have a family  Dr.  He was recently admitted into the hospital and he was just discharged with HHC orders.  Since he doesn't have a PCP they want to know if you would sign off on the orders and take over.  Please call [phone]

## 2024-12-16 ENCOUNTER — APPOINTMENT (OUTPATIENT)
Dept: RADIOLOGY | Facility: HOSPITAL | Age: 64
End: 2024-12-16
Payer: MEDICARE

## 2024-12-16 ENCOUNTER — APPOINTMENT (OUTPATIENT)
Dept: CARDIOLOGY | Facility: HOSPITAL | Age: 64
End: 2024-12-16
Payer: MEDICARE

## 2024-12-16 ENCOUNTER — HOSPITAL ENCOUNTER (INPATIENT)
Facility: HOSPITAL | Age: 64
LOS: 2 days | Discharge: NURSING, REHAB OR LONG-TERM CARE | End: 2024-12-19
Attending: EMERGENCY MEDICINE | Admitting: STUDENT IN AN ORGANIZED HEALTH CARE EDUCATION/TRAINING PROGRAM
Payer: MEDICARE

## 2024-12-16 DIAGNOSIS — S70.02XA CONTUSION OF LEFT HIP, INITIAL ENCOUNTER: ICD-10-CM

## 2024-12-16 DIAGNOSIS — Z91.89 AT RISK FOR ARRHYTHMIA: ICD-10-CM

## 2024-12-16 DIAGNOSIS — I67.848 OTHER CEREBROVASCULAR VASOSPASM AND VASOCONSTRICTION: ICD-10-CM
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
## 2024-12-16 DIAGNOSIS — N28.9 RENAL INSUFFICIENCY: ICD-10-CM

## 2024-12-16 DIAGNOSIS — Z86.73 HISTORY OF CVA (CEREBROVASCULAR ACCIDENT): ICD-10-CM

## 2024-12-16 DIAGNOSIS — D64.9 ANEMIA, UNSPECIFIED TYPE: ICD-10-CM

## 2024-12-16 DIAGNOSIS — R79.89 ELEVATED TROPONIN: ICD-10-CM

## 2024-12-16 DIAGNOSIS — R73.9 HYPERGLYCEMIA: ICD-10-CM
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
## 2024-12-16 LAB
ALBUMIN SERPL BCP-MCNC: 3.5 G/DL (ref 3.4–5)
ALP SERPL-CCNC: 106 U/L (ref 33–136)
ALT SERPL W P-5'-P-CCNC: 13 U/L (ref 10–52)
AMMONIA PLAS-SCNC: 25 UMOL/L (ref 16–53)
AMPHETAMINES UR QL SCN: NORMAL
ANION GAP SERPL CALC-SCNC: 15 MMOL/L (ref 10–20)
APAP SERPL-MCNC: <10 UG/ML
APPEARANCE UR: CLEAR
AST SERPL W P-5'-P-CCNC: 20 U/L (ref 9–39)
ATRIAL RATE: 104 BPM
ATRIAL RATE: 114 BPM
BARBITURATES UR QL SCN: NORMAL
BASOPHILS # BLD AUTO: 0.07 X10*3/UL (ref 0–0.1)
BASOPHILS NFR BLD AUTO: 0.8 %
BENZODIAZ UR QL SCN: NORMAL
BILIRUB SERPL-MCNC: 0.5 MG/DL (ref 0–1.2)
BILIRUB UR STRIP.AUTO-MCNC: NEGATIVE MG/DL
BNP SERPL-MCNC: 163 PG/ML (ref 0–99)
BUN SERPL-MCNC: 31 MG/DL (ref 6–23)
BZE UR QL SCN: NORMAL
CALCIUM SERPL-MCNC: 8.9 MG/DL (ref 8.6–10.3)
CANNABINOIDS UR QL SCN: NORMAL
CARDIAC TROPONIN I PNL SERPL HS: 57 NG/L (ref 0–20)
CARDIAC TROPONIN I PNL SERPL HS: 60 NG/L (ref 0–20)
CHLORIDE SERPL-SCNC: 108 MMOL/L (ref 98–107)
CK SERPL-CCNC: 215 U/L (ref 0–325)
CO2 SERPL-SCNC: 20 MMOL/L (ref 21–32)
COLOR UR: COLORLESS
CREAT SERPL-MCNC: 1.81 MG/DL (ref 0.5–1.3)
EGFRCR SERPLBLD CKD-EPI 2021: 42 ML/MIN/1.73M*2
EOSINOPHIL # BLD AUTO: 0.03 X10*3/UL (ref 0–0.7)
EOSINOPHIL NFR BLD AUTO: 0.3 %
ERYTHROCYTE [DISTWIDTH] IN BLOOD BY AUTOMATED COUNT: 13.9 % (ref 11.5–14.5)
ETHANOL SERPL-MCNC: <10 MG/DL
FENTANYL+NORFENTANYL UR QL SCN: NORMAL
GLUCOSE BLD MANUAL STRIP-MCNC: 171 MG/DL (ref 74–99)
GLUCOSE BLD MANUAL STRIP-MCNC: 241 MG/DL (ref 74–99)
GLUCOSE SERPL-MCNC: 328 MG/DL (ref 74–99)
GLUCOSE UR STRIP.AUTO-MCNC: ABNORMAL MG/DL
HCT VFR BLD AUTO: 33.7 % (ref 41–52)
HGB BLD-MCNC: 10.7 G/DL (ref 13.5–17.5)
HOLD SPECIMEN: NORMAL
IMM GRANULOCYTES # BLD AUTO: 0.03 X10*3/UL (ref 0–0.7)
IMM GRANULOCYTES NFR BLD AUTO: 0.3 % (ref 0–0.9)
INR PPP: 1 (ref 0.9–1.1)
KETONES UR STRIP.AUTO-MCNC: NEGATIVE MG/DL
LACTATE SERPL-SCNC: 1.7 MMOL/L (ref 0.4–2)
LACTATE SERPL-SCNC: 2.3 MMOL/L (ref 0.4–2)
LEUKOCYTE ESTERASE UR QL STRIP.AUTO: NEGATIVE
LYMPHOCYTES # BLD AUTO: 1.08 X10*3/UL (ref 1.2–4.8)
LYMPHOCYTES NFR BLD AUTO: 12.2 %
MAGNESIUM SERPL-MCNC: 1.9 MG/DL (ref 1.6–2.4)
MCH RBC QN AUTO: 29.4 PG (ref 26–34)
MCHC RBC AUTO-ENTMCNC: 31.8 G/DL (ref 32–36)
MCV RBC AUTO: 93 FL (ref 80–100)
METHADONE UR QL SCN: NORMAL
MONOCYTES # BLD AUTO: 0.64 X10*3/UL (ref 0.1–1)
MONOCYTES NFR BLD AUTO: 7.2 %
NEUTROPHILS # BLD AUTO: 6.98 X10*3/UL (ref 1.2–7.7)
NEUTROPHILS NFR BLD AUTO: 79.2 %
NITRITE UR QL STRIP.AUTO: NEGATIVE
NRBC BLD-RTO: 0 /100 WBCS (ref 0–0)
OPIATES UR QL SCN: NORMAL
OXYCODONE+OXYMORPHONE UR QL SCN: NORMAL
P AXIS: 62 DEGREES
P AXIS: 67 DEGREES
P OFFSET: 196 MS
P OFFSET: 197 MS
P ONSET: 140 MS
P ONSET: 140 MS
PCP UR QL SCN: NORMAL
PH UR STRIP.AUTO: 5 [PH]
PLATELET # BLD AUTO: 221 X10*3/UL (ref 150–450)
POTASSIUM SERPL-SCNC: 4.3 MMOL/L (ref 3.5–5.3)
PR INTERVAL: 158 MS
PR INTERVAL: 170 MS
PROT SERPL-MCNC: 7 G/DL (ref 6.4–8.2)
PROT UR STRIP.AUTO-MCNC: NEGATIVE MG/DL
PROTHROMBIN TIME: 11.4 SECONDS (ref 9.8–12.8)
Q ONSET: 219 MS
Q ONSET: 225 MS
QRS COUNT: 17 BEATS
QRS COUNT: 19 BEATS
QRS DURATION: 56 MS
QRS DURATION: 76 MS
QT INTERVAL: 308 MS
QT INTERVAL: 352 MS
QTC CALCULATION(BAZETT): 424 MS
QTC CALCULATION(BAZETT): 462 MS
QTC FREDERICIA: 381 MS
QTC FREDERICIA: 422 MS
R AXIS: 35 DEGREES
R AXIS: 49 DEGREES
RBC # BLD AUTO: 3.64 X10*6/UL (ref 4.5–5.9)
RBC # UR STRIP.AUTO: NEGATIVE /UL
SALICYLATES SERPL-MCNC: <3 MG/DL
SODIUM SERPL-SCNC: 139 MMOL/L (ref 136–145)
SP GR UR STRIP.AUTO: 1.01
T AXIS: 33 DEGREES
T AXIS: 71 DEGREES
T OFFSET: 379 MS
T OFFSET: 395 MS
UROBILINOGEN UR STRIP.AUTO-MCNC: NORMAL MG/DL
VENTRICULAR RATE: 104 BPM
VENTRICULAR RATE: 114 BPM
WBC # BLD AUTO: 8.8 X10*3/UL (ref 4.4–11.3)

## 2024-12-16 PROCEDURE — 99223 1ST HOSP IP/OBS HIGH 75: CPT

## 2024-12-16 PROCEDURE — 81003 URINALYSIS AUTO W/O SCOPE: CPT | Performed by: EMERGENCY MEDICINE

## 2024-12-16 PROCEDURE — 71045 X-RAY EXAM CHEST 1 VIEW: CPT

## 2024-12-16 PROCEDURE — 71045 X-RAY EXAM CHEST 1 VIEW: CPT | Performed by: RADIOLOGY

## 2024-12-16 PROCEDURE — 73502 X-RAY EXAM HIP UNI 2-3 VIEWS: CPT | Mod: LEFT SIDE | Performed by: RADIOLOGY

## 2024-12-16 PROCEDURE — 83605 ASSAY OF LACTIC ACID: CPT | Performed by: EMERGENCY MEDICINE

## 2024-12-16 PROCEDURE — 99285 EMERGENCY DEPT VISIT HI MDM: CPT | Mod: 25 | Performed by: EMERGENCY MEDICINE

## 2024-12-16 PROCEDURE — 80307 DRUG TEST PRSMV CHEM ANLYZR: CPT | Performed by: EMERGENCY MEDICINE

## 2024-12-16 PROCEDURE — G0378 HOSPITAL OBSERVATION PER HR: HCPCS

## 2024-12-16 PROCEDURE — 36415 COLL VENOUS BLD VENIPUNCTURE: CPT | Performed by: EMERGENCY MEDICINE

## 2024-12-16 PROCEDURE — 97165 OT EVAL LOW COMPLEX 30 MIN: CPT | Mod: GO

## 2024-12-16 PROCEDURE — 73502 X-RAY EXAM HIP UNI 2-3 VIEWS: CPT | Mod: LT

## 2024-12-16 PROCEDURE — 80320 DRUG SCREEN QUANTALCOHOLS: CPT | Performed by: EMERGENCY MEDICINE

## 2024-12-16 PROCEDURE — 97162 PT EVAL MOD COMPLEX 30 MIN: CPT | Mod: GP

## 2024-12-16 PROCEDURE — 72125 CT NECK SPINE W/O DYE: CPT | Performed by: RADIOLOGY

## 2024-12-16 PROCEDURE — 83880 ASSAY OF NATRIURETIC PEPTIDE: CPT | Performed by: EMERGENCY MEDICINE

## 2024-12-16 PROCEDURE — 82140 ASSAY OF AMMONIA: CPT | Performed by: EMERGENCY MEDICINE

## 2024-12-16 PROCEDURE — 72125 CT NECK SPINE W/O DYE: CPT

## 2024-12-16 PROCEDURE — 85025 COMPLETE CBC W/AUTO DIFF WBC: CPT | Performed by: EMERGENCY MEDICINE

## 2024-12-16 PROCEDURE — 2500000002 HC RX 250 W HCPCS SELF ADMINISTERED DRUGS (ALT 637 FOR MEDICARE OP, ALT 636 FOR OP/ED)

## 2024-12-16 PROCEDURE — 80053 COMPREHEN METABOLIC PANEL: CPT | Performed by: EMERGENCY MEDICINE

## 2024-12-16 PROCEDURE — 93005 ELECTROCARDIOGRAM TRACING: CPT

## 2024-12-16 PROCEDURE — 83735 ASSAY OF MAGNESIUM: CPT | Performed by: EMERGENCY MEDICINE

## 2024-12-16 PROCEDURE — 84484 ASSAY OF TROPONIN QUANT: CPT | Performed by: EMERGENCY MEDICINE

## 2024-12-16 PROCEDURE — 82550 ASSAY OF CK (CPK): CPT

## 2024-12-16 PROCEDURE — 70450 CT HEAD/BRAIN W/O DYE: CPT

## 2024-12-16 PROCEDURE — 85610 PROTHROMBIN TIME: CPT | Performed by: EMERGENCY MEDICINE

## 2024-12-16 PROCEDURE — 70450 CT HEAD/BRAIN W/O DYE: CPT | Performed by: RADIOLOGY

## 2024-12-16 PROCEDURE — 82947 ASSAY GLUCOSE BLOOD QUANT: CPT

## 2024-12-16 RX ORDER — ONDANSETRON HYDROCHLORIDE 2 MG/ML
4 INJECTION, SOLUTION INTRAVENOUS EVERY 8 HOURS PRN
Status: DISCONTINUED | OUTPATIENT
Start: 2024-12-16 | End: 2024-12-19 | Stop reason: HOSPADM

## 2024-12-16 RX ORDER — INSULIN GLARGINE 100 [IU]/ML
12 INJECTION, SOLUTION SUBCUTANEOUS NIGHTLY
Status: DISCONTINUED | OUTPATIENT
Start: 2024-12-16 | End: 2024-12-19 | Stop reason: HOSPADM

## 2024-12-16 RX ORDER — ACETAMINOPHEN 160 MG/5ML
650 SOLUTION ORAL EVERY 4 HOURS PRN
Status: DISCONTINUED | OUTPATIENT
Start: 2024-12-16 | End: 2024-12-19 | Stop reason: HOSPADM

## 2024-12-16 RX ORDER — ONDANSETRON 4 MG/1
4 TABLET, FILM COATED ORAL EVERY 8 HOURS PRN
Status: DISCONTINUED | OUTPATIENT
Start: 2024-12-16 | End: 2024-12-19 | Stop reason: HOSPADM

## 2024-12-16 RX ORDER — ACETAMINOPHEN 325 MG/1
650 TABLET ORAL EVERY 4 HOURS PRN
Status: DISCONTINUED | OUTPATIENT
Start: 2024-12-16 | End: 2024-12-19 | Stop reason: HOSPADM

## 2024-12-16 RX ORDER — DEXTROSE 50 % IN WATER (D50W) INTRAVENOUS SYRINGE
25
Status: DISCONTINUED | OUTPATIENT
Start: 2024-12-16 | End: 2024-12-19 | Stop reason: HOSPADM

## 2024-12-16 RX ORDER — ATORVASTATIN CALCIUM 80 MG/1
80 TABLET, FILM COATED ORAL NIGHTLY
Status: DISCONTINUED | OUTPATIENT
Start: 2024-12-16 | End: 2024-12-19 | Stop reason: HOSPADM

## 2024-12-16 RX ORDER — ACETAMINOPHEN 650 MG/1
650 SUPPOSITORY RECTAL EVERY 4 HOURS PRN
Status: DISCONTINUED | OUTPATIENT
Start: 2024-12-16 | End: 2024-12-19 | Stop reason: HOSPADM

## 2024-12-16 RX ORDER — DAPAGLIFLOZIN 5 MG/1
10 TABLET, FILM COATED ORAL DAILY
Status: DISCONTINUED | OUTPATIENT
Start: 2024-12-17 | End: 2024-12-19 | Stop reason: HOSPADM

## 2024-12-16 RX ORDER — LISINOPRIL 10 MG/1
10 TABLET ORAL DAILY
COMMUNITY

## 2024-12-16 RX ORDER — INSULIN LISPRO 100 [IU]/ML
0-10 INJECTION, SOLUTION INTRAVENOUS; SUBCUTANEOUS
Status: DISCONTINUED | OUTPATIENT
Start: 2024-12-16 | End: 2024-12-19 | Stop reason: HOSPADM

## 2024-12-16 RX ORDER — TAMSULOSIN HYDROCHLORIDE 0.4 MG/1
0.4 CAPSULE ORAL DAILY
Status: DISCONTINUED | OUTPATIENT
Start: 2024-12-17 | End: 2024-12-19 | Stop reason: HOSPADM

## 2024-12-16 RX ORDER — POLYETHYLENE GLYCOL 3350 17 G/17G
17 POWDER, FOR SOLUTION ORAL DAILY
Status: DISCONTINUED | OUTPATIENT
Start: 2024-12-16 | End: 2024-12-19 | Stop reason: HOSPADM

## 2024-12-16 RX ORDER — DEXTROSE 50 % IN WATER (D50W) INTRAVENOUS SYRINGE
12.5
Status: DISCONTINUED | OUTPATIENT
Start: 2024-12-16 | End: 2024-12-19 | Stop reason: HOSPADM

## 2024-12-16 SDOH — ECONOMIC STABILITY: FOOD INSECURITY: WITHIN THE PAST 12 MONTHS, THE FOOD YOU BOUGHT JUST DIDN'T LAST AND YOU DIDN'T HAVE MONEY TO GET MORE.: NEVER TRUE

## 2024-12-16 SDOH — SOCIAL STABILITY: SOCIAL INSECURITY: DOES ANYONE TRY TO KEEP YOU FROM HAVING/CONTACTING OTHER FRIENDS OR DOING THINGS OUTSIDE YOUR HOME?: NO

## 2024-12-16 SDOH — SOCIAL STABILITY: SOCIAL INSECURITY: HAS ANYONE EVER THREATENED TO HURT YOUR FAMILY OR YOUR PETS?: NO

## 2024-12-16 SDOH — SOCIAL STABILITY: SOCIAL INSECURITY: ARE YOU OR HAVE YOU BEEN THREATENED OR ABUSED PHYSICALLY, EMOTIONALLY, OR SEXUALLY BY ANYONE?: NO

## 2024-12-16 SDOH — ECONOMIC STABILITY: HOUSING INSECURITY: IN THE LAST 12 MONTHS, WAS THERE A TIME WHEN YOU WERE NOT ABLE TO PAY THE MORTGAGE OR RENT ON TIME?: NO

## 2024-12-16 SDOH — ECONOMIC STABILITY: TRANSPORTATION INSECURITY: IN THE PAST 12 MONTHS, HAS LACK OF TRANSPORTATION KEPT YOU FROM MEDICAL APPOINTMENTS OR FROM GETTING MEDICATIONS?: NO

## 2024-12-16 SDOH — ECONOMIC STABILITY: HOUSING INSECURITY: IN THE PAST 12 MONTHS, HOW MANY TIMES HAVE YOU MOVED WHERE YOU WERE LIVING?: 1

## 2024-12-16 SDOH — ECONOMIC STABILITY: FOOD INSECURITY: WITHIN THE PAST 12 MONTHS, YOU WORRIED THAT YOUR FOOD WOULD RUN OUT BEFORE YOU GOT THE MONEY TO BUY MORE.: NEVER TRUE

## 2024-12-16 SDOH — SOCIAL STABILITY: SOCIAL INSECURITY: WERE YOU ABLE TO COMPLETE ALL THE BEHAVIORAL HEALTH SCREENINGS?: YES

## 2024-12-16 SDOH — SOCIAL STABILITY: SOCIAL INSECURITY: WITHIN THE LAST YEAR, HAVE YOU BEEN HUMILIATED OR EMOTIONALLY ABUSED IN OTHER WAYS BY YOUR PARTNER OR EX-PARTNER?: NO

## 2024-12-16 SDOH — SOCIAL STABILITY: SOCIAL INSECURITY: DO YOU FEEL UNSAFE GOING BACK TO THE PLACE WHERE YOU ARE LIVING?: NO

## 2024-12-16 SDOH — ECONOMIC STABILITY: INCOME INSECURITY: IN THE PAST 12 MONTHS HAS THE ELECTRIC, GAS, OIL, OR WATER COMPANY THREATENED TO SHUT OFF SERVICES IN YOUR HOME?: NO

## 2024-12-16 SDOH — SOCIAL STABILITY: SOCIAL INSECURITY: ABUSE: ADULT

## 2024-12-16 SDOH — SOCIAL STABILITY: SOCIAL INSECURITY: ARE THERE ANY APPARENT SIGNS OF INJURIES/BEHAVIORS THAT COULD BE RELATED TO ABUSE/NEGLECT?: NO

## 2024-12-16 SDOH — SOCIAL STABILITY: SOCIAL INSECURITY: HAVE YOU HAD THOUGHTS OF HARMING ANYONE ELSE?: NO

## 2024-12-16 SDOH — SOCIAL STABILITY: SOCIAL INSECURITY: WITHIN THE LAST YEAR, HAVE YOU BEEN AFRAID OF YOUR PARTNER OR EX-PARTNER?: NO

## 2024-12-16 SDOH — ECONOMIC STABILITY: HOUSING INSECURITY: AT ANY TIME IN THE PAST 12 MONTHS, WERE YOU HOMELESS OR LIVING IN A SHELTER (INCLUDING NOW)?: NO

## 2024-12-16 SDOH — SOCIAL STABILITY: SOCIAL INSECURITY: DO YOU FEEL ANYONE HAS EXPLOITED OR TAKEN ADVANTAGE OF YOU FINANCIALLY OR OF YOUR PERSONAL PROPERTY?: NO

## 2024-12-16 SDOH — ECONOMIC STABILITY: FOOD INSECURITY: HOW HARD IS IT FOR YOU TO PAY FOR THE VERY BASICS LIKE FOOD, HOUSING, MEDICAL CARE, AND HEATING?: NOT HARD AT ALL

## 2024-12-16 ASSESSMENT — LIFESTYLE VARIABLES
AUDIT-C TOTAL SCORE: 0
HOW OFTEN DO YOU HAVE A DRINK CONTAINING ALCOHOL: NEVER
SKIP TO QUESTIONS 9-10: 1
HAVE PEOPLE ANNOYED YOU BY CRITICIZING YOUR DRINKING: NO
EVER FELT BAD OR GUILTY ABOUT YOUR DRINKING: NO
EVER HAD A DRINK FIRST THING IN THE MORNING TO STEADY YOUR NERVES TO GET RID OF A HANGOVER: NO
HOW OFTEN DO YOU HAVE 6 OR MORE DRINKS ON ONE OCCASION: NEVER
HAVE YOU EVER FELT YOU SHOULD CUT DOWN ON YOUR DRINKING: NO
TOTAL SCORE: 0
AUDIT-C TOTAL SCORE: 0
HOW MANY STANDARD DRINKS CONTAINING ALCOHOL DO YOU HAVE ON A TYPICAL DAY: PATIENT DOES NOT DRINK

## 2024-12-16 ASSESSMENT — ACTIVITIES OF DAILY LIVING (ADL)
ASSISTIVE_DEVICE: WALKER
DRESSING YOURSELF: NEEDS ASSISTANCE
PATIENT'S MEMORY ADEQUATE TO SAFELY COMPLETE DAILY ACTIVITIES?: YES
ADEQUATE_TO_COMPLETE_ADL: YES
LACK_OF_TRANSPORTATION: NO
GROOMING: INDEPENDENT
LACK_OF_TRANSPORTATION: NO
FEEDING YOURSELF: INDEPENDENT
TOILETING: NEEDS ASSISTANCE
HEARING - RIGHT EAR: FUNCTIONAL
ADEQUATE_TO_COMPLETE_ADL: YES
HEARING - LEFT EAR: FUNCTIONAL
LACK_OF_TRANSPORTATION: NO
BATHING_ASSISTANCE: TOTAL
BATHING: NEEDS ASSISTANCE
JUDGMENT_ADEQUATE_SAFELY_COMPLETE_DAILY_ACTIVITIES: UNABLE TO ASSESS
WALKS IN HOME: NEEDS ASSISTANCE

## 2024-12-16 ASSESSMENT — COGNITIVE AND FUNCTIONAL STATUS - GENERAL
MOVING FROM LYING ON BACK TO SITTING ON SIDE OF FLAT BED WITH BEDRAILS: A LITTLE
MOBILITY SCORE: 7
PATIENT BASELINE BEDBOUND: NO
TURNING FROM BACK TO SIDE WHILE IN FLAT BAD: TOTAL
HELP NEEDED FOR BATHING: A LITTLE
CLIMB 3 TO 5 STEPS WITH RAILING: TOTAL
PERSONAL GROOMING: A LITTLE
MOVING TO AND FROM BED TO CHAIR: A LOT
PERSONAL GROOMING: A LOT
TOILETING: A LITTLE
MOVING TO AND FROM BED TO CHAIR: TOTAL
DRESSING REGULAR UPPER BODY CLOTHING: A LITTLE
DRESSING REGULAR LOWER BODY CLOTHING: TOTAL
DAILY ACTIVITIY SCORE: 18
DAILY ACTIVITIY SCORE: 10
DRESSING REGULAR UPPER BODY CLOTHING: A LOT
STANDING UP FROM CHAIR USING ARMS: A LOT
STANDING UP FROM CHAIR USING ARMS: A LOT
EATING MEALS: A LITTLE
MOVING FROM LYING ON BACK TO SITTING ON SIDE OF FLAT BED WITH BEDRAILS: TOTAL
TOILETING: TOTAL
CLIMB 3 TO 5 STEPS WITH RAILING: TOTAL
MOBILITY SCORE: 12
WALKING IN HOSPITAL ROOM: TOTAL
HELP NEEDED FOR BATHING: TOTAL
TURNING FROM BACK TO SIDE WHILE IN FLAT BAD: A LOT
WALKING IN HOSPITAL ROOM: A LOT
DRESSING REGULAR LOWER BODY CLOTHING: A LOT

## 2024-12-16 ASSESSMENT — PAIN SCALES - GENERAL
PAINLEVEL_OUTOF10: 8
PAINLEVEL_OUTOF10: 0 - NO PAIN
PAINLEVEL_OUTOF10: 6

## 2024-12-16 ASSESSMENT — PAIN - FUNCTIONAL ASSESSMENT
PAIN_FUNCTIONAL_ASSESSMENT: 0-10

## 2024-12-16 ASSESSMENT — COLUMBIA-SUICIDE SEVERITY RATING SCALE - C-SSRS
2. HAVE YOU ACTUALLY HAD ANY THOUGHTS OF KILLING YOURSELF?: NO
1. IN THE PAST MONTH, HAVE YOU WISHED YOU WERE DEAD OR WISHED YOU COULD GO TO SLEEP AND NOT WAKE UP?: NO
6. HAVE YOU EVER DONE ANYTHING, STARTED TO DO ANYTHING, OR PREPARED TO DO ANYTHING TO END YOUR LIFE?: NO

## 2024-12-16 ASSESSMENT — PAIN DESCRIPTION - LOCATION: LOCATION: HIP

## 2024-12-16 ASSESSMENT — PAIN DESCRIPTION - ORIENTATION: ORIENTATION: LEFT

## 2024-12-16 NOTE — DISCHARGE INSTRUCTIONS
Patient needs to hold Eliquis up until 12/27/24, take baby aspirin until then, can resume Eliquis from 12/28/24, aspirin should be stopped at that time  
no

## 2024-12-16 NOTE — PROGRESS NOTES
"Occupational Therapy    Evaluation/Treatment    Patient Name: Lito Osei \"Nima\"  MRN: [id number]  : 1960  Today's Date: 24  Time Calculation  Start Time: 1403  Stop Time: 1420  Time Calculation (min): 17 min     AC14/AC14    Assessment:  OT Assessment: pt presents with decreased safety with functional mobility and ADLS. Will benefit from con't skilled OT tx to address impairments  Prognosis: Fair  Barriers to Discharge Home: Caregiver assistance, Physical needs  Caregiver Assistance: Patient lives alone and/or does not have reliable caregiver assistance  Physical Needs: Stair navigation into home limited by function/safety, Ambulating household distances limited by function/safety, 24hr mobility assistance needed  End of Session Patient Position:  (on ED cart, B rails up, male external catheter, iv in L UE, alarm on)  OT Assessment Results: Decreased ADL status, Decreased upper extremity range of motion, Decreased safe judgment during ADL, Decreased cognition, Decreased endurance, Decreased functional mobility, Non-functional left upper extremity  Prognosis: Fair    Plan:  Treatment Interventions: ADL retraining, Functional transfer training, Endurance training, Patient/family training  OT Frequency: 2 times per week  OT Discharge Recommendations: Moderate intensity level of continued care  OT Recommended Transfer Status: Maximum assist, Assist of 1 (gait belt)  OT - OK to Discharge: Yes  Treatment Interventions: ADL retraining, Functional transfer training, Endurance training, Patient/family training  Subjective     Current Problem:  1. Fall, initial encounter  Referral to Primary Care      2. Contusion of left hip, initial encounter  Referral to Primary Care      3. Renal insufficiency  Referral to Primary Care      4. Elevated troponin  Referral to Primary Care      5. Hyperglycemia  Referral to Primary Care      6. Anemia, unspecified type  Referral to Primary Care        Past Medical History: "   Diagnosis Date    CKD (chronic kidney disease)     CVA (cerebral vascular accident) (Multi) 11/21/2024    HLD (hyperlipidemia)     Hypertension     Stroke (Multi)     Type 2 diabetes mellitus with kidney complication, with long-term current use of insulin      Past Surgical History:   Procedure Laterality Date    ANKLE SURGERY Right     PARTIAL HIP ARTHROPLASTY Left        General:   OT Received On: 12/16/24  General  Reason for Referral: ADL impairment  Referred By: [facility] PT/OT eval and tx 12/16  Past Medical History Relevant to Rehab: HTN, CVA w L hemiparesis 2015, CKD, DM, L hip fx 11/17/24, L hip hemiarthroplasty  Family/Caregiver Present: No  Co-Treatment: PT  Co-Treatment Reason: to maximize pt/staff safety  Patient Position Received: Alarm on (ED cart, B rails up, pt's eyes closed, has male external catheter)  General Comment: pt to ED 12/16 s/p fall getting OOB, yelled for help, neighbors called 911. Chest xray negative, L hip xray negative fx, CT head negative, CT Cspine negative, , trop 57, 60    Precautions:  LE Weight Bearing Status: Weight Bearing as Tolerated (L LE posterior hip precautions)  Medical Precautions: Fall precautions           Pain:  Pain Assessment  Pain Assessment: 0-10  0-10 (Numeric) Pain Score: 8  Pain Type: Acute pain  Pain Location: Hip  Pain Orientation: Left  Objective     Cognition:  Overall Cognitive Status: Impaired (lethargic)  Attention:  (impaired)  Problem Solving:  (impaired)             Home Living:  Home Living Comments: lives alone, 1 story, 7 DIPIKA with B HR. Amb with rollator. Tub/shower with grab bar    Prior Function:  Prior Function Comments: unable to determine, pt poor historian, unable to provide reliable prior level of function from past month. Unsure if pt was receiving any [facility] services. States he owns a reacher, but it's gone missing           Activities of Daily Living:   Eating Assistance: Minimal  Grooming Assistance: Maximal  Bathing Assistance:  Total  UE Dressing Assistance: Maximal  LE Dressing Assistance: Total  Toileting Assistance with Device: Maximal  Functional Assistance:  (pt unable to weight shift in standing at cart)                         Activity Tolerance:  Endurance: Decreased tolerance for upright activites           Bed Mobility/Transfers: Bed Mobility  Bed Mobility:  (sup to sit to R side dependent)  Transfers  Transfer:  (sit to stand at cart level max A)                Balance:  Static Sitting: fair -, leaning forward heavily   Dynamic Sitting: absent  Static Standing: poor  Dynamic Standing: absent      Vision:Vision - Basic Assessment  Current Vision:  (unable to determine)        Sensation:  Light Touch: No apparent deficits    Strength:  Strength Comments: increased tone L UE, R UE 4/5 throughout        Extremities: RUE   RUE : Within Functional Limits and LUE   LUE:  (L UE PROM impaired due to hypertonicity)    Outcome Measures: [facility] Daily Activity  Putting on and taking off regular lower body clothing: Total  Bathing (including washing, rinsing, drying): Total  Putting on and taking off regular upper body clothing: A lot  Toileting, which includes using toilet, bedpan or urinal: Total  Taking care of personal grooming such as brushing teeth: A lot  Eating Meals: A little  Daily Activity - Total Score: 10

## 2024-12-16 NOTE — NURSING NOTE
"Secure chat sent to DESTINY Ch in regards to patient's brothers concerns:      Hello. I'm taking care of Sea in 623. I am not here tomorrow and wanted to be sure to pass on the brother's concern of patient's mentation. The brother says that he feels that there is something not right mentally, that he just doesn't seem all there. Brother doesn't want his concerns dismissed \"because he hurt his hip in November\". The brother feels that everything is being blamed on his hip surgery rather than finding out what is really going on. I did share that sometimes when older people are sleep deprived, that  they can get confused or have a change in mentation. I recommended that the brother allow the patient to get a good nights sleep and to check his mentation again in the AM and if he is still concerned, to have that convo with you tomorrow.  "

## 2024-12-16 NOTE — PROGRESS NOTES
Physical Therapy    Physical Therapy Evaluation    Patient Name: Lito Osei  MRN: [id number]  Today's Date: 12/16/2024   Time Calculation  Start Time: 1402  Stop Time: 1420  Time Calculation (min): 18 min  AC14/AC14    Assessment/Plan   PT Assessment  PT Assessment Results: Decreased strength, Impaired balance, Decreased endurance, Decreased mobility  Rehab Prognosis: Fair  Barriers to Discharge Home: Cognition needs, Physical needs  End of Session Communication: Care Coordinator  Assessment Comment: Pt requires max/dep A with all mobility. Not safe to return home at this time. Pt limited by fatigue and lethargy. Pt is high fall risk and requires 24 hour care and assist. Recommend continued therapy at a moderate intensity, may require continued assist.Very inoconsistent, reports using WW and Rollator in previous notes but would not be able to use these devices with L UE presenation today.  End of Session Patient Position: On cart, Alarm on  IP OR SWING BED PT PLAN  Inpatient or Swing Bed: Inpatient  PT Plan  Treatment/Interventions: Bed mobility, Transfer training, Balance training, Gait training, Strengthening  PT Plan: Ongoing PT  PT Frequency: 3 times per week  PT Discharge Recommendations: Moderate intensity level of continued care  PT Recommended Transfer Status: Assist x2, Total assist      Subjective     Current Problem:  1. Fall, initial encounter         General Visit Information:  General  Reason for Referral: impaired mobility  Referred By: [facility] (PT/OT 12/16)  Past Medical History Relevant to Rehab: including HTN, HLD, CVA, CKD, ataxia, L hemiarthroplasty 11/2024  Family/Caregiver Present: No  Co-Treatment: OT  Co-Treatment Reason: stat eval in ED, maximize pt mobility  Patient Position Received: On cart, Alarm on  General Comment: Pt admitted s/p fall at home. Neighbors heard screaming and called 911. Admitted OBS. Head CT- negative acute, small vessel ischemia, redemonstration of encephalomalacia in  L occipital lobe. Trops 60, 57, , CXR - negative, L hip xray - satisfactry L hip arthroplasty.    Home Living:  Home Living  Home Living Comments: Pt lives alone, brother assists. one level apt with 7 steps with 2 HR to enter. Owns WW, SC, Rollator, walk in shower.    Prior Level of Function:  Prior Function Per Pt/Caregiver Report  Prior Function Comments: Pt ambulates with Rollator. Difficulty obtaining PLOF due to lethargy but >2 falls in last 2 months. Does not do steps in/out apt without brother. Assist with ADLs and IADLs.    Precautions:  Precautions  LE Weight Bearing Status: Weight Bearing as Tolerated (L LE)  Medical Precautions: Fall precautions  Post-Surgical Precautions: Left hip precautions    Vital Signs:  Vital Signs  Heart Rate: 100  Pulse Ox: 96 %  BP: 120/71  Objective     Pain:  Pain Assessment  Pain Assessment: 0-10  0-10 (Numeric) Pain Score: 0 - No pain    Cognition:  Cognition  Overall Cognitive Status: Impaired  Safety/Judgement: Exceptions to WFL  Processing Speed: Delayed    General Assessments:  General Observation  General Observation: Pt agreeable to PT/OT evaluation. IV, bed alarm, purewick, tele, Pt increased elthargy and decreased initiation. Difficulty staying engaged in task   Activity Tolerance  Endurance: Tolerates less than 10 min exercise, no significant change in vital signs  Sensation  Sensation Comment: Denies Numbness and tingling, WFL with light touch  Strength  Strength Comments: L UE contracted, R UE WFL, L LE unable to complete AROM, knee ext 1/5, DF 0/5, R LE + SLR knee ext 4-/5, DF 4+/5 MMT     Coordination  Coordination Comment: impaired  Postural Control  Posture Comment: forward flexed posture, assist to not break hip preacutiosn with forward leaning at EOB  Static Sitting Balance  Static Sitting-Comment/Number of Minutes: Poor  Dynamic Sitting Balance  Dynamic Sitting-Comments: Poor  Static Standing Balance  Static Standing-Comment/Number of Minutes:  Poor    Functional Assessments:     Bed Mobility  Bed Mobility: Yes  Bed Mobility 1  Bed Mobility Comments 1: Pt performed sup to sit and sit to sup with dep A x2. Poor initiation due to lethargy.  Transfers  Transfer: Yes  Transfer 1  Trials/Comments 1: Pt performed sit to stand with Max Ax1 with gait belt, pt unable to advance LEs even with assisted weight shift. but no buckling noted  Ambulation/Gait Training  Ambulation/Gait Training Performed: No          Extremity/Trunk Assessments:  RUE   RUE : Within Functional Limits  LUE   LUE: Exceptions to WFL (UE tone, OT fully assessed)  RLE   RLE : Within Functional Limits  LLE   LLE : Exceptions to WFL (Poor active movement in L LE)    Outcome Measures:     [facility] Basic Mobility  Turning from your back to your side while in a flat bed without using bedrails: Total  Moving from lying on your back to sitting on the side of a flat bed without using bedrails: Total  Moving to and from bed to chair (including a wheelchair): Total  Standing up from a chair using your arms (e.g. wheelchair or bedside chair): A lot  To walk in hospital room: Total  Climbing 3-5 steps with railing: Total  Basic Mobility - Total Score: 7                Goals:  Encounter Problems       Encounter Problems (Active)       PT Problem       Pt will demonstrate min A with all bed mobility   (Progressing)       Start:  12/16/24    Expected End:  12/30/24            Pt will demonstrate sit to stand and bed/chair transfers with unilateral device with Min A.   (Progressing)       Start:  12/16/24    Expected End:  12/30/24            Pt will tolerate 15 reps x2 LE therapeutic exercise for LE strengthening and endurance  (Progressing)       Start:  12/16/24    Expected End:  12/30/24            Pt will tolerate sitting EOB >5 minutes with SBA to progress to functional transfers.  (Progressing)       Start:  12/16/24    Expected End:  12/30/24                     Education Documentation  Mobility Training,  taught by Nai Benitez, PT at 12/16/2024  2:55 PM.  Learner: Patient  Readiness: Acceptance  Method: Explanation  Response: Verbalizes Understanding  Comment: Pt will require continue education. Educated on importance of mobility and PT POC

## 2024-12-16 NOTE — ED PROVIDER NOTES
HPI   Chief Complaint   Patient presents with    Fall         History provided by:  Patient and EMS personnel  History of Present Illness:  64-year-old male presents by EMS after fall.  The exact time of the fall is unknown.  It probably happened sometime in the night.  The patient states that he tried to get up out of bed and his socks were too slippery for the floor causing him to fall.  He was unable to get himself up.  He called out for help and his neighbors finally heard him and called 911.      PMFSH:   As per HPI, otherwise nurses notes reviewed in EMR.    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    HTN (hypertension) 01/24/2013    Type 2 diabetes mellitus with kidney complication, with long-term current use of insulin 11/21/2024    History of CVA (cerebrovascular accident) 11/21/2024    CKD (chronic kidney disease) stage 4, GFR 15-29 ml/min (Multi) 11/21/2024    Hyperglycemia due to diabetes mellitus (Multi) 11/21/2024    Acute kidney injury superimposed on stage 4 chronic kidney disease 11/21/2024    Metabolic acidosis due to diabetes mellitus (Multi) 11/22/2024    Status post hip surgery 11/22/2024    Acute urinary retention 11/22/2024    Neutrophilic leukocytosis 11/22/2024    Hyponatremia 11/22/2024     Resolved Ambulatory Problems     Diagnosis Date Noted    CVA (cerebral vascular accident) (Multi) 11/21/2024     Past Medical History:   Diagnosis Date    CKD (chronic kidney disease)     HLD (hyperlipidemia)     Hypertension     Stroke (Multi)       Past Surgical History:   Past Surgical History:   Procedure Laterality Date    ANKLE SURGERY Right     PARTIAL HIP ARTHROPLASTY Left       Family History:   Family History   Problem Relation Name Age of Onset    Diabetes Mother      Hypertension Mother      Coronary artery disease Father       Diabetes Father      Other (Ischemic heart disease) Father      Stroke Maternal Grandmother        Social History:    Social History     Socioeconomic History    Marital status:      Spouse name: Not on file    Number of children: Not on file    Years of education: Not on file    Highest education level: Not on file   Occupational History    Not on file   Tobacco Use    Smoking status: Never    Smokeless tobacco: Not on file   Vaping Use    Vaping status: Never Used   Substance and Sexual Activity    Alcohol use: Never    Drug use: Never    Sexual activity: Defer   Other Topics Concern    Not on file   Social History Narrative    Not on file     Social Drivers of Health     Financial Resource Strain: Low Risk  (11/22/2024)    Overall Financial Resource Strain (CARDIA)     Difficulty of Paying Living Expenses: Not hard at all   Food Insecurity: No Food Insecurity (11/22/2024)    Hunger Vital Sign     Worried About Running Out of Food in the Last Year: Never true     Ran Out of Food in the Last Year: Never true   Transportation Needs: No Transportation Needs (11/22/2024)    PRAPARE - Transportation     Lack of Transportation (Medical): No     Lack of Transportation (Non-Medical): No   Physical Activity: Not on file   Stress: Not on file   Social Connections: Not on file   Intimate Partner Violence: Not At Risk (11/22/2024)    Humiliation, Afraid, Rape, and Kick questionnaire     Fear of Current or Ex-Partner: No     Emotionally Abused: No     Physically Abused: No     Sexually Abused: No   Housing Stability: Low Risk  (11/22/2024)    Housing Stability Vital Sign     Unable to Pay for Housing in the Last Year: No     Number of Times Moved in the Last Year: 0     Homeless in the Last Year: No              Patient History   Past Medical History:   Diagnosis Date    CKD (chronic kidney disease)     CVA (cerebral vascular accident) (Multi) 11/21/2024    HLD (hyperlipidemia)     Hypertension     Stroke (Multi)      Type 2 diabetes mellitus with kidney complication, with long-term current use of insulin      Past Surgical History:   Procedure Laterality Date    ANKLE SURGERY Right     PARTIAL HIP ARTHROPLASTY Left      Family History   Problem Relation Name Age of Onset    Diabetes Mother      Hypertension Mother      Coronary artery disease Father      Diabetes Father      Other (Ischemic heart disease) Father      Stroke Maternal Grandmother       Social History     Tobacco Use    Smoking status: Never    Smokeless tobacco: Not on file   Vaping Use    Vaping status: Never Used   Substance Use Topics    Alcohol use: Never    Drug use: Never       Physical Exam   ED Triage Vitals   Temp Pulse Resp BP   -- -- -- --      SpO2 Temp src Heart Rate Source Patient Position   -- -- -- --      BP Location FiO2 (%)     -- --       Physical Exam  Physical Exam:    ED Triage Vitals   Temp Pulse Resp BP   -- -- -- --      SpO2 Temp src Heart Rate Source Patient Position   -- -- -- --      BP Location FiO2 (%)     -- --         Constitutional: Vital signs per nursing notes.  Well developed, well nourished.  No acute distress.    Psychiatric: alert and oriented to person, place, and time; no abnormalities of mood or affect; memory intact; slow to respond but chronic secondary to his previous stroke  Eyes: PERRL; conjunctivae and lids normal; EOMI  ENT: otoscopic exam of external canal and TM´s normal; nasal mucosa, turbinates, and septum normal; mouth, tongue, and pharynx normal; pharynx without edema, exudate, or injection  Respiratory: normal respiratory effort and excursion; no rales, rhonchi, or wheezes; equal air entry  Cardiovascular: regular rate and rhythm; no murmurs, rubs or gallops; symmetric pulses; no edema; normal capillary refill; distal pulses present  Neurological: normal but slow speech; CN II-XII grossly intact; normal motor and sensory function; no nystagmus; no pronator drift; normal finger to nose and heel to shin  tests; except left upper extremity contracted from previous stroke  GI: no masses, tenderness, rebound or guarding; no palpable, pulsatile mass; no organomegaly; no hernia; normal bowel sounds; (-) Amaro´s sign; (-) McBurney´s sign; (-) CVA tenderness  Lymphatic: no adenopathy of neck  Musculoskeletal: normal gait and station; normal digits and nails; no gross tendon or ligament injury; normal to palpation; normal strength/tone; neurovascular status intact; (-) Umm´s sign  Skin: normal to inspection; normal to palpation; no rash  GCS: 15      ED Course & MDM   Diagnoses as of 12/16/24 1320   Fall, initial encounter   Contusion of left hip, initial encounter   Renal insufficiency   Elevated troponin   Hyperglycemia   Anemia, unspecified type                 No data recorded                                 Medical Decision Making  Medical Decision Making:    Differential Diagnoses Considered: Multisystem trauma, ACS, arrhythmia, electrolyte dramality, infection     EKG: My interpretation of EKG is sinus tachycardia 114 bpm with nonspecific ST-T changes             My interpretation of second EKG is sinus tachycardia 104 bpm with nonspecific ST-T changes    Labs Reviewed   CBC WITH AUTO DIFFERENTIAL - Abnormal       Result Value    WBC 8.8      nRBC 0.0      RBC 3.64 (*)     Hemoglobin 10.7 (*)     Hematocrit 33.7 (*)     MCV 93      MCH 29.4      MCHC 31.8 (*)     RDW 13.9      Platelets 221      Neutrophils % 79.2      Immature Granulocytes %, Automated 0.3      Lymphocytes % 12.2      Monocytes % 7.2      Eosinophils % 0.3      Basophils % 0.8      Neutrophils Absolute 6.98      Immature Granulocytes Absolute, Automated 0.03      Lymphocytes Absolute 1.08 (*)     Monocytes Absolute 0.64      Eosinophils Absolute 0.03      Basophils Absolute 0.07     COMPREHENSIVE METABOLIC PANEL - Abnormal    Glucose 328 (*)     Sodium 139      Potassium 4.3      Chloride 108 (*)     Bicarbonate 20 (*)     Anion Gap 15      Urea  Nitrogen 31 (*)     Creatinine 1.81 (*)     eGFR 42 (*)     Calcium 8.9      Albumin 3.5      Alkaline Phosphatase 106      Total Protein 7.0      AST 20      Bilirubin, Total 0.5      ALT 13     B-TYPE NATRIURETIC PEPTIDE - Abnormal     (*)     Narrative:        <100 pg/mL - Heart failure unlikely  100-299 pg/mL - Intermediate probability of acute heart                  failure exacerbation. Correlate with clinical                  context and patient history.    >=300 pg/mL - Heart Failure likely. Correlate with clinical                  context and patient history.    BNP testing is performed using different testing methodology at [facility] than at other [facility]. Direct result comparisons should only be made within the same method.      LACTATE - Abnormal    Lactate 2.3 (*)     Narrative:     Venipuncture immediately after or during the administration of Metamizole may lead to falsely low results. Testing should be performed immediately prior to Metamizole dosing.   SERIAL TROPONIN-INITIAL - Abnormal    Troponin I, High Sensitivity 60 (*)     Narrative:     Less than 99th percentile of normal range cutoff-  Female and children under 18 years old <14 ng/L; Male <21 ng/L: Negative  Repeat testing should be performed if clinically indicated.     Female and children under 18 years old 14-50 ng/L; Male 21-50 ng/L:  Consistent with possible cardiac damage and possible increased clinical   risk. Serial measurements may help to assess extent of myocardial damage.     >50 ng/L: Consistent with cardiac damage, increased clinical risk and  myocardial infarction. Serial measurements may help assess extent of   myocardial damage.      NOTE: Children less than 1 year old may have higher baseline troponin   levels and results should be interpreted in conjunction with the overall   clinical context.     NOTE: Troponin I testing is performed using a different   testing methodology at [facility] than at other   system \[facility]\"". Direct result comparisons should only   be made within the same method.   URINALYSIS WITH REFLEX CULTURE AND MICROSCOPIC - Abnormal    Color, Urine Colorless (*)     Appearance, Urine Clear      Specific Gravity, Urine 1.015      pH, Urine 5.0      Protein, Urine NEGATIVE      Glucose, Urine OVER (4+) (*)     Blood, Urine NEGATIVE      Ketones, Urine NEGATIVE      Bilirubin, Urine NEGATIVE      Urobilinogen, Urine Normal      Nitrite, Urine NEGATIVE      Leukocyte Esterase, Urine NEGATIVE      Narrative:     OVER is reported when the result is greater than the clinically reportable range.   SERIAL TROPONIN, 1 HOUR - Abnormal    Troponin I, High Sensitivity 57 (*)     Narrative:     Less than 99th percentile of normal range cutoff-  Female and children under 18 years old <14 ng/L; Male <21 ng/L: Negative  Repeat testing should be performed if clinically indicated.     Female and children under 18 years old 14-50 ng/L; Male 21-50 ng/L:  Consistent with possible cardiac damage and possible increased clinical   risk. Serial measurements may help to assess extent of myocardial damage.     >50 ng/L: Consistent with cardiac damage, increased clinical risk and  myocardial infarction. Serial measurements may help assess extent of   myocardial damage.      NOTE: Children less than 1 year old may have higher baseline troponin   levels and results should be interpreted in conjunction with the overall   clinical context.     NOTE: Troponin I testing is performed using a different   testing methodology at [facility] than at other   [facility]. Direct result comparisons should only   be made within the same method.   MAGNESIUM - Normal    Magnesium 1.90     ACUTE TOXICOLOGY PANEL, BLOOD - Normal    Acetaminophen <10.0      Salicylate  <3      Alcohol <10     DRUG SCREEN,URINE - Normal    Amphetamine Screen, Urine Presumptive Negative      Barbiturate Screen,  Urine Presumptive Negative      Benzodiazepines Screen, Urine Presumptive Negative      Cannabinoid Screen, Urine Presumptive Negative      Cocaine Metabolite Screen, Urine Presumptive Negative      Fentanyl Screen, Urine Presumptive Negative      Opiate Screen, Urine Presumptive Negative      Oxycodone Screen, Urine Presumptive Negative      PCP Screen, Urine Presumptive Negative      Methadone Screen, Urine Presumptive Negative      Narrative:     Drug screen results are presumptive and should not be used to assess   compliance with prescribed medication. Contact the performing [facility] laboratory   to add-on definitive confirmatory testing if clinically indicated.    Toxicology screening results are reported qualitatively. The concentration must   be greater than or equal to the cutoff to be reported as positive. The concentration   at which the screening test can detect an individual drug or metabolite varies.   The absence of expected drug(s) and/or drug metabolite(s) may indicate non-compliance,   inappropriate timing of specimen collection relative to drug administration, poor drug   absorption, diluted/adulterated urine, or limitations of testing. For medical purposes   only; not valid for forensic use.    Interpretive questions should be directed to the laboratory medical directors.   AMMONIA - Normal    Ammonia 25     PROTIME-INR - Normal    Protime 11.4      INR 1.0     LACTATE - Normal    Lactate 1.7      Narrative:     Venipuncture immediately after or during the administration of Metamizole may lead to falsely low results. Testing should be performed immediately prior to Metamizole dosing.   TROPONIN SERIES- (INITIAL, 1 HR)    Narrative:     The following orders were created for panel order Troponin I Series, High Sensitivity (0, 1 HR).  Procedure                               Abnormality         Status                     ---------                               -----------         ------                      Troponin I, High Sensiti...[[id number]]  Abnormal            Final result               Troponin, High Sensitivi...[[id number]]  Abnormal            Final result                 Please view results for these tests on the individual orders.   URINALYSIS WITH REFLEX CULTURE AND MICROSCOPIC    Narrative:     The following orders were created for panel order Urinalysis with Reflex Culture and Microscopic.  Procedure                               Abnormality         Status                     ---------                               -----------         ------                     Urinalysis with Reflex C...[[id number]]  Abnormal            Final result               Extra Urine Gray Tube[[id number]]                            In process                   Please view results for these tests on the individual orders.   EXTRA URINE GRAY TUBE       XR chest 1 view   Final Result   No evidence of acute intrathoracic abnormality.        Signed by: Kulwinder Pereira 12/16/2024 8:43 AM   Dictation workstation:   [id number]      XR hip left with pelvis when performed 2 or 3 views   Final Result   Satisfactory appearance left hip arthroplasty.        Signed by: Kulwinder Pereira 12/16/2024 8:43 AM   Dictation workstation:   [id number]      CT head wo IV contrast   Final Result        Diffuse volume loss and periventricular white matter changes, which   given patient's age likely represent small vessel ischemic disease.        No CT evidence of acute hemorrhage or acute cortical infarct.        No evidence of displaced skull fracture.        Redemonstration of area of encephalomalacia in the left occipital   lobe, which may be related to a prior infarct, without significant   change             MACRO:   None        Signed by: Génesis Garcia 12/16/2024 8:51 AM   Dictation workstation:   [id number]      CT cervical spine wo IV contrast   Final Result   No evidence for an acute fracture or subluxation of the cervical   spine.        Reversal  normal cervical lordosis and degenerative changes.        Dextroscoliosis.        MACRO:   None        Signed by: Génesis Garcia 12/16/2024 8:56 AM   Dictation workstation:   [id number]          Diagnostic testing considered:         Review of recent and relevant records:    ED Medication Administration:     Prescription Medication Consideration/Given:     Social Determinants of Health Significantly Affecting Care:      Summary:    BP      Temp      Pulse     Resp      SpO2        Abnormal Labs Reviewed   CBC WITH AUTO DIFFERENTIAL - Abnormal; Notable for the following components:       Result Value    RBC 3.64 (*)     Hemoglobin 10.7 (*)     Hematocrit 33.7 (*)     MCHC 31.8 (*)     Lymphocytes Absolute 1.08 (*)     All other components within normal limits   COMPREHENSIVE METABOLIC PANEL - Abnormal; Notable for the following components:    Glucose 328 (*)     Chloride 108 (*)     Bicarbonate 20 (*)     Urea Nitrogen 31 (*)     Creatinine 1.81 (*)     eGFR 42 (*)     All other components within normal limits   B-TYPE NATRIURETIC PEPTIDE - Abnormal; Notable for the following components:     (*)     All other components within normal limits    Narrative:        <100 pg/mL - Heart failure unlikely  100-299 pg/mL - Intermediate probability of acute heart                  failure exacerbation. Correlate with clinical                  context and patient history.    >=300 pg/mL - Heart Failure likely. Correlate with clinical                  context and patient history.    BNP testing is performed using different testing methodology at [facility] than at other [facility] hospitals. Direct result comparisons should only be made within the same method.      LACTATE - Abnormal; Notable for the following components:    Lactate 2.3 (*)     All other components within normal limits    Narrative:     Venipuncture immediately after or during the administration of Metamizole may lead to falsely low results.  Testing should be performed immediately prior to Metamizole dosing.   SERIAL TROPONIN-INITIAL - Abnormal; Notable for the following components:    Troponin I, High Sensitivity 60 (*)     All other components within normal limits    Narrative:     Less than 99th percentile of normal range cutoff-  Female and children under 18 years old <14 ng/L; Male <21 ng/L: Negative  Repeat testing should be performed if clinically indicated.     Female and children under 18 years old 14-50 ng/L; Male 21-50 ng/L:  Consistent with possible cardiac damage and possible increased clinical   risk. Serial measurements may help to assess extent of myocardial damage.     >50 ng/L: Consistent with cardiac damage, increased clinical risk and  myocardial infarction. Serial measurements may help assess extent of   myocardial damage.      NOTE: Children less than 1 year old may have higher baseline troponin   levels and results should be interpreted in conjunction with the overall   clinical context.     NOTE: Troponin I testing is performed using a different   testing methodology at [facility] than at other   [facility]. Direct result comparisons should only   be made within the same method.   URINALYSIS WITH REFLEX CULTURE AND MICROSCOPIC - Abnormal; Notable for the following components:    Color, Urine Colorless (*)     Glucose, Urine OVER (4+) (*)     All other components within normal limits    Narrative:     OVER is reported when the result is greater than the clinically reportable range.   SERIAL TROPONIN, 1 HOUR - Abnormal; Notable for the following components:    Troponin I, High Sensitivity 57 (*)     All other components within normal limits    Narrative:     Less than 99th percentile of normal range cutoff-  Female and children under 18 years old <14 ng/L; Male <21 ng/L: Negative  Repeat testing should be performed if clinically indicated.     Female and children under 18 years old 14-50 ng/L; Male 21-50  ng/L:  Consistent with possible cardiac damage and possible increased clinical   risk. Serial measurements may help to assess extent of myocardial damage.     >50 ng/L: Consistent with cardiac damage, increased clinical risk and  myocardial infarction. Serial measurements may help assess extent of   myocardial damage.      NOTE: Children less than 1 year old may have higher baseline troponin   levels and results should be interpreted in conjunction with the overall   clinical context.     NOTE: Troponin I testing is performed using a different   testing methodology at [facility] than at other   [facility] hospitals. Direct result comparisons should only   be made within the same method.     Diagnostic evaluation was completed.  Initial troponin is elevated at 60.  Second troponin was 57.  Therefore I do not suspect this is acute coronary syndrome but is likely secondary to his chronic renal insufficiency.  Lactate was elevated 2.3.  Repeat lactate was 1.7.  BNP is slightly elevated at 163.  Metabolic panel shows an elevated glucose at 328.  Sodium potassium in the normal range.  Renal function shows an elevated BUN and creatinine of 31 and 1.81.  Liver function tests are in the normal range.  Acetaminophen, salicylate and alcohol are negative.  Ammonia is normal.  INR is 1.0.  CBC shows a normal white blood cell count mild anemia with a hemoglobin of 10.7.  Platelets are in the normal range.  Urinalysis shows 4+ glucose.  Chest x-ray shows no evidence of acute intrathoracic abnormality.  X-ray of the left hip shows satisfactory appearance left hip arthroplasty.  CT of the head shows diffuse volume loss and periventricular white matter changes which given patient's age likely present small vessel ischemic disease.  No CT evidence of acute hemorrhage or infarct.  No evidence of display skull fracture.  Redemonstration of area of encephalomalacia in the left occipital lobe which may be related to prior  infarct without significant change.  CT of the cervical spine shows no evidence for acute fracture or subluxation of the cervical spine.  Reversal normal cervical lordosis and degenerative changes.  Dextroscoliosis.  Urine drug screen was negative.    Repeat evaluation of the patient shows him to be at his baseline.  No urgent or emergent conditions been identified.  Patient will be discharged home with short-term follow-up with his primary care provider.    I discussed the results and discharge plan with the patient and/or family/friend if present.  I emphasized the importance of follow up with the physician I referred them to in the timeframe recommended.  I explained reasons for the patient to return to the Emergency Department.  Questions were addressed.  The patient and/or family/friend expressed understanding.      The brother showed up and was going to take the patient home.  However, once he got here he shared that he does not feel comfortable taking the patient home.  He feels the patient needs to be placed in a nursing home.  Therefore social work will be contacted.  Will look to place the patient in the hospital in order to get this done.    I discussed the results and plan for hospitalization with the patient and/or family/friend if present.  Questions were addressed.  Patient and/or family/friend expressed understanding.    The patient's condition requires ongoing treatment and evaluation necessitating hospital admission.  I have reviewed the patient's history, physical exam, and test information with the admitting physician,    MAHESH Marquez/Dr. Peralta               , who agrees to hospitalize the patient.     I also talked to Mar Castellon from social work.  She recommended having the patient evaluated by physical therapy and Occupational Therapy.  She will see if the patient is a candidate for placement or if he will need long-term care facility.                Diagnoses as of 12/16/24 1320   Fall,  initial encounter   Contusion of left hip, initial encounter   Renal insufficiency   Elevated troponin   Hyperglycemia   Anemia, unspecified type         Procedure  Procedures     Helio GAN MD  12/16/24 1048       Helio GAN MD  12/16/24 1256       Helio GAN MD  12/16/24 1320

## 2024-12-16 NOTE — H&P
History Of Present Illness  Lito Osei is a 64 y.o. male with CKD stage IV, hx of CVA with residual left-sided deficits, HLD, HTN, recent hx of PE on Eliquis, and T2DM requiring insulin presenting via EMS for a fall at home overnight. Patient is A&O x 3 but was a poor historian stating that he fell at home but was able to crawl to his couch to get himself up. Majority of history gotten from bedside nurse and ED provider. Patient lives at home alone, was recently discharged from SNF after a left hip fracture requiring partial left hip arthroplasty. Patient was getting out of bed to use the restroom when he fell because his param hose were too slippery on the floor. Patient was unable to get himself up. A neighbor heard the patient calling for help and called EMS for assistance. He was then brought to the ED. Patient denies LOC or head trauma. Denies CP, SOB, fever, chills, abd pain, n/v, headache, dizziness, vision changes. Denies recent travel or ill contacts. Denies tobacco, alcohol, or illicit drug use.     ED course: BMP shows glucose 328, Cr 1.81 with BUN 31 and GFR 42. Troponin elevated at 60/57. Lactate initially elevated at 2.3, now 1.7.. CK normal. CBC shows mild anemia with hgb 10.7. UA and UDS unremarkable. CXR without abnormality. XR left hip with normal arthoplasty. CT c spine without acute fractures. CT head without acute abnormality, shows previous left occipital CVA and chronic ischemic changes. PT/OT and SW consulted in the ED. Patient will be admitted for further evaluation, treatment, and placement.     Past Medical History  Past Medical History:   Diagnosis Date    CKD (chronic kidney disease)     CVA (cerebral vascular accident) (Multi) 11/21/2024    HLD (hyperlipidemia)     Hypertension     Stroke (Multi)     Type 2 diabetes mellitus with kidney complication, with long-term current use of insulin        Surgical History  Past Surgical History:   Procedure Laterality Date    ANKLE SURGERY  Right     PARTIAL HIP ARTHROPLASTY Left         Social History  He reports that he has never smoked. He does not have any smokeless tobacco history on file. He reports that he does not drink alcohol and does not use drugs.    Family History  Family History   Problem Relation Name Age of Onset    Diabetes Mother      Hypertension Mother      Coronary artery disease Father      Diabetes Father      Other (Ischemic heart disease) Father      Stroke Maternal Grandmother          Allergies  Patient has no known allergies.    Review of Systems   All ROS are negative except for what is mentioned in the HPI    Physical Exam  Constitutional:       Appearance: Normal appearance. He is overweight.   HENT:      Head: Normocephalic.      Mouth/Throat:      Mouth: Mucous membranes are dry.   Eyes:      Pupils: Pupils are equal, round, and reactive to light.   Cardiovascular:      Rate and Rhythm: Regular rhythm. Tachycardia present.      Heart sounds: Normal heart sounds, S1 normal and S2 normal.   Pulmonary:      Effort: Pulmonary effort is normal.      Breath sounds: Normal breath sounds.   Abdominal:      General: Bowel sounds are normal.      Palpations: Abdomen is soft.      Tenderness: There is no abdominal tenderness.   Musculoskeletal:         General: Normal range of motion.      Cervical back: Neck supple.   Skin:     General: Skin is warm.   Neurological:      Mental Status: He is alert.      Comments: Hx CVA, with residual left sided deficits, left arm chronically contracted and held at patient's chest, unable to wiggle left toes   Psychiatric:         Mood and Affect: Mood normal.         Behavior: Behavior is cooperative.         Thought Content: Thought content normal.      Comments: Was A&O to place, month, and year but did appear to be confused, had slowed responses          Last Recorded Vitals  Blood pressure 120/71, pulse (!) 104, temperature 36.5 °C (97.7 °F), temperature source Temporal, resp. rate 18,  "height 1.753 m (5' 9\"), weight 77.1 kg (170 lb), SpO2 94%.    Relevant Results  Scheduled medications    Continuous medications    PRN medications    Results for orders placed or performed during the hospital encounter of 12/16/24 (from the past 24 hours)   ECG 12 lead   Result Value Ref Range    Ventricular Rate 114 BPM    Atrial Rate 114 BPM    VA Interval 170 ms    QRS Duration 56 ms    QT Interval 308 ms    QTC Calculation(Bazett) 424 ms    P Axis 67 degrees    R Axis 35 degrees    T Axis 33 degrees    QRS Count 19 beats    Q Onset 225 ms    P Onset 140 ms    P Offset 196 ms    T Offset 379 ms    QTC Fredericia 381 ms   Ammonia   Result Value Ref Range    Ammonia 25 16 - 53 umol/L   Protime-INR   Result Value Ref Range    Protime 11.4 9.8 - 12.8 seconds    INR 1.0 0.9 - 1.1   CBC and Auto Differential   Result Value Ref Range    WBC 8.8 4.4 - 11.3 x10*3/uL    nRBC 0.0 0.0 - 0.0 /100 WBCs    RBC 3.64 (L) 4.50 - 5.90 x10*6/uL    Hemoglobin 10.7 (L) 13.5 - 17.5 g/dL    Hematocrit 33.7 (L) 41.0 - 52.0 %    MCV 93 80 - 100 fL    MCH 29.4 26.0 - 34.0 pg    MCHC 31.8 (L) 32.0 - 36.0 g/dL    RDW 13.9 11.5 - 14.5 %    Platelets 221 150 - 450 x10*3/uL    Neutrophils % 79.2 40.0 - 80.0 %    Immature Granulocytes %, Automated 0.3 0.0 - 0.9 %    Lymphocytes % 12.2 13.0 - 44.0 %    Monocytes % 7.2 2.0 - 10.0 %    Eosinophils % 0.3 0.0 - 6.0 %    Basophils % 0.8 0.0 - 2.0 %    Neutrophils Absolute 6.98 1.20 - 7.70 x10*3/uL    Immature Granulocytes Absolute, Automated 0.03 0.00 - 0.70 x10*3/uL    Lymphocytes Absolute 1.08 (L) 1.20 - 4.80 x10*3/uL    Monocytes Absolute 0.64 0.10 - 1.00 x10*3/uL    Eosinophils Absolute 0.03 0.00 - 0.70 x10*3/uL    Basophils Absolute 0.07 0.00 - 0.10 x10*3/uL   Comprehensive Metabolic Panel   Result Value Ref Range    Glucose 328 (H) 74 - 99 mg/dL    Sodium 139 136 - 145 mmol/L    Potassium 4.3 3.5 - 5.3 mmol/L    Chloride 108 (H) 98 - 107 mmol/L    Bicarbonate 20 (L) 21 - 32 mmol/L    Anion Gap " 15 10 - 20 mmol/L    Urea Nitrogen 31 (H) 6 - 23 mg/dL    Creatinine 1.81 (H) 0.50 - 1.30 mg/dL    eGFR 42 (L) >60 mL/min/1.73m*2    Calcium 8.9 8.6 - 10.3 mg/dL    Albumin 3.5 3.4 - 5.0 g/dL    Alkaline Phosphatase 106 33 - 136 U/L    Total Protein 7.0 6.4 - 8.2 g/dL    AST 20 9 - 39 U/L    Bilirubin, Total 0.5 0.0 - 1.2 mg/dL    ALT 13 10 - 52 U/L   Magnesium   Result Value Ref Range    Magnesium 1.90 1.60 - 2.40 mg/dL   B-Type Natriuretic Peptide   Result Value Ref Range     (H) 0 - 99 pg/mL   Acute Toxicology Panel, Blood   Result Value Ref Range    Acetaminophen <10.0 10.0 - 30.0 ug/mL    Salicylate  <3 4 - 20 mg/dL    Alcohol <10 <=10 mg/dL   Lactate   Result Value Ref Range    Lactate 2.3 (H) 0.4 - 2.0 mmol/L   Troponin I, High Sensitivity, Initial   Result Value Ref Range    Troponin I, High Sensitivity 60 (HH) 0 - 20 ng/L   Troponin, High Sensitivity, 1 Hour   Result Value Ref Range    Troponin I, High Sensitivity 57 (HH) 0 - 20 ng/L   Creatine Kinase   Result Value Ref Range    Creatine Kinase 215 0 - 325 U/L   Lactate   Result Value Ref Range    Lactate 1.7 0.4 - 2.0 mmol/L   Urinalysis with Reflex Culture and Microscopic   Result Value Ref Range    Color, Urine Colorless (N) Light-Yellow, Yellow, Dark-Yellow    Appearance, Urine Clear Clear    Specific Gravity, Urine 1.015 1.005 - 1.035    pH, Urine 5.0 5.0, 5.5, 6.0, 6.5, 7.0, 7.5, 8.0    Protein, Urine NEGATIVE NEGATIVE, 10 (TRACE), 20 (TRACE) mg/dL    Glucose, Urine OVER (4+) (A) Normal mg/dL    Blood, Urine NEGATIVE NEGATIVE    Ketones, Urine NEGATIVE NEGATIVE mg/dL    Bilirubin, Urine NEGATIVE NEGATIVE    Urobilinogen, Urine Normal Normal mg/dL    Nitrite, Urine NEGATIVE NEGATIVE    Leukocyte Esterase, Urine NEGATIVE NEGATIVE   Drug Screen, Urine   Result Value Ref Range    Amphetamine Screen, Urine Presumptive Negative Presumptive Negative    Barbiturate Screen, Urine Presumptive Negative Presumptive Negative    Benzodiazepines Screen, Urine  Presumptive Negative Presumptive Negative    Cannabinoid Screen, Urine Presumptive Negative Presumptive Negative    Cocaine Metabolite Screen, Urine Presumptive Negative Presumptive Negative    Fentanyl Screen, Urine Presumptive Negative Presumptive Negative    Opiate Screen, Urine Presumptive Negative Presumptive Negative    Oxycodone Screen, Urine Presumptive Negative Presumptive Negative    PCP Screen, Urine Presumptive Negative Presumptive Negative    Methadone Screen, Urine Presumptive Negative Presumptive Negative   ECG 12 lead   Result Value Ref Range    Ventricular Rate 104 BPM    Atrial Rate 104 BPM    OH Interval 158 ms    QRS Duration 76 ms    QT Interval 352 ms    QTC Calculation(Bazett) 462 ms    P Axis 62 degrees    R Axis 49 degrees    T Axis 71 degrees    QRS Count 17 beats    Q Onset 219 ms    P Onset 140 ms    P Offset 197 ms    T Offset 395 ms    QTC Fredericia 422 ms     ECG 12 lead    Result Date: 12/16/2024  Sinus tachycardia Otherwise normal ECG When compared with ECG of 16-DEC-2024 08:32, (unconfirmed) ST no longer depressed in Inferior leads ST no longer elevated in Lateral leads See ED provider note for full interpretation and clinical correlation Confirmed by Raiza Lackey ([id number]) on 12/16/2024 10:42:19 AM    ECG 12 lead    Result Date: 12/16/2024  Sinus tachycardia Possible Left atrial enlargement Borderline ECG When compared with ECG of 21-NOV-2024 18:31, ST now depressed in Inferior leads ST elevation now present in Lateral leads T wave inversion no longer evident in Lateral leads See ED provider note for full interpretation and clinical correlation Confirmed by Raiza Lackey ([id number]) on 12/16/2024 10:41:58 AM    CT cervical spine wo IV contrast    Result Date: 12/16/2024  Interpreted By:  Génesis Garcia, STUDY: CT CERVICAL SPINE WO IV CONTRAST;  12/16/2024 8:18 am   INDICATION: Signs/Symptoms:fall.     COMPARISON: 11/17/2020   ACCESSION NUMBER(S): [id number]   ORDERING CLINICIAN: ROGE  MARIA FERNANDAROD   TECHNIQUE: Axial CT images of the cervical spine are obtained. Axial, coronal and sagittal reconstructions are provided for review.   FINDINGS:       Fractures: There is no evidence for an acute fracture of the cervical spine.   Vertebral Alignment: Reversal normal cervical lordosis is seen. Right dextroscoliosis is seen.   Craniocervical Junction: The odontoid process and craniocervical junction are intact.   Vertebrae/Disc Spaces: The cervical vertebral body heights are intact. Endplate sclerosis and spur formation is seen throughout the cervical spine. Uncovertebral joint hypertrophy is seen. Narrowing of C5-6 and C6-7 disc spaces noted   Prevertebral/Paraspinal Soft Tissues: The prevertebral and paraspinal soft tissues are unremarkable.   The vascular calcifications are identified.       No evidence for an acute fracture or subluxation of the cervical spine.   Reversal normal cervical lordosis and degenerative changes.   Dextroscoliosis.   MACRO: None   Signed by: Génesis Garcia 12/16/2024 8:56 AM Dictation workstation:   [id number]    CT head wo IV contrast    Result Date: 12/16/2024  Interpreted By:  Génesis Garcia, STUDY: CT HEAD WO IV CONTRAST;  12/16/2024 8:18 am   INDICATION: Signs/Symptoms:fall.   COMPARISON: 11/17/2020   ACCESSION NUMBER(S): [id number]   ORDERING CLINICIAN: ROGE KINGSLEY   TECHNIQUE: Axial images of 5 mm thickness were obtained through the head without intravenous contrast sagittal and coronal reconstructions were acquired.   FINDINGS: BRAIN PARENCHYMA: Prominent periventricular and subcortical white matter changes are present.  No masses are seen. No mass effect.  No acute cortical infarct or mass effect is seen.   There is an area of encephalomalacia in the left occipital lobe, which has not significantly changed   HEMORRHAGE: There is no evidence for hemorrhage.   VENTRICLES and EXTRA-AXIAL SPACES: The ventricles, sulci and cisterns are prominent suggesting volume loss.    INTRACRANIAL VESSELS:  atherosclerotic calcification.   EXTRACRANIAL SOFT TISSUES: Within normal limits.   PARANASAL SINUSES/MASTOIDS: There is a small mucous retention cyst or polyp in the right maxillary sinus   CALVARIUM: No destructive lesion or depressed skull fracture.         Diffuse volume loss and periventricular white matter changes, which given patient's age likely represent small vessel ischemic disease.   No CT evidence of acute hemorrhage or acute cortical infarct.   No evidence of displaced skull fracture.   Redemonstration of area of encephalomalacia in the left occipital lobe, which may be related to a prior infarct, without significant change     MACRO: None   Signed by: Génesis Garcia 12/16/2024 8:51 AM Dictation workstation:   [id number]    XR hip left with pelvis when performed 2 or 3 views    Result Date: 12/16/2024  Interpreted By:  Kulwinder Pereira, STUDY: XR HIP LEFT WITH PELVIS WHEN PERFORMED 2 OR 3 VIEWS   INDICATION: Signs/Symptoms:fall.   COMPARISON: December 3   ACCESSION NUMBER(S): [id number]   ORDERING CLINICIAN: ROGE KINGSLEY   FINDINGS: Postsurgical changes of left hip hemiarthroplasty without fracture, malalignment, or periprosthetic lucency.       Satisfactory appearance left hip arthroplasty.   Signed by: Kulwinder Pereira 12/16/2024 8:43 AM Dictation workstation:   [id number]    XR chest 1 view    Result Date: 12/16/2024  Interpreted By:  Kulwinder Pereira, STUDY: XR CHEST 1 VIEW   INDICATION: Signs/Symptoms:Chest Pain.   COMPARISON: November 21   ACCESSION NUMBER(S): [id number]   ORDERING CLINICIAN: ROGE KINGSLEY   FINDINGS: No consolidation, effusion, edema, or pneumothorax. Heart size within normal limits.       No evidence of acute intrathoracic abnormality.   Signed by: Kulwinder Pereira 12/16/2024 8:43 AM Dictation workstation:   [id number]    XR hip left with pelvis when performed 2 or 3 views    Result Date: 12/3/2024  Interpreted By:  Nicola Webster, STUDY: XR HIP LEFT WITH PELVIS  WHEN PERFORMED 2 OR 3 VIEWS;  ;  12/3/2024 9:40 am   INDICATION: Signs/Symptoms:pain.   ACCESSION NUMBER(S): [id number]   ORDERING CLINICIAN: CECILIA PLATA   FINDINGS: No acute fracture or dislocations of the hip. Intact left hip hemiarthroplasty for fracture without evidence of loosening or change in hardware.. Concentric joint space. No effusion or soft tissue swelling.         Signed by: Cecilia Plata 12/3/2024 5:23 PM Dictation workstation:   [id number]    ECG 12 lead    Result Date: 11/29/2024  Sinus tachycardia Minimal voltage criteria for LVH, may be normal variant Borderline ECG When compared with ECG of 21-OCT-2015 18:06, No significant change was found Confirmed by ROSENDO Burch ([id number]) on 11/29/2024 9:37:12 AM    NM Lung perfusion with spect    Result Date: 11/22/2024  Interpreted By:  Reema Quiñones and Hanreck James STUDY: NM LUNG PERFUSION WITH SPECT;  11/22/2024 1:45 pm   INDICATION: Signs/Symptoms:persistent tachycardia, recent hip arthroplasty.   COMPARISON: 11/21/2024 chest radiograph   ACCESSION NUMBER(S): [id number]   ORDERING CLINICIAN: TOI HALE   TECHNIQUE: DIVISION OF NUCLEAR MEDICINE PERFUSION LUNG SCANS   Multiple perfusion images of the lungs were acquired after the intravenous administration of 4.3 mCi of Tc-99m macroaggregated albumin (MAA). In addition, SPECT/CT of the chest was performed.   FINDINGS: Perfusion images of both lungs demonstrate mild heterogeneity throughout the lung fields bilaterally. There are bilateral wedge-shaped segmental and subsegmental perfusion defects most conspicuous in the lateral right upper lobe, superior left lower lobe, posterior left lower lobe and lateral left upper lobe.       1. Bilateral wedge-shaped segmental and subsegmental perfusion defects as described above suggestive of acute pulmonary embolism (high probability). 2. An epic secure chat message was sent to Den Potts MD at 20:06 p.m. on 11/22/2024 with message acknowledgement.   I  personally reviewed the images/study and I agree with the resident Sixto Diaz's findings as stated. This study was interpreted at [facility], [place], [place].   MACRO: Critical Finding:  See findings. Notification was initiated on 11/22/2024 at 1:57 pm by  Sixto Diaz.  (**-YCF-**) Instructions:       Signed by: Reema Quiñones 11/22/2024 2:11 PM Dictation workstation:   [id number]    Lower extremity venous duplex left    Result Date: 11/22/2024            [facility] [place] [address] [place] [address]     Tel [phone] Fax [phone]  Vascular Lab Report  VASC US LOWER EXTREMITY VENOUS DUPLEX LEFT Patient Name:      THA Scales Physician:  [id number] Cuca Acuña MD, RPVI Study Date:        11/21/2024           Ordering Provider:  [id number] AMA HENSLEY MRN/PID:           [id number]             Fellow: Accession#:        [id number]         Technologist:       Jessica Quarles RVT Date of Birth/Age: 1960 / 64 years Technologist 2: Gender:            M                    Encounter#:         [id number] Admission Status:  Emergency            Location Performed: [facility]  Diagnosis/ICD: Other specified soft tissue disorders-M79.89 CPT Codes:     44438 Peripheral venous duplex scan for DVT Limited  Pertinent History: Immobility.  CONCLUSIONS: Right Lower Venous: The right common femoral vein demonstrates normal spontaneous and respirophasic flow. Left Lower Venous: No evidence of acute deep vein thrombus visualized in the left lower extremity. Poor visualization of peroneal veins in grayscale. Peroneal veins visualized in segments with color Doppler. Additional Findings: Technically difficult exam due to body habitus.  Imaging & Doppler Findings:  Right        Flow CFV   Spontaneous/Phasic  Left                  Compress Thrombus        Flow Distal External Iliac   Yes       None   Spontaneous/Phasic CFV                     Yes      None   Spontaneous/Phasic PFV                     Yes      None FV Proximal             Yes      None   Spontaneous/Phasic FV Mid                  Yes      None FV Distal               Yes      None Popliteal               Yes      None   Spontaneous/Phasic Peroneal                Yes      None PTV                     Yes      None  [id number] Cuca Acuña MD, JA Electronically signed by [id number] JA Boyd MD on 11/22/2024 at 9:14:44 AM  ** Final **     XR chest 1 view    Result Date: 11/21/2024  Interpreted By:  Bro Wilkerson, STUDY: XR CHEST 1 VIEW;  11/21/2024 6:57 pm   INDICATION: Signs/Symptoms:check for pneumonia, cause for dka.     COMPARISON: 10/21/2015   ACCESSION NUMBER(S): [id number]   ORDERING CLINICIAN: HAFSA EATON   FINDINGS:     The cardiomediastinal silhouette and pulmonary vasculature are within normal limits. Mild left basilar atelectasis.   No consolidation, pleural effusion or pneumothorax.         No acute cardiopulmonary process.   Mild left basilar atelectasis.   MACRO: None.   Signed by: Bro Wilkerson 11/21/2024 7:14 PM Dictation workstation:   [id number]    XR elbow left 3+ views    Result Date: 11/19/2024  Interpreted By:  Greg Faustin, STUDY: XR ELBOW LEFT 3+ VIEWS;  11/19/2024 10:14 am   INDICATION: Signs/Symptoms:c/o left elbow pain, left elbow swollen - h/o recent fall onto Lt side.     COMPARISON: None.   ACCESSION NUMBER(S): [id number]   ORDERING CLINICIAN: NINA SORIA   TECHNIQUE: Left elbow series performed with 5 images.   FINDINGS: Alignment appears intact. Mild marginal spurring is seen throughout the elbow along with tiny enthesophyte along the medial humeral condyle. No appreciable acute fracture. No subluxation. Soft tissue vascular calcifications are present. IV tubing is seen in the antecubital fossa region.       No acute fracture or subluxation.   MACRO: None.   Signed by: Greg  Sfiligoj 11/19/2024 3:20 PM Dictation workstation:   [id number]    CT hip left wo IV contrast    Result Date: 11/17/2024  STUDY: CT Extremity; Completed Time: 11/17/2024 11:22 AM INDICATION: Left hip pain.  Evaluate for fracture. COMPARISON: XR left femur & XR pelvis 11/17/2024. ACCESSION NUMBER(S): [id number] ORDERING CLINICIAN: CHERELLE FUENTES TECHNIQUE:  Thin section axial images were obtained through the left hip without intravenous contrast.  Orthogonal reconstructed images were obtained and reviewed.  Automated mA/kV exposure control was utilized and patient examination was performed in strict accordance with principles of ALARA. FINDINGS:  Examination of the bony structures demonstrates a displaced left femoral neck fracture (203-45) sees.  There is no evidence of an associated dislocation.  There is a mild to moderate arthrosis of the left hip noted.  There is mild bony demineralization. The soft tissues demonstrate a possible small left hip effusion.  The muscular/soft tissue planes are well-maintained.  The visualized pelvic structures demonstrate no acute abnormalities.  Vascular calcifications are visualized..    1.  Displaced fracture involving the left femoral neck as described above.  No evidence of an associated dislocation. 2.  Mild to moderate arthrosis of the left hip joint. 3.  Possible small left hip joint effusion. Signed by Nicola Mortensen MD    CT cervical spine wo IV contrast    Result Date: 11/17/2024  Interpreted By:  Alma Cheek, STUDY: CT CERVICAL SPINE WO IV CONTRAST;  11/17/2024 11:20 am   INDICATION: Signs/Symptoms:fall pain.     COMPARISON: None.   ACCESSION NUMBER(S): [id number]   ORDERING CLINICIAN: CHERELLE FUENTES   TECHNIQUE: Axial CT images of the cervical spine are obtained. Axial, coronal and sagittal reconstructions are provided for review.   FINDINGS: There is curvature of the cervical spine with convexity to the right consistent with torticollis.   There is  degenerative disc disease. There is moderate anterior and moderate posterior bony spondylosis at C4-5 through C6-7.   Fractures: There is no evidence for an acute fracture of the cervical spine.   Vertebral Alignment: Within normal limits.   Craniocervical Junction: The odontoid process and craniocervical junction are intact.   Vertebrae/Disc Spaces:  The cervical vertebral body heights are intact and the disc spaces are preserved.   Prevertebral/Paraspinal Soft Tissues: The prevertebral and paraspinal soft tissues are unremarkable.         No evidence for an acute fracture or subluxation of the cervical spine. Torticollis.   MACRO: None   Signed by: Alma Cheek 11/17/2024 11:31 AM Dictation workstation:   [id number]    CT head wo IV contrast    Result Date: 11/17/2024  Interpreted By:  Alma Cheek, STUDY: CT HEAD WO IV CONTRAST;  11/17/2024 11:20 am   INDICATION: Signs/Symptoms:fall pain.   COMPARISON: 10/21/2015   ACCESSION NUMBER(S): [id number]   ORDERING CLINICIAN: CHERELLE FUENTES   TECHNIQUE: Examination was performed in the axial plane using soft tissue and bone algorithm.   FINDINGS: INTRACRANIAL: There is prominence of the ventricular system and cerebral sulci consistent with cerebral atrophy. There are periventricular hypodensities consistent with  marked small vessel disease. No mass or mass effect is identified. There is no hemorrhage or subdural fluid collection. There is no acute infarct. There is a remote left occipital infarct. There is a remote infarct along the right cerebral convexity in the vascular territory of the anterior cerebral artery. There is no fracture of the calvarium.   EXTRACRANIAL: Visualized paranasal sinuses and mastoids are clear.       No acute intracranial pathology.   MACRO: None   Signed by: Alma Cheek 11/17/2024 11:28 AM Dictation workstation:   [id number]    XR pelvis 1-2 views    Result Date: 11/17/2024  STUDY: Pelvis Radiographs; 11/17/2024 at 11:13am  INDICATION: Pelvis pain post fall. COMPARISON: None Available. ACCESSION NUMBER(S): [id number] ORDERING CLINICIAN: CHERELLE FUENTES TECHNIQUE:  One view(s) of the pelvis. FINDINGS:  The pelvic ring is intact.  There is a displaced fracture involving the left femoral neck.  No evidence of associated dislocation.  There is a moderate arthrosis noted involving the hips bilaterally..      1.  Displaced fracture involving the left femoral neck. 2.  Moderate arthrosis of the hips bilaterally.. Signed by Nicola Mortensen MD    XR femur left 2+ views    Result Date: 11/17/2024  STUDY: Femur Radiographs; 11/17/2024, 1113 INDICATION: Fall. COMPARISON: None Available. ACCESSION NUMBER(S): [id number] ORDERING CLINICIAN: CHERELLE FUENTES TECHNIQUE:  2 view(s) (4 images) of the left femur. FINDINGS:  Lucency of the left femoral neck.  Left femoral neck is suboptimally visualized due to overlapping anatomy/internal rotation.  The diaphysis of the left femur is intact and there is no dislocation.    Left femoral neck fracture.  Consider CT.  Fracture suboptimally visualized. Signed by Bandar Virk DO        Assessment/Plan     Mechanical fall at home  Contusion of left hip  S/p left hip partial arthroplasty 11/18  -Imaging unremarkable for acute fracture/dislocation, no acute intracranial abnormality  -UA and UDS unremarkable  -Orthostatics every shift  -Falls precautions  -PT/OT eval and treat  -TCC for discharge planning  -Nutrition consult  -Daily CBC, BMP, Mg    CKD stage IV  Elevated troponin  -Troponin likely elevated 2/2 to CKD vs trauma  -Baseline Cr ~1.8, stable   -CK normal, likely no rhabdo  -Encourage PO hydration     T2DM with CKD and hyperglycemia  -A1c 11/2024 was 8.2%  -Appears to use 8 units Novolog 70-30 BID and SSI QID  -Will start Lantus 12 units nightly  -SSI, accu-cheks, diabetic diet  -Hypoglycemic protocol     Anemia: appears stable, likely from blood loss 2/2 to surgery, is uptrending  HTN/HLD: resume  home meds  Hx PE: resume home eliquis     Patient's POA is Rosales soria    DVTp: eliquis, SCDs  Plan: placement       I spent 75 minutes in the professional and overall care of this patient.    Jing Reaves PA-C    Plan of care was discussed extensively with patient.  Patient verbalized understanding through teach back method.  All question and concerns addressed upon examination.     Of note, this documentation is completed using the Dragon Dictation system (voice recognition software). There may be spelling and/or grammatical errors that were not corrected prior to final submission.

## 2024-12-16 NOTE — PROGRESS NOTES
Social Work Note Rcvd referral from ER for discharge planning.  Per Dr Avila, pt has hx of stroke with contractures and was recently discharged home from SNF.  Pt fell out of bed and layed on the ground, yelling for help until neighbors heard him.  Requested orders for PT/OT and alerted PT/OT of need for evals to help establish pt's needs and ECF vs SNF.  Pt just discharged home from [facility] 4 days ago.  Pt now is A&Ox2.  During recent admission, SNF choices were [facility]) [facility]) [facility] and 3) [facility].  Spoke with pt's brother Rosales and discussed discharge planning and offered freedom of choice.  Rosales states that SNF choices remain as they were previously and as indicated above.  Referral sent in Care Port to [facility] and indicated that they are [facility].  OSCAR Gonzalez

## 2024-12-17 ENCOUNTER — APPOINTMENT (OUTPATIENT)
Dept: RADIOLOGY | Facility: HOSPITAL | Age: 64
End: 2024-12-17
Payer: MEDICARE

## 2024-12-17 ENCOUNTER — TELEPHONE (OUTPATIENT)
Dept: NEUROLOGY | Age: 64
End: 2024-12-17

## 2024-12-17 ENCOUNTER — APPOINTMENT (OUTPATIENT)
Dept: CARDIOLOGY | Facility: HOSPITAL | Age: 64
End: 2024-12-17
Payer: MEDICARE

## 2024-12-17 LAB
ANION GAP SERPL CALC-SCNC: 11 MMOL/L (ref 10–20)
BUN SERPL-MCNC: 32 MG/DL (ref 6–23)
CALCIUM SERPL-MCNC: 8.3 MG/DL (ref 8.6–10.3)
CHLORIDE SERPL-SCNC: 109 MMOL/L (ref 98–107)
CHOLEST SERPL-MCNC: 121 MG/DL (ref 0–199)
CHOLESTEROL/HDL RATIO: 2.1
CO2 SERPL-SCNC: 24 MMOL/L (ref 21–32)
CREAT SERPL-MCNC: 1.8 MG/DL (ref 0.5–1.3)
EGFRCR SERPLBLD CKD-EPI 2021: 42 ML/MIN/1.73M*2
ERYTHROCYTE [DISTWIDTH] IN BLOOD BY AUTOMATED COUNT: 14.1 % (ref 11.5–14.5)
FOLATE SERPL-MCNC: 9.9 NG/ML
GLUCOSE BLD MANUAL STRIP-MCNC: 128 MG/DL (ref 74–99)
GLUCOSE BLD MANUAL STRIP-MCNC: 131 MG/DL (ref 74–99)
GLUCOSE BLD MANUAL STRIP-MCNC: 215 MG/DL (ref 74–99)
GLUCOSE BLD MANUAL STRIP-MCNC: 222 MG/DL (ref 74–99)
GLUCOSE SERPL-MCNC: 251 MG/DL (ref 74–99)
HCT VFR BLD AUTO: 32.8 % (ref 41–52)
HDLC SERPL-MCNC: 57.2 MG/DL
HGB BLD-MCNC: 10.3 G/DL (ref 13.5–17.5)
LDLC SERPL CALC-MCNC: 40 MG/DL
MAGNESIUM SERPL-MCNC: 2.02 MG/DL (ref 1.6–2.4)
MCH RBC QN AUTO: 29.1 PG (ref 26–34)
MCHC RBC AUTO-ENTMCNC: 31.4 G/DL (ref 32–36)
MCV RBC AUTO: 93 FL (ref 80–100)
NON HDL CHOLESTEROL: 64 MG/DL (ref 0–149)
NRBC BLD-RTO: 0 /100 WBCS (ref 0–0)
PLATELET # BLD AUTO: 226 X10*3/UL (ref 150–450)
POTASSIUM SERPL-SCNC: 3.9 MMOL/L (ref 3.5–5.3)
RBC # BLD AUTO: 3.54 X10*6/UL (ref 4.5–5.9)
SODIUM SERPL-SCNC: 140 MMOL/L (ref 136–145)
TRIGL SERPL-MCNC: 121 MG/DL (ref 0–149)
TSH SERPL-ACNC: 1.13 MIU/L (ref 0.44–3.98)
VIT B12 SERPL-MCNC: 266 PG/ML (ref 211–911)
VLDL: 24 MG/DL (ref 0–40)
WBC # BLD AUTO: 7.1 X10*3/UL (ref 4.4–11.3)

## 2024-12-17 PROCEDURE — 80048 BASIC METABOLIC PNL TOTAL CA: CPT

## 2024-12-17 PROCEDURE — 82947 ASSAY GLUCOSE BLOOD QUANT: CPT

## 2024-12-17 PROCEDURE — 2500000002 HC RX 250 W HCPCS SELF ADMINISTERED DRUGS (ALT 637 FOR MEDICARE OP, ALT 636 FOR OP/ED): Performed by: STUDENT IN AN ORGANIZED HEALTH CARE EDUCATION/TRAINING PROGRAM

## 2024-12-17 PROCEDURE — 82607 VITAMIN B-12: CPT

## 2024-12-17 PROCEDURE — 99232 SBSQ HOSP IP/OBS MODERATE 35: CPT

## 2024-12-17 PROCEDURE — 84443 ASSAY THYROID STIM HORMONE: CPT

## 2024-12-17 PROCEDURE — 70547 MR ANGIOGRAPHY NECK W/O DYE: CPT | Performed by: RADIOLOGY

## 2024-12-17 PROCEDURE — 83735 ASSAY OF MAGNESIUM: CPT

## 2024-12-17 PROCEDURE — 93880 EXTRACRANIAL BILAT STUDY: CPT | Performed by: RADIOLOGY

## 2024-12-17 PROCEDURE — 70544 MR ANGIOGRAPHY HEAD W/O DYE: CPT | Performed by: RADIOLOGY

## 2024-12-17 PROCEDURE — 93306 TTE W/DOPPLER COMPLETE: CPT | Performed by: INTERNAL MEDICINE

## 2024-12-17 PROCEDURE — 70551 MRI BRAIN STEM W/O DYE: CPT

## 2024-12-17 PROCEDURE — 2500000002 HC RX 250 W HCPCS SELF ADMINISTERED DRUGS (ALT 637 FOR MEDICARE OP, ALT 636 FOR OP/ED)

## 2024-12-17 PROCEDURE — 70551 MRI BRAIN STEM W/O DYE: CPT | Performed by: RADIOLOGY

## 2024-12-17 PROCEDURE — 2500000001 HC RX 250 WO HCPCS SELF ADMINISTERED DRUGS (ALT 637 FOR MEDICARE OP): Performed by: STUDENT IN AN ORGANIZED HEALTH CARE EDUCATION/TRAINING PROGRAM

## 2024-12-17 PROCEDURE — 80061 LIPID PANEL: CPT

## 2024-12-17 PROCEDURE — 70544 MR ANGIOGRAPHY HEAD W/O DYE: CPT

## 2024-12-17 PROCEDURE — 99222 1ST HOSP IP/OBS MODERATE 55: CPT

## 2024-12-17 PROCEDURE — 1100000001 HC PRIVATE ROOM DAILY

## 2024-12-17 PROCEDURE — C8929 TTE W OR WO FOL WCON,DOPPLER: HCPCS

## 2024-12-17 PROCEDURE — 82746 ASSAY OF FOLIC ACID SERUM: CPT

## 2024-12-17 PROCEDURE — 85027 COMPLETE CBC AUTOMATED: CPT

## 2024-12-17 PROCEDURE — 36415 COLL VENOUS BLD VENIPUNCTURE: CPT

## 2024-12-17 PROCEDURE — 70547 MR ANGIOGRAPHY NECK W/O DYE: CPT

## 2024-12-17 PROCEDURE — 2500000004 HC RX 250 GENERAL PHARMACY W/ HCPCS (ALT 636 FOR OP/ED): Performed by: STUDENT IN AN ORGANIZED HEALTH CARE EDUCATION/TRAINING PROGRAM

## 2024-12-17 PROCEDURE — 92610 EVALUATE SWALLOWING FUNCTION: CPT | Mod: GN | Performed by: SPEECH-LANGUAGE PATHOLOGIST

## 2024-12-17 PROCEDURE — 93880 EXTRACRANIAL BILAT STUDY: CPT

## 2024-12-17 PROCEDURE — 2500000004 HC RX 250 GENERAL PHARMACY W/ HCPCS (ALT 636 FOR OP/ED)

## 2024-12-17 RX ORDER — MECLIZINE HCL 12.5 MG 12.5 MG/1
25 TABLET ORAL 3 TIMES DAILY PRN
Status: DISCONTINUED | OUTPATIENT
Start: 2024-12-17 | End: 2024-12-19 | Stop reason: HOSPADM

## 2024-12-17 ASSESSMENT — PAIN SCALES - GENERAL
PAINLEVEL_OUTOF10: 0 - NO PAIN
PAINLEVEL_OUTOF10: 3
PAINLEVEL_OUTOF10: 0 - NO PAIN
PAINLEVEL_OUTOF10: 0 - NO PAIN

## 2024-12-17 ASSESSMENT — COGNITIVE AND FUNCTIONAL STATUS - GENERAL
HELP NEEDED FOR BATHING: A LOT
PERSONAL GROOMING: TOTAL
DRESSING REGULAR LOWER BODY CLOTHING: TOTAL
STANDING UP FROM CHAIR USING ARMS: TOTAL
CLIMB 3 TO 5 STEPS WITH RAILING: TOTAL
MOBILITY SCORE: 7
DRESSING REGULAR LOWER BODY CLOTHING: A LOT
CLIMB 3 TO 5 STEPS WITH RAILING: TOTAL
MOVING TO AND FROM BED TO CHAIR: TOTAL
DAILY ACTIVITIY SCORE: 7
MOVING FROM LYING ON BACK TO SITTING ON SIDE OF FLAT BED WITH BEDRAILS: A LOT
DRESSING REGULAR UPPER BODY CLOTHING: TOTAL
TOILETING: A LITTLE
HELP NEEDED FOR BATHING: A LITTLE
PERSONAL GROOMING: TOTAL
DAILY ACTIVITIY SCORE: 8
DRESSING REGULAR UPPER BODY CLOTHING: A LITTLE
WALKING IN HOSPITAL ROOM: A LOT
WALKING IN HOSPITAL ROOM: TOTAL
WALKING IN HOSPITAL ROOM: TOTAL
MOVING TO AND FROM BED TO CHAIR: TOTAL
MOVING TO AND FROM BED TO CHAIR: A LOT
TURNING FROM BACK TO SIDE WHILE IN FLAT BAD: A LOT
EATING MEALS: A LOT
HELP NEEDED FOR BATHING: A LOT
STANDING UP FROM CHAIR USING ARMS: TOTAL
PERSONAL GROOMING: A LITTLE
DRESSING REGULAR UPPER BODY CLOTHING: TOTAL
MOVING FROM LYING ON BACK TO SITTING ON SIDE OF FLAT BED WITH BEDRAILS: A LOT
DAILY ACTIVITIY SCORE: 17
MOVING FROM LYING ON BACK TO SITTING ON SIDE OF FLAT BED WITH BEDRAILS: A LITTLE
STANDING UP FROM CHAIR USING ARMS: A LOT
MOBILITY SCORE: 12
DRESSING REGULAR LOWER BODY CLOTHING: TOTAL
EATING MEALS: A LITTLE
TOILETING: TOTAL
TURNING FROM BACK TO SIDE WHILE IN FLAT BAD: TOTAL
MOBILITY SCORE: 7
TURNING FROM BACK TO SIDE WHILE IN FLAT BAD: TOTAL
EATING MEALS: TOTAL
TOILETING: TOTAL
CLIMB 3 TO 5 STEPS WITH RAILING: TOTAL

## 2024-12-17 ASSESSMENT — PAIN - FUNCTIONAL ASSESSMENT
PAIN_FUNCTIONAL_ASSESSMENT: 0-10
PAIN_FUNCTIONAL_ASSESSMENT: 0-10

## 2024-12-17 NOTE — PROGRESS NOTES
12/17/24 1240   Discharge Planning   Home or Post Acute Services Post acute facilities (Rehab/SNF/etc)   Type of Post Acute Facility Services Rehab;Skilled nursing   Expected Discharge Disposition SNF   Does the patient need discharge transport arranged? Yes   RoundTrip coordination needed? Yes   Has discharge transport been arranged? No     AMPAC scores are PT (7) OT (10) recommending continued therapy at moderate level/intensity. Pt/family SNF facility of choice [facility] confirms acceptance pending auth. MELODIE Manuel aware and notified team to initiate SNF auth which is currently pending, REF # [id number]. CT team will continue monitoring case for progression and DC planning.

## 2024-12-17 NOTE — CONSULTS
Inpatient consult to Neurology  Consult performed by: JOSE Earl-CNP  Consult ordered by: Jing Reaves PA-C        History Of Present Illness  Lito Osei is a 64 y.o. male presenting with fall at home. Pt. Was recently discharged from SNF after left hip fx requiring partial left hip arthroplasty. PMH of CKD stage IV, hx of CVA with residual left-sided deficits, HLD, HTN, recent hx of PE on Eliquis, and T2DM requiring insulin. Neurology was consulted for new onset LUE hypertonia?. Brother is very concerned about the patient's mentation, speech, and worsening weakness.  Pt. Seen and examined. He has a hx of right CVA in 2015 with some left sided weakness and aphasia. He was able to be rehabilitated to the point where he could continue to drive and live independently. He follows with Neurologist Dr. Sampson annually. In November, the pt. Had a fall at home which caused him to fx left hip which he had surgically repaired and was sent to acute rehab. He was again rehabilitated and discharged back to his residence. Per brother, the pt. Was walking and using a rollator at time of this discharge. The pt. Again had a fall last night when was getting out of bed to use the restroom and he fell because his param hose were too slippery on the floor. He was found by neighbors after yelling for an unknown amount of time and EMS was called. While hospitalized, it was discovered by nursing that the pt. Had increased left sided weakness, LKW is unknown. On exam, he has left sided hemiplegia. Pt. States before this hospitalization, he was able to move his left side ok and he was living in an apartment by himself.  Review of systems are negative unless otherwise specified in HPI.     Past Medical History  Past Medical History:   Diagnosis Date    CKD (chronic kidney disease)     CVA (cerebral vascular accident) (Multi) 11/21/2024    HLD (hyperlipidemia)     Hypertension     Stroke (Multi)     Type 2 diabetes mellitus with  kidney complication, with long-term current use of insulin      Surgical History  Past Surgical History:   Procedure Laterality Date    ANKLE SURGERY Right     PARTIAL HIP ARTHROPLASTY Left      Social History  Social History     Tobacco Use    Smoking status: Never   Vaping Use    Vaping status: Never Used   Substance Use Topics    Alcohol use: Never    Drug use: Never     Allergies  Patient has no known allergies.  Medications Prior to Admission   Medication Sig Dispense Refill Last Dose/Taking    atorvastatin (Lipitor) 80 mg tablet Take 1 tablet (80 mg) by mouth once daily at bedtime.   12/15/2024 Bedtime    dapagliflozin propanediol (Farxiga) 10 mg Take 1 tablet (10 mg) by mouth once every 24 hours.   12/15/2024 Evening    insulin asp prt-insulin aspart (NovoLOG Mix 70-30) 100 unit/mL (70-30) injection Inject under the skin 2 times a day. 4 units in AM and 12 units in PM   12/15/2024 Bedtime    acetaminophen (Tylenol) 325 mg tablet Take 2 tablets (650 mg) by mouth every 4 hours if needed for mild pain (1 - 3).   Unknown    alum-mag hydroxide-simeth (Mylanta) 200-200-20 mg/5 mL oral suspension Take 30 mL by mouth once daily as needed for indigestion or heartburn.   Unknown    apixaban (Eliquis) 5 mg tablet Take 2 tablets (10 mg) by mouth 2 times a day for 3 days, THEN 1 tablet (5 mg) 2 times a day. (Patient not taking: Reported on 12/16/2024)   Not Taking    guaiFENesin (Robitussin) 100 mg/5 mL syrup Take 30 mL by mouth 4 times a day as needed for cough.   Unknown    insulin lispro 100 unit/mL injection Inject 0-10 Units under the skin 4 times a day before meals. Take as directed per insulin instructions.   Unknown    lisinopril 10 mg tablet Take 1 tablet (10 mg) by mouth once daily.   Unknown    magnesium hydroxide (Milk of Magnesia) 400 mg/5 mL suspension Take 30 mL by mouth once daily as needed for constipation.   Unknown    tamsulosin (Flomax) 0.4 mg 24 hr capsule Take 1 capsule (0.4 mg) by mouth once daily.   " 12/13/2024       Review of Systems  Neurological Exam  Mental Status  Awake, alert and oriented to person, place and time. Speech is normal. Expressive aphasia present.    Cranial Nerves  CN II: Right visual acuity: Finger movement. Left visual acuity: Finger movement. Right normal visual field. Left normal visual field.  CN III, IV, VI: Abnormal extraocular movements: Unable to track. No nystagmus.   Right pupil: 3 mm. Round. Reactive to light. Reactive to accommodation.   Left pupil: 3 mm. Round. Reactive to light. Reactive to accommodation.  CN V:  Right: Facial sensation is normal.  Left: Facial sensation is normal on the left.  CN VII:  Right: There is no facial weakness.  Left: There is no facial weakness.  CN VIII:  Right: Hearing is normal.  Left: Hearing is normal.  CN IX, X:  Right: Palate is normal.  Left: Palate is normal.  CN XI:  Right: Trapezius strength is normal.  Left: Trapezius strength is weak.  CN XII: Tongue midline without atrophy or fasciculations.    Motor  Normal muscle bulk throughout. Normal muscle tone. Strength is 5/5 in all four extremities except as noted.  Left hemiparesis.    Sensory  Light touch is normal in upper and lower extremities.     Reflexes  Deep tendon reflexes are 2+ and symmetric in all four extremities.    Coordination  Right: Finger-to-nose normal.Left: Finger-to-nose abnormality:    Physical Exam  HENT:      Right Ear: Hearing normal.      Left Ear: Hearing normal.   Eyes:      Extraocular Movements: EOM normalNo nystagmus.   Neurological:      Deep Tendon Reflexes: Reflexes are normal and symmetric.   Psychiatric:         Speech: Speech normal.         Last Recorded Vitals  Blood pressure 127/88, pulse 102, temperature 36.2 °C (97.2 °F), temperature source Temporal, resp. rate 18, height 1.651 m (5' 5\"), weight 73 kg (161 lb), SpO2 95%.    Relevant Results  Results for orders placed or performed during the hospital encounter of 12/16/24 (from the past 24 hours) "   POCT GLUCOSE   Result Value Ref Range    POCT Glucose 241 (H) 74 - 99 mg/dL   POCT GLUCOSE   Result Value Ref Range    POCT Glucose 171 (H) 74 - 99 mg/dL   Magnesium   Result Value Ref Range    Magnesium 2.02 1.60 - 2.40 mg/dL   Basic Metabolic Panel   Result Value Ref Range    Glucose 251 (H) 74 - 99 mg/dL    Sodium 140 136 - 145 mmol/L    Potassium 3.9 3.5 - 5.3 mmol/L    Chloride 109 (H) 98 - 107 mmol/L    Bicarbonate 24 21 - 32 mmol/L    Anion Gap 11 10 - 20 mmol/L    Urea Nitrogen 32 (H) 6 - 23 mg/dL    Creatinine 1.80 (H) 0.50 - 1.30 mg/dL    eGFR 42 (L) >60 mL/min/1.73m*2    Calcium 8.3 (L) 8.6 - 10.3 mg/dL   CBC   Result Value Ref Range    WBC 7.1 4.4 - 11.3 x10*3/uL    nRBC 0.0 0.0 - 0.0 /100 WBCs    RBC 3.54 (L) 4.50 - 5.90 x10*6/uL    Hemoglobin 10.3 (L) 13.5 - 17.5 g/dL    Hematocrit 32.8 (L) 41.0 - 52.0 %    MCV 93 80 - 100 fL    MCH 29.1 26.0 - 34.0 pg    MCHC 31.4 (L) 32.0 - 36.0 g/dL    RDW 14.1 11.5 - 14.5 %    Platelets 226 150 - 450 x10*3/uL   TSH with reflex to Free T4 if abnormal   Result Value Ref Range    Thyroid Stimulating Hormone 1.13 0.44 - 3.98 mIU/L   Folate   Result Value Ref Range    Folate, Serum 9.9 >5.0 ng/mL   Vitamin B12   Result Value Ref Range    Vitamin B12 266 211 - 911 pg/mL   POCT GLUCOSE   Result Value Ref Range    POCT Glucose 222 (H) 74 - 99 mg/dL   ECG 12 lead    Result Date: 12/16/2024  Sinus tachycardia Otherwise normal ECG When compared with ECG of 16-DEC-2024 08:32, (unconfirmed) ST no longer depressed in Inferior leads ST no longer elevated in Lateral leads See ED provider note for full interpretation and clinical correlation Confirmed by Raiza Lackey ([id number]) on 12/16/2024 10:42:19 AM    ECG 12 lead    Result Date: 12/16/2024  Sinus tachycardia Possible Left atrial enlargement Borderline ECG When compared with ECG of 21-NOV-2024 18:31, ST now depressed in Inferior leads ST elevation now present in Lateral leads T wave inversion no longer evident in Lateral leads  See ED provider note for full interpretation and clinical correlation Confirmed by Raiza Lackey ([id number]) on 12/16/2024 10:41:58 AM    CT cervical spine wo IV contrast    Result Date: 12/16/2024  Interpreted By:  Génesis Garcia, STUDY: CT CERVICAL SPINE WO IV CONTRAST;  12/16/2024 8:18 am   INDICATION: Signs/Symptoms:fall.     COMPARISON: 11/17/2020   ACCESSION NUMBER(S): [id number]   ORDERING CLINICIAN: ROGE KINGSLEY   TECHNIQUE: Axial CT images of the cervical spine are obtained. Axial, coronal and sagittal reconstructions are provided for review.   FINDINGS:       Fractures: There is no evidence for an acute fracture of the cervical spine.   Vertebral Alignment: Reversal normal cervical lordosis is seen. Right dextroscoliosis is seen.   Craniocervical Junction: The odontoid process and craniocervical junction are intact.   Vertebrae/Disc Spaces: The cervical vertebral body heights are intact. Endplate sclerosis and spur formation is seen throughout the cervical spine. Uncovertebral joint hypertrophy is seen. Narrowing of C5-6 and C6-7 disc spaces noted   Prevertebral/Paraspinal Soft Tissues: The prevertebral and paraspinal soft tissues are unremarkable.   The vascular calcifications are identified.       No evidence for an acute fracture or subluxation of the cervical spine.   Reversal normal cervical lordosis and degenerative changes.   Dextroscoliosis.   MACRO: None   Signed by: Génesis Garcia 12/16/2024 8:56 AM Dictation workstation:   [id number]    CT head wo IV contrast    Result Date: 12/16/2024  Interpreted By:  Génesis Garcia, STUDY: CT HEAD WO IV CONTRAST;  12/16/2024 8:18 am   INDICATION: Signs/Symptoms:fall.   COMPARISON: 11/17/2020   ACCESSION NUMBER(S): [id number]   ORDERING CLINICIAN: ROGE KINGSLEY   TECHNIQUE: Axial images of 5 mm thickness were obtained through the head without intravenous contrast sagittal and coronal reconstructions were acquired.   FINDINGS: BRAIN PARENCHYMA: Prominent  periventricular and subcortical white matter changes are present.  No masses are seen. No mass effect.  No acute cortical infarct or mass effect is seen.   There is an area of encephalomalacia in the left occipital lobe, which has not significantly changed   HEMORRHAGE: There is no evidence for hemorrhage.   VENTRICLES and EXTRA-AXIAL SPACES: The ventricles, sulci and cisterns are prominent suggesting volume loss.   INTRACRANIAL VESSELS:  atherosclerotic calcification.   EXTRACRANIAL SOFT TISSUES: Within normal limits.   PARANASAL SINUSES/MASTOIDS: There is a small mucous retention cyst or polyp in the right maxillary sinus   CALVARIUM: No destructive lesion or depressed skull fracture.         Diffuse volume loss and periventricular white matter changes, which given patient's age likely represent small vessel ischemic disease.   No CT evidence of acute hemorrhage or acute cortical infarct.   No evidence of displaced skull fracture.   Redemonstration of area of encephalomalacia in the left occipital lobe, which may be related to a prior infarct, without significant change     MACRO: None   Signed by: Génesis Garcia 12/16/2024 8:51 AM Dictation workstation:   [id number]    XR hip left with pelvis when performed 2 or 3 views    Result Date: 12/16/2024  Interpreted By:  Kulwinder Pereira, STUDY: XR HIP LEFT WITH PELVIS WHEN PERFORMED 2 OR 3 VIEWS   INDICATION: Signs/Symptoms:fall.   COMPARISON: December 3   ACCESSION NUMBER(S): [id number]   ORDERING CLINICIAN: ROGE KINGSLEY   FINDINGS: Postsurgical changes of left hip hemiarthroplasty without fracture, malalignment, or periprosthetic lucency.       Satisfactory appearance left hip arthroplasty.   Signed by: Kulwinder Pereira 12/16/2024 8:43 AM Dictation workstation:   [id number]    XR chest 1 view    Result Date: 12/16/2024  Interpreted By:  Kulwinder Pereira, STUDY: XR CHEST 1 VIEW   INDICATION: Signs/Symptoms:Chest Pain.   COMPARISON: November 21   ACCESSION NUMBER(S):  [id number]   ORDERING CLINICIAN: ROGE KINGSLEY   FINDINGS: No consolidation, effusion, edema, or pneumothorax. Heart size within normal limits.       No evidence of acute intrathoracic abnormality.   Signed by: Kulwinder Pereira 12/16/2024 8:43 AM Dictation workstation:   [id number]   Scheduled medications   Medication Dose Route Frequency    atorvastatin  80 mg oral Nightly    dapagliflozin propanediol  10 mg oral Daily    insulin glargine  12 Units subcutaneous Nightly    insulin lispro  0-10 Units subcutaneous TID AC    polyethylene glycol  17 g oral Daily    tamsulosin  0.4 mg oral Daily     PRN medications   Medication    acetaminophen    Or    acetaminophen    Or    acetaminophen    dextrose    dextrose    glucagon    glucagon    ondansetron    Or    ondansetron                                         I have personally reviewed the following imaging results ECG 12 lead    Result Date: 12/16/2024  Sinus tachycardia Otherwise normal ECG When compared with ECG of 16-DEC-2024 08:32, (unconfirmed) ST no longer depressed in Inferior leads ST no longer elevated in Lateral leads See ED provider note for full interpretation and clinical correlation Confirmed by Raiza Lackey ([id number]) on 12/16/2024 10:42:19 AM    ECG 12 lead    Result Date: 12/16/2024  Sinus tachycardia Possible Left atrial enlargement Borderline ECG When compared with ECG of 21-NOV-2024 18:31, ST now depressed in Inferior leads ST elevation now present in Lateral leads T wave inversion no longer evident in Lateral leads See ED provider note for full interpretation and clinical correlation Confirmed by Raiza Lackey ([id number]) on 12/16/2024 10:41:58 AM    CT cervical spine wo IV contrast    Result Date: 12/16/2024  Interpreted By:  Génesis Garcia, STUDY: CT CERVICAL SPINE WO IV CONTRAST;  12/16/2024 8:18 am   INDICATION: Signs/Symptoms:fall.     COMPARISON: 11/17/2020   ACCESSION NUMBER(S): [id number]   ORDERING CLINICIAN: ROGE KINGSLEY   TECHNIQUE: Axial CT  images of the cervical spine are obtained. Axial, coronal and sagittal reconstructions are provided for review.   FINDINGS:       Fractures: There is no evidence for an acute fracture of the cervical spine.   Vertebral Alignment: Reversal normal cervical lordosis is seen. Right dextroscoliosis is seen.   Craniocervical Junction: The odontoid process and craniocervical junction are intact.   Vertebrae/Disc Spaces: The cervical vertebral body heights are intact. Endplate sclerosis and spur formation is seen throughout the cervical spine. Uncovertebral joint hypertrophy is seen. Narrowing of C5-6 and C6-7 disc spaces noted   Prevertebral/Paraspinal Soft Tissues: The prevertebral and paraspinal soft tissues are unremarkable.   The vascular calcifications are identified.       No evidence for an acute fracture or subluxation of the cervical spine.   Reversal normal cervical lordosis and degenerative changes.   Dextroscoliosis.   MACRO: None   Signed by: Génesis Garcia 12/16/2024 8:56 AM Dictation workstation:   [id number]    CT head wo IV contrast    Result Date: 12/16/2024  Interpreted By:  Génesis Garcia, STUDY: CT HEAD WO IV CONTRAST;  12/16/2024 8:18 am   INDICATION: Signs/Symptoms:fall.   COMPARISON: 11/17/2020   ACCESSION NUMBER(S): [id number]   ORDERING CLINICIAN: ROGE KINGSLEY   TECHNIQUE: Axial images of 5 mm thickness were obtained through the head without intravenous contrast sagittal and coronal reconstructions were acquired.   FINDINGS: BRAIN PARENCHYMA: Prominent periventricular and subcortical white matter changes are present.  No masses are seen. No mass effect.  No acute cortical infarct or mass effect is seen.   There is an area of encephalomalacia in the left occipital lobe, which has not significantly changed   HEMORRHAGE: There is no evidence for hemorrhage.   VENTRICLES and EXTRA-AXIAL SPACES: The ventricles, sulci and cisterns are prominent suggesting volume loss.   INTRACRANIAL VESSELS:   atherosclerotic calcification.   EXTRACRANIAL SOFT TISSUES: Within normal limits.   PARANASAL SINUSES/MASTOIDS: There is a small mucous retention cyst or polyp in the right maxillary sinus   CALVARIUM: No destructive lesion or depressed skull fracture.         Diffuse volume loss and periventricular white matter changes, which given patient's age likely represent small vessel ischemic disease.   No CT evidence of acute hemorrhage or acute cortical infarct.   No evidence of displaced skull fracture.   Redemonstration of area of encephalomalacia in the left occipital lobe, which may be related to a prior infarct, without significant change     MACRO: None   Signed by: Génesis Garcia 12/16/2024 8:51 AM Dictation workstation:   [id number]    XR hip left with pelvis when performed 2 or 3 views    Result Date: 12/16/2024  Interpreted By:  Kulwinder Pereira, STUDY: XR HIP LEFT WITH PELVIS WHEN PERFORMED 2 OR 3 VIEWS   INDICATION: Signs/Symptoms:fall.   COMPARISON: December 3   ACCESSION NUMBER(S): [id number]   ORDERING CLINICIAN: ROGE KINGSLEY   FINDINGS: Postsurgical changes of left hip hemiarthroplasty without fracture, malalignment, or periprosthetic lucency.       Satisfactory appearance left hip arthroplasty.   Signed by: Kulwinder Pereira 12/16/2024 8:43 AM Dictation workstation:   [id number]    XR chest 1 view    Result Date: 12/16/2024  Interpreted By:  Kulwinder Pereira, STUDY: XR CHEST 1 VIEW   INDICATION: Signs/Symptoms:Chest Pain.   COMPARISON: November 21   ACCESSION NUMBER(S): [id number]   ORDERING CLINICIAN: ROGE KINGSLEY   FINDINGS: No consolidation, effusion, edema, or pneumothorax. Heart size within normal limits.       No evidence of acute intrathoracic abnormality.   Signed by: Kulwinder Pereira 12/16/2024 8:43 AM Dictation workstation:   [id number]    XR hip left with pelvis when performed 2 or 3 views    Result Date: 12/3/2024  Interpreted By:  Nicola Webster, STUDY: XR HIP LEFT WITH PELVIS WHEN PERFORMED 2 OR 3  VIEWS;  ;  12/3/2024 9:40 am   INDICATION: Signs/Symptoms:pain.   ACCESSION NUMBER(S): [id number]   ORDERING CLINICIAN: CECILIA PLATA   FINDINGS: No acute fracture or dislocations of the hip. Intact left hip hemiarthroplasty for fracture without evidence of loosening or change in hardware.. Concentric joint space. No effusion or soft tissue swelling.         Signed by: Cecilia Plata 12/3/2024 5:23 PM Dictation workstation:   [id number]    ECG 12 lead    Result Date: 11/29/2024  Sinus tachycardia Minimal voltage criteria for LVH, may be normal variant Borderline ECG When compared with ECG of 21-OCT-2015 18:06, No significant change was found Confirmed by ROSENDO Burch ([id number]) on 11/29/2024 9:37:12 AM    NM Lung perfusion with spect    Result Date: 11/22/2024  Interpreted By:  Reema Quiñones and Hanreck James STUDY: NM LUNG PERFUSION WITH SPECT;  11/22/2024 1:45 pm   INDICATION: Signs/Symptoms:persistent tachycardia, recent hip arthroplasty.   COMPARISON: 11/21/2024 chest radiograph   ACCESSION NUMBER(S): [id number]   ORDERING CLINICIAN: TOI HALE   TECHNIQUE: DIVISION OF NUCLEAR MEDICINE PERFUSION LUNG SCANS   Multiple perfusion images of the lungs were acquired after the intravenous administration of 4.3 mCi of Tc-99m macroaggregated albumin (MAA). In addition, SPECT/CT of the chest was performed.   FINDINGS: Perfusion images of both lungs demonstrate mild heterogeneity throughout the lung fields bilaterally. There are bilateral wedge-shaped segmental and subsegmental perfusion defects most conspicuous in the lateral right upper lobe, superior left lower lobe, posterior left lower lobe and lateral left upper lobe.       1. Bilateral wedge-shaped segmental and subsegmental perfusion defects as described above suggestive of acute pulmonary embolism (high probability). 2. An epic secure chat message was sent to Den Potts MD at 20:06 p.m. on 11/22/2024 with message acknowledgement.   I personally reviewed the  images/study and I agree with the resident Sixto Diaz's findings as stated. This study was interpreted at [facility], [place], [place].   MACRO: Critical Finding:  See findings. Notification was initiated on 11/22/2024 at 1:57 pm by  Sixto Diaz.  (**-YCF-**) Instructions:       Signed by: Reema Quiñones 11/22/2024 2:11 PM Dictation workstation:   [id number]    Lower extremity venous duplex left    Result Date: 11/22/2024            [facility] [address] [place] [address]     Tel [phone] Fax [phone]  Vascular Lab Report  VASC US LOWER EXTREMITY VENOUS DUPLEX LEFT Patient Name:      THA Scales Physician:  [id number] Cuca Acuña MD, RPVI Study Date:        11/21/2024           Ordering Provider:  [id number] AMA HENSLEY MRN/PID:           [id number]             Fellow: Accession#:        [id number]         Technologist:       Jessica Quarles RVT Date of Birth/Age: 1960 / 64 years Technologist 2: Gender:            M                    Encounter#:         [id number] Admission Status:  Emergency            Location Performed: [facility]  Diagnosis/ICD: Other specified soft tissue disorders-M79.89 CPT Codes:     30390 Peripheral venous duplex scan for DVT Limited  Pertinent History: Immobility.  CONCLUSIONS: Right Lower Venous: The right common femoral vein demonstrates normal spontaneous and respirophasic flow. Left Lower Venous: No evidence of acute deep vein thrombus visualized in the left lower extremity. Poor visualization of peroneal veins in grayscale. Peroneal veins visualized in segments with color Doppler. Additional Findings: Technically difficult exam due to body habitus.  Imaging & Doppler Findings:  Right        Flow CFV   Spontaneous/Phasic  Left                  Compress Thrombus        Flow Distal External Iliac   Yes      None    Spontaneous/Phasic CFV                     Yes      None   Spontaneous/Phasic PFV                     Yes      None FV Proximal             Yes      None   Spontaneous/Phasic FV Mid                  Yes      None FV Distal               Yes      None Popliteal               Yes      None   Spontaneous/Phasic Peroneal                Yes      None PTV                     Yes      None  [id number] Cuca Acuña MD, RPVI Electronically signed by [id number] JA Boyd MD on 11/22/2024 at 9:14:44 AM  ** Final **     XR chest 1 view    Result Date: 11/21/2024  Interpreted By:  Bro Wilkerson, STUDY: XR CHEST 1 VIEW;  11/21/2024 6:57 pm   INDICATION: Signs/Symptoms:check for pneumonia, cause for dka.     COMPARISON: 10/21/2015   ACCESSION NUMBER(S): [id number]   ORDERING CLINICIAN: HAFSA EATON   FINDINGS:     The cardiomediastinal silhouette and pulmonary vasculature are within normal limits. Mild left basilar atelectasis.   No consolidation, pleural effusion or pneumothorax.         No acute cardiopulmonary process.   Mild left basilar atelectasis.   MACRO: None.   Signed by: Bro Wilkerson 11/21/2024 7:14 PM Dictation workstation:   [id number]    XR elbow left 3+ views    Result Date: 11/19/2024  Interpreted By:  Greg Faustin, STUDY: XR ELBOW LEFT 3+ VIEWS;  11/19/2024 10:14 am   INDICATION: Signs/Symptoms:c/o left elbow pain, left elbow swollen - h/o recent fall onto Lt side.     COMPARISON: None.   ACCESSION NUMBER(S): [id number]   ORDERING CLINICIAN: NINA SORIA   TECHNIQUE: Left elbow series performed with 5 images.   FINDINGS: Alignment appears intact. Mild marginal spurring is seen throughout the elbow along with tiny enthesophyte along the medial humeral condyle. No appreciable acute fracture. No subluxation. Soft tissue vascular calcifications are present. IV tubing is seen in the antecubital fossa region.       No acute fracture or subluxation.   MACRO: None.   Signed by: Greg Faustin  11/19/2024 3:20 PM Dictation workstation:   [id number]    CT hip left wo IV contrast    Result Date: 11/17/2024  STUDY: CT Extremity; Completed Time: 11/17/2024 11:22 AM INDICATION: Left hip pain.  Evaluate for fracture. COMPARISON: XR left femur & XR pelvis 11/17/2024. ACCESSION NUMBER(S): [id number] ORDERING CLINICIAN: CHERELLE FUENTES TECHNIQUE:  Thin section axial images were obtained through the left hip without intravenous contrast.  Orthogonal reconstructed images were obtained and reviewed.  Automated mA/kV exposure control was utilized and patient examination was performed in strict accordance with principles of ALARA. FINDINGS:  Examination of the bony structures demonstrates a displaced left femoral neck fracture (203-45) sees.  There is no evidence of an associated dislocation.  There is a mild to moderate arthrosis of the left hip noted.  There is mild bony demineralization. The soft tissues demonstrate a possible small left hip effusion.  The muscular/soft tissue planes are well-maintained.  The visualized pelvic structures demonstrate no acute abnormalities.  Vascular calcifications are visualized..    1.  Displaced fracture involving the left femoral neck as described above.  No evidence of an associated dislocation. 2.  Mild to moderate arthrosis of the left hip joint. 3.  Possible small left hip joint effusion. Signed by Nicola Mortensen MD    CT cervical spine wo IV contrast    Result Date: 11/17/2024  Interpreted By:  Alma Cheek, STUDY: CT CERVICAL SPINE WO IV CONTRAST;  11/17/2024 11:20 am   INDICATION: Signs/Symptoms:fall pain.     COMPARISON: None.   ACCESSION NUMBER(S): [id number]   ORDERING CLINICIAN: CHERELLE FUENTES   TECHNIQUE: Axial CT images of the cervical spine are obtained. Axial, coronal and sagittal reconstructions are provided for review.   FINDINGS: There is curvature of the cervical spine with convexity to the right consistent with torticollis.   There is degenerative  disc disease. There is moderate anterior and moderate posterior bony spondylosis at C4-5 through C6-7.   Fractures: There is no evidence for an acute fracture of the cervical spine.   Vertebral Alignment: Within normal limits.   Craniocervical Junction: The odontoid process and craniocervical junction are intact.   Vertebrae/Disc Spaces:  The cervical vertebral body heights are intact and the disc spaces are preserved.   Prevertebral/Paraspinal Soft Tissues: The prevertebral and paraspinal soft tissues are unremarkable.         No evidence for an acute fracture or subluxation of the cervical spine. Torticollis.   MACRO: None   Signed by: Alma Cheek 11/17/2024 11:31 AM Dictation workstation:   [id number]    CT head wo IV contrast    Result Date: 11/17/2024  Interpreted By:  Alma Cheek, STUDY: CT HEAD WO IV CONTRAST;  11/17/2024 11:20 am   INDICATION: Signs/Symptoms:fall pain.   COMPARISON: 10/21/2015   ACCESSION NUMBER(S): [id number]   ORDERING CLINICIAN: CHERELLE FUENTES   TECHNIQUE: Examination was performed in the axial plane using soft tissue and bone algorithm.   FINDINGS: INTRACRANIAL: There is prominence of the ventricular system and cerebral sulci consistent with cerebral atrophy. There are periventricular hypodensities consistent with  marked small vessel disease. No mass or mass effect is identified. There is no hemorrhage or subdural fluid collection. There is no acute infarct. There is a remote left occipital infarct. There is a remote infarct along the right cerebral convexity in the vascular territory of the anterior cerebral artery. There is no fracture of the calvarium.   EXTRACRANIAL: Visualized paranasal sinuses and mastoids are clear.       No acute intracranial pathology.   MACRO: None   Signed by: Alma Cheek 11/17/2024 11:28 AM Dictation workstation:   [id number]    XR pelvis 1-2 views    Result Date: 11/17/2024  STUDY: Pelvis Radiographs; 11/17/2024 at 11:13am INDICATION:  Pelvis pain post fall. COMPARISON: None Available. ACCESSION NUMBER(S): [id number] ORDERING CLINICIAN: CHERELLE FUENTES TECHNIQUE:  One view(s) of the pelvis. FINDINGS:  The pelvic ring is intact.  There is a displaced fracture involving the left femoral neck.  No evidence of associated dislocation.  There is a moderate arthrosis noted involving the hips bilaterally..      1.  Displaced fracture involving the left femoral neck. 2.  Moderate arthrosis of the hips bilaterally.. Signed by Nicola Mortensen MD    XR femur left 2+ views    Result Date: 11/17/2024  STUDY: Femur Radiographs; 11/17/2024, 1113 INDICATION: Fall. COMPARISON: None Available. ACCESSION NUMBER(S): [id number] ORDERING CLINICIAN: CHERELLE FUENTES TECHNIQUE:  2 view(s) (4 images) of the left femur. FINDINGS:  Lucency of the left femoral neck.  Left femoral neck is suboptimally visualized due to overlapping anatomy/internal rotation.  The diaphysis of the left femur is intact and there is no dislocation.    Left femoral neck fracture.  Consider CT.  Fracture suboptimally visualized. Signed by Bandar Virk DO  .      Assessment/Plan   Assessment & Plan  Fall at home, initial encounter      Impression:  Acute left sided weakness, LKW unknown   Vascular risk factors of HTN, HLD, DM2  Recent PE was supposed to be taking Eliquis, but unsure if he was taking  Hx of PFO, last TTE in 2015  Recommendations:   Monitor neuro status   Permissive hypertension first 24 to 48* after stroke   Full stroke work up including MRI brain, vessel imaging, TTE, HgbA1c, TSH w/reflex, lipid panel  Address vascular risk factors  PT/OT/ST eval and treat             I spent 60 minutes in the professional and overall care of this patient.      Selena Alvarado, APRN-CNP

## 2024-12-17 NOTE — CARE PLAN
The patient's goals for the shift include  remain free from falls    The clinical goals for the shift include remain free from falls    Over the shift, the patient made progress toward the following goals.       Problem: Skin  Goal: Decreased wound size/increased tissue granulation at next dressing change  12/17/2024 0345 by Dariela Velasquez RN  Outcome: Progressing  12/17/2024 0345 by Dariela Velasquez RN  Outcome: Progressing  Goal: Participates in plan/prevention/treatment measures  12/17/2024 0345 by Dariela Velasquez RN  Outcome: Progressing  12/17/2024 0345 by Dariela Velasquez RN  Outcome: Progressing  Goal: Prevent/manage excess moisture  12/17/2024 0345 by Dariela Velasquez RN  Outcome: Progressing  12/17/2024 0345 by Dariela Velasquez RN  Outcome: Progressing  Goal: Prevent/minimize sheer/friction injuries  12/17/2024 0345 by Dariela Velasquez RN  Outcome: Progressing  12/17/2024 0345 by Dariela Velasquez RN  Outcome: Progressing  Goal: Promote/optimize nutrition  12/17/2024 0345 by Dariela Velasquez RN  Outcome: Progressing  12/17/2024 0345 by Dariela Velasquez RN  Outcome: Progressing  Goal: Promote skin healing  12/17/2024 0345 by Dariela Velasquez RN  Outcome: Progressing  12/17/2024 0345 by Dariela Velasquez RN  Outcome: Progressing     Problem: Pain - Adult  Goal: Verbalizes/displays adequate comfort level or baseline comfort level  Outcome: Progressing     Problem: Safety - Adult  Goal: Free from fall injury  Outcome: Progressing     Problem: Discharge Planning  Goal: Discharge to home or other facility with appropriate resources  Outcome: Progressing     Problem: Chronic Conditions and Co-morbidities  Goal: Patient's chronic conditions and co-morbidity symptoms are monitored and maintained or improved  Outcome: Progressing

## 2024-12-17 NOTE — TELEPHONE ENCOUNTER
FYI     Brother Hector Leigh called to make sure brother was still a patient of Dr. Mallory. He is currently in [facility] with another possible Stroke.

## 2024-12-17 NOTE — CARE PLAN
Problem: OT Goals  Goal: pt will complete grooming SBA  Outcome: Progressing  Goal: Pt will transfer to bed, chair, toilet with min a x 2  Outcome: Progressing  Goal: Pt will follow one step directions with >75% accuracy  Outcome: Progressing  Goal: Pt will demo good dyn sitting balance with aDLS  Outcome: Progressing  Goal: Pt will participate in neuro re-ed activities with L UE to facilitate normalized tone and functional use of L UE  Description: INTERVENTIONS:  Outcome: Progressing

## 2024-12-17 NOTE — PROGRESS NOTES
Physical Therapy                 Therapy Communication Note    Patient Name: Lito Osei  MRN: [id number]  Department: [facility]  Room: [address] [place], [place] [address]  Today's Date: 12/17/2024     Discipline: Physical Therapy    PT Missed Visit: Yes     Missed Visit Reason: Missed Visit Reason: Patient in a medical procedure (pt off the floor for ultrasound , reattempt as able)    Missed Time: Attempt 12:40    Comment:

## 2024-12-17 NOTE — CARE PLAN
The clinical goals for the shift include saftey    Problem: Skin  Goal: Decreased wound size/increased tissue granulation at next dressing change  Outcome: Progressing  Goal: Participates in plan/prevention/treatment measures  Outcome: Progressing  Goal: Prevent/manage excess moisture  Outcome: Progressing  Goal: Prevent/minimize sheer/friction injuries  12/17/2024 1647 by Selene Vora RN  Outcome: Progressing  12/17/2024 1647 by Selene Vora RN  Flowsheets (Taken 12/17/2024 1647)  Prevent/minimize sheer/friction injuries:   Use pull sheet   Turn/reposition every 2 hours/use positioning/transfer devices  Goal: Promote/optimize nutrition  Outcome: Progressing  Goal: Promote skin healing  Outcome: Progressing     Problem: Pain - Adult  Goal: Verbalizes/displays adequate comfort level or baseline comfort level  Outcome: Progressing     Problem: Safety - Adult  Goal: Free from fall injury  Outcome: Progressing     Problem: Discharge Planning  Goal: Discharge to home or other facility with appropriate resources  Outcome: Progressing     Problem: Chronic Conditions and Co-morbidities  Goal: Patient's chronic conditions and co-morbidity symptoms are monitored and maintained or improved  Outcome: Progressing     Problem: Diabetes  Goal: Achieve decreasing blood glucose levels by end of shift  Outcome: Progressing  Goal: Increase stability of blood glucose readings by end of shift  Outcome: Progressing  Goal: Decrease in ketones present in urine by end of shift  Outcome: Progressing  Goal: Maintain electrolyte levels within acceptable range throughout shift  Outcome: Progressing  Goal: Maintain glucose levels >70mg/dl to <250mg/dl throughout shift  Outcome: Progressing  Goal: No changes in neurological exam by end of shift  Outcome: Progressing  Goal: Learn about and adhere to nutrition recommendations by end of shift  Outcome: Progressing  Goal: Vital signs within normal range for age by end of shift  Outcome:  Progressing  Goal: Increase self care and/or family involovement by end of shift  Outcome: Progressing  Goal: Receive DSME education by end of shift  Outcome: Progressing     Problem: Nutrition  Goal: Oral intake greater 75%  Outcome: Progressing  Goal: Consume prescribed supplement  Outcome: Progressing  Goal: Adequate PO fluid intake  Outcome: Progressing  Goal: BG  mg/dL  Outcome: Progressing  Goal: Lab values WNL  Outcome: Progressing  Goal: Electrolytes WNL  Outcome: Progressing  Goal: Maintain stable weight  Outcome: Progressing

## 2024-12-17 NOTE — PROGRESS NOTES
Occupational Therapy                 Therapy Communication Note    Patient Name: Lito Osei  MRN: [id number]  Department: [facility]  Room: [facility]23-  Today's Date: 12/17/2024     Discipline: Occupational Therapy      Missed Visit Reason: Patient in a medical procedure: off floor for MRI; will reattempt as appropriate.    Missed Time: Attempt

## 2024-12-17 NOTE — PROGRESS NOTES
"Daily Progress Note    Lito Osei is a 64 y.o. male on day 0 of admission presenting with Fall at home, initial encounter.    Subjective   Patient seen resting company in bed.  Patient with history of stroke with left-sided residual.  Appreciate neurology recommendations.  Patient denies shortness of breath or chest pain.       Objective     Physical Exam    Physical Exam  Constitutional:       Appearance: Normal appearance.   HENT:      Head: Normocephalic.      Mouth/Throat:      Mouth: Mucous membranes are moist.   Eyes:      Pupils: Pupils are equal, round, and reactive to light.   Cardiovascular:      Rate and Rhythm: Normal rate and regular rhythm.      Heart sounds: Normal heart sounds, S1 normal and S2 normal.   Pulmonary:      Effort: Pulmonary effort is normal.      Breath sounds: Normal breath sounds.   Abdominal:      General: Bowel sounds are normal.      Palpations: Abdomen is soft.   Musculoskeletal:         General: Normal range of motion.      Cervical back: Neck supple.   Skin:     General: Skin is warm.   Neurological:      Mental Status: He is alert. Mental status is at baseline.      Motor: Weakness present.      Comments: LUE Hemiplegia   Psychiatric:         Mood and Affect: Mood normal.         Behavior: Behavior normal.         Cognition and Memory: Cognition is impaired. Memory is impaired.      Comments: Speech: slow to respond         Last Recorded Vitals  Blood pressure 127/88, pulse 102, temperature 36.2 °C (97.2 °F), temperature source Temporal, resp. rate 18, height 1.651 m (5' 5\"), weight 73 kg (161 lb), SpO2 95%.  Intake/Output last 3 Shifts:  I/O last 3 completed shifts:  In: 1040 (14.2 mL/kg) [P.O.:1040]  Out: 250 (3.4 mL/kg) [Urine:250 (0.1 mL/kg/hr)]  Weight: 73 kg     Medications  Scheduled medications  atorvastatin, 80 mg, oral, Nightly  dapagliflozin propanediol, 10 mg, oral, Daily  insulin glargine, 12 Units, subcutaneous, Nightly  insulin lispro, 0-10 Units, " subcutaneous, TID AC  polyethylene glycol, 17 g, oral, Daily  tamsulosin, 0.4 mg, oral, Daily      Continuous medications     PRN medications  PRN medications: acetaminophen **OR** acetaminophen **OR** acetaminophen, dextrose, dextrose, glucagon, glucagon, ondansetron **OR** ondansetron    Labs  CBC:   Results from last 7 days   Lab Units 12/17/24  0621 12/16/24  0849   WBC AUTO x10*3/uL 7.1 8.8   RBC AUTO x10*6/uL 3.54* 3.64*   HEMOGLOBIN g/dL 10.3* 10.7*   HEMATOCRIT % 32.8* 33.7*   MCV fL 93 93   MCH pg 29.1 29.4   MCHC g/dL 31.4* 31.8*   RDW % 14.1 13.9   PLATELETS AUTO x10*3/uL 226 221     CMP:    Results from last 7 days   Lab Units 12/17/24  0621 12/16/24  0849   SODIUM mmol/L 140 139   POTASSIUM mmol/L 3.9 4.3   CHLORIDE mmol/L 109* 108*   CO2 mmol/L 24 20*   BUN mg/dL 32* 31*   CREATININE mg/dL 1.80* 1.81*   GLUCOSE mg/dL 251* 328*   PROTEIN TOTAL g/dL  --  7.0   CALCIUM mg/dL 8.3* 8.9   BILIRUBIN TOTAL mg/dL  --  0.5   ALK PHOS U/L  --  106   AST U/L  --  20   ALT U/L  --  13     BMP:    Results from last 7 days   Lab Units 12/17/24  0621 12/16/24  0849   SODIUM mmol/L 140 139   POTASSIUM mmol/L 3.9 4.3   CHLORIDE mmol/L 109* 108*   CO2 mmol/L 24 20*   BUN mg/dL 32* 31*   CREATININE mg/dL 1.80* 1.81*   CALCIUM mg/dL 8.3* 8.9   GLUCOSE mg/dL 251* 328*     Magnesium:  Results from last 7 days   Lab Units 12/17/24  0621 12/16/24  0849   MAGNESIUM mg/dL 2.02 1.90     Troponin:    Results from last 7 days   Lab Units 12/16/24  0938 12/16/24  0849   TROPHS ng/L 57* 60*     BNP:   Results from last 7 days   Lab Units 12/16/24  0849   BNP pg/mL 163*     Lipid Panel:  Results from last 7 days   Lab Units 12/16/24  0848   INR  1.0   PROTIME seconds 11.4        Nutrition    Malnutrition Diagnosis Status: New  Malnutrition Diagnosis: Severe malnutrition related to acute disease or injury  As Evidenced by: >5% wt loss in 1 month, moderate muscle loss  I agree with the dietitian's malnutrition  diagnosis.          Relevant Results  Results from last 7 days   Lab Units 12/17/24  1204 12/17/24  0732 12/17/24  0621 12/16/24  2122 12/16/24  1655 12/16/24  0849   POCT GLUCOSE mg/dL 131* 222*  --  171* 241*  --    GLUCOSE mg/dL  --   --  251*  --   --  328*     Lab Results   Component Value Date    HGBA1C 8.2 (H) 11/17/2024        Assessment/Plan    Mechanical fall  CKD stage IV  History of CVA with left-sided residual  HLD/HTN  Recent PE on Eliquis  T2DM    -Imaging unremarkable for acute fracture/dislocation, no acute intracranial abnormality  -UA unremarkable  -Orthostatics every shift  -Falls precautions  -Nutrition consult  -Speech therapy  -Daily CBC, BMP, Mg  -Troponin likely elevated 2/2 to CKD vs trauma  -Baseline Cr ~1.8, stable   -A1c 8.2%  -Lantus 12 units at at bedtime-diabetic diet, Accu-Cheks with SSI  -Resume home meds  -Consult neurology full stroke workup  -Pending MRI/MRA/TTE  -If PFO will consult cardiology  -PT/OT evaluation  -TCC for discharge planning    DVTp: Will resume Eliquis after MRI    PLAN: Pre-CERT for placement    JOSE Friend-CNP    Plan of care was discussed extensively with patient.  Patient verbalized understanding through teach back method.  All question and concerns addressed upon examination.    Of note, this documentation is completed using the Dragon Dictation system (voice recognition software). There may be spelling and/or grammatical errors that were not corrected prior to final submission.

## 2024-12-17 NOTE — PROGRESS NOTES
Social Work Note Per [facility], they can accept pt for admission.  Requested that direct auth team apply for precert.  OSCAR Gonzalez

## 2024-12-17 NOTE — CONSULTS
"Nutrition Initial Assessment:   Nutrition Assessment    Reason for Assessment: Admission nursing screening    Patient is a 64 y.o. male presenting with fall at home  Past medical history of CKD stage IV, hx of CVA with residual left-sided deficits, HLD, HTN, recent hx of PE on Eliquis, and T2DM requiring insulin presenting via EMS for a fall at home overnight.     Nutrition History:  Energy Intake:  (no recorded meal intakes, RN reports pt refused breakfast)  Food and Nutrient History: RDN consult for assessment and recommendation. Met with pt who reports he has been eating ok, states he is following diabetic diet at home. Per RN pt lives alone and prepares own meals, states pt has been in and out of hospitals and nursing home for ~ the past month or so. Pt is unsure of recent wt changes, states  lbs. Pt denies GI symptoms at this time. Per RN, pt coughing when taking medication, has requested SLP eval. Discussed trial of Magic Cup with pt, he states he does not eat food that will raise his blood sugar - states he avoids pasta, breads. Will send Low Sugar Magic Cup with meals. Encouraged increased po intake as able/tolerated.  Vitamin/Herbal Supplement Use: none  Food Allergies/Intolerances:  None  GI Symptoms: None  Oral Problems:  nursing notes pt coughing with medication       Anthropometrics:  Height: 165.1 cm (5' 5\")   Weight: 73 kg (161 lb)   BMI (Calculated): 26.79  IBW/kg (Dietitian Calculated): 61.8 kg  Percent of IBW: 118 %       Weight History:   Wt Readings from Last 10 Encounters:   12/16/24 73 kg (161 lb)   11/21/24 77.1 kg (170 lb)   11/21/24 77.1 kg (170 lb)   11/17/24 77.1 kg (170 lb)      Weight Change %:  Weight History / % Weight Change: per chart review, pt with 9 lb, 5.3% significant wt loss in 1 month  Significant Weight Loss: Yes  Interpretation of Weight Loss: >5% in 1 month    Nutrition Focused Physical Exam Findings:    Subcutaneous Fat Loss:   Orbital Fat Pads: Well nourished " (slightly bulging fat pads)  Buccal Fat Pads: Well nourished (full, rounded cheeks)  Triceps: Well nourished (ample fat tissue)  Muscle Wasting:  Temporalis: Mild-Moderate (slight depression)  Pectoralis (Clavicular Region): Mild-Moderate (some protrusion of clavicle)  Deltoid/Trapezius: Well nourished (rounded appearance at arm, shoulder, neck)  Interosseous: Well nourished (muscle bulges)  Edema:  Edema: +1 trace  Edema Location: BLE  Physical Findings:  Skin: Negative    Nutrition Significant Labs:  BMP Trend:   Results from last 7 days   Lab Units 12/17/24  0621 12/16/24  0849   GLUCOSE mg/dL 251* 328*   CALCIUM mg/dL 8.3* 8.9   SODIUM mmol/L 140 139   POTASSIUM mmol/L 3.9 4.3   CO2 mmol/L 24 20*   CHLORIDE mmol/L 109* 108*   BUN mg/dL 32* 31*   CREATININE mg/dL 1.80* 1.81*    , A1C:  Lab Results   Component Value Date    HGBA1C 8.2 (H) 11/17/2024   , BG POCT trend:   Results from last 7 days   Lab Units 12/17/24  0732 12/16/24  2122 12/16/24  1655   POCT GLUCOSE mg/dL 222* 171* 241*        Nutrition Specific Medications:  Scheduled medications  atorvastatin, 80 mg, oral, Nightly  insulin glargine, 12 Units, subcutaneous, Nightly  insulin lispro, 0-10 Units, subcutaneous, TID AC  polyethylene glycol, 17 g, oral, Daily    I/O:    ;      Dietary Orders (From admission, onward)       Start     Ordered    12/17/24 0834  Adult diet Consistent Carb; CCD 75 gm/meal  Diet effective now        Question Answer Comment   Diet type Consistent Carb    Carb diet selection: CCD 75 gm/meal        12/17/24 0834    12/16/24 1717  May Participate in Room Service  ( ROOM SERVICE MAY PARTICIPATE)  Once        Question:  .  Answer:  Yes    12/16/24 1716                     Estimated Needs:   Total Energy Estimated Needs (kCal): 2190 kCal  Method for Estimating Needs: 30 kcal/kg ABW  Total Protein Estimated Needs (g): 88 g  Method for Estimating Needs: 1.2 g/kg ABW  Total Fluid Estimated Needs (mL): 2190 mL  Method for Estimating  Needs: 1 ml/kcal or per MD        Nutrition Diagnosis   Malnutrition Diagnosis  Patient has Malnutrition Diagnosis: Yes  Diagnosis Status: New  Malnutrition Diagnosis: Severe malnutrition related to acute disease or injury  As Evidenced by: >5% wt loss in 1 month, moderate muscle loss            Nutrition Interventions/Recommendations         Nutrition Prescription:  Individualized Nutrition Prescription Provided for : 75 g Carb Control diet - texture/consistency per SLP. Trial of reduced sugar Magic Cup with meals        Nutrition Interventions:   Interventions: Meals and snacks, Medical food supplement  Meals and Snacks: Carbohydrate-modified diet, General healthful diet  Goal: Consumes 3 meals per day  Medical Food Supplement: Commercial beverage  Goal: Magic Cup Reduced Sugar TID (provides 280 kcal and 9 g protein per serving).    Collaboration and Referral of Nutrition Care: Collaboration by nutrition professional with other providers  Goal: RN    Nutrition Education:   No needs at this time       Nutrition Monitoring and Evaluation   Food/Nutrient Related History Monitoring  Monitoring and Evaluation Plan: Energy intake, Amount of food, Fluid intake  Energy Intake: Estimated energy intake  Criteria: Pt meets >75% of estimated energy needs  Fluid Intake: Estimated fluid intake  Criteria: Meets >75% of estimated fluid needs  Amount of Food: Estimated amout of food, Medical food intake  Criteria: Pt consumes >75% of meals and supplements    Body Composition/Growth/Weight History  Monitoring and Evaluation Plan: Weight  Weight: Measured weight  Criteria: Maintains stable weight    Biochemical Data, Medical Tests and Procedures  Monitoring and Evaluation Plan: Glucose/endocrine profile, Electrolyte/renal panel  Electrolyte and Renal Panel: Sodium, Potassium, Phosphorus  Criteria: Electrolytes WNL  Glucose/Endocrine Profile: Glucose, casual  Criteria: BG within desirable range              Time Spent (min): 45  minutes

## 2024-12-17 NOTE — PROGRESS NOTES
Inpatient Speech-Language Pathology Clinical Swallow Evaluation       Patient Name: Lito Osei  MRN: [id number]  : 1960  Patient Room: [address] [place], [place] [address]  Today's Date: 24  Time Calculation  Start Time: 1120  Stop Time: 1135  Time Calculation (min): 15 min     Current Problem:   1. Fall, initial encounter  Referral to Primary Care      2. Contusion of left hip, initial encounter  Referral to Primary Care      3. Renal insufficiency  Referral to Primary Care      4. Elevated troponin  Referral to Primary Care      5. Hyperglycemia  Referral to Primary Care      6. Anemia, unspecified type  Referral to Primary Care      7. History of CVA (cerebrovascular accident)  Transthoracic Echo Complete    Transthoracic Echo Complete    Carotid duplex bilateral    Carotid duplex bilateral          Recommendations:  Solid Diet Recommendations : Regular (IDDSI Level 7)   Liquid Diet Recommendations: Thin (IDDSI Level 0)   Compensatory Swallowing Strategies: Upright 90 degrees as possible for all oral intake, Single sips, Small bites/sips, Eat/feed slowly     Medication Administration Recommendations: Whole, With Liquid, With Pureed    Medical Staff Made Aware: Yes        Assessment:  Respiratory Status: Room air   Patient Positioning: Upright in Bed   Consistencies Trialed: Consistencies Trialed: Thin (IDDSI Level 0) - Straw, Thin (IDDSI Level 0) - Cup, Pureed/extremely thick (IDDSI Level 4), Regular (IDDSI Level 7)   Oral Motor: Within Functional Limits  Lingual Strength: Within Functional Limits   Dentition: Adequate/Natural      Pt positioned upright in bed for P.O. intake. Pt trialed sips of thin liquids via straw with no overt s/s aspiration and/or penetration observed. Pt trialed bites of pureed and regular consistencies with timely mastication, bolus manipulation, and clearance following swallow. Recommending a SOFT BITE-SIZED DIET WITH THIN LIQUIDS at this time. Pt currently undergoing full neuro follow-up to  determine if a new stroke occurred, if so a cog eval is recommended.     Plan:  SLP Plan: Skilled SLP Skilled speech therapy for dysphagia treatment is warranted in order to provide training and instruction regarding the use of compensatory swallow strategies, assess tolerance of diet and pt/caregiver education in order to reduce risk of aspiration, dehydration and malnutrition.  SLP Frequency: 3x per week   Duration: 30 days   Next Treatment Priority: Check diet, Cog eval?   Discussed POC: Patient, Nursing   Discussed Risks/Benefits: Yes   Patient/Caregiver Agreeable: Yes   Prognosis: Good  Barriers to improvement: comorbidities  SLP Discharge Recommendations: unable to determine at this time, refer to subsequent notes    Goals:  Goals established on 24  Pt. To tolerate least restrictive diet without pulmonary compromise   Pt. To use safe swallow strategies to decrease risk of aspiration in all observed trials of intake       Subjective   Pt in bed with eyes closed but responsive to stimuli. Pt with a moderate delay requiring increased response time.     General Visit Information:  Pt. Admitted  after fall at home. Concern for stroke given L sided deficits. RN reported some coughing with P.O. intake.   Past medical history: CKD stage IV, hx of CVA with residual left-sided deficits, HLD, HTN, recent hx of PE on Eliquis, and T2DM requiring insulin     Living Environment: Home           Reason for Referral: Stroke?      Current Diet : reg/thin   Prior to Session Communication: Bedside nurse     Pain:  Ratin-10   Pt report: 0  Action taken: none needed    Treatment Completed with evaluation:   No      SLP Inpatient Education:  Learner patient   Barriers to Learning Acuteness of the illness, Cognitive limitations, Communication limitations   Method Verbal, Demonstration   Education - Topic ST provided patient education regarding role of ST, purpose of assessment, clinical impressions, goals of care      Outcome    1= partially meets; needs review

## 2024-12-18 ENCOUNTER — APPOINTMENT (OUTPATIENT)
Dept: RADIOLOGY | Facility: HOSPITAL | Age: 64
End: 2024-12-18
Payer: MEDICARE

## 2024-12-18 PROBLEM — I63.9 ACUTE CVA (CEREBROVASCULAR ACCIDENT) (MULTI): Status: ACTIVE | Noted: 2024-11-17

## 2024-12-18 LAB
ANION GAP SERPL CALC-SCNC: 10 MMOL/L (ref 10–20)
AORTIC VALVE MEAN GRADIENT: 5 MMHG
AORTIC VALVE PEAK VELOCITY: 1.45 M/S
AV PEAK GRADIENT: 8 MMHG
AVA (PEAK VEL): 2.36 CM2
AVA (VTI): 2.13 CM2
BUN SERPL-MCNC: 29 MG/DL (ref 6–23)
CALCIUM SERPL-MCNC: 8.1 MG/DL (ref 8.6–10.3)
CHLORIDE SERPL-SCNC: 108 MMOL/L (ref 98–107)
CO2 SERPL-SCNC: 26 MMOL/L (ref 21–32)
CREAT SERPL-MCNC: 1.7 MG/DL (ref 0.5–1.3)
EGFRCR SERPLBLD CKD-EPI 2021: 45 ML/MIN/1.73M*2
EJECTION FRACTION APICAL 4 CHAMBER: 55.1
EJECTION FRACTION: 60 %
ERYTHROCYTE [DISTWIDTH] IN BLOOD BY AUTOMATED COUNT: 14 % (ref 11.5–14.5)
GLUCOSE BLD MANUAL STRIP-MCNC: 132 MG/DL (ref 74–99)
GLUCOSE BLD MANUAL STRIP-MCNC: 139 MG/DL (ref 74–99)
GLUCOSE BLD MANUAL STRIP-MCNC: 148 MG/DL (ref 74–99)
GLUCOSE BLD MANUAL STRIP-MCNC: 166 MG/DL (ref 74–99)
GLUCOSE SERPL-MCNC: 151 MG/DL (ref 74–99)
HCT VFR BLD AUTO: 31.1 % (ref 41–52)
HGB BLD-MCNC: 9.9 G/DL (ref 13.5–17.5)
LEFT ATRIUM VOLUME AREA LENGTH INDEX BSA: 17.3 ML/M2
LEFT VENTRICLE INTERNAL DIMENSION DIASTOLE: 3.61 CM (ref 3.5–6)
LEFT VENTRICULAR OUTFLOW TRACT DIAMETER: 2 CM
LV EJECTION FRACTION BIPLANE: 59 %
MAGNESIUM SERPL-MCNC: 2.08 MG/DL (ref 1.6–2.4)
MCH RBC QN AUTO: 29.5 PG (ref 26–34)
MCHC RBC AUTO-ENTMCNC: 31.8 G/DL (ref 32–36)
MCV RBC AUTO: 93 FL (ref 80–100)
MITRAL VALVE E/A RATIO: 0.6
NRBC BLD-RTO: 0 /100 WBCS (ref 0–0)
PLATELET # BLD AUTO: 207 X10*3/UL (ref 150–450)
POTASSIUM SERPL-SCNC: 3.7 MMOL/L (ref 3.5–5.3)
RBC # BLD AUTO: 3.36 X10*6/UL (ref 4.5–5.9)
RIGHT VENTRICLE FREE WALL PEAK S': 12.7 CM/S
RIGHT VENTRICLE PEAK SYSTOLIC PRESSURE: 26.4 MMHG
SODIUM SERPL-SCNC: 140 MMOL/L (ref 136–145)
TRICUSPID ANNULAR PLANE SYSTOLIC EXCURSION: 2.3 CM
WBC # BLD AUTO: 8.8 X10*3/UL (ref 4.4–11.3)

## 2024-12-18 PROCEDURE — 2500000002 HC RX 250 W HCPCS SELF ADMINISTERED DRUGS (ALT 637 FOR MEDICARE OP, ALT 636 FOR OP/ED): Performed by: NURSE PRACTITIONER

## 2024-12-18 PROCEDURE — 2500000004 HC RX 250 GENERAL PHARMACY W/ HCPCS (ALT 636 FOR OP/ED)

## 2024-12-18 PROCEDURE — 92526 ORAL FUNCTION THERAPY: CPT | Mod: GN | Performed by: STUDENT IN AN ORGANIZED HEALTH CARE EDUCATION/TRAINING PROGRAM

## 2024-12-18 PROCEDURE — 85027 COMPLETE CBC AUTOMATED: CPT

## 2024-12-18 PROCEDURE — 99232 SBSQ HOSP IP/OBS MODERATE 35: CPT

## 2024-12-18 PROCEDURE — 83735 ASSAY OF MAGNESIUM: CPT

## 2024-12-18 PROCEDURE — 99233 SBSQ HOSP IP/OBS HIGH 50: CPT | Performed by: STUDENT IN AN ORGANIZED HEALTH CARE EDUCATION/TRAINING PROGRAM

## 2024-12-18 PROCEDURE — 2500000002 HC RX 250 W HCPCS SELF ADMINISTERED DRUGS (ALT 637 FOR MEDICARE OP, ALT 636 FOR OP/ED)

## 2024-12-18 PROCEDURE — 70450 CT HEAD/BRAIN W/O DYE: CPT

## 2024-12-18 PROCEDURE — 36415 COLL VENOUS BLD VENIPUNCTURE: CPT

## 2024-12-18 PROCEDURE — 1200000002 HC GENERAL ROOM WITH TELEMETRY DAILY

## 2024-12-18 PROCEDURE — 70450 CT HEAD/BRAIN W/O DYE: CPT | Performed by: RADIOLOGY

## 2024-12-18 PROCEDURE — 97530 THERAPEUTIC ACTIVITIES: CPT | Mod: GO

## 2024-12-18 PROCEDURE — 80048 BASIC METABOLIC PNL TOTAL CA: CPT

## 2024-12-18 PROCEDURE — 2500000002 HC RX 250 W HCPCS SELF ADMINISTERED DRUGS (ALT 637 FOR MEDICARE OP, ALT 636 FOR OP/ED): Performed by: STUDENT IN AN ORGANIZED HEALTH CARE EDUCATION/TRAINING PROGRAM

## 2024-12-18 PROCEDURE — 2500000001 HC RX 250 WO HCPCS SELF ADMINISTERED DRUGS (ALT 637 FOR MEDICARE OP): Performed by: STUDENT IN AN ORGANIZED HEALTH CARE EDUCATION/TRAINING PROGRAM

## 2024-12-18 PROCEDURE — 82947 ASSAY GLUCOSE BLOOD QUANT: CPT

## 2024-12-18 PROCEDURE — 92523 SPEECH SOUND LANG COMPREHEN: CPT | Mod: GN | Performed by: STUDENT IN AN ORGANIZED HEALTH CARE EDUCATION/TRAINING PROGRAM

## 2024-12-18 PROCEDURE — 97530 THERAPEUTIC ACTIVITIES: CPT | Mod: GP | Performed by: PHYSICAL THERAPIST

## 2024-12-18 ASSESSMENT — PAIN SCALES - GENERAL
PAINLEVEL_OUTOF10: 0 - NO PAIN

## 2024-12-18 ASSESSMENT — COGNITIVE AND FUNCTIONAL STATUS - GENERAL
HELP NEEDED FOR BATHING: TOTAL
WALKING IN HOSPITAL ROOM: TOTAL
MOVING TO AND FROM BED TO CHAIR: TOTAL
TURNING FROM BACK TO SIDE WHILE IN FLAT BAD: TOTAL
MOVING FROM LYING ON BACK TO SITTING ON SIDE OF FLAT BED WITH BEDRAILS: A LOT
WALKING IN HOSPITAL ROOM: TOTAL
DAILY ACTIVITIY SCORE: 10
PERSONAL GROOMING: TOTAL
DRESSING REGULAR LOWER BODY CLOTHING: TOTAL
STANDING UP FROM CHAIR USING ARMS: TOTAL
STANDING UP FROM CHAIR USING ARMS: TOTAL
TOILETING: TOTAL
TOILETING: TOTAL
CLIMB 3 TO 5 STEPS WITH RAILING: TOTAL
MOBILITY SCORE: 7
CLIMB 3 TO 5 STEPS WITH RAILING: TOTAL
MOBILITY SCORE: 7
WALKING IN HOSPITAL ROOM: TOTAL
DRESSING REGULAR LOWER BODY CLOTHING: TOTAL
EATING MEALS: A LITTLE
MOVING TO AND FROM BED TO CHAIR: TOTAL
DRESSING REGULAR UPPER BODY CLOTHING: TOTAL
TURNING FROM BACK TO SIDE WHILE IN FLAT BAD: TOTAL
CLIMB 3 TO 5 STEPS WITH RAILING: TOTAL
MOBILITY SCORE: 8
HELP NEEDED FOR BATHING: TOTAL
EATING MEALS: TOTAL
EATING MEALS: TOTAL
STANDING UP FROM CHAIR USING ARMS: A LOT
PERSONAL GROOMING: A LOT
TURNING FROM BACK TO SIDE WHILE IN FLAT BAD: TOTAL
MOVING FROM LYING ON BACK TO SITTING ON SIDE OF FLAT BED WITH BEDRAILS: A LOT
TOILETING: TOTAL
DRESSING REGULAR UPPER BODY CLOTHING: TOTAL
MOVING TO AND FROM BED TO CHAIR: TOTAL
PERSONAL GROOMING: TOTAL
DAILY ACTIVITIY SCORE: 6
DAILY ACTIVITIY SCORE: 6
MOVING FROM LYING ON BACK TO SITTING ON SIDE OF FLAT BED WITH BEDRAILS: A LOT
DRESSING REGULAR LOWER BODY CLOTHING: TOTAL
DRESSING REGULAR UPPER BODY CLOTHING: A LOT
HELP NEEDED FOR BATHING: TOTAL

## 2024-12-18 ASSESSMENT — PAIN - FUNCTIONAL ASSESSMENT
PAIN_FUNCTIONAL_ASSESSMENT: 0-10

## 2024-12-18 NOTE — CARE PLAN
Problem: Skin  Goal: Decreased wound size/increased tissue granulation at next dressing change  Outcome: Progressing  Goal: Participates in plan/prevention/treatment measures  Outcome: Progressing  Goal: Prevent/manage excess moisture  Outcome: Progressing  Goal: Prevent/minimize sheer/friction injuries  Outcome: Progressing  Goal: Promote/optimize nutrition  12/18/2024 1107 by Van Quispe RN  Flowsheets (Taken 12/18/2024 1107)  Promote/optimize nutrition: Consume > 50% meals/supplements  12/18/2024 1107 by Van Quispe RN  Outcome: Progressing  Flowsheets (Taken 12/18/2024 1107)  Promote/optimize nutrition: Consume > 50% meals/supplements  Goal: Promote skin healing  Outcome: Progressing     Problem: Pain - Adult  Goal: Verbalizes/displays adequate comfort level or baseline comfort level  Outcome: Progressing     Problem: Safety - Adult  Goal: Free from fall injury  Outcome: Progressing     Problem: Discharge Planning  Goal: Discharge to home or other facility with appropriate resources  Outcome: Progressing     Problem: Chronic Conditions and Co-morbidities  Goal: Patient's chronic conditions and co-morbidity symptoms are monitored and maintained or improved  Outcome: Progressing     Problem: Diabetes  Goal: Achieve decreasing blood glucose levels by end of shift  Outcome: Progressing  Goal: Increase stability of blood glucose readings by end of shift  Outcome: Progressing  Goal: Decrease in ketones present in urine by end of shift  Outcome: Progressing  Goal: Maintain electrolyte levels within acceptable range throughout shift  Outcome: Progressing  Goal: Maintain glucose levels >70mg/dl to <250mg/dl throughout shift  Outcome: Progressing  Goal: No changes in neurological exam by end of shift  Outcome: Progressing  Goal: Learn about and adhere to nutrition recommendations by end of shift  Outcome: Progressing  Goal: Vital signs within normal range for age by end of shift  Outcome: Progressing  Goal:  Increase self care and/or family involovement by end of shift  Outcome: Progressing  Goal: Receive DSME education by end of shift  Outcome: Progressing     Problem: Nutrition  Goal: Oral intake greater 75%  Outcome: Progressing  Goal: Consume prescribed supplement  Outcome: Progressing  Goal: Adequate PO fluid intake  Outcome: Progressing  Goal: BG  mg/dL  Outcome: Progressing  Goal: Lab values WNL  Outcome: Progressing  Goal: Electrolytes WNL  Outcome: Progressing  Goal: Maintain stable weight  Outcome: Progressing   The patient's goals for the shift include      The clinical goals for the shift include Tran

## 2024-12-18 NOTE — CARE PLAN
The patient's goals for the shift include  comfort    The clinical goals for the shift include patient will not show s/s of neurological deterioration    Over the shift the patient was straight cathed for urinary retention. Pt participated in q2 turns as well.   The patient made progress toward the following goals.       Problem: Skin  Goal: Decreased wound size/increased tissue granulation at next dressing change  Outcome: Progressing  Goal: Participates in plan/prevention/treatment measures  Outcome: Progressing  Goal: Prevent/manage excess moisture  Outcome: Progressing  Goal: Prevent/minimize sheer/friction injuries  Outcome: Progressing  Goal: Promote/optimize nutrition  Outcome: Progressing  Goal: Promote skin healing  Outcome: Progressing     Problem: Pain - Adult  Goal: Verbalizes/displays adequate comfort level or baseline comfort level  Outcome: Progressing     Problem: Safety - Adult  Goal: Free from fall injury  Outcome: Progressing     Problem: Discharge Planning  Goal: Discharge to home or other facility with appropriate resources  Outcome: Progressing     Problem: Chronic Conditions and Co-morbidities  Goal: Patient's chronic conditions and co-morbidity symptoms are monitored and maintained or improved  Outcome: Progressing     Problem: Diabetes  Goal: Achieve decreasing blood glucose levels by end of shift  Outcome: Progressing  Goal: Increase stability of blood glucose readings by end of shift  Outcome: Progressing  Goal: Decrease in ketones present in urine by end of shift  Outcome: Progressing  Goal: Maintain electrolyte levels within acceptable range throughout shift  Outcome: Progressing  Goal: Maintain glucose levels >70mg/dl to <250mg/dl throughout shift  Outcome: Progressing  Goal: No changes in neurological exam by end of shift  Outcome: Progressing  Goal: Learn about and adhere to nutrition recommendations by end of shift  Outcome: Progressing  Goal: Vital signs within normal range for age  by end of shift  Outcome: Progressing  Goal: Increase self care and/or family involovement by end of shift  Outcome: Progressing  Goal: Receive DSME education by end of shift  Outcome: Progressing     Problem: Nutrition  Goal: Oral intake greater 75%  Outcome: Progressing  Goal: Consume prescribed supplement  Outcome: Progressing  Goal: Adequate PO fluid intake  Outcome: Progressing  Goal: BG  mg/dL  Outcome: Progressing  Goal: Lab values WNL  Outcome: Progressing  Goal: Electrolytes WNL  Outcome: Progressing  Goal: Maintain stable weight  Outcome: Progressing

## 2024-12-18 NOTE — PROGRESS NOTES
Social Work Note Per direct auth team, precert has been obtained.  Notified SN[facility] accordingly.  OSCAR Gonzalez

## 2024-12-18 NOTE — PROGRESS NOTES
"Occupational Therapy    Treatment    Patient Name: Lito Osei \"Nima\"  MRN: [id number]  : 1960  Today's Date: 24  Time Calculation  Start Time: 1019  Stop Time: 1047  Time Calculation (min): 28 min     623/623-A    Assessment:  OT Assessment: pt presents with decreased safety with functional mobility and ADLS. Will benefit from con't skilled OT tx to address impairments  Prognosis: Fair  Barriers to Discharge Home: Caregiver assistance, Physical needs  Caregiver Assistance: Patient lives alone and/or does not have reliable caregiver assistance  Physical Needs: Stair navigation into home limited by function/safety, Ambulating household distances limited by function/safety, 24hr mobility assistance needed  End of Session Communication: Bedside nurse (notified of pt's transfer to chair and use of gait belt and to transfer pt leading  to R side back to bed)  End of Session Patient Position: Up in chair, Alarm on (call light in reach, B LE's elevated, L UE positioned on pillow for elbow and hand/wrist support)  OT Assessment Results: Decreased ADL status, Decreased upper extremity range of motion, Decreased safe judgment during ADL, Decreased cognition, Decreased endurance, Decreased functional mobility, Non-functional left upper extremity  Prognosis: Fair    Plan:  Treatment Interventions: ADL retraining, Functional transfer training, Endurance training, Patient/family training, Neuromuscular reeducation  OT Frequency: 2 times per week  OT Discharge Recommendations: Moderate intensity level of continued care  OT Recommended Transfer Status: Maximum assist (use of gait belt, leading to R side)  OT - OK to Discharge: Yes  Treatment Interventions: ADL retraining, Functional transfer training, Endurance training, Patient/family training, Neuromuscular reeducation  Subjective     Current Problem:  1. Fall, initial encounter  Referral to Primary Care      2. Contusion of left hip, initial encounter  Referral to " Primary Care      3. Renal insufficiency  Referral to Primary Care      4. Elevated troponin  Referral to Primary Care      5. Hyperglycemia  Referral to Primary Care      6. Anemia, unspecified type  Referral to Primary Care      7. History of CVA (cerebrovascular accident)  Transthoracic Echo Complete    Transthoracic Echo Complete    Carotid duplex bilateral    Carotid duplex bilateral      8. At risk for arrhythmia  Holter Or Event Cardiac Monitor    Holter Or Event Cardiac Monitor      9. Other cerebrovascular vasospasm and vasoconstriction  Holter Or Event Cardiac Monitor    Holter Or Event Cardiac Monitor          General:   OT Received On: 12/18/24  General  Reason for Referral: ADL impairment  Referred By: UBALDO Ruvalcaba  Past Medical History Relevant to Rehab: HTN, CVA w L hemiparesis 2015, CKD, DM, L hip fx 11/17/24, L hip hemiarthroplasty  Family/Caregiver Present: No  Co-Treatment: PT  Co-Treatment Reason: to maximize pt/staff safety  Prior to Session Communication: Bedside nurse  Patient Position Received: Alarm on, Bed, 3 rail up (male external catheter)  General Comment: pt demo increased alertness this date. L UE completely flaccid with 2 fingerwidth subluxation noted at L shoulder. Significant difference from hypertonicity in L elbow at time of eval 12/16. Pt does not demo L neglect and is able to spontaneously scan to L of midline    Precautions:  LE Weight Bearing Status: Weight Bearing as Tolerated (L LE posterior hip precautions)  Medical Precautions: Fall precautions           Pain:  Pain Assessment  Pain Assessment: 0-10  0-10 (Numeric) Pain Score: 0 - No pain  Pain Type: Acute pain  Pain Location: Hip  Pain Orientation: Left  Objective     Cognition:  Overall Cognitive Status:  (intermittent alertness/responsiveness during tx)  Orientation Level: Oriented X4  Attention:  (flucuates throughout tx)  Problem Solving:  (impaired)  Insight:  (limited)             Home Living:  Home Living Comments:  lives alone, 1 story, 7 DIPIKA with B HR. Amb with rollator. Tub/shower with grab bar    Prior Function:  Prior Function Comments: unable to determine, pt poor historian, unable to provide reliable prior level of function from past month. Unsure if pt was receiving any [facility] services. States he owns a reacher, but it's gone missing           Activities of Daily Living:   Eating Assistance: Minimal  Grooming Assistance: Maximal  Bathing Assistance: Total  UE Dressing Assistance: Maximal  LE Dressing Assistance: Total  Toileting Assistance with Device: Maximal  Functional Assistance:  (pt unable to weight shift in standing at cart)     Grooming  Grooming Comments: pt washed face indep after set up, reaching across midline to L side spontaneously              Toileting  Toileting Level of Assistance: Maximum assistance  Toileting Comments: transfer to BSC, pt states he needs to have BM, but was uanble to after seated on commode x 5 minutes. Pt max A for pivot transfer to R side with gait belt    Activity Tolerance:  Endurance: Decreased tolerance for upright activites           Bed Mobility/Transfers: Bed Mobility  Bed Mobility:  (sup to sit max A x 2, getting up on L side of bed)  Transfers  Transfer:  (sit to stand from EOB max A with gait belt and bed pad, transferred from bed to BSC and then to recliner chair all leading with R side max A for all transfers)                     Balance:   Static Sitting Balance  Static Sitting-Level of Assistance:  (fair +, able to maintain upright midline posture at EOB x 5 minutes. Pt participated in reaching with R UE across midline and maintain sitting balance)               Vision:Vision - Basic Assessment  Current Vision:  (unable to determine)        Sensation:  Light Touch: No apparent deficits    Strength:  Strength Comments: increased tone L UE, R UE 4/5 throughout            Extremities: RUE   RUE : Within Functional Limits and LUE   LUE:  (L UE flaccid, L thumb demo limited  active abduction/extension. No shoulder shrug, no elbow/wrist ROM)    Outcome Measures: [facility] Daily Activity  Putting on and taking off regular lower body clothing: Total  Bathing (including washing, rinsing, drying): Total  Putting on and taking off regular upper body clothing: A lot  Toileting, which includes using toilet, bedpan or urinal: Total  Taking care of personal grooming such as brushing teeth: A lot  Eating Meals: A little  Daily Activity - Total Score: 10    Education Documentation  ADL Training, taught by Bell Teran, OT at 12/18/2024  2:50 PM.  Learner: Patient  Readiness: Acceptance  Method: Explanation  Response: Verbalizes Understanding, Needs Reinforcement                 Goals:  Encounter Problems       Encounter Problems (Active)       OT Goals       pt will complete grooming SBA (Progressing)       Start:  12/17/24    Expected End:  12/30/24            Pt will transfer to bed, chair, toilet with min a x 2 (Progressing)       Start:  12/17/24    Expected End:  12/30/24            Pt will follow one step directions with >75% accuracy (Progressing)       Start:  12/17/24    Expected End:  12/30/24            Pt will demo good dyn sitting balance with aDLS (Progressing)       Start:  12/17/24    Expected End:  12/30/24            Pt will participate in neuro re-ed activities with L UE to facilitate normalized tone and functional use of L UE (Not Progressing)       Start:  12/17/24    Expected End:  12/30/24       INTERVENTIONS:

## 2024-12-18 NOTE — PROGRESS NOTES
"Lito Osei is a 64 y.o. male on day 1 of admission presenting with Fall at home, initial encounter.    Subjective   No new symptoms, left side remains similarly weak       Objective     Last Recorded Vitals  Blood pressure 121/71, pulse 96, temperature 36.5 °C (97.7 °F), temperature source Temporal, resp. rate 16, height 1.651 m (5' 5\"), weight 73 kg (161 lb), SpO2 92%.    Physical Exam  Neurological Exam  Relevant Results    NIH Stroke Scale  1A. Level of Consciousness: Arouses to Minor Stimulation  1B. Ask Month and Age: Both Questions Right  1C. Blink Eyes & Squeeze Hands: Performs Both Tasks  2. Best Gaze: Normal  3. Visual: Partial Hemianopia  4. Facial Palsy: Normal Symmetrical Movements  5A. Motor - Left Arm: No Effort Against Gravity  5B. Motor - Right Arm: No Drift  6A. Motor - Left Leg: No Effort Against Gravity  6B. Motor - Right Leg: No Effort Against [place]  7. Limb Ataxia: Absent  8. Sensory Loss: Mild-to-Moderate Sensory Loss  9. Best Language: Mild-to-Moderate Aphasia  10. Dysarthria: Mild-to-Moderate Dysarthria  11. Extinction and Inattention: No Abnormality  NIH Stroke Scale: 14           [place] Coma Scale  Best Eye Response: To verbal stimuli  Best Verbal Response: Oriented  Best Motor Response: Follows commands  [place] Coma Scale Score: 14                                  Assessment/Plan      Assessment & Plan  Fall at home, initial encounter    Acute CVA (cerebrovascular accident) (Multi)    Impression:  Bilateral (mainly right frontal) cardioembolic infarcts due to paradoxical embolus, or possibly occult A-fib  Small petechial hemorrhagic transformation    Recommend:  Repeat CTH today, if hemorrhage improved can start aspirin 81 mg  Will likely switch to Eliquis in 1-2 weeks  Telemetry  [place] on discharge  Have him see Dr. Cason for consideration of PFO closure outpatient  Followup with me               Agustín Huston MD  "

## 2024-12-18 NOTE — PROGRESS NOTES
"Physical Therapy    Physical Therapy Treatment    Patient Name: Lito Osei  MRN: [id number]  Department: [facility]  Room: [address] [place] [address]-A  Today's Date: 12/18/2024  Time Calculation  Start Time: 1020  Stop Time: 1055  Time Calculation (min): 35 min         Assessment/Plan   PT Assessment  End of Session Communication: Bedside nurse  End of Session Patient Position: Up in chair, Alarm on  PT Plan  Inpatient/Swing Bed or Outpatient: Inpatient  PT Plan  Treatment/Interventions: Bed mobility, Transfer training, Balance training, Gait training, Strengthening  PT Plan: Ongoing PT  PT Frequency: 3 times per week  PT Discharge Recommendations: High intensity level of continued care  PT Recommended Transfer Status: Assist x2 (Stand pivot transfer towards R side; gait belt)  PT - OK to Discharge: Yes (to next level of care, when medically stable)      General Visit Information:   PT  Visit  PT Received On: 12/18/24  General  Family/Caregiver Present: No  Co-Treatment: OT; billed one unit  Co-Treatment Reason: to maximize functional mobility and safety  Prior to Session Communication: Bedside nurse (Neuro CNP; pt. prefers the name \"Nima\")  Patient Position Received: Bed, 3 rail up, Alarm on (external catheter)  General Comment: MRI of brain->Acute or early subacute infarcts involving the right greater than  left frontal lobes, right anterior cingulate gyrus, and punctate  infarct in the left medial occipital lobe. **LLE and LUE are flaccid with 2 fingerwidth subluxation noted at L shoulder. There was concern on MRI of brain for possible early hemorrhagic transformation; Neuro ordered a follow-up CT of head which states \"no acute intracranial hemorrhage\"    Subjective   Precautions:  Precautions  Hearing/Visual Limitations: [place]; hearing aids; Further visual assessment would be beneficial, considering new diagnosis of CVA  LE Weight Bearing Status: Weight Bearing as Tolerated (LLE)  Medical Precautions: Fall precautions  Post-Surgical " "Precautions: Left hip precautions  Precautions Comment: L sided hemiplegia              Objective   Pain:  Pain Assessment  Pain Assessment: 0-10  0-10 (Numeric) Pain Score: 0 - No pain  Cognition:  Cognition  Overall Cognitive Status:  (Flat affect; drowsy; intermittent alertness/responsiveness during tx)  Orientation Level:  (oriented x 3-4, initially stated \"[facility]\" for location)  Attention: Exceptions to WFL  Sustained Attention: Impaired  Insight: Moderate  Processing Speed: Delayed  Coordination:  Coordination Comment: LUE and LLE are flaccid  Postural Control:  Static Sitting Balance  Static Sitting-Balance Support: Right upper extremity supported, Feet supported  Static Sitting-Level of Assistance: Contact guard  Static Sitting-Comment/Number of Minutes: 3-4 minutes    Activity Tolerance:  Activity Tolerance  Endurance: Decreased tolerance for upright activites  Activity Tolerance Comments: pt. fatigues quickly  Treatments:  Therapeutic Exercise  Therapeutic Exercise Performed:  (RLE APs and LAQ x10 reps)              Bed Mobility  Bed Mobility:  (supine to sit with maxAx2 and cues for technique/sequence)       Transfers  Transfer:  (Sit to/from stand with gait belt and therapist supporting anteriorly with maxAx1; Stand pivot transfers performed towards R SIDE (bed->BSC->recliner) with gait belt and maxAx2; therapist supporting anteriorly and therapist supporting posterior-laterally)    Outcome Measures:  [facility] Basic Mobility  Turning from your back to your side while in a flat bed without using bedrails: A lot  Moving from lying on your back to sitting on the side of a flat bed without using bedrails: Total (2 person assist required, therefore, scored as a \"1\")  Moving to and from bed to chair (including a wheelchair): Total (2 person assist required, therefore, scored as a \"1\")  Standing up from a chair using your arms (e.g. wheelchair or bedside chair): A lot  To walk in hospital room: Total  Climbing 3-5 " steps with railing: Total  Basic Mobility - Total Score: 8    Education Documentation  No documentation found.  Education Comments  No comments found.        OP EDUCATION:  Outpatient Education  Individual(s) Educated: Patient  Education Provided:  (transfer technique and safety)  Education Comment: reinforcement required    Encounter Problems       Encounter Problems (Active)       PT Problem       Pt will demonstrate min A with all bed mobility   (Progressing)       Start:  12/16/24    Expected End:  12/30/24            Pt will demonstrate sit to stand and bed/chair transfers with unilateral device with Min A.   (Progressing)       Start:  12/16/24    Expected End:  12/30/24            Pt will tolerate 15 reps x2 LE therapeutic exercise for LE strengthening and endurance  (Progressing)       Start:  12/16/24    Expected End:  12/30/24            Pt will tolerate sitting EOB >5 minutes with SBA to progress to functional transfers.  (Progressing)       Start:  12/16/24    Expected End:  12/30/24                   Pain - Adult

## 2024-12-18 NOTE — PROGRESS NOTES
Speech-Language Pathology    SLP Adult Inpatient  Speech-Language Pathology Treatment     Patient Name: Lito Osei  MRN: [id number]  Today's Date: 12/18/2024  Time Calculation  Start Time: 1144  Stop Time: 1213  Time Calculation (min): 29 min         Current Problem:   1. Fall, initial encounter  Referral to Primary Care      2. Contusion of left hip, initial encounter  Referral to Primary Care      3. Renal insufficiency  Referral to Primary Care      4. Elevated troponin  Referral to Primary Care      5. Hyperglycemia  Referral to Primary Care      6. Anemia, unspecified type  Referral to Primary Care      7. History of CVA (cerebrovascular accident)  Transthoracic Echo Complete    Transthoracic Echo Complete    Carotid duplex bilateral    Carotid duplex bilateral      8. At risk for arrhythmia  Holter Or Event Cardiac Monitor    Holter Or Event Cardiac Monitor      9. Other cerebrovascular vasospasm and vasoconstriction  Holter Or Event Cardiac Monitor    Holter Or Event Cardiac Monitor        Assessment:  Pt was seen for dysphagia management following a clinical swallow evaluation completed 12/17. The patient was upright in the chair upon entering room. Lunch tray was just delivered. The recommendation from the CSE was soft and bite sized however diet order was still regular solids. Pt was observed self feeding a turkey sandwich. The patient did not respond or adjust when cued to take smaller bites. In fact, the last bite of his sandwich half was exceptionally large and caused him to briefly gag. Patient did not recognize cue to slow rate of drinking however no overt s/s of aspiration were observed with either consistency. Recommend diet orders be changed to reflect soft and bite sized solids d/t to impulsive eating. ST will continue to follow.     SLP Assessment  SLP TX Intervention Outcome: Making Progress Towards Goals  Prognosis: Good  Treatment Tolerance: Patient tolerated treatment well  Medical Staff  Made Aware: Yes  Education Provided: Yes    Recommendations:  Solid Diet Recommendations: Soft & bite sized/chopped (IDDSI Level 6)   Liquid Diet Recommendations: Thin (IDDSI Level 0)   Swallow Strategies:  - Small bites  - Small, single sips  - Alternate consistencies  - Upright for all PO intake  Medicine Administration: whole with liquid or best tolerated    Interventions:  Therapeutic Swallow Intervention : Compensatory Strategies, PO Trials, Caregiver Education        Baseline Assessment:  Oxygen: Room air  Patient Report: Patient requested to be put back in bed because his tailbone hurt    Subjective:  Pt utilized white board to answer orientation questions.     Plan: Skilled speech therapy for dysphagia treatment is warranted in order to provide training and instruction regarding the use of compensatory swallow strategies, oropharyngeal strengthening exercises, and pt/caregiver education in order to reduce risk of aspiration, dehydration and malnutrition.  Plan  Inpatient/Swing Bed or Outpatient: Inpatient  SLP TX Plan: Continue Plan of Care  SLP Plan: Skilled SLP  SLP Frequency: 2x per week  Duration: 2 weeks  SLP Discharge Recommendations: Continue skilled SLP services at the next level of care  Next Treatment Priority: diet jordan  Discussed POC: Patient, Caregiver/family  Discussed Risks/Benefits: Yes  Patient/Caregiver Agreeable: Yes  SLP - OK to Discharge: No     Goals:  Goals established on 12/17/24  Pt. To tolerate least restrictive diet without pulmonary compromise   Goal Status: In progress  Goal Progress: Progressing as follows   -Impulsive eating behaviors noted. Diet orders changed to recommendation from the [facility]  Pt. To use safe swallow strategies to decrease risk of aspiration in all observed trials of intake   Goal Status: In progress  Goal Progress: Progressing as follows   -Pt did not respond to direct verbal cues to reduce bolus size or alternate consistencies. Pt required assist to prepare  boni.    Pain:  Pain Assessment  Pain Assessment: 0-10  0-10 (Numeric) Pain Score: 0 - No pain     Education:  Learner:  Patient, Family  Barriers to Learning: Cognitive limitations   Method: Verbal  Education - Topic: ST provided patient education regarding role of ST, purpose of assessment, clinical impressions, goals of treatment, and plan of care. Patient verbalized full comprehension, consistent with cognitive status. Education will be reinforced. ST further coordinated with RN regarding recommendations and precautions per this assessment, with RN verbalizing understanding.  Outcome:  Verbalized understanding and agreement, Needs review/reinforcement

## 2024-12-18 NOTE — PROGRESS NOTES
Speech-Language Pathology    SLP Adult Inpatient Speech-Language Cognition    Patient Name: Lito Osei  MRN: [id number]  Today's Date: 12/18/2024   Time Calculation  Start Time: 1144  Stop Time: 1213  Time Calculation (min): 29 min         Current Problem:   1. Fall, initial encounter  Referral to Primary Care      2. Contusion of left hip, initial encounter  Referral to Primary Care      3. Renal insufficiency  Referral to Primary Care      4. Elevated troponin  Referral to Primary Care      5. Hyperglycemia  Referral to Primary Care      6. Anemia, unspecified type  Referral to Primary Care      7. History of CVA (cerebrovascular accident)  Transthoracic Echo Complete    Transthoracic Echo Complete    Carotid duplex bilateral    Carotid duplex bilateral      8. At risk for arrhythmia  Holter Or Event Cardiac Monitor    Holter Or Event Cardiac Monitor      9. Other cerebrovascular vasospasm and vasoconstriction  Holter Or Event Cardiac Monitor    Holter Or Event Cardiac Monitor        SLP Assessment:  Pt. Presenting with mild-mod cognitive communication impairments specific to attention, executive function, memory. Speed of processing was reduced relative to baseline but to what degree is unknown. The patient endorses he is slow to respond and had been since his 2015 CVA. Flat affect noted. Pt did not respond to direct inquiry about changes in communication or mentation although he recognized being asked the question. The patient did not respond or recognize cues to utilize swallow precautions when seen for dysphagia management. Portions of the MoCA administered this date with results below. The patient was aware of and use call button to request transfer to bed. ST will continue to follow.    Subjective: The patient's brother arrived near the end of the evaluation and confirmed the patient had been living independently since his 2015 CVA and was able to complete ADLs/IADLs appropriately and independently. He had  been driving. The brother endorsed changes in pt response time (which is slow at baseline) and other changes but could not necessarily specify what change he's noticed.    SLP Plan:  Skilled SLP warranted for changes in cognitive communication function with baseline mild (suspected) impairments.  Plan  Inpatient/Swing Bed or Outpatient: Inpatient  SLP TX Plan: Continue Plan of Care  SLP Plan: Skilled SLP  SLP Frequency: 2x per week  Duration: 2 weeks  SLP Discharge Recommendations: Continue skilled SLP services at the next level of care  Next Treatment Priority: diet jordan/safety, exec func, reasoning  Discussed POC: Patient, Caregiver/family  Discussed Risks/Benefits: Yes  Patient/Caregiver Agreeable: Yes  SLP - OK to Discharge: No    Goals: Est 12/18/24  1. Patient will complete structured tasks targeting executive function (reasoning, problem solving) for daily activities and safety with 80% accuracy given minimal cues.   2. Patient will complete verbal reasoning skills (generative naming, convergent naming, categorization) with 80% accuracy given min cues.    General Visit Information:  General Information  Reason for Referral: Changes in speech/lang/cognition observed during CSE  Referred By: UBALDO Ruvalcaba  Past Medical History Relevant to Rehab: 64 y.o. male with CKD stage IV, hx of CVA with residual left-sided deficits, HLD, HTN, recent hx of PE on Eliquis, and T2DM requiring insulin presenting via EMS for a fall at home overnight. Patient is A&O x 3 but was a poor historian stating that he fell at home but was able to crawl to his couch to get himself up. Majority of history gotten from bedside nurse and ED provider. Patient lives at home alone, was recently discharged from SNF after a left hip fracture requiring partial left hip arthroplasty.  Patient Seen During This Visit: Yes  Co-Treatment: PT  Co-Treatment Reason: to maximize pt/staff safety  Prior to Session Communication: Bedside nurse    Relevant  Imaging:  MRI Brain IMPRESSION:  1. Acute or early subacute infarcts involving the right greater than left frontal lobes, right anterior cingulate gyrus, and punctate infarct in the left medial occipital lobe. There is patchy gradient signal hypointensity superimposed upon the right frontal infarct, which may be representative of early hemorrhagic transformation.  2. Moderate-to-severe burden of white matter FLAIR signal  hyperintensities consistent with chronic ischemic microvascular  changes.  3. Remote infarcts with ensuing gliosis and encephalomalacia  involving the left occipital hemisphere and right cerebellum.    Pain:  Scale: 0-10   Pt. Rating:     Cognition:  Portions of the [place] Cognitive Assessment (MoCA, version 8.1) were administered. Visuospatial portions not completed as patient was eating lunch.  Visuospatial/executive:  N/A  Namin/3  Attention:    6 - digit repeat was WFL, pt did not produce a distinct response to letter recognition task and did not attempt serial subtraction task because he said he wasn't good at math  Language:    1/3 - One error with sentence repetition. No animals produce during generative naming task.  Abstraction:   1/  Delayed recall:    2/5   Orientation:    6/6 (used white board)      Motor Speech Production:  Rate of speech is mildly reduced    Education:  Learner:  Patient, Family  Barriers to Learning: Cognitive limitations   Method: Verbal  Education - Topic: ST provided patient education regarding role of ST, purpose of assessment, clinical impressions, goals of treatment, and plan of care. Patient verbalized full comprehension, consistent with cognitive status. Education will be reinforced. ST further coordinated with RN regarding recommendations and precautions per this assessment, with RN verbalizing understanding.  Outcome:  Verbalized understanding and agreement, Needs review/reinforcement

## 2024-12-18 NOTE — PROGRESS NOTES
12/18/24 1418   Discharge Planning   Home or Post Acute Services Post acute facilities (Rehab/SNF/etc)   Type of Post Acute Facility Services Rehab;Skilled nursing   Expected Discharge Disposition SNF   Does the patient need discharge transport arranged? Yes   RoundTrip coordination needed? Yes   Has discharge transport been arranged? No   What day is the transport expected? 12/19/24     Per CNP MRI shows bleed, Neurology on consult and recommends repeat head CT today,  Zio patch on discharge, anticipate DC tomorrow. [facility] scores are PT (7) OT (10). Pt brother Rosales in room, notified pt/brother that insurance auth for [facility] was received today. Pts brother Rosales would like notified when pt transfers at phone [phone], and is requesting clinical update from [facility]. CNP notified. CT team will continue monitoring case for progression and DC planning.

## 2024-12-18 NOTE — PROGRESS NOTES
"Daily Progress Note    Lito Osei is a 64 y.o. male on day 1 of admission presenting with Fall at home, initial encounter.    Subjective   Patient seen resting in bed.  Brother at bedside and updated.  Appreciate neurology recommendations.  Will repeat CTh 2-day.  Pending discharge to SNF.  Patient denies shortness of breath or chest pain       Objective     Physical Exam    Physical Exam  Constitutional:       Appearance: Normal appearance.   HENT:      Head: Normocephalic.      Mouth/Throat:      Mouth: Mucous membranes are moist.   Eyes:      Pupils: Pupils are equal, round, and reactive to light.   Cardiovascular:      Rate and Rhythm: Normal rate and regular rhythm.      Heart sounds: Normal heart sounds, S1 normal and S2 normal.   Pulmonary:      Effort: Pulmonary effort is normal.      Breath sounds: Normal breath sounds.   Abdominal:      General: Bowel sounds are normal.      Palpations: Abdomen is soft.   Musculoskeletal:         General: Normal range of motion.      Cervical back: Neck supple.   Skin:     General: Skin is warm.   Neurological:      Mental Status: He is alert and oriented to person, place, and time.      Motor: Weakness present.      Comments: Left upper and lower flaccid   Psychiatric:         Mood and Affect: Mood normal.         Speech: Speech normal.         Behavior: Behavior normal.       Last Recorded Vitals  Blood pressure 121/71, pulse 96, temperature 36.5 °C (97.7 °F), temperature source Temporal, resp. rate 16, height 1.651 m (5' 5\"), weight 73 kg (161 lb), SpO2 92%.  Intake/Output last 3 Shifts:  I/O last 3 completed shifts:  In: 1040 (14.2 mL/kg) [P.O.:1040]  Out: 2100 (28.8 mL/kg) [Urine:2100 (0.8 mL/kg/hr)]  Weight: 73 kg     Medications  Scheduled medications  atorvastatin, 80 mg, oral, Nightly  dapagliflozin propanediol, 10 mg, oral, Daily  insulin glargine, 12 Units, subcutaneous, Nightly  insulin lispro, 0-10 Units, subcutaneous, TID AC  polyethylene glycol, 17 g, " oral, Daily  tamsulosin, 0.4 mg, oral, Daily      Continuous medications     PRN medications  PRN medications: acetaminophen **OR** acetaminophen **OR** acetaminophen, dextrose, dextrose, glucagon, glucagon, meclizine, ondansetron **OR** ondansetron    Labs  CBC:   Results from last 7 days   Lab Units 12/18/24  0609 12/17/24  0621 12/16/24  0849   WBC AUTO x10*3/uL 8.8 7.1 8.8   RBC AUTO x10*6/uL 3.36* 3.54* 3.64*   HEMOGLOBIN g/dL 9.9* 10.3* 10.7*   HEMATOCRIT % 31.1* 32.8* 33.7*   MCV fL 93 93 93   MCH pg 29.5 29.1 29.4   MCHC g/dL 31.8* 31.4* 31.8*   RDW % 14.0 14.1 13.9   PLATELETS AUTO x10*3/uL 207 226 221     CMP:    Results from last 7 days   Lab Units 12/18/24  0609 12/17/24  0621 12/16/24  0849   SODIUM mmol/L 140 140 139   POTASSIUM mmol/L 3.7 3.9 4.3   CHLORIDE mmol/L 108* 109* 108*   CO2 mmol/L 26 24 20*   BUN mg/dL 29* 32* 31*   CREATININE mg/dL 1.70* 1.80* 1.81*   GLUCOSE mg/dL 151* 251* 328*   PROTEIN TOTAL g/dL  --   --  7.0   CALCIUM mg/dL 8.1* 8.3* 8.9   BILIRUBIN TOTAL mg/dL  --   --  0.5   ALK PHOS U/L  --   --  106   AST U/L  --   --  20   ALT U/L  --   --  13     BMP:    Results from last 7 days   Lab Units 12/18/24  0609 12/17/24  0621 12/16/24  0849   SODIUM mmol/L 140 140 139   POTASSIUM mmol/L 3.7 3.9 4.3   CHLORIDE mmol/L 108* 109* 108*   CO2 mmol/L 26 24 20*   BUN mg/dL 29* 32* 31*   CREATININE mg/dL 1.70* 1.80* 1.81*   CALCIUM mg/dL 8.1* 8.3* 8.9   GLUCOSE mg/dL 151* 251* 328*     Magnesium:  Results from last 7 days   Lab Units 12/18/24  0609 12/17/24  0621 12/16/24  0849   MAGNESIUM mg/dL 2.08 2.02 1.90     Troponin:    Results from last 7 days   Lab Units 12/16/24  0938 12/16/24  0849   TROPHS ng/L 57* 60*     BNP:   Results from last 7 days   Lab Units 12/16/24  0849   BNP pg/mL 163*     Lipid Panel:  Results from last 7 days   Lab Units 12/16/24  0848   INR  1.0   PROTIME seconds 11.4        Nutrition             Relevant Results  Results from last 7 days   Lab Units 12/18/24  0863  12/18/24  0715 12/18/24  0609 12/17/24  2106 12/17/24  1624 12/17/24  1204 12/17/24  0732 12/17/24  0621 12/16/24  1655 12/16/24  0849   POCT GLUCOSE mg/dL 148* 139*  --  128* 215* 131*   < >  --    < >  --    GLUCOSE mg/dL  --   --  151*  --   --   --   --  251*  --  328*    < > = values in this interval not displayed.     Lab Results   Component Value Date    HGBA1C 8.2 (H) 11/17/2024        Assessment/Plan    Mechanical fall  Acute CVA  Urinary retention  CKD stage IV  History of CVA with left-sided residual  HLD/HTN  Recent PE on Eliquis  T2DM    -Imaging unremarkable for acute fracture/dislocation, no acute intracranial abnormality  -MRI shows bilateral cardioembolic infarcts  -UA unremarkable  -Orthostatics every shift  -Bladder scan as Needed Place, Hernandez if >300  -Falls precautions  -Nutrition consult  -Daily CBC, BMP, Mg  -Troponin likely elevated 2/2 to CKD vs trauma  -Baseline Cr ~1.8, stable   -CK normal, likely no rhabdo  -Encourage PO hydration   -A1c 8.2%  -Lantus 12 units at at bedtime-diabetic diet, Accu-Cheks with SSI  -Resume home meds  -Consult neurology will repeat CTh today, switch to Eliquis in 1 to 2 weeks, Zio patch on discharge  -TTE positive for PFO  -PT/OT evaluation  -TCC for discharge planning    DVTp: Hold for now due to hemorrhage    PLAN: Pre-CERT for SNF    JOSE Friend-CNP    Plan of care was discussed extensively with patient.  Patient verbalized understanding through teach back method.  All question and concerns addressed upon examination.    Of note, this documentation is completed using the Dragon Dictation system (voice recognition software). There may be spelling and/or grammatical errors that were not corrected prior to final submission.

## 2024-12-18 NOTE — NURSING NOTE
Patient's abdomen distended. Denies abdominal pain or urge to urinate. Pt has been incontinent with output in external catheter and one small unmeasured episode when external cath leaked. Bladder scanned for 1300cc. Patient straight cathed with 1550ml output.

## 2024-12-19 ENCOUNTER — TELEPHONE (OUTPATIENT)
Dept: CARDIOLOGY | Facility: CLINIC | Age: 64
End: 2024-12-19
Payer: MEDICARE

## 2024-12-19 VITALS
OXYGEN SATURATION: 94 % | TEMPERATURE: 98.8 F | SYSTOLIC BLOOD PRESSURE: 120 MMHG | HEART RATE: 90 BPM | BODY MASS INDEX: 26.82 KG/M2 | HEIGHT: 65 IN | RESPIRATION RATE: 17 BRPM | WEIGHT: 161 LBS | DIASTOLIC BLOOD PRESSURE: 68 MMHG
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
## 2024-12-19 PROBLEM — I63.9 ACUTE CVA (CEREBROVASCULAR ACCIDENT) (MULTI): Status: RESOLVED | Noted: 2024-11-17 | Resolved: 2024-12-19
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
## 2024-12-19 LAB
ANION GAP SERPL CALC-SCNC: 8 MMOL/L (ref 10–20)
BUN SERPL-MCNC: 27 MG/DL (ref 6–23)
CALCIUM SERPL-MCNC: 7.7 MG/DL (ref 8.6–10.3)
CHLORIDE SERPL-SCNC: 112 MMOL/L (ref 98–107)
CO2 SERPL-SCNC: 26 MMOL/L (ref 21–32)
CREAT SERPL-MCNC: 1.53 MG/DL (ref 0.5–1.3)
EGFRCR SERPLBLD CKD-EPI 2021: 51 ML/MIN/1.73M*2
ERYTHROCYTE [DISTWIDTH] IN BLOOD BY AUTOMATED COUNT: 13.8 % (ref 11.5–14.5)
GLUCOSE BLD MANUAL STRIP-MCNC: 128 MG/DL (ref 74–99)
GLUCOSE BLD MANUAL STRIP-MCNC: 83 MG/DL (ref 74–99)
GLUCOSE SERPL-MCNC: 94 MG/DL (ref 74–99)
HCT VFR BLD AUTO: 30.6 % (ref 41–52)
HGB BLD-MCNC: 9.5 G/DL (ref 13.5–17.5)
MAGNESIUM SERPL-MCNC: 2 MG/DL (ref 1.6–2.4)
MCH RBC QN AUTO: 29.1 PG (ref 26–34)
MCHC RBC AUTO-ENTMCNC: 31 G/DL (ref 32–36)
MCV RBC AUTO: 94 FL (ref 80–100)
NRBC BLD-RTO: 0 /100 WBCS (ref 0–0)
PLATELET # BLD AUTO: 191 X10*3/UL (ref 150–450)
POTASSIUM SERPL-SCNC: 4 MMOL/L (ref 3.5–5.3)
RBC # BLD AUTO: 3.27 X10*6/UL (ref 4.5–5.9)
SODIUM SERPL-SCNC: 142 MMOL/L (ref 136–145)
WBC # BLD AUTO: 6.8 X10*3/UL (ref 4.4–11.3)

## 2024-12-19 PROCEDURE — 99232 SBSQ HOSP IP/OBS MODERATE 35: CPT

## 2024-12-19 PROCEDURE — 2500000002 HC RX 250 W HCPCS SELF ADMINISTERED DRUGS (ALT 637 FOR MEDICARE OP, ALT 636 FOR OP/ED): Performed by: STUDENT IN AN ORGANIZED HEALTH CARE EDUCATION/TRAINING PROGRAM

## 2024-12-19 PROCEDURE — 82374 ASSAY BLOOD CARBON DIOXIDE: CPT

## 2024-12-19 PROCEDURE — 2500000004 HC RX 250 GENERAL PHARMACY W/ HCPCS (ALT 636 FOR OP/ED)

## 2024-12-19 PROCEDURE — 85027 COMPLETE CBC AUTOMATED: CPT

## 2024-12-19 PROCEDURE — 83735 ASSAY OF MAGNESIUM: CPT

## 2024-12-19 PROCEDURE — 92526 ORAL FUNCTION THERAPY: CPT | Mod: GN

## 2024-12-19 PROCEDURE — 82947 ASSAY GLUCOSE BLOOD QUANT: CPT

## 2024-12-19 PROCEDURE — 2500000001 HC RX 250 WO HCPCS SELF ADMINISTERED DRUGS (ALT 637 FOR MEDICARE OP): Performed by: STUDENT IN AN ORGANIZED HEALTH CARE EDUCATION/TRAINING PROGRAM

## 2024-12-19 PROCEDURE — 99239 HOSP IP/OBS DSCHRG MGMT >30: CPT | Performed by: STUDENT IN AN ORGANIZED HEALTH CARE EDUCATION/TRAINING PROGRAM

## 2024-12-19 PROCEDURE — 36415 COLL VENOUS BLD VENIPUNCTURE: CPT

## 2024-12-19 RX ORDER — NAPROXEN SODIUM 220 MG/1
81 TABLET, FILM COATED ORAL DAILY
Qty: 7 TABLET | Refills: 0 | Status: SHIPPED | OUTPATIENT
Start: 2024-12-19 | End: 2024-12-26

## 2024-12-19 RX ORDER — NAPROXEN SODIUM 220 MG/1
81 TABLET, FILM COATED ORAL DAILY
Status: DISCONTINUED | OUTPATIENT
Start: 2024-12-19 | End: 2024-12-19 | Stop reason: HOSPADM

## 2024-12-19 ASSESSMENT — PAIN - FUNCTIONAL ASSESSMENT
PAIN_FUNCTIONAL_ASSESSMENT: 0-10
PAIN_FUNCTIONAL_ASSESSMENT: 0-10

## 2024-12-19 ASSESSMENT — PAIN SCALES - GENERAL
PAINLEVEL_OUTOF10: 0 - NO PAIN
PAINLEVEL_OUTOF10: 0 - NO PAIN

## 2024-12-19 NOTE — PROGRESS NOTES
"Lito Osei is a 64 y.o. male on day 2 of admission presenting with Fall at home, initial encounter.      Subjective   Pt. In bed. Repeat CTH shows no hemorrhage, evolving right frontal CVA, left frontal CVA not well visualized on imaging. He continues with left sided hemiplegia. Ok to start aspirin and then will switch to Eliquis in 1 week for PE. Will discharge to SNF with [facility] as oupt.   Review of systems are negative unless otherwise specified in HPI.       Objective     Last Recorded Vitals  Blood pressure 120/68, pulse 90, temperature 37.1 °C (98.8 °F), temperature source Temporal, resp. rate 17, height 1.651 m (5' 5\"), weight 73 kg (161 lb), SpO2 94%.      Relevant Results  Results for orders placed or performed during the hospital encounter of 12/16/24 (from the past 24 hours)   POCT GLUCOSE   Result Value Ref Range    POCT Glucose 166 (H) 74 - 99 mg/dL   POCT GLUCOSE   Result Value Ref Range    POCT Glucose 132 (H) 74 - 99 mg/dL   Magnesium   Result Value Ref Range    Magnesium 2.00 1.60 - 2.40 mg/dL   Basic Metabolic Panel   Result Value Ref Range    Glucose 94 74 - 99 mg/dL    Sodium 142 136 - 145 mmol/L    Potassium 4.0 3.5 - 5.3 mmol/L    Chloride 112 (H) 98 - 107 mmol/L    Bicarbonate 26 21 - 32 mmol/L    Anion Gap 8 (L) 10 - 20 mmol/L    Urea Nitrogen 27 (H) 6 - 23 mg/dL    Creatinine 1.53 (H) 0.50 - 1.30 mg/dL    eGFR 51 (L) >60 mL/min/1.73m*2    Calcium 7.7 (L) 8.6 - 10.3 mg/dL   CBC   Result Value Ref Range    WBC 6.8 4.4 - 11.3 x10*3/uL    nRBC 0.0 0.0 - 0.0 /100 WBCs    RBC 3.27 (L) 4.50 - 5.90 x10*6/uL    Hemoglobin 9.5 (L) 13.5 - 17.5 g/dL    Hematocrit 30.6 (L) 41.0 - 52.0 %    MCV 94 80 - 100 fL    MCH 29.1 26.0 - 34.0 pg    MCHC 31.0 (L) 32.0 - 36.0 g/dL    RDW 13.8 11.5 - 14.5 %    Platelets 191 150 - 450 x10*3/uL   POCT GLUCOSE   Result Value Ref Range    POCT Glucose 83 74 - 99 mg/dL   CT head wo IV contrast    Result Date: 12/18/2024  Interpreted By:  Cammie Jeong, STUDY: CT HEAD " WO IV CONTRAST;  12/18/2024 2:05 pm   INDICATION: Signs/Symptoms:recent infarct reassess petechial hemorrhage.   COMPARISON: CT head from 12/16/2024 MRI brain from 12/17/2024   ACCESSION NUMBER(S): [id number]   ORDERING CLINICIAN: DHIRAJ HAYES   TECHNIQUE: Noncontrast axial CT scan of head was performed. Angled reformats in brain and bone windows were generated. The images were reviewed in bone, brain, blood and soft tissue windows.   FINDINGS: There is a subacute infarct within the right frontal lobe. Areas of encephalomalacia and gliosis are again noted within bilateral frontal lobes near convexity. The additional subacute infarct within the left frontal lobe is not adequately defined on the CT exam compared to prior MRI.   Additional focus of encephalomalacia and gliosis is noted within the left occipital lobe.   There is no hyperdense intracranial hemorrhage.   Patchy hypodensities are noted within bilateral periventricular and subcortical white matter which likely reflect sequela of chronic small vessel ischemic change. There is volume loss of the inferior vermis and ex vacuo dilatation of the 4th ventricle.   Visualized paranasal sinuses and bilateral mastoids are clear.       No acute intracranial hemorrhage. Evolving right frontal lobe subacute infarct. The left frontal lobe subacute infarct is not well visualized on current exam as compared to prior MRI.   Signed by: Cammie Jeong 12/18/2024 2:28 PM Dictation workstation:   [id number]    Transthoracic Echo Complete    Result Date: 12/18/2024          [place] [address]  Tel [phone] Fax [phone] TRANSTHORACIC ECHOCARDIOGRAM REPORT Patient Name:       THA Scales Physician:    [id number] Tawanda Mcknight DO Study Date:         12/17/2024          Ordering Provider:    [id number] BELLA                                                                MIKEL MRN/PID:            [id number]            Fellow: Accession#:         [id number]        Nurse:                Selene Ly Date of Birth/Age:  1960 / 64      Sonographer:          Maria Elena Gunter RDCS Gender Assigned at                     Additional Staff: Birth: Height:             165.10 cm           Admit Date:           12/16/2024 Weight:             73.03 kg            Admission Status:     Inpatient -                                                               Routine BSA / BMI:          1.80 m2 / 26.79     Department Location:  [place] [address]                                     Echo Lab Blood Pressure: 127 /88 mmHg Study Type:    TRANSTHORACIC ECHO (TTE) COMPLETE Diagnosis/ICD: Personal history of transient ischemic attack (TIA), and cerebral                infarction without residual deficits-Z86.73 Indication:    Cerebrovascular Accident CPT Codes:     Echo Complete w Full Doppler-01032 Patient History: Diabetes:          Yes Pertinent History: CVA, HTN, Hyperlipidemia and PFO. Study Detail: The following Echo studies were performed: 2D, M-Mode, Doppler and               color flow. Technically challenging study due to patient lying in               supine position and body habitus. Definity used as a contrast               agent for endocardial border definition and agitated saline used               as a contrast agent for intraseptal flow evaluation. Total               contrast used for this procedure was 2 mL via IV push.  PHYSICIAN INTERPRETATION: Left Ventricle: The left ventricular systolic function is normal, with a visually estimated ejection fraction of 60%. There are no regional wall motion abnormalities. The left ventricular cavity size is normal. There is mild increased septal and normal posterior left ventricular  wall thickness. There is left ventricular concentric remodeling. Spectral Doppler shows a Grade I (impaired relaxation pattern) of left ventricular diastolic filling with normal left atrial filling pressure. LV Wall Scoring: All segments are normal. Left Atrium: The left atrium is normal in size. The patent foramen ovale was visualized using agitated saline contrast. There is a mobile interatrial septum. A bubble study using agitated saline was performed. Bubble study is positive. A small PFO (= 10 bubbles) was demonstrated. PFO noted today previously identified at [facility] on an echo done in 2015. Right Ventricle: The right ventricle is normal in size. There is normal right ventricular global systolic function. Right Atrium: The right atrium is normal in size. Aortic Valve: The aortic valve appears structurally normal. There is no evidence of aortic valve stenosis. The aortic valve dimensionless index is 0.68. There is no evidence of aortic valve regurgitation. The peak instantaneous gradient of the aortic valve is 8 mmHg. The mean gradient of the aortic valve is 5 mmHg. Mitral Valve: The mitral valve is normal in structure. There is no evidence of mitral valve stenosis. There is normal mitral valve leaflet mobility. There is trace mitral valve regurgitation. Tricuspid Valve: The tricuspid valve is structurally normal. There is normal tricuspid valve leaflet mobility. There is trace to mild tricuspid regurgitation. Pulmonic Valve: The pulmonic valve is structurally normal. There is no indication of pulmonic valve regurgitation. Pericardium: Trivial pericardial effusion. Aorta: The aortic root is normal. Pulmonary Artery: The pulmonary artery is not well visualized. The tricuspid regurgitant velocity is 2.42 m/s, and with an estimated right atrial pressure of 3 mmHg, the estimated pulmonary artery pressure is normal with the RVSP at 26.4 mmHg. Systemic Veins: The inferior vena cava was not assessed.   CONCLUSIONS:  1. The left ventricular systolic function is normal, with a visually estimated ejection fraction of 60%.  2. Spectral Doppler shows a Grade I (impaired relaxation pattern) of left ventricular diastolic filling with normal left atrial filling pressure.  3. There is normal right ventricular global systolic function.  4. There is no evidence of mitral valve stenosis.  5. Trace mitral valve regurgitation.  6. Trace to mild tricuspid regurgitation.  7. Aortic valve stenosis is not present.  8. The pulmonary artery is not well visualized.  9. A bubble study using agitated saline was performed. Bubble study is positive. A small PFO (= 10 bubbles) was demonstrated. QUANTITATIVE DATA SUMMARY:  2D MEASUREMENTS:           Normal Ranges: LAs:             2.90 cm   (2.7-4.0cm) IVSd:            1.18 cm   (0.6-1.1cm) LVPWd:           1.03 cm   (0.6-1.1cm) LVIDd:           3.61 cm   (3.9-5.9cm) LVIDs:           2.56 cm LV Mass Index:   82.1 g/m2 LV % FS          29.1 %  LA VOLUME:                    Normal Ranges: LA Vol A4C:        31.8 ml    (22+/-6mL/m2) LA Vol A2C:        30.1 ml LA Vol BP:         31.3 ml LA Vol Index A4C:  17.6ml/m2 LA Vol Index A2C:  16.7 ml/m2 LA Vol Index BP:   17.3 ml/m2 LA Area A4C:       13.4 cm2 LA Area A2C:       12.9 cm2 LA Major Axis A4C: 4.8 cm LA Major Axis A2C: 4.7 cm LA Volume Index:   16.8 ml/m2  LV SYSTOLIC FUNCTION BY 2D PLANIMETRY (MOD):                      Normal Ranges: EF-A4C View:    55 % (>=55%) EF-A2C View:    60 % EF-Biplane:     59 % EF-Visual:      60 % LV EF Reported: 60 %  LV DIASTOLIC FUNCTION:           Normal Ranges: MV Peak E:             0.50 m/s  (0.7-1.2 m/s) MV Peak A:             0.84 m/s  (0.42-0.7 m/s) E/A Ratio:             0.60      (1.0-2.2) MV e'                  0.069 m/s (>8.0) MV lateral e'          0.08 m/s MV medial e'           0.06 m/s E/e' Ratio:            7.22      (<8.0)  MITRAL VALVE:          Normal Ranges: MV DT:        172 msec  (150-240msec)  AORTIC VALVE:                     Normal Ranges: AoV Vmax:                1.45 m/s (<=1.7m/s) AoV Peak P.4 mmHg (<20mmHg) AoV Mean P.0 mmHg (1.7-11.5mmHg) LVOT Max Toby:            1.09 m/s (<=1.1m/s) AoV VTI:                 21.70 cm (18-25cm) LVOT VTI:                14.70 cm LVOT Diameter:           2.00 cm  (1.8-2.4cm) AoV Area, VTI:           2.13 cm2 (2.5-5.5cm2) AoV Area,Vmax:           2.36 cm2 (2.5-4.5cm2) AoV Dimensionless Index: 0.68  RIGHT VENTRICLE: TAPSE: 23.0 mm RV s'  0.13 m/s  TRICUSPID VALVE/RVSP:          Normal Ranges: Peak TR Velocity:     2.42 m/s RV Syst Pressure:     26 mmHg  (< 30mmHg)  [id number] Tawanda Mcknight DO Electronically signed on 2024 at 9:23:33 AM  Wall Scoring  ** Final **     MR brain wo IV contrast    Result Date: 2024  Interpreted By:  Sergio Zhang and Hooper Grayson STUDY: MR BRAIN WO IV CONTRAST; MR ANGIO NECK WO IV CONTRAST; MR ANGIO HEAD WO IV CONTRAST;  2024 5:15 pm   INDICATION: Signs/Symptoms:stroke.     COMPARISON: CT of the head obtained 2024 Carotid artery ultrasound obtained 2024   ACCESSION NUMBER(S): [id number]; [id number]; [id number]   ORDERING CLINICIAN: JENA MORIN   TECHNIQUE: Axial T2, FLAIR, DWI, gradient echo T2 and sagittal and coronal T1 weighted images of brain were acquired. No contrast administered. Multiple sequences are marred by patient motion artifact. Time-of-flight MR angiography was performed of the great vessels of the head. Axial source data as well as MIP projections were provided. Time-of-flight MR angiography was performed of the great vessels of the neck. Axial source data as well as MIP projections were provided.   FINDINGS: MRI BRAIN:   Midline brain structures appear intact on mid sagittal imaging. Mild-to-moderate diffuse cerebral parenchymal volume loss is seen.   Restricted diffusion is observed in the right superior frontal gyrus and  anterior right cingulate gyrus, and the cortex of the dorsal left frontal lobe along the left superior frontal sulcus, and additional punctate involvement noted of the left paramedian occipital lobe. Patchy gradient signal hypointensity is observed in the subcortical right frontal lobe (axial T2 star series 8 images 24-29).   No evidence of acute intracranial hemorrhage, intra-axial mass, or mass effect. [place] subcortical and periventricular white matter FLAIR signal hyperintensities are observed bilaterally. Left paramedian occipital lobe encephalomalacia and gliosis is noted consistent with remote infarct. Paramedian FLAIR signal hyperintensity and volume loss observed in the right cerebellar hemisphere consistent with remote infarct.   No ventriculomegaly. T2 vascular flow voids are normal.   Normal MRI appearances of the orbits and globes. The paranasal sinuses are clear. There is no mastoid effusion.   MR ANGIOGRAPHY HEAD:   Fetal origin right PCA incidentally noted.   Paucity of signal is observed in the distal cavernous segment of the right intracranial internal carotid artery (axial source data series 1, image 64). Otherwise, unremarkable signal and configuration of the demonstrated segments of the intracranial internal carotid arteries. No evidence of aneurysm, dissection, or flow-limiting stenosis.   The right A1 segment appears aplastic. Otherwise normal assessment of the [id number] and its branch vasculature. Slight loss of flow related signal of the A4 segments of bilateral ICAs thought to be artifactual secondary to edge of field of view.   The left vertebral artery appears to be dominant. Demonstrated segments of the distal vertebral arteries through the vertebrobasilar confluence are normal.   MR ANGIOGRAPHY NECK:   The demonstrated segments of the common carotid arteries appear normal. Carotid bulbs appear normal. Signal and configuration of the demonstrated extracranial segments of the  internal carotid arteries appear normal.   The V3 and distal V4 segments of the right vertebral artery demonstrates variable multifocal signal opacification, possibly representative of multifocal stenoses. Normal signal and configuration of the left vertebral artery.   Limited assessment of the soft tissues of the neck is unremarkable.       MRI BRAIN: 1. Acute or early subacute infarcts involving the right greater than left frontal lobes, right anterior cingulate gyrus, and punctate infarct in the left medial occipital lobe. There is patchy gradient signal hypointensity superimposed upon the right frontal infarct, which may be representative of early hemorrhagic transformation. 2. Moderate-to-severe burden of white matter FLAIR signal hyperintensities consistent with chronic ischemic microvascular changes. 3. Remote infarcts with ensuing gliosis and encephalomalacia involving the left occipital hemisphere and right cerebellum.   MR ANGIOGRAPHY HEAD: 1. Paucity of signal observed in the distal cavernous segment of the right internal carotid artery, which may be secondary to time-of-flight acquisition technique versus focal stenosis. 2. Aplastic right A1. 3. Fetal origin of the right PCA.   MR ANGIOGRAPHY NECK: 1. Intermittent loss of flow related signal of the V3 and V4 segments of the right vertebral artery, which represents true stenoses versus artifact secondary to diminutive caliber. Dominant normal appearance of the left vertebral artery. 2. Normal signal and configuration of the visualized common carotid arteries, carotid bulbs, and extracranial courses of the internal carotid arteries.   I personally reviewed the images/study and I agree with the findings as stated. This study was interpreted at [facility], [place], [place].   MACRO: Soren Jerez discussed the significance and urgency of this critical finding by epic secure chatwith  Dr. Peralta On 12/18/2024 at 8:15 am.   (**-RCF-**) Findings:  See findings.   Signed by: Sergio Zhang 12/18/2024 9:10 AM Dictation workstation:   [id number]    MR angio neck wo IV contrast    Result Date: 12/18/2024  Interpreted By:  Sergio Zhang and Hooper Grayson STUDY: MR BRAIN WO IV CONTRAST; MR ANGIO NECK WO IV CONTRAST; MR ANGIO HEAD WO IV CONTRAST;  12/17/2024 5:15 pm   INDICATION: Signs/Symptoms:stroke.     COMPARISON: CT of the head obtained December 16, 2024 Carotid artery ultrasound obtained December 17, 2024   ACCESSION NUMBER(S): [id number]; [id number]; [id number]   ORDERING CLINICIAN: JENA MORIN   TECHNIQUE: Axial T2, FLAIR, DWI, gradient echo T2 and sagittal and coronal T1 weighted images of brain were acquired. No contrast administered. Multiple sequences are marred by patient motion artifact. Time-of-flight MR angiography was performed of the great vessels of the head. Axial source data as well as MIP projections were provided. Time-of-flight MR angiography was performed of the great vessels of the neck. Axial source data as well as MIP projections were provided.   FINDINGS: MRI BRAIN:   Midline brain structures appear intact on mid sagittal imaging. Mild-to-moderate diffuse cerebral parenchymal volume loss is seen.   Restricted diffusion is observed in the right superior frontal gyrus and anterior right cingulate gyrus, and the cortex of the dorsal left frontal lobe along the left superior frontal sulcus, and additional punctate involvement noted of the left paramedian occipital lobe. Patchy gradient signal hypointensity is observed in the subcortical right frontal lobe (axial T2 star series 8 images 24-29).   No evidence of acute intracranial hemorrhage, intra-axial mass, or mass effect. [place] subcortical and periventricular white matter FLAIR signal hyperintensities are observed bilaterally. Left paramedian occipital lobe encephalomalacia and gliosis is noted consistent with remote infarct. Paramedian FLAIR signal  hyperintensity and volume loss observed in the right cerebellar hemisphere consistent with remote infarct.   No ventriculomegaly. T2 vascular flow voids are normal.   Normal MRI appearances of the orbits and globes. The paranasal sinuses are clear. There is no mastoid effusion.   MR ANGIOGRAPHY HEAD:   Fetal origin right PCA incidentally noted.   Paucity of signal is observed in the distal cavernous segment of the right intracranial internal carotid artery (axial source data series 1, image 64). Otherwise, unremarkable signal and configuration of the demonstrated segments of the intracranial internal carotid arteries. No evidence of aneurysm, dissection, or flow-limiting stenosis.   The right A1 segment appears aplastic. Otherwise normal assessment of the [id number] and its branch vasculature. Slight loss of flow related signal of the A4 segments of bilateral ICAs thought to be artifactual secondary to edge of field of view.   The left vertebral artery appears to be dominant. Demonstrated segments of the distal vertebral arteries through the vertebrobasilar confluence are normal.   MR ANGIOGRAPHY NECK:   The demonstrated segments of the common carotid arteries appear normal. Carotid bulbs appear normal. Signal and configuration of the demonstrated extracranial segments of the internal carotid arteries appear normal.   The V3 and distal V4 segments of the right vertebral artery demonstrates variable multifocal signal opacification, possibly representative of multifocal stenoses. Normal signal and configuration of the left vertebral artery.   Limited assessment of the soft tissues of the neck is unremarkable.       MRI BRAIN: 1. Acute or early subacute infarcts involving the right greater than left frontal lobes, right anterior cingulate gyrus, and punctate infarct in the left medial occipital lobe. There is patchy gradient signal hypointensity superimposed upon the right frontal infarct, which may be  representative of early hemorrhagic transformation. 2. Moderate-to-severe burden of white matter FLAIR signal hyperintensities consistent with chronic ischemic microvascular changes. 3. Remote infarcts with ensuing gliosis and encephalomalacia involving the left occipital hemisphere and right cerebellum.   MR ANGIOGRAPHY HEAD: 1. Paucity of signal observed in the distal cavernous segment of the right internal carotid artery, which may be secondary to time-of-flight acquisition technique versus focal stenosis. 2. Aplastic right A1. 3. Fetal origin of the right PCA.   MR ANGIOGRAPHY NECK: 1. Intermittent loss of flow related signal of the V3 and V4 segments of the right vertebral artery, which represents true stenoses versus artifact secondary to diminutive caliber. Dominant normal appearance of the left vertebral artery. 2. Normal signal and configuration of the visualized common carotid arteries, carotid bulbs, and extracranial courses of the internal carotid arteries.   I personally reviewed the images/study and I agree with the findings as stated. This study was interpreted at [place], [place].   MACRO: Soren Jerez discussed the significance and urgency of this critical finding by [facility] chatwith  Dr. Peralta On 12/18/2024 at 8:15 am.  (**-RCF-**) Findings:  See findings.   Signed by: Sergio Zhang 12/18/2024 9:10 AM Dictation workstation:   [id number]    MR angio head wo IV contrast    Result Date: 12/18/2024  Interpreted By:  Sergio Zhang and Hooper Grayson STUDY: MR BRAIN WO IV CONTRAST; MR ANGIO NECK WO IV CONTRAST; MR ANGIO HEAD WO IV CONTRAST;  12/17/2024 5:15 pm   INDICATION: Signs/Symptoms:stroke.     COMPARISON: CT of the head obtained December 16, 2024 Carotid artery ultrasound obtained December 17, 2024   ACCESSION NUMBER(S): [id number]; [id number]; [id number]   ORDERING CLINICIAN: JENA MORIN   TECHNIQUE: Axial T2, FLAIR, DWI, gradient echo  T2 and sagittal and coronal T1 weighted images of brain were acquired. No contrast administered. Multiple sequences are marred by patient motion artifact. Time-of-flight MR angiography was performed of the great vessels of the head. Axial source data as well as MIP projections were provided. Time-of-flight MR angiography was performed of the great vessels of the neck. Axial source data as well as MIP projections were provided.   FINDINGS: MRI BRAIN:   Midline brain structures appear intact on mid sagittal imaging. Mild-to-moderate diffuse cerebral parenchymal volume loss is seen.   Restricted diffusion is observed in the right superior frontal gyrus and anterior right cingulate gyrus, and the cortex of the dorsal left frontal lobe along the left superior frontal sulcus, and additional punctate involvement noted of the left paramedian occipital lobe. Patchy gradient signal hypointensity is observed in the subcortical right frontal lobe (axial T2 star series 8 images 24-29).   No evidence of acute intracranial hemorrhage, intra-axial mass, or mass effect. [place] subcortical and periventricular white matter FLAIR signal hyperintensities are observed bilaterally. Left paramedian occipital lobe encephalomalacia and gliosis is noted consistent with remote infarct. Paramedian FLAIR signal hyperintensity and volume loss observed in the right cerebellar hemisphere consistent with remote infarct.   No ventriculomegaly. T2 vascular flow voids are normal.   Normal MRI appearances of the orbits and globes. The paranasal sinuses are clear. There is no mastoid effusion.   MR ANGIOGRAPHY HEAD:   Fetal origin right PCA incidentally noted.   Paucity of signal is observed in the distal cavernous segment of the right intracranial internal carotid artery (axial source data series 1, image 64). Otherwise, unremarkable signal and configuration of the demonstrated segments of the intracranial internal carotid arteries. No evidence of  aneurysm, dissection, or flow-limiting stenosis.   The right A1 segment appears aplastic. Otherwise normal assessment of the [id number] and its branch vasculature. Slight loss of flow related signal of the A4 segments of bilateral ICAs thought to be artifactual secondary to edge of field of view.   The left vertebral artery appears to be dominant. Demonstrated segments of the distal vertebral arteries through the vertebrobasilar confluence are normal.   MR ANGIOGRAPHY NECK:   The demonstrated segments of the common carotid arteries appear normal. Carotid bulbs appear normal. Signal and configuration of the demonstrated extracranial segments of the internal carotid arteries appear normal.   The V3 and distal V4 segments of the right vertebral artery demonstrates variable multifocal signal opacification, possibly representative of multifocal stenoses. Normal signal and configuration of the left vertebral artery.   Limited assessment of the soft tissues of the neck is unremarkable.       MRI BRAIN: 1. Acute or early subacute infarcts involving the right greater than left frontal lobes, right anterior cingulate gyrus, and punctate infarct in the left medial occipital lobe. There is patchy gradient signal hypointensity superimposed upon the right frontal infarct, which may be representative of early hemorrhagic transformation. 2. Moderate-to-severe burden of white matter FLAIR signal hyperintensities consistent with chronic ischemic microvascular changes. 3. Remote infarcts with ensuing gliosis and encephalomalacia involving the left occipital hemisphere and right cerebellum.   MR ANGIOGRAPHY HEAD: 1. Paucity of signal observed in the distal cavernous segment of the right internal carotid artery, which may be secondary to time-of-flight acquisition technique versus focal stenosis. 2. Aplastic right A1. 3. Fetal origin of the right PCA.   MR ANGIOGRAPHY NECK: 1. Intermittent loss of flow related signal of the V3 and  V4 segments of the right vertebral artery, which represents true stenoses versus artifact secondary to diminutive caliber. Dominant normal appearance of the left vertebral artery. 2. Normal signal and configuration of the visualized common carotid arteries, carotid bulbs, and extracranial courses of the internal carotid arteries.   I personally reviewed the images/study and I agree with the findings as stated. This study was interpreted at [facility], [place], [place].   MACRO: Soren Jerez discussed the significance and urgency of this critical finding by epic secure chatwith  Dr. Peralta On 12/18/2024 at 8:15 am.  (**-RCF-**) Findings:  See findings.   Signed by: Sergio Zhang 12/18/2024 9:10 AM Dictation workstation:   [id number]    Carotid duplex bilateral    Result Date: 12/17/2024  Interpreted By:  Greg Faustin, STUDY: [facility] US CAROTID ARTERY DUPLEX BILATERAL;  12/17/2024 12:41 pm   INDICATION: Signs/Symptoms:stroke.   COMPARISON: None.   ACCESSION NUMBER(S): [id number]   ORDERING CLINICIAN: JENA MORIN   TECHNIQUE: Vascular ultrasound of the extracranial carotid system was performed bilaterally.  Gray scale, color Doppler and spectral Doppler waveform analysis was performed.   FINDINGS: RIGHT: On the right  mild atherosclerotic plaque is demonstrated. Right ICA peak velocities not significantly elevated. The peak systolic velocities are as follows:   RIGHT SIDE PEAK SYSTOLIC VELOCITY TABLE: CCA 72 cm/sec. ICA 53 cm/sec. ECA 91 cm/sec.   The ratio of the peak systolic velocity of the right ICA/CCA is 0.7.   RIGHT VERTEBRAL ARTERY: The right vertebral artery demonstrates proximal normal anterograde flow   LEFT: On the left  mild atherosclerotic plaque is demonstrated. Left ICA peak velocities not significantly elevated. The peak systolic velocities are as follows:   LEFT SIDE PEAK SYSTOLIC VELOCITY TABLE: CCA 73 cm/sec. ICA 60 cm/sec. ECA 67 cm/sec.   The ratio of the  peak systolic velocity of the left ICA/CCA is 0.8.   LEFT VERTEBRAL ARTERY: The left vertebral artery demonstrates proximal normal anterograde flow       Estimated ICA stenosis is less than 50% bilaterally.   MACRO: None   Signed by: Greg Faustin 12/17/2024 4:42 PM Dictation workstation:   [id number]   Scheduled medications   Medication Dose Route Frequency    aspirin  81 mg oral Daily    atorvastatin  80 mg oral Nightly    dapagliflozin propanediol  10 mg oral Daily    insulin glargine  12 Units subcutaneous Nightly    insulin lispro  0-10 Units subcutaneous TID AC    polyethylene glycol  17 g oral Daily    tamsulosin  0.4 mg oral Daily     PRN medications   Medication    acetaminophen    Or    acetaminophen    Or    acetaminophen    dextrose    dextrose    glucagon    glucagon    meclizine    ondansetron    Or    ondansetron           NIH Stroke Scale  1A. Level of Consciousness: Arouses to Minor Stimulation  1B. Ask Month and Age: Both Questions Right  1C. Blink Eyes & Squeeze Hands: Performs Both Tasks  2. Best Gaze: Normal  3. Visual: Partial Hemianopia  4. Facial Palsy: Normal Symmetrical Movements  5A. Motor - Left Arm: No Effort Against Gravity  5B. Motor - Right Arm: No Drift  6A. Motor - Left Leg: No Effort Against Gravity  6B. Motor - Right Leg: No Effort Against [place]  7. Limb Ataxia: Absent  8. Sensory Loss: Mild-to-Moderate Sensory Loss  9. Best Language: Mild-to-Moderate Aphasia  10. Dysarthria: Mild-to-Moderate Dysarthria  11. Extinction and Inattention: No Abnormality  NIH Stroke Scale: 14           Abbey Coma Scale  Best Eye Response: To verbal stimuli  Best Verbal Response: Oriented  Best Motor Response: Follows commands  Amanda Coma Scale Score: 14                               Assessment/Plan      Assessment & Plan  Fall at home, initial encounter    Acute CVA (cerebrovascular accident) (Multi)    Impression:  Bilateral (mainly right frontal) cardioembolic infarcts due to paradoxical  embolus, or possibly occult A-fib  Small petechial hemorrhagic transformation-resolving  Repeat CTH hows no hemorrhage, evolving right frontal CVA, left frontal CVA not well visualized on imaging.   Recommend:  Continue with baby asa daily and then switch to Eliquis in 1-2 weeks  Ziopatch on discharge-ordered placed in chart  Have him see Dr. Cason for consideration of PFO closure outpatient  Discharge to SNF for rehab    No further needs from neurology; okay for transfer or discharge as per primary team. Please contact if condition changes for re-eval.                 I spent 30 minutes in the professional and overall care of this patient.      Selena Alvarado, APRN-CNP

## 2024-12-19 NOTE — PROGRESS NOTES
Inpatient Speech Language Pathology Treatment Note     Patient Name: Lito Osei  MRN: [id number]  : 1960  Today's Date: 24  Time Calculation  Start Time: 1145  Stop Time: 1200  Time Calculation (min): 15 min     Room:[address] [place] [address]    Assessment::     Patient was seen at bedside during his lunch today.  Nurse was feeding patient and speech therapist took over working with patient.  Patient was repositioned in his bed with his tray in front of him.  Speech therapist cued patient to feed himself.  Patient able to feed himself with cueing.  He was able to tolerate soft bite-size diet with mild-moderate oral delay and minimal oral residual post swallow.  He was able to tolerate sips of thin ice tea via straw without overt signs symptoms aspiration.  Recommending to continue with plan of care.  Patient to be discharged later today.    Plan:     Skilled speech therapy for dysphagia treatment continues to be warranted to provide training and instruction regarding the use of compensatory swallow strategies, for pt/caregiver education in order to reduce risk of aspiration, dehydration and malnutrition. , to assess tolerance of diet , to determine ability to upgrade diet after PO trials with SLP  SLP TX Plan: Continue Plan of Care  SLP Frequency: 2x per week  Discussed POC: Patient, Nursing  Discussed Risks/Benefits: Patient, Nursing  Patient/Caregiver Agreeable: Yes  SLP - OK to Discharge: No    Pain:     Pain Assessment  Pain Assessment: 0-10  0-10 (Numeric) Pain Score: 0 - No pain       Subjective:     Pt. seen at bedside for skilled dysphagia treatment.   Prior to Session Communication: Bedside nurse        Oxygen status:     nasal cannula     RECOMMENDATIONS:     Solid Diet Recommendations: Soft & bite sized/chopped (IDDSI Level 6)  Liquid Diet Recommendations: Thin (IDDSI Level 0)  Compensatory strategies: small bites/sips, alternate bites/sips, upright 90 degrees for intake   Medication administration:    Whole with thin liquids       Goals:     Goals established on 12/17/24  Pt. To tolerate least restrictive diet without pulmonary compromise   Goal Status: In progress  Goal Progress: Progressing as follows              -Impulsive eating behaviors noted. Diet orders changed to recommendation from the [facility]  Pt. To use safe swallow strategies to decrease risk of aspiration in all observed trials of intake   Goal Status: In progress  Goal Progress: Progressing as follows              -Pt did not respond to direct verbal cues to reduce bolus size or alternate consistencies. Pt required assist to prepare sandwich.    Education:     Pt. given skilled instruction on use of compensatory swallowing strategies and feeding himself.  Also educated nurse and nurse tech regarding set up and encouragement/cueing of patient to feed himself during meals and use compensatory swallowing strategies.  Pt. was unable to demonstrate understanding, needs further instruction

## 2024-12-19 NOTE — PROGRESS NOTES
12/19/24 1233   Discharge Planning   Home or Post Acute Services Post acute facilities (Rehab/SNF/etc)   Type of Post Acute Facility Services Rehab;Skilled nursing   Expected Discharge Disposition SNF   Does the patient need discharge transport arranged? Yes   RoundTrip coordination needed? Yes   Has discharge transport been arranged? Yes   What day is the transport expected? 12/19/24   What time is the transport expected? 1300     Pt medically ready to DC to SNF today. Per Neurology, pt Ziopatch ordered and will be delivered to SNF, pt discharging on baby ASA PO until 12/27, then resume Eliquis on 12/28. [facility] confirms acceptance today, pts RN given report number [phone] and ask for nursing supervisor. Pt and pt brother Rosales notified and in agreement to DC to [facility] today. Pt transport set up and confirmed for 1 PM vis [employer] [phone]. [facility]SC has sent [phone] in [employer], and GF, DC summary and AVS attached in [facility].

## 2024-12-19 NOTE — DISCHARGE SUMMARY
Discharge Diagnosis  Fall at home, initial encounter    Issues Requiring Follow-Up      Discharge Meds     Medication List      ASK your doctor about these medications     acetaminophen 325 mg tablet; Commonly known as: Tylenol   alum-mag hydroxide-simeth 200-200-20 mg/5 mL oral suspension; Commonly   known as: Mylanta   apixaban 5 mg tablet; Commonly known as: Eliquis; Take 2 tablets (10 mg)   by mouth 2 times a day for 3 days, THEN 1 tablet (5 mg) 2 times a day.;   Start taking on: November 26, 2024   atorvastatin 80 mg tablet; Commonly known as: Lipitor   Farxiga 10 mg; Generic drug: dapagliflozin propanediol   guaiFENesin 100 mg/5 mL syrup; Commonly known as: Robitussin   insulin asp prt-insulin aspart 100 unit/mL (70-30) injection; Commonly   known as: NovoLOG Mix 70-30   insulin lispro 100 unit/mL injection; Inject 0-10 Units under the skin 4   times a day before meals. Take as directed per insulin instructions.   lisinopril 10 mg tablet   magnesium hydroxide 400 mg/5 mL suspension; Commonly known as: Milk of   Magnesia   tamsulosin 0.4 mg 24 hr capsule; Commonly known as: Flomax; Take 1   capsule (0.4 mg) by mouth once daily.       Test Results Pending At Discharge  Pending Labs       No current pending labs.            Hospital Course   64-year-old male with past medical history of recent PE on Eliquis, type 2 diabetes, hypertension, hyperlipidemia, history of CVA, CKD stage IV who was admitted with a mechanical fall was found to have bilateral cardioembolic infarcts.  Echocardiogram showed a PFO.  Initially it seemed like he did have some hemorrhagic transformation as per neurology for now we will continue aspirin for 1 week and then switch back to Eliquis.  Zio patch has also been ordered.  Outpatient evaluation with cardiology for PFO closure and neurology.  Repeat CAT scan did not show any increasing hemorrhage, vitally stable.  Plan to discharge to SNF.  Patient does not have any active  complaints.    Pertinent Physical Exam At Time of Discharge  Physical Exam  Cardiovascular:      Rate and Rhythm: Normal rate and regular rhythm.      Heart sounds: Normal heart sounds, S1 normal and S2 normal.   Pulmonary:      Effort: Pulmonary effort is normal.      Breath sounds: Normal breath sounds.   Abdominal:      General: Bowel sounds are normal.      Palpations: Abdomen is soft.   Musculoskeletal:         General: Normal range of motion.      Cervical back: Neck supple.   Skin:     General: Skin is warm.   Neurological:      Mental Status: He is alert and oriented to person, place, and time.      Motor: Weakness present.      Comments: Left upper and lower flaccid   Outpatient Follow-Up  Future Appointments   Date Time Provider Department Center   1/21/2025  8:45 AM Nicola Webster MD [id number] [place]         Jair Sanders MD

## 2024-12-19 NOTE — CARE PLAN
The patient's goals for the shift include      Problem: Skin  Goal: Prevent/minimize sheer/friction injuries  Outcome: Progressing  Flowsheets (Taken 12/19/2024 2218)  Prevent/minimize sheer/friction injuries:   Use pull sheet   Turn/reposition every 2 hours/use positioning/transfer devices     Problem: Pain - Adult  Goal: Verbalizes/displays adequate comfort level or baseline comfort level  Outcome: Progressing     Problem: Diabetes  Goal: Vital signs within normal range for age by end of shift  Outcome: Progressing     The clinical goals for the shift include free from falls and injury    Over the shift, the patient remained free from falls and injury throughout shift. Patient turned throughout shift as ordered. Patient currently resting comfortably with stable vital signs.

## 2024-12-19 NOTE — TELEPHONE ENCOUNTER
14 day Ziopatch [id number] does not require prior auth per the [employer]-Transaction ID: [id number]/Customer ID: [id number].

## 2024-12-23 NOTE — DISCHARGE SUMMARY
Discharge Diagnosis  Hyperglycemia due to diabetes mellitus (Multi)  Metabolic acidosis  Acute urinary retention  30 minutes type II  Acute PE      Discharge Meds     Medication List      START taking these medications     insulin lispro 100 unit/mL injection; Inject 0-10 Units under the skin 4   times a day before meals. Take as directed per insulin instructions.   tamsulosin 0.4 mg 24 hr capsule; Commonly known as: Flomax; Take 1   capsule (0.4 mg) by mouth once daily.     CHANGE how you take these medications     apixaban 5 mg tablet; Commonly known as: Eliquis; Take 2 tablets (10 mg)   by mouth 2 times a day for 3 days, THEN 1 tablet (5 mg) 2 times a day.;   Start taking on: November 26, 2024; What changed: medication strength, See   the new instructions.     CONTINUE taking these medications     * acetaminophen 325 mg tablet; Commonly known as: Tylenol   alum-mag hydroxide-simeth 200-200-20 mg/5 mL oral suspension; Commonly   known as: Mylanta   atorvastatin 80 mg tablet; Commonly known as: Lipitor   Farxiga 10 mg; Generic drug: dapagliflozin propanediol   guaiFENesin 100 mg/5 mL syrup; Commonly known as: Robitussin   insulin asp prt-insulin aspart 100 unit/mL (70-30) injection; Commonly   known as: NovoLOG Mix 70-30   magnesium hydroxide 400 mg/5 mL suspension; Commonly known as: Milk of   Magnesia  * This list has 1 medication(s) that are the same as other medications   prescribed for you. Read the directions carefully, and ask your doctor or   other care provider to review them with you.     STOP taking these medications     lisinopril 10 mg tablet     ASK your doctor about these medications     * acetaminophen 325 mg tablet; Commonly known as: Tylenol; Take 2   tablets (650 mg) by mouth every 6 hours for 7 days.; Ask about: Should I   take this medication?   oxyCODONE 5 mg immediate release tablet; Commonly known as: Roxicodone;   Take 1 tablet (5 mg) by mouth every 6 hours if needed for severe pain (7 -    10) for up to 7 days.; Ask about: Should I take this medication?  * This list has 1 medication(s) that are the same as other medications   prescribed for you. Read the directions carefully, and ask your doctor or   other care provider to review them with you.       Test Results Pending At Discharge  Pending Labs       No current pending labs.            Hospital Course  Patient is 64-year-old male admitted to the hospital with acute kidney injury, metabolic acidosis, patient also diagnosed with acute PE patient kidney function improved, metabolic acidosis status resolved, patient was started on Eliquis, patient improved and was cleared to be discharged and follow-up as an outpatient    Outpatient Follow-Up  Future Appointments   Date Time Provider Department Center   1/2/2025  2:00 PM ELY [id number] ECG/HOLTER [id number] [place]   1/21/2025  8:45 AM Nicola Webster MD [id number] [place]   1/23/2025  9:15 AM Tawanda Pulido DO [id number] [place]         Jovi Raya MD

## 2025-01-02 ENCOUNTER — APPOINTMENT (OUTPATIENT)
Dept: CARDIOLOGY | Facility: CLINIC | Age: 65
End: 2025-01-02
Payer: MEDICARE

## 2025-01-02 DIAGNOSIS — I67.848 OTHER CEREBROVASCULAR VASOSPASM AND VASOCONSTRICTION: ICD-10-CM

## 2025-01-08 ENCOUNTER — TELEPHONE (OUTPATIENT)
Dept: NEUROLOGY | Facility: CLINIC | Age: 65
End: 2025-01-08
Payer: MEDICARE

## 2025-01-08 DIAGNOSIS — E11.65 HYPERGLYCEMIA DUE TO DIABETES MELLITUS (MULTI): ICD-10-CM

## 2025-01-08 NOTE — TELEPHONE ENCOUNTER
Pt's brother (POA) called with questions about his exact diagnosis from his hospital stay, stating he was not crystal clear if he had a stroke or not.  And that the rehab facility, [facility] in [place] has no documentation that pt had a stroke.  Fax number for [facility] is [phone].  Please fax over docs to them, or print them out and I will do it.  And please call Rosales to discuss pt's diagnosis.  Thanks.

## 2025-01-09 NOTE — TELEPHONE ENCOUNTER
"Spoke with brother Rosales via phone call. He states that [facility] was not aware that the pt. had a stroke. Brother also states that the house doctor was also not aware that the pt. Had a stroke. Discharge paperwork was sent to facility and states pt. was \"found to have bilateral cardioembolic infarcts\". Pt. Did have some HT, so ACT was on hold, but he was continued on baby asa and statin.  I did fax over notes and MRI results as well as plan to start Eliquis in future. The brother voiced concern over the pt.'s diet at facility not being a diabetic diet, as well as questioning if the facility was equipped to provide proper stroke rehabilitation.   I discussed with the brother that he will need to discuss with the  at [facility] to have pt. transferred to another facility if he in unhappy with the care. I suggested calling [facility] in [place].  stated when he picked [facility], he wasn't aware of their capabilities.  "

## 2025-01-11 DIAGNOSIS — I10 ESSENTIAL HYPERTENSION: ICD-10-CM

## 2025-01-11 NOTE — TELEPHONE ENCOUNTER
Comments:     Last Office Visit (last PCP visit):   7/10/2024    Next Visit Date:  Future Appointments   Date Time Provider Department Center   2/12/2025 11:00 AM Tierra Mallory PA [facility]   3/26/2025  2:00 PM uYsuf Mallory MD [facility] Neurology -   4/2/2025  9:30 AM Rey, Mike S, PA [facility]   6/16/2025  1:00 PM Yusuf Mallory MD [facility] Neurology -         Rx requested:  Requested Prescriptions     Pending Prescriptions Disp Refills    lisinopril (PRINIVIL;ZESTRIL) 10 MG tablet [Pharmacy Med Name: lisinopril 10 mg tablet] 90 tablet 1     Sig: Take 1 tablet by mouth daily

## 2025-01-12 RX ORDER — LISINOPRIL 10 MG/1
10 TABLET ORAL DAILY
Qty: 90 TABLET | Refills: 1 | Status: SHIPPED | OUTPATIENT
Start: 2025-01-12

## 2025-01-16 NOTE — PROGRESS NOTES
History of Present Illness:  Date of Surgery: 11/18/24 left partial hip arthroplasty.  Overall patient states he is doing well with no pain.  He has just been transferred to a new facility.    Imaging:  X-rays left hip status post hemiarthroplasty with stable appearance of all hardware    Exam:  Mild effusion  Left hip Incision is clean, dry, intact, and healing well  ROM: Pain-free gentle range of motion of the left hip  No calf tenderness   Negative Umm's test  Distal neurovascular exam intact    Assessment:  Patient status post left partial hip arthroplasty    Plan:  Continue PT OT.  Hip precautions until 8 weeks postop  Follow-up 4 weeks with new x-rays    Scribe Attestation  By signing my name below, Michell BRIONES Scribe   attest that this documentation has been prepared under the direction and in the presence of Nicola Webster MD.

## 2025-01-20 ENCOUNTER — CARE COORDINATION (OUTPATIENT)
Dept: CARE COORDINATION | Age: 65
End: 2025-01-20

## 2025-01-20 NOTE — CARE COORDINATION
Patient is currently in skilled nursing facility [facility].  Patient had hospital admission 12 /16 through 12 /19 at [facility] transferred to SNF

## 2025-01-21 ENCOUNTER — APPOINTMENT (OUTPATIENT)
Dept: ORTHOPEDIC SURGERY | Facility: CLINIC | Age: 65
End: 2025-01-21
Payer: MEDICARE

## 2025-01-23 ENCOUNTER — APPOINTMENT (OUTPATIENT)
Dept: CARDIOLOGY | Facility: CLINIC | Age: 65
End: 2025-01-23
Payer: MEDICARE

## 2025-01-23 PROCEDURE — 93248 EXT ECG>7D<15D REV&INTERPJ: CPT | Performed by: INTERNAL MEDICINE

## 2025-01-29 ENCOUNTER — APPOINTMENT (OUTPATIENT)
Dept: CT IMAGING | Age: 65
DRG: 682 | End: 2025-01-29
Attending: EMERGENCY MEDICINE
Payer: MEDICARE

## 2025-01-29 ENCOUNTER — HOSPITAL ENCOUNTER (INPATIENT)
Age: 65
LOS: 9 days | Discharge: NURSING, REHAB OR LONG-TERM CARE | DRG: 682 | End: 2025-02-07
Attending: EMERGENCY MEDICINE | Admitting: INTERNAL MEDICINE
Payer: MEDICARE

## 2025-01-29 ENCOUNTER — APPOINTMENT (OUTPATIENT)
Dept: GENERAL RADIOLOGY | Age: 65
DRG: 682 | End: 2025-01-29
Payer: MEDICARE

## 2025-01-29 DIAGNOSIS — R41.82 ALTERED MENTAL STATUS, UNSPECIFIED ALTERED MENTAL STATUS TYPE: Primary | ICD-10-CM

## 2025-01-29 DIAGNOSIS — N17.9 ACUTE RENAL FAILURE SUPERIMPOSED ON CHRONIC KIDNEY DISEASE, UNSPECIFIED ACUTE RENAL FAILURE TYPE, UNSPECIFIED CKD STAGE (HCC): ICD-10-CM

## 2025-01-29 DIAGNOSIS — N18.9 ACUTE RENAL FAILURE SUPERIMPOSED ON CHRONIC KIDNEY DISEASE, UNSPECIFIED ACUTE RENAL FAILURE TYPE, UNSPECIFIED CKD STAGE (HCC): ICD-10-CM

## 2025-01-29 LAB
ALBUMIN SERPL-MCNC: 3 G/DL (ref 3.5–4.6)
ALP SERPL-CCNC: 81 U/L (ref 35–104)
ALT SERPL-CCNC: 11 U/L (ref 0–41)
AMPHET UR QL SCN: NORMAL
ANION GAP SERPL CALCULATED.3IONS-SCNC: 13 MEQ/L (ref 9–15)
ANION GAP SERPL CALCULATED.3IONS-SCNC: 13 MEQ/L (ref 9–15)
AST SERPL-CCNC: 16 U/L (ref 0–40)
B PARAP IS1001 DNA NPH QL NAA+NON-PROBE: NOT DETECTED
B PERT.PT PRMT NPH QL NAA+NON-PROBE: NOT DETECTED
BACTERIA URNS QL MICRO: NEGATIVE /HPF
BARBITURATES UR QL SCN: NORMAL
BENZODIAZ UR QL SCN: NORMAL
BILIRUB SERPL-MCNC: 0.3 MG/DL (ref 0.2–0.7)
BILIRUB UR QL STRIP: NEGATIVE
BNP BLD-MCNC: 428 PG/ML
BUN SERPL-MCNC: 82 MG/DL (ref 8–23)
BUN SERPL-MCNC: 97 MG/DL (ref 8–23)
C PNEUM DNA NPH QL NAA+NON-PROBE: NOT DETECTED
CALCIUM SERPL-MCNC: 8 MG/DL (ref 8.5–9.9)
CALCIUM SERPL-MCNC: 8.5 MG/DL (ref 8.5–9.9)
CANNABINOIDS UR QL SCN: NORMAL
CHLORIDE SERPL-SCNC: 116 MEQ/L (ref 95–107)
CHLORIDE SERPL-SCNC: 117 MEQ/L (ref 95–107)
CHP ED QC CHECK: NORMAL
CK SERPL-CCNC: 78 U/L (ref 0–190)
CLARITY UR: CLEAR
CO2 SERPL-SCNC: 13 MEQ/L (ref 20–31)
CO2 SERPL-SCNC: 14 MEQ/L (ref 20–31)
COCAINE UR QL SCN: NORMAL
COLOR UR: YELLOW
CREAT SERPL-MCNC: 2.99 MG/DL (ref 0.7–1.2)
CREAT SERPL-MCNC: 3.72 MG/DL (ref 0.7–1.2)
DRUG SCREEN COMMENT UR-IMP: NORMAL
EPI CELLS #/AREA URNS AUTO: ABNORMAL /HPF (ref 0–5)
ERYTHROCYTE [DISTWIDTH] IN BLOOD BY AUTOMATED COUNT: 14.9 % (ref 11.5–14.5)
ETHANOL PERCENT: NORMAL G/DL
ETHANOLAMINE SERPL-MCNC: <10 MG/DL (ref 0–0.08)
FENTANYL SCREEN, URINE: NORMAL
FLUAV RNA NPH QL NAA+NON-PROBE: NOT DETECTED
FLUBV RNA NPH QL NAA+NON-PROBE: NOT DETECTED
GLOBULIN SER CALC-MCNC: 3.4 G/DL (ref 2.3–3.5)
GLUCOSE BLD-MCNC: 214 MG/DL (ref 70–99)
GLUCOSE BLD-MCNC: 289 MG/DL
GLUCOSE BLD-MCNC: 289 MG/DL (ref 70–99)
GLUCOSE SERPL-MCNC: 204 MG/DL (ref 70–99)
GLUCOSE SERPL-MCNC: 280 MG/DL (ref 70–99)
GLUCOSE UR STRIP-MCNC: 500 MG/DL
HADV DNA NPH QL NAA+NON-PROBE: NOT DETECTED
HCOV 229E RNA NPH QL NAA+NON-PROBE: NOT DETECTED
HCOV HKU1 RNA NPH QL NAA+NON-PROBE: NOT DETECTED
HCOV NL63 RNA NPH QL NAA+NON-PROBE: NOT DETECTED
HCOV OC43 RNA NPH QL NAA+NON-PROBE: NOT DETECTED
HCT VFR BLD AUTO: 33.4 % (ref 42–52)
HGB BLD-MCNC: 10.4 G/DL (ref 14–18)
HGB UR QL STRIP: ABNORMAL
HMPV RNA NPH QL NAA+NON-PROBE: NOT DETECTED
HPIV1 RNA NPH QL NAA+NON-PROBE: NOT DETECTED
HPIV2 RNA NPH QL NAA+NON-PROBE: NOT DETECTED
HPIV3 RNA NPH QL NAA+NON-PROBE: NOT DETECTED
HPIV4 RNA NPH QL NAA+NON-PROBE: NOT DETECTED
HYALINE CASTS #/AREA URNS AUTO: ABNORMAL /HPF (ref 0–5)
KETONES UR STRIP-MCNC: NEGATIVE MG/DL
LACTIC ACID, SEPSIS: 1.8 MMOL/L (ref 0.5–1.9)
LEUKOCYTE ESTERASE UR QL STRIP: ABNORMAL
M PNEUMO DNA NPH QL NAA+NON-PROBE: NOT DETECTED
MCH RBC QN AUTO: 29 PG (ref 27–31.3)
MCHC RBC AUTO-ENTMCNC: 31.1 % (ref 33–37)
MCV RBC AUTO: 93 FL (ref 79–92.2)
METHADONE UR QL SCN: NORMAL
NITRITE UR QL STRIP: NEGATIVE
OPIATES UR QL SCN: NORMAL
OXYCODONE UR QL SCN: NORMAL
PCP UR QL SCN: NORMAL
PERFORMED ON: ABNORMAL
PERFORMED ON: ABNORMAL
PH UR STRIP: 5 [PH] (ref 5–9)
PLATELET # BLD AUTO: 256 K/UL (ref 130–400)
POTASSIUM SERPL-SCNC: 4.8 MEQ/L (ref 3.4–4.9)
POTASSIUM SERPL-SCNC: 6.3 MEQ/L (ref 3.4–4.9)
PROCALCITONIN SERPL IA-MCNC: 0.6 NG/ML (ref 0–0.15)
PROPOXYPH UR QL SCN: NORMAL
PROT SERPL-MCNC: 6.4 G/DL (ref 6.3–8)
PROT UR STRIP-MCNC: ABNORMAL MG/DL
RBC # BLD AUTO: 3.59 M/UL (ref 4.7–6.1)
RBC #/AREA URNS AUTO: ABNORMAL /HPF (ref 0–5)
RSV RNA NPH QL NAA+NON-PROBE: NOT DETECTED
RV+EV RNA NPH QL NAA+NON-PROBE: NOT DETECTED
SARS-COV-2 RNA NPH QL NAA+NON-PROBE: DETECTED
SODIUM SERPL-SCNC: 142 MEQ/L (ref 135–144)
SODIUM SERPL-SCNC: 144 MEQ/L (ref 135–144)
SP GR UR STRIP: 1.01 (ref 1–1.03)
UROBILINOGEN UR STRIP-ACNC: 0.2 E.U./DL
WBC # BLD AUTO: 7.8 K/UL (ref 4.8–10.8)
WBC #/AREA URNS AUTO: ABNORMAL /HPF (ref 0–5)

## 2025-01-29 PROCEDURE — 2580000003 HC RX 258: Performed by: INTERNAL MEDICINE

## 2025-01-29 PROCEDURE — 0202U NFCT DS 22 TRGT SARS-COV-2: CPT

## 2025-01-29 PROCEDURE — 84145 PROCALCITONIN (PCT): CPT

## 2025-01-29 PROCEDURE — 85027 COMPLETE CBC AUTOMATED: CPT

## 2025-01-29 PROCEDURE — 80307 DRUG TEST PRSMV CHEM ANLYZR: CPT

## 2025-01-29 PROCEDURE — 6370000000 HC RX 637 (ALT 250 FOR IP): Performed by: INTERNAL MEDICINE

## 2025-01-29 PROCEDURE — 82077 ASSAY SPEC XCP UR&BREATH IA: CPT

## 2025-01-29 PROCEDURE — 96374 THER/PROPH/DIAG INJ IV PUSH: CPT

## 2025-01-29 PROCEDURE — 71045 X-RAY EXAM CHEST 1 VIEW: CPT

## 2025-01-29 PROCEDURE — 96361 HYDRATE IV INFUSION ADD-ON: CPT

## 2025-01-29 PROCEDURE — 93005 ELECTROCARDIOGRAM TRACING: CPT | Performed by: EMERGENCY MEDICINE

## 2025-01-29 PROCEDURE — 80053 COMPREHEN METABOLIC PANEL: CPT

## 2025-01-29 PROCEDURE — 96375 TX/PRO/DX INJ NEW DRUG ADDON: CPT

## 2025-01-29 PROCEDURE — 70450 CT HEAD/BRAIN W/O DYE: CPT

## 2025-01-29 PROCEDURE — 2500000003 HC RX 250 WO HCPCS: Performed by: INTERNAL MEDICINE

## 2025-01-29 PROCEDURE — 81001 URINALYSIS AUTO W/SCOPE: CPT

## 2025-01-29 PROCEDURE — 87086 URINE CULTURE/COLONY COUNT: CPT

## 2025-01-29 PROCEDURE — 83605 ASSAY OF LACTIC ACID: CPT

## 2025-01-29 PROCEDURE — 6370000000 HC RX 637 (ALT 250 FOR IP): Performed by: EMERGENCY MEDICINE

## 2025-01-29 PROCEDURE — 83880 ASSAY OF NATRIURETIC PEPTIDE: CPT

## 2025-01-29 PROCEDURE — 87040 BLOOD CULTURE FOR BACTERIA: CPT

## 2025-01-29 PROCEDURE — 82550 ASSAY OF CK (CPK): CPT

## 2025-01-29 PROCEDURE — 2500000003 HC RX 250 WO HCPCS: Performed by: EMERGENCY MEDICINE

## 2025-01-29 PROCEDURE — 94640 AIRWAY INHALATION TREATMENT: CPT

## 2025-01-29 PROCEDURE — 96367 TX/PROPH/DG ADDL SEQ IV INF: CPT

## 2025-01-29 PROCEDURE — 1210000000 HC MED SURG R&B

## 2025-01-29 PROCEDURE — 2580000003 HC RX 258: Performed by: EMERGENCY MEDICINE

## 2025-01-29 PROCEDURE — 6360000002 HC RX W HCPCS: Performed by: EMERGENCY MEDICINE

## 2025-01-29 PROCEDURE — 96365 THER/PROPH/DIAG IV INF INIT: CPT

## 2025-01-29 PROCEDURE — 94761 N-INVAS EAR/PLS OXIMETRY MLT: CPT

## 2025-01-29 PROCEDURE — 99285 EMERGENCY DEPT VISIT HI MDM: CPT

## 2025-01-29 PROCEDURE — 36415 COLL VENOUS BLD VENIPUNCTURE: CPT

## 2025-01-29 RX ORDER — SODIUM CHLORIDE 0.9 % (FLUSH) 0.9 %
5-40 SYRINGE (ML) INJECTION PRN
Status: DISCONTINUED | OUTPATIENT
Start: 2025-01-29 | End: 2025-02-07 | Stop reason: HOSPADM

## 2025-01-29 RX ORDER — SODIUM CHLORIDE, SODIUM LACTATE, POTASSIUM CHLORIDE, AND CALCIUM CHLORIDE .6; .31; .03; .02 G/100ML; G/100ML; G/100ML; G/100ML
30 INJECTION, SOLUTION INTRAVENOUS ONCE
Status: COMPLETED | OUTPATIENT
Start: 2025-01-29 | End: 2025-01-29

## 2025-01-29 RX ORDER — ALBUTEROL SULFATE 0.83 MG/ML
10 SOLUTION RESPIRATORY (INHALATION) ONCE
Status: COMPLETED | OUTPATIENT
Start: 2025-01-29 | End: 2025-01-29

## 2025-01-29 RX ORDER — SODIUM CHLORIDE 9 MG/ML
INJECTION, SOLUTION INTRAVENOUS PRN
Status: DISCONTINUED | OUTPATIENT
Start: 2025-01-29 | End: 2025-02-07 | Stop reason: HOSPADM

## 2025-01-29 RX ORDER — ONDANSETRON 2 MG/ML
4 INJECTION INTRAMUSCULAR; INTRAVENOUS EVERY 6 HOURS PRN
Status: DISCONTINUED | OUTPATIENT
Start: 2025-01-29 | End: 2025-02-07 | Stop reason: HOSPADM

## 2025-01-29 RX ORDER — 0.9 % SODIUM CHLORIDE 0.9 %
1000 INTRAVENOUS SOLUTION INTRAVENOUS ONCE
Status: COMPLETED | OUTPATIENT
Start: 2025-01-29 | End: 2025-01-29

## 2025-01-29 RX ORDER — GLUCAGON 1 MG/ML
1 KIT INJECTION PRN
Status: DISCONTINUED | OUTPATIENT
Start: 2025-01-29 | End: 2025-01-29

## 2025-01-29 RX ORDER — DEXTROSE MONOHYDRATE 100 MG/ML
INJECTION, SOLUTION INTRAVENOUS CONTINUOUS PRN
Status: DISCONTINUED | OUTPATIENT
Start: 2025-01-29 | End: 2025-01-29

## 2025-01-29 RX ORDER — INSULIN LISPRO 100 [IU]/ML
0-4 INJECTION, SOLUTION INTRAVENOUS; SUBCUTANEOUS
Status: DISCONTINUED | OUTPATIENT
Start: 2025-01-29 | End: 2025-02-07 | Stop reason: HOSPADM

## 2025-01-29 RX ORDER — CALCIUM GLUCONATE 20 MG/ML
1000 INJECTION, SOLUTION INTRAVENOUS ONCE
Status: COMPLETED | OUTPATIENT
Start: 2025-01-29 | End: 2025-01-29

## 2025-01-29 RX ORDER — ONDANSETRON 4 MG/1
4 TABLET, ORALLY DISINTEGRATING ORAL EVERY 8 HOURS PRN
Status: DISCONTINUED | OUTPATIENT
Start: 2025-01-29 | End: 2025-02-07 | Stop reason: HOSPADM

## 2025-01-29 RX ORDER — SODIUM CHLORIDE 0.9 % (FLUSH) 0.9 %
5-40 SYRINGE (ML) INJECTION EVERY 12 HOURS SCHEDULED
Status: DISCONTINUED | OUTPATIENT
Start: 2025-01-29 | End: 2025-02-07 | Stop reason: HOSPADM

## 2025-01-29 RX ORDER — INSULIN GLARGINE 100 [IU]/ML
0.15 INJECTION, SOLUTION SUBCUTANEOUS DAILY
Status: DISCONTINUED | OUTPATIENT
Start: 2025-01-29 | End: 2025-02-07 | Stop reason: HOSPADM

## 2025-01-29 RX ORDER — ACETAMINOPHEN 650 MG/1
650 SUPPOSITORY RECTAL EVERY 6 HOURS PRN
Status: DISCONTINUED | OUTPATIENT
Start: 2025-01-29 | End: 2025-02-07 | Stop reason: HOSPADM

## 2025-01-29 RX ORDER — POLYETHYLENE GLYCOL 3350 17 G/17G
17 POWDER, FOR SOLUTION ORAL DAILY PRN
Status: DISCONTINUED | OUTPATIENT
Start: 2025-01-29 | End: 2025-02-07 | Stop reason: HOSPADM

## 2025-01-29 RX ORDER — DEXTROSE MONOHYDRATE 100 MG/ML
INJECTION, SOLUTION INTRAVENOUS CONTINUOUS PRN
Status: DISCONTINUED | OUTPATIENT
Start: 2025-01-29 | End: 2025-02-07 | Stop reason: HOSPADM

## 2025-01-29 RX ORDER — SODIUM CHLORIDE 9 MG/ML
INJECTION, SOLUTION INTRAVENOUS CONTINUOUS
Status: DISPENSED | OUTPATIENT
Start: 2025-01-29 | End: 2025-01-30

## 2025-01-29 RX ORDER — 0.9 % SODIUM CHLORIDE 0.9 %
500 INTRAVENOUS SOLUTION INTRAVENOUS ONCE
Status: COMPLETED | OUTPATIENT
Start: 2025-01-29 | End: 2025-01-29

## 2025-01-29 RX ORDER — ENOXAPARIN SODIUM 100 MG/ML
1 INJECTION SUBCUTANEOUS DAILY
Status: DISCONTINUED | OUTPATIENT
Start: 2025-01-30 | End: 2025-01-31

## 2025-01-29 RX ORDER — ACETAMINOPHEN 325 MG/1
650 TABLET ORAL EVERY 6 HOURS PRN
Status: DISCONTINUED | OUTPATIENT
Start: 2025-01-29 | End: 2025-02-07 | Stop reason: HOSPADM

## 2025-01-29 RX ORDER — ENOXAPARIN SODIUM 100 MG/ML
30 INJECTION SUBCUTANEOUS DAILY
Status: DISCONTINUED | OUTPATIENT
Start: 2025-01-30 | End: 2025-01-29

## 2025-01-29 RX ORDER — GLUCAGON 1 MG/ML
1 KIT INJECTION PRN
Status: DISCONTINUED | OUTPATIENT
Start: 2025-01-29 | End: 2025-02-07 | Stop reason: HOSPADM

## 2025-01-29 RX ADMIN — AZITHROMYCIN MONOHYDRATE 500 MG: 500 INJECTION, POWDER, LYOPHILIZED, FOR SOLUTION INTRAVENOUS at 15:26

## 2025-01-29 RX ADMIN — CALCIUM GLUCONATE 1000 MG: 20 INJECTION, SOLUTION INTRAVENOUS at 16:25

## 2025-01-29 RX ADMIN — DEXTROSE MONOHYDRATE 250 ML: 100 INJECTION, SOLUTION INTRAVENOUS at 16:25

## 2025-01-29 RX ADMIN — ALBUTEROL SULFATE 10 MG: 2.5 SOLUTION RESPIRATORY (INHALATION) at 16:55

## 2025-01-29 RX ADMIN — SODIUM CHLORIDE, POTASSIUM CHLORIDE, SODIUM LACTATE AND CALCIUM CHLORIDE 1000 ML: 600; 310; 30; 20 INJECTION, SOLUTION INTRAVENOUS at 15:10

## 2025-01-29 RX ADMIN — SODIUM BICARBONATE 50 MEQ: 84 INJECTION INTRAVENOUS at 16:25

## 2025-01-29 RX ADMIN — INSULIN GLARGINE 10 UNITS: 100 INJECTION, SOLUTION SUBCUTANEOUS at 21:55

## 2025-01-29 RX ADMIN — SODIUM CHLORIDE 500 ML: 9 INJECTION, SOLUTION INTRAVENOUS at 21:54

## 2025-01-29 RX ADMIN — Medication 10 ML: at 21:50

## 2025-01-29 RX ADMIN — INSULIN LISPRO 1 UNITS: 100 INJECTION, SOLUTION INTRAVENOUS; SUBCUTANEOUS at 21:55

## 2025-01-29 RX ADMIN — SODIUM CHLORIDE 1000 ML: 9 INJECTION, SOLUTION INTRAVENOUS at 16:24

## 2025-01-29 RX ADMIN — WATER 1000 MG: 1 INJECTION INTRAMUSCULAR; INTRAVENOUS; SUBCUTANEOUS at 15:12

## 2025-01-29 RX ADMIN — INSULIN HUMAN 10 UNITS: 100 INJECTION, SOLUTION PARENTERAL at 16:24

## 2025-01-29 RX ADMIN — SODIUM CHLORIDE: 9 INJECTION, SOLUTION INTRAVENOUS at 21:51

## 2025-01-29 ASSESSMENT — LIFESTYLE VARIABLES
HOW OFTEN DO YOU HAVE A DRINK CONTAINING ALCOHOL: NEVER
HOW MANY STANDARD DRINKS CONTAINING ALCOHOL DO YOU HAVE ON A TYPICAL DAY: PATIENT DOES NOT DRINK

## 2025-01-29 ASSESSMENT — PAIN - FUNCTIONAL ASSESSMENT
PAIN_FUNCTIONAL_ASSESSMENT: NONE - DENIES PAIN
PAIN_FUNCTIONAL_ASSESSMENT: NONE - DENIES PAIN

## 2025-01-29 NOTE — ED PROVIDER NOTES
[facility] EMERGENCY DEPARTMENT  EMERGENCY DEPARTMENT ENCOUNTER      Pt Name: Jesse Leigh  MRN: [id number]  Birthdate 1960  Date of evaluation: 1/29/2025  Provider: Jesus Andersen DO  2:37 PM EST    CHIEF COMPLAINT       Chief Complaint   Patient presents with    Altered Mental Status     [facility] from [facility]         HISTORY OF PRESENT ILLNESS   (Location/Symptom, Timing/Onset, Context/Setting, Quality, Duration, Modifying Factors, Severity)  Note limiting factors.   Jesse Leigh is a 64 y.o. male who presents to the emergency department from a nursing home for evaluation of altered mental status.  Patient typically wears 2 L of oxygen which has not changed.  He was recently diagnosed with COVID about a week ago.    HPI    Nursing Notes were reviewed.    REVIEW OF SYSTEMS    (2-9 systems for level 4, 10 or more for level 5)     Review of Systems   Unable to perform ROS: Mental status change       Except as noted above the remainder of the review of systems was reviewed and negative.       PAST MEDICAL HISTORY     Past Medical History:   Diagnosis Date    Cerebrovascular small vessel disease     Chronic kidney disease, stage 3b (HCC) 12/16/2020    Colon cancer screening declined 8/30/2021    Debility 7/11/2022    Diabetic polyneuropathy associated with type 2 diabetes mellitus (HCC) 4/22/2021    DM (diabetes mellitus) ([facility])     was with Dr Knox, [facility]    Dysarthria as late effect of cerebrovascular accident (CVA) 2015    Hearing loss     Hemiparesis affecting left side as late effect of cerebrovascular accident (CVA) (HCC)     History of left PCA stroke 2013    History of right STEPHANIE stroke 10/21/2015    was at [facility], now Dr Mallory    Microalbuminuria 2018    PFO (patent foramen ovale) 2015    Right pontine CVA (HCC) 10/2015    Stage 3a chronic kidney disease (HCC) 12/16/2020    Syncope and collapse 6/1/2020         SURGICAL HISTORY       Past Surgical History:   Procedure Laterality Date    ANKLE SURGERY

## 2025-01-29 NOTE — ED TRIAGE NOTES
Patient arrived via EMS due to AMS per facility, tearing out IV's, combative with staff. Patient has remained calm for EMS. Patient has a hx of 2 CVA's, diabetes, COVID isolation last week at the facility. Out of isolation per EMS. Cough present, crackles in the bases, 1 duo neb provided by EMS. Patient is normally A/O 3, he is A/O 2 for me

## 2025-01-30 ENCOUNTER — APPOINTMENT (OUTPATIENT)
Dept: GENERAL RADIOLOGY | Age: 65
DRG: 682 | End: 2025-01-30
Payer: MEDICARE

## 2025-01-30 LAB
ALBUMIN SERPL-MCNC: 2.5 G/DL (ref 3.5–4.6)
ANION GAP SERPL CALCULATED.3IONS-SCNC: 6 MEQ/L (ref 9–15)
BASOPHILS # BLD: 0 K/UL (ref 0–0.2)
BASOPHILS NFR BLD: 0.4 %
BUN SERPL-MCNC: 74 MG/DL (ref 8–23)
CALCIUM SERPL-MCNC: 7.8 MG/DL (ref 8.5–9.9)
CHLORIDE SERPL-SCNC: 123 MEQ/L (ref 95–107)
CO2 SERPL-SCNC: 19 MEQ/L (ref 20–31)
CREAT SERPL-MCNC: 2.59 MG/DL (ref 0.7–1.2)
CRP SERPL HS-MCNC: 16.6 MG/L (ref 0–5)
EKG ATRIAL RATE: 104 BPM
EKG P AXIS: 62 DEGREES
EKG P-R INTERVAL: 168 MS
EKG Q-T INTERVAL: 354 MS
EKG QRS DURATION: 72 MS
EKG QTC CALCULATION (BAZETT): 465 MS
EKG R AXIS: 41 DEGREES
EKG T AXIS: 68 DEGREES
EKG VENTRICULAR RATE: 104 BPM
EOSINOPHIL # BLD: 0.3 K/UL (ref 0–0.7)
EOSINOPHIL NFR BLD: 4.1 %
ERYTHROCYTE [DISTWIDTH] IN BLOOD BY AUTOMATED COUNT: 15.1 % (ref 11.5–14.5)
ESTIMATED AVERAGE GLUCOSE: 203 MG/DL
GLUCOSE BLD-MCNC: 122 MG/DL (ref 70–99)
GLUCOSE BLD-MCNC: 170 MG/DL (ref 70–99)
GLUCOSE BLD-MCNC: 171 MG/DL (ref 70–99)
GLUCOSE BLD-MCNC: 173 MG/DL (ref 70–99)
GLUCOSE SERPL-MCNC: 176 MG/DL (ref 70–99)
HBA1C MFR BLD: 8.7 % (ref 4–6)
HCT VFR BLD AUTO: 27.7 % (ref 42–52)
HGB BLD-MCNC: 8.3 G/DL (ref 14–18)
LYMPHOCYTES # BLD: 1.9 K/UL (ref 1–4.8)
LYMPHOCYTES NFR BLD: 28.7 %
MAGNESIUM SERPL-MCNC: 2.3 MG/DL (ref 1.7–2.4)
MCH RBC QN AUTO: 27.8 PG (ref 27–31.3)
MCHC RBC AUTO-ENTMCNC: 30 % (ref 33–37)
MCV RBC AUTO: 92.6 FL (ref 79–92.2)
MONOCYTES # BLD: 0.7 K/UL (ref 0.2–0.8)
MONOCYTES NFR BLD: 10.7 %
NEUTROPHILS # BLD: 3.8 K/UL (ref 1.4–6.5)
NEUTS SEG NFR BLD: 55.7 %
PERFORMED ON: ABNORMAL
PHOSPHATE SERPL-MCNC: 4.2 MG/DL (ref 2.3–4.8)
PLATELET # BLD AUTO: 235 K/UL (ref 130–400)
POTASSIUM SERPL-SCNC: 5.1 MEQ/L (ref 3.4–4.9)
PROCALCITONIN SERPL IA-MCNC: 0.45 NG/ML (ref 0–0.15)
RBC # BLD AUTO: 2.99 M/UL (ref 4.7–6.1)
SODIUM SERPL-SCNC: 148 MEQ/L (ref 135–144)
WBC # BLD AUTO: 6.8 K/UL (ref 4.8–10.8)

## 2025-01-30 PROCEDURE — 83735 ASSAY OF MAGNESIUM: CPT

## 2025-01-30 PROCEDURE — 1210000000 HC MED SURG R&B

## 2025-01-30 PROCEDURE — 2500000003 HC RX 250 WO HCPCS: Performed by: PHYSICIAN ASSISTANT

## 2025-01-30 PROCEDURE — 2500000003 HC RX 250 WO HCPCS: Performed by: INTERNAL MEDICINE

## 2025-01-30 PROCEDURE — 6360000002 HC RX W HCPCS: Performed by: INTERNAL MEDICINE

## 2025-01-30 PROCEDURE — 74230 X-RAY XM SWLNG FUNCJ C+: CPT

## 2025-01-30 PROCEDURE — 92611 MOTION FLUOROSCOPY/SWALLOW: CPT

## 2025-01-30 PROCEDURE — 83036 HEMOGLOBIN GLYCOSYLATED A1C: CPT

## 2025-01-30 PROCEDURE — 86140 C-REACTIVE PROTEIN: CPT

## 2025-01-30 PROCEDURE — 6370000000 HC RX 637 (ALT 250 FOR IP): Performed by: INTERNAL MEDICINE

## 2025-01-30 PROCEDURE — 85025 COMPLETE CBC W/AUTO DIFF WBC: CPT

## 2025-01-30 PROCEDURE — 97162 PT EVAL MOD COMPLEX 30 MIN: CPT

## 2025-01-30 PROCEDURE — 92610 EVALUATE SWALLOWING FUNCTION: CPT

## 2025-01-30 PROCEDURE — 36415 COLL VENOUS BLD VENIPUNCTURE: CPT

## 2025-01-30 PROCEDURE — 84145 PROCALCITONIN (PCT): CPT

## 2025-01-30 PROCEDURE — 2700000000 HC OXYGEN THERAPY PER DAY

## 2025-01-30 PROCEDURE — 2580000003 HC RX 258: Performed by: STUDENT IN AN ORGANIZED HEALTH CARE EDUCATION/TRAINING PROGRAM

## 2025-01-30 PROCEDURE — 80069 RENAL FUNCTION PANEL: CPT

## 2025-01-30 PROCEDURE — 97166 OT EVAL MOD COMPLEX 45 MIN: CPT

## 2025-01-30 RX ORDER — DOCUSATE SODIUM 100 MG/1
100 CAPSULE, LIQUID FILLED ORAL 2 TIMES DAILY
Status: ON HOLD | COMMUNITY
End: 2025-02-07 | Stop reason: HOSPADM

## 2025-01-30 RX ORDER — CODEINE PHOSPHATE AND GUAIFENESIN 10; 100 MG/5ML; MG/5ML
10 SOLUTION ORAL EVERY 6 HOURS
COMMUNITY

## 2025-01-30 RX ORDER — MIRTAZAPINE 15 MG/1
7.5 TABLET, FILM COATED ORAL NIGHTLY
COMMUNITY

## 2025-01-30 RX ORDER — TAMSULOSIN HYDROCHLORIDE 0.4 MG/1
0.4 CAPSULE ORAL DAILY
COMMUNITY

## 2025-01-30 RX ORDER — ACETAMINOPHEN 325 MG/1
650 TABLET ORAL EVERY 4 HOURS PRN
COMMUNITY

## 2025-01-30 RX ORDER — POLYETHYLENE GLYCOL 3350 17 G/17G
17 POWDER, FOR SOLUTION ORAL DAILY PRN
COMMUNITY

## 2025-01-30 RX ORDER — INSULIN LISPRO 100 [IU]/ML
0-10 INJECTION, SOLUTION INTRAVENOUS; SUBCUTANEOUS
COMMUNITY
Start: 2025-01-09

## 2025-01-30 RX ORDER — SODIUM CHLORIDE 450 MG/100ML
INJECTION, SOLUTION INTRAVENOUS CONTINUOUS
Status: DISPENSED | OUTPATIENT
Start: 2025-01-30 | End: 2025-01-30

## 2025-01-30 RX ADMIN — INSULIN GLARGINE 10 UNITS: 100 INJECTION, SOLUTION SUBCUTANEOUS at 08:52

## 2025-01-30 RX ADMIN — SODIUM CHLORIDE: 0.45 INJECTION, SOLUTION INTRAVENOUS at 14:44

## 2025-01-30 RX ADMIN — BARIUM SULFATE 80 ML: 400 SUSPENSION ORAL at 13:51

## 2025-01-30 RX ADMIN — BARIUM SULFATE 50 G: 0.81 POWDER, FOR SUSPENSION ORAL at 13:51

## 2025-01-30 RX ADMIN — Medication 10 ML: at 08:52

## 2025-01-30 RX ADMIN — ENOXAPARIN SODIUM 70 MG: 100 INJECTION SUBCUTANEOUS at 08:52

## 2025-01-30 RX ADMIN — WATER 1000 MG: 1 INJECTION INTRAMUSCULAR; INTRAVENOUS; SUBCUTANEOUS at 08:51

## 2025-01-30 NOTE — H&P
Hospital Medicine  History and Physical    Patient:  Jesse Leigh  MRN: [id number]    CHIEF COMPLAINT:    Chief Complaint   Patient presents with    Altered Mental Status     AMS from [facility]       History Obtained From:  Patient, EMR  Primary Care Physician: Tierra Mallory PA    HISTORY OF PRESENT ILLNESS:   The patient is a 64 y.o. male with PMH of HTN, CVA with left hemiparesis,  PE on Apixaban, DM2, CKD3a, urine retention, left hip fracture s/p repair in 11/2024, managed at [facility] from 12/16-12/19 for cardioembolic CVA and PFO, recent COVID infection, who presented from NH with altered mental status, was hypotensive with SBP in the 60-70s on arrival to ED, saline bolus given, CT head, ECG and CXR were negative for acute findings, u/a was suggestive of UTI, labs showed JUSTIN/CKD3, hyperkalemia, metabolic acidosis, hyperglycemia, anemia, IV Insulin/D50, sodium bicarbonate, calcium gluconate given, admitted for further management.    Past Medical History:      Diagnosis Date    Cerebrovascular small vessel disease     Chronic kidney disease, stage 3b (HCC) 12/16/2020    Colon cancer screening declined 8/30/2021    Debility 7/11/2022    Diabetic polyneuropathy associated with type 2 diabetes mellitus (HCC) 4/22/2021    DM (diabetes mellitus) ([facility])     was with Dr Knox, [facility]    Dysarthria as late effect of cerebrovascular accident (CVA) 2015    Hearing loss     Hemiparesis affecting left side as late effect of cerebrovascular accident (CVA) (HCC)     History of left PCA stroke 2013    History of right STEPHANIE stroke 10/21/2015    was at [facility], now Dr Mallory    Microalbuminuria 2018    PFO (patent foramen ovale) 2015    Right pontine CVA (HCC) 10/2015    Stage 3a chronic kidney disease (HCC) 12/16/2020    Syncope and collapse 6/1/2020       Past Surgical History:      Procedure Laterality Date    ANKLE SURGERY         Medications Prior to Admission:    Prior to Admission medications    Medication Sig Start Date

## 2025-01-30 NOTE — ACP (ADVANCE CARE PLANNING)
Advance Care Planning   Healthcare Decision Maker:    Primary Decision Maker: Hector Leigh - Brother/Sister - [phone]    Click here to complete Healthcare Decision Makers including selection of the Healthcare Decision Maker Relationship (ie \"Primary\").  Today we documented Decision Maker(s) consistent with Legal Next of Kin hierarchy.       If the relationship to the patient does NOT follow our state's Next of Kin hierarchy, the patient MUST complete an ACP Document to allow him/her to act on the patient's behalf. :

## 2025-01-30 NOTE — FLOWSHEET NOTE
Perfect serve sent to MD in  regards to gaona. Awaiting response.     MD on unit for rounds, this RN spoke with MD about discontinuation of gaona and attempting a voiding trial. MD would like to look into gaona placement/orders first... no new orders at this time.

## 2025-01-30 NOTE — ED NOTES
Confirmed with Kurtis RN nursing supervisor and RN on [address] the patient will be able to go to the floor. HUC aware and transport requested.

## 2025-01-30 NOTE — CARE COORDINATION
Case Management Assessment  Initial Evaluation    Date/Time of Evaluation: 1/30/2025 3:02 PM  Assessment Completed by: UZMA Holliday    If patient is discharged prior to next notation, then this note serves as note for discharge by case management.    Patient Name: Jesse Leigh                   YOB: 1960  Diagnosis: JUSTIN (acute kidney injury) (HCC) [N17.9]  Altered mental status, unspecified altered mental status type [R41.82]  Acute renal failure superimposed on chronic kidney disease, unspecified acute renal failure type, unspecified CKD stage (HCC) [N17.9, N18.9]                   Date / Time: 1/29/2025  2:20 PM    Patient Admission Status: Inpatient   Readmission Risk (Low < 19, Mod (19-27), High > 27): Readmission Risk Score: 18.8    Current PCP: Tierra Mallory PA  PCP verified by CM? Yes    Chart Reviewed: Yes      History Provided by: Patient, Child/Family  Patient Orientation: Alert and Oriented, Person, Situation    Patient Cognition: Alert    Hospitalization in the last 30 days (Readmission):  No    If yes, Readmission Assessment in CM Navigator will be completed.    Advance Directives:      Code Status: Full Code   Patient's Primary Decision Maker is: Legal Next of Kin      Discharge Planning:    Patient lives with: Alone Type of Home: Acute Rehab  Primary Care Giver:    Patient Support Systems include: Family Members   Current Financial resources:    Current community resources:    Current services prior to admission: Skilled Nursing Facility            Current DME:              Type of Home Care services:  None    ADLS  Prior functional level: Assistance with the following:, Bathing, Dressing, Toileting, Cooking, Housework, Shopping, Mobility  Current functional level: Assistance with the following:, Bathing, Dressing, Toileting, Cooking, Housework, Shopping, Mobility    PT AM-PAC: 10 /24  OT AM-PAC: 10 /24    Family can provide assistance at DC: Yes  Would you like Case

## 2025-01-30 NOTE — PROCEDURES
[facility]   Facility/Department: [address] TELE  Speech Language Pathology  Modified Barium Swallow (MBS)    NAME:Jesse Leigh  : 1960 (64 y.o.)   [x]   confirmed    MRN: [id number]  ROOM: [place] [address]  ADMISSION DATE: 2025  PATIENT DIAGNOSIS(ES): JUSTIN (acute kidney injury) ([facility]) [N17.9]  Altered mental status, unspecified altered mental status type [R41.82]  Acute renal failure superimposed on chronic kidney disease, unspecified acute renal failure type, unspecified CKD stage (HCC) [N17.9, N18.9]  Chief Complaint   Patient presents with    Altered Mental Status     AMS from [facility]     Patient Active Problem List    Diagnosis Date Noted    Controlled type 2 diabetes mellitus with stage 3 chronic kidney disease, with long-term current use of insulin ([facility]) 2022    Debility 2022    Hemiparesis, left ([facility]) 2022    Hemiplegia as late effect of cerebral infarction (HCC) 2022    Dizziness 2022    JUSTIN (acute kidney injury) ([facility]) 2025    History of stroke 2024    Left leg weakness 06/15/2023    Conductive hearing loss of right ear with restricted hearing of left ear 10/05/2021    Colon cancer screening declined 2021    Dysarthria 2021    Diabetic polyneuropathy associated with type 2 diabetes mellitus ([facility]) 2021    Stage 3a chronic kidney disease ([facility]) 2020    Balance problem 06/15/2020    Cerebrovascular accident (CVA) due to stenosis of right middle cerebral artery (HCC) 2020    Nonproliferative diabetic retinopathy ([facility]) 2020    Age-related macular degeneration 2020    Seasonal allergies 2019    Microalbuminuria 2018    Alkaline phosphatase elevation 06/10/2016    Essential hypertension 2016    Hyperlipidemia 2016    History of medication noncompliance 10/29/2015    Patent foramen ovale 10/29/2015    Ataxia 10/27/2015    Speech and language deficit as late effect of stroke 10/27/2015

## 2025-01-31 LAB
ALBUMIN SERPL-MCNC: 2.5 G/DL (ref 3.5–4.6)
ANION GAP SERPL CALCULATED.3IONS-SCNC: 6 MEQ/L (ref 9–15)
BACTERIA UR CULT: NORMAL
BASOPHILS # BLD: 0 K/UL (ref 0–0.2)
BASOPHILS NFR BLD: 0.4 %
BUN SERPL-MCNC: 50 MG/DL (ref 8–23)
CALCIUM SERPL-MCNC: 8 MG/DL (ref 8.5–9.9)
CHLORIDE SERPL-SCNC: 125 MEQ/L (ref 95–107)
CO2 SERPL-SCNC: 20 MEQ/L (ref 20–31)
CREAT SERPL-MCNC: 1.98 MG/DL (ref 0.7–1.2)
EOSINOPHIL # BLD: 0.4 K/UL (ref 0–0.7)
EOSINOPHIL NFR BLD: 4.9 %
ERYTHROCYTE [DISTWIDTH] IN BLOOD BY AUTOMATED COUNT: 15.3 % (ref 11.5–14.5)
FERRITIN: 180 NG/ML
FOLATE: 4.6 NG/ML (ref 4.8–24.2)
GLUCOSE BLD-MCNC: 134 MG/DL (ref 70–99)
GLUCOSE BLD-MCNC: 136 MG/DL (ref 70–99)
GLUCOSE BLD-MCNC: 138 MG/DL (ref 70–99)
GLUCOSE BLD-MCNC: 92 MG/DL (ref 70–99)
GLUCOSE SERPL-MCNC: 100 MG/DL (ref 70–99)
HCT VFR BLD AUTO: 28.5 % (ref 42–52)
HGB BLD-MCNC: 9 G/DL (ref 14–18)
IRON % SATURATION: 18 % (ref 20–55)
IRON: 27 UG/DL (ref 61–157)
LYMPHOCYTES # BLD: 2.7 K/UL (ref 1–4.8)
LYMPHOCYTES NFR BLD: 33.9 %
MAGNESIUM SERPL-MCNC: 2.3 MG/DL (ref 1.7–2.4)
MCH RBC QN AUTO: 28.8 PG (ref 27–31.3)
MCHC RBC AUTO-ENTMCNC: 31.6 % (ref 33–37)
MCV RBC AUTO: 91.1 FL (ref 79–92.2)
MONOCYTES # BLD: 0.7 K/UL (ref 0.2–0.8)
MONOCYTES NFR BLD: 8.5 %
NEUTROPHILS # BLD: 4.1 K/UL (ref 1.4–6.5)
NEUTS SEG NFR BLD: 52 %
PERFORMED ON: ABNORMAL
PERFORMED ON: NORMAL
PHOSPHATE SERPL-MCNC: 2.9 MG/DL (ref 2.3–4.8)
PLATELET # BLD AUTO: 235 K/UL (ref 130–400)
POTASSIUM SERPL-SCNC: 4.9 MEQ/L (ref 3.4–4.9)
RBC # BLD AUTO: 3.13 M/UL (ref 4.7–6.1)
SODIUM SERPL-SCNC: 151 MEQ/L (ref 135–144)
TOTAL IRON BINDING CAPACITY: 148 UG/DL (ref 250–450)
UNSATURATED IRON BINDING CAPACITY: 121 UG/DL (ref 112–347)
VITAMIN B-12: 749 PG/ML (ref 232–1245)
WBC # BLD AUTO: 7.9 K/UL (ref 4.8–10.8)

## 2025-01-31 PROCEDURE — 6370000000 HC RX 637 (ALT 250 FOR IP): Performed by: INTERNAL MEDICINE

## 2025-01-31 PROCEDURE — 2700000000 HC OXYGEN THERAPY PER DAY

## 2025-01-31 PROCEDURE — 2500000003 HC RX 250 WO HCPCS: Performed by: INTERNAL MEDICINE

## 2025-01-31 PROCEDURE — 2580000003 HC RX 258: Performed by: STUDENT IN AN ORGANIZED HEALTH CARE EDUCATION/TRAINING PROGRAM

## 2025-01-31 PROCEDURE — 83735 ASSAY OF MAGNESIUM: CPT

## 2025-01-31 PROCEDURE — 83550 IRON BINDING TEST: CPT

## 2025-01-31 PROCEDURE — 80069 RENAL FUNCTION PANEL: CPT

## 2025-01-31 PROCEDURE — 82607 VITAMIN B-12: CPT

## 2025-01-31 PROCEDURE — 36415 COLL VENOUS BLD VENIPUNCTURE: CPT

## 2025-01-31 PROCEDURE — 1210000000 HC MED SURG R&B

## 2025-01-31 PROCEDURE — 82728 ASSAY OF FERRITIN: CPT

## 2025-01-31 PROCEDURE — 83540 ASSAY OF IRON: CPT

## 2025-01-31 PROCEDURE — 85025 COMPLETE CBC W/AUTO DIFF WBC: CPT

## 2025-01-31 PROCEDURE — 82746 ASSAY OF FOLIC ACID SERUM: CPT

## 2025-01-31 PROCEDURE — 6360000002 HC RX W HCPCS: Performed by: INTERNAL MEDICINE

## 2025-01-31 RX ORDER — ENOXAPARIN SODIUM 100 MG/ML
1 INJECTION SUBCUTANEOUS 2 TIMES DAILY
Status: DISCONTINUED | OUTPATIENT
Start: 2025-01-31 | End: 2025-01-31

## 2025-01-31 RX ORDER — DEXTROSE MONOHYDRATE 50 MG/ML
INJECTION, SOLUTION INTRAVENOUS CONTINUOUS
Status: DISPENSED | OUTPATIENT
Start: 2025-01-31 | End: 2025-01-31

## 2025-01-31 RX ORDER — ENOXAPARIN SODIUM 100 MG/ML
1 INJECTION SUBCUTANEOUS DAILY
Status: DISCONTINUED | OUTPATIENT
Start: 2025-02-01 | End: 2025-02-01

## 2025-01-31 RX ADMIN — WATER 1000 MG: 1 INJECTION INTRAMUSCULAR; INTRAVENOUS; SUBCUTANEOUS at 10:49

## 2025-01-31 RX ADMIN — INSULIN GLARGINE 10 UNITS: 100 INJECTION, SOLUTION SUBCUTANEOUS at 10:48

## 2025-01-31 RX ADMIN — DEXTROSE MONOHYDRATE: 50 INJECTION, SOLUTION INTRAVENOUS at 10:59

## 2025-01-31 RX ADMIN — ENOXAPARIN SODIUM 70 MG: 100 INJECTION SUBCUTANEOUS at 10:49

## 2025-01-31 RX ADMIN — Medication 10 ML: at 10:54

## 2025-01-31 NOTE — CARE COORDINATION
[facility] CAN ACCEPT THE PATIENT.  [facility] WILL START PRECERT TODAY.  REQUESTED UPDATES WERE SENT TO [facility].  [facility] [phone].

## 2025-01-31 NOTE — DISCHARGE INSTR - COC
Continuity of Care Form    Patient Name: Jesse Leigh   :  1960  MRN:  [id number]    Admit date:  2025  Discharge date:  2025    Code Status Order: Full Code   Advance Directives:   Advance Care Flowsheet Documentation             Admitting Physician:  Wei Pacheco MD  PCP: Tierra Mallory PA    Discharging Nurse: VALARIE Navarrete  Discharging Hospital Unit/Room#: W284/W284-01  Discharging Unit Phone Number: [phone]    Emergency Contact:   Extended Emergency Contact Information  Primary Emergency Contact: Hector Leigh  Address: lives with girlfriend           [facility] [place] [address] [facility]  Home Phone: [phone]  Mobile Phone: [phone]  Relation: Brother/Sister    Past Surgical History:  Past Surgical History:   Procedure Laterality Date    ANKLE SURGERY      FRACTURE SURGERY Left     Left Hip       Immunization History:   Immunization History   Administered Date(s) Administered    COVID-19, MODERNA, (age 12y+), IM, 50mcg/0.5mL 10/16/2023    COVID-19, PFIZER Bivalent, DO NOT Dilute, (age 12y+), IM, 30 mcg/0.3 mL 10/03/2022    COVID-19, PFIZER PURPLE top, DILUTE for use, (age 12 y+), 30mcg/0.3mL 2021    COVID-19, PFIZER, (age 12y+), IM, 30mcg/0.3mL 2024    Influenza Vaccine, unspecified formulation 10/07/2016, 10/02/2017    Influenza Virus Vaccine 10/03/2018    Influenza, AFLURIA (age 3 y+), FLUZONE, (age 6 mo+), Quadv MDV, 0.5mL 10/03/2018    Influenza, FLUARIX, FLULAVAL, FLUZONE (age 6 mo+) and AFLURIA, (age 3 y+), Quadv PF, 0.5mL 10/01/2019, 09/10/2020    Pneumococcal, PCV20, PREVNAR 20, (age 6w+), IM, 0.5mL 2022    Pneumococcal, PPSV23, PNEUMOVAX 23, (age 2y+), SC/IM, 0.5mL 2014    TDaP, ADACEL (age 10y-64y), BOOSTRIX (age 10y+), IM, 0.5mL 2013    Zoster Recombinant (Shingrix) 2019, 2019       Active Problems:  Patient Active Problem List   Diagnosis Code    Essential hypertension I10    Hyperlipidemia E78.5    Alkaline phosphatase

## 2025-02-01 LAB
ALBUMIN SERPL-MCNC: 2.5 G/DL (ref 3.5–4.6)
ANION GAP SERPL CALCULATED.3IONS-SCNC: 10 MEQ/L (ref 9–15)
BASOPHILS # BLD: 0 K/UL (ref 0–0.2)
BASOPHILS NFR BLD: 0.6 %
BUN SERPL-MCNC: 33 MG/DL (ref 8–23)
CALCIUM SERPL-MCNC: 8 MG/DL (ref 8.5–9.9)
CHLORIDE SERPL-SCNC: 118 MEQ/L (ref 95–107)
CO2 SERPL-SCNC: 17 MEQ/L (ref 20–31)
CREAT SERPL-MCNC: 1.51 MG/DL (ref 0.7–1.2)
EOSINOPHIL # BLD: 0.3 K/UL (ref 0–0.7)
EOSINOPHIL NFR BLD: 4.9 %
ERYTHROCYTE [DISTWIDTH] IN BLOOD BY AUTOMATED COUNT: 15.2 % (ref 11.5–14.5)
GLUCOSE BLD-MCNC: 118 MG/DL (ref 70–99)
GLUCOSE BLD-MCNC: 124 MG/DL (ref 70–99)
GLUCOSE BLD-MCNC: 129 MG/DL (ref 70–99)
GLUCOSE SERPL-MCNC: 93 MG/DL (ref 70–99)
HCT VFR BLD AUTO: 30.7 % (ref 42–52)
HGB BLD-MCNC: 9.3 G/DL (ref 14–18)
LYMPHOCYTES # BLD: 2.8 K/UL (ref 1–4.8)
LYMPHOCYTES NFR BLD: 39.9 %
MAGNESIUM SERPL-MCNC: 2.1 MG/DL (ref 1.7–2.4)
MCH RBC QN AUTO: 28 PG (ref 27–31.3)
MCHC RBC AUTO-ENTMCNC: 30.3 % (ref 33–37)
MCV RBC AUTO: 92.5 FL (ref 79–92.2)
MONOCYTES # BLD: 0.4 K/UL (ref 0.2–0.8)
MONOCYTES NFR BLD: 6.1 %
NEUTROPHILS # BLD: 3.3 K/UL (ref 1.4–6.5)
NEUTS SEG NFR BLD: 48.2 %
PERFORMED ON: ABNORMAL
PHOSPHATE SERPL-MCNC: 2.8 MG/DL (ref 2.3–4.8)
PLATELET # BLD AUTO: 236 K/UL (ref 130–400)
POTASSIUM SERPL-SCNC: 4.6 MEQ/L (ref 3.4–4.9)
RBC # BLD AUTO: 3.32 M/UL (ref 4.7–6.1)
SODIUM SERPL-SCNC: 145 MEQ/L (ref 135–144)
WBC # BLD AUTO: 6.9 K/UL (ref 4.8–10.8)

## 2025-02-01 PROCEDURE — 83735 ASSAY OF MAGNESIUM: CPT

## 2025-02-01 PROCEDURE — 6370000000 HC RX 637 (ALT 250 FOR IP): Performed by: INTERNAL MEDICINE

## 2025-02-01 PROCEDURE — 2500000003 HC RX 250 WO HCPCS: Performed by: INTERNAL MEDICINE

## 2025-02-01 PROCEDURE — 36415 COLL VENOUS BLD VENIPUNCTURE: CPT

## 2025-02-01 PROCEDURE — 2700000000 HC OXYGEN THERAPY PER DAY

## 2025-02-01 PROCEDURE — 85025 COMPLETE CBC W/AUTO DIFF WBC: CPT

## 2025-02-01 PROCEDURE — 6360000002 HC RX W HCPCS: Performed by: INTERNAL MEDICINE

## 2025-02-01 PROCEDURE — 2580000003 HC RX 258: Performed by: INTERNAL MEDICINE

## 2025-02-01 PROCEDURE — 80069 RENAL FUNCTION PANEL: CPT

## 2025-02-01 PROCEDURE — 1210000000 HC MED SURG R&B

## 2025-02-01 PROCEDURE — 97535 SELF CARE MNGMENT TRAINING: CPT

## 2025-02-01 RX ORDER — ATORVASTATIN CALCIUM 80 MG/1
80 TABLET, FILM COATED ORAL NIGHTLY
Status: DISCONTINUED | OUTPATIENT
Start: 2025-02-01 | End: 2025-02-07 | Stop reason: HOSPADM

## 2025-02-01 RX ORDER — MIRTAZAPINE 15 MG/1
7.5 TABLET, FILM COATED ORAL NIGHTLY
Status: DISCONTINUED | OUTPATIENT
Start: 2025-02-01 | End: 2025-02-07 | Stop reason: HOSPADM

## 2025-02-01 RX ORDER — TAMSULOSIN HYDROCHLORIDE 0.4 MG/1
0.4 CAPSULE ORAL DAILY
Status: DISCONTINUED | OUTPATIENT
Start: 2025-02-01 | End: 2025-02-07 | Stop reason: HOSPADM

## 2025-02-01 RX ORDER — FOLIC ACID 1 MG/1
1 TABLET ORAL DAILY
Status: DISCONTINUED | OUTPATIENT
Start: 2025-02-01 | End: 2025-02-07 | Stop reason: HOSPADM

## 2025-02-01 RX ADMIN — INSULIN GLARGINE 10 UNITS: 100 INJECTION, SOLUTION SUBCUTANEOUS at 10:06

## 2025-02-01 RX ADMIN — MIRTAZAPINE 7.5 MG: 15 TABLET, FILM COATED ORAL at 20:14

## 2025-02-01 RX ADMIN — SODIUM CHLORIDE 300 MG: 9 INJECTION, SOLUTION INTRAVENOUS at 14:42

## 2025-02-01 RX ADMIN — ATORVASTATIN CALCIUM 80 MG: 80 TABLET, FILM COATED ORAL at 20:14

## 2025-02-01 RX ADMIN — TAMSULOSIN HYDROCHLORIDE 0.4 MG: 0.4 CAPSULE ORAL at 14:40

## 2025-02-01 RX ADMIN — Medication 10 ML: at 20:15

## 2025-02-01 RX ADMIN — FOLIC ACID 1 MG: 1 TABLET ORAL at 14:40

## 2025-02-01 RX ADMIN — Medication 10 ML: at 10:06

## 2025-02-01 RX ADMIN — ONDANSETRON 4 MG: 2 INJECTION INTRAMUSCULAR; INTRAVENOUS at 14:40

## 2025-02-01 RX ADMIN — ENOXAPARIN SODIUM 70 MG: 100 INJECTION SUBCUTANEOUS at 10:06

## 2025-02-01 NOTE — FLOWSHEET NOTE
Care assumed.   Please contact patient with lab results.  PSA is trending upward. He did not complete prostate biopsy as previously recommended by urology.  We discussed this at the office visit and he is aware of the need to follow up with urology.  He must keep appt as scheduled and proceed with their recommendations.  Lipids are elevated as expected since he stopped his statin.  Start atorvastatin 40mg, rx sent. Please tell him to start with half tab daily for 1 week then 1 tab daily. Will plan to recheck labs in 3 months.   Remaining results normal/stable. See me as scheduled for bp follow up.

## 2025-02-02 LAB
ALBUMIN SERPL-MCNC: 2.7 G/DL (ref 3.5–4.6)
ANION GAP SERPL CALCULATED.3IONS-SCNC: 8 MEQ/L (ref 9–15)
BASOPHILS # BLD: 0.1 K/UL (ref 0–0.2)
BASOPHILS NFR BLD: 1.2 %
BUN SERPL-MCNC: 24 MG/DL (ref 8–23)
CALCIUM SERPL-MCNC: 8.2 MG/DL (ref 8.5–9.9)
CHLORIDE SERPL-SCNC: 117 MEQ/L (ref 95–107)
CO2 SERPL-SCNC: 20 MEQ/L (ref 20–31)
CREAT SERPL-MCNC: 1.42 MG/DL (ref 0.7–1.2)
EOSINOPHIL # BLD: 0.2 K/UL (ref 0–0.7)
EOSINOPHIL NFR BLD: 4 %
ERYTHROCYTE [DISTWIDTH] IN BLOOD BY AUTOMATED COUNT: 14.9 % (ref 11.5–14.5)
GLUCOSE BLD-MCNC: 108 MG/DL (ref 70–99)
GLUCOSE BLD-MCNC: 133 MG/DL (ref 70–99)
GLUCOSE BLD-MCNC: 189 MG/DL (ref 70–99)
GLUCOSE BLD-MCNC: 69 MG/DL (ref 70–99)
GLUCOSE BLD-MCNC: 86 MG/DL (ref 70–99)
GLUCOSE SERPL-MCNC: 105 MG/DL (ref 70–99)
HCT VFR BLD AUTO: 33 % (ref 42–52)
HGB BLD-MCNC: 10.1 G/DL (ref 14–18)
LYMPHOCYTES # BLD: 2.5 K/UL (ref 1–4.8)
LYMPHOCYTES NFR BLD: 42.4 %
MAGNESIUM SERPL-MCNC: 2.2 MG/DL (ref 1.7–2.4)
MCH RBC QN AUTO: 27.8 PG (ref 27–31.3)
MCHC RBC AUTO-ENTMCNC: 30.6 % (ref 33–37)
MCV RBC AUTO: 90.9 FL (ref 79–92.2)
MONOCYTES # BLD: 0.5 K/UL (ref 0.2–0.8)
MONOCYTES NFR BLD: 9.1 %
NEUTROPHILS # BLD: 2.6 K/UL (ref 1.4–6.5)
NEUTS SEG NFR BLD: 43.1 %
PERFORMED ON: ABNORMAL
PERFORMED ON: NORMAL
PHOSPHATE SERPL-MCNC: 2.9 MG/DL (ref 2.3–4.8)
PLATELET # BLD AUTO: 233 K/UL (ref 130–400)
POTASSIUM SERPL-SCNC: 4.6 MEQ/L (ref 3.4–4.9)
RBC # BLD AUTO: 3.63 M/UL (ref 4.7–6.1)
SODIUM SERPL-SCNC: 145 MEQ/L (ref 135–144)
WBC # BLD AUTO: 6 K/UL (ref 4.8–10.8)

## 2025-02-02 PROCEDURE — 85025 COMPLETE CBC W/AUTO DIFF WBC: CPT

## 2025-02-02 PROCEDURE — 6370000000 HC RX 637 (ALT 250 FOR IP): Performed by: INTERNAL MEDICINE

## 2025-02-02 PROCEDURE — 2500000003 HC RX 250 WO HCPCS: Performed by: INTERNAL MEDICINE

## 2025-02-02 PROCEDURE — 2700000000 HC OXYGEN THERAPY PER DAY

## 2025-02-02 PROCEDURE — 36415 COLL VENOUS BLD VENIPUNCTURE: CPT

## 2025-02-02 PROCEDURE — 80069 RENAL FUNCTION PANEL: CPT

## 2025-02-02 PROCEDURE — 1210000000 HC MED SURG R&B

## 2025-02-02 PROCEDURE — 83735 ASSAY OF MAGNESIUM: CPT

## 2025-02-02 RX ORDER — FERROUS SULFATE 325(65) MG
325 TABLET ORAL
Status: DISCONTINUED | OUTPATIENT
Start: 2025-02-03 | End: 2025-02-07 | Stop reason: HOSPADM

## 2025-02-02 RX ADMIN — FOLIC ACID 1 MG: 1 TABLET ORAL at 09:22

## 2025-02-02 RX ADMIN — Medication 10 ML: at 09:22

## 2025-02-02 RX ADMIN — INSULIN GLARGINE 10 UNITS: 100 INJECTION, SOLUTION SUBCUTANEOUS at 09:33

## 2025-02-02 RX ADMIN — APIXABAN 5 MG: 5 TABLET, FILM COATED ORAL at 09:22

## 2025-02-02 RX ADMIN — APIXABAN 5 MG: 5 TABLET, FILM COATED ORAL at 21:52

## 2025-02-02 RX ADMIN — ATORVASTATIN CALCIUM 80 MG: 80 TABLET, FILM COATED ORAL at 21:52

## 2025-02-02 RX ADMIN — INSULIN LISPRO 1 UNITS: 100 INJECTION, SOLUTION INTRAVENOUS; SUBCUTANEOUS at 17:12

## 2025-02-02 RX ADMIN — MIRTAZAPINE 7.5 MG: 15 TABLET, FILM COATED ORAL at 21:52

## 2025-02-02 RX ADMIN — Medication 10 ML: at 21:52

## 2025-02-02 RX ADMIN — TAMSULOSIN HYDROCHLORIDE 0.4 MG: 0.4 CAPSULE ORAL at 09:22

## 2025-02-03 LAB
ALBUMIN SERPL-MCNC: 2.7 G/DL (ref 3.5–4.6)
ANION GAP SERPL CALCULATED.3IONS-SCNC: 7 MEQ/L (ref 9–15)
BACTERIA BLD CULT ORG #2: NORMAL
BACTERIA BLD CULT: NORMAL
BASOPHILS # BLD: 0 K/UL (ref 0–0.2)
BASOPHILS NFR BLD: 0.9 %
BUN SERPL-MCNC: 16 MG/DL (ref 8–23)
CALCIUM SERPL-MCNC: 8.2 MG/DL (ref 8.5–9.9)
CHLORIDE SERPL-SCNC: 118 MEQ/L (ref 95–107)
CO2 SERPL-SCNC: 21 MEQ/L (ref 20–31)
CREAT SERPL-MCNC: 1.3 MG/DL (ref 0.7–1.2)
EOSINOPHIL # BLD: 0.2 K/UL (ref 0–0.7)
EOSINOPHIL NFR BLD: 3 %
ERYTHROCYTE [DISTWIDTH] IN BLOOD BY AUTOMATED COUNT: 14.9 % (ref 11.5–14.5)
GLUCOSE BLD-MCNC: 110 MG/DL (ref 70–99)
GLUCOSE BLD-MCNC: 127 MG/DL (ref 70–99)
GLUCOSE BLD-MCNC: 171 MG/DL (ref 70–99)
GLUCOSE BLD-MCNC: 199 MG/DL (ref 70–99)
GLUCOSE SERPL-MCNC: 133 MG/DL (ref 70–99)
HCT VFR BLD AUTO: 31.1 % (ref 42–52)
HGB BLD-MCNC: 9.6 G/DL (ref 14–18)
LYMPHOCYTES # BLD: 2.1 K/UL (ref 1–4.8)
LYMPHOCYTES NFR BLD: 31 %
MAGNESIUM SERPL-MCNC: 2.2 MG/DL (ref 1.7–2.4)
MCH RBC QN AUTO: 28.2 PG (ref 27–31.3)
MCHC RBC AUTO-ENTMCNC: 30.9 % (ref 33–37)
MCV RBC AUTO: 91.5 FL (ref 79–92.2)
MONOCYTES # BLD: 0.1 K/UL (ref 0.2–0.8)
MONOCYTES NFR BLD: 1.9 %
NEUTROPHILS # BLD: 4 K/UL (ref 1.4–6.5)
NEUTS SEG NFR BLD: 62 %
PERFORMED ON: ABNORMAL
PHOSPHATE SERPL-MCNC: 2.6 MG/DL (ref 2.3–4.8)
PLATELET # BLD AUTO: 234 K/UL (ref 130–400)
PLATELET BLD QL SMEAR: ADEQUATE
POTASSIUM SERPL-SCNC: 4.7 MEQ/L (ref 3.4–4.9)
RBC # BLD AUTO: 3.4 M/UL (ref 4.7–6.1)
SLIDE REVIEW: ABNORMAL
SMUDGE CELLS BLD QL SMEAR: 18.9
SODIUM SERPL-SCNC: 146 MEQ/L (ref 135–144)
VARIANT LYMPHS NFR BLD: 2 %
WBC # BLD AUTO: 6.4 K/UL (ref 4.8–10.8)

## 2025-02-03 PROCEDURE — 80069 RENAL FUNCTION PANEL: CPT

## 2025-02-03 PROCEDURE — 36415 COLL VENOUS BLD VENIPUNCTURE: CPT

## 2025-02-03 PROCEDURE — 97535 SELF CARE MNGMENT TRAINING: CPT

## 2025-02-03 PROCEDURE — 92526 ORAL FUNCTION THERAPY: CPT

## 2025-02-03 PROCEDURE — 85025 COMPLETE CBC W/AUTO DIFF WBC: CPT

## 2025-02-03 PROCEDURE — 83735 ASSAY OF MAGNESIUM: CPT

## 2025-02-03 PROCEDURE — 6370000000 HC RX 637 (ALT 250 FOR IP): Performed by: INTERNAL MEDICINE

## 2025-02-03 PROCEDURE — 2500000003 HC RX 250 WO HCPCS: Performed by: INTERNAL MEDICINE

## 2025-02-03 PROCEDURE — 1210000000 HC MED SURG R&B

## 2025-02-03 PROCEDURE — 2700000000 HC OXYGEN THERAPY PER DAY

## 2025-02-03 RX ADMIN — APIXABAN 5 MG: 5 TABLET, FILM COATED ORAL at 21:21

## 2025-02-03 RX ADMIN — ATORVASTATIN CALCIUM 80 MG: 80 TABLET, FILM COATED ORAL at 21:22

## 2025-02-03 RX ADMIN — FERROUS SULFATE TAB 325 MG (65 MG ELEMENTAL FE) 325 MG: 325 (65 FE) TAB at 09:47

## 2025-02-03 RX ADMIN — INSULIN GLARGINE 10 UNITS: 100 INJECTION, SOLUTION SUBCUTANEOUS at 09:47

## 2025-02-03 RX ADMIN — APIXABAN 5 MG: 5 TABLET, FILM COATED ORAL at 09:47

## 2025-02-03 RX ADMIN — TAMSULOSIN HYDROCHLORIDE 0.4 MG: 0.4 CAPSULE ORAL at 09:47

## 2025-02-03 RX ADMIN — MIRTAZAPINE 7.5 MG: 15 TABLET, FILM COATED ORAL at 21:22

## 2025-02-03 RX ADMIN — Medication 10 ML: at 21:22

## 2025-02-03 RX ADMIN — INSULIN LISPRO 1 UNITS: 100 INJECTION, SOLUTION INTRAVENOUS; SUBCUTANEOUS at 17:06

## 2025-02-03 RX ADMIN — Medication 10 ML: at 09:47

## 2025-02-03 RX ADMIN — FOLIC ACID 1 MG: 1 TABLET ORAL at 09:47

## 2025-02-03 NOTE — CARE COORDINATION
UPDATES WERE SENT TO [facility] ([facility].  STILL AWAITING PRECERT APPROVAL.

## 2025-02-04 ENCOUNTER — APPOINTMENT (OUTPATIENT)
Dept: NEUROLOGY | Facility: CLINIC | Age: 65
End: 2025-02-04
Payer: MEDICARE

## 2025-02-04 LAB
ALBUMIN SERPL-MCNC: 2.6 G/DL (ref 3.5–4.6)
ANION GAP SERPL CALCULATED.3IONS-SCNC: 6 MEQ/L (ref 9–15)
BASOPHILS # BLD: 0.1 K/UL (ref 0–0.2)
BASOPHILS NFR BLD: 1 %
BUN SERPL-MCNC: 16 MG/DL (ref 8–23)
CALCIUM SERPL-MCNC: 8.2 MG/DL (ref 8.5–9.9)
CHLORIDE SERPL-SCNC: 116 MEQ/L (ref 95–107)
CO2 SERPL-SCNC: 22 MEQ/L (ref 20–31)
CREAT SERPL-MCNC: 1.31 MG/DL (ref 0.7–1.2)
EOSINOPHIL # BLD: 0.2 K/UL (ref 0–0.7)
EOSINOPHIL NFR BLD: 2.9 %
ERYTHROCYTE [DISTWIDTH] IN BLOOD BY AUTOMATED COUNT: 15 % (ref 11.5–14.5)
GLUCOSE BLD-MCNC: 146 MG/DL (ref 70–99)
GLUCOSE BLD-MCNC: 218 MG/DL (ref 70–99)
GLUCOSE BLD-MCNC: 254 MG/DL (ref 70–99)
GLUCOSE SERPL-MCNC: 132 MG/DL (ref 70–99)
HCT VFR BLD AUTO: 31.7 % (ref 42–52)
HGB BLD-MCNC: 9.6 G/DL (ref 14–18)
LYMPHOCYTES # BLD: 3.3 K/UL (ref 1–4.8)
LYMPHOCYTES NFR BLD: 47.4 %
MAGNESIUM SERPL-MCNC: 2.2 MG/DL (ref 1.7–2.4)
MCH RBC QN AUTO: 27.4 PG (ref 27–31.3)
MCHC RBC AUTO-ENTMCNC: 30.3 % (ref 33–37)
MCV RBC AUTO: 90.6 FL (ref 79–92.2)
MONOCYTES # BLD: 0.5 K/UL (ref 0.2–0.8)
MONOCYTES NFR BLD: 6.9 %
NEUTROPHILS # BLD: 2.9 K/UL (ref 1.4–6.5)
NEUTS SEG NFR BLD: 41.5 %
PERFORMED ON: ABNORMAL
PHOSPHATE SERPL-MCNC: 2.5 MG/DL (ref 2.3–4.8)
PLATELET # BLD AUTO: 228 K/UL (ref 130–400)
POTASSIUM SERPL-SCNC: 4.8 MEQ/L (ref 3.4–4.9)
RBC # BLD AUTO: 3.5 M/UL (ref 4.7–6.1)
SODIUM SERPL-SCNC: 144 MEQ/L (ref 135–144)
WBC # BLD AUTO: 7 K/UL (ref 4.8–10.8)

## 2025-02-04 PROCEDURE — 83735 ASSAY OF MAGNESIUM: CPT

## 2025-02-04 PROCEDURE — 2500000003 HC RX 250 WO HCPCS: Performed by: INTERNAL MEDICINE

## 2025-02-04 PROCEDURE — 1210000000 HC MED SURG R&B

## 2025-02-04 PROCEDURE — 97535 SELF CARE MNGMENT TRAINING: CPT

## 2025-02-04 PROCEDURE — 85025 COMPLETE CBC W/AUTO DIFF WBC: CPT

## 2025-02-04 PROCEDURE — 6370000000 HC RX 637 (ALT 250 FOR IP): Performed by: INTERNAL MEDICINE

## 2025-02-04 PROCEDURE — 36415 COLL VENOUS BLD VENIPUNCTURE: CPT

## 2025-02-04 PROCEDURE — 97116 GAIT TRAINING THERAPY: CPT

## 2025-02-04 PROCEDURE — 2700000000 HC OXYGEN THERAPY PER DAY

## 2025-02-04 PROCEDURE — 80069 RENAL FUNCTION PANEL: CPT

## 2025-02-04 RX ADMIN — Medication 10 ML: at 21:15

## 2025-02-04 RX ADMIN — TAMSULOSIN HYDROCHLORIDE 0.4 MG: 0.4 CAPSULE ORAL at 11:08

## 2025-02-04 RX ADMIN — FOLIC ACID 1 MG: 1 TABLET ORAL at 11:09

## 2025-02-04 RX ADMIN — MIRTAZAPINE 7.5 MG: 15 TABLET, FILM COATED ORAL at 21:10

## 2025-02-04 RX ADMIN — INSULIN GLARGINE 10 UNITS: 100 INJECTION, SOLUTION SUBCUTANEOUS at 11:08

## 2025-02-04 RX ADMIN — FERROUS SULFATE TAB 325 MG (65 MG ELEMENTAL FE) 325 MG: 325 (65 FE) TAB at 11:09

## 2025-02-04 RX ADMIN — Medication 10 ML: at 11:09

## 2025-02-04 RX ADMIN — INSULIN LISPRO 2 UNITS: 100 INJECTION, SOLUTION INTRAVENOUS; SUBCUTANEOUS at 22:40

## 2025-02-04 RX ADMIN — APIXABAN 5 MG: 5 TABLET, FILM COATED ORAL at 11:09

## 2025-02-04 RX ADMIN — ATORVASTATIN CALCIUM 80 MG: 80 TABLET, FILM COATED ORAL at 21:10

## 2025-02-04 RX ADMIN — APIXABAN 5 MG: 5 TABLET, FILM COATED ORAL at 21:10

## 2025-02-04 ASSESSMENT — ENCOUNTER SYMPTOMS
SHORTNESS OF BREATH: 0
COUGH: 0

## 2025-02-04 ASSESSMENT — PAIN SCALES - GENERAL: PAINLEVEL_OUTOF10: 0

## 2025-02-04 NOTE — CARE COORDINATION
[facility] CALLED TO SAY THAT A P2P WILL BE COMPLETED TODAY AT 3PM.  LSW TO GET SNF CHOICE FROM BROTHER SO PRECERT CAN BE STARTED FOR SNF AS SOON AS WE GET THE ANSWER FROM THE P2P.      PT'S BROTHER NONA REVIEWING SNF LIST AND WILL CALL WITH FINAL DECISION TODAY.

## 2025-02-05 LAB
ALBUMIN SERPL-MCNC: 2.8 G/DL (ref 3.5–4.6)
ANION GAP SERPL CALCULATED.3IONS-SCNC: 8 MEQ/L (ref 9–15)
BASOPHILS # BLD: 0.1 K/UL (ref 0–0.2)
BASOPHILS NFR BLD: 0.8 %
BUN SERPL-MCNC: 16 MG/DL (ref 8–23)
CALCIUM SERPL-MCNC: 8.5 MG/DL (ref 8.5–9.9)
CHLORIDE SERPL-SCNC: 117 MEQ/L (ref 95–107)
CO2 SERPL-SCNC: 24 MEQ/L (ref 20–31)
CREAT SERPL-MCNC: 1.35 MG/DL (ref 0.7–1.2)
EOSINOPHIL # BLD: 0.2 K/UL (ref 0–0.7)
EOSINOPHIL NFR BLD: 3.4 %
ERYTHROCYTE [DISTWIDTH] IN BLOOD BY AUTOMATED COUNT: 14.8 % (ref 11.5–14.5)
GLUCOSE BLD-MCNC: 108 MG/DL (ref 70–99)
GLUCOSE BLD-MCNC: 150 MG/DL (ref 70–99)
GLUCOSE BLD-MCNC: 163 MG/DL (ref 70–99)
GLUCOSE BLD-MCNC: 200 MG/DL (ref 70–99)
GLUCOSE SERPL-MCNC: 84 MG/DL (ref 70–99)
HCT VFR BLD AUTO: 33.1 % (ref 42–52)
HGB BLD-MCNC: 10.3 G/DL (ref 14–18)
LYMPHOCYTES # BLD: 3 K/UL (ref 1–4.8)
LYMPHOCYTES NFR BLD: 42.1 %
MAGNESIUM SERPL-MCNC: 2.2 MG/DL (ref 1.7–2.4)
MCH RBC QN AUTO: 28.4 PG (ref 27–31.3)
MCHC RBC AUTO-ENTMCNC: 31.1 % (ref 33–37)
MCV RBC AUTO: 91.2 FL (ref 79–92.2)
MONOCYTES # BLD: 0.6 K/UL (ref 0.2–0.8)
MONOCYTES NFR BLD: 7.8 %
NEUTROPHILS # BLD: 3.3 K/UL (ref 1.4–6.5)
NEUTS SEG NFR BLD: 45.8 %
PERFORMED ON: ABNORMAL
PHOSPHATE SERPL-MCNC: 2.6 MG/DL (ref 2.3–4.8)
PLATELET # BLD AUTO: 253 K/UL (ref 130–400)
POTASSIUM SERPL-SCNC: 5 MEQ/L (ref 3.4–4.9)
RBC # BLD AUTO: 3.63 M/UL (ref 4.7–6.1)
SODIUM SERPL-SCNC: 149 MEQ/L (ref 135–144)
WBC # BLD AUTO: 7.2 K/UL (ref 4.8–10.8)

## 2025-02-05 PROCEDURE — 2700000000 HC OXYGEN THERAPY PER DAY

## 2025-02-05 PROCEDURE — 85025 COMPLETE CBC W/AUTO DIFF WBC: CPT

## 2025-02-05 PROCEDURE — 1210000000 HC MED SURG R&B

## 2025-02-05 PROCEDURE — 80069 RENAL FUNCTION PANEL: CPT

## 2025-02-05 PROCEDURE — 36415 COLL VENOUS BLD VENIPUNCTURE: CPT

## 2025-02-05 PROCEDURE — 83735 ASSAY OF MAGNESIUM: CPT

## 2025-02-05 PROCEDURE — 6370000000 HC RX 637 (ALT 250 FOR IP): Performed by: INTERNAL MEDICINE

## 2025-02-05 PROCEDURE — 2500000003 HC RX 250 WO HCPCS: Performed by: INTERNAL MEDICINE

## 2025-02-05 PROCEDURE — 97535 SELF CARE MNGMENT TRAINING: CPT

## 2025-02-05 PROCEDURE — 97116 GAIT TRAINING THERAPY: CPT

## 2025-02-05 RX ADMIN — Medication 10 ML: at 21:14

## 2025-02-05 RX ADMIN — FERROUS SULFATE TAB 325 MG (65 MG ELEMENTAL FE) 325 MG: 325 (65 FE) TAB at 08:57

## 2025-02-05 RX ADMIN — INSULIN GLARGINE 10 UNITS: 100 INJECTION, SOLUTION SUBCUTANEOUS at 08:57

## 2025-02-05 RX ADMIN — ATORVASTATIN CALCIUM 80 MG: 80 TABLET, FILM COATED ORAL at 21:13

## 2025-02-05 RX ADMIN — INSULIN LISPRO 1 UNITS: 100 INJECTION, SOLUTION INTRAVENOUS; SUBCUTANEOUS at 22:17

## 2025-02-05 RX ADMIN — MIRTAZAPINE 7.5 MG: 15 TABLET, FILM COATED ORAL at 21:13

## 2025-02-05 RX ADMIN — APIXABAN 5 MG: 5 TABLET, FILM COATED ORAL at 08:57

## 2025-02-05 RX ADMIN — APIXABAN 5 MG: 5 TABLET, FILM COATED ORAL at 21:13

## 2025-02-05 RX ADMIN — FOLIC ACID 1 MG: 1 TABLET ORAL at 08:56

## 2025-02-05 RX ADMIN — TAMSULOSIN HYDROCHLORIDE 0.4 MG: 0.4 CAPSULE ORAL at 08:56

## 2025-02-05 ASSESSMENT — PAIN SCALES - GENERAL: PAINLEVEL_OUTOF10: 0

## 2025-02-05 ASSESSMENT — ENCOUNTER SYMPTOMS
COUGH: 0
SHORTNESS OF BREATH: 0

## 2025-02-05 NOTE — CARE COORDINATION
LSW SPOKE WITH PT'S BROTHER REGARDING THE dc PLAN AND HE WOULD LIKE [facility] AS HIS FIRST CHOICE.  REFERRAL WAS CALLED TO [facility] AND SHE WILL REVIEW.  LSW ASKED FOR A SECOND CHOICE AND AFTER LONG DISCUSSION THE BROTHER SAID THAT [facility] WOULD BE HIS CHOICE.  REFERRAL WAS CALLED TO KADEN AT [facility] AND SHE WILL SEE IF THEY CAN ACCEPT THE INSURANCE.  LSW TO FOLLOW.      [facility] WAS DENIED BY INSURANCE AFTER P2P WAS COMPLETED.      PT'S BROTHER NOW WOULD LIKE [facility].  REFERRAL CALLED TO SELENA IN ADMISSIONS.  [facility] HAS DENIED THE PATIENT FOR ACCEPTANCE.

## 2025-02-06 LAB
ALBUMIN SERPL-MCNC: 2.8 G/DL (ref 3.5–4.6)
ANION GAP SERPL CALCULATED.3IONS-SCNC: 11 MEQ/L (ref 9–15)
BASOPHILS # BLD: 0.1 K/UL (ref 0–0.2)
BASOPHILS NFR BLD: 0.9 %
BUN SERPL-MCNC: 20 MG/DL (ref 8–23)
CALCIUM SERPL-MCNC: 8.6 MG/DL (ref 8.5–9.9)
CHLORIDE SERPL-SCNC: 114 MEQ/L (ref 95–107)
CO2 SERPL-SCNC: 21 MEQ/L (ref 20–31)
CREAT SERPL-MCNC: 1.5 MG/DL (ref 0.7–1.2)
EOSINOPHIL # BLD: 0.3 K/UL (ref 0–0.7)
EOSINOPHIL NFR BLD: 3.5 %
ERYTHROCYTE [DISTWIDTH] IN BLOOD BY AUTOMATED COUNT: 14.7 % (ref 11.5–14.5)
GLUCOSE BLD-MCNC: 107 MG/DL (ref 70–99)
GLUCOSE BLD-MCNC: 145 MG/DL (ref 70–99)
GLUCOSE BLD-MCNC: 173 MG/DL (ref 70–99)
GLUCOSE BLD-MCNC: 277 MG/DL (ref 70–99)
GLUCOSE BLD-MCNC: 295 MG/DL (ref 70–99)
GLUCOSE SERPL-MCNC: 114 MG/DL (ref 70–99)
HCT VFR BLD AUTO: 33.2 % (ref 42–52)
HGB BLD-MCNC: 10.2 G/DL (ref 14–18)
LYMPHOCYTES # BLD: 3.2 K/UL (ref 1–4.8)
LYMPHOCYTES NFR BLD: 40.8 %
MAGNESIUM SERPL-MCNC: 2.2 MG/DL (ref 1.7–2.4)
MCH RBC QN AUTO: 28.2 PG (ref 27–31.3)
MCHC RBC AUTO-ENTMCNC: 30.7 % (ref 33–37)
MCV RBC AUTO: 91.7 FL (ref 79–92.2)
MONOCYTES # BLD: 0.6 K/UL (ref 0.2–0.8)
MONOCYTES NFR BLD: 7.4 %
NEUTROPHILS # BLD: 3.7 K/UL (ref 1.4–6.5)
NEUTS SEG NFR BLD: 47.1 %
PERFORMED ON: ABNORMAL
PHOSPHATE SERPL-MCNC: 2.5 MG/DL (ref 2.3–4.8)
PLATELET # BLD AUTO: 255 K/UL (ref 130–400)
POTASSIUM SERPL-SCNC: 5.1 MEQ/L (ref 3.4–4.9)
RBC # BLD AUTO: 3.62 M/UL (ref 4.7–6.1)
SODIUM SERPL-SCNC: 146 MEQ/L (ref 135–144)
WBC # BLD AUTO: 7.9 K/UL (ref 4.8–10.8)

## 2025-02-06 PROCEDURE — 6370000000 HC RX 637 (ALT 250 FOR IP): Performed by: INTERNAL MEDICINE

## 2025-02-06 PROCEDURE — 85025 COMPLETE CBC W/AUTO DIFF WBC: CPT

## 2025-02-06 PROCEDURE — 6370000000 HC RX 637 (ALT 250 FOR IP): Performed by: PSYCHIATRY & NEUROLOGY

## 2025-02-06 PROCEDURE — 2580000003 HC RX 258: Performed by: INTERNAL MEDICINE

## 2025-02-06 PROCEDURE — 6360000002 HC RX W HCPCS: Performed by: NURSE PRACTITIONER

## 2025-02-06 PROCEDURE — 83735 ASSAY OF MAGNESIUM: CPT

## 2025-02-06 PROCEDURE — 2500000003 HC RX 250 WO HCPCS: Performed by: INTERNAL MEDICINE

## 2025-02-06 PROCEDURE — 80069 RENAL FUNCTION PANEL: CPT

## 2025-02-06 PROCEDURE — 1210000000 HC MED SURG R&B

## 2025-02-06 PROCEDURE — 36415 COLL VENOUS BLD VENIPUNCTURE: CPT

## 2025-02-06 RX ORDER — DIPHENHYDRAMINE HYDROCHLORIDE 50 MG/ML
50 INJECTION INTRAMUSCULAR; INTRAVENOUS ONCE
Status: COMPLETED | OUTPATIENT
Start: 2025-02-06 | End: 2025-02-06

## 2025-02-06 RX ORDER — DIPHENHYDRAMINE HYDROCHLORIDE 50 MG/ML
INJECTION INTRAMUSCULAR; INTRAVENOUS
Status: DISCONTINUED
Start: 2025-02-06 | End: 2025-02-06 | Stop reason: WASHOUT

## 2025-02-06 RX ORDER — HALOPERIDOL 5 MG/ML
5 INJECTION INTRAMUSCULAR ONCE
Status: COMPLETED | OUTPATIENT
Start: 2025-02-06 | End: 2025-02-06

## 2025-02-06 RX ORDER — HALOPERIDOL 5 MG/ML
INJECTION INTRAMUSCULAR
Status: DISCONTINUED
Start: 2025-02-06 | End: 2025-02-06 | Stop reason: WASHOUT

## 2025-02-06 RX ORDER — LORAZEPAM 2 MG/ML
2 INJECTION INTRAMUSCULAR ONCE
Status: COMPLETED | OUTPATIENT
Start: 2025-02-06 | End: 2025-02-06

## 2025-02-06 RX ORDER — VALPROIC ACID 250 MG/5ML
250 SOLUTION ORAL 2 TIMES DAILY
Status: DISCONTINUED | OUTPATIENT
Start: 2025-02-06 | End: 2025-02-07 | Stop reason: HOSPADM

## 2025-02-06 RX ORDER — DEXTROSE MONOHYDRATE 50 MG/ML
INJECTION, SOLUTION INTRAVENOUS CONTINUOUS
Status: DISCONTINUED | OUTPATIENT
Start: 2025-02-06 | End: 2025-02-07 | Stop reason: HOSPADM

## 2025-02-06 RX ADMIN — FERROUS SULFATE TAB 325 MG (65 MG ELEMENTAL FE) 325 MG: 325 (65 FE) TAB at 08:49

## 2025-02-06 RX ADMIN — ATORVASTATIN CALCIUM 80 MG: 80 TABLET, FILM COATED ORAL at 20:56

## 2025-02-06 RX ADMIN — MIRTAZAPINE 7.5 MG: 15 TABLET, FILM COATED ORAL at 20:56

## 2025-02-06 RX ADMIN — Medication 10 ML: at 08:49

## 2025-02-06 RX ADMIN — LORAZEPAM 2 MG: 2 INJECTION INTRAMUSCULAR at 04:00

## 2025-02-06 RX ADMIN — INSULIN LISPRO 2 UNITS: 100 INJECTION, SOLUTION INTRAVENOUS; SUBCUTANEOUS at 20:56

## 2025-02-06 RX ADMIN — APIXABAN 5 MG: 5 TABLET, FILM COATED ORAL at 20:56

## 2025-02-06 RX ADMIN — TAMSULOSIN HYDROCHLORIDE 0.4 MG: 0.4 CAPSULE ORAL at 08:49

## 2025-02-06 RX ADMIN — VALPROIC ACID 250 MG: 250 SOLUTION ORAL at 20:56

## 2025-02-06 RX ADMIN — HALOPERIDOL 5 MG: 5 INJECTION INTRAMUSCULAR at 04:00

## 2025-02-06 RX ADMIN — DIPHENHYDRAMINE HYDROCHLORIDE 50 MG: 50 INJECTION INTRAMUSCULAR; INTRAVENOUS at 04:00

## 2025-02-06 RX ADMIN — FOLIC ACID 1 MG: 1 TABLET ORAL at 08:49

## 2025-02-06 RX ADMIN — DEXTROSE MONOHYDRATE: 50 INJECTION, SOLUTION INTRAVENOUS at 11:38

## 2025-02-06 RX ADMIN — Medication 10 ML: at 20:57

## 2025-02-06 RX ADMIN — APIXABAN 5 MG: 5 TABLET, FILM COATED ORAL at 08:49

## 2025-02-06 ASSESSMENT — ENCOUNTER SYMPTOMS
COUGH: 0
SHORTNESS OF BREATH: 0

## 2025-02-06 NOTE — CONSULTS
[facility], Department of Psychiatry  Behavioral Health Consult    REASON FOR CONSULT: AMS    CONSULTING PHYSICIAN: Dr Wright    History obtained from: Patient    HISTORY OF PRESENT ILLNESS:      The patient is a 64 y.o. male with no significant past psychiatric history of  who presents with AMS  Pt is poor historian sec to current mental state  Most information was obtained by reviewing chart  Pt Iive alone and has severe visual and hearing impairment  Recent stroke 12/19 and was in SNF  Recent covid illness made him worse with increase confusion and agitation      The patient is not currently receiving care for the above psychiatric illness.      Psychiatric Review of Systems    Unable to obtain details      Past Medical History:        Diagnosis Date    Cerebrovascular small vessel disease     Chronic kidney disease, stage 3b (HCC) 12/16/2020    Colon cancer screening declined 8/30/2021    Debility 7/11/2022    Diabetic polyneuropathy associated with type 2 diabetes mellitus (HCC) 4/22/2021    DM (diabetes mellitus) ([facility])     was with Dr Knox, [facility]    Dysarthria as late effect of cerebrovascular accident (CVA) 2015    Hearing loss     Hemiparesis affecting left side as late effect of cerebrovascular accident (CVA) ([facility])     History of left PCA stroke 2013    History of right STEPHANIE stroke 10/21/2015    was at [facility], now Dr Mallory    Microalbuminuria 2018    PFO (patent foramen ovale) 2015    Right pontine CVA ([facility]) 10/2015    Stage 3a chronic kidney disease (HCC) 12/16/2020    Syncope and collapse 6/1/2020       Past Surgical History:        Procedure Laterality Date    ANKLE SURGERY      FRACTURE SURGERY Left     Left Hip       Medications Prior to Admission:   Medications Prior to Admission: acetaminophen (TYLENOL) 325 MG tablet, Take 2 tablets by mouth every 4 hours as needed for Pain or Fever  apixaban (ELIQUIS) 5 MG TABS tablet, Take 1 tablet by mouth 2 times daily  insulin lispro, 1 Unit Dial, 
CALCIUM 7.8 01/30/2025 05:52 AM    GFRAA 59.1 06/20/2022 08:22 AM    LABGLOM 27.1 01/30/2025 05:52 AM    LABGLOM 40.3 01/10/2024 10:27 AM    GLUCOSE 176 01/30/2025 05:52 AM     CT HEAD WO CONTRAST    Result Date: 1/29/2025  EXAMINATION: CT OF THE HEAD WITHOUT CONTRAST  1/29/2025 4:39 pm TECHNIQUE: CT of the head was performed without the administration of intravenous contrast. Automated exposure control, iterative reconstruction, and/or weight based adjustment of the mA/kV was utilized to reduce the radiation dose to as low as reasonably achievable. COMPARISON: None. HISTORY: ORDERING SYSTEM PROVIDED HISTORY: AMS TECHNOLOGIST PROVIDED HISTORY: Has a \"code stroke\" or \"stroke alert\" been called?->No Reason for exam:->[facility] Decision Support Exception - unselect if not a suspected or confirmed emergency medical condition->Emergency Medical Condition (MA) What reading provider will be dictating this exam?->CRC FINDINGS: BRAIN/VENTRICLES: There is no acute intracranial hemorrhage, mass effect or midline shift.  No abnormal extra-axial fluid collection.  The gray-white differentiation is maintained without evidence of an acute infarct.  There is no evidence of hydrocephalus. The ventricles, cisterns and sulci are prominent consistent with atrophy.  There is decreased attenuation within the periventricular white matter consistent with periventricular microvascular ischemic disease. There are findings of an old infarct within the left occipital lobe. ORBITS: The visualized portion of the orbits demonstrate no acute abnormality. SINUSES: There is mucosal thickening seen within the right and left ethmoid air cells, right and left sphenoid sinuses and within the right maxillary sinus.. SOFT TISSUES/SKULL:  No acute abnormality of the visualized skull or soft tissues.     1.  There is no acute intracranial abnormality.  Specifically, there is no intracranial hemorrhage. 2. Atrophy and periventricular microvascular ischemic disease

## 2025-02-06 NOTE — FLOWSHEET NOTE
Pt resting in the bed at this time.Electronically signed by Connie Perez LPN on 2/6/2025 at 10:30 AM

## 2025-02-06 NOTE — CARE COORDINATION
iNSURANCE IS REQUESTING PRIOR LEVEL OF FUNCTION FOR THIS PT SO THIS LSW SPOKE WITH THE PT AND HIS BROTHER FOR THE FOLLOWING INFORMATION.      PT WAS LIVING ALONE AND INDEPENDENT PRIOR TO A FALL THAT RESULTED IN A HIP FX ON 11/17/24.  THE PT COMPLETED REHAB AT [facility] FOR THE HIP FX AND WENT BACK TO HIS APARTMENT.  HE WAS HOME FOR 3 DAYS AND SUFFERED A STROKE ON 12/19/24.  HE RETURNED TO [facility] FOR REHAB FOLLOWING HIS STROKE.  HE WAS USING A WALKER FOR AMBULATION PRIOR TO HIS COVID DIAGNOSIS THAT LEFT HIM DECONDITIONED.  PT AND FAMILY WANT THE PT TO BE REHABILITATED TO THE POINT THAT HE CAN RETURN TO HIS INDEPENDENT LIVING.

## 2025-02-07 VITALS
WEIGHT: 146.6 LBS | SYSTOLIC BLOOD PRESSURE: 125 MMHG | RESPIRATION RATE: 16 BRPM | DIASTOLIC BLOOD PRESSURE: 79 MMHG | HEART RATE: 101 BPM | TEMPERATURE: 97.5 F | OXYGEN SATURATION: 100 % | BODY MASS INDEX: 24.43 KG/M2 | HEIGHT: 65 IN

## 2025-02-07 LAB
ALBUMIN SERPL-MCNC: 2.8 G/DL (ref 3.5–4.6)
ANION GAP SERPL CALCULATED.3IONS-SCNC: 9 MEQ/L (ref 9–15)
BASOPHILS # BLD: 0.1 K/UL (ref 0–0.2)
BASOPHILS NFR BLD: 0.6 %
BUN SERPL-MCNC: 17 MG/DL (ref 8–23)
CALCIUM SERPL-MCNC: 8.1 MG/DL (ref 8.5–9.9)
CHLORIDE SERPL-SCNC: 108 MEQ/L (ref 95–107)
CO2 SERPL-SCNC: 23 MEQ/L (ref 20–31)
CREAT SERPL-MCNC: 1.46 MG/DL (ref 0.7–1.2)
EOSINOPHIL # BLD: 0.3 K/UL (ref 0–0.7)
EOSINOPHIL NFR BLD: 3.2 %
ERYTHROCYTE [DISTWIDTH] IN BLOOD BY AUTOMATED COUNT: 14.7 % (ref 11.5–14.5)
GLUCOSE BLD-MCNC: 138 MG/DL (ref 70–99)
GLUCOSE BLD-MCNC: 171 MG/DL (ref 70–99)
GLUCOSE BLD-MCNC: 216 MG/DL (ref 70–99)
GLUCOSE SERPL-MCNC: 164 MG/DL (ref 70–99)
HCT VFR BLD AUTO: 30.8 % (ref 42–52)
HGB BLD-MCNC: 9.8 G/DL (ref 14–18)
LYMPHOCYTES # BLD: 3.2 K/UL (ref 1–4.8)
LYMPHOCYTES NFR BLD: 38.9 %
MAGNESIUM SERPL-MCNC: 2 MG/DL (ref 1.7–2.4)
MCH RBC QN AUTO: 29.1 PG (ref 27–31.3)
MCHC RBC AUTO-ENTMCNC: 31.8 % (ref 33–37)
MCV RBC AUTO: 91.4 FL (ref 79–92.2)
MONOCYTES # BLD: 0.7 K/UL (ref 0.2–0.8)
MONOCYTES NFR BLD: 8 %
NEUTROPHILS # BLD: 4 K/UL (ref 1.4–6.5)
NEUTS SEG NFR BLD: 49.1 %
PERFORMED ON: ABNORMAL
PHOSPHATE SERPL-MCNC: 2.8 MG/DL (ref 2.3–4.8)
PLATELET # BLD AUTO: 234 K/UL (ref 130–400)
POTASSIUM SERPL-SCNC: 5.3 MEQ/L (ref 3.4–4.9)
RBC # BLD AUTO: 3.37 M/UL (ref 4.7–6.1)
SODIUM SERPL-SCNC: 140 MEQ/L (ref 135–144)
WBC # BLD AUTO: 8.1 K/UL (ref 4.8–10.8)

## 2025-02-07 PROCEDURE — 6370000000 HC RX 637 (ALT 250 FOR IP): Performed by: INTERNAL MEDICINE

## 2025-02-07 PROCEDURE — 85025 COMPLETE CBC W/AUTO DIFF WBC: CPT

## 2025-02-07 PROCEDURE — 2580000003 HC RX 258: Performed by: INTERNAL MEDICINE

## 2025-02-07 PROCEDURE — 36415 COLL VENOUS BLD VENIPUNCTURE: CPT

## 2025-02-07 PROCEDURE — 2700000000 HC OXYGEN THERAPY PER DAY

## 2025-02-07 PROCEDURE — 83735 ASSAY OF MAGNESIUM: CPT

## 2025-02-07 PROCEDURE — 6370000000 HC RX 637 (ALT 250 FOR IP): Performed by: PSYCHIATRY & NEUROLOGY

## 2025-02-07 PROCEDURE — 80069 RENAL FUNCTION PANEL: CPT

## 2025-02-07 PROCEDURE — 92526 ORAL FUNCTION THERAPY: CPT

## 2025-02-07 RX ORDER — VALPROIC ACID 250 MG/5ML
250 SOLUTION ORAL 2 TIMES DAILY
Qty: 600 ML | Refills: 0 | Status: SHIPPED | OUTPATIENT
Start: 2025-02-07

## 2025-02-07 RX ORDER — FOLIC ACID 1 MG/1
1 TABLET ORAL DAILY
Qty: 30 TABLET | Refills: 3 | Status: SHIPPED | OUTPATIENT
Start: 2025-02-08

## 2025-02-07 RX ORDER — FERROUS SULFATE 325(65) MG
325 TABLET ORAL
Qty: 30 TABLET | Refills: 3 | Status: SHIPPED | OUTPATIENT
Start: 2025-02-08

## 2025-02-07 RX ADMIN — APIXABAN 5 MG: 5 TABLET, FILM COATED ORAL at 08:28

## 2025-02-07 RX ADMIN — FERROUS SULFATE TAB 325 MG (65 MG ELEMENTAL FE) 325 MG: 325 (65 FE) TAB at 08:28

## 2025-02-07 RX ADMIN — INSULIN GLARGINE 10 UNITS: 100 INJECTION, SOLUTION SUBCUTANEOUS at 08:28

## 2025-02-07 RX ADMIN — SODIUM ZIRCONIUM CYCLOSILICATE 10 G: 10 POWDER, FOR SUSPENSION ORAL at 12:57

## 2025-02-07 RX ADMIN — VALPROIC ACID 250 MG: 250 SOLUTION ORAL at 08:27

## 2025-02-07 RX ADMIN — DEXTROSE MONOHYDRATE: 50 INJECTION, SOLUTION INTRAVENOUS at 00:59

## 2025-02-07 RX ADMIN — INSULIN LISPRO 1 UNITS: 100 INJECTION, SOLUTION INTRAVENOUS; SUBCUTANEOUS at 12:57

## 2025-02-07 RX ADMIN — FOLIC ACID 1 MG: 1 TABLET ORAL at 08:28

## 2025-02-07 RX ADMIN — TAMSULOSIN HYDROCHLORIDE 0.4 MG: 0.4 CAPSULE ORAL at 08:28

## 2025-02-07 NOTE — CARE COORDINATION
We have precert approval for the pt to transfer to [facility] ambulance is set for 3:30  today.  Brother notified of transfer.

## 2025-02-07 NOTE — DISCHARGE SUMMARY
Discharge Summary    Date: 2/7/2025  Patient Name: Jesse Leigh    YOB: 1960     Age: 64 y.o.    Admit Date: 1/29/2025  Discharge Date: 2/7/2025  Discharge Condition: Fair    Admission Diagnosis  JUSTIN (acute kidney injury) ([facility]) [N17.9];Altered mental status, unspecified altered mental status type [R41.82];Acute renal failure superimposed on chronic kidney disease, unspecified acute renal failure type, unspecified CKD stage ([facility]) [N17.9, N18.9]      Discharge Diagnosis  Principal Problem:    JUSTIN (acute kidney injury) ([facility])  Resolved Problems:    * No resolved hospital problems. *      Hospital Stay  Narrative of Hospital Course:  Patient has history of CVA and hard time hearing and possible cognitive impairment comes in here metabolic encephalopathy secondary to acute kidney injury.  Urine culture was negative, abx  has been discontinued.pt was treated for hypernatremia with free water. Pt also received lokelma for hyperkalemia    Consultants:  IP CONSULT TO NEPHROLOGY  IP CONSULT TO PSYCHIATRY    Surgeries/procedures Performed:      Treatments:            Discharge Plan/Disposition:  Home    Hospital/Incidental Findings Requiring Follow Up:    Patient Instructions:    Diet:    Activity:  For number of days (if applicable):      Other Instructions:    Provider Follow-Up:   No follow-ups on file.     Significant Diagnostic Studies:    Recent Labs:  Admission on 01/29/2025  No results displayed because visit has over 200 results.    ------------    Radiology last 7 days:  No results found.     [unfilled]    Discharge Medications    Current Discharge Medication List    START taking these medications    valproic acid (DEPAKENE) 250 MG/5ML SOLN oral solution  Take 5 mLs by mouth 2 times daily  Qty: 600 mL Refills: 0    ferrous sulfate (IRON 325) 325 (65 Fe) MG tablet  Take 1 tablet by mouth daily (with breakfast)  Qty: 30 tablet Refills: 3    folic acid (FOLVITE) 1 MG tablet  Take 1 tablet by mouth

## 2025-02-07 NOTE — PLAN OF CARE
Problem: Chronic Conditions and Co-morbidities  Goal: Patient's chronic conditions and co-morbidity symptoms are monitored and maintained or improved  1/31/2025 1059 by Ann Galvan RN  Outcome: Progressing     Problem: Discharge Planning  Goal: Discharge to home or other facility with appropriate resources  1/31/2025 1059 by Ann Galvan RN  Outcome: Progressing     Problem: Safety - Adult  Goal: Free from fall injury  1/31/2025 1059 by Ann Galvan RN  Outcome: Progressing     Problem: Skin/Tissue Integrity  Goal: Skin integrity remains intact  Description: 1.  Monitor for areas of redness and/or skin breakdown  2.  Assess vascular access sites hourly  3.  Every 4-6 hours minimum:  Change oxygen saturation probe site  4.  Every 4-6 hours:  If on nasal continuous positive airway pressure, respiratory therapy assess nares and determine need for appliance change or resting period  1/31/2025 1059 by Ann Galvan RN  Outcome: Progressing     Problem: Confusion  Goal: Confusion, delirium, dementia, or psychosis is improved or at baseline  Description: INTERVENTIONS:  1. Assess for possible contributors to thought disturbance, including medications, impaired vision or hearing, underlying metabolic abnormalities, dehydration, psychiatric diagnoses, and notify attending LIP  2. [place] high risk fall precautions, as indicated  3. Provide frequent short contacts to provide reality reorientation, refocusing and direction  4. Decrease environmental stimuli, including noise as appropriate  5. Monitor and intervene to maintain adequate nutrition, hydration, elimination, sleep and activity  6. If unable to ensure safety without constant attention obtain sitter and review sitter guidelines with assigned personnel  7. Initiate Psychosocial CNS and Spiritual Care consult, as indicated  1/31/2025 1059 by Ann Galvan RN  Outcome: Progressing     Problem: Confusion  Goal: Confusion, delirium, 
  Problem: Chronic Conditions and Co-morbidities  Goal: Patient's chronic conditions and co-morbidity symptoms are monitored and maintained or improved  1/31/2025 2024 by Nelly Castrejon RN  Outcome: Progressing  1/31/2025 1059 by Ann Galvan RN  Outcome: Progressing     Problem: Discharge Planning  Goal: Discharge to home or other facility with appropriate resources  1/31/2025 2024 by Nelly Castrejon RN  Outcome: Progressing  1/31/2025 1059 by Ann Galvan RN  Outcome: Progressing     Problem: Safety - Adult  Goal: Free from fall injury  1/31/2025 2024 by Nelly Castrejon RN  Outcome: Progressing  1/31/2025 1059 by Ann Galvan RN  Outcome: Progressing     Problem: Skin/Tissue Integrity  Goal: Skin integrity remains intact  Description: 1.  Monitor for areas of redness and/or skin breakdown  2.  Assess vascular access sites hourly  3.  Every 4-6 hours minimum:  Change oxygen saturation probe site  4.  Every 4-6 hours:  If on nasal continuous positive airway pressure, respiratory therapy assess nares and determine need for appliance change or resting period  1/31/2025 2024 by Nelly Castrejon RN  Outcome: Progressing  1/31/2025 1059 by Ann Galvan RN  Outcome: Progressing     Problem: Confusion  Goal: Confusion, delirium, dementia, or psychosis is improved or at baseline  Description: INTERVENTIONS:  1. Assess for possible contributors to thought disturbance, including medications, impaired vision or hearing, underlying metabolic abnormalities, dehydration, psychiatric diagnoses, and notify attending LIP  2. [place] high risk fall precautions, as indicated  3. Provide frequent short contacts to provide reality reorientation, refocusing and direction  4. Decrease environmental stimuli, including noise as appropriate  5. Monitor and intervene to maintain adequate nutrition, hydration, elimination, sleep and activity  6. If unable to ensure safety without constant attention obtain 
  Problem: Chronic Conditions and Co-morbidities  Goal: Patient's chronic conditions and co-morbidity symptoms are monitored and maintained or improved  2/1/2025 2330 by Nelly Castrejon RN  Outcome: Progressing  2/1/2025 1111 by Darya Mckeon RN  Outcome: Progressing  Flowsheets (Taken 2/1/2025 1000)  Care Plan - Patient's Chronic Conditions and Co-Morbidity Symptoms are Monitored and Maintained or Improved:   Monitor and assess patient's chronic conditions and comorbid symptoms for stability, deterioration, or improvement   Collaborate with multidisciplinary team to address chronic and comorbid conditions and prevent exacerbation or deterioration   Update acute care plan with appropriate goals if chronic or comorbid symptoms are exacerbated and prevent overall improvement and discharge     Problem: Discharge Planning  Goal: Discharge to home or other facility with appropriate resources  2/1/2025 2330 by Nelly Castrejon RN  Outcome: Progressing  2/1/2025 1111 by Darya Mckeon RN  Outcome: Progressing  Flowsheets (Taken 2/1/2025 1000)  Discharge to home or other facility with appropriate resources:   Identify barriers to discharge with patient and caregiver   Arrange for needed discharge resources and transportation as appropriate   Identify discharge learning needs (meds, wound care, etc)   Arrange for interpreters to assist at discharge as needed   Refer to discharge planning if patient needs post-hospital services based on physician order or complex needs related to functional status, cognitive ability or social support system     Problem: Safety - Adult  Goal: Free from fall injury  2/1/2025 2330 by Nelly Castrejon RN  Outcome: Progressing  2/1/2025 1111 by Darya Mckeon RN  Outcome: Progressing     Problem: Skin/Tissue Integrity  Goal: Skin integrity remains intact  Description: 1.  Monitor for areas of redness and/or skin breakdown  2.  Assess vascular access sites hourly  3.  Every 4-6 hours 
  Problem: Chronic Conditions and Co-morbidities  Goal: Patient's chronic conditions and co-morbidity symptoms are monitored and maintained or improved  2/2/2025 1146 by Peg Quinn RN  Outcome: Progressing  2/1/2025 2330 by Nelly Castrejon RN  Outcome: Progressing     Problem: Discharge Planning  Goal: Discharge to home or other facility with appropriate resources  2/2/2025 1146 by Peg Quinn RN  Outcome: Progressing  2/1/2025 2330 by Nelly Castrejon RN  Outcome: Progressing     Problem: Safety - Adult  Goal: Free from fall injury  2/2/2025 1146 by Peg Quinn RN  Outcome: Progressing  2/1/2025 2330 by Nelly Castrejon RN  Outcome: Progressing     Problem: Skin/Tissue Integrity  Goal: Skin integrity remains intact  Description: 1.  Monitor for areas of redness and/or skin breakdown  2.  Assess vascular access sites hourly  3.  Every 4-6 hours minimum:  Change oxygen saturation probe site  4.  Every 4-6 hours:  If on nasal continuous positive airway pressure, respiratory therapy assess nares and determine need for appliance change or resting period  2/2/2025 1146 by Peg Quinn RN  Outcome: Progressing  2/1/2025 2330 by Nelly Castrejon RN  Outcome: Progressing  Flowsheets (Taken 2/1/2025 1112 by Darya Mckeon RN)  Skin Integrity Remains Intact: Monitor for areas of redness and/or skin breakdown     Problem: Confusion  Goal: Confusion, delirium, dementia, or psychosis is improved or at baseline  Description: INTERVENTIONS:  1. Assess for possible contributors to thought disturbance, including medications, impaired vision or hearing, underlying metabolic abnormalities, dehydration, psychiatric diagnoses, and notify attending LIP  2. [place] high risk fall precautions, as indicated  3. Provide frequent short contacts to provide reality reorientation, refocusing and direction  4. Decrease environmental stimuli, including noise as appropriate  5. Monitor and intervene to maintain 
  Problem: Chronic Conditions and Co-morbidities  Goal: Patient's chronic conditions and co-morbidity symptoms are monitored and maintained or improved  2/3/2025 0019 by Madelyn Barrow RN  Outcome: Progressing     Problem: Discharge Planning  Goal: Discharge to home or other facility with appropriate resources  2/3/2025 0019 by Madelyn Barrow RN  Outcome: Progressing     Problem: Safety - Adult  Goal: Free from fall injury  2/3/2025 0019 by Madelyn Barrow RN  Outcome: Progressing     Problem: Skin/Tissue Integrity  Goal: Skin integrity remains intact  Description: 1.  Monitor for areas of redness and/or skin breakdown  2.  Assess vascular access sites hourly  3.  Every 4-6 hours minimum:  Change oxygen saturation probe site  4.  Every 4-6 hours:  If on nasal continuous positive airway pressure, respiratory therapy assess nares and determine need for appliance change or resting period  2/3/2025 0019 by Madelyn Barrow RN  Outcome: Progressing     Problem: Confusion  Goal: Confusion, delirium, dementia, or psychosis is improved or at baseline  Description: INTERVENTIONS:  1. Assess for possible contributors to thought disturbance, including medications, impaired vision or hearing, underlying metabolic abnormalities, dehydration, psychiatric diagnoses, and notify attending LIP  2. [place] high risk fall precautions, as indicated  3. Provide frequent short contacts to provide reality reorientation, refocusing and direction  4. Decrease environmental stimuli, including noise as appropriate  5. Monitor and intervene to maintain adequate nutrition, hydration, elimination, sleep and activity  6. If unable to ensure safety without constant attention obtain sitter and review sitter guidelines with assigned personnel  7. Initiate Psychosocial CNS and Spiritual Care consult, as indicated  2/3/2025 0019 by Madelyn Barrow RN  Outcome: Progressing     
  Problem: Chronic Conditions and Co-morbidities  Goal: Patient's chronic conditions and co-morbidity symptoms are monitored and maintained or improved  2/7/2025 1104 by Rosalba Marin RN  Outcome: Progressing  2/7/2025 0512 by Cynthia Bear RN  Outcome: Progressing  Flowsheets (Taken 2/6/2025 0800 by Celestina Sherman RN)  Care Plan - Patient's Chronic Conditions and Co-Morbidity Symptoms are Monitored and Maintained or Improved:   Monitor and assess patient's chronic conditions and comorbid symptoms for stability, deterioration, or improvement   Update acute care plan with appropriate goals if chronic or comorbid symptoms are exacerbated and prevent overall improvement and discharge   Collaborate with multidisciplinary team to address chronic and comorbid conditions and prevent exacerbation or deterioration     Problem: Discharge Planning  Goal: Discharge to home or other facility with appropriate resources  2/7/2025 1104 by Rosalba Marin RN  Outcome: Progressing  2/7/2025 0512 by Cynthia Bear RN  Outcome: Progressing  Flowsheets (Taken 2/6/2025 0800 by Celestina Sherman RN)  Discharge to home or other facility with appropriate resources:   Identify barriers to discharge with patient and caregiver   Arrange for needed discharge resources and transportation as appropriate   Identify discharge learning needs (meds, wound care, etc)   Arrange for interpreters to assist at discharge as needed   Refer to discharge planning if patient needs post-hospital services based on physician order or complex needs related to functional status, cognitive ability or social support system     Problem: Safety - Adult  Goal: Free from fall injury  Outcome: Progressing     Problem: Skin/Tissue Integrity  Goal: Skin integrity remains intact  Description: 1.  Monitor for areas of redness and/or skin breakdown  2.  Assess vascular access sites hourly  3.  Every 4-6 hours minimum:  Change oxygen saturation probe site  4.  Every 4-6 
  Problem: Chronic Conditions and Co-morbidities  Goal: Patient's chronic conditions and co-morbidity symptoms are monitored and maintained or improved  Outcome: Progressing     Problem: Discharge Planning  Goal: Discharge to home or other facility with appropriate resources  Outcome: Progressing     Problem: Safety - Adult  Goal: Free from fall injury  Outcome: Progressing     Problem: Skin/Tissue Integrity  Goal: Skin integrity remains intact  Description: 1.  Monitor for areas of redness and/or skin breakdown  2.  Assess vascular access sites hourly  3.  Every 4-6 hours minimum:  Change oxygen saturation probe site  4.  Every 4-6 hours:  If on nasal continuous positive airway pressure, respiratory therapy assess nares and determine need for appliance change or resting period  Outcome: Progressing     Problem: Confusion  Goal: Confusion, delirium, dementia, or psychosis is improved or at baseline  Description: INTERVENTIONS:  1. Assess for possible contributors to thought disturbance, including medications, impaired vision or hearing, underlying metabolic abnormalities, dehydration, psychiatric diagnoses, and notify attending LIP  2. [place] high risk fall precautions, as indicated  3. Provide frequent short contacts to provide reality reorientation, refocusing and direction  4. Decrease environmental stimuli, including noise as appropriate  5. Monitor and intervene to maintain adequate nutrition, hydration, elimination, sleep and activity  6. If unable to ensure safety without constant attention obtain sitter and review sitter guidelines with assigned personnel  7. Initiate Psychosocial CNS and Spiritual Care consult, as indicated  Outcome: Progressing     
  Problem: Chronic Conditions and Co-morbidities  Goal: Patient's chronic conditions and co-morbidity symptoms are monitored and maintained or improved  Outcome: Progressing     Problem: Discharge Planning  Goal: Discharge to home or other facility with appropriate resources  Outcome: Progressing     Problem: Safety - Adult  Goal: Free from fall injury  Outcome: Progressing     Problem: Skin/Tissue Integrity  Goal: Skin integrity remains intact  Description: 1.  Monitor for areas of redness and/or skin breakdown  2.  Assess vascular access sites hourly  3.  Every 4-6 hours minimum:  Change oxygen saturation probe site  4.  Every 4-6 hours:  If on nasal continuous positive airway pressure, respiratory therapy assess nares and determine need for appliance change or resting period  Outcome: Progressing     Problem: Confusion  Goal: Confusion, delirium, dementia, or psychosis is improved or at baseline  Description: INTERVENTIONS:  1. Assess for possible contributors to thought disturbance, including medications, impaired vision or hearing, underlying metabolic abnormalities, dehydration, psychiatric diagnoses, and notify attending LIP  2. [place] high risk fall precautions, as indicated  3. Provide frequent short contacts to provide reality reorientation, refocusing and direction  4. Decrease environmental stimuli, including noise as appropriate  5. Monitor and intervene to maintain adequate nutrition, hydration, elimination, sleep and activity  6. If unable to ensure safety without constant attention obtain sitter and review sitter guidelines with assigned personnel  7. Initiate Psychosocial CNS and Spiritual Care consult, as indicated  Outcome: Progressing     Problem: Neurosensory - Adult  Goal: Achieves stable or improved neurological status  Outcome: Progressing  Goal: Absence of seizures  Outcome: Progressing  Goal: Remains free of injury related to seizures activity  Outcome: Progressing  Goal: Achieves 
  Problem: Chronic Conditions and Co-morbidities  Goal: Patient's chronic conditions and co-morbidity symptoms are monitored and maintained or improved  Outcome: Progressing  Flowsheets (Taken 2/1/2025 1000)  Care Plan - Patient's Chronic Conditions and Co-Morbidity Symptoms are Monitored and Maintained or Improved:   Monitor and assess patient's chronic conditions and comorbid symptoms for stability, deterioration, or improvement   Collaborate with multidisciplinary team to address chronic and comorbid conditions and prevent exacerbation or deterioration   Update acute care plan with appropriate goals if chronic or comorbid symptoms are exacerbated and prevent overall improvement and discharge     Problem: Discharge Planning  Goal: Discharge to home or other facility with appropriate resources  Outcome: Progressing  Flowsheets (Taken 2/1/2025 1000)  Discharge to home or other facility with appropriate resources:   Identify barriers to discharge with patient and caregiver   Arrange for needed discharge resources and transportation as appropriate   Identify discharge learning needs (meds, wound care, etc)   Arrange for interpreters to assist at discharge as needed   Refer to discharge planning if patient needs post-hospital services based on physician order or complex needs related to functional status, cognitive ability or social support system     Problem: Safety - Adult  Goal: Free from fall injury  Outcome: Progressing     Problem: Skin/Tissue Integrity  Goal: Skin integrity remains intact  Description: 1.  Monitor for areas of redness and/or skin breakdown  2.  Assess vascular access sites hourly  3.  Every 4-6 hours minimum:  Change oxygen saturation probe site  4.  Every 4-6 hours:  If on nasal continuous positive airway pressure, respiratory therapy assess nares and determine need for appliance change or resting period  Outcome: Progressing     Problem: Confusion  Goal: Confusion, delirium, dementia, or 
  Problem: Chronic Conditions and Co-morbidities  Goal: Patient's chronic conditions and co-morbidity symptoms are monitored and maintained or improved  Outcome: Progressing  Flowsheets (Taken 2/6/2025 0800 by Celestina Sherman RN)  Care Plan - Patient's Chronic Conditions and Co-Morbidity Symptoms are Monitored and Maintained or Improved:   Monitor and assess patient's chronic conditions and comorbid symptoms for stability, deterioration, or improvement   Update acute care plan with appropriate goals if chronic or comorbid symptoms are exacerbated and prevent overall improvement and discharge   Collaborate with multidisciplinary team to address chronic and comorbid conditions and prevent exacerbation or deterioration     Problem: Discharge Planning  Goal: Discharge to home or other facility with appropriate resources  Outcome: Progressing  Flowsheets (Taken 2/6/2025 0800 by Celestina Sherman RN)  Discharge to home or other facility with appropriate resources:   Identify barriers to discharge with patient and caregiver   Arrange for needed discharge resources and transportation as appropriate   Identify discharge learning needs (meds, wound care, etc)   Arrange for interpreters to assist at discharge as needed   Refer to discharge planning if patient needs post-hospital services based on physician order or complex needs related to functional status, cognitive ability or social support system     Problem: Skin/Tissue Integrity  Goal: Skin integrity remains intact  Description: 1.  Monitor for areas of redness and/or skin breakdown  2.  Assess vascular access sites hourly  3.  Every 4-6 hours minimum:  Change oxygen saturation probe site  4.  Every 4-6 hours:  If on nasal continuous positive airway pressure, respiratory therapy assess nares and determine need for appliance change or resting period  Outcome: Progressing  Flowsheets (Taken 2/7/2025 0512)  Skin Integrity Remains Intact: Monitor for areas of redness and/or skin 
  Problem: Confusion  Goal: Confusion, delirium, dementia, or psychosis is improved or at baseline  Description: INTERVENTIONS:  1. Assess for possible contributors to thought disturbance, including medications, impaired vision or hearing, underlying metabolic abnormalities, dehydration, psychiatric diagnoses, and notify attending LIP  2. [place] high risk fall precautions, as indicated  3. Provide frequent short contacts to provide reality reorientation, refocusing and direction  4. Decrease environmental stimuli, including noise as appropriate  5. Monitor and intervene to maintain adequate nutrition, hydration, elimination, sleep and activity  6. If unable to ensure safety without constant attention obtain sitter and review sitter guidelines with assigned personnel  7. Initiate Psychosocial CNS and Spiritual Care consult, as indicated  Outcome: Not Progressing     Problem: Chronic Conditions and Co-morbidities  Goal: Patient's chronic conditions and co-morbidity symptoms are monitored and maintained or improved  Outcome: Progressing     Problem: Discharge Planning  Goal: Discharge to home or other facility with appropriate resources  Outcome: Progressing     Problem: Safety - Adult  Goal: Free from fall injury  Outcome: Progressing     Problem: Skin/Tissue Integrity  Goal: Skin integrity remains intact  Description: 1.  Monitor for areas of redness and/or skin breakdown  2.  Assess vascular access sites hourly  3.  Every 4-6 hours minimum:  Change oxygen saturation probe site  4.  Every 4-6 hours:  If on nasal continuous positive airway pressure, respiratory therapy assess nares and determine need for appliance change or resting period  Outcome: Progressing     Problem: SLP Adult - Impaired Swallowing  Goal: By Discharge: Advance to least restrictive diet without signs or symptoms of aspiration for planned discharge setting.  See evaluation for individualized goals.  1/30/2025 1431 by Carroll Lee, 
  Problem: Safety - Adult  Goal: Free from fall injury  Outcome: Progressing     Problem: Skin/Tissue Integrity  Goal: Skin integrity remains intact  Description: 1.  Monitor for areas of redness and/or skin breakdown  2.  Assess vascular access sites hourly  3.  Every 4-6 hours minimum:  Change oxygen saturation probe site  4.  Every 4-6 hours:  If on nasal continuous positive airway pressure, respiratory therapy assess nares and determine need for appliance change or resting period  Outcome: Progressing     Problem: Confusion  Goal: Confusion, delirium, dementia, or psychosis is improved or at baseline  Description: INTERVENTIONS:  1. Assess for possible contributors to thought disturbance, including medications, impaired vision or hearing, underlying metabolic abnormalities, dehydration, psychiatric diagnoses, and notify attending LIP  2. [place] high risk fall precautions, as indicated  3. Provide frequent short contacts to provide reality reorientation, refocusing and direction  4. Decrease environmental stimuli, including noise as appropriate  5. Monitor and intervene to maintain adequate nutrition, hydration, elimination, sleep and activity  6. If unable to ensure safety without constant attention obtain sitter and review sitter guidelines with assigned personnel  7. Initiate Psychosocial CNS and Spiritual Care consult, as indicated  Outcome: Progressing     
BSE completed.  
MBS completed  
Nutrition Problem #1: Inadequate oral intake  Intervention: Food and/or Nutrient Delivery: Continue Current Diet, Start Oral Nutrition Supplement (Continue Diet dysphagia soft and bite sized;moderately thick liquids  Begin a frozen oral supplement BID  Consider daily laxative/stool softner)      
See OT evaluation for all goals and OT POC. Electronically signed by Tiarra Huddleston OTR/L on 1/30/2025 at 12:57 PM    
Therapy evaluation completed.  Please see daily notes and/or progress notes for details related to planned treatment interventions, goals and functional performance.    
hygiene technique   Identify and instruct in appropriate isolation precautions for identified infection/condition  Goal: Absence of infection during hospitalization  Outcome: Progressing  Flowsheets (Taken 2/6/2025 0800)  Absence of infection during hospitalization:   Monitor lab/diagnostic results   Assess and monitor for signs and symptoms of infection   Monitor all insertion sites i.e., indwelling lines, tubes and drains   Monitor endotracheal (as able) and nasal secretions for changes in amount and color   [place] appropriate cooling/warming therapies per order   Instruct and encourage patient and family to use good hand hygiene technique   Administer medications as ordered   Identify and instruct in appropriate isolation precautions for identified infection/condition  Goal: Absence of fever/infection during anticipated neutropenic period  Outcome: Progressing  Flowsheets (Taken 2/6/2025 0800)  Absence of fever/infection during anticipated neutropenic period:   Monitor white blood cell count   Administer growth factors as ordered   Implement neutropenic guidelines     Problem: Metabolic/Fluid and Electrolytes - Adult  Goal: Electrolytes maintained within normal limits  Outcome: Progressing  Flowsheets (Taken 2/6/2025 0800)  Electrolytes maintained within normal limits:   Monitor labs and assess patient for signs and symptoms of electrolyte imbalances   Administer electrolyte replacement as ordered   Monitor response to electrolyte replacements, including repeat lab results as appropriate   Fluid restriction as ordered   Instruct patient on fluid and nutrition restrictions as appropriate  Goal: Hemodynamic stability and optimal renal function maintained  Outcome: Progressing  Flowsheets (Taken 2/6/2025 0800)  Hemodynamic stability and optimal renal function maintained:   Monitor labs and assess for signs and symptoms of volume excess or deficit   Monitor intake, output and patient weight   Monitor urine

## 2025-02-07 NOTE — DISCHARGE INSTR - DIET
Good nutrition is important when healing from an illness, injury, or surgery.  Follow any nutrition recommendations given to you during your hospital stay.   If you were given an oral nutrition supplement while in the hospital, continue to take this supplement at home.  You can take it with meals, in-between meals, and/or before bedtime. These supplements can be purchased at most local grocery stores, pharmacies, and chain [employer]-stores.   If you have any questions about your diet or nutrition, call the hospital and ask for the dietitian.    Soft bite size with honey thick liquid

## 2025-02-07 NOTE — PROGRESS NOTES
Comprehensive Nutrition Assessment    Type and Reason for Visit:  Initial, LOS    Nutrition Recommendations/Plan:   Continue Diet dysphagia soft and bite sized;moderately thick liquids   Begin a frozen oral supplement BID   Consider daily laxative/stool softner      Malnutrition Assessment:  Malnutrition Status:  Insufficient data (02/06/25 4204)      Nutrition Assessment:    Pt with suboptimal po intake of meals and fluids. Pt currently on dysphagia diet with thickened liquids, unable to obtain nutrition hx.   Hypernatremia noted with initiation of D5 IVF. Will add a thickened oral nutrition supplement.  There is no documentation of pt having a BM this admission, consider adding daily laxatives/stool softner    Nutrition Related Findings:    PMH of HTN, CVA with left hemiparesis, DM2, CKD3a, recent COVID infection, who presented from NH with altered mental status.  MBS 1/30=Soft and bite sized;Moderately thick liquids, Labs: Na 146, K+ 5.1, hyperglcemia.  Meds include folic acid, remeron, Iron. IVF: D5 @ 75 ml/hr. A/O x 2, +COVID isolation. No BM documented since admission, abdomen soft;rounded per nursing, 1+ ble edema noted Wound Type: None       Current Nutrition Intake & Therapies:    Average Meal Intake: 26-50%, 51-75% (AVG)  Average Supplements Intake: None Ordered  ADULT DIET; Dysphagia - Soft and Bite Sized; Moderately Thick (Honey)    Anthropometric Measures:  Height: 165.1 cm (5' 5\")  Ideal Body Weight (IBW): 136 lbs (62 kg)    Admission Body Weight: 66.2 kg (146 lb)  Current Body Weight: 66.2 kg (146 lb) (adm wt),Weight Source:  (\"actual\")  Current BMI (kg/m2): 24.3  Usual Body Weight: 76.2 kg (168 lb) (10/17/24)     % Weight Change (Calculated): -13.1                    BMI Categories: Normal Weight (BMI 18.5-24.9)    Estimated Daily Nutrient Needs:  Energy Requirements Based On: Kcal/kg  Weight Used for Energy Requirements: Admission  Energy (kcal/day): ~1860 kcal @ 28 kcal/kg  Weight Used for Protein 
During morning assessment pt was agitated when nurse applied hearing aides so pt could hear communication from nurses. Pt tried to remove hearing aides and pushed nurses hand away. Pt was mostly compliant with the rest of the assessment and took meds well, crushed in applesauce.  
Hospitalist Progress Note      PCP: Tierra Mallory PA    Date of Admission: 1/29/2025    Chief Complaint:  no acute events, afebrile, stable HD    Medications:  Reviewed    Infusion Medications    dextrose      sodium chloride       Scheduled Medications    enoxaparin  1 mg/kg SubCUTAneous Daily    sodium chloride flush  5-40 mL IntraVENous 2 times per day    insulin glargine  0.15 Units/kg SubCUTAneous Daily    insulin lispro  0-4 Units SubCUTAneous 4x Daily AC & HS     PRN Meds: glucose, dextrose bolus **OR** dextrose bolus, glucagon (rDNA), dextrose, sodium chloride flush, sodium chloride, ondansetron **OR** ondansetron, polyethylene glycol, acetaminophen **OR** acetaminophen      Intake/Output Summary (Last 24 hours) at 2/1/2025 1315  Last data filed at 2/1/2025 0759  Gross per 24 hour   Intake --   Output 500 ml   Net -500 ml       Exam:    BP (!) 127/90   Pulse 84   Temp 97.2 °F (36.2 °C) (Oral)   Resp 16   Ht 1.651 m (5' 5\")   Wt 66.5 kg (146 lb 9.6 oz)   SpO2 99%   BMI 24.40 kg/m²     General appearance: awake, cooperative  Lungs: clear to auscultation bilaterally  Heart: S1/S2,RRR  Abdomen: soft  Extremities: no edema      Labs:   Recent Labs     01/30/25  0553 01/31/25  0538 02/01/25  0531   WBC 6.8 7.9 6.9   HGB 8.3* 9.0* 9.3*   HCT 27.7* 28.5* 30.7*    235 236     Recent Labs     01/30/25  0552 01/31/25  0538 02/01/25  0531   * 151* 145*   K 5.1* 4.9 4.6   * 125* 118*   CO2 19* 20 17*   BUN 74* 50* 33*   CREATININE 2.59* 1.98* 1.51*   CALCIUM 7.8* 8.0* 8.0*   PHOS 4.2 2.9 2.8     Recent Labs     01/29/25  1504   AST 16   ALT 11   BILITOT 0.3   ALKPHOS 81     No results for input(s): \"INR\" in the last 72 hours.  Recent Labs     01/29/25  1504   CKTOTAL 78       Urinalysis:      Lab Results   Component Value Date/Time    NITRU Negative 01/29/2025 03:24 PM    WBCUA  01/29/2025 03:24 PM    BACTERIA Negative 01/29/2025 03:24 PM    RBCUA 20-50 01/29/2025 03:24 PM    BLOODU 
Hospitalist Progress Note      PCP: Tierra Mallory PA    Date of Admission: 1/29/2025    Chief Complaint:  no acute events, afebrile, stable HD, on 2 liters of O2, u/o-2100 ml    Medications:  Reviewed    Infusion Medications    sodium chloride      dextrose      sodium chloride      sodium chloride Stopped (01/30/25 1125)     Scheduled Medications    sodium zirconium cyclosilicate  10 g Oral Once    sodium chloride flush  5-40 mL IntraVENous 2 times per day    insulin glargine  0.15 Units/kg SubCUTAneous Daily    insulin lispro  0-4 Units SubCUTAneous 4x Daily AC & HS    cefTRIAXone (ROCEPHIN) IV  1,000 mg IntraVENous Q24H    enoxaparin  1 mg/kg SubCUTAneous Daily     PRN Meds: glucose, dextrose bolus **OR** dextrose bolus, glucagon (rDNA), dextrose, sodium chloride flush, sodium chloride, ondansetron **OR** ondansetron, polyethylene glycol, acetaminophen **OR** acetaminophen      Intake/Output Summary (Last 24 hours) at 1/30/2025 1356  Last data filed at 1/30/2025 1300  Gross per 24 hour   Intake 10 ml   Output 3350 ml   Net -3340 ml       Exam:    /70   Pulse 95   Temp 98.2 °F (36.8 °C) (Oral)   Resp 18   Ht 1.651 m (5' 5\")   Wt 66.5 kg (146 lb 9.6 oz)   SpO2 100%   BMI 24.40 kg/m²     General appearance: awake, cooperative  Lungs: clear to auscultation bilaterally  Heart: S1/S2,RRR  Abdomen: soft  Extremities: no edema      Labs:   Recent Labs     01/29/25  1504 01/30/25  0553   WBC 7.8 6.8   HGB 10.4* 8.3*   HCT 33.4* 27.7*    235     Recent Labs     01/29/25  1504 01/29/25  2033 01/30/25  0552    144 148*   K 6.3* 4.8 5.1*   * 117* 123*   CO2 13* 14* 19*   BUN 97* 82* 74*   CREATININE 3.72* 2.99* 2.59*   CALCIUM 8.5 8.0* 7.8*   PHOS  --   --  4.2     Recent Labs     01/29/25  1504   AST 16   ALT 11   BILITOT 0.3   ALKPHOS 81     No results for input(s): \"INR\" in the last 72 hours.  Recent Labs     01/29/25  1504   CKTOTAL 78       Urinalysis:      Lab Results   Component Value 
Hospitalist Progress Note      PCP: Tierra Mallory PA    Date of Admission: 1/29/2025    Chief Complaint:  no acute events, afebrile, stable HD, on 2 liters of O2, u/o-3000 ml    Medications:  Reviewed    Infusion Medications    dextrose 100 mL/hr at 01/31/25 1059    dextrose      sodium chloride       Scheduled Medications    [START ON 2/1/2025] enoxaparin  1 mg/kg SubCUTAneous Daily    sodium chloride flush  5-40 mL IntraVENous 2 times per day    insulin glargine  0.15 Units/kg SubCUTAneous Daily    insulin lispro  0-4 Units SubCUTAneous 4x Daily AC & HS     PRN Meds: glucose, dextrose bolus **OR** dextrose bolus, glucagon (rDNA), dextrose, sodium chloride flush, sodium chloride, ondansetron **OR** ondansetron, polyethylene glycol, acetaminophen **OR** acetaminophen      Intake/Output Summary (Last 24 hours) at 1/31/2025 1328  Last data filed at 1/31/2025 1200  Gross per 24 hour   Intake 900 ml   Output 2050 ml   Net -1150 ml       Exam:    BP 99/68   Pulse 87   Temp 97.3 °F (36.3 °C) (Oral)   Resp 18   Ht 1.651 m (5' 5\")   Wt 66.5 kg (146 lb 9.6 oz)   SpO2 98%   BMI 24.40 kg/m²     General appearance: awake, cooperative  Lungs: clear to auscultation bilaterally  Heart: S1/S2,RRR  Abdomen: soft  Extremities: no edema      Labs:   Recent Labs     01/29/25  1504 01/30/25  0553 01/31/25  0538   WBC 7.8 6.8 7.9   HGB 10.4* 8.3* 9.0*   HCT 33.4* 27.7* 28.5*    235 235     Recent Labs     01/29/25  2033 01/30/25  0552 01/31/25  0538    148* 151*   K 4.8 5.1* 4.9   * 123* 125*   CO2 14* 19* 20   BUN 82* 74* 50*   CREATININE 2.99* 2.59* 1.98*   CALCIUM 8.0* 7.8* 8.0*   PHOS  --  4.2 2.9     Recent Labs     01/29/25  1504   AST 16   ALT 11   BILITOT 0.3   ALKPHOS 81     No results for input(s): \"INR\" in the last 72 hours.  Recent Labs     01/29/25  1504   CKTOTAL 78       Urinalysis:      Lab Results   Component Value Date/Time    NITRU Negative 01/29/2025 03:24 PM    WBCUA  01/29/2025 
Hospitalist Progress Note      PCP: Tierra Mallory PA    Date of Admission: 1/29/2025    Chief Complaint:  no acute events, afebrile, stable HD, u/o-1100 ml    Medications:  Reviewed    Infusion Medications    dextrose      sodium chloride       Scheduled Medications    folic acid  1 mg Oral Daily    apixaban  5 mg Oral BID    atorvastatin  80 mg Oral Nightly    mirtazapine  7.5 mg Oral Nightly    tamsulosin  0.4 mg Oral Daily    sodium chloride flush  5-40 mL IntraVENous 2 times per day    insulin glargine  0.15 Units/kg SubCUTAneous Daily    insulin lispro  0-4 Units SubCUTAneous 4x Daily AC & HS     PRN Meds: glucose, dextrose bolus **OR** dextrose bolus, glucagon (rDNA), dextrose, sodium chloride flush, sodium chloride, ondansetron **OR** ondansetron, polyethylene glycol, acetaminophen **OR** acetaminophen      Intake/Output Summary (Last 24 hours) at 2/2/2025 1352  Last data filed at 2/1/2025 1628  Gross per 24 hour   Intake --   Output 850 ml   Net -850 ml       Exam:    BP (!) 134/92   Pulse 82   Temp 98.1 °F (36.7 °C) (Oral)   Resp 17   Ht 1.651 m (5' 5\")   Wt 66.5 kg (146 lb 9.6 oz)   SpO2 98%   BMI 24.40 kg/m²     General appearance: awake, cooperative  Lungs: clear to auscultation bilaterally  Heart: S1/S2,RRR  Abdomen: soft  Extremities: no edema      Labs:   Recent Labs     01/31/25  0538 02/01/25  0531 02/02/25  0536   WBC 7.9 6.9 6.0   HGB 9.0* 9.3* 10.1*   HCT 28.5* 30.7* 33.0*    236 233     Recent Labs     01/31/25  0538 02/01/25  0531 02/02/25  0536   * 145* 145*   K 4.9 4.6 4.6   * 118* 117*   CO2 20 17* 20   BUN 50* 33* 24*   CREATININE 1.98* 1.51* 1.42*   CALCIUM 8.0* 8.0* 8.2*   PHOS 2.9 2.8 2.9     No results for input(s): \"AST\", \"ALT\", \"BILIDIR\", \"BILITOT\", \"ALKPHOS\" in the last 72 hours.    No results for input(s): \"INR\" in the last 72 hours.  No results for input(s): \"CKTOTAL\", \"TROPONINI\" in the last 72 hours.      Urinalysis:      Lab Results   Component Value 
[facility]   Facility/Department: [address] TELE  Speech Language Pathology  Clinical Bedside Swallow Evaluation    NAME:Jesse Leigh  : 1960 (64 y.o.)   [x]   confirmed    MRN: [id number]  ROOM: [place] [address]  ADMISSION DATE: 2025  PATIENT DIAGNOSIS(ES): JUSTIN (acute kidney injury) ([facility]) [N17.9]  Altered mental status, unspecified altered mental status type [R41.82]  Acute renal failure superimposed on chronic kidney disease, unspecified acute renal failure type, unspecified CKD stage (HCC) [N17.9, N18.9]  Chief Complaint   Patient presents with    Altered Mental Status     AMS from [facility]     Patient Active Problem List    Diagnosis Date Noted    Controlled type 2 diabetes mellitus with stage 3 chronic kidney disease, with long-term current use of insulin ([facility]) 2022    Debility 2022    Hemiparesis, left ([facility]) 2022    Hemiplegia as late effect of cerebral infarction ([facility]) 2022    Dizziness 2022    JUSTIN (acute kidney injury) ([facility]) 2025    History of stroke 2024    Left leg weakness 06/15/2023    Conductive hearing loss of right ear with restricted hearing of left ear 10/05/2021    Colon cancer screening declined 2021    Dysarthria 2021    Diabetic polyneuropathy associated with type 2 diabetes mellitus ([facility]) 2021    Stage 3a chronic kidney disease ([facility]) 2020    Balance problem 06/15/2020    Cerebrovascular accident (CVA) due to stenosis of right middle cerebral artery ([facility]) 2020    Nonproliferative diabetic retinopathy ([facility]) 2020    Age-related macular degeneration 2020    Seasonal allergies 2019    Microalbuminuria 2018    Alkaline phosphatase elevation 06/10/2016    Essential hypertension 2016    Hyperlipidemia 2016    History of medication noncompliance 10/29/2015    Patent foramen ovale 10/29/2015    Ataxia 10/27/2015    Speech and language deficit as late effect of stroke 10/27/2015 
[facility]  Occupational Therapy        NAME:  Jesse Leigh  ROOM: [id number]/W284-01  :  1960  DATE: 2025    Attempted to see Jesse Leigh at 09:44 on this date for:   []  Initial Evaluation   [x]  Treatment     Patient was unable to be seen due to:   [] Off unit for testing/procedure    [] Patient refused, stating \"    [] Therapy on hold due to     [] Nursing deferred due to    [x] Other:      Pt sleeping in bed upon arrival. Verbal cues (stating name out loud) and tactile cues (tapping the shoulders) to wake pt up. Pt briefly opened eyes for short period of time. Pt unable to keep eyes open to maintain contact with therapist at this time. Pt not able to appropriately participate in OT treatment, thus try again later at the next earliest convenience.     Electronically signed by IVET Dickerson on 25 at 9:56 AM EST  
[facility]   Facility/Department: [facility] ORTHO TELE  Speech Language Pathology    Jesse Leigh  1960  [id number]/W284-01    Date: 2/6/2025      Speech Therapy attempted to see Jesse Leigh on this date for a/an:    Treatment    Pt was unable to be seen due to:   Patient is too lethargic to participate        Electronically signed by RAJENDRA Gasca on 2/6/25 at 2:15 PM EST   
[facility]  Facility/Department: [facility] 2[facility] ORTHO TELE  Speech Language Pathology   Treatment Note      Jesse Leigh  1960  W284/W284-01  [x]   confirmed      Date: 2/3/2025    JUSTIN (acute kidney injury) (HCC) [N17.9]  Altered mental status, unspecified altered mental status type [R41.82]  Acute renal failure superimposed on chronic kidney disease, unspecified acute renal failure type, unspecified CKD stage (HCC) [N17.9, N18.9]    Restrictions/Precautions: Fall Risk, Isolation    ADULT DIET; Dysphagia - Soft and Bite Sized; Moderately Thick (Honey)     Respiratory Status:O2 Flow Rate (L/min): 2 L/min (25 0143)   Droplet Plus      Subjective:  Alert and Cooperative      SLP placed hearing aids at start of session per pt request.    Interventions used this date:  Dysphagia Treatment      Objective/Assessment:  Patient progressing towards goals:  Goal 1: Patient will tolerate a soft and bite size diet with moderately thick liquids with adequate mastication and oral clearance with no overt s/s of difficulty or aspiration.  Per Betty RN report, pt tolerating crushed pills in applesauce.    Pt fed self moderately thick water from cup x 1 with consecutive sips with no overt s/s of difficulty or aspiration.  Goal 2: Patient will tolerate trials of mildly thick using double swallow via spoon with timely swallow and no overt s/s of difficulty or aspiration.  Pt trialed with mildly thick juice from spoon with cues to perform double swallow.  Pt promptly swallowed bolus but needed reminder each time to perform second swallow.  Educated pt on pharyngeal residue shown on MBS and why double swallow is important.  Pt appreciate of juice because it was less thick, however informed pt he will continue with moderately thick due to safety.  Pt unable to verbalize understanding.  Goal 3: Patient will demonstrate recommended swallow strategies for safe and efficient swallow at mod assist level.  Pt continues to take 
Nephrology Progress Note    Assessment:  64 y.o. male with history s/f HTN, HLD, CVA c/b LT hemiparesis, PE on apixaban, T2DM, CKD stage III, LT hip fx s/p repair (11/24) who presented for AMS.      JUSTIN on CKD stage III: 2/2 volume depletion, Scr 3.7 on presentation, improving, baseline Scr ~1.6 w/ eGFR mid 40s, CKD risk factors T2DM, HTN, CAD  Hyperkalemia: corrected w/ medical management   Metabolic acidosis  Hypernatremia  Covid infection  Hypoalbuminemia   Anemia  ?UTI   Hypotension: has episodes of low BP     Plan:  - changing to D5W at 100 ml/hr for 10 hours   - urine culture pending        Patient Active Problem List:     Essential hypertension     Hyperlipidemia     Alkaline phosphatase elevation     Ataxia     Speech and language deficit as late effect of stroke     History of medication noncompliance     Patent foramen ovale     Chronic ischemic right STEPHANIE stroke     Microalbuminuria     Seasonal allergies     Nonproliferative diabetic retinopathy (HCC)     Age-related macular degeneration     Cerebrovascular accident (CVA) due to stenosis of right middle cerebral artery (HCC)     Balance problem     Stage 3a chronic kidney disease (HCC)     Diabetic polyneuropathy associated with type 2 diabetes mellitus (HCC)     Dysarthria     Colon cancer screening declined     Conductive hearing loss of right ear with restricted hearing of left ear     Hemiparesis, left (HCC)     Hemiplegia as late effect of cerebral infarction (HCC)     Dizziness     Debility     Controlled type 2 diabetes mellitus with stage 3 chronic kidney disease, with long-term current use of insulin (HCC)     Left leg weakness     History of stroke     JUSTIN (acute kidney injury) (HCC)      Subjective:  Admit Date: 1/29/2025    Interval History: function improving, however now hypernatremic  Medications:  Scheduled Meds:   sodium zirconium cyclosilicate  10 g Oral Once    sodium chloride flush  5-40 mL IntraVENous 2 times per day    insulin 
Nephrology Progress Note    Assessment:  Resolving JUSTIN  Hx Hypertension  DM type-2  Hx CVA  left hemiparesis  Anemia      Plan: maintain present program  labs in morning  vitals good    Patient Active Problem List:     Essential hypertension     Hyperlipidemia     Alkaline phosphatase elevation     Ataxia     Speech and language deficit as late effect of stroke     History of medication noncompliance     Patent foramen ovale     Chronic ischemic right STEPHANIE stroke     Microalbuminuria     Seasonal allergies     Nonproliferative diabetic retinopathy (HCC)     Age-related macular degeneration     Cerebrovascular accident (CVA) due to stenosis of right middle cerebral artery (HCC)     Balance problem     Stage 3a chronic kidney disease (HCC)     Diabetic polyneuropathy associated with type 2 diabetes mellitus (HCC)     Dysarthria     Colon cancer screening declined     Conductive hearing loss of right ear with restricted hearing of left ear     Hemiparesis, left (HCC)     Hemiplegia as late effect of cerebral infarction (HCC)     Dizziness     Debility     Controlled type 2 diabetes mellitus with stage 3 chronic kidney disease, with long-term current use of insulin (HCC)     Left leg weakness     History of stroke     JUSTIN (acute kidney injury) ([facility])      Subjective:  Admit Date: 1/29/2025    Interval History: stable    Medications:  Scheduled Meds:   folic acid  1 mg Oral Daily    apixaban  5 mg Oral BID    atorvastatin  80 mg Oral Nightly    mirtazapine  7.5 mg Oral Nightly    tamsulosin  0.4 mg Oral Daily    sodium chloride flush  5-40 mL IntraVENous 2 times per day    insulin glargine  0.15 Units/kg SubCUTAneous Daily    insulin lispro  0-4 Units SubCUTAneous 4x Daily AC & HS     Continuous Infusions:   dextrose      sodium chloride         CBC:   Recent Labs     02/01/25  0531 02/02/25  0536   WBC 6.9 6.0   HGB 9.3* 10.1*    233     CMP:    Recent Labs     01/31/25  0538 02/01/25  0531 02/02/25  0536   * 
Physical Therapy Med Surg Daily Treatment Note  Facility/Department: [facility] [place] 2 ORTHO TELE  Room: [place] [address]       NAME: Jesse Leigh  : 1960 (64 y.o.)  MRN: [id number]  CODE STATUS: Full Code    Date of Service: 2025    Patient Diagnosis(es): JUSTIN (acute kidney injury) ([facility]) [N17.9]  Altered mental status, unspecified altered mental status type [R41.82]  Acute renal failure superimposed on chronic kidney disease, unspecified acute renal failure type, unspecified CKD stage (HCC) [N17.9, N18.9]   Chief Complaint   Patient presents with    Altered Mental Status     AMS from [facility]     Patient Active Problem List    Diagnosis Date Noted    Controlled type 2 diabetes mellitus with stage 3 chronic kidney disease, with long-term current use of insulin ([facility]) 2022    Debility 2022    Hemiparesis, left ([facility]) 2022    Hemiplegia as late effect of cerebral infarction ([facility]) 2022    Dizziness 2022    JUSTIN (acute kidney injury) ([facility]) 2025    History of stroke 2024    Left leg weakness 06/15/2023    Conductive hearing loss of right ear with restricted hearing of left ear 10/05/2021    Colon cancer screening declined 2021    Dysarthria 2021    Diabetic polyneuropathy associated with type 2 diabetes mellitus ([facility]) 2021    Stage 3a chronic kidney disease (HCC) 2020    Balance problem 06/15/2020    Cerebrovascular accident (CVA) due to stenosis of right middle cerebral artery (HCC) 2020    Nonproliferative diabetic retinopathy (HCC) 2020    Age-related macular degeneration 2020    Seasonal allergies 2019    Microalbuminuria 2018    Alkaline phosphatase elevation 06/10/2016    Essential hypertension 2016    Hyperlipidemia 2016    History of medication noncompliance 10/29/2015    Patent foramen ovale 10/29/2015    Ataxia 10/27/2015    Speech and language deficit as late effect of stroke 10/27/2015    Chronic ischemic 
Physical Therapy Med Surg Daily Treatment Note  Facility/Department: [facility] [place] 2 ORTHO TELE  Room: [place] [address]       NAME: Jesse Leigh  : 1960 (64 y.o.)  MRN: [id number]  CODE STATUS: Full Code    Date of Service: 2025    Patient Diagnosis(es): JUSTIN (acute kidney injury) ([facility]) [N17.9]  Altered mental status, unspecified altered mental status type [R41.82]  Acute renal failure superimposed on chronic kidney disease, unspecified acute renal failure type, unspecified CKD stage (HCC) [N17.9, N18.9]   Chief Complaint   Patient presents with    Altered Mental Status     [facility] from [facility]     Patient Active Problem List    Diagnosis Date Noted    Controlled type 2 diabetes mellitus with stage 3 chronic kidney disease, with long-term current use of insulin ([facility]) 2022    Debility 2022    Hemiparesis, left ([facility]) 2022    Hemiplegia as late effect of cerebral infarction ([facility]) 2022    Dizziness 2022    JUSTIN (acute kidney injury) ([facility]) 2025    History of stroke 2024    Left leg weakness 06/15/2023    Conductive hearing loss of right ear with restricted hearing of left ear 10/05/2021    Colon cancer screening declined 2021    Dysarthria 2021    Diabetic polyneuropathy associated with type 2 diabetes mellitus ([facility]) 2021    Stage 3a chronic kidney disease (HCC) 2020    Balance problem 06/15/2020    Cerebrovascular accident (CVA) due to stenosis of right middle cerebral artery (HCC) 2020    Nonproliferative diabetic retinopathy (HCC) 2020    Age-related macular degeneration 2020    Seasonal allergies 2019    Microalbuminuria 2018    Alkaline phosphatase elevation 06/10/2016    Essential hypertension 2016    Hyperlipidemia 2016    History of medication noncompliance 10/29/2015    Patent foramen ovale 10/29/2015    Ataxia 10/27/2015    Speech and language deficit as late effect of stroke 10/27/2015    Chronic ischemic 
Physical Therapy Med Surg Initial Assessment  Facility/Department: [address] ORTHO TELE  Room: [place] [address]       NAME: Jesse Leigh  : 1960 (64 y.o.)  MRN: [id number]  CODE STATUS: Full Code    Date of Service: 2025    Patient Diagnosis(es): JUSTIN (acute kidney injury) ([facility]) [N17.9]  Altered mental status, unspecified altered mental status type [R41.82]  Acute renal failure superimposed on chronic kidney disease, unspecified acute renal failure type, unspecified CKD stage (HCC) [N17.9, N18.9]   Chief Complaint   Patient presents with    Altered Mental Status     AMS from [facility]     Patient Active Problem List    Diagnosis Date Noted    Controlled type 2 diabetes mellitus with stage 3 chronic kidney disease, with long-term current use of insulin ([facility]) 2022    Debility 2022    Hemiparesis, left ([facility]) 2022    Hemiplegia as late effect of cerebral infarction ([facility]) 2022    Dizziness 2022    JUSTIN (acute kidney injury) ([facility]) 2025    History of stroke 2024    Left leg weakness 06/15/2023    Conductive hearing loss of right ear with restricted hearing of left ear 10/05/2021    Colon cancer screening declined 2021    Dysarthria 2021    Diabetic polyneuropathy associated with type 2 diabetes mellitus ([facility]) 2021    Stage 3a chronic kidney disease ([facility]) 2020    Balance problem 06/15/2020    Cerebrovascular accident (CVA) due to stenosis of right middle cerebral artery ([facility]) 2020    Nonproliferative diabetic retinopathy ([facility]) 2020    Age-related macular degeneration 2020    Seasonal allergies 2019    Microalbuminuria 2018    Alkaline phosphatase elevation 06/10/2016    Essential hypertension 2016    Hyperlipidemia 2016    History of medication noncompliance 10/29/2015    Patent foramen ovale 10/29/2015    Ataxia 10/27/2015    Speech and language deficit as late effect of stroke 10/27/2015    Chronic ischemic 
Physical Therapy Missed Treatment   Facility/Department: [facility] MED SURG W284/W284-01    NAME: Jesse Leigh  Patient Status:   : 1960 (64 y.o.)  MRN: [id number]  Account: [id number]  Gender: male        [] Patient Declines PT Treatment            [x] Patient Unavailable:     Pt combative last night and is resting now. Per chart IVET attempted but was unable to see patient. RN reports pt up last night as well and is resting. Defer tx at this time. Family present to see patient but after talking to RN she decided to leave (sister in law. )  Will attempt PT Treatment again at earliest convenience.        Electronically signed by Joslyn Cordero PTA on 25 at 10:10 AM EST    
Physical Therapy Missed Treatment   Facility/Department: [facility] MED SURG W284/W284-01    NAME: Jesse Leigh    : 1960 (64 y.o.)  MRN: [id number]    Account: [id number]  Gender: male    Chart reviewed, attempted PT at 14:54. Patient unavailable 2° to:    [x] Hold - Pt is lethargic. He is out of restraints but too lethargic to safely participate.     [] Pt declined     [] Nsg notified   [] Other notified    [] Pt.. off floor for test/procedure.     [] Pt. Unavailable       Will attempt PT treatment again at earliest convenience.      Electronically signed by Gayatri Valle PTA on 25 at 2:54 PM EST    
Physical Therapy Missed Treatment   Facility/Department: [facility] MED SURG W284/W284-01    NAME: Jesse Leigh    : 1960 (64 y.o.)  MRN: [id number]    Account: [id number]  Gender: male    Chart reviewed, attempted PT at 11:20. Patient unavailable 2° to:    [x] Hold - Lethargic and confused. Several attempts to reorient. He would open his eye then fall back to sleep. \"I need my hearing aids in.\" Tried to explain his hearing aids were in but he fell asleep.     [] Pt declined     [] Nsg notified   [] Other notified    [] Pt.. off floor for test/procedure.     [] Pt. Unavailable       Will attempt PT treatment again at earliest convenience.      Electronically signed by Gayatri Valle PTA on 25 at 11:52 AM EST    
Progress Note  Date:2025       [facility]  Patient Name:Jesse Leigh     YOB: 1960     Age:64 y.o.        Subjective    Subjective:  Symptoms:  He reports malaise and weakness.  No shortness of breath, cough, chest pain, headache, chest pressure, anorexia or anxiety.       Review of Systems   Respiratory:  Negative for cough and shortness of breath.    Cardiovascular:  Negative for chest pain.   Gastrointestinal:  Negative for anorexia.   Neurological:  Positive for weakness.     Objective         Vitals Last 24 Hours:  TEMPERATURE:  Temp  Av.9 °F (36.6 °C)  Min: 97.7 °F (36.5 °C)  Max: 98.2 °F (36.8 °C)  RESPIRATIONS RANGE: Resp  Av.5  Min: 18  Max: 20  PULSE OXIMETRY RANGE: SpO2  Av.7 %  Min: 99 %  Max: 100 %  PULSE RANGE: Pulse  Av.9  Min: 91  Max: 108  BLOOD PRESSURE RANGE: Systolic (24hrs), Av , Min:89 , Max:117   ; Diastolic (24hrs), Av, Min:52, Max:81    I/O (24Hr):    Intake/Output Summary (Last 24 hours) at 2025 1124  Last data filed at 2025 2110  Gross per 24 hour   Intake 50 ml   Output --   Net 50 ml     Objective:  General Appearance:  In no acute distress and well-appearing.    Vital signs: (most recent): Blood pressure 108/64, pulse 92, temperature 97.7 °F (36.5 °C), temperature source Axillary, resp. rate 18, height 1.651 m (5' 5\"), weight 66.5 kg (146 lb 9.6 oz), SpO2 100%.    HEENT: Normal HEENT exam.    Lungs:  There are decreased breath sounds.    Heart: S1 normal and S2 normal.    Abdomen: Abdomen is soft.  Bowel sounds are normal.     Pulses: Distal pulses are intact.    Neurological: Patient is alert.    Pupils:  Pupils are equal, round, and reactive to light.    Skin:  Warm.      Labs/Imaging/Diagnostics    Labs:  CBC:  Recent Labs     25  0539 25  0530 25  0510   WBC 6.4 7.0 7.2   RBC 3.40* 3.50* 3.63*   HGB 9.6* 9.6* 10.3*   HCT 31.1* 31.7* 33.1*   MCV 91.5 90.6 91.2   RDW 14.9* 15.0* 14.8*    228 253 
Progress Note  Date:2025       [facility]  Patient Name:Jesse Leigh     YOB: 1960     Age:64 y.o.        Subjective    Subjective:  Symptoms:  He reports malaise and weakness.  No shortness of breath, cough, chest pain, headache, chest pressure, anorexia or anxiety.       Review of Systems   Respiratory:  Negative for cough and shortness of breath.    Cardiovascular:  Negative for chest pain.   Gastrointestinal:  Negative for anorexia.   Neurological:  Positive for weakness.     Objective         Vitals Last 24 Hours:  TEMPERATURE:  Temp  Av.5 °F (36.4 °C)  Min: 97 °F (36.1 °C)  Max: 97.7 °F (36.5 °C)  RESPIRATIONS RANGE: Resp  Av  Min: 18  Max: 18  PULSE OXIMETRY RANGE: SpO2  Av.3 %  Min: 96 %  Max: 100 %  PULSE RANGE: Pulse  Av.5  Min: 91  Max: 103  BLOOD PRESSURE RANGE: Systolic (24hrs), Av , Min:103 , Max:127   ; Diastolic (24hrs), Av, Min:71, Max:79    I/O (24Hr):    Intake/Output Summary (Last 24 hours) at 2025 0949  Last data filed at 2/3/2025 1246  Gross per 24 hour   Intake 120 ml   Output --   Net 120 ml     Objective:  General Appearance:  In no acute distress and well-appearing.    Vital signs: (most recent): Blood pressure 127/76, pulse 91, temperature 97.5 °F (36.4 °C), resp. rate 18, height 1.651 m (5' 5\"), weight 66.5 kg (146 lb 9.6 oz), SpO2 99%.    HEENT: Normal HEENT exam.    Lungs:  There are decreased breath sounds.    Heart: S1 normal and S2 normal.    Abdomen: Abdomen is soft.  Bowel sounds are normal.     Pulses: Distal pulses are intact.    Neurological: Patient is alert.    Pupils:  Pupils are equal, round, and reactive to light.    Skin:  Warm.      Labs/Imaging/Diagnostics    Labs:  CBC:  Recent Labs     25  0536 25  0539 25  0530   WBC 6.0 6.4 7.0   RBC 3.63* 3.40* 3.50*   HGB 10.1* 9.6* 9.6*   HCT 33.0* 31.1* 31.7*   MCV 90.9 91.5 90.6   RDW 14.9* 14.9* 15.0*    234 228     CHEMISTRIES:  Recent Labs 
Progress Note  Date:2025       [facility]  Patient Name:Jesse Leigh     YOB: 1960     Age:64 y.o.        Subjective    Subjective:  Symptoms:  He reports malaise and weakness.  No shortness of breath, cough, chest pain, headache, chest pressure, anorexia or anxiety.       Review of Systems   Respiratory:  Negative for cough and shortness of breath.    Cardiovascular:  Negative for chest pain.   Gastrointestinal:  Negative for anorexia.   Neurological:  Positive for weakness.     Objective         Vitals Last 24 Hours:  TEMPERATURE:  Temp  Av.8 °F (36.6 °C)  Min: 97.3 °F (36.3 °C)  Max: 98.4 °F (36.9 °C)  RESPIRATIONS RANGE: Resp  Av.2  Min: 18  Max: 19  PULSE OXIMETRY RANGE: SpO2  Av %  Min: 98 %  Max: 100 %  PULSE RANGE: Pulse  Av.5  Min: 95  Max: 118  BLOOD PRESSURE RANGE: Systolic (24hrs), Av , Min:91 , Max:128   ; Diastolic (24hrs), Av, Min:61, Max:94    I/O (24Hr):    Intake/Output Summary (Last 24 hours) at 2025 1122  Last data filed at 2025 2113  Gross per 24 hour   Intake 390 ml   Output --   Net 390 ml     Objective:  General Appearance:  In no acute distress and well-appearing.    Vital signs: (most recent): Blood pressure 128/85, pulse (!) 115, temperature 97.9 °F (36.6 °C), temperature source Axillary, resp. rate 18, height 1.651 m (5' 5\"), weight 66.5 kg (146 lb 9.6 oz), SpO2 100%.    HEENT: Normal HEENT exam.    Lungs:  There are decreased breath sounds.    Heart: S1 normal and S2 normal.    Abdomen: Abdomen is soft.  Bowel sounds are normal.     Pulses: Distal pulses are intact.    Neurological: Patient is alert.    Pupils:  Pupils are equal, round, and reactive to light.    Skin:  Warm.      Labs/Imaging/Diagnostics    Labs:  CBC:  Recent Labs     25  0530 25  0510 25  0541   WBC 7.0 7.2 7.9   RBC 3.50* 3.63* 3.62*   HGB 9.6* 10.3* 10.2*   HCT 31.7* 33.1* 33.2*   MCV 90.6 91.2 91.7   RDW 15.0* 14.8* 14.7*    
Progress Note  Date:2025       [facility]  Patient Name:Jesse Leigh     YOB: 1960     Age:64 y.o.        Subjective    Subjective:  Symptoms:  He reports malaise and weakness.  No shortness of breath, cough, chest pain, headache, chest pressure, anorexia or anxiety.       Review of Systems   Respiratory:  Negative for cough and shortness of breath.    Cardiovascular:  Negative for chest pain.   Gastrointestinal:  Negative for anorexia.   Neurological:  Positive for weakness.     Objective         Vitals Last 24 Hours:  TEMPERATURE:  Temp  Av.5 °F (36.4 °C)  Min: 97 °F (36.1 °C)  Max: 97.7 °F (36.5 °C)  RESPIRATIONS RANGE: Resp  Av  Min: 18  Max: 18  PULSE OXIMETRY RANGE: SpO2  Av.3 %  Min: 96 %  Max: 100 %  PULSE RANGE: Pulse  Av.5  Min: 91  Max: 103  BLOOD PRESSURE RANGE: Systolic (24hrs), Av , Min:103 , Max:127   ; Diastolic (24hrs), Av, Min:71, Max:79    I/O (24Hr):    Intake/Output Summary (Last 24 hours) at 2025 0946  Last data filed at 2/3/2025 1246  Gross per 24 hour   Intake 120 ml   Output --   Net 120 ml     Objective:  General Appearance:  In no acute distress and well-appearing.    Vital signs: (most recent): Blood pressure 127/76, pulse 91, temperature 97.5 °F (36.4 °C), resp. rate 18, height 1.651 m (5' 5\"), weight 66.5 kg (146 lb 9.6 oz), SpO2 99%.    HEENT: Normal HEENT exam.    Lungs:  There are decreased breath sounds.    Heart: S1 normal and S2 normal.    Abdomen: Abdomen is soft.  Bowel sounds are normal.     Pulses: Distal pulses are intact.    Neurological: Patient is alert.    Pupils:  Pupils are equal, round, and reactive to light.    Skin:  Warm.      Labs/Imaging/Diagnostics    Labs:  CBC:  Recent Labs     25  0536 25  0539 25  0530   WBC 6.0 6.4 7.0   RBC 3.63* 3.40* 3.50*   HGB 10.1* 9.6* 9.6*   HCT 33.0* 31.1* 31.7*   MCV 90.9 91.5 90.6   RDW 14.9* 14.9* 15.0*    234 228     CHEMISTRIES:  Recent Labs 
Pt brother, Hector notified of Pt progress. Hector verbalized understanding. Plan of care continued.  
Pt observed yelling \"help\". Staff attempted to assist Pt with needs, Pt became physically and verbally violent. Pt attempted to throw tele pack at this Nurse. Pt pulled at lines, sat at the side of the bed and pulled on electrical cords. Pt is non-redirectable verbally or through Avasys. CIT called d/t attempts of physical harm. Pt medicated per orders and placed in 4 point soft restraints. Pt continued verbal attacks.  
Renal Progress Note    Assessment and Plan:    65 yo man with ckd stage 3a with b/l gfr in upper 40s and cr in mid 1s.  Risk factors of DM, HTN, CVA with recent hip fracture and then recent cardioembolic CVA.  Admitted on 1/29 with altered mental status, UTI and JUSTIN with cr up to 3.7 and hyperkalemia with acidosis.  On farxiga lisinopril at baseline.  Was hypotensive on admission.     - gfr back to baseline  - would keep off farxiga and lisinopril now and on discharge.  Could look into later, but given uti would hold farxiga and given bp is fine now and low on admisison would keep off lisinopril.      Patient Active Problem List:     Essential hypertension     Hyperlipidemia     Alkaline phosphatase elevation     Ataxia     Speech and language deficit as late effect of stroke     History of medication noncompliance     Patent foramen ovale     Chronic ischemic right STEPHANIE stroke     Microalbuminuria     Seasonal allergies     Nonproliferative diabetic retinopathy (HCC)     Age-related macular degeneration     Cerebrovascular accident (CVA) due to stenosis of right middle cerebral artery (HCC)     Balance problem     Stage 3a chronic kidney disease (HCC)     Diabetic polyneuropathy associated with type 2 diabetes mellitus (HCC)     Dysarthria     Colon cancer screening declined     Conductive hearing loss of right ear with restricted hearing of left ear     Hemiparesis, left (HCC)     Hemiplegia as late effect of cerebral infarction (HCC)     Dizziness     Debility     Controlled type 2 diabetes mellitus with stage 3 chronic kidney disease, with long-term current use of insulin (HCC)     Left leg weakness     History of stroke     JUSTIN (acute kidney injury) ([facility])      Subjective:   Admit Date: 1/29/2025    Interval History: pt doing well.  Labs back to baseline.  No cp.  No sob      Medications:   Scheduled Meds:   ferrous sulfate  325 mg Oral Daily with breakfast    folic acid  1 mg Oral Daily    apixaban  5 mg Oral 
Report called to Maria Isabel at [facility]  
Writer spoke with Laure SHEPPARD from [facility]. Provided updates on Pt admission. Med rec completed to best of this nurses ability. Pt unreliable historian. Alert to self only.   
  CREATININE 2.59* 1.98* 1.51*   GLUCOSE 176* 100* 93   CALCIUM 7.8* 8.0* 8.0*   LABGLOM 27.1* 37.4* 51.7*     Troponin: No results for input(s): \"TROPONINI\" in the last 72 hours.  BNP: No results for input(s): \"BNP\" in the last 72 hours.  INR: No results for input(s): \"INR\" in the last 72 hours.  Lipids: No results for input(s): \"CHOL\", \"LDLDIRECT\", \"TRIG\", \"HDL\", \"AMYLASE\", \"LIPASE\" in the last 72 hours.  Liver:   Recent Labs     01/29/25  1504   AST 16   ALT 11   ALKPHOS 81   BILITOT 0.3     Iron:    Recent Labs     01/31/25  0538   FERRITIN 180     Urinalysis: No results for input(s): \"UA\" in the last 72 hours.    Objective:  Vitals: BP (!) 127/90   Pulse 84   Temp 97.2 °F (36.2 °C) (Oral)   Resp 16   Ht 1.651 m (5' 5\")   Wt 66.5 kg (146 lb 9.6 oz)   SpO2 99%   BMI 24.40 kg/m²    Wt Readings from Last 3 Encounters:   01/29/25 66.5 kg (146 lb 9.6 oz)   10/17/24 76.2 kg (168 lb)   10/11/24 76.2 kg (168 lb)      24HR INTAKE/OUTPUT:    Intake/Output Summary (Last 24 hours) at 2/1/2025 0946  Last data filed at 2/1/2025 0759  Gross per 24 hour   Intake 480 ml   Output 800 ml   Net -320 ml       General: ?alert, in no apparent distress  HEENT: normocephalic, atraumatic, anicteric  Neck: supple, no mass  Lungs: non-labored respirations, clear to auscultation bilaterally  Heart: regular rate and rhythm, no murmurs or rubs  Abdomen: soft, non-tender, non-distended  Ext: no cyanosis, no peripheral edema left weakness  Neuro: alert and oriented, no gross abnormalities  Psych: normal mood and affect  Skin: no rash      Electronically signed by Gumaro Curry DO              
ear with improved attention to task and follow through of directions.  Pt performed lingual extension 5x with mod cues with good ROM, decreased coordination.  Goal 5: Pt will complete chin tuck against resistance, falsetto & effortful pitch glide, super supraglottic swallow exercises 5-10x/each with mod cues in order to strengthen the muscles of the swallow to decrease bolus residue in the pyriform sinuses and increase airway protection.  Attempted to have pt complete vocal adduction and pitch glides however pt not following directions for task despite hearing aids being in.      Treatment/Activity Tolerance:  Patient limited by fatigue    Plan:  Continue per POC    Pain Assessment:  Patient does not appear in pain.    Pain Re-assessment:  Patient does not appear in pain.    Patient/Caregiver Education:  Patient educated on session and progression towards goals.  Education needs reinforcement.    Safety Devices:  Bed alarm in place, Call light within reach, and Telesitter in use      Therapy Time  SLP Individual Minutes  Time In: 0942  Time Out: 0955  Minutes: 13            Signature: Electronically signed by RAJENDRA Ellis on 2/7/2025 at 11:56 AM            
  Skin:normal, no rash  HEENT:sclera anicteric.  Head atraumatic normocephalic  Neck:supple with no thyromegally  Cardiovascular:  S1, S2 without m/r/g   Respiratory:  CTA B without w/r/r   Abdomen: +bs, soft, nt  Ext: no LE edema  Musculoskeletal:Intact  Neuro:Alert and oriented with no deficit      Electronically signed by Espinoza Crystal MD on 2/4/2025 at 12:07 PM          
completion. Device may be needed.  Stand by assistance = pt requires verbal cues or instructions from another person, close to but not touching, to perform the activity  Minimal assistance= pt performs 75% or more of the activity; assistance is required to complete the activity  Moderate assistance= pt performs 50% of the activity; assistance is required to complete the activity  Maximal assistance = pt performs 25% of the activity; assistance is required to complete the activity  Dependent = pt requires total physical assistance to accomplish the task  
pt performs 50% of the activity; assistance is required to complete the activity  Maximal assistance = pt performs 25% of the activity; assistance is required to complete the activity  Dependent = pt requires total physical assistance to accomplish the task  
history is moderately complex  Exam: Pt has 9 performance deficits  Assistance / Modification: pt requires max A    AMPAC (Six Click) Self care Score   How much help is needed for putting on and taking off regular lower body clothing?: Total  How much help is needed for bathing (which includes washing, rinsing, drying)?: Total  How much help is needed for toileting (which includes using toilet, bedpan, or urinal)?: Total  How much help is needed for putting on and taking off regular upper body clothing?: A Lot  How much help is needed for taking care of personal grooming?: A Lot  How much help for eating meals?: A Little  AM-[facility] Inpatient Daily Activity Raw Score: 10  AM-[facility] Inpatient ADL T-Scale Score : 27.31  ADL Inpatient CMS 0-100% Score: 74.7    Therapy key for assistance levels -   Independent/Mod I = Pt. is able to perform task with no assistance but may require a device   Stand by assistance = Pt. does not perform task at an independent level but does not need physical assistance, requires verbal cues  Minimal, Moderate, Maximal Assistance = Pt. requires physical assistance (25%, 50%, 75% assist from helper) for task but is able to actively participate in task   Dependent = Pt. requires total assistance with task and is not able to actively participate with task completion     Plan:  Occupational Therapy Plan  Times Per Week: 1-4x  Therapy Duration:  (Length of acute stay)  Current Treatment Recommendations: Strengthening, Balance training, Functional mobility training, Endurance training, Pain management, Safety education & training, Patient/Caregiver education & training, Equipment evaluation, education, & procurement, Self-Care / ADL, Home management training, Neuromuscular re-education, Cognitive reorientation, Cognitive/Perceptual training    Goals:   Patient will:    - Improve functional endurance to tolerate/complete 30 mins of ADL's  - Be Min A in UB ADLs   - Be Mod A in LB ADLs  - Be Mod A in ADL

## 2025-02-08 ENCOUNTER — APPOINTMENT (OUTPATIENT)
Dept: RADIOLOGY | Facility: HOSPITAL | Age: 65
End: 2025-02-08
Payer: MEDICARE

## 2025-02-08 ENCOUNTER — APPOINTMENT (OUTPATIENT)
Dept: CARDIOLOGY | Facility: HOSPITAL | Age: 65
End: 2025-02-08
Payer: MEDICARE

## 2025-02-08 ENCOUNTER — HOSPITAL ENCOUNTER (EMERGENCY)
Facility: HOSPITAL | Age: 65
Discharge: HOME | End: 2025-02-08
Attending: STUDENT IN AN ORGANIZED HEALTH CARE EDUCATION/TRAINING PROGRAM
Payer: MEDICARE

## 2025-02-08 VITALS
HEART RATE: 100 BPM | WEIGHT: 155 LBS | RESPIRATION RATE: 18 BRPM | BODY MASS INDEX: 24.91 KG/M2 | OXYGEN SATURATION: 94 % | SYSTOLIC BLOOD PRESSURE: 126 MMHG | HEIGHT: 66 IN | DIASTOLIC BLOOD PRESSURE: 76 MMHG | TEMPERATURE: 97.9 F

## 2025-02-08 DIAGNOSIS — E86.1 HYPOTENSION DUE TO HYPOVOLEMIA: ICD-10-CM

## 2025-02-08 DIAGNOSIS — I95.89 HYPOTENSION DUE TO HYPOVOLEMIA: ICD-10-CM

## 2025-02-08 DIAGNOSIS — U07.1 COVID: Primary | ICD-10-CM

## 2025-02-08 LAB
ALBUMIN SERPL BCP-MCNC: 2.9 G/DL (ref 3.4–5)
ALP SERPL-CCNC: 77 U/L (ref 33–136)
ALT SERPL W P-5'-P-CCNC: 16 U/L (ref 10–52)
ANION GAP SERPL CALC-SCNC: 12 MMOL/L (ref 10–20)
APPEARANCE UR: CLEAR
APTT PPP: 30 SECONDS (ref 27–38)
AST SERPL W P-5'-P-CCNC: 22 U/L (ref 9–39)
BACTERIA #/AREA URNS AUTO: ABNORMAL /HPF
BASOPHILS # BLD AUTO: 0.08 X10*3/UL (ref 0–0.1)
BASOPHILS NFR BLD AUTO: 1 %
BILIRUB SERPL-MCNC: 0.4 MG/DL (ref 0–1.2)
BILIRUB UR STRIP.AUTO-MCNC: NEGATIVE MG/DL
BUN SERPL-MCNC: 19 MG/DL (ref 6–23)
CALCIUM SERPL-MCNC: 8.1 MG/DL (ref 8.6–10.3)
CARDIAC TROPONIN I PNL SERPL HS: 15 NG/L (ref 0–20)
CHLORIDE SERPL-SCNC: 109 MMOL/L (ref 98–107)
CO2 SERPL-SCNC: 21 MMOL/L (ref 21–32)
COLOR UR: ABNORMAL
CREAT SERPL-MCNC: 1.59 MG/DL (ref 0.5–1.3)
EGFRCR SERPLBLD CKD-EPI 2021: 49 ML/MIN/1.73M*2
EOSINOPHIL # BLD AUTO: 0.19 X10*3/UL (ref 0–0.7)
EOSINOPHIL NFR BLD AUTO: 2.4 %
ERYTHROCYTE [DISTWIDTH] IN BLOOD BY AUTOMATED COUNT: 14.6 % (ref 11.5–14.5)
FLUAV RNA RESP QL NAA+PROBE: NOT DETECTED
FLUBV RNA RESP QL NAA+PROBE: NOT DETECTED
GLUCOSE SERPL-MCNC: 177 MG/DL (ref 74–99)
GLUCOSE UR STRIP.AUTO-MCNC: ABNORMAL MG/DL
HCT VFR BLD AUTO: 32.9 % (ref 41–52)
HGB BLD-MCNC: 9.9 G/DL (ref 13.5–17.5)
HOLD SPECIMEN: NORMAL
IMM GRANULOCYTES # BLD AUTO: 0.03 X10*3/UL (ref 0–0.7)
IMM GRANULOCYTES NFR BLD AUTO: 0.4 % (ref 0–0.9)
INR PPP: 1.2 (ref 0.9–1.1)
KETONES UR STRIP.AUTO-MCNC: NEGATIVE MG/DL
LACTATE SERPL-SCNC: 2 MMOL/L (ref 0.4–2)
LEUKOCYTE ESTERASE UR QL STRIP.AUTO: ABNORMAL
LIPASE SERPL-CCNC: 9 U/L (ref 9–82)
LYMPHOCYTES # BLD AUTO: 2.26 X10*3/UL (ref 1.2–4.8)
LYMPHOCYTES NFR BLD AUTO: 28.6 %
MCH RBC QN AUTO: 28 PG (ref 26–34)
MCHC RBC AUTO-ENTMCNC: 30.1 G/DL (ref 32–36)
MCV RBC AUTO: 93 FL (ref 80–100)
MONOCYTES # BLD AUTO: 0.6 X10*3/UL (ref 0.1–1)
MONOCYTES NFR BLD AUTO: 7.6 %
MUCOUS THREADS #/AREA URNS AUTO: ABNORMAL /LPF
NEUTROPHILS # BLD AUTO: 4.75 X10*3/UL (ref 1.2–7.7)
NEUTROPHILS NFR BLD AUTO: 60 %
NITRITE UR QL STRIP.AUTO: NEGATIVE
NRBC BLD-RTO: 0 /100 WBCS (ref 0–0)
PH UR STRIP.AUTO: 5 [PH]
PLATELET # BLD AUTO: 196 X10*3/UL (ref 150–450)
POTASSIUM SERPL-SCNC: 5.3 MMOL/L (ref 3.5–5.3)
PROT SERPL-MCNC: 6 G/DL (ref 6.4–8.2)
PROT UR STRIP.AUTO-MCNC: NEGATIVE MG/DL
PROTHROMBIN TIME: 13 SECONDS (ref 9.8–12.8)
RBC # BLD AUTO: 3.53 X10*6/UL (ref 4.5–5.9)
RBC # UR STRIP.AUTO: NEGATIVE MG/DL
RBC #/AREA URNS AUTO: ABNORMAL /HPF
SARS-COV-2 RNA RESP QL NAA+PROBE: DETECTED
SODIUM SERPL-SCNC: 137 MMOL/L (ref 136–145)
SP GR UR STRIP.AUTO: 1.01
UROBILINOGEN UR STRIP.AUTO-MCNC: NORMAL MG/DL
WBC # BLD AUTO: 7.9 X10*3/UL (ref 4.4–11.3)
WBC #/AREA URNS AUTO: ABNORMAL /HPF

## 2025-02-08 PROCEDURE — 84075 ASSAY ALKALINE PHOSPHATASE: CPT | Performed by: STUDENT IN AN ORGANIZED HEALTH CARE EDUCATION/TRAINING PROGRAM

## 2025-02-08 PROCEDURE — 87040 BLOOD CULTURE FOR BACTERIA: CPT | Mod: ELYLAB | Performed by: STUDENT IN AN ORGANIZED HEALTH CARE EDUCATION/TRAINING PROGRAM

## 2025-02-08 PROCEDURE — 71045 X-RAY EXAM CHEST 1 VIEW: CPT | Performed by: RADIOLOGY

## 2025-02-08 PROCEDURE — 81001 URINALYSIS AUTO W/SCOPE: CPT | Performed by: STUDENT IN AN ORGANIZED HEALTH CARE EDUCATION/TRAINING PROGRAM

## 2025-02-08 PROCEDURE — 96360 HYDRATION IV INFUSION INIT: CPT

## 2025-02-08 PROCEDURE — 83690 ASSAY OF LIPASE: CPT | Performed by: STUDENT IN AN ORGANIZED HEALTH CARE EDUCATION/TRAINING PROGRAM

## 2025-02-08 PROCEDURE — 87086 URINE CULTURE/COLONY COUNT: CPT | Mod: ELYLAB | Performed by: STUDENT IN AN ORGANIZED HEALTH CARE EDUCATION/TRAINING PROGRAM

## 2025-02-08 PROCEDURE — 85610 PROTHROMBIN TIME: CPT | Performed by: STUDENT IN AN ORGANIZED HEALTH CARE EDUCATION/TRAINING PROGRAM

## 2025-02-08 PROCEDURE — 85730 THROMBOPLASTIN TIME PARTIAL: CPT | Performed by: STUDENT IN AN ORGANIZED HEALTH CARE EDUCATION/TRAINING PROGRAM

## 2025-02-08 PROCEDURE — 85025 COMPLETE CBC W/AUTO DIFF WBC: CPT | Performed by: STUDENT IN AN ORGANIZED HEALTH CARE EDUCATION/TRAINING PROGRAM

## 2025-02-08 PROCEDURE — 83605 ASSAY OF LACTIC ACID: CPT | Performed by: STUDENT IN AN ORGANIZED HEALTH CARE EDUCATION/TRAINING PROGRAM

## 2025-02-08 PROCEDURE — 36415 COLL VENOUS BLD VENIPUNCTURE: CPT | Performed by: STUDENT IN AN ORGANIZED HEALTH CARE EDUCATION/TRAINING PROGRAM

## 2025-02-08 PROCEDURE — 84484 ASSAY OF TROPONIN QUANT: CPT | Performed by: STUDENT IN AN ORGANIZED HEALTH CARE EDUCATION/TRAINING PROGRAM

## 2025-02-08 PROCEDURE — 2500000004 HC RX 250 GENERAL PHARMACY W/ HCPCS (ALT 636 FOR OP/ED): Performed by: STUDENT IN AN ORGANIZED HEALTH CARE EDUCATION/TRAINING PROGRAM

## 2025-02-08 PROCEDURE — 87077 CULTURE AEROBIC IDENTIFY: CPT | Mod: ELYLAB | Performed by: STUDENT IN AN ORGANIZED HEALTH CARE EDUCATION/TRAINING PROGRAM

## 2025-02-08 PROCEDURE — 93005 ELECTROCARDIOGRAM TRACING: CPT

## 2025-02-08 PROCEDURE — 87075 CULTR BACTERIA EXCEPT BLOOD: CPT | Mod: ELYLAB | Performed by: STUDENT IN AN ORGANIZED HEALTH CARE EDUCATION/TRAINING PROGRAM

## 2025-02-08 PROCEDURE — 99285 EMERGENCY DEPT VISIT HI MDM: CPT | Performed by: STUDENT IN AN ORGANIZED HEALTH CARE EDUCATION/TRAINING PROGRAM

## 2025-02-08 PROCEDURE — 71045 X-RAY EXAM CHEST 1 VIEW: CPT

## 2025-02-08 PROCEDURE — 87636 SARSCOV2 & INF A&B AMP PRB: CPT | Performed by: STUDENT IN AN ORGANIZED HEALTH CARE EDUCATION/TRAINING PROGRAM

## 2025-02-08 PROCEDURE — 96361 HYDRATE IV INFUSION ADD-ON: CPT

## 2025-02-08 RX ADMIN — SODIUM CHLORIDE 1000 ML: 9 INJECTION, SOLUTION INTRAVENOUS at 02:49

## 2025-02-08 RX ADMIN — SODIUM CHLORIDE 1000 ML: 9 INJECTION, SOLUTION INTRAVENOUS at 01:25

## 2025-02-08 ASSESSMENT — PAIN - FUNCTIONAL ASSESSMENT: PAIN_FUNCTIONAL_ASSESSMENT: UNABLE TO SELF-REPORT

## 2025-02-08 NOTE — ED PROVIDER NOTES
HPI   Chief Complaint   Patient presents with    Lethargy     [facility]       Patient is a 64-year-old male with past medical history of CKD, stroke with chronic left-sided deficits, hyperlipidemia, hypertension, diabetes who presents the ED via EMS from the nursing home for lethargy and low blood pressure.  Patient had been admitted to the nursing home yesterday after an admission at [facility] for acute kidney injury and UTI.  Per the nursing home throughout the evening his blood pressures became low and he became more lethargic than he had been.  Patient states that he feels okay and denies any pain at this time.              Patient History   Past Medical History:   Diagnosis Date    CKD (chronic kidney disease)     CVA (cerebral vascular accident) (Multi) 11/21/2024    HLD (hyperlipidemia)     Hypertension     Stroke (Multi)     Type 2 diabetes mellitus with kidney complication, with long-term current use of insulin      Past Surgical History:   Procedure Laterality Date    ANKLE SURGERY Right     PARTIAL HIP ARTHROPLASTY Left      Family History   Problem Relation Name Age of Onset    Diabetes Mother      Hypertension Mother      Coronary artery disease Father      Diabetes Father      Other (Ischemic heart disease) Father      Stroke Maternal Grandmother       Social History     Tobacco Use    Smoking status: Never    Smokeless tobacco: Not on file   Vaping Use    Vaping status: Never Used   Substance Use Topics    Alcohol use: Never    Drug use: Never       Physical Exam   ED Triage Vitals [02/08/25 0042]   Temperature Heart Rate Respirations BP   36.3 °C (97.3 °F) (!) 105 16 90/59      Pulse Ox Temp Source Heart Rate Source Patient Position   99 % Temporal Monitor Lying      BP Location FiO2 (%)     Right arm --       Physical Exam  Vitals and nursing note reviewed.   Constitutional:       Appearance: He is not toxic-appearing.   HENT:      Head: Normocephalic.       Mouth/Throat:      Mouth: Mucous membranes are moist.   Cardiovascular:      Rate and Rhythm: Regular rhythm. Tachycardia present.      Heart sounds: No murmur heard.     No gallop.   Pulmonary:      Effort: Pulmonary effort is normal. No respiratory distress.      Breath sounds: Normal breath sounds. No wheezing or rales.   Abdominal:      General: Abdomen is flat.      Palpations: Abdomen is soft.      Tenderness: There is no abdominal tenderness. There is no guarding or rebound.   Musculoskeletal:      Cervical back: Neck supple.      Right lower leg: No edema.      Left lower leg: No edema.   Skin:     General: Skin is warm and dry.   Neurological:      Mental Status: He is alert. He is disoriented.           ED Course & MDM   Diagnoses as of 02/08/25 0345   COVID   Hypotension due to hypovolemia                 No data recorded                                 Medical Decision Making  EKG performed at 0 155 as interpreted by me: Sinus tachycardia with ventricular rate of 104 bpm.  Normal axis.  No ST elevation or depression.    On arrival to the ED the patient is afebrile however he is mildly hypotensive and tachycardic.  Will give him IV fluids and obtain a septic workup.    Workup is significant for a chronic anemia as well as chronic CKD.  Chest x-ray shows no acute infiltrate.  He is positive for COVID-19.  He does not meet SIRS criteria and is not septic.    After 2 L of IV fluids his blood pressure has significantly improved and his heart rate is in the 90s.  I feel his low blood pressures were likely due to hypovolemia given his response to the fluids and overall reassuring labs.  At this time I feel he is stable for discharge back to the nursing home.  He will follow-up with primary care physician.        Procedure  Procedures     Ernesto Lanier DO  02/08/25 [id number]

## 2025-02-09 LAB
BACTERIA BLD CULT: NORMAL
BACTERIA UR CULT: NO GROWTH
GRAM STN SPEC: ABNORMAL

## 2025-02-09 NOTE — ED NOTES
Post Discharge Critical Results Call Back  02/09/24  12:34  Per Dr Helio Avila MD, notify NH of positive result, if patient is doing well, ok to wait for identification, if second culture turns positive instruct NH to sent patient back to the ED.  Spoke with Iman at [facility] [phone].  Informed Iman patient has 1 gram positive cocci, cluster Anaerobic blood culture.  Iman states patient appears to be doing well, informed them if there is a worsening in his condition to have him sent back to the ED.  Informed Iman when bacteria is identified and if patient requires treatment or a second culture turns positive we will contact the NH return to the ED.  Blood culture result also fax to [facility], instructed Iman to advise the NH attending.        Vanessa Whitaker RN BSN     Vanessa Whitaker, RN  02/09/25 [id number]

## 2025-02-10 ENCOUNTER — OFFICE VISIT (OUTPATIENT)
Dept: GERIATRIC MEDICINE | Age: 65
End: 2025-02-10

## 2025-02-10 DIAGNOSIS — R53.1 WEAKNESS: Primary | ICD-10-CM

## 2025-02-10 DIAGNOSIS — Z79.4 CONTROLLED TYPE 2 DIABETES MELLITUS WITH MICROALBUMINURIA, WITH LONG-TERM CURRENT USE OF INSULIN (HCC): ICD-10-CM

## 2025-02-10 DIAGNOSIS — F01.50 VASCULAR DEMENTIA, UNSPECIFIED DEMENTIA SEVERITY, UNSPECIFIED WHETHER BEHAVIORAL, PSYCHOTIC, OR MOOD DISTURBANCE OR ANXIETY (HCC): ICD-10-CM

## 2025-02-10 DIAGNOSIS — E78.5 HYPERLIPIDEMIA, UNSPECIFIED HYPERLIPIDEMIA TYPE: ICD-10-CM

## 2025-02-10 DIAGNOSIS — G81.94 HEMIPARESIS, LEFT (HCC): ICD-10-CM

## 2025-02-10 DIAGNOSIS — R80.9 CONTROLLED TYPE 2 DIABETES MELLITUS WITH MICROALBUMINURIA, WITH LONG-TERM CURRENT USE OF INSULIN (HCC): ICD-10-CM

## 2025-02-10 DIAGNOSIS — Z79.4 CONTROLLED TYPE 2 DIABETES MELLITUS WITH STAGE 3 CHRONIC KIDNEY DISEASE, WITH LONG-TERM CURRENT USE OF INSULIN (HCC): ICD-10-CM

## 2025-02-10 DIAGNOSIS — E11.29 CONTROLLED TYPE 2 DIABETES MELLITUS WITH MICROALBUMINURIA, WITH LONG-TERM CURRENT USE OF INSULIN (HCC): ICD-10-CM

## 2025-02-10 DIAGNOSIS — E11.22 CONTROLLED TYPE 2 DIABETES MELLITUS WITH STAGE 3 CHRONIC KIDNEY DISEASE, WITH LONG-TERM CURRENT USE OF INSULIN (HCC): ICD-10-CM

## 2025-02-10 DIAGNOSIS — N18.30 CONTROLLED TYPE 2 DIABETES MELLITUS WITH STAGE 3 CHRONIC KIDNEY DISEASE, WITH LONG-TERM CURRENT USE OF INSULIN (HCC): ICD-10-CM

## 2025-02-11 ENCOUNTER — APPOINTMENT (OUTPATIENT)
Dept: ORTHOPEDIC SURGERY | Facility: CLINIC | Age: 65
End: 2025-02-11
Payer: MEDICARE

## 2025-02-11 ENCOUNTER — OFFICE VISIT (OUTPATIENT)
Dept: GERIATRIC MEDICINE | Age: 65
End: 2025-02-11

## 2025-02-11 DIAGNOSIS — R55 SYNCOPE, UNSPECIFIED SYNCOPE TYPE: Primary | ICD-10-CM

## 2025-02-11 DIAGNOSIS — Z79.4 CONTROLLED TYPE 2 DIABETES MELLITUS WITH STAGE 3 CHRONIC KIDNEY DISEASE, WITH LONG-TERM CURRENT USE OF INSULIN (HCC): ICD-10-CM

## 2025-02-11 DIAGNOSIS — R80.9 CONTROLLED TYPE 2 DIABETES MELLITUS WITH MICROALBUMINURIA, WITH LONG-TERM CURRENT USE OF INSULIN (HCC): ICD-10-CM

## 2025-02-11 DIAGNOSIS — E78.5 HYPERLIPIDEMIA, UNSPECIFIED HYPERLIPIDEMIA TYPE: ICD-10-CM

## 2025-02-11 DIAGNOSIS — E11.22 CONTROLLED TYPE 2 DIABETES MELLITUS WITH STAGE 3 CHRONIC KIDNEY DISEASE, WITH LONG-TERM CURRENT USE OF INSULIN (HCC): ICD-10-CM

## 2025-02-11 DIAGNOSIS — G81.94 HEMIPARESIS, LEFT (HCC): ICD-10-CM

## 2025-02-11 DIAGNOSIS — Z79.4 CONTROLLED TYPE 2 DIABETES MELLITUS WITH MICROALBUMINURIA, WITH LONG-TERM CURRENT USE OF INSULIN (HCC): ICD-10-CM

## 2025-02-11 DIAGNOSIS — E11.29 CONTROLLED TYPE 2 DIABETES MELLITUS WITH MICROALBUMINURIA, WITH LONG-TERM CURRENT USE OF INSULIN (HCC): ICD-10-CM

## 2025-02-11 DIAGNOSIS — N18.30 CONTROLLED TYPE 2 DIABETES MELLITUS WITH STAGE 3 CHRONIC KIDNEY DISEASE, WITH LONG-TERM CURRENT USE OF INSULIN (HCC): ICD-10-CM

## 2025-02-11 DIAGNOSIS — R53.1 WEAKNESS: ICD-10-CM

## 2025-02-11 DIAGNOSIS — F01.50 VASCULAR DEMENTIA, UNSPECIFIED DEMENTIA SEVERITY, UNSPECIFIED WHETHER BEHAVIORAL, PSYCHOTIC, OR MOOD DISTURBANCE OR ANXIETY (HCC): ICD-10-CM

## 2025-02-12 LAB
BACTERIA BLD AEROBE CULT: ABNORMAL
BACTERIA BLD CULT: ABNORMAL
BACTERIA BLD CULT: NORMAL
GRAM STN SPEC: ABNORMAL

## 2025-02-12 NOTE — PROGRESS NOTES
History and Physical      CHIEF COMPLAINT:  JUSTIN     History of Present Illness:      A 64 y.o. male who is being seen at [facility] after a hospital admit for JUSTIN. He was noted to have hypotension. He was also noted to have COVID and was weak from this.     REVIEW OF SYSTEMS:  A complete 10 Point review of systems was preformed and negative unless previously stated      PMH:  Past Medical History:   Diagnosis Date    Cerebrovascular small vessel disease     Chronic kidney disease, stage 3b (HCC) 12/16/2020    Colon cancer screening declined 8/30/2021    Debility 7/11/2022    Diabetic polyneuropathy associated with type 2 diabetes mellitus (HCC) 4/22/2021    DM (diabetes mellitus) ([facility])     was with Dr Knox, [facility]    Dysarthria as late effect of cerebrovascular accident (CVA) 2015    Hearing loss     Hemiparesis affecting left side as late effect of cerebrovascular accident (CVA) ([facility])     History of left PCA stroke 2013    History of right STEPHANIE stroke 10/21/2015    was at [facility], now Dr Mallory    Microalbuminuria 2018    PFO (patent foramen ovale) 2015    Right pontine CVA ([facility]) 10/2015    Stage 3a chronic kidney disease (HCC) 12/16/2020    Syncope and collapse 6/1/2020       Surgical History:  Past Surgical History:   Procedure Laterality Date    ANKLE SURGERY      FRACTURE SURGERY Left     Left Hip       Medications Prior to Admission:    Prior to Admission medications    Medication Sig Start Date End Date Taking? Authorizing Provider   valproic acid (DEPAKENE) 250 MG/5ML SOLN oral solution Take 5 mLs by mouth 2 times daily 2/7/25   Manujla Wright MD   ferrous sulfate (IRON 325) 325 (65 Fe) MG tablet Take 1 tablet by mouth daily (with breakfast) 2/8/25   Manjula Wright MD   folic acid (FOLVITE) 1 MG tablet Take 1 tablet by mouth daily 2/8/25   Manjula Wright MD   acetaminophen (TYLENOL) 325 MG tablet Take 2 tablets by mouth every 4 hours as needed for Pain or Fever    Provider, MD Misty   apixaban (ELIQUIS) 5

## 2025-02-13 ENCOUNTER — TELEPHONE (OUTPATIENT)
Dept: PHARMACY | Facility: HOSPITAL | Age: 65
End: 2025-02-13
Payer: MEDICARE

## 2025-02-13 NOTE — PROGRESS NOTES
EDPD Note: Lab/Chart Reviewed    Reviewed Mr./Mrs./Ms. Lito Osei 's chart regarding a positive blood culture/result that was taken during their recent emergency room visit. The patient was transferred back to their rehab/LTC facility .Therefore, I have faxed this information to [facility] at fax number [phone] .    Susceptibility data from last 90 days.  Collected Specimen Info Organism   02/08/25 Blood culture from Peripheral Venipuncture Staphylococcus pettenkoferi       No further follow up needed from EDPD Team.     Johanny Macdonald, VandaD

## 2025-02-14 ENCOUNTER — OFFICE VISIT (OUTPATIENT)
Dept: GERIATRIC MEDICINE | Age: 65
End: 2025-02-14

## 2025-02-14 DIAGNOSIS — Z79.4 CONTROLLED TYPE 2 DIABETES MELLITUS WITH MICROALBUMINURIA, WITH LONG-TERM CURRENT USE OF INSULIN (HCC): ICD-10-CM

## 2025-02-14 DIAGNOSIS — F01.50 VASCULAR DEMENTIA, UNSPECIFIED DEMENTIA SEVERITY, UNSPECIFIED WHETHER BEHAVIORAL, PSYCHOTIC, OR MOOD DISTURBANCE OR ANXIETY (HCC): ICD-10-CM

## 2025-02-14 DIAGNOSIS — Z79.4 CONTROLLED TYPE 2 DIABETES MELLITUS WITH STAGE 3 CHRONIC KIDNEY DISEASE, WITH LONG-TERM CURRENT USE OF INSULIN (HCC): ICD-10-CM

## 2025-02-14 DIAGNOSIS — R80.9 CONTROLLED TYPE 2 DIABETES MELLITUS WITH MICROALBUMINURIA, WITH LONG-TERM CURRENT USE OF INSULIN (HCC): ICD-10-CM

## 2025-02-14 DIAGNOSIS — G81.94 HEMIPARESIS, LEFT (HCC): ICD-10-CM

## 2025-02-14 DIAGNOSIS — E78.5 HYPERLIPIDEMIA, UNSPECIFIED HYPERLIPIDEMIA TYPE: ICD-10-CM

## 2025-02-14 DIAGNOSIS — E11.22 CONTROLLED TYPE 2 DIABETES MELLITUS WITH STAGE 3 CHRONIC KIDNEY DISEASE, WITH LONG-TERM CURRENT USE OF INSULIN (HCC): ICD-10-CM

## 2025-02-14 DIAGNOSIS — N18.30 CONTROLLED TYPE 2 DIABETES MELLITUS WITH STAGE 3 CHRONIC KIDNEY DISEASE, WITH LONG-TERM CURRENT USE OF INSULIN (HCC): ICD-10-CM

## 2025-02-14 DIAGNOSIS — R55 SYNCOPE, UNSPECIFIED SYNCOPE TYPE: Primary | ICD-10-CM

## 2025-02-14 DIAGNOSIS — E11.29 CONTROLLED TYPE 2 DIABETES MELLITUS WITH MICROALBUMINURIA, WITH LONG-TERM CURRENT USE OF INSULIN (HCC): ICD-10-CM

## 2025-02-14 DIAGNOSIS — R53.1 WEAKNESS: ICD-10-CM

## 2025-02-17 ENCOUNTER — OFFICE VISIT (OUTPATIENT)
Dept: GERIATRIC MEDICINE | Age: 65
End: 2025-02-17
Payer: MEDICARE

## 2025-02-17 DIAGNOSIS — E78.5 HYPERLIPIDEMIA, UNSPECIFIED HYPERLIPIDEMIA TYPE: ICD-10-CM

## 2025-02-17 DIAGNOSIS — N18.30 CONTROLLED TYPE 2 DIABETES MELLITUS WITH STAGE 3 CHRONIC KIDNEY DISEASE, WITH LONG-TERM CURRENT USE OF INSULIN (HCC): ICD-10-CM

## 2025-02-17 DIAGNOSIS — E11.22 CONTROLLED TYPE 2 DIABETES MELLITUS WITH STAGE 3 CHRONIC KIDNEY DISEASE, WITH LONG-TERM CURRENT USE OF INSULIN (HCC): ICD-10-CM

## 2025-02-17 DIAGNOSIS — R80.9 CONTROLLED TYPE 2 DIABETES MELLITUS WITH MICROALBUMINURIA, WITH LONG-TERM CURRENT USE OF INSULIN (HCC): ICD-10-CM

## 2025-02-17 DIAGNOSIS — E11.29 CONTROLLED TYPE 2 DIABETES MELLITUS WITH MICROALBUMINURIA, WITH LONG-TERM CURRENT USE OF INSULIN (HCC): ICD-10-CM

## 2025-02-17 DIAGNOSIS — Z79.4 CONTROLLED TYPE 2 DIABETES MELLITUS WITH STAGE 3 CHRONIC KIDNEY DISEASE, WITH LONG-TERM CURRENT USE OF INSULIN (HCC): ICD-10-CM

## 2025-02-17 DIAGNOSIS — F01.50 VASCULAR DEMENTIA, UNSPECIFIED DEMENTIA SEVERITY, UNSPECIFIED WHETHER BEHAVIORAL, PSYCHOTIC, OR MOOD DISTURBANCE OR ANXIETY (HCC): ICD-10-CM

## 2025-02-17 DIAGNOSIS — G81.94 HEMIPARESIS, LEFT (HCC): ICD-10-CM

## 2025-02-17 DIAGNOSIS — R55 SYNCOPE, UNSPECIFIED SYNCOPE TYPE: Primary | ICD-10-CM

## 2025-02-17 DIAGNOSIS — Z79.4 CONTROLLED TYPE 2 DIABETES MELLITUS WITH MICROALBUMINURIA, WITH LONG-TERM CURRENT USE OF INSULIN (HCC): ICD-10-CM

## 2025-02-17 DIAGNOSIS — R53.1 WEAKNESS: ICD-10-CM

## 2025-02-17 PROCEDURE — 99308 SBSQ NF CARE LOW MDM 20: CPT | Performed by: INTERNAL MEDICINE

## 2025-02-17 PROCEDURE — 3052F HG A1C>EQUAL 8.0%<EQUAL 9.0%: CPT | Performed by: INTERNAL MEDICINE

## 2025-02-18 ENCOUNTER — OFFICE VISIT (OUTPATIENT)
Dept: GERIATRIC MEDICINE | Age: 65
End: 2025-02-18

## 2025-02-18 DIAGNOSIS — R53.1 WEAKNESS: ICD-10-CM

## 2025-02-18 DIAGNOSIS — N18.30 CONTROLLED TYPE 2 DIABETES MELLITUS WITH STAGE 3 CHRONIC KIDNEY DISEASE, WITH LONG-TERM CURRENT USE OF INSULIN (HCC): ICD-10-CM

## 2025-02-18 DIAGNOSIS — E11.29 CONTROLLED TYPE 2 DIABETES MELLITUS WITH MICROALBUMINURIA, WITH LONG-TERM CURRENT USE OF INSULIN (HCC): ICD-10-CM

## 2025-02-18 DIAGNOSIS — R80.9 CONTROLLED TYPE 2 DIABETES MELLITUS WITH MICROALBUMINURIA, WITH LONG-TERM CURRENT USE OF INSULIN (HCC): ICD-10-CM

## 2025-02-18 DIAGNOSIS — Z79.4 CONTROLLED TYPE 2 DIABETES MELLITUS WITH STAGE 3 CHRONIC KIDNEY DISEASE, WITH LONG-TERM CURRENT USE OF INSULIN (HCC): ICD-10-CM

## 2025-02-18 DIAGNOSIS — G81.94 HEMIPARESIS, LEFT (HCC): ICD-10-CM

## 2025-02-18 DIAGNOSIS — Z79.4 CONTROLLED TYPE 2 DIABETES MELLITUS WITH MICROALBUMINURIA, WITH LONG-TERM CURRENT USE OF INSULIN (HCC): ICD-10-CM

## 2025-02-18 DIAGNOSIS — R55 SYNCOPE, UNSPECIFIED SYNCOPE TYPE: Primary | ICD-10-CM

## 2025-02-18 DIAGNOSIS — E11.22 CONTROLLED TYPE 2 DIABETES MELLITUS WITH STAGE 3 CHRONIC KIDNEY DISEASE, WITH LONG-TERM CURRENT USE OF INSULIN (HCC): ICD-10-CM

## 2025-02-18 NOTE — PROGRESS NOTES
History of Present Illness:  Date of Surgery: 11/18/24 left partial hip arthroplasty.  He is doing well with no complaints.    Imaging:  X-rays left hip: Shows well-aligned hemiarthroplasty with stable appearance of all hardware    Exam:  Trace effusion  Left hip Incision is healed  ROM: Pain-free gentle range of motion of the left hip  No calf tenderness   Negative Umm's test  Distal neurovascular exam intact    Assessment:  Patient status post left partial hip arthroplasty    Plan:  He can return to full activities with no restrictions and no precautions  Follow-up as needed    Scribe Attestation  By signing my name below, Michell BRIONES Scribkeith   attest that this documentation has been prepared under the direction and in the presence of Nicola Webster MD.

## 2025-02-19 ENCOUNTER — APPOINTMENT (OUTPATIENT)
Dept: CARDIOLOGY | Facility: CLINIC | Age: 65
End: 2025-02-19
Payer: MEDICARE

## 2025-02-19 VITALS — HEART RATE: 112 BPM | SYSTOLIC BLOOD PRESSURE: 82 MMHG | DIASTOLIC BLOOD PRESSURE: 52 MMHG

## 2025-02-19 DIAGNOSIS — R06.02 SHORTNESS OF BREATH: ICD-10-CM

## 2025-02-19 DIAGNOSIS — E11.21 TYPE 2 DIABETES MELLITUS WITH DIABETIC NEPHROPATHY, WITH LONG-TERM CURRENT USE OF INSULIN: ICD-10-CM

## 2025-02-19 DIAGNOSIS — Z79.4 TYPE 2 DIABETES MELLITUS WITH DIABETIC NEPHROPATHY, WITH LONG-TERM CURRENT USE OF INSULIN: ICD-10-CM

## 2025-02-19 DIAGNOSIS — I10 PRIMARY HYPERTENSION: ICD-10-CM

## 2025-02-19 DIAGNOSIS — R00.0 SINUS TACHYCARDIA: ICD-10-CM

## 2025-02-19 DIAGNOSIS — I47.29 NSVT (NONSUSTAINED VENTRICULAR TACHYCARDIA) (MULTI): ICD-10-CM

## 2025-02-19 DIAGNOSIS — R94.31 ABNORMAL EKG: ICD-10-CM

## 2025-02-19 DIAGNOSIS — Q21.12 PFO (PATENT FORAMEN OVALE) (HHS-HCC): ICD-10-CM

## 2025-02-19 DIAGNOSIS — Z86.73 HISTORY OF CVA (CEREBROVASCULAR ACCIDENT): ICD-10-CM

## 2025-02-19 DIAGNOSIS — Z78.9 NEVER SMOKED TOBACCO: ICD-10-CM

## 2025-02-19 PROCEDURE — 3074F SYST BP LT 130 MM HG: CPT | Performed by: INTERNAL MEDICINE

## 2025-02-19 PROCEDURE — 1036F TOBACCO NON-USER: CPT | Performed by: INTERNAL MEDICINE

## 2025-02-19 PROCEDURE — 3078F DIAST BP <80 MM HG: CPT | Performed by: INTERNAL MEDICINE

## 2025-02-19 PROCEDURE — 99205 OFFICE O/P NEW HI 60 MIN: CPT | Performed by: INTERNAL MEDICINE

## 2025-02-19 RX ORDER — FOLIC ACID 1 MG/1
TABLET ORAL DAILY
COMMUNITY

## 2025-02-19 RX ORDER — MIDODRINE HYDROCHLORIDE 5 MG/1
5 TABLET ORAL
COMMUNITY

## 2025-02-19 RX ORDER — TAMSULOSIN HYDROCHLORIDE 0.4 MG/1
0.4 CAPSULE ORAL DAILY
COMMUNITY

## 2025-02-19 RX ORDER — INSULIN LISPRO 100 [IU]/ML
INJECTION, SOLUTION INTRAVENOUS; SUBCUTANEOUS
COMMUNITY
Start: 2025-01-09

## 2025-02-19 RX ORDER — MIRTAZAPINE 7.5 MG/1
7.5 TABLET, FILM COATED ORAL NIGHTLY
COMMUNITY

## 2025-02-19 RX ORDER — ONDANSETRON 4 MG/1
4 TABLET, FILM COATED ORAL EVERY 8 HOURS PRN
COMMUNITY

## 2025-02-19 RX ORDER — DOCUSATE SODIUM 100 MG/1
100 CAPSULE, LIQUID FILLED ORAL 2 TIMES DAILY PRN
COMMUNITY

## 2025-02-19 RX ORDER — FERROUS SULFATE 325(65) MG
325 TABLET ORAL
COMMUNITY

## 2025-02-19 NOTE — PATIENT INSTRUCTIONS
MPL stress test  Follow up after  Continue same medications and treatments.   Patient educated on proper medication use.   Patient educated on risk factor modification.   Please bring any lab results from other providers / physicians to your next appointment.     Please bring all medicines, vitamins, and herbal supplements with you when you come to the office.     Prescriptions will not be filled unless you are compliant with your follow up appointments or have a follow up appointment scheduled as per instruction of your physician. Refills should be requested at the time of your visit.  BP instructions

## 2025-02-19 NOTE — PROGRESS NOTES
CARDIOLOGY OFFICE VISIT      CHIEF COMPLAINT  Chief Complaint   Patient presents with    New Patient Visit     Follow up from [facility] and to review recent [facility] results        HISTORY OF PRESENT ILLNESS    Patient is a 64-year-old  male with past medical history significant for cerebrovascular accident with left-sided weakness, pulmonary embolus, diabetes mellitus, hypertension, hyperlipidemia, patent foramen ovale, nonsustained ventricular tachycardia, paroxysmal supraventricular tachycardia, left ventricular hypertrophy presents for cardiac evaluation.    It appears the patient was sent to our office regarding PFO.  Patient reports that he had a stroke and neurology started him on Eliquis.  He also has documentation of a pulmonary embolus on a VQ scan however the patient was not aware of this diagnosis.  He recently had COVID and was treated conservatively.  He currently denies chest pain, dyspnea, palpitations, nausea, vomiting, dizziness, near-syncope, arabella syncope.  He has chronic lower extremity edema which is unchanged.  Patient is a nursing home resident at [facility].    Past surgical history:  Left total hip replacement  Right ankle surgery    Social history:  Denies tobacco use  Denies alcohol use    Family history:  Mother  kidney disease  Father  kidney disease disease, myocardial infarction in his 70s    Review of systems have been reviewed and are negative, noncontributory, as previously mentioned x 12 systems.    I personally reviewed EKG 2025: Sinus tachycardia 112 bpm, nonspecific T wave abnormality    Assessment:  Abnormal EKG  Sinus tachycardia  Nonsustained ventricular tachycardia, rate 188 bpm  Paroxysmal supraventricular tachycardia, rate 214 bpm  Patent foramen ovale  Left ventricular hypertrophy  Cerebrovascular accident with left-sided weakness  Pulmonary embolus on V/Q scan 2024  Diabetes  mellitus  Dyslipidemia  Hypotension    Recommendations:  Patient appears to be managed by Dr. Can ROSE at [facility].  From the records and per the patient he was started on Eliquis after his embolic CVA.  If he has a recurrent embolic event while on anticoagulation that would be an indication for PFO closure.  Patient has documented arrhythmia and multiple risk factors for coronary artery disease.  At this time we will further evaluate with Lexiscan nuclear stress test.  Patient has been taken off of his blood pressure medications due to hypotension.  Per his medication list he was started on midodrine.  I will ask the nursing home to maintain a blood pressure diary 3 times per day.  He has no current symptomatic hypotension or symptomatic arrhythmia.  Follow-up after testing.    Please excuse any errors in grammar or translation related to this dictation.  Voice recognition software was utilized to prepare this document.    Recent Cardiovascular Testing:  The following results have been reviewed and updated.   Lung Scan 11/21/24  1.1. Bilateral wedge-shaped segmental and subsegmental perfusion  defects as described above suggestive of acute pulmonary embolism  (high probability    CRD 12/17/24  1.ICA <50%    ECHO 12/17/24  1.EF 60%  2.Bubble study Positive PFO    Holter  1/2/25  NSVT 5 beat 188bpm  PSVT max. 214bpm  Sinus tachycardia average 103 bpm range  bpm      VITALS  Vitals:    02/19/25 1444   BP: 82/52   Pulse: (!) 112     Wt Readings from Last 4 Encounters:   02/08/25 70.3 kg (155 lb)   12/16/24 73 kg (161 lb)   11/21/24 77.1 kg (170 lb)   11/21/24 77.1 kg (170 lb)       PHYSICAL EXAM:  GENERAL:  Well developed, well nourished, in no acute distress.  CHEST:  Symmetric and nontender.  NEURO/PSYCH:  Alert and oriented times three with approppriate behavior and responses.  NECK:  Supple, no JVD, no bruit.  LUNGS:  Clear to auscultation bilaterally, normal respiratory effort.  HEART:  Rate and  rhythm REGULAR with no evident murmur, no gallop appreciated.    There are no rubs, clicks or heaves.  EXTREMITIES:  Warm with good color, no clubbing or cyanosis.  There is mild lower extremity edema noted.  PERIPHERAL VASCULAR:  Pulses present and equally palpable; 2+ throughout.    ASSESSMENT AND PLAN  Problem List Items Addressed This Visit          Cardiac and Vasculature    HTN (hypertension)    PFO (patent foramen ovale) ([facility])    Relevant Medications    midodrine (Proamatine) 5 mg tablet    Abnormal EKG    Relevant Orders    Nuclear Stress Test    NSVT (nonsustained ventricular tachycardia) ([facility])    Relevant Medications    midodrine (Proamatine) 5 mg tablet    Sinus tachycardia       Endocrine/Metabolic    Type 2 diabetes mellitus with kidney complication, with long-term current use of insulin       Neuro    History of CVA (cerebrovascular accident)       Tobacco    Never smoked tobacco     Other Visit Diagnoses       Shortness of breath        Relevant Orders    Nuclear Stress Test            Past Medical History  Past Medical History:   Diagnosis Date    CKD (chronic kidney disease)     CVA (cerebral vascular accident) ([facility]) 11/21/2024    HLD (hyperlipidemia)     Hypertension     Stroke (Multi)     Type 2 diabetes mellitus with kidney complication, with long-term current use of insulin        Social History  Social History     Tobacco Use    Smoking status: Never    Smokeless tobacco: Never   Vaping Use    Vaping status: Never Used   Substance Use Topics    Alcohol use: Never    Drug use: Never       Family History     Family History   Problem Relation Name Age of Onset    Diabetes Mother      Hypertension Mother      Coronary artery disease Father      Diabetes Father      Other (Ischemic heart disease) Father      Stroke Maternal Grandmother          Allergies:  No Known Allergies     Outpatient Medications:  Current Outpatient Medications   Medication Instructions    acetaminophen (TYLENOL) 650  mg, Every 4 hours PRN    alum-mag hydroxide-simeth (Mylanta) 200-200-20 mg/5 mL oral suspension 30 mL, Daily PRN    apixaban (ELIQUIS) 5 mg, oral, 2 times daily, To be started for stroke prevention per Neurology.    atorvastatin (LIPITOR) 80 mg, Nightly    dapagliflozin propanediol (FARXIGA) 10 mg, Every 24 hours    docusate sodium (COLACE) 100 mg, 2 times daily PRN    ferrous sulfate 325 mg, Daily with breakfast    folic acid (Folvite) 1 mg tablet Daily    guaiFENesin (Robitussin) 100 mg/5 mL syrup 30 mL, 4 times daily PRN    insulin lispro (HumaLOG) 100 unit/mL injection As directed    insulin lispro 0-10 Units, subcutaneous, 4 times daily before meals and nightly, Take as directed per insulin instructions.    magnesium hydroxide (Milk of Magnesia) 400 mg/5 mL suspension 30 mL, Daily PRN    midodrine (PROAMATINE) 5 mg, 3 times daily (morning, midday, late afternoon)    mirtazapine (REMERON) 7.5 mg, Nightly    ondansetron (ZOFRAN) 4 mg, Every 8 hours PRN    tamsulosin (FLOMAX) 0.4 mg, Daily        Recent Lab Results:    CMP:    Lab Results   Component Value Date     02/08/2025    K 5.3 02/08/2025     (H) 02/08/2025    CO2 21 02/08/2025    BUN 19 02/08/2025    CREATININE 1.59 (H) 02/08/2025    GLUCOSE 177 (H) 02/08/2025    CALCIUM 8.1 (L) 02/08/2025       Magnesium:    Lab Results   Component Value Date    MG 2.00 12/19/2024       Lipid Profile:    Lab Results   Component Value Date    TRIG 121 12/17/2024    HDL 57.2 12/17/2024    LDLCALC 40 12/17/2024       TSH:    Lab Results   Component Value Date    TSH 1.13 12/17/2024       BNP:   Lab Results   Component Value Date     (H) 12/16/2024        PT/INR:    Lab Results   Component Value Date    PROTIME 13.0 (H) 02/08/2025    INR 1.2 (H) 02/08/2025       HgBA1c:    Lab Results   Component Value Date    HGBA1C 8.2 (H) 11/17/2024       BMP:  Lab Results   Component Value Date     02/08/2025     12/19/2024     12/18/2024    K 5.3  02/08/2025    K 4.0 12/19/2024    K 3.7 12/18/2024     (H) 02/08/2025     (H) 12/19/2024     (H) 12/18/2024    CO2 21 02/08/2025    CO2 26 12/19/2024    CO2 26 12/18/2024    BUN 19 02/08/2025    BUN 27 (H) 12/19/2024    BUN 29 (H) 12/18/2024    CREATININE 1.59 (H) 02/08/2025    CREATININE 1.53 (H) 12/19/2024    CREATININE 1.70 (H) 12/18/2024       CBC:  Lab Results   Component Value Date    WBC 7.9 02/08/2025    WBC 6.8 12/19/2024    WBC 8.8 12/18/2024    RBC 3.53 (L) 02/08/2025    RBC 3.27 (L) 12/19/2024    RBC 3.36 (L) 12/18/2024    HGB 9.9 (L) 02/08/2025    HGB 9.5 (L) 12/19/2024    HGB 9.9 (L) 12/18/2024    HCT 32.9 (L) 02/08/2025    HCT 30.6 (L) 12/19/2024    HCT 31.1 (L) 12/18/2024    MCV 93 02/08/2025    MCV 94 12/19/2024    MCV 93 12/18/2024    MCH 28.0 02/08/2025    MCH 29.1 12/19/2024    MCH 29.5 12/18/2024    MCHC 30.1 (L) 02/08/2025    MCHC 31.0 (L) 12/19/2024    MCHC 31.8 (L) 12/18/2024    RDW 14.6 (H) 02/08/2025    RDW 13.8 12/19/2024    RDW 14.0 12/18/2024     02/08/2025     12/19/2024     12/18/2024       Cardiac Enzymes:    Lab Results   Component Value Date    TROPHS 15 02/08/2025    TROPHS 57 (HH) 12/16/2024    TROPHS 60 (HH) 12/16/2024       Hepatic Function Panel:    Lab Results   Component Value Date    ALKPHOS 77 02/08/2025    ALT 16 02/08/2025    AST 22 02/08/2025    PROT 6.0 (L) 02/08/2025    BILITOT 0.4 02/08/2025           Tawanda Pulido DO

## 2025-02-20 NOTE — PROGRESS NOTES
SNF PROGRESS NOTE      Cc- syncope       Patient is a Jesse Leigh 64 y.o. male who is being seen at [facility] after a hospital admit for JUSTIN. He was noted to have hypotension. He was also noted to have COVID and was weak from this.    Patient continues to get midodrine. He remains confused. He is laying in bed.         Past Medical History:   Diagnosis Date    Cerebrovascular small vessel disease     Chronic kidney disease, stage 3b (HCC) 12/16/2020    Colon cancer screening declined 8/30/2021    Debility 7/11/2022    Diabetic polyneuropathy associated with type 2 diabetes mellitus (HCC) 4/22/2021    DM (diabetes mellitus) ([facility])     was with Dr Knox, [facility]    Dysarthria as late effect of cerebrovascular accident (CVA) 2015    Hearing loss     Hemiparesis affecting left side as late effect of cerebrovascular accident (CVA) ([facility])     History of left PCA stroke 2013    History of right STEPHANIE stroke 10/21/2015    was at [facility], now Dr Mallory    Microalbuminuria 2018    PFO (patent foramen ovale) 2015    Right pontine CVA ([facility]) 10/2015    Stage 3a chronic kidney disease (HCC) 12/16/2020    Syncope and collapse 6/1/2020     Patient has no known allergies.    VS reviewed      Gen- Alert and oriented x 2   Heart- RRR no murmur no LE edema   Lungs- CTA b/l no resp distress RA oxygen   Abd- bs x 4            Assessment and Plan    Syncope   Advised Cardio follow up appt ASAP since this is the 2nd syncopal episode in 2 days.   Weakness secondary to COVID   Hypotension   Stopped lisinopril   Hx CVA with Left side weakness   Vascular Dementia   A fib   Eliquis   DM  Lantus   Farxiga   Depression   CKD stage 3   HLD  Statin       Nani Simmons DO FACCAMERON     Electronically signed by: Nani Simmnos DO on 2/14/2025

## 2025-02-21 ENCOUNTER — HOSPITAL ENCOUNTER (OUTPATIENT)
Dept: RADIOLOGY | Facility: CLINIC | Age: 65
Discharge: HOME | End: 2025-02-21
Payer: MEDICARE

## 2025-02-21 ENCOUNTER — OFFICE VISIT (OUTPATIENT)
Dept: ORTHOPEDIC SURGERY | Facility: CLINIC | Age: 65
End: 2025-02-21
Payer: MEDICARE

## 2025-02-21 DIAGNOSIS — S72.002A CLOSED FRACTURE OF NECK OF LEFT FEMUR, INITIAL ENCOUNTER: ICD-10-CM

## 2025-02-21 DIAGNOSIS — S72.002A CLOSED FRACTURE OF NECK OF LEFT FEMUR, INITIAL ENCOUNTER: Primary | ICD-10-CM

## 2025-02-21 PROCEDURE — 99213 OFFICE O/P EST LOW 20 MIN: CPT | Performed by: ORTHOPAEDIC SURGERY

## 2025-02-21 PROCEDURE — 73502 X-RAY EXAM HIP UNI 2-3 VIEWS: CPT | Mod: LT

## 2025-02-25 ENCOUNTER — OFFICE VISIT (OUTPATIENT)
Dept: GERIATRIC MEDICINE | Age: 65
End: 2025-02-25

## 2025-02-25 DIAGNOSIS — Z79.4 CONTROLLED TYPE 2 DIABETES MELLITUS WITH STAGE 3 CHRONIC KIDNEY DISEASE, WITH LONG-TERM CURRENT USE OF INSULIN (HCC): ICD-10-CM

## 2025-02-25 DIAGNOSIS — G81.94 HEMIPARESIS, LEFT (HCC): ICD-10-CM

## 2025-02-25 DIAGNOSIS — R55 SYNCOPE, UNSPECIFIED SYNCOPE TYPE: Primary | ICD-10-CM

## 2025-02-25 DIAGNOSIS — N18.30 CONTROLLED TYPE 2 DIABETES MELLITUS WITH STAGE 3 CHRONIC KIDNEY DISEASE, WITH LONG-TERM CURRENT USE OF INSULIN (HCC): ICD-10-CM

## 2025-02-25 DIAGNOSIS — Z79.4 CONTROLLED TYPE 2 DIABETES MELLITUS WITH MICROALBUMINURIA, WITH LONG-TERM CURRENT USE OF INSULIN (HCC): ICD-10-CM

## 2025-02-25 DIAGNOSIS — E78.5 HYPERLIPIDEMIA, UNSPECIFIED HYPERLIPIDEMIA TYPE: ICD-10-CM

## 2025-02-25 DIAGNOSIS — R80.9 CONTROLLED TYPE 2 DIABETES MELLITUS WITH MICROALBUMINURIA, WITH LONG-TERM CURRENT USE OF INSULIN (HCC): ICD-10-CM

## 2025-02-25 DIAGNOSIS — E11.29 CONTROLLED TYPE 2 DIABETES MELLITUS WITH MICROALBUMINURIA, WITH LONG-TERM CURRENT USE OF INSULIN (HCC): ICD-10-CM

## 2025-02-25 DIAGNOSIS — R53.1 WEAKNESS: ICD-10-CM

## 2025-02-25 DIAGNOSIS — E11.22 CONTROLLED TYPE 2 DIABETES MELLITUS WITH STAGE 3 CHRONIC KIDNEY DISEASE, WITH LONG-TERM CURRENT USE OF INSULIN (HCC): ICD-10-CM

## 2025-02-25 DIAGNOSIS — F01.50 VASCULAR DEMENTIA, UNSPECIFIED DEMENTIA SEVERITY, UNSPECIFIED WHETHER BEHAVIORAL, PSYCHOTIC, OR MOOD DISTURBANCE OR ANXIETY (HCC): ICD-10-CM

## 2025-02-25 NOTE — PROGRESS NOTES
SNF PROGRESS NOTE      Cc- syncope       Patient is a Jesse Leigh 64 y.o. male who is being seen at [facility] after a hospital admit for JUSTIN. He was noted to have hypotension. He was also noted to have COVID and was weak from this.     Patient with a cough.  X-ray to be done here shortly.  No fever.  He remains on nasal cannula.      Past Medical History:   Diagnosis Date    Cerebrovascular small vessel disease     Chronic kidney disease, stage 3b (HCC) 12/16/2020    Colon cancer screening declined 8/30/2021    Debility 7/11/2022    Diabetic polyneuropathy associated with type 2 diabetes mellitus (HCC) 4/22/2021    DM (diabetes mellitus) ([facility])     was with Dr Knox, [facility]    Dysarthria as late effect of cerebrovascular accident (CVA) 2015    Hearing loss     Hemiparesis affecting left side as late effect of cerebrovascular accident (CVA) ([facility])     History of left PCA stroke 2013    History of right STEPHANIE stroke 10/21/2015    was at [facility], now Dr Mallory    Microalbuminuria 2018    PFO (patent foramen ovale) 2015    Right pontine CVA ([facility]) 10/2015    Stage 3a chronic kidney disease (HCC) 12/16/2020    Syncope and collapse 6/1/2020     Patient has no known allergies.    VS reviewed      Gen- Alert and oriented x 2   Heart- RRR no murmur no LE edema   Lungs- CTA b/l no resp distress RA oxygen   Abd- bs x 4         Assessment and Plan    Weakness secondary to COVID   Hypotension   Stopped lisinopril   Hx CVA with Left side weakness   Vascular Dementia   A fib   Eliquis   DM  Lantus   Farxiga   Depression   CKD stage 3   HLD  Statin       Nani Simmons DO, JITENDRA     Electronically signed by: Nani Simmons DO on 2/17/2025

## 2025-02-27 ENCOUNTER — OFFICE VISIT (OUTPATIENT)
Dept: GERIATRIC MEDICINE | Age: 65
End: 2025-02-27
Payer: MEDICARE

## 2025-02-27 DIAGNOSIS — R53.1 WEAKNESS: ICD-10-CM

## 2025-02-27 DIAGNOSIS — E78.5 HYPERLIPIDEMIA, UNSPECIFIED HYPERLIPIDEMIA TYPE: ICD-10-CM

## 2025-02-27 DIAGNOSIS — E11.22 CONTROLLED TYPE 2 DIABETES MELLITUS WITH STAGE 3 CHRONIC KIDNEY DISEASE, WITH LONG-TERM CURRENT USE OF INSULIN (HCC): ICD-10-CM

## 2025-02-27 DIAGNOSIS — R80.9 CONTROLLED TYPE 2 DIABETES MELLITUS WITH MICROALBUMINURIA, WITH LONG-TERM CURRENT USE OF INSULIN (HCC): ICD-10-CM

## 2025-02-27 DIAGNOSIS — Z79.4 CONTROLLED TYPE 2 DIABETES MELLITUS WITH STAGE 3 CHRONIC KIDNEY DISEASE, WITH LONG-TERM CURRENT USE OF INSULIN (HCC): ICD-10-CM

## 2025-02-27 DIAGNOSIS — G81.94 HEMIPARESIS, LEFT (HCC): ICD-10-CM

## 2025-02-27 DIAGNOSIS — Z79.4 CONTROLLED TYPE 2 DIABETES MELLITUS WITH MICROALBUMINURIA, WITH LONG-TERM CURRENT USE OF INSULIN (HCC): ICD-10-CM

## 2025-02-27 DIAGNOSIS — E11.29 CONTROLLED TYPE 2 DIABETES MELLITUS WITH MICROALBUMINURIA, WITH LONG-TERM CURRENT USE OF INSULIN (HCC): ICD-10-CM

## 2025-02-27 DIAGNOSIS — F01.50 VASCULAR DEMENTIA, UNSPECIFIED DEMENTIA SEVERITY, UNSPECIFIED WHETHER BEHAVIORAL, PSYCHOTIC, OR MOOD DISTURBANCE OR ANXIETY (HCC): ICD-10-CM

## 2025-02-27 DIAGNOSIS — N18.30 CONTROLLED TYPE 2 DIABETES MELLITUS WITH STAGE 3 CHRONIC KIDNEY DISEASE, WITH LONG-TERM CURRENT USE OF INSULIN (HCC): ICD-10-CM

## 2025-02-27 DIAGNOSIS — R55 SYNCOPE, UNSPECIFIED SYNCOPE TYPE: Primary | ICD-10-CM

## 2025-02-27 PROCEDURE — 99308 SBSQ NF CARE LOW MDM 20: CPT | Performed by: INTERNAL MEDICINE

## 2025-02-27 PROCEDURE — 3052F HG A1C>EQUAL 8.0%<EQUAL 9.0%: CPT | Performed by: INTERNAL MEDICINE

## 2025-02-27 NOTE — PROGRESS NOTES
SNF PROGRESS NOTE      Cc- syncope       Patient is a Jesse Leigh 64 y.o. male who is being seen at [facility] after a hospital admit for JUSTIN. He was noted to have hypotension. He was also noted to have COVID and was weak from this.     Patient has a cough and on nasal cannula. Patient still with cough. The patient is eating well.         Past Medical History:   Diagnosis Date    Cerebrovascular small vessel disease     Chronic kidney disease, stage 3b (HCC) 12/16/2020    Colon cancer screening declined 8/30/2021    Debility 7/11/2022    Diabetic polyneuropathy associated with type 2 diabetes mellitus (HCC) 4/22/2021    DM (diabetes mellitus) ([facility])     was with Dr Knox, [facility]    Dysarthria as late effect of cerebrovascular accident (CVA) 2015    Hearing loss     Hemiparesis affecting left side as late effect of cerebrovascular accident (CVA) ([facility])     History of left PCA stroke 2013    History of right STEPHANIE stroke 10/21/2015    was at [facility], now Dr Mallory    Microalbuminuria 2018    PFO (patent foramen ovale) 2015    Right pontine CVA ([facility]) 10/2015    Stage 3a chronic kidney disease (HCC) 12/16/2020    Syncope and collapse 6/1/2020     Patient has no known allergies.    VS reviewed      Gen- Alert and oriented x 2   Heart- RRR no murmur no LE edema   Lungs- CTA b/l no resp distress RA oxygen   Abd- bs x 4       Assessment and Plan    Weakness secondary to COVID   Hypotension   Stopped lisinopril   Hx CVA with Left side weakness   Vascular Dementia   A fib   Eliquis   DM  Lantus   Farxiga   Depression   CKD stage 3   HLD  Statin       Nani Simmons DO, FACCAMERON     Electronically signed by: Nani Simmons DO on 2/18/2025

## 2025-02-28 ENCOUNTER — OFFICE VISIT (OUTPATIENT)
Dept: GERIATRIC MEDICINE | Age: 65
End: 2025-02-28

## 2025-02-28 DIAGNOSIS — G81.94 HEMIPARESIS, LEFT (HCC): ICD-10-CM

## 2025-02-28 DIAGNOSIS — E11.29 CONTROLLED TYPE 2 DIABETES MELLITUS WITH MICROALBUMINURIA, WITH LONG-TERM CURRENT USE OF INSULIN (HCC): ICD-10-CM

## 2025-02-28 DIAGNOSIS — R53.1 WEAKNESS: ICD-10-CM

## 2025-02-28 DIAGNOSIS — Z79.4 CONTROLLED TYPE 2 DIABETES MELLITUS WITH STAGE 3 CHRONIC KIDNEY DISEASE, WITH LONG-TERM CURRENT USE OF INSULIN (HCC): ICD-10-CM

## 2025-02-28 DIAGNOSIS — R80.9 CONTROLLED TYPE 2 DIABETES MELLITUS WITH MICROALBUMINURIA, WITH LONG-TERM CURRENT USE OF INSULIN (HCC): ICD-10-CM

## 2025-02-28 DIAGNOSIS — R55 SYNCOPE, UNSPECIFIED SYNCOPE TYPE: Primary | ICD-10-CM

## 2025-02-28 DIAGNOSIS — N18.30 CONTROLLED TYPE 2 DIABETES MELLITUS WITH STAGE 3 CHRONIC KIDNEY DISEASE, WITH LONG-TERM CURRENT USE OF INSULIN (HCC): ICD-10-CM

## 2025-02-28 DIAGNOSIS — E78.5 HYPERLIPIDEMIA, UNSPECIFIED HYPERLIPIDEMIA TYPE: ICD-10-CM

## 2025-02-28 DIAGNOSIS — F01.50 VASCULAR DEMENTIA, UNSPECIFIED DEMENTIA SEVERITY, UNSPECIFIED WHETHER BEHAVIORAL, PSYCHOTIC, OR MOOD DISTURBANCE OR ANXIETY (HCC): ICD-10-CM

## 2025-02-28 DIAGNOSIS — Z79.4 CONTROLLED TYPE 2 DIABETES MELLITUS WITH MICROALBUMINURIA, WITH LONG-TERM CURRENT USE OF INSULIN (HCC): ICD-10-CM

## 2025-02-28 DIAGNOSIS — E11.22 CONTROLLED TYPE 2 DIABETES MELLITUS WITH STAGE 3 CHRONIC KIDNEY DISEASE, WITH LONG-TERM CURRENT USE OF INSULIN (HCC): ICD-10-CM

## 2025-03-03 ENCOUNTER — OFFICE VISIT (OUTPATIENT)
Dept: GERIATRIC MEDICINE | Age: 65
End: 2025-03-03

## 2025-03-03 DIAGNOSIS — F01.50 VASCULAR DEMENTIA, UNSPECIFIED DEMENTIA SEVERITY, UNSPECIFIED WHETHER BEHAVIORAL, PSYCHOTIC, OR MOOD DISTURBANCE OR ANXIETY (HCC): ICD-10-CM

## 2025-03-03 DIAGNOSIS — Z79.4 CONTROLLED TYPE 2 DIABETES MELLITUS WITH MICROALBUMINURIA, WITH LONG-TERM CURRENT USE OF INSULIN (HCC): ICD-10-CM

## 2025-03-03 DIAGNOSIS — Z79.4 CONTROLLED TYPE 2 DIABETES MELLITUS WITH STAGE 3 CHRONIC KIDNEY DISEASE, WITH LONG-TERM CURRENT USE OF INSULIN (HCC): ICD-10-CM

## 2025-03-03 DIAGNOSIS — R80.9 CONTROLLED TYPE 2 DIABETES MELLITUS WITH MICROALBUMINURIA, WITH LONG-TERM CURRENT USE OF INSULIN (HCC): ICD-10-CM

## 2025-03-03 DIAGNOSIS — E78.5 HYPERLIPIDEMIA, UNSPECIFIED HYPERLIPIDEMIA TYPE: ICD-10-CM

## 2025-03-03 DIAGNOSIS — G81.94 HEMIPARESIS, LEFT (HCC): ICD-10-CM

## 2025-03-03 DIAGNOSIS — R55 SYNCOPE, UNSPECIFIED SYNCOPE TYPE: Primary | ICD-10-CM

## 2025-03-03 DIAGNOSIS — N18.30 CONTROLLED TYPE 2 DIABETES MELLITUS WITH STAGE 3 CHRONIC KIDNEY DISEASE, WITH LONG-TERM CURRENT USE OF INSULIN (HCC): ICD-10-CM

## 2025-03-03 DIAGNOSIS — E11.29 CONTROLLED TYPE 2 DIABETES MELLITUS WITH MICROALBUMINURIA, WITH LONG-TERM CURRENT USE OF INSULIN (HCC): ICD-10-CM

## 2025-03-03 DIAGNOSIS — E11.22 CONTROLLED TYPE 2 DIABETES MELLITUS WITH STAGE 3 CHRONIC KIDNEY DISEASE, WITH LONG-TERM CURRENT USE OF INSULIN (HCC): ICD-10-CM

## 2025-03-03 DIAGNOSIS — R53.1 WEAKNESS: ICD-10-CM

## 2025-03-03 NOTE — PROGRESS NOTES
SNF PROGRESS NOTE      Cc- syncope       Patient is a Jesse Leigh 64 y.o. male who is being seen at [facility] after a hospital admit for JUSTIN. He was noted to have hypotension. He was also noted to have COVID and was weak from this.     Patient is sitting up in the therapy gym in the wheelchair. He is on RA. HE is eating ok.         Past Medical History:   Diagnosis Date    Cerebrovascular small vessel disease     Chronic kidney disease, stage 3b (HCC) 12/16/2020    Colon cancer screening declined 8/30/2021    Debility 7/11/2022    Diabetic polyneuropathy associated with type 2 diabetes mellitus (HCC) 4/22/2021    DM (diabetes mellitus) ([facility])     was with Dr Knox, [facility]    Dysarthria as late effect of cerebrovascular accident (CVA) 2015    Hearing loss     Hemiparesis affecting left side as late effect of cerebrovascular accident (CVA) ([facility])     History of left PCA stroke 2013    History of right STEPHANIE stroke 10/21/2015    was at [facility], now Dr Mallory    Microalbuminuria 2018    PFO (patent foramen ovale) 2015    Right pontine CVA ([facility]) 10/2015    Stage 3a chronic kidney disease (HCC) 12/16/2020    Syncope and collapse 6/1/2020     Patient has no known allergies.    VS reviewed      Gen- Alert and oriented x 2   Heart- RRR no murmur no LE edema   Lungs- CTA b/l no resp distress RA oxygen   Abd- bs x 4       Assessment and Plan    Weakness secondary to COVID   Hypotension   Stopped lisinopril   Saw cardiology--recommendations blood pressure 3 times a day for 2 weeks  Cardiology also recommended stress test  Follow-up cardiology 3/19/2025  Hx CVA with Left side weakness   Vascular Dementia   A fib   Eliquis   DM  Lantus   Farxiga   Depression   CKD stage 3   HLD  Statin       Nani Simmons DO, FACOI     Electronically signed by: Nani Simmons DO on 2/27/2025

## 2025-03-04 ENCOUNTER — OFFICE VISIT (OUTPATIENT)
Dept: GERIATRIC MEDICINE | Age: 65
End: 2025-03-04
Payer: MEDICARE

## 2025-03-04 DIAGNOSIS — R53.1 WEAKNESS: ICD-10-CM

## 2025-03-04 DIAGNOSIS — G81.94 HEMIPARESIS, LEFT (HCC): ICD-10-CM

## 2025-03-04 DIAGNOSIS — R55 SYNCOPE, UNSPECIFIED SYNCOPE TYPE: Primary | ICD-10-CM

## 2025-03-04 DIAGNOSIS — R80.9 CONTROLLED TYPE 2 DIABETES MELLITUS WITH MICROALBUMINURIA, WITH LONG-TERM CURRENT USE OF INSULIN (HCC): ICD-10-CM

## 2025-03-04 DIAGNOSIS — E78.5 HYPERLIPIDEMIA, UNSPECIFIED HYPERLIPIDEMIA TYPE: ICD-10-CM

## 2025-03-04 DIAGNOSIS — Z79.4 CONTROLLED TYPE 2 DIABETES MELLITUS WITH MICROALBUMINURIA, WITH LONG-TERM CURRENT USE OF INSULIN (HCC): ICD-10-CM

## 2025-03-04 DIAGNOSIS — E11.29 CONTROLLED TYPE 2 DIABETES MELLITUS WITH MICROALBUMINURIA, WITH LONG-TERM CURRENT USE OF INSULIN (HCC): ICD-10-CM

## 2025-03-04 DIAGNOSIS — F01.50 VASCULAR DEMENTIA, UNSPECIFIED DEMENTIA SEVERITY, UNSPECIFIED WHETHER BEHAVIORAL, PSYCHOTIC, OR MOOD DISTURBANCE OR ANXIETY (HCC): ICD-10-CM

## 2025-03-04 PROCEDURE — 99308 SBSQ NF CARE LOW MDM 20: CPT | Performed by: INTERNAL MEDICINE

## 2025-03-04 PROCEDURE — 3052F HG A1C>EQUAL 8.0%<EQUAL 9.0%: CPT | Performed by: INTERNAL MEDICINE

## 2025-03-05 NOTE — PROGRESS NOTES
SNF PROGRESS NOTE      Cc- syncope       Patient is a Jesse Leigh 64 y.o. male who is being seen at [facility] after a hospital admit for JUSTIN. He was noted to have hypotension. He was also noted to have COVID and was weak from this.     Patient is sitting in his room in the chair.  He just got done eating breakfast and states eating okay.  He is on room air oxygen.  He denies any complaints of pain.        Past Medical History:   Diagnosis Date    Cerebrovascular small vessel disease     Chronic kidney disease, stage 3b (HCC) 12/16/2020    Colon cancer screening declined 8/30/2021    Debility 7/11/2022    Diabetic polyneuropathy associated with type 2 diabetes mellitus (HCC) 4/22/2021    DM (diabetes mellitus) ([facility])     was with Dr Knox, [facility]    Dysarthria as late effect of cerebrovascular accident (CVA) 2015    Hearing loss     Hemiparesis affecting left side as late effect of cerebrovascular accident (CVA) ([facility])     History of left PCA stroke 2013    History of right STEPHANIE stroke 10/21/2015    was at [facility], now Dr Mallory    Microalbuminuria 2018    PFO (patent foramen ovale) 2015    Right pontine CVA ([facility]) 10/2015    Stage 3a chronic kidney disease ([facility]) 12/16/2020    Syncope and collapse 6/1/2020     Patient has no known allergies.    VS reviewed      Gen- Alert and oriented x 2   Heart- RRR no murmur no LE edema   Lungs- CTA b/l no resp distress RA oxygen   Abd- bs x 4         Assessment and Plan    Weakness secondary to COVID   Hypotension   Stopped lisinopril   Saw cardiology--recommendations blood pressure 3 times a day for 2 weeks  Cardiology also recommended stress test  Follow-up cardiology 3/19/2025  Hx CVA with Left side weakness   Vascular Dementia   A fib   Eliquis   DM  Lantus   Patricia   Depression   CKD stage 3   HLD  Statin       Nani Simmons DO, FACOI     Electronically signed by: Nani Simmons DO on 2/28/2025

## 2025-03-05 NOTE — PROGRESS NOTES
SNF PROGRESS NOTE      Cc- syncope       Patient is a Jesse Leigh 64 y.o. male who is being seen at [facility] after a hospital admit for JUSTIN. He was noted to have hypotension. He was also noted to have COVID and was weak from this.     Patient is sitting in therapy gym. He has no complaints. No complaints. He is eating well.         Past Medical History:   Diagnosis Date    Cerebrovascular small vessel disease     Chronic kidney disease, stage 3b (HCC) 12/16/2020    Colon cancer screening declined 8/30/2021    Debility 7/11/2022    Diabetic polyneuropathy associated with type 2 diabetes mellitus (HCC) 4/22/2021    DM (diabetes mellitus) ([facility])     was with Dr Knox, [facility]    Dysarthria as late effect of cerebrovascular accident (CVA) 2015    Hearing loss     Hemiparesis affecting left side as late effect of cerebrovascular accident (CVA) ([facility])     History of left PCA stroke 2013    History of right STEPHANIE stroke 10/21/2015    was at [facility], now Dr Mallory    Microalbuminuria 2018    PFO (patent foramen ovale) 2015    Right pontine CVA ([facility]) 10/2015    Stage 3a chronic kidney disease (HCC) 12/16/2020    Syncope and collapse 6/1/2020     Patient has no known allergies.    VS reviewed    Gen- Alert and oriented x 2   Heart- RRR no murmur no LE edema   Lungs- CTA b/l no resp distress RA oxygen   Abd- bs x 4         Assessment and Plan    Weakness secondary to COVID   Hypotension   Stopped lisinopril   Saw cardiology--recommendations blood pressure 3 times a day for 2 weeks  Cardiology also recommended stress test  Follow-up cardiology 3/19/2025  Hx CVA with Left side weakness   Vascular Dementia   A fib   Eliquis   DM  Lantus   Farxiga   Depression   CKD stage 3   HLD  Statin       Patient will eventually switch to LTC       JITENDRA Wheeler DO     Electronically signed by: Nani Simmons DO on 3/3/2025

## 2025-03-06 ENCOUNTER — OFFICE VISIT (OUTPATIENT)
Dept: GERIATRIC MEDICINE | Age: 65
End: 2025-03-06
Payer: MEDICARE

## 2025-03-06 DIAGNOSIS — E11.22 CONTROLLED TYPE 2 DIABETES MELLITUS WITH STAGE 3 CHRONIC KIDNEY DISEASE, WITH LONG-TERM CURRENT USE OF INSULIN (HCC): ICD-10-CM

## 2025-03-06 DIAGNOSIS — N18.30 CONTROLLED TYPE 2 DIABETES MELLITUS WITH STAGE 3 CHRONIC KIDNEY DISEASE, WITH LONG-TERM CURRENT USE OF INSULIN (HCC): ICD-10-CM

## 2025-03-06 DIAGNOSIS — E78.5 HYPERLIPIDEMIA, UNSPECIFIED HYPERLIPIDEMIA TYPE: ICD-10-CM

## 2025-03-06 DIAGNOSIS — R55 SYNCOPE, UNSPECIFIED SYNCOPE TYPE: Primary | ICD-10-CM

## 2025-03-06 DIAGNOSIS — E11.29 CONTROLLED TYPE 2 DIABETES MELLITUS WITH MICROALBUMINURIA, WITH LONG-TERM CURRENT USE OF INSULIN (HCC): ICD-10-CM

## 2025-03-06 DIAGNOSIS — G81.94 HEMIPARESIS, LEFT (HCC): ICD-10-CM

## 2025-03-06 DIAGNOSIS — F01.50 VASCULAR DEMENTIA, UNSPECIFIED DEMENTIA SEVERITY, UNSPECIFIED WHETHER BEHAVIORAL, PSYCHOTIC, OR MOOD DISTURBANCE OR ANXIETY (HCC): ICD-10-CM

## 2025-03-06 DIAGNOSIS — R80.9 CONTROLLED TYPE 2 DIABETES MELLITUS WITH MICROALBUMINURIA, WITH LONG-TERM CURRENT USE OF INSULIN (HCC): ICD-10-CM

## 2025-03-06 DIAGNOSIS — Z79.4 CONTROLLED TYPE 2 DIABETES MELLITUS WITH MICROALBUMINURIA, WITH LONG-TERM CURRENT USE OF INSULIN (HCC): ICD-10-CM

## 2025-03-06 DIAGNOSIS — Z79.4 CONTROLLED TYPE 2 DIABETES MELLITUS WITH STAGE 3 CHRONIC KIDNEY DISEASE, WITH LONG-TERM CURRENT USE OF INSULIN (HCC): ICD-10-CM

## 2025-03-06 DIAGNOSIS — R53.1 WEAKNESS: ICD-10-CM

## 2025-03-06 PROCEDURE — 99308 SBSQ NF CARE LOW MDM 20: CPT | Performed by: INTERNAL MEDICINE

## 2025-03-06 PROCEDURE — 3052F HG A1C>EQUAL 8.0%<EQUAL 9.0%: CPT | Performed by: INTERNAL MEDICINE

## 2025-03-07 ENCOUNTER — OFFICE VISIT (OUTPATIENT)
Dept: GERIATRIC MEDICINE | Age: 65
End: 2025-03-07

## 2025-03-07 DIAGNOSIS — E11.29 CONTROLLED TYPE 2 DIABETES MELLITUS WITH MICROALBUMINURIA, WITH LONG-TERM CURRENT USE OF INSULIN (HCC): ICD-10-CM

## 2025-03-07 DIAGNOSIS — G81.94 HEMIPARESIS, LEFT (HCC): ICD-10-CM

## 2025-03-07 DIAGNOSIS — Z79.4 CONTROLLED TYPE 2 DIABETES MELLITUS WITH MICROALBUMINURIA, WITH LONG-TERM CURRENT USE OF INSULIN (HCC): ICD-10-CM

## 2025-03-07 DIAGNOSIS — E78.5 HYPERLIPIDEMIA, UNSPECIFIED HYPERLIPIDEMIA TYPE: ICD-10-CM

## 2025-03-07 DIAGNOSIS — R55 SYNCOPE, UNSPECIFIED SYNCOPE TYPE: Primary | ICD-10-CM

## 2025-03-07 DIAGNOSIS — R53.1 WEAKNESS: ICD-10-CM

## 2025-03-07 DIAGNOSIS — F01.50 VASCULAR DEMENTIA, UNSPECIFIED DEMENTIA SEVERITY, UNSPECIFIED WHETHER BEHAVIORAL, PSYCHOTIC, OR MOOD DISTURBANCE OR ANXIETY (HCC): ICD-10-CM

## 2025-03-07 DIAGNOSIS — R80.9 CONTROLLED TYPE 2 DIABETES MELLITUS WITH MICROALBUMINURIA, WITH LONG-TERM CURRENT USE OF INSULIN (HCC): ICD-10-CM

## 2025-03-10 NOTE — PROGRESS NOTES
SNF PROGRESS NOTE      Cc- syncope       Patient is a Jesse Leigh 64 y.o. male  who is being seen at [facility] after a hospital admit for JUSTIN. He was noted to have hypotension. He was also noted to have COVID and was weak from this.     Patient did have a fall. No injury. He is eating well.         Past Medical History:   Diagnosis Date    Cerebrovascular small vessel disease     Chronic kidney disease, stage 3b (HCC) 12/16/2020    Colon cancer screening declined 8/30/2021    Debility 7/11/2022    Diabetic polyneuropathy associated with type 2 diabetes mellitus (HCC) 4/22/2021    DM (diabetes mellitus) ([facility])     was with Dr Knox, [facility]    Dysarthria as late effect of cerebrovascular accident (CVA) 2015    Hearing loss     Hemiparesis affecting left side as late effect of cerebrovascular accident (CVA) ([facility])     History of left PCA stroke 2013    History of right STEPHANIE stroke 10/21/2015    was at [facility], now Dr Mallory    Microalbuminuria 2018    PFO (patent foramen ovale) 2015    Right pontine CVA ([facility]) 10/2015    Stage 3a chronic kidney disease ([facility]) 12/16/2020    Syncope and collapse 6/1/2020     Patient has no known allergies.    VS reviewed    Gen- Alert and oriented x 2   Heart- RRR no murmur no LE edema   Lungs- CTA b/l no resp distress RA oxygen   Abd- bs x 4         Assessment and Plan    Weakness secondary to COVID   Hypotension   Stopped lisinopril   Saw cardiology--recommendations blood pressure 3 times a day for 2 weeks  Cardiology also recommended stress test  Follow-up cardiology 3/19/2025  Hx CVA with Left side weakness   Vascular Dementia   A fib   Eliquis   DM  Lantus   Patricia   Depression   CKD stage 3   HLD  Statin         Patient will eventually switch to LTC       Nani Simmons DO FACCAMERON     Electronically signed by: Nani Simmons DO on 3/4/2025

## 2025-03-11 ENCOUNTER — HOSPITAL ENCOUNTER (OUTPATIENT)
Dept: RADIOLOGY | Facility: CLINIC | Age: 65
End: 2025-03-11
Payer: MEDICARE

## 2025-03-11 ENCOUNTER — HOSPITAL ENCOUNTER (OUTPATIENT)
Dept: CARDIOLOGY | Facility: CLINIC | Age: 65
Discharge: HOME | End: 2025-03-11
Payer: MEDICARE

## 2025-03-11 ENCOUNTER — HOSPITAL ENCOUNTER (OUTPATIENT)
Dept: RADIOLOGY | Facility: CLINIC | Age: 65
Discharge: HOME | End: 2025-03-11
Payer: MEDICARE

## 2025-03-11 DIAGNOSIS — R06.02 SHORTNESS OF BREATH: ICD-10-CM

## 2025-03-11 DIAGNOSIS — R94.31 ABNORMAL EKG: ICD-10-CM

## 2025-03-11 NOTE — PROGRESS NOTES
SNF PROGRESS NOTE      Cc- syncope       Patient is a Jesse Leigh 64 y.o. male who is being seen at [facility] after a hospital admit for JUSTIN. He was noted to have hypotension. He was also noted to have COVID and was weak from this.     Patient is doing well. He is eating well. No complaints of pain. He is on RA.         Past Medical History:   Diagnosis Date    Cerebrovascular small vessel disease     Chronic kidney disease, stage 3b (HCC) 12/16/2020    Colon cancer screening declined 8/30/2021    Debility 7/11/2022    Diabetic polyneuropathy associated with type 2 diabetes mellitus (HCC) 4/22/2021    DM (diabetes mellitus) ([facility])     was with Dr Knox, [facility]    Dysarthria as late effect of cerebrovascular accident (CVA) 2015    Hearing loss     Hemiparesis affecting left side as late effect of cerebrovascular accident (CVA) ([facility])     History of left PCA stroke 2013    History of right STEPHANIE stroke 10/21/2015    was at [facility], now Dr Mallory    Microalbuminuria 2018    PFO (patent foramen ovale) 2015    Right pontine CVA (HCC) 10/2015    Stage 3a chronic kidney disease (HCC) 12/16/2020    Syncope and collapse 6/1/2020     Patient has no known allergies.    VS reviewed    Gen- Alert and oriented x 2   Heart- RRR no murmur no LE edema   Lungs- CTA b/l no resp distress RA oxygen   Abd- bs x 4           Assessment and Plan      Weakness secondary to COVID   Hypotension   Stopped lisinopril   Saw cardiology--recommendations blood pressure 3 times a day for 2 weeks  Cardiology also recommended stress test  Follow-up cardiology 3/19/2025  Hx CVA with Left side weakness   Vascular Dementia   A fib   Eliquis   DM  Lantus   Farxiga   Depression   CKD stage 3   HLD  Statin         Patient will eventually switch to LTC       JITENDRA Wheeler DO     Electronically signed by: Nani Simmons DO on 3/6/2025

## 2025-03-12 NOTE — PROGRESS NOTES
SNF PROGRESS NOTE      Cc- syncope       Patient is a Jesse Leigh 64 y.o. male who is being seen at [facility] after a hospital admit for JUSTIN. He was noted to have hypotension. He was also noted to have COVID and was weak from this.     Patient is sitting in the therapy gym. Denies pain. He is on RA.         Past Medical History:   Diagnosis Date    Cerebrovascular small vessel disease     Chronic kidney disease, stage 3b (HCC) 12/16/2020    Colon cancer screening declined 8/30/2021    Debility 7/11/2022    Diabetic polyneuropathy associated with type 2 diabetes mellitus (HCC) 4/22/2021    DM (diabetes mellitus) ([facility])     was with Dr Knox, [facility]    Dysarthria as late effect of cerebrovascular accident (CVA) 2015    Hearing loss     Hemiparesis affecting left side as late effect of cerebrovascular accident (CVA) ([facility])     History of left PCA stroke 2013    History of right STEPHANIE stroke 10/21/2015    was at [facility], now Dr Mallory    Microalbuminuria 2018    PFO (patent foramen ovale) 2015    Right pontine CVA ([facility]) 10/2015    Stage 3a chronic kidney disease (HCC) 12/16/2020    Syncope and collapse 6/1/2020     Patient has no known allergies.    VS reviewed    Gen- Alert and oriented x 2   Heart- RRR no murmur no LE edema   Lungs- CTA b/l no resp distress RA oxygen   Abd- bs x 4         Assessment and Plan      Weakness secondary to COVID   Hypotension   Stopped lisinopril   Saw cardiology--recommendations blood pressure 3 times a day for 2 weeks  Cardiology also recommended stress test  Follow-up cardiology 3/19/2025  Hx CVA with Left side weakness   Vascular Dementia   A fib   Eliquis   DM  Lantus   F[place]olo   Depression   CKD stage 3   HLD  Statin         Patient will eventually switch to LTC       JITENDRA Wheeler DO     Electronically signed by: Nani Simmons DO on 3/7/2025

## 2025-03-14 ENCOUNTER — HOSPITAL ENCOUNTER (OUTPATIENT)
Dept: RADIOLOGY | Facility: CLINIC | Age: 65
Discharge: HOME | End: 2025-03-14
Payer: MEDICARE

## 2025-03-14 ENCOUNTER — HOSPITAL ENCOUNTER (OUTPATIENT)
Dept: CARDIOLOGY | Facility: CLINIC | Age: 65
Discharge: HOME | End: 2025-03-14
Payer: MEDICARE

## 2025-03-14 PROCEDURE — 3430000001 HC RX 343 DIAGNOSTIC RADIOPHARMACEUTICALS: Performed by: INTERNAL MEDICINE

## 2025-03-14 PROCEDURE — A9502 TC99M TETROFOSMIN: HCPCS | Performed by: INTERNAL MEDICINE

## 2025-03-14 PROCEDURE — 2500000004 HC RX 250 GENERAL PHARMACY W/ HCPCS (ALT 636 FOR OP/ED): Performed by: INTERNAL MEDICINE

## 2025-03-14 PROCEDURE — 93017 CV STRESS TEST TRACING ONLY: CPT

## 2025-03-14 PROCEDURE — 78452 HT MUSCLE IMAGE SPECT MULT: CPT

## 2025-03-14 RX ORDER — REGADENOSON 0.08 MG/ML
0.4 INJECTION, SOLUTION INTRAVENOUS ONCE
Status: COMPLETED | OUTPATIENT
Start: 2025-03-14 | End: 2025-03-14

## 2025-03-14 RX ADMIN — TETROFOSMIN 35.8 MILLICURIE: 0.23 INJECTION, POWDER, LYOPHILIZED, FOR SOLUTION INTRAVENOUS at 11:21

## 2025-03-14 RX ADMIN — TETROFOSMIN 10.7 MILLICURIE: 0.23 INJECTION, POWDER, LYOPHILIZED, FOR SOLUTION INTRAVENOUS at 10:08

## 2025-03-14 RX ADMIN — REGADENOSON 0.4 MG: 0.08 INJECTION, SOLUTION INTRAVENOUS at 11:32

## 2025-03-17 ENCOUNTER — OFFICE VISIT (OUTPATIENT)
Dept: GERIATRIC MEDICINE | Age: 65
End: 2025-03-17

## 2025-03-17 DIAGNOSIS — R53.1 WEAKNESS: ICD-10-CM

## 2025-03-17 DIAGNOSIS — R80.9 CONTROLLED TYPE 2 DIABETES MELLITUS WITH MICROALBUMINURIA, WITH LONG-TERM CURRENT USE OF INSULIN (HCC): ICD-10-CM

## 2025-03-17 DIAGNOSIS — G81.94 HEMIPARESIS, LEFT (HCC): ICD-10-CM

## 2025-03-17 DIAGNOSIS — E11.29 CONTROLLED TYPE 2 DIABETES MELLITUS WITH MICROALBUMINURIA, WITH LONG-TERM CURRENT USE OF INSULIN (HCC): ICD-10-CM

## 2025-03-17 DIAGNOSIS — E78.5 HYPERLIPIDEMIA, UNSPECIFIED HYPERLIPIDEMIA TYPE: ICD-10-CM

## 2025-03-17 DIAGNOSIS — F01.50 VASCULAR DEMENTIA, UNSPECIFIED DEMENTIA SEVERITY, UNSPECIFIED WHETHER BEHAVIORAL, PSYCHOTIC, OR MOOD DISTURBANCE OR ANXIETY (HCC): Primary | ICD-10-CM

## 2025-03-17 DIAGNOSIS — Z79.4 CONTROLLED TYPE 2 DIABETES MELLITUS WITH MICROALBUMINURIA, WITH LONG-TERM CURRENT USE OF INSULIN (HCC): ICD-10-CM

## 2025-03-19 ENCOUNTER — APPOINTMENT (OUTPATIENT)
Dept: CARDIOLOGY | Facility: CLINIC | Age: 65
End: 2025-03-19
Payer: MEDICARE

## 2025-03-19 VITALS — HEART RATE: 88 BPM | SYSTOLIC BLOOD PRESSURE: 110 MMHG | DIASTOLIC BLOOD PRESSURE: 62 MMHG

## 2025-03-19 DIAGNOSIS — I95.0 IDIOPATHIC HYPOTENSION: ICD-10-CM

## 2025-03-19 DIAGNOSIS — I10 PRIMARY HYPERTENSION: ICD-10-CM

## 2025-03-19 DIAGNOSIS — Z86.73 HISTORY OF CVA (CEREBROVASCULAR ACCIDENT): ICD-10-CM

## 2025-03-19 DIAGNOSIS — Z79.4 TYPE 2 DIABETES MELLITUS WITH DIABETIC NEPHROPATHY, WITH LONG-TERM CURRENT USE OF INSULIN: ICD-10-CM

## 2025-03-19 DIAGNOSIS — E11.21 TYPE 2 DIABETES MELLITUS WITH DIABETIC NEPHROPATHY, WITH LONG-TERM CURRENT USE OF INSULIN: ICD-10-CM

## 2025-03-19 DIAGNOSIS — I47.29 NSVT (NONSUSTAINED VENTRICULAR TACHYCARDIA) (MULTI): ICD-10-CM

## 2025-03-19 DIAGNOSIS — Q21.12 PFO (PATENT FORAMEN OVALE) (HHS-HCC): ICD-10-CM

## 2025-03-19 DIAGNOSIS — Z78.9 NEVER SMOKED TOBACCO: ICD-10-CM

## 2025-03-19 PROCEDURE — 3078F DIAST BP <80 MM HG: CPT | Performed by: INTERNAL MEDICINE

## 2025-03-19 PROCEDURE — 3074F SYST BP LT 130 MM HG: CPT | Performed by: INTERNAL MEDICINE

## 2025-03-19 PROCEDURE — 99214 OFFICE O/P EST MOD 30 MIN: CPT | Performed by: INTERNAL MEDICINE

## 2025-03-19 PROCEDURE — 1036F TOBACCO NON-USER: CPT | Performed by: INTERNAL MEDICINE

## 2025-03-19 NOTE — PROGRESS NOTES
CARDIOLOGY OFFICE VISIT      CHIEF COMPLAINT  Chief Complaint   Patient presents with    Results     Follow up to review recent stress testing results        HISTORY OF PRESENT ILLNESS  Patient is a 64-year-old  male with past medical history significant for cerebrovascular accident with left-sided weakness, pulmonary embolus, diabetes mellitus, hypertension, hyperlipidemia, patent foramen ovale, nonsustained ventricular tachycardia, paroxysmal supraventricular tachycardia, left ventricular hypertrophy presents for cardiac evaluation.     It appears the patient was sent to our office regarding PFO.  Patient  denies chest pain, dyspnea, palpitations, nausea, vomiting, dizziness, near-syncope, arabella syncope.  He has chronic lower extremity edema which is unchanged.  Patient is a nursing home resident at [facility].  Patient has frequent low blood pressure readings at [facility] how have her has been asymptomatic.  He goes to physical therapy 3 times a week.     Past surgical history:  Left total hip replacement  Right ankle surgery     Social history:  Denies tobacco use  Denies alcohol use     Family history:  Mother  kidney disease  Father  kidney disease disease, myocardial infarction in his 70s     Review of systems have been reviewed and are negative, noncontributory, as previously mentioned x 12 systems.     I personally reviewed EKG 2025: Sinus tachycardia 112 bpm, nonspecific T wave abnormality     Assessment:    Sinus tachycardia  Nonsustained ventricular tachycardia, rate 188 bpm  Paroxysmal supraventricular tachycardia, rate 214 bpm  Patent foramen ovale  Left ventricular hypertrophy  Cerebrovascular accident with left-sided weakness  Pulmonary embolus on V/Q scan 2024  Diabetes mellitus  Dyslipidemia  Hypotension     Recommendations:  Patient appears to be managed by Dr. Can ROSE at [facility].  From the records and per the patient he  was started on Eliquis after his embolic CVA.  If he has a recurrent embolic event while on anticoagulation that would be an indication for PFO closure.  No symptoms of angina and no ischemia on stress test.  Patient has been taken off of his blood pressure medications due to hypotension.  Per his medication list he was started on midodrine.  No current symptomatic hypotension.  Advise wearing compression stockings daily.     Please excuse any errors in grammar or translation related to this dictation.  Voice recognition software was utilized to prepare this document.    Recent Cardiovascular Testing:  The following results have been reviewed and updated.     Stress test 3/14/25  1.Normal  2. EF 61%    Lung Scan 11/21/24  1.1. Bilateral wedge-shaped segmental and subsegmental perfusion  defects as described above suggestive of acute pulmonary embolism  (high probability     CRD 12/17/24  1.ICA <50%     ECHO 12/17/24  1.EF 60%  2.Bubble study Positive PFO     Holter  1/2/25  NSVT 5 beat 188bpm  PSVT max. 214bpm  Sinus tachycardia average 103 bpm range  bpm    VITALS  Vitals:    03/19/25 1455   BP: 110/62   Pulse: 88     Wt Readings from Last 4 Encounters:   02/08/25 70.3 kg (155 lb)   12/16/24 73 kg (161 lb)   11/21/24 77.1 kg (170 lb)   11/21/24 77.1 kg (170 lb)       PHYSICAL EXAM:  GENERAL:  Well developed, well nourished, in no acute distress.  CHEST:  Symmetric and nontender.  NEURO/PSYCH:  Alert and oriented times three with approppriate behavior and responses.  NECK:  Supple, no JVD, no bruit.  LUNGS:  Clear to auscultation bilaterally, normal respiratory effort.  HEART:  Rate and rhythm REGULAR with no evident murmur, no gallop appreciated.    There are no rubs, clicks or heaves.  EXTREMITIES:  Warm with good color, no clubbing or cyanosis.  There is no edema noted.  PERIPHERAL VASCULAR:  Pulses present and equally palpable; 2+ throughout.    ASSESSMENT AND PLAN  Problem List Items Addressed This Visit           Cardiac and Vasculature    HTN (hypertension)    PFO (patent foramen ovale) ([facility])    NSVT (nonsustained ventricular tachycardia) ([facility])       Endocrine/Metabolic    Type 2 diabetes mellitus with kidney complication, with long-term current use of insulin       Neuro    History of CVA (cerebrovascular accident)       Tobacco    Never smoked tobacco     Other Visit Diagnoses       Idiopathic hypotension        Relevant Orders    Compression Stockings 15-20 mmHg            Past Medical History  Past Medical History:   Diagnosis Date    CKD (chronic kidney disease)     CVA (cerebral vascular accident) ([facility]) 11/21/2024    HLD (hyperlipidemia)     Hypertension     Stroke ([facility])     Type 2 diabetes mellitus with kidney complication, with long-term current use of insulin        Social History  Social History     Tobacco Use    Smoking status: Never    Smokeless tobacco: Never   Vaping Use    Vaping status: Never Used   Substance Use Topics    Alcohol use: Never    Drug use: Never       Family History     Family History   Problem Relation Name Age of Onset    Diabetes Mother      Hypertension Mother      Coronary artery disease Father      Diabetes Father      Other (Ischemic heart disease) Father      Stroke Maternal Grandmother          Allergies:  No Known Allergies     Outpatient Medications:  Current Outpatient Medications   Medication Instructions    acetaminophen (TYLENOL) 650 mg, Every 4 hours PRN    alum-mag hydroxide-simeth (Mylanta) 200-200-20 mg/5 mL oral suspension 30 mL, Daily PRN    apixaban (ELIQUIS) 5 mg, oral, 2 times daily, To be started for stroke prevention per Neurology.    atorvastatin (LIPITOR) 80 mg, Nightly    dapagliflozin propanediol (FARXIGA) 10 mg, Every 24 hours    docusate sodium (COLACE) 100 mg, 2 times daily PRN    ferrous sulfate 325 mg, Daily with breakfast    folic acid (Folvite) 1 mg tablet Daily    guaiFENesin (Robitussin) 100 mg/5 mL syrup 30 mL, 4 times daily PRN     insulin lispro (HumaLOG) 100 unit/mL injection As directed    insulin lispro 0-10 Units, subcutaneous, 4 times daily before meals and nightly, Take as directed per insulin instructions.    magnesium hydroxide (Milk of Magnesia) 400 mg/5 mL suspension 30 mL, Daily PRN    midodrine (PROAMATINE) 5 mg, 3 times daily (morning, midday, late afternoon)    mirtazapine (REMERON) 7.5 mg, Nightly    ondansetron (ZOFRAN) 4 mg, Every 8 hours PRN    tamsulosin (FLOMAX) 0.4 mg, Daily        Recent Lab Results:    CMP:    Lab Results   Component Value Date     02/08/2025    K 5.3 02/08/2025     (H) 02/08/2025    CO2 21 02/08/2025    BUN 19 02/08/2025    CREATININE 1.59 (H) 02/08/2025    GLUCOSE 177 (H) 02/08/2025    CALCIUM 8.1 (L) 02/08/2025       Magnesium:    Lab Results   Component Value Date    MG 2.00 12/19/2024       Lipid Profile:    Lab Results   Component Value Date    TRIG 121 12/17/2024    HDL 57.2 12/17/2024    LDLCALC 40 12/17/2024       TSH:    Lab Results   Component Value Date    TSH 1.13 12/17/2024       BNP:   Lab Results   Component Value Date     (H) 12/16/2024        PT/INR:    Lab Results   Component Value Date    PROTIME 13.0 (H) 02/08/2025    INR 1.2 (H) 02/08/2025       HgBA1c:    Lab Results   Component Value Date    HGBA1C 8.2 (H) 11/17/2024       BMP:  Lab Results   Component Value Date     02/08/2025     12/19/2024     12/18/2024    K 5.3 02/08/2025    K 4.0 12/19/2024    K 3.7 12/18/2024     (H) 02/08/2025     (H) 12/19/2024     (H) 12/18/2024    CO2 21 02/08/2025    CO2 26 12/19/2024    CO2 26 12/18/2024    BUN 19 02/08/2025    BUN 27 (H) 12/19/2024    BUN 29 (H) 12/18/2024    CREATININE 1.59 (H) 02/08/2025    CREATININE 1.53 (H) 12/19/2024    CREATININE 1.70 (H) 12/18/2024       CBC:  Lab Results   Component Value Date    WBC 7.9 02/08/2025    WBC 6.8 12/19/2024    WBC 8.8 12/18/2024    RBC 3.53 (L) 02/08/2025    RBC 3.27 (L) 12/19/2024    RBC  3.36 (L) 12/18/2024    HGB 9.9 (L) 02/08/2025    HGB 9.5 (L) 12/19/2024    HGB 9.9 (L) 12/18/2024    HCT 32.9 (L) 02/08/2025    HCT 30.6 (L) 12/19/2024    HCT 31.1 (L) 12/18/2024    MCV 93 02/08/2025    MCV 94 12/19/2024    MCV 93 12/18/2024    MCH 28.0 02/08/2025    MCH 29.1 12/19/2024    MCH 29.5 12/18/2024    MCHC 30.1 (L) 02/08/2025    MCHC 31.0 (L) 12/19/2024    MCHC 31.8 (L) 12/18/2024    RDW 14.6 (H) 02/08/2025    RDW 13.8 12/19/2024    RDW 14.0 12/18/2024     02/08/2025     12/19/2024     12/18/2024       Cardiac Enzymes:    Lab Results   Component Value Date    TROPHS 15 02/08/2025    TROPHS 57 (HH) 12/16/2024    TROPHS 60 (HH) 12/16/2024       Hepatic Function Panel:    Lab Results   Component Value Date    ALKPHOS 77 02/08/2025    ALT 16 02/08/2025    AST 22 02/08/2025    PROT 6.0 (L) 02/08/2025    BILITOT 0.4 02/08/2025           I,Jermey Mccollum LPN   am scribing for, and in the presence of Dr. Tawanda Pulido DO   .    I, Dr. Tawanda Pulido DO   , personally performed the services described in the documentation as scribed by Jeremy Mccollum LPN   in my presence, and confirm it is both accurate and complete.      Dr. Tawanda Pulido DO   Thank you for allowing me to participate in the care of this patient. Please do not hesitate to contact me with any further questions or concerns.

## 2025-03-19 NOTE — PATIENT INSTRUCTIONS
Continue same medications and treatments.   Patient educated on proper medication use.   Patient educated on risk factor modification.   Please bring any lab results from other providers / physicians to your next appointment.     Please bring all medicines, vitamins, and herbal supplements with you when you come to the office.     Prescriptions will not be filled unless you are compliant with your follow up appointments or have a follow up appointment scheduled as per instruction of your physician. Refills should be requested at the time of your visit.  Wear Knee compression stockings 15/20mmHg on am off in the evening  6 month visit

## 2025-03-22 NOTE — PROGRESS NOTES
SNF PROGRESS NOTE      Cc-dementia      Patient is a Jesse Leigh 64 y.o. male who is being seen at [facility] after a hospital admit for JUSTIN. He was noted to have hypotension. He was also noted to have COVID and was weak from this.  The patient is being seen for his 30-day examination    Over the last 30 days, the patient transition to long-term care.  He occasionally refuses care.  His oral intake has been fair        Past Medical History:   Diagnosis Date    Cerebrovascular small vessel disease     Chronic kidney disease, stage 3b ([facility]) 12/16/2020    Colon cancer screening declined 8/30/2021    Debility 7/11/2022    Diabetic polyneuropathy associated with type 2 diabetes mellitus (HCC) 4/22/2021    DM (diabetes mellitus) ([facility])     was with Dr Knox, [facility]    Dysarthria as late effect of cerebrovascular accident (CVA) 2015    Hearing loss     Hemiparesis affecting left side as late effect of cerebrovascular accident (CVA) ([facility])     History of left PCA stroke 2013    History of right STEPHANIE stroke 10/21/2015    was at [facility], now Dr Mallory    Microalbuminuria 2018    PFO (patent foramen ovale) 2015    Right pontine CVA ([facility]) 10/2015    Stage 3a chronic kidney disease ([facility]) 12/16/2020    Syncope and collapse 6/1/2020     Patient has no known allergies.    VS reviewed    Gen- Alert and oriented x 2   Heart- RRR no murmur no LE edema   Lungs- CTA b/l no resp distress RA oxygen   Abd- bs x 4           Assessment and Plan    Vascular Demen.tia:  Hypotension   Stopped lisinopril   Saw cardiology--recommendations blood pressure 3 times a day for 2 weeks  Cardiology also recommended stress test  Follow-up cardiology 3/19/2025  Hx CVA with Left side weakness   A fib   Eliquis   DM  Lantus   Farxiga   Depression   CKD stage 3   HLD  Statin         Patient will eventually switch to LTC       JITENDRA Wheeler DO     Electronically signed by: Nani Simmons DO on 3/17/2025

## 2025-03-25 LAB
ATRIAL RATE: 104 BPM
P AXIS: 63 DEGREES
PR INTERVAL: 154 MS
QRS DURATION: 64 MS
QT INTERVAL: 348 MS
R AXIS: 46 DEGREES
T AXIS: 80 DEGREES

## 2025-03-26 ENCOUNTER — HOSPITAL ENCOUNTER (OUTPATIENT)
Dept: RADIOLOGY | Facility: HOSPITAL | Age: 65
Discharge: HOME | End: 2025-03-26
Payer: MEDICARE

## 2025-03-26 DIAGNOSIS — R13.12 DYSPHAGIA, OROPHARYNGEAL PHASE: ICD-10-CM

## 2025-03-26 PROCEDURE — 2500000005 HC RX 250 GENERAL PHARMACY W/O HCPCS

## 2025-03-26 PROCEDURE — 92611 MOTION FLUOROSCOPY/SWALLOW: CPT | Mod: GN

## 2025-03-26 PROCEDURE — 74230 X-RAY XM SWLNG FUNCJ C+: CPT

## 2025-03-26 RX ADMIN — BARIUM SULFATE 100 ML: 0.81 POWDER, FOR SUSPENSION ORAL at 10:16

## 2025-03-26 RX ADMIN — BARIUM SULFATE 20 ML: 400 SUSPENSION ORAL at 10:12

## 2025-03-26 RX ADMIN — BARIUM SULFATE 10 ML: 400 PASTE ORAL at 10:17

## 2025-03-26 NOTE — PROCEDURES
Speech-Language Pathology    Outpatient  Modified Barium Swallow Study     Patient Name: Lito Osei  MRN: [id number]  : 1960  Room/bed info not found   Today's Date: 25   Time in 09:10  Time out: 09:40       Modified Barium Swallow Study completed. Informed verbal consent obtained prior to completion of exam. The study was completed with various liquid barium consistencies, pudding, and solids. Unable to follow full protocol due to cognition. The anatomic structures and function of the oropharynx, larynx, hypopharynx and cervical esophagus were evaluated.    Reason for referral: Dysphagia  Patient hx: Patient is a 64-year-old  male with past medical history significant for cerebrovascular accident with left-sided weakness, pulmonary embolus, diabetes mellitus, hypertension, hyperlipidemia, patent foramen ovale, nonsustained ventricular tachycardia, paroxysmal supraventricular tachycardia, and left ventricular hypertrophy.  Currently residing at [facility]   Respiratory status: room air    Pain: Pain Scale: 0-10  Ratin     Location: N/A  Type: N/A  Action Taken: None needed   Current diet:     SPEECH RECOMMENDATIONS:  Diet recommendations: - Soft bite-sized (IDDSI Level 6)  - Thin liquids (IDDSI Level 0) - via small sips only  Swallow Strategies:   - Small bites  - Alternate bites/sips  - SMALL, SINGLE SIPS   - No straws    Plan:  SLP Plan: Skilled SLP at facility  Frequency: 2x/wk   Duration: 30 days   Discussed POC: Patient  Discussed Risks/Benefits: Yes  Patient/Caregiver Agreeable: Yes      Short term goals: established 3/26/25  - Patient will tolerate current diet without noted pulmonary compromise or evidence of pulmonary sequela as noted in patient chart and/or reported by patient/family.  - Pt will independently implement safe swallow strategies to aide in oral clearance as measured by SLP assessment in 90% of therapeutic trials with SLP.  - Pt will complete effortful swallows 10 reps, 3  x a day for 7 days a week; to strengthen pharyngeal muscles for improve bolus clearance through the pharynx.        Education: SLP provided education to Patient regarding MBSS impressions, recommendations and POC at this time. Verbal understanding and agreement given on all accounts.       Additional medical consult suggested: - No new disciplines indicated  Repeat study/discharge plan: Repeat study as clinically indicated per MD or SLP         MBSImP Physiological Component  ORAL PHASE:  Lip Closure - No labial escape/anterior loss of bolus   Tongue Control During Bolus Hold - Posterior escape of less than half of the bolus   Bolus prep/mastication - Slow prolonged mastication with complete re-collection necessary   Bolus transport/lingual motion - Delayed initiation of tongue motion for A-P movement of the bolus   Oral residue - Trace residue lining oral structures     PHARYNGEAL PHASE:  Initiation of pharyngeal swallow - Bolus head at pit of pyriforms   Soft palate elevation - No bolus between soft palate/pharyngeal wall   Laryngeal elevation - Complete superior movement of thyroid cartilage with contact of arytenoids to epiglottic petiole   Anterior hyoid excursion - Complete anterior movement   Epiglottic movement - Incomplete inversion    Laryngeal vestibule closure - Incomplete - narrow column of air/contrast in laryngeal vestibule   Pharyngeal stripping wave - Present, however, diminished   Pharyngeal contraction (A/P view) - Complete  Pharyngoesophageal segment opening - Incomplete distension and complete duration/no obstruction of flow of bolus   Tongue base retraction - No bolus between tongue base and posterior pharyngeal wall   Pharyngeal residue - Residue within the pyriform sinuses following the swallow   Laryngeal penetration - Thin with straws and sequential thin  Aspiration - Sequential thin - was NOT present with single small sips   Response to aspiration- Silent  Strategies attempted- Instructed  patient to cough but could only produce a weak cough    ESOPHAGEAL PHASE:  Esophageal clearance - Esophageal retention with retrograde flow below the pharyngoesophageal segment       SLP Impression statement:   Pt presents with mild oropharyngeal dysphagia characterized by deficits noted above. Swallowing physiology is detailed above. Impairments most impacting swallowing safety and efficiency include delayed initiation of tongue motion, slowed mastication, posterior escape of bolus, pharyngeal residue within the pyriform sinuses, diminished pharyngeal stripping wave and incomplete laryngeal vestibule closure. Pt also presents with esophageal retention with retrograde flow below pharyngoesophageal segment.      Scales and Outcome Measures:   Rosenbek's Penetration Aspiration Scale (rated based on worst swallow per consistency)  Thin Liquids: 8. SILENT ASPIRATION - contrast passes glottis, visible residue, NO pt response]   Puree: 1. NO ASPIRATION & NO PENETRATION - no aspiration, contrast does not enter airway  Soft Solids: 1. NO ASPIRATION & NO PENETRATION - no aspiration, contrast does not enter airway  Solids: 1. NO ASPIRATION & NO PENETRATION - no aspiration, contrast does not enter airway      Functional Oral Intake Scale  Functional Oral Intake Scale: Level 7        total oral diet with no restrictions

## 2025-03-27 ENCOUNTER — OFFICE VISIT (OUTPATIENT)
Dept: GERIATRIC MEDICINE | Age: 65
End: 2025-03-27
Payer: MEDICARE

## 2025-03-27 DIAGNOSIS — E78.5 HYPERLIPIDEMIA, UNSPECIFIED HYPERLIPIDEMIA TYPE: ICD-10-CM

## 2025-03-27 DIAGNOSIS — F01.50 VASCULAR DEMENTIA, UNSPECIFIED DEMENTIA SEVERITY, UNSPECIFIED WHETHER BEHAVIORAL, PSYCHOTIC, OR MOOD DISTURBANCE OR ANXIETY (HCC): Primary | ICD-10-CM

## 2025-03-27 DIAGNOSIS — R80.9 CONTROLLED TYPE 2 DIABETES MELLITUS WITH MICROALBUMINURIA, WITH LONG-TERM CURRENT USE OF INSULIN (HCC): ICD-10-CM

## 2025-03-27 DIAGNOSIS — R53.1 WEAKNESS: ICD-10-CM

## 2025-03-27 DIAGNOSIS — G81.94 HEMIPARESIS, LEFT (HCC): ICD-10-CM

## 2025-03-27 DIAGNOSIS — Z79.4 CONTROLLED TYPE 2 DIABETES MELLITUS WITH MICROALBUMINURIA, WITH LONG-TERM CURRENT USE OF INSULIN (HCC): ICD-10-CM

## 2025-03-27 DIAGNOSIS — E11.29 CONTROLLED TYPE 2 DIABETES MELLITUS WITH MICROALBUMINURIA, WITH LONG-TERM CURRENT USE OF INSULIN (HCC): ICD-10-CM

## 2025-03-27 PROCEDURE — 3052F HG A1C>EQUAL 8.0%<EQUAL 9.0%: CPT | Performed by: INTERNAL MEDICINE

## 2025-03-27 PROCEDURE — 99309 SBSQ NF CARE MODERATE MDM 30: CPT | Performed by: INTERNAL MEDICINE

## 2025-03-27 NOTE — PROGRESS NOTES
SNF PROGRESS NOTE      Cc- dementia         Patient is a Jesse Leigh 64 y.o. male who is being seen at [facility] after a hospital admit for JUSTIN. He was noted to have hypotension. He was also noted to have COVID and was weak from this.      Patient is sitting up and eating fair. He denies complaints of pain.             Past Medical History:   Diagnosis Date    Cerebrovascular small vessel disease     Chronic kidney disease, stage 3b ([facility]) 12/16/2020    Colon cancer screening declined 8/30/2021    Debility 7/11/2022    Diabetic polyneuropathy associated with type 2 diabetes mellitus (HCC) 4/22/2021    DM (diabetes mellitus) ([facility])     was with Dr Knox, [facility]    Dysarthria as late effect of cerebrovascular accident (CVA) 2015    Hearing loss     Hemiparesis affecting left side as late effect of cerebrovascular accident (CVA) ([facility])     History of left PCA stroke 2013    History of right STEPHANIE stroke 10/21/2015    was at [facility], now Dr Mallory    Microalbuminuria 2018    PFO (patent foramen ovale) 2015    Right pontine CVA ([facility]) 10/2015    Stage 3a chronic kidney disease ([facility]) 12/16/2020    Syncope and collapse 6/1/2020     Patient has no known allergies.    VS reviewed      Gen- Alert and oriented x 2   Heart- RRR no murmur no LE edema   Lungs- CTA b/l no resp distress RA oxygen   Abd- bs x 4         Assessment and Plan    Vascular Dementia:  Hypotension   Stopped lisinopril   Saw cardiology--recommendations blood pressure 3 times a day for 2 weeks  Cardiology also recommended stress test  Follow-up cardiology 3/19/2025  Hx CVA with Left side weakness   A fib   Eliquis   DM  Lantus   Farxiga   Depression   CKD stage 3   HLD  Statin      Patient needs a hospital bed as he needs the HOB elevated and requires assist with repositioning of one person not feasible in a bed. Patient needs this as this cannot be done in a regular bed due to a diagnosis of stroke.     Patient needs a

## 2025-04-07 ENCOUNTER — CARE COORDINATION (OUTPATIENT)
Dept: CASE MANAGEMENT | Age: 65
End: 2025-04-07

## 2025-04-07 ENCOUNTER — OFFICE VISIT (OUTPATIENT)
Age: 65
End: 2025-04-07
Payer: MEDICARE

## 2025-04-07 VITALS
HEIGHT: 65 IN | WEIGHT: 146 LBS | HEART RATE: 97 BPM | BODY MASS INDEX: 24.32 KG/M2 | OXYGEN SATURATION: 100 % | DIASTOLIC BLOOD PRESSURE: 78 MMHG | SYSTOLIC BLOOD PRESSURE: 120 MMHG

## 2025-04-07 DIAGNOSIS — N17.9 AKI (ACUTE KIDNEY INJURY): Primary | ICD-10-CM

## 2025-04-07 DIAGNOSIS — N18.32 CHRONIC KIDNEY DISEASE, STAGE 3B (HCC): Primary | ICD-10-CM

## 2025-04-07 DIAGNOSIS — I47.10 PSVT (PAROXYSMAL SUPRAVENTRICULAR TACHYCARDIA): ICD-10-CM

## 2025-04-07 DIAGNOSIS — I69.328 SPEECH AND LANGUAGE DEFICIT AS LATE EFFECT OF STROKE: ICD-10-CM

## 2025-04-07 DIAGNOSIS — R33.9 URINARY RETENTION: ICD-10-CM

## 2025-04-07 DIAGNOSIS — Z96.642 S/P TOTAL LEFT HIP ARTHROPLASTY: ICD-10-CM

## 2025-04-07 PROCEDURE — 3074F SYST BP LT 130 MM HG: CPT | Performed by: PHYSICIAN ASSISTANT

## 2025-04-07 PROCEDURE — 3078F DIAST BP <80 MM HG: CPT | Performed by: PHYSICIAN ASSISTANT

## 2025-04-07 PROCEDURE — 3017F COLORECTAL CA SCREEN DOC REV: CPT | Performed by: PHYSICIAN ASSISTANT

## 2025-04-07 PROCEDURE — 99215 OFFICE O/P EST HI 40 MIN: CPT | Performed by: PHYSICIAN ASSISTANT

## 2025-04-07 PROCEDURE — G8427 DOCREV CUR MEDS BY ELIG CLIN: HCPCS | Performed by: PHYSICIAN ASSISTANT

## 2025-04-07 PROCEDURE — 1111F DSCHRG MED/CURRENT MED MERGE: CPT | Performed by: PHYSICIAN ASSISTANT

## 2025-04-07 PROCEDURE — 1036F TOBACCO NON-USER: CPT | Performed by: PHYSICIAN ASSISTANT

## 2025-04-07 PROCEDURE — G8420 CALC BMI NORM PARAMETERS: HCPCS | Performed by: PHYSICIAN ASSISTANT

## 2025-04-07 RX ORDER — MIRTAZAPINE 7.5 MG/1
7.5 TABLET, FILM COATED ORAL NIGHTLY
COMMUNITY
Start: 2025-03-30

## 2025-04-07 RX ORDER — PSEUDOEPHEDRINE HCL 30 MG
100 TABLET ORAL 2 TIMES DAILY PRN
COMMUNITY

## 2025-04-07 RX ORDER — INSULIN GLARGINE 100 [IU]/ML
100 INJECTION, SOLUTION SUBCUTANEOUS NIGHTLY
COMMUNITY

## 2025-04-07 RX ORDER — MIDODRINE HYDROCHLORIDE 5 MG/1
5 TABLET ORAL PRN
COMMUNITY

## 2025-04-07 ASSESSMENT — PATIENT HEALTH QUESTIONNAIRE - PHQ9
2. FEELING DOWN, DEPRESSED OR HOPELESS: NOT AT ALL
SUM OF ALL RESPONSES TO PHQ QUESTIONS 1-9: 0
1. LITTLE INTEREST OR PLEASURE IN DOING THINGS: NOT AT ALL

## 2025-04-07 NOTE — PATIENT INSTRUCTIONS
Need Endo Appointment with Dr. Rey  Check with Neurology for earlier appointment   Cardiologist appointment with Dr. El on 04/30/25  Ask Home Care about speech therapy

## 2025-04-07 NOTE — PROGRESS NOTES
Subjective  Jesse Leigh 1960 is a 64 y.o. male who presents today with:  Chief Complaint   Patient presents with    Follow-Up from Hospital     States that he is feeling better       HPI  History of Present Illness  The patient presents for evaluation of stroke, paroxysmal supraventricular tachycardia, acute kidney injury, urinary retention, and hip fracture.    Hospital/Rehab  - patient was seen in the ER on 1/29/2025 for JUSTIN and was hospitalized.   - History of fall on 11/17/24 requiring left hip partial arthroplasty on 11/18/24. Has since been in nursing home for rehabilitation. Was seen in the ER again on 11/21/24 for PE, JUSTIN, and metabolic acidosis. He was discharged back to the Nursing home for rehabilitation. He was seen in the ER again for fall on 12/16/24 which was unclear if it was d/t stroke of PFO. He had ziopatch ordered by cardiology. He will be seeing intervential radiology on 04/30/25 with Dr. El    He is also under the care of a cardiologist due to concerns about paroxysmal supraventricular tachycardia. His last consultation with the cardiologist revealed that he had successfully passed a stress test, but no discussion regarding the patent foramen ovale was initiated. He is scheduled to see Dr. Jaguar Dewitt, an interventional cardiologist, on 04/30/2025.    Urinary Retention  He has been experiencing urinary retention and is currently on Flomax. He has had one consultation with a urologist during his stay at the nursing home. His urinary function has improved, although he still needs to urinate once during the night.    Aphasia   -Soft bite-sized (IDDSI Level 6)  - Thin liquids (IDDSI Level 0) - via small sips only  Swallow Strategies:   - Small bites  - Alternate bites/sips  - SMALL, SINGLE SIPS   - No straws   Recommending for Speech therapy evaluation and treatment.     CKD  He has been hospitalized multiple times due to acute kidney injury. He has not been consulting with

## 2025-04-07 NOTE — CARE COORDINATION
Care Transitions Note    Initial Call - Call within 2 business days of discharge: Yes    Attempted to reach patient for transitions of care follow up. Unable to reach patient.    Outreach Attempts:   HIPAA compliant voicemail left for patient.     Patient: Jesse Leigh    Patient : 1960   MRN: <[id number]>    Reason for Admission: JUSTIN  Discharge Date: 25  RURS: Readmission Risk Score: 18    Last Discharge Facility       Date Complaint Diagnosis Description Type Department Provider    25 Altered Mental Status Altered mental status, unspecified altered mental status type ... ED to Hosp-Admission (Discharged) (ADMITTED) Manjula Britton MD; Jesus Andersen...            Was this an external facility discharge? Yes. Discharge Date: 25. Facility Name: [facility]    Follow Up Appointment:   Patient has hospital follow up appointment scheduled within 7 days of discharge.    Future Appointments         Provider Specialty Dept Phone    2025 4:00 PM Tierra Mallory PA Family Medicine [phone]    2025 1:00 PM Yusuf Mallory MD Neurology [phone]    2025 2:15 PM Yusuf Mallory MD Neurology [phone]            Plan for follow-up on next business day.      Alberta Calderon RN       
shower. Patient is able to dress himself. He is staying with [facility] while he recovers. [facility] nurse and PT was out to the home today. PCP appointment today at 4pm. No further outreach. Patient progressing. Brother Hector is supportive.    Care Transition Nurse reviewed medical action plan with family. The family was given an opportunity to ask questions; all questions answered at this time.. The family verbalized understanding.   Were discharge instructions available to patient? Yes.   Reviewed appropriate site of care based on symptoms and resources available to patient including: PCP  Home health  When to call 911. The family agrees to contact the primary care provider and/or specialist office for questions related to their healthcare.      Advance Care Planning:   Does patient have an Advance Directive:  not on file .    Medication Reconciliation:  Medication reconciliation was performed with family,[id number] entered: yes.     Assessments:  Care Transitions 24 Hour Call    Do you have any ongoing symptoms?: No  Were you discharged with any Home Care or Post Acute Services: Yes  Post Acute Services: Home Health (Comment: [facility])  Care Transitions Interventions          Follow Up Appointment:   Discussed follow up appointments. Patient has hospital follow up appointment scheduled within 7 days of discharge.   Future Appointments         Provider Specialty Dept Phone    4/7/2025 4:00 PM Tierra Mallory PA Family Medicine [phone]    6/16/2025 1:00 PM Yusuf Mallory MD Neurology [phone]    7/22/2025 2:15 PM Yusuf Mallory MD Neurology [phone]            Care Transition Nurse provided contact information.  No further follow-up call indicated based on severity of symptoms and risk factors.  Plan for next call:  No further outreach      Alberta Calderon RN

## 2025-04-08 ENCOUNTER — TELEPHONE (OUTPATIENT)
Age: 65
End: 2025-04-08

## 2025-04-08 NOTE — TELEPHONE ENCOUNTER
Mary with [facility] states that PT was started on 4/7/2025 and will be 2 x's a week for 4 weeks.    Callback  [phone]

## 2025-04-09 ENCOUNTER — TELEPHONE (OUTPATIENT)
Age: 65
End: 2025-04-09

## 2025-04-09 DIAGNOSIS — R80.9 CONTROLLED TYPE 2 DIABETES MELLITUS WITH MICROALBUMINURIA, WITH LONG-TERM CURRENT USE OF INSULIN (HCC): ICD-10-CM

## 2025-04-09 DIAGNOSIS — E11.29 CONTROLLED TYPE 2 DIABETES MELLITUS WITH MICROALBUMINURIA, WITH LONG-TERM CURRENT USE OF INSULIN (HCC): ICD-10-CM

## 2025-04-09 DIAGNOSIS — Z79.4 CONTROLLED TYPE 2 DIABETES MELLITUS WITH MICROALBUMINURIA, WITH LONG-TERM CURRENT USE OF INSULIN (HCC): ICD-10-CM

## 2025-04-09 DIAGNOSIS — N18.32 CHRONIC KIDNEY DISEASE, STAGE 3B (HCC): ICD-10-CM

## 2025-04-09 LAB
ALBUMIN SERPL-MCNC: 3.3 G/DL (ref 3.5–4.6)
ALP SERPL-CCNC: 107 U/L (ref 35–104)
ALT SERPL-CCNC: 19 U/L (ref 0–41)
ANION GAP SERPL CALCULATED.3IONS-SCNC: 9 MEQ/L (ref 9–15)
AST SERPL-CCNC: 23 U/L (ref 0–40)
BASOPHILS # BLD: 0.1 K/UL (ref 0–0.2)
BASOPHILS NFR BLD: 1.1 %
BILIRUB SERPL-MCNC: 0.3 MG/DL (ref 0.2–0.7)
BUN SERPL-MCNC: 32 MG/DL (ref 8–23)
CALCIUM SERPL-MCNC: 8.5 MG/DL (ref 8.5–9.9)
CHLORIDE SERPL-SCNC: 108 MEQ/L (ref 95–107)
CO2 SERPL-SCNC: 25 MEQ/L (ref 20–31)
CREAT SERPL-MCNC: 1.37 MG/DL (ref 0.7–1.2)
EOSINOPHIL # BLD: 0.2 K/UL (ref 0–0.7)
EOSINOPHIL NFR BLD: 2 %
ERYTHROCYTE [DISTWIDTH] IN BLOOD BY AUTOMATED COUNT: 14.6 % (ref 11.5–14.5)
ESTIMATED AVERAGE GLUCOSE: 169 MG/DL
GLOBULIN SER CALC-MCNC: 2.9 G/DL (ref 2.3–3.5)
GLUCOSE SERPL-MCNC: 198 MG/DL (ref 70–99)
HBA1C MFR BLD: 7.5 % (ref 4–6)
HCT VFR BLD AUTO: 33.2 % (ref 42–52)
HGB BLD-MCNC: 10.5 G/DL (ref 14–18)
LYMPHOCYTES # BLD: 2.9 K/UL (ref 1–4.8)
LYMPHOCYTES NFR BLD: 37.1 %
MCH RBC QN AUTO: 28 PG (ref 27–31.3)
MCHC RBC AUTO-ENTMCNC: 31.6 % (ref 33–37)
MCV RBC AUTO: 88.5 FL (ref 79–92.2)
MONOCYTES # BLD: 0.6 K/UL (ref 0.2–0.8)
MONOCYTES NFR BLD: 7.9 %
NEUTROPHILS # BLD: 4.1 K/UL (ref 1.4–6.5)
NEUTS SEG NFR BLD: 51.6 %
PLATELET # BLD AUTO: 216 K/UL (ref 130–400)
POTASSIUM SERPL-SCNC: 4.6 MEQ/L (ref 3.4–4.9)
PROT SERPL-MCNC: 6.2 G/DL (ref 6.3–8)
RBC # BLD AUTO: 3.75 M/UL (ref 4.7–6.1)
SODIUM SERPL-SCNC: 142 MEQ/L (ref 135–144)
WBC # BLD AUTO: 7.9 K/UL (ref 4.8–10.8)

## 2025-04-09 NOTE — TELEPHONE ENCOUNTER
Spoke with Mary at [facility]. Gave verbal to request Speech Therapy to Eval and Treat.

## 2025-04-09 NOTE — TELEPHONE ENCOUNTER
Verbal order for PT/OT/and skilled nursing approved.     I would like to leave verbal orders for speech therapy for evaluation and treatment for dysphagia please confirm as Jodie has possible addressed in another encounter.

## 2025-04-09 NOTE — TELEPHONE ENCOUNTER
Anna from [facility] called to have verbal orders for PT/OT/SKILLED NURSING for pt.   [phone]

## 2025-04-14 ENCOUNTER — RESULTS FOLLOW-UP (OUTPATIENT)
Age: 65
End: 2025-04-14

## 2025-04-14 ASSESSMENT — ENCOUNTER SYMPTOMS
COUGH: 0
DIARRHEA: 0
SINUS PRESSURE: 0
NAUSEA: 0
SHORTNESS OF BREATH: 0
VOMITING: 0
ABDOMINAL PAIN: 0
SINUS PAIN: 0
CHEST TIGHTNESS: 0
SORE THROAT: 0
BACK PAIN: 0
TROUBLE SWALLOWING: 1

## 2025-04-14 ASSESSMENT — VISUAL ACUITY: OU: 1

## 2025-04-20 NOTE — PROGRESS NOTES
Medicare Annual Wellness Visit    Jesse Leigh is here for Follow-up (Pt here for follow up/)    Assessment & Plan   Medicare annual wellness visit, subsequent  Controlled type 2 diabetes mellitus with microalbuminuria, with long-term current use of insulin (HCC)  -     Albumin/Creatinine Ratio, Urine; Future  -     Lipid Panel; Future  -     insulin glargine (LANTUS SOLOSTAR) 100 UNIT/ML injection pen; Inject 10 Units into the skin nightly, Disp-5 Adjustable Dose Pre-filled Pen Syringe, R-0Normal  History of CVA (cerebrovascular accident)  -     ferrous sulfate (IRON 325) 325 (65 Fe) MG tablet; Take 1 tablet by mouth daily (with breakfast), Disp-30 tablet, R-3Normal  -     folic acid (FOLVITE) 1 MG tablet; Take 1 tablet by mouth daily, Disp-30 tablet, R-3Normal  -     apixaban (ELIQUIS) 5 MG TABS tablet; Take 1 tablet by mouth 2 times daily, Disp-60 tablet, R-2Normal  Screening for colorectal cancer  -     FIT-DNA ([facility])        Return in 4 months (on 8/21/2025).     Subjective   The following acute and/or chronic problems were also addressed today:  History of Present Illness  The patient presents for evaluation of post-stroke rehabilitation, dysphagia, and diabetes mellitus. He is accompanied by his son.    A slight improvement in his condition is reported, with an increase in strength. Home-based physical therapy is being received twice a week and occupational therapy once a week, which is found beneficial. Skilled nursing care has been provided at home for the past 2 weeks, with a nurse visiting last Thursday to check his vitals. Previously, he was approved for acute therapy at [facility] but contracted COVID-19, leading to a decline in health. He became bedridden, unresponsive, and dehydrated due to the illness. His son is interested in exploring outpatient therapy options and is curious about insurance coverage for these services. Significant progress has been made over the past 5 months,

## 2025-04-21 ENCOUNTER — OFFICE VISIT (OUTPATIENT)
Age: 65
End: 2025-04-21

## 2025-04-21 VITALS
BODY MASS INDEX: 24.59 KG/M2 | SYSTOLIC BLOOD PRESSURE: 120 MMHG | HEART RATE: 91 BPM | HEIGHT: 65 IN | DIASTOLIC BLOOD PRESSURE: 86 MMHG | OXYGEN SATURATION: 98 % | WEIGHT: 147.6 LBS

## 2025-04-21 DIAGNOSIS — Z86.73 HISTORY OF CVA (CEREBROVASCULAR ACCIDENT): ICD-10-CM

## 2025-04-21 DIAGNOSIS — Z00.00 MEDICARE ANNUAL WELLNESS VISIT, SUBSEQUENT: Primary | ICD-10-CM

## 2025-04-21 DIAGNOSIS — R80.9 CONTROLLED TYPE 2 DIABETES MELLITUS WITH MICROALBUMINURIA, WITH LONG-TERM CURRENT USE OF INSULIN (HCC): ICD-10-CM

## 2025-04-21 DIAGNOSIS — Z12.11 SCREENING FOR COLORECTAL CANCER: ICD-10-CM

## 2025-04-21 DIAGNOSIS — Z79.4 CONTROLLED TYPE 2 DIABETES MELLITUS WITH MICROALBUMINURIA, WITH LONG-TERM CURRENT USE OF INSULIN (HCC): ICD-10-CM

## 2025-04-21 DIAGNOSIS — Z12.12 SCREENING FOR COLORECTAL CANCER: ICD-10-CM

## 2025-04-21 DIAGNOSIS — E11.29 CONTROLLED TYPE 2 DIABETES MELLITUS WITH MICROALBUMINURIA, WITH LONG-TERM CURRENT USE OF INSULIN (HCC): ICD-10-CM

## 2025-04-21 RX ORDER — FOLIC ACID 1 MG/1
1 TABLET ORAL DAILY
Qty: 30 TABLET | Refills: 3 | Status: SHIPPED | OUTPATIENT
Start: 2025-04-21

## 2025-04-21 RX ORDER — INSULIN GLARGINE 100 [IU]/ML
10 INJECTION, SOLUTION SUBCUTANEOUS NIGHTLY
Qty: 5 ADJUSTABLE DOSE PRE-FILLED PEN SYRINGE | Refills: 0 | Status: SHIPPED | OUTPATIENT
Start: 2025-04-21

## 2025-04-21 RX ORDER — FERROUS SULFATE 325(65) MG
325 TABLET ORAL
Qty: 30 TABLET | Refills: 3 | Status: SHIPPED | OUTPATIENT
Start: 2025-04-21

## 2025-04-21 ASSESSMENT — PATIENT HEALTH QUESTIONNAIRE - PHQ9: DEPRESSION UNABLE TO ASSESS: PT REFUSES

## 2025-04-24 ENCOUNTER — TELEPHONE (OUTPATIENT)
Age: 65
End: 2025-04-24

## 2025-04-24 DIAGNOSIS — I69.891 DYSPHAGIA AS LATE EFFECT OF CEREBRAL ANEURYSM: Primary | ICD-10-CM

## 2025-04-24 NOTE — TELEPHONE ENCOUNTER
Spoke to [facility]   Pt  has not received Speech Therapy and needs referral

## 2025-04-25 ENCOUNTER — TELEPHONE (OUTPATIENT)
Age: 65
End: 2025-04-25

## 2025-04-25 NOTE — TELEPHONE ENCOUNTER
Shelby from [facility] was given orders that [facility] will follow pt for hhc and speech therapy. Fyi to PCP. Patient was seen in office 4/21/25

## 2025-05-01 ENCOUNTER — TELEPHONE (OUTPATIENT)
Age: 65
End: 2025-05-01

## 2025-05-01 NOTE — TELEPHONE ENCOUNTER
Mary from [facility] is requesting a verbal ok to extend patient home pt for 1x wk for 4 wks.    Please advise    Callback [phone]

## 2025-05-06 ENCOUNTER — OFFICE VISIT (OUTPATIENT)
Age: 65
End: 2025-05-06
Payer: MEDICARE

## 2025-05-06 VITALS
SYSTOLIC BLOOD PRESSURE: 149 MMHG | HEART RATE: 106 BPM | DIASTOLIC BLOOD PRESSURE: 96 MMHG | BODY MASS INDEX: 24.66 KG/M2 | WEIGHT: 148 LBS | HEIGHT: 65 IN

## 2025-05-06 DIAGNOSIS — Z79.4 CONTROLLED TYPE 2 DIABETES MELLITUS WITH MICROALBUMINURIA, WITH LONG-TERM CURRENT USE OF INSULIN (HCC): Primary | ICD-10-CM

## 2025-05-06 DIAGNOSIS — E11.29 CONTROLLED TYPE 2 DIABETES MELLITUS WITH MICROALBUMINURIA, WITH LONG-TERM CURRENT USE OF INSULIN (HCC): Primary | ICD-10-CM

## 2025-05-06 DIAGNOSIS — R80.9 CONTROLLED TYPE 2 DIABETES MELLITUS WITH MICROALBUMINURIA, WITH LONG-TERM CURRENT USE OF INSULIN (HCC): Primary | ICD-10-CM

## 2025-05-06 LAB
CHP ED QC CHECK: NORMAL
GLUCOSE BLD-MCNC: 210 MG/DL

## 2025-05-06 PROCEDURE — 3017F COLORECTAL CA SCREEN DOC REV: CPT | Performed by: PHYSICIAN ASSISTANT

## 2025-05-06 PROCEDURE — G8420 CALC BMI NORM PARAMETERS: HCPCS | Performed by: PHYSICIAN ASSISTANT

## 2025-05-06 PROCEDURE — G8427 DOCREV CUR MEDS BY ELIG CLIN: HCPCS | Performed by: PHYSICIAN ASSISTANT

## 2025-05-06 PROCEDURE — 2022F DILAT RTA XM EVC RTNOPTHY: CPT | Performed by: PHYSICIAN ASSISTANT

## 2025-05-06 PROCEDURE — 82962 GLUCOSE BLOOD TEST: CPT | Performed by: PHYSICIAN ASSISTANT

## 2025-05-06 PROCEDURE — 3051F HG A1C>EQUAL 7.0%<8.0%: CPT | Performed by: PHYSICIAN ASSISTANT

## 2025-05-06 PROCEDURE — 3077F SYST BP >= 140 MM HG: CPT | Performed by: PHYSICIAN ASSISTANT

## 2025-05-06 PROCEDURE — 99214 OFFICE O/P EST MOD 30 MIN: CPT | Performed by: PHYSICIAN ASSISTANT

## 2025-05-06 PROCEDURE — 1036F TOBACCO NON-USER: CPT | Performed by: PHYSICIAN ASSISTANT

## 2025-05-06 PROCEDURE — 95251 CONT GLUC MNTR ANALYSIS I&R: CPT | Performed by: PHYSICIAN ASSISTANT

## 2025-05-06 PROCEDURE — 3080F DIAST BP >= 90 MM HG: CPT | Performed by: PHYSICIAN ASSISTANT

## 2025-05-06 RX ORDER — DAPAGLIFLOZIN 10 MG/1
10 TABLET, FILM COATED ORAL EVERY MORNING
Qty: 90 TABLET | Refills: 4 | Status: SHIPPED | OUTPATIENT
Start: 2025-05-06

## 2025-05-06 RX ORDER — HYDROCHLOROTHIAZIDE 12.5 MG/1
CAPSULE ORAL
Qty: 6 EACH | Refills: 5 | Status: SHIPPED | OUTPATIENT
Start: 2025-05-06

## 2025-05-06 RX ORDER — INSULIN ASPART 100 [IU]/ML
INJECTION, SOLUTION INTRAVENOUS; SUBCUTANEOUS
Qty: 15 ML | Refills: 5 | Status: SHIPPED | OUTPATIENT
Start: 2025-05-06

## 2025-05-06 ASSESSMENT — ENCOUNTER SYMPTOMS
COUGH: 0
EYE PAIN: 0
SORE THROAT: 0
DIARRHEA: 0
NAUSEA: 0
VOMITING: 0
SINUS PRESSURE: 0
RHINORRHEA: 0
EYE REDNESS: 0
ABDOMINAL PAIN: 0
WHEEZING: 0
SHORTNESS OF BREATH: 0

## 2025-05-06 NOTE — PATIENT INSTRUCTIONS
Endocrinology-    Check your blood sugars 4 times a day, before meals and at night  Document these numbers in a blood glucose log and bring them with you to your follow-up appointment.  If you are prescribed insulin, Do not take your mealtime insulin if your blood sugars less than 120   Call our office if you have blood sugars less than 80 or greater then 300 on two or more occasions  Call our office if you have any questions regarding your blood sugars or insulin dosing regiment  Signs of low blood sugar may include tremors, feeling shaky, sweating, dizziness, confusion and weakness. Check your blood sugar immediatly if you have any of these symptoms.     The plan as discussed at your appointment-   Lantus 10 units injected nightly   Novolog, inject 3 times daily before meals- 120-140 2 units, 141-160 3 units, 161-180 4 units, 181-200 5 units, 201-220 6 units, 221-240 7 units, 241-260 8 units, 261-280 9 units, 281-300 10 units, 301 or higher 11 units.     Farxiga 10 mg daily   Monitor glucose 3-4 times daily and document  Freestyle Nghia 3 being used- [facility]. If not covered let me know and I will send prescription to [facility]   Repeat labs in 3 months   Follow up in 3 months     You have been prescribed the Freestyle nghia continuous glucose monitor (CGM).  This device reads your glucose levels in the interstitial fluid under your skin. This is not a blood glucose monitor.  The concentration of glucose in your blood is sometimes slightly higher than the concentration of glucose in the fluid under your skin.  This is a normal variation and is usually only a 5-15 difference.  You may notice a greater difference in the numbers between the blood and the device immediately after eating or activity.  This again is a normal variance and the levels will usually equal out over time.  You can use the number on the Freestyle Nghia to monitor your glucose and take your medications or insulin.  If, for

## 2025-05-06 NOTE — PROGRESS NOTES
Component Value Date    CHOLFAST 135 09/23/2022    CHOLFAST 127 12/02/2021    TRIGLYCFAST 70 09/23/2022    TRIGLYCFAST 163 (H) 12/02/2021    HDL 74 (H) 10/19/2023    HDL 81 (H) 09/23/2022    HDL 47 12/02/2021    CHOL 142 10/19/2023    CHOL 103 09/14/2020    CHOL 117 11/01/2017    TRIG 77 10/19/2023    TRIG 61 09/14/2020    TRIG 90 11/01/2017     No results found for: \"TESTM\"  Lab Results   Component Value Date    TSH 1.390 03/29/2019     No results found for: \"TPOABS\"    Review of Systems   Constitutional:  Negative for chills, fatigue and fever.   HENT:  Negative for congestion, ear pain, postnasal drip, rhinorrhea, sinus pressure and sore throat.    Eyes:  Negative for pain and redness.   Respiratory:  Negative for cough, shortness of breath and wheezing.    Cardiovascular:  Positive for leg swelling. Negative for chest pain and palpitations.   Gastrointestinal:  Negative for abdominal pain, diarrhea, nausea and vomiting.   Endocrine: Negative for polydipsia and polyuria.   Genitourinary:  Negative for difficulty urinating.   Musculoskeletal:  Negative for arthralgias.   Skin:  Negative for rash.   Neurological:  Positive for tremors. Negative for dizziness and headaches.   Psychiatric/Behavioral:  Positive for confusion.        Objective:   Physical Exam  Constitutional:       Appearance: He is well-developed.   HENT:      Head: Normocephalic and atraumatic.      Mouth/Throat:      Mouth: Mucous membranes are moist.   Eyes:      Conjunctiva/sclera: Conjunctivae normal.   Neck:      Comments: No thyromegaly or palpable nodules  Cardiovascular:      Rate and Rhythm: Normal rate and regular rhythm.      Heart sounds: Normal heart sounds. No murmur heard.  Pulmonary:      Effort: Pulmonary effort is normal.      Breath sounds: Normal breath sounds.   Abdominal:      General: Bowel sounds are normal.      Palpations: Abdomen is soft.   Musculoskeletal:         General: No swelling. Normal range of motion.

## 2025-05-22 RX ORDER — HYDROCHLOROTHIAZIDE 12.5 MG/1
CAPSULE ORAL
Qty: 2 EACH | Refills: 3 | Status: SHIPPED | OUTPATIENT
Start: 2025-05-22

## 2025-05-22 RX ORDER — KETOROLAC TROMETHAMINE 30 MG/ML
INJECTION, SOLUTION INTRAMUSCULAR; INTRAVENOUS
Qty: 1 EACH | Refills: 0 | Status: SHIPPED | OUTPATIENT
Start: 2025-05-22

## 2025-06-16 ENCOUNTER — OFFICE VISIT (OUTPATIENT)
Age: 65
End: 2025-06-16
Payer: MEDICARE

## 2025-06-16 VITALS
WEIGHT: 155 LBS | DIASTOLIC BLOOD PRESSURE: 84 MMHG | HEART RATE: 91 BPM | SYSTOLIC BLOOD PRESSURE: 130 MMHG | BODY MASS INDEX: 25.79 KG/M2

## 2025-06-16 DIAGNOSIS — E11.42 DIABETIC POLYNEUROPATHY ASSOCIATED WITH TYPE 2 DIABETES MELLITUS (HCC): Chronic | ICD-10-CM

## 2025-06-16 DIAGNOSIS — I10 ESSENTIAL HYPERTENSION: ICD-10-CM

## 2025-06-16 DIAGNOSIS — Z86.73 HISTORY OF STROKE: ICD-10-CM

## 2025-06-16 DIAGNOSIS — G81.94 HEMIPARESIS, LEFT (HCC): ICD-10-CM

## 2025-06-16 DIAGNOSIS — Q21.12 PATENT FORAMEN OVALE: ICD-10-CM

## 2025-06-16 DIAGNOSIS — I63.511 CEREBROVASCULAR ACCIDENT (CVA) DUE TO STENOSIS OF RIGHT MIDDLE CEREBRAL ARTERY (HCC): Primary | ICD-10-CM

## 2025-06-16 PROCEDURE — 3075F SYST BP GE 130 - 139MM HG: CPT | Performed by: PSYCHIATRY & NEUROLOGY

## 2025-06-16 PROCEDURE — G8427 DOCREV CUR MEDS BY ELIG CLIN: HCPCS | Performed by: PSYCHIATRY & NEUROLOGY

## 2025-06-16 PROCEDURE — 1036F TOBACCO NON-USER: CPT | Performed by: PSYCHIATRY & NEUROLOGY

## 2025-06-16 PROCEDURE — 2022F DILAT RTA XM EVC RTNOPTHY: CPT | Performed by: PSYCHIATRY & NEUROLOGY

## 2025-06-16 PROCEDURE — 3079F DIAST BP 80-89 MM HG: CPT | Performed by: PSYCHIATRY & NEUROLOGY

## 2025-06-16 PROCEDURE — G8419 CALC BMI OUT NRM PARAM NOF/U: HCPCS | Performed by: PSYCHIATRY & NEUROLOGY

## 2025-06-16 PROCEDURE — 3017F COLORECTAL CA SCREEN DOC REV: CPT | Performed by: PSYCHIATRY & NEUROLOGY

## 2025-06-16 PROCEDURE — 99214 OFFICE O/P EST MOD 30 MIN: CPT | Performed by: PSYCHIATRY & NEUROLOGY

## 2025-06-16 PROCEDURE — 3051F HG A1C>EQUAL 7.0%<8.0%: CPT | Performed by: PSYCHIATRY & NEUROLOGY

## 2025-06-16 ASSESSMENT — ENCOUNTER SYMPTOMS
BACK PAIN: 0
SHORTNESS OF BREATH: 0
CHOKING: 0
COLOR CHANGE: 0
NAUSEA: 0
VOMITING: 0
TROUBLE SWALLOWING: 0
PHOTOPHOBIA: 0

## 2025-06-16 NOTE — PROGRESS NOTES
Subjective:      Patient ID: Jesse Leigh is a 64 y.o. male who presents today for:  Chief Complaint   Patient presents with    Follow-up     Pt states he had a stroke in December 2024 and treated at . Pt states he has had a few falls since his last visit.        HPI 64 right-handed gentleman now with a history of right cerebellar stroke.  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  Patient continues on Eliquis and lipid last MRA December 2024 in [place].  Patient reports December 2024 he was treated at  for stroke MRI and MRI reviewed and patient actually had some flow-voids in the vertebral artery.  Patient had small right superior frontal gyrus and anterior right cingulate gyrus stroke.  This again was seen in the same distribution and his MRI showed a small tiny infarcts in his right frontal area    Patient x-ray had fallen and had a hip fracture on the left and then he went to nursing home and was noted to have further weakness on the left where he was not able to raise his arm up he was not a candidate for TNK given the surgery and I am not quite sure if this was new onset atrial fibrillation.  Patient now is on Eliquis.  He has now regained some of the strength on the left but remains in skilled nursing.  He is ambulatory with a walker    Past Medical History:   Diagnosis Date    Cerebrovascular small vessel disease     Chronic kidney disease, stage 3b (HCC) 12/16/2020    Colon cancer screening declined 8/30/2021    Debility 7/11/2022    Diabetic polyneuropathy associated with type 2 diabetes mellitus (HCC) 4/22/2021    DM (diabetes mellitus) ([facility])     was with Dr Knox, [facility]    Dysarthria as late effect of cerebrovascular accident (CVA) 2015    Hearing loss     Hemiparesis affecting left side as late effect of cerebrovascular accident (CVA) ([facility])     History of left PCA stroke 2013    History of right STEPHANIE stroke 10/21/2015    was

## 2025-06-19 DIAGNOSIS — E11.29 CONTROLLED TYPE 2 DIABETES MELLITUS WITH MICROALBUMINURIA, WITH LONG-TERM CURRENT USE OF INSULIN (HCC): ICD-10-CM

## 2025-06-19 DIAGNOSIS — Z79.4 CONTROLLED TYPE 2 DIABETES MELLITUS WITH MICROALBUMINURIA, WITH LONG-TERM CURRENT USE OF INSULIN (HCC): ICD-10-CM

## 2025-06-19 DIAGNOSIS — R80.9 CONTROLLED TYPE 2 DIABETES MELLITUS WITH MICROALBUMINURIA, WITH LONG-TERM CURRENT USE OF INSULIN (HCC): ICD-10-CM

## 2025-06-19 RX ORDER — INSULIN GLARGINE 100 [IU]/ML
10 INJECTION, SOLUTION SUBCUTANEOUS NIGHTLY
Qty: 5 ADJUSTABLE DOSE PRE-FILLED PEN SYRINGE | Refills: 0 | OUTPATIENT
Start: 2025-06-19

## 2025-06-19 RX ORDER — INSULIN GLARGINE 100 [IU]/ML
10 INJECTION, SOLUTION SUBCUTANEOUS NIGHTLY
Qty: 5 ADJUSTABLE DOSE PRE-FILLED PEN SYRINGE | Refills: 0 | Status: SHIPPED | OUTPATIENT
Start: 2025-06-19

## 2025-06-19 NOTE — TELEPHONE ENCOUNTER
Requested Prescriptions     Pending Prescriptions Disp Refills    insulin glargine (LANTUS SOLOSTAR) 100 UNIT/ML injection pen 5 Adjustable Dose Pre-filled Pen Syringe 0     Sig: Inject 10 Units into the skin nightly

## 2025-07-22 ENCOUNTER — HOSPITAL ENCOUNTER (OUTPATIENT)
Dept: SPEECH THERAPY | Age: 65
Setting detail: THERAPIES SERIES
Discharge: HOME | End: 2025-07-22

## 2025-07-22 NOTE — PROGRESS NOTES
Therapy                            Cancellation/No-show Note      Date:  2025  Patient Name:  Jesse Leigh  :  1960   MRN:  [id number]      Visit Information:  Visit Information  SLP Insurance Information: [employer]  Total # of Visits Approved: 1  Total # of Visits to Date: 0  No Show: 0  Canceled Appointment: 1    For today's appointment patient:  Cancelled    Reason given by patient:  Other:  Pt arrived to evaluation with his sister. Patients order was for Dysphagia (ICD R13.13). Following explantation of evaluation and referral, pt adamantly declined swallow evaluation stating he's \"not doing those silly exercises anymore\" and that he is \"doing fine\" with eating and drinking. Per chart review, pt has been seen by ST in the past for dysphagia treatment with most recent MBS in 2025 recommending ongoing dysphagia therapy. Pt educated on this and continued to decline.     Pt reports he wanted a speech-language evaluation due to dysarthria. Per pt, he has had dysarthria since a stroke \"years ago\" SLP explained that a diagnosis code of \"dysarthria\" or \"speech disturbance\" would be needed to complete a speech-language evaluation. Pt verbalized  understanding. SLP discussed with sister as well following verbal consent. Note with recommended diagnosis given to patient for him to refer back to physician.     Follow-up needed:  SLP [facility]     Comments:       Signature: Electronically signed by RAJENDRA Wilkins on 25 at 4:37 PM EDT

## 2025-07-22 NOTE — PROGRESS NOTES
[facility]            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 Mallory     Your patient Jesse Leigh was seen at our clinic for Speech Therapy. We would like you to be aware of the following:     Patient is requesting the following referral:  [] Hearing Evaluation  [] Physical Therapy Evaluation  [] Occupational Therapy Evaluation  [] Psychological Testing Re:  [] Modified Barium Swallow Study  [x] Other: Speech Language Evaluation for Dysarthria     Patient declined evaluation for Dysphagia.     If you are in agreement, please fax a script with the above request, a diagnosis, the date, and a signature to [phone] or via the patient's account on [employer].     Please call with any questions, [phone].      Thank you for allowing us to participate in the care of your patient.       Signature: Electronically signed by RAJENDRA Wilkins on 2025 at 4:46 PM

## 2025-08-18 ENCOUNTER — OFFICE VISIT (OUTPATIENT)
Age: 65
End: 2025-08-18
Payer: MEDICARE

## 2025-08-18 VITALS
SYSTOLIC BLOOD PRESSURE: 154 MMHG | OXYGEN SATURATION: 99 % | DIASTOLIC BLOOD PRESSURE: 87 MMHG | WEIGHT: 154 LBS | BODY MASS INDEX: 25.66 KG/M2 | HEART RATE: 93 BPM | HEIGHT: 65 IN

## 2025-08-18 DIAGNOSIS — R80.9 CONTROLLED TYPE 2 DIABETES MELLITUS WITH MICROALBUMINURIA, WITH LONG-TERM CURRENT USE OF INSULIN (HCC): Primary | ICD-10-CM

## 2025-08-18 DIAGNOSIS — R80.9 CONTROLLED TYPE 2 DIABETES MELLITUS WITH MICROALBUMINURIA, WITH LONG-TERM CURRENT USE OF INSULIN (HCC): ICD-10-CM

## 2025-08-18 DIAGNOSIS — Z79.4 CONTROLLED TYPE 2 DIABETES MELLITUS WITH MICROALBUMINURIA, WITH LONG-TERM CURRENT USE OF INSULIN (HCC): Primary | ICD-10-CM

## 2025-08-18 DIAGNOSIS — E11.29 CONTROLLED TYPE 2 DIABETES MELLITUS WITH MICROALBUMINURIA, WITH LONG-TERM CURRENT USE OF INSULIN (HCC): ICD-10-CM

## 2025-08-18 DIAGNOSIS — Z79.4 CONTROLLED TYPE 2 DIABETES MELLITUS WITH MICROALBUMINURIA, WITH LONG-TERM CURRENT USE OF INSULIN (HCC): ICD-10-CM

## 2025-08-18 DIAGNOSIS — E11.29 CONTROLLED TYPE 2 DIABETES MELLITUS WITH MICROALBUMINURIA, WITH LONG-TERM CURRENT USE OF INSULIN (HCC): Primary | ICD-10-CM

## 2025-08-18 LAB
ALBUMIN SERPL-MCNC: 4.3 G/DL (ref 3.5–4.6)
ALP SERPL-CCNC: 112 U/L (ref 35–104)
ALT SERPL-CCNC: 16 U/L (ref 0–41)
ANION GAP SERPL CALCULATED.3IONS-SCNC: 12 MEQ/L (ref 9–15)
AST SERPL-CCNC: 17 U/L (ref 0–40)
BILIRUB SERPL-MCNC: 0.3 MG/DL (ref 0.2–0.7)
BUN SERPL-MCNC: 35 MG/DL (ref 8–23)
CALCIUM SERPL-MCNC: 8.8 MG/DL (ref 8.5–9.9)
CHLORIDE SERPL-SCNC: 101 MEQ/L (ref 95–107)
CHP ED QC CHECK: NORMAL
CO2 SERPL-SCNC: 25 MEQ/L (ref 20–31)
CREAT SERPL-MCNC: 1.48 MG/DL (ref 0.7–1.2)
GLOBULIN SER CALC-MCNC: 2.4 G/DL (ref 2.3–3.5)
GLUCOSE BLD-MCNC: 258 MG/DL
GLUCOSE SERPL-MCNC: 229 MG/DL (ref 70–99)
POTASSIUM SERPL-SCNC: 4.3 MEQ/L (ref 3.4–4.9)
PROT SERPL-MCNC: 6.7 G/DL (ref 6.3–8)
SODIUM SERPL-SCNC: 138 MEQ/L (ref 135–144)

## 2025-08-18 PROCEDURE — 3017F COLORECTAL CA SCREEN DOC REV: CPT | Performed by: PHYSICIAN ASSISTANT

## 2025-08-18 PROCEDURE — 2022F DILAT RTA XM EVC RTNOPTHY: CPT | Performed by: PHYSICIAN ASSISTANT

## 2025-08-18 PROCEDURE — 3077F SYST BP >= 140 MM HG: CPT | Performed by: PHYSICIAN ASSISTANT

## 2025-08-18 PROCEDURE — 82962 GLUCOSE BLOOD TEST: CPT | Performed by: PHYSICIAN ASSISTANT

## 2025-08-18 PROCEDURE — 1036F TOBACCO NON-USER: CPT | Performed by: PHYSICIAN ASSISTANT

## 2025-08-18 PROCEDURE — 99214 OFFICE O/P EST MOD 30 MIN: CPT | Performed by: PHYSICIAN ASSISTANT

## 2025-08-18 PROCEDURE — 3078F DIAST BP <80 MM HG: CPT | Performed by: PHYSICIAN ASSISTANT

## 2025-08-18 PROCEDURE — G8427 DOCREV CUR MEDS BY ELIG CLIN: HCPCS | Performed by: PHYSICIAN ASSISTANT

## 2025-08-18 PROCEDURE — G8419 CALC BMI OUT NRM PARAM NOF/U: HCPCS | Performed by: PHYSICIAN ASSISTANT

## 2025-08-18 PROCEDURE — 95251 CONT GLUC MNTR ANALYSIS I&R: CPT | Performed by: PHYSICIAN ASSISTANT

## 2025-08-18 PROCEDURE — 3051F HG A1C>EQUAL 7.0%<8.0%: CPT | Performed by: PHYSICIAN ASSISTANT

## 2025-08-18 ASSESSMENT — ENCOUNTER SYMPTOMS
ABDOMINAL PAIN: 0
COUGH: 0
EYE PAIN: 0
DIARRHEA: 0
SHORTNESS OF BREATH: 0
SORE THROAT: 0
WHEEZING: 0
RHINORRHEA: 0
SINUS PRESSURE: 0
NAUSEA: 0
EYE REDNESS: 0
VOMITING: 0

## 2025-08-19 LAB
C PEPTIDE SERPL-MCNC: 2.7 NG/ML (ref 1.1–4.4)
ESTIMATED AVERAGE GLUCOSE: 177 MG/DL
HBA1C MFR BLD: 7.8 % (ref 4–6)

## 2025-08-22 ENCOUNTER — OFFICE VISIT (OUTPATIENT)
Age: 65
End: 2025-08-22

## 2025-08-22 VITALS
OXYGEN SATURATION: 99 % | SYSTOLIC BLOOD PRESSURE: 142 MMHG | BODY MASS INDEX: 25.04 KG/M2 | WEIGHT: 155.8 LBS | TEMPERATURE: 97.3 F | DIASTOLIC BLOOD PRESSURE: 84 MMHG | HEIGHT: 66 IN | HEART RATE: 79 BPM

## 2025-08-22 DIAGNOSIS — Z86.73 HISTORY OF CVA (CEREBROVASCULAR ACCIDENT): ICD-10-CM

## 2025-08-22 DIAGNOSIS — R26.89 BALANCE PROBLEM: Chronic | ICD-10-CM

## 2025-08-22 DIAGNOSIS — R47.1 DYSARTHRIA: ICD-10-CM

## 2025-08-22 DIAGNOSIS — I10 ESSENTIAL HYPERTENSION: Primary | ICD-10-CM

## 2025-08-22 DIAGNOSIS — R29.898 LEFT LEG WEAKNESS: ICD-10-CM

## 2025-08-22 DIAGNOSIS — R22.43 LOCALIZED SWELLING OF BOTH LOWER LEGS: ICD-10-CM

## 2025-08-22 DIAGNOSIS — I69.328 SPEECH AND LANGUAGE DEFICIT AS LATE EFFECT OF STROKE: ICD-10-CM

## 2025-08-22 PROBLEM — I63.511 CEREBROVASCULAR ACCIDENT (CVA) DUE TO STENOSIS OF RIGHT MIDDLE CEREBRAL ARTERY (HCC): Status: RESOLVED | Noted: 2020-06-01 | Resolved: 2025-08-22

## 2025-08-22 RX ORDER — PEN NEEDLE, DIABETIC 31 G X1/4"
NEEDLE, DISPOSABLE MISCELLANEOUS
COMMUNITY
Start: 2025-07-28

## 2025-08-22 RX ORDER — FOLIC ACID 1 MG/1
1000 TABLET ORAL DAILY
COMMUNITY
Start: 2025-07-01

## 2025-08-22 ASSESSMENT — ENCOUNTER SYMPTOMS
ABDOMINAL PAIN: 0
TROUBLE SWALLOWING: 0
SINUS PRESSURE: 0
NAUSEA: 0
DIARRHEA: 0
SORE THROAT: 0
VOICE CHANGE: 1
SINUS PAIN: 0
SHORTNESS OF BREATH: 0
COUGH: 0
VOMITING: 0
BACK PAIN: 0
CHEST TIGHTNESS: 0

## 2025-08-22 ASSESSMENT — VISUAL ACUITY: OU: 1

## 2025-08-28 ENCOUNTER — HOSPITAL ENCOUNTER (OUTPATIENT)
Dept: SPEECH THERAPY | Age: 65
Setting detail: THERAPIES SERIES
Discharge: HOME | End: 2025-08-28
Payer: MEDICARE

## 2025-08-28 PROCEDURE — 92523 SPEECH SOUND LANG COMPREHEN: CPT

## 2025-09-18 ENCOUNTER — APPOINTMENT (OUTPATIENT)
Dept: CARDIOLOGY | Facility: CLINIC | Age: 65
End: 2025-09-18
Payer: MEDICARE

## (undated) DEVICE — SUTURE, VICRYL, 1, 27 IN, CP, VIOLET

## (undated) DEVICE — DRAPE, INCISE, ANTIMICROBIAL, IOBAN 2, STERI DRAPE, 23 X 33 IN, DISPOSABLE, STERILE

## (undated) DEVICE — HOOD, SURGICAL, FLYTE, T7

## (undated) DEVICE — STAPLER, SKIN, PLUS, WIDE, 35

## (undated) DEVICE — MAT, FLOOR, STANDARD FLUID BARRIER, 32X44, GREEN

## (undated) DEVICE — IRRIGATION SYSTEM, WOUND, SURGIPHOR, 450ML, STERILE

## (undated) DEVICE — PROTECTOR, NERVE, ULNAR, PINK

## (undated) DEVICE — BLADE, SAW, SAGITTAL, 21 X 84.5 X 0.89 MM, STAINLESS STEEL, STERILE

## (undated) DEVICE — ADHESIVE, SKIN, DERMABOND ADVANCED, 15CM, PEN-STYLE

## (undated) DEVICE — SOLUTION, TOPICAL, ALCOHOL, ISOPROPYL 70%, 16 OZ

## (undated) DEVICE — SUTURE, STRATAFIX, 1 SYMMETRIC, PDS PLUS, 60CM, CTX, VIOLET

## (undated) DEVICE — SUTURE, STRATAFIX, 3-0, SPIRAL MONOCRYL PLUS, PS, 70CM, UNDYED

## (undated) DEVICE — Device

## (undated) DEVICE — STRAP, ARM BOARD, 32 X 1.5

## (undated) DEVICE — GLOVE, SURGICAL, PROTEXIS PI BLUE W/NEUTHERA, 8.0, PF, LF

## (undated) DEVICE — GOWN, SURGICAL, IMPLT, BACK, XLARGE, XLONG, STERILE

## (undated) DEVICE — DRAPE, SHEET, U, W/ADHESIVE STRIP, IMPERVIOUS, 60 X 70 IN, DISPOSABLE, LF, STERILE

## (undated) DEVICE — PREP, IODOPHOR, W/ALCOHOL, DURAPREP, W/APPLICATOR, 26 CC

## (undated) DEVICE — DRESSING, MEPILEX BORDER, POST-OP AG, 4 X 12 IN

## (undated) DEVICE — IRRIGATION SYSTEM, BRUSH, W/SUCTION, FEMORAL CANAL

## (undated) DEVICE — STRAP, VELCRO, BODY, 4 X 60IN, NS

## (undated) DEVICE — CAUTERY, PENCIL, PUSH BUTTON, SMOKE EVAC, 70MM

## (undated) DEVICE — DRAPE, SHEET, XL

## (undated) DEVICE — POSITIONER, CRADLE, HEAD, MEDC, FOAM SLOTTED

## (undated) DEVICE — DRESSING, GAUZE, SPONGE, 12 PLY, 4 X 4 IN, PLASTIC POUCH, STRL 10PK

## (undated) DEVICE — BATH BLANKET STERILE

## (undated) DEVICE — GLOVE, SURGICAL, ORTHO, PROTEXIS, HYDROGEL, 8.0, PF, LATEX

## (undated) DEVICE — SOLUTION, INJECTION, USP, SODIUM CHLORIDE 0.9%, .9, NACL, 1000 ML, BAG

## (undated) DEVICE — SUTURE, ETHIBOND XTRA, 5 CCS, GRN/BR, LF

## (undated) DEVICE — WOUND SYSTEM, DEBRIDEMENT & CLEANING, O.R DUOPAK

## (undated) DEVICE — PILLOW, ABDUCTION, LARGE, 25 X 18 X 6.25 IN

## (undated) DEVICE — CEMENT MIX KIT, REVOLUTION, W BREAKAWAY-HIP

## (undated) DEVICE — MANIFOLD, 4 PORT NEPTUNE STANDARD